# Patient Record
Sex: FEMALE | Race: WHITE | Employment: FULL TIME | URBAN - METROPOLITAN AREA
[De-identification: names, ages, dates, MRNs, and addresses within clinical notes are randomized per-mention and may not be internally consistent; named-entity substitution may affect disease eponyms.]

---

## 2017-06-06 ENCOUNTER — TELEPHONE (OUTPATIENT)
Dept: GYNECOLOGY | Age: 62
End: 2017-06-06

## 2017-06-06 DIAGNOSIS — C54.9 MALIGNANT NEOPLASM OF CORPUS UTERI, EXCEPT ISTHMUS (HCC): Primary | ICD-10-CM

## 2017-06-27 LAB
ALBUMIN SERPL-MCNC: 4.3 G/DL (ref 3.6–4.8)
ALBUMIN/GLOB SERPL: 1.9 {RATIO} (ref 1.2–2.2)
ALP SERPL-CCNC: 75 IU/L (ref 39–117)
ALT SERPL-CCNC: 15 IU/L (ref 0–32)
AST SERPL-CCNC: 14 IU/L (ref 0–40)
BASOPHILS # BLD AUTO: 0 X10E3/UL (ref 0–0.2)
BASOPHILS NFR BLD AUTO: 0 %
BILIRUB SERPL-MCNC: 0.7 MG/DL (ref 0–1.2)
BUN SERPL-MCNC: 10 MG/DL (ref 8–27)
BUN/CREAT SERPL: 21 (ref 12–28)
CALCIUM SERPL-MCNC: 9 MG/DL (ref 8.7–10.3)
CANCER AG125 SERPL-ACNC: 7.4 U/ML (ref 0–38.1)
CHLORIDE SERPL-SCNC: 102 MMOL/L (ref 96–106)
CO2 SERPL-SCNC: 24 MMOL/L (ref 18–29)
CREAT SERPL-MCNC: 0.48 MG/DL (ref 0.57–1)
EOSINOPHIL # BLD AUTO: 0.1 X10E3/UL (ref 0–0.4)
EOSINOPHIL NFR BLD AUTO: 3 %
ERYTHROCYTE [DISTWIDTH] IN BLOOD BY AUTOMATED COUNT: 14 % (ref 12.3–15.4)
GLOBULIN SER CALC-MCNC: 2.3 G/DL (ref 1.5–4.5)
GLUCOSE SERPL-MCNC: 156 MG/DL (ref 65–99)
HCT VFR BLD AUTO: 42.3 % (ref 34–46.6)
HGB BLD-MCNC: 14.4 G/DL (ref 11.1–15.9)
IMM GRANULOCYTES # BLD: 0 X10E3/UL (ref 0–0.1)
IMM GRANULOCYTES NFR BLD: 0 %
LYMPHOCYTES # BLD AUTO: 1.7 X10E3/UL (ref 0.7–3.1)
LYMPHOCYTES NFR BLD AUTO: 35 %
MCH RBC QN AUTO: 30.1 PG (ref 26.6–33)
MCHC RBC AUTO-ENTMCNC: 34 G/DL (ref 31.5–35.7)
MCV RBC AUTO: 89 FL (ref 79–97)
MONOCYTES # BLD AUTO: 0.4 X10E3/UL (ref 0.1–0.9)
MONOCYTES NFR BLD AUTO: 8 %
NEUTROPHILS # BLD AUTO: 2.6 X10E3/UL (ref 1.4–7)
NEUTROPHILS NFR BLD AUTO: 54 %
PLATELET # BLD AUTO: 231 X10E3/UL (ref 150–379)
POTASSIUM SERPL-SCNC: 3.8 MMOL/L (ref 3.5–5.2)
PROT SERPL-MCNC: 6.6 G/DL (ref 6–8.5)
RBC # BLD AUTO: 4.78 X10E6/UL (ref 3.77–5.28)
SODIUM SERPL-SCNC: 140 MMOL/L (ref 134–144)
WBC # BLD AUTO: 4.9 X10E3/UL (ref 3.4–10.8)

## 2017-06-29 ENCOUNTER — OFFICE VISIT (OUTPATIENT)
Dept: GYNECOLOGY | Age: 62
End: 2017-06-29

## 2017-06-29 VITALS
DIASTOLIC BLOOD PRESSURE: 78 MMHG | SYSTOLIC BLOOD PRESSURE: 129 MMHG | WEIGHT: 178.4 LBS | HEART RATE: 100 BPM | BODY MASS INDEX: 29.72 KG/M2 | HEIGHT: 65 IN

## 2017-06-29 DIAGNOSIS — R31.9 HEMATURIA: ICD-10-CM

## 2017-06-29 DIAGNOSIS — C54.9 MALIGNANT NEOPLASM OF CORPUS UTERI, EXCEPT ISTHMUS (HCC): Primary | ICD-10-CM

## 2017-06-29 RX ORDER — RANITIDINE 150 MG/1
TABLET, FILM COATED ORAL
COMMUNITY
Start: 2011-09-29 | End: 2022-10-04

## 2017-06-29 NOTE — PROGRESS NOTES
[address] [place] [address] Mathias Moritz [address], [address] [facility]  [phone]  MD Dashawn Wynn MD  Office Visit    Patient ID:  Name: Marci Cornejo  MRN: [id number]  : 1955/62 y.o. Visit date: 2017    INTERVAL HISTORY:  Marci Cornejo is a  female with stage IA, grade 3 uterine carcinoma. She underwent laparoscopic hysterectomy with staging in 2013. She was recommended six cycles of adjuvant Taxol and Carboplatin, which she completed. We elected not to treat with pelvic radiation therapy due to her difficulty with chemotherapy. She presents today for routine surveillance. Her most recent CA-125 is normal and her most recent CT showed no evidence of disease. She reports some bleeding this morning. She does have hemorrhoids, but she doesn't think it was from there. She is a bit concerned about it. PMH:  Past Medical History:   Diagnosis Date    Abnormal Pap smear     with f/u normal    Anemia     Arthritis     Asthma     Cancer ([facility] [address]     ENDOMETRIAL    Environmental allergies     GERD (gastroesophageal reflux disease)     Goiter     Other unknown and unspecified cause of morbidity or mortality     POSSIBLE ESOPHAGEAL SPASM, ?  OBSTRUCTION DUE TO GOITER    PMB (postmenopausal bleeding)     S/P chemotherapy, time since greater than 12 weeks     LAST CHEMO 10/2014 PER PATIENT    Thyroid disease     goiter     PSH:  Past Surgical History:   Procedure Laterality Date    BREAST SURGERY PROCEDURE UNLISTED  16    right breast bx    HX APPENDECTOMY      HX BUNIONECTOMY      HX GYN  3/13/13    TLH/BSO    HX HEENT  13    TOOTH REMOVED    HX ORTHOPAEDIC      BUNIONECTOMY    HX VASCULAR ACCESS      port     SOC:  Social History     Social History    Marital status:      Spouse name: N/A    Number of children: N/A    Years of education: N/A     Occupational History    Not on file. Social History Main Topics    Smoking status: Never Smoker    Smokeless tobacco: Never Used    Alcohol use 0.0 oz/week      Comment: RARE OCC    Drug use: No    Sexual activity: Not on file     Other Topics Concern    Not on file     Social History Narrative     Family History  Family History   Problem Relation Age of Onset    Cancer Maternal Aunt      breast    Heart Disease Mother     Diabetes Father     Cancer Sister      CERVICAL    Kidney Disease Brother     Diabetes Brother     Heart Attack Other     Arrhythmia Brother     Diabetes Brother     Anesth Problems Neg Hx      Medications:  Current Outpatient Prescriptions on File Prior to Visit   Medication Sig Dispense Refill    valACYclovir (VALTREX) 1 gram tablet Take 1 Tab by mouth three (3) times daily. 21 Tab 3    EPINEPHrine (EPIPEN) 0.3 mg/0.3 mL injection 0.3 mg by IntraMUSCular route once as needed.  Cetirizine (ZYRTEC) 10 mg cap Take 10 mg by mouth nightly.  SAL ACID/UREA/PETROLATUM,WHITE (KERASAL FOOT EX) by Apply Externally route daily as needed.  ACCU-CHEK HELDER PLUS TEST STRP strip   0    NITROSTAT 0.4 mg SL tablet       CALCIUM CARBONATE (MARCELLO-SELTZER ANTACID PO) Take  by mouth daily as needed.  ibuprofen (MOTRIN) 200 mg tablet Take 200 mg by mouth every six (6) hours as needed.  cyclobenzaprine (FLEXERIL) 5 mg tablet take 1 tablet by mouth three times a day if needed  0    HYDROcodone-acetaminophen (NORCO) 5-325 mg per tablet       oxyCODONE-acetaminophen (PERCOCET) 5-325 mg per tablet Take 1 Tab by mouth every four (4) hours as needed for Pain. Max Daily Amount: 6 Tabs. 20 Tab 0    ketotifen (ZADITOR) 0.025 % (0.035 %) ophthalmic solution Administer 1 Drop to both eyes every morning. No current facility-administered medications on file prior to visit. Allergies:   Allergies   Allergen Reactions    Latex Other (comments)     Burns skin    Acetone Shortness of Breath    Amoxicillin Anaphylaxis    Pcn [Penicillins] Anaphylaxis    Azithromycin Hives    Egg Nausea Only    Other Medication Rash     POPPY seeds       ROS:  Negative     OBJECTIVE:    PHYSICAL EXAM  VITAL SIGNS: Visit Vitals    /78 (BP 1 Location: Right arm, BP Patient Position: Sitting)    Pulse 100    Ht 5' 5\" (1.651 m)    Wt 80.9 kg (178 lb 6.4 oz)    BMI 29.69 kg/m2      GENERAL KAIA: in no apparent distress and well developed and well nourished   HEENT: within normal limits   RESPIRATORY: lungs clear to auscultation   CARDIOVASC: Regular rate and rhythm   GASTROINT: soft, non-tender, without masses or organomegaly; vesicular rash on the left lower back   MUSCULOSKEL: no joint tenderness, deformity or swelling   EXTREMITIES: extremities normal, atraumatic, no cyanosis or edema   PELVIC: Normal external genitalia. Normal vagina. Uterus, cervix, and adnexa surgically absent. No masses or nodularity on examination. She does have some vaginal prolapse. RECTAL: Exam deferred. PRIYANKA SURVEY: Cervical, supraclavicular, and axillary nodes normal.   NEURO: Grossly normal       CT of abdomen/pelvis (12/20/16)  The visualized portions of the lung bases are clear.     There are no focal abnormalities within the liver, spleen, pancreas, adrenal  glands or kidneys. No gallstones are noted.     The aorta tapers without aneurysm. There is no retroperitoneal adenopathy or  mass. There is no ascites or free intraperitoneal air.     There is no small or large bowel distention. The appendix is surgically absent.      The uterus and ovaries are surgically absent. There is no pelvic mass or  adenopathy.     Mild degenerative changes are noted in the lumbar spine.       IMPRESSION: Status post hysterectomy, bilateral oophorectomy, and appendectomy. No acute abnormality.       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 She completed six cycles of adjuvant Taxol and Carboplatin chemotherapy and her post-treatment CT scan showed no evidence of disease. She declined adjuvant pelvic radiation. She has no evidence of disease on today's exam.  I reassured her that I did not see anything vaginally that would suggest disease. I think the bleeding was most likely due to her hemorrhoids. I am going to send her for a UA to rule out a urinary source.        Andreea Carlton MD  6/29/2017/2:05 PM

## 2017-06-29 NOTE — PROGRESS NOTES
Six month check up, pt has had recent GI issues, abdominal bloating and  noticed blood when she wiped this morning. She thinks it was vaginal. Last colonoscopy in 1/2015.

## 2017-06-30 LAB
APPEARANCE UR: CLEAR
BILIRUB UR QL STRIP: NEGATIVE
COLOR UR: YELLOW
GLUCOSE UR QL: ABNORMAL
HGB UR QL STRIP: NEGATIVE
KETONES UR QL STRIP: NEGATIVE
LEUKOCYTE ESTERASE UR QL STRIP: NEGATIVE
MICRO URNS: ABNORMAL
NITRITE UR QL STRIP: NEGATIVE
PH UR STRIP: 6 [PH] (ref 5–7.5)
PROT UR QL STRIP: NEGATIVE
SP GR UR: 1.01 (ref 1–1.03)
UROBILINOGEN UR STRIP-MCNC: 0.2 MG/DL (ref 0.2–1)

## 2017-07-06 LAB
CYTOLOGIST CVX/VAG CYTO: NORMAL
CYTOLOGY CVX/VAG DOC THIN PREP: NORMAL
LABCORP, 190119: NORMAL
Lab: NORMAL
OTHER STN SPEC: NORMAL
PATH REPORT.FINAL DX SPEC: NORMAL
STAT OF ADQ CVX/VAG CYTO-IMP: NORMAL

## 2017-07-13 ENCOUNTER — TELEPHONE (OUTPATIENT)
Dept: GYNECOLOGY | Age: 62
End: 2017-07-13

## 2017-07-19 NOTE — TELEPHONE ENCOUNTER
Pt calling back to get pap and urinalysis results. Pt informed pap was normal and urine showed a trace of glucose. She was encouraged to see her PCP as she feels her thyroid may also be low.

## 2017-11-18 ENCOUNTER — HOSPITAL ENCOUNTER (OUTPATIENT)
Dept: ULTRASOUND IMAGING | Age: 62
Discharge: HOME | End: 2017-11-18
Attending: OTOLARYNGOLOGY
Payer: COMMERCIAL

## 2017-11-18 DIAGNOSIS — E04.1 THYROID NODULE: ICD-10-CM

## 2017-11-18 DIAGNOSIS — Z78.9 NONSMOKER: ICD-10-CM

## 2017-11-18 PROCEDURE — 76536 US EXAM OF HEAD AND NECK: CPT

## 2017-12-05 ENCOUNTER — TELEPHONE (OUTPATIENT)
Dept: GYNECOLOGY | Age: 62
End: 2017-12-05

## 2017-12-05 DIAGNOSIS — C54.9 MALIGNANT NEOPLASM OF CORPUS UTERI, EXCEPT ISTHMUS (HCC): Primary | ICD-10-CM

## 2017-12-12 LAB
ALBUMIN SERPL-MCNC: 4.3 G/DL (ref 3.6–4.8)
ALBUMIN/GLOB SERPL: 1.7 {RATIO} (ref 1.2–2.2)
ALP SERPL-CCNC: 76 IU/L (ref 39–117)
ALT SERPL-CCNC: 16 IU/L (ref 0–32)
AST SERPL-CCNC: 14 IU/L (ref 0–40)
BASOPHILS # BLD AUTO: 0 X10E3/UL (ref 0–0.2)
BASOPHILS NFR BLD AUTO: 0 %
BILIRUB SERPL-MCNC: 0.7 MG/DL (ref 0–1.2)
BUN SERPL-MCNC: 10 MG/DL (ref 8–27)
BUN/CREAT SERPL: 21 (ref 12–28)
CALCIUM SERPL-MCNC: 8.8 MG/DL (ref 8.7–10.3)
CANCER AG125 SERPL-ACNC: 6.8 U/ML (ref 0–38.1)
CHLORIDE SERPL-SCNC: 102 MMOL/L (ref 96–106)
CO2 SERPL-SCNC: 25 MMOL/L (ref 18–29)
CREAT SERPL-MCNC: 0.48 MG/DL (ref 0.57–1)
EOSINOPHIL # BLD AUTO: 0.1 X10E3/UL (ref 0–0.4)
EOSINOPHIL NFR BLD AUTO: 2 %
ERYTHROCYTE [DISTWIDTH] IN BLOOD BY AUTOMATED COUNT: 13.9 % (ref 12.3–15.4)
GFR SERPLBLD CREATININE-BSD FMLA CKD-EPI: 106 ML/MIN/1.73
GFR SERPLBLD CREATININE-BSD FMLA CKD-EPI: 122 ML/MIN/1.73
GLOBULIN SER CALC-MCNC: 2.5 G/DL (ref 1.5–4.5)
GLUCOSE SERPL-MCNC: 147 MG/DL (ref 65–99)
HCT VFR BLD AUTO: 43.4 % (ref 34–46.6)
HGB BLD-MCNC: 14.4 G/DL (ref 11.1–15.9)
IMM GRANULOCYTES # BLD: 0 X10E3/UL (ref 0–0.1)
IMM GRANULOCYTES NFR BLD: 0 %
LYMPHOCYTES # BLD AUTO: 1.7 X10E3/UL (ref 0.7–3.1)
LYMPHOCYTES NFR BLD AUTO: 25 %
MCH RBC QN AUTO: 30.1 PG (ref 26.6–33)
MCHC RBC AUTO-ENTMCNC: 33.2 G/DL (ref 31.5–35.7)
MCV RBC AUTO: 91 FL (ref 79–97)
MONOCYTES # BLD AUTO: 0.6 X10E3/UL (ref 0.1–0.9)
MONOCYTES NFR BLD AUTO: 8 %
NEUTROPHILS # BLD AUTO: 4.2 X10E3/UL (ref 1.4–7)
NEUTROPHILS NFR BLD AUTO: 65 %
PLATELET # BLD AUTO: 239 X10E3/UL (ref 150–379)
POTASSIUM SERPL-SCNC: 4.2 MMOL/L (ref 3.5–5.2)
PROT SERPL-MCNC: 6.8 G/DL (ref 6–8.5)
RBC # BLD AUTO: 4.78 X10E6/UL (ref 3.77–5.28)
SODIUM SERPL-SCNC: 141 MMOL/L (ref 134–144)
WBC # BLD AUTO: 6.6 X10E3/UL (ref 3.4–10.8)

## 2017-12-14 ENCOUNTER — OFFICE VISIT (OUTPATIENT)
Dept: GYNECOLOGY | Age: 62
End: 2017-12-14

## 2017-12-14 VITALS
SYSTOLIC BLOOD PRESSURE: 130 MMHG | HEART RATE: 80 BPM | WEIGHT: 175.8 LBS | BODY MASS INDEX: 29.29 KG/M2 | HEIGHT: 65 IN | DIASTOLIC BLOOD PRESSURE: 80 MMHG

## 2017-12-14 DIAGNOSIS — C54.9 MALIGNANT NEOPLASM OF CORPUS UTERI, EXCEPT ISTHMUS (HCC): Primary | ICD-10-CM

## 2017-12-14 RX ORDER — EPINASTINE HYDROCHLORIDE 0.5 MG/ML
SOLUTION/ DROPS OPHTHALMIC
COMMUNITY
End: 2022-10-04

## 2017-12-14 NOTE — PROGRESS NOTES
[address] [place] [address] Mathias Mor[address], [address] [facility]  [phone]  MD Morris Mondragon MD  Office Visit    Patient ID:  Name: Lui Reynoso  MRN: [id number]  : 62 y.o. Visit date: 2017    INTERVAL HISTORY:  Lui Reynoso is a  female with stage IA, grade 3 uterine carcinoma. She underwent laparoscopic hysterectomy with staging in 2013. She was recommended six cycles of adjuvant Taxol and Carboplatin, which she completed. We elected not to treat with pelvic radiation therapy due to her difficulty with chemotherapy. She presents today for routine surveillance. Her most recent CA-125 is normal and her most recent CT showed no evidence of disease. She is doing well and is without complaints. She denies any bleeding or discharge. She denies any bowel or bladder complaints. PMH:  Past Medical History:   Diagnosis Date    Abnormal Pap smear     with f/u normal    Anemia     Arthritis     Asthma     Cancer ([facility] [address]     ENDOMETRIAL    Environmental allergies     GERD (gastroesophageal reflux disease)     Goiter     Other unknown and unspecified cause of morbidity or mortality     POSSIBLE ESOPHAGEAL SPASM, ? OBSTRUCTION DUE TO GOITER    PMB (postmenopausal bleeding)     S/P chemotherapy, time since greater than 12 weeks     LAST CHEMO 10/2014 PER PATIENT    Thyroid disease     goiter     PSH:  Past Surgical History:   Procedure Laterality Date    BREAST SURGERY PROCEDURE UNLISTED  16    right breast bx    HX APPENDECTOMY      HX BUNIONECTOMY      HX GYN  3/13/13    TLH/BSO    HX HEENT  13    TOOTH REMOVED    HX ORTHOPAEDIC      BUNIONECTOMY    HX VASCULAR ACCESS      port     SOC:  Social History     Social History    Marital status:      Spouse name: N/A    Number of children: N/A    Years of education: N/A     Occupational History    Not on file. Social History Main Topics    Smoking status: Never Smoker    Smokeless tobacco: Never Used    Alcohol use 0.0 oz/week      Comment: RARE OCC    Drug use: No    Sexual activity: Not on file     Other Topics Concern    Not on file     Social History Narrative     Family History  Family History   Problem Relation Age of Onset    Cancer Maternal Aunt      breast    Heart Disease Mother     Diabetes Father     Cancer Sister      CERVICAL    Kidney Disease Brother     Diabetes Brother     Heart Attack Other     Arrhythmia Brother     Diabetes Brother     Anesth Problems Neg Hx      Medications:  Current Outpatient Prescriptions on File Prior to Visit   Medication Sig Dispense Refill    raNITIdine (ZANTAC) 150 mg tablet ZANTAC TABS      valACYclovir (VALTREX) 1 gram tablet Take 1 Tab by mouth three (3) times daily. 21 Tab 3    EPINEPHrine (EPIPEN) 0.3 mg/0.3 mL injection 0.3 mg by IntraMUSCular route once as needed.  Cetirizine (ZYRTEC) 10 mg cap Take 10 mg by mouth nightly.  SAL ACID/UREA/PETROLATUM,WHITE (KERASAL FOOT EX) by Apply Externally route daily as needed.  ACCU-CHEK HELDER PLUS TEST STRP strip   0    NITROSTAT 0.4 mg SL tablet       CALCIUM CARBONATE (MARCELLO-SELTZER ANTACID PO) Take  by mouth daily as needed.  ibuprofen (MOTRIN) 200 mg tablet Take 200 mg by mouth every six (6) hours as needed.  cyclobenzaprine (FLEXERIL) 5 mg tablet take 1 tablet by mouth three times a day if needed  0    HYDROcodone-acetaminophen (NORCO) 5-325 mg per tablet       oxyCODONE-acetaminophen (PERCOCET) 5-325 mg per tablet Take 1 Tab by mouth every four (4) hours as needed for Pain. Max Daily Amount: 6 Tabs. 20 Tab 0    ketotifen (ZADITOR) 0.025 % (0.035 %) ophthalmic solution Administer 1 Drop to both eyes every morning. No current facility-administered medications on file prior to visit. Allergies:   Allergies   Allergen Reactions    Latex Other (comments)     Burns skin  Acetone Shortness of Breath    Amoxicillin Anaphylaxis    Pcn [Penicillins] Anaphylaxis    Azithromycin Hives    Egg Nausea Only    Other Medication Rash     POPPY seeds       ROS:  Negative     OBJECTIVE:    PHYSICAL EXAM  VITAL SIGNS: Visit Vitals    /80 (BP 1 Location: Left arm, BP Patient Position: Sitting)    Pulse 80    Ht 5' 5\" (1.651 m)    Wt 175 lb 12.8 oz (79.7 kg)    BMI 29.25 kg/m2      GENERAL KAIA: in no apparent distress and well developed and well nourished   HEENT: within normal limits   RESPIRATORY: lungs clear to auscultation   CARDIOVASC: Regular rate and rhythm   GASTROINT: soft, non-tender, without masses or organomegaly; vesicular rash on the left lower back   MUSCULOSKEL: no joint tenderness, deformity or swelling   EXTREMITIES: extremities normal, atraumatic, no cyanosis or edema   PELVIC: Normal external genitalia. Normal vagina. Uterus, cervix, and adnexa surgically absent. No masses or nodularity on examination. She does have some vaginal prolapse. RECTAL: Exam deferred. PRIYANKA SURVEY: Cervical, supraclavicular, and axillary nodes normal.   NEURO: Grossly normal       CT of abdomen/pelvis (12/20/16)  The visualized portions of the lung bases are clear.     There are no focal abnormalities within the liver, spleen, pancreas, adrenal  glands or kidneys. No gallstones are noted.     The aorta tapers without aneurysm. There is no retroperitoneal adenopathy or  mass. There is no ascites or free intraperitoneal air.     There is no small or large bowel distention. The appendix is surgically absent.      The uterus and ovaries are surgically absent. There is no pelvic mass or  adenopathy.     Mild degenerative changes are noted in the lumbar spine.       IMPRESSION: Status post hysterectomy, bilateral oophorectomy, and appendectomy.   No acute abnormality.       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 She completed six cycles of adjuvant Taxol and Carboplatin chemotherapy and her post-treatment CT scan showed no evidence of disease. She declined adjuvant pelvic radiation.   She has no evidence of disease on today's exam.       Shalini Gil MD  12/14/2017/2:05 PM

## 2017-12-19 ENCOUNTER — HOSPITAL ENCOUNTER (OUTPATIENT)
Dept: ULTRASOUND IMAGING | Age: 62
Discharge: HOME | End: 2017-12-19
Attending: OTOLARYNGOLOGY
Payer: COMMERCIAL

## 2017-12-19 DIAGNOSIS — E04.1 THYROID NODULE: ICD-10-CM

## 2017-12-19 PROCEDURE — 10022 US GUIDE FINE NDL ASP W IMAGE: CPT

## 2017-12-19 PROCEDURE — 74011000250 HC RX REV CODE- 250: Performed by: RADIOLOGY

## 2017-12-19 PROCEDURE — 88172 CYTP DX EVAL FNA 1ST EA SITE: CPT | Performed by: OTOLARYNGOLOGY

## 2017-12-19 PROCEDURE — 88173 CYTOPATH EVAL FNA REPORT: CPT | Performed by: OTOLARYNGOLOGY

## 2017-12-19 RX ORDER — LIDOCAINE HYDROCHLORIDE 10 MG/ML
10 INJECTION, SOLUTION EPIDURAL; INFILTRATION; INTRACAUDAL; PERINEURAL
Status: COMPLETED | OUTPATIENT
Start: 2017-12-19 | End: 2017-12-19

## 2017-12-19 RX ADMIN — LIDOCAINE HYDROCHLORIDE 5 ML: 10 INJECTION, SOLUTION EPIDURAL; INFILTRATION; INTRACAUDAL; PERINEURAL at 12:02

## 2017-12-19 NOTE — DISCHARGE INSTRUCTIONS
[address] [facility]  Department of Radiology  [phone] Ultrasound Department  [phone] Emergency Department    BIOPSY DISCHARGE INSTRUCTIONS for Danii Rodríguez on 12/19/17    General Instructions:  - A biopsy is the removal of a small piece of tissue for microscopic examination or testing.  - Healthy tissue can be obtained for the purpose of tissue-type matching for transplants. - Unhealthy tissues are more commonly biopsied to diagnose disease. General Biopsy:  - A mass can grow in any area of the body, and we are taking a specimen as ordered by your doctor. - The risks are the same. They include bleeding, pain, and infection. Home Care Instructions:  - You may resume your regular diet and medication regimen. - You may use over the counter acetaminophen (Tylenol) or ibuprofen (Advil) for the soreness. - You may apply an ice pack to the affected area for 20-30 minutes at time for the first 24 hours. -- After that, you may apply a heat pack. - You may remove the bandaid(s) tonight. - You may take a shower tonight. - Please keep the site clean and dry for 24-48 hours. - Do not soak in any kind of water (bath tub, hot tub, pool, ocean, etc) for 24-48 hours. - Do not participate in any kind of activity making you vigorously sweat for 24-48 hours. - Do not use any moisturizer/makeup/perfume on the site for 24-48 hours. Call If:  - You should call your doctor if you have any questions or concerns about the biopsy site. - Call if you should have increased pain, fever, redness, drainage, or bleeding more than a small spot on the bandage. Follow-Up Instructions:  - Please see your doctor as he has requested.

## 2017-12-22 LAB
CYTOLOGIST CVX/VAG CYTO: NORMAL
CYTOLOGY CVX/VAG DOC THIN PREP: NORMAL
DX ICD CODE: NORMAL
LABCORP, 190119: NORMAL
Lab: NORMAL
Lab: NORMAL
OTHER STN SPEC: NORMAL
PATH REPORT.FINAL DX SPEC: NORMAL
STAT OF ADQ CVX/VAG CYTO-IMP: NORMAL

## 2018-06-14 ENCOUNTER — TELEPHONE (OUTPATIENT)
Dept: GYNECOLOGY | Age: 63
End: 2018-06-14

## 2018-06-15 DIAGNOSIS — C54.9 MALIGNANT NEOPLASM OF CORPUS UTERI, EXCEPT ISTHMUS (HCC): Primary | ICD-10-CM

## 2018-06-19 LAB
ALBUMIN SERPL-MCNC: 4.3 G/DL (ref 3.6–4.8)
ALBUMIN/GLOB SERPL: 1.7 {RATIO} (ref 1.2–2.2)
ALP SERPL-CCNC: 69 IU/L (ref 39–117)
ALT SERPL-CCNC: 17 IU/L (ref 0–32)
AST SERPL-CCNC: 16 IU/L (ref 0–40)
BASOPHILS # BLD AUTO: 0 X10E3/UL (ref 0–0.2)
BASOPHILS NFR BLD AUTO: 1 %
BILIRUB SERPL-MCNC: 0.4 MG/DL (ref 0–1.2)
BUN SERPL-MCNC: 12 MG/DL (ref 8–27)
BUN/CREAT SERPL: 23 (ref 12–28)
CALCIUM SERPL-MCNC: 9 MG/DL (ref 8.7–10.3)
CANCER AG125 SERPL-ACNC: 6.9 U/ML (ref 0–38.1)
CHLORIDE SERPL-SCNC: 104 MMOL/L (ref 96–106)
CO2 SERPL-SCNC: 23 MMOL/L (ref 20–29)
CREAT SERPL-MCNC: 0.52 MG/DL (ref 0.57–1)
EOSINOPHIL # BLD AUTO: 0.1 X10E3/UL (ref 0–0.4)
EOSINOPHIL NFR BLD AUTO: 2 %
ERYTHROCYTE [DISTWIDTH] IN BLOOD BY AUTOMATED COUNT: 13.6 % (ref 12.3–15.4)
GFR SERPLBLD CREATININE-BSD FMLA CKD-EPI: 103 ML/MIN/1.73
GFR SERPLBLD CREATININE-BSD FMLA CKD-EPI: 118 ML/MIN/1.73
GLOBULIN SER CALC-MCNC: 2.5 G/DL (ref 1.5–4.5)
GLUCOSE SERPL-MCNC: 146 MG/DL (ref 65–99)
HCT VFR BLD AUTO: 43.7 % (ref 34–46.6)
HGB BLD-MCNC: 14.6 G/DL (ref 11.1–15.9)
IMM GRANULOCYTES # BLD: 0 X10E3/UL (ref 0–0.1)
IMM GRANULOCYTES NFR BLD: 0 %
LYMPHOCYTES # BLD AUTO: 1.8 X10E3/UL (ref 0.7–3.1)
LYMPHOCYTES NFR BLD AUTO: 33 %
MCH RBC QN AUTO: 30.6 PG (ref 26.6–33)
MCHC RBC AUTO-ENTMCNC: 33.4 G/DL (ref 31.5–35.7)
MCV RBC AUTO: 92 FL (ref 79–97)
MONOCYTES # BLD AUTO: 0.5 X10E3/UL (ref 0.1–0.9)
MONOCYTES NFR BLD AUTO: 10 %
NEUTROPHILS # BLD AUTO: 2.9 X10E3/UL (ref 1.4–7)
NEUTROPHILS NFR BLD AUTO: 54 %
PLATELET # BLD AUTO: 222 X10E3/UL (ref 150–379)
POTASSIUM SERPL-SCNC: 4.1 MMOL/L (ref 3.5–5.2)
PROT SERPL-MCNC: 6.8 G/DL (ref 6–8.5)
RBC # BLD AUTO: 4.77 X10E6/UL (ref 3.77–5.28)
SODIUM SERPL-SCNC: 142 MMOL/L (ref 134–144)
WBC # BLD AUTO: 5.4 X10E3/UL (ref 3.4–10.8)

## 2018-06-26 ENCOUNTER — OFFICE VISIT (OUTPATIENT)
Dept: GYNECOLOGY | Age: 63
End: 2018-06-26

## 2018-06-26 VITALS
BODY MASS INDEX: 29.06 KG/M2 | DIASTOLIC BLOOD PRESSURE: 82 MMHG | HEIGHT: 65 IN | HEART RATE: 80 BPM | SYSTOLIC BLOOD PRESSURE: 142 MMHG | WEIGHT: 174.4 LBS

## 2018-06-26 DIAGNOSIS — C54.9 MALIGNANT NEOPLASM OF CORPUS UTERI, EXCEPT ISTHMUS (HCC): Primary | ICD-10-CM

## 2018-06-26 NOTE — PROGRESS NOTES
Six month check up. Patient states no abnormal spotting or bleeding. 1. Have you been to the ER, urgent care clinic since your last visit? Hospitalized since your last visit? No    2. Have you seen or consulted any other health care providers outside of the [address] [place] [address] since your last visit? Include any pap smears or colon screening.  No

## 2018-06-26 NOTE — PROGRESS NOTES
[address] [place] [address] Mathias Mor[address], [address] [facility]  [phone]  MD Romelia Licea MD  Office Visit    Patient ID:  Name: Vince Epstein  MRN: [id number]  : 1955/63 y.o. Visit date: 2018    INTERVAL HISTORY:  Vince Epstein is a  female with stage IA, grade 3 uterine carcinoma. She underwent laparoscopic hysterectomy with staging in 2013. She was recommended six cycles of adjuvant Taxol and Carboplatin, which she completed. We elected not to treat with pelvic radiation therapy due to her difficulty with chemotherapy. She presents today for routine surveillance. Her most recent CA-125 is normal and her most recent CT showed no evidence of disease. She is doing well and is without complaints. She denies any bleeding or discharge. She denies any bowel or bladder complaints. PMH:  Past Medical History:   Diagnosis Date    Abnormal Pap smear     with f/u normal    Anemia     Arthritis     Asthma     Cancer ([facility] [address]     ENDOMETRIAL    Environmental allergies     GERD (gastroesophageal reflux disease)     Goiter     Other unknown and unspecified cause of morbidity or mortality     POSSIBLE ESOPHAGEAL SPASM, ? OBSTRUCTION DUE TO GOITER    PMB (postmenopausal bleeding)     S/P chemotherapy, time since greater than 12 weeks     LAST CHEMO 10/2014 PER PATIENT    Thyroid disease     goiter     PSH:  Past Surgical History:   Procedure Laterality Date    BREAST SURGERY PROCEDURE UNLISTED  16    right breast bx    HX APPENDECTOMY      HX BUNIONECTOMY      HX GYN  3/13/13    TLH/BSO    HX HEENT  13    TOOTH REMOVED    HX ORTHOPAEDIC      BUNIONECTOMY    HX VASCULAR ACCESS      port     SOC:  Social History     Social History    Marital status:      Spouse name: N/A    Number of children: N/A    Years of education: N/A     Occupational History    Not on file. Social History Main Topics    Smoking status: Never Smoker    Smokeless tobacco: Never Used    Alcohol use 0.0 oz/week      Comment: RARE OCC    Drug use: No    Sexual activity: Not on file     Other Topics Concern    Not on file     Social History Narrative     Family History  Family History   Problem Relation Age of Onset    Cancer Maternal Aunt      breast    Heart Disease Mother     Diabetes Father     Cancer Sister      CERVICAL    Kidney Disease Brother     Diabetes Brother     Heart Attack Other     Arrhythmia Brother     Diabetes Brother     Anesth Problems Neg Hx      Medications:  Current Outpatient Prescriptions on File Prior to Visit   Medication Sig Dispense Refill    raNITIdine (ZANTAC) 150 mg tablet ZANTAC TABS      valACYclovir (VALTREX) 1 gram tablet Take 1 Tab by mouth three (3) times daily. 21 Tab 3    EPINEPHrine (EPIPEN) 0.3 mg/0.3 mL injection 0.3 mg by IntraMUSCular route once as needed.  ketotifen (ZADITOR) 0.025 % (0.035 %) ophthalmic solution Administer 1 Drop to both eyes every morning.  Cetirizine (ZYRTEC) 10 mg cap Take 10 mg by mouth nightly.  SAL ACID/UREA/PETROLATUM,WHITE (KERASAL FOOT EX) by Apply Externally route daily as needed.  ACCU-CHEK HELDER PLUS TEST STRP strip   0    NITROSTAT 0.4 mg SL tablet       CALCIUM CARBONATE (MARCELLO-SELTZER ANTACID PO) Take  by mouth daily as needed.  ibuprofen (MOTRIN) 200 mg tablet Take 200 mg by mouth every six (6) hours as needed.  epinastine 0.05 % drop Apply  to eye.  cyclobenzaprine (FLEXERIL) 5 mg tablet take 1 tablet by mouth three times a day if needed  0     No current facility-administered medications on file prior to visit. Allergies:   Allergies   Allergen Reactions    Latex Other (comments)     Burns skin    Acetone Shortness of Breath    Amoxicillin Anaphylaxis    Pcn [Penicillins] Anaphylaxis    Azithromycin Hives    Egg Nausea Only    Other Medication Rash POPPY seeds       ROS:  Negative     OBJECTIVE:    PHYSICAL EXAM  VITAL SIGNS: Visit Vitals    /82 (BP 1 Location: Right arm, BP Patient Position: Sitting)    Pulse 80    Ht 5' 5\" (1.651 m)    Wt 174 lb 6.4 oz (79.1 kg)    BMI 29.02 kg/m2      GENERAL KAIA: in no apparent distress and well developed and well nourished   HEENT: within normal limits   RESPIRATORY: lungs clear to auscultation   CARDIOVASC: Regular rate and rhythm   GASTROINT: soft, non-tender, without masses or organomegaly; vesicular rash on the left lower back   MUSCULOSKEL: no joint tenderness, deformity or swelling   EXTREMITIES: extremities normal, atraumatic, no cyanosis or edema   PELVIC: Normal external genitalia. Normal vagina. Uterus, cervix, and adnexa surgically absent. No masses or nodularity on examination. She does have some vaginal prolapse. RECTAL: Exam deferred. PRIYANKA SURVEY: Cervical, supraclavicular, and axillary nodes normal.   NEURO: Grossly normal       CT of abdomen/pelvis (12/20/16)  The visualized portions of the lung bases are clear.     There are no focal abnormalities within the liver, spleen, pancreas, adrenal  glands or kidneys. No gallstones are noted.     The aorta tapers without aneurysm. There is no retroperitoneal adenopathy or  mass. There is no ascites or free intraperitoneal air.     There is no small or large bowel distention. The appendix is surgically absent.      The uterus and ovaries are surgically absent. There is no pelvic mass or  adenopathy.     Mild degenerative changes are noted in the lumbar spine.       IMPRESSION: Status post hysterectomy, bilateral oophorectomy, and appendectomy. No acute abnormality.       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   She completed six cycles of adjuvant Taxol and Carboplatin chemotherapy and her post-treatment CT scan showed no evidence of disease. She declined adjuvant pelvic radiation.   She has no evidence of disease on today's exam.       Maryanne Patterson., MD  6/26/2018/2:05 PM

## 2018-06-29 LAB
CYTOLOGIST CVX/VAG CYTO: NORMAL
CYTOLOGY CVX/VAG DOC THIN PREP: NORMAL
DX ICD CODE: NORMAL
LABCORP, 190119: NORMAL
Lab: NORMAL
OTHER STN SPEC: NORMAL
PATH REPORT.FINAL DX SPEC: NORMAL
STAT OF ADQ CVX/VAG CYTO-IMP: NORMAL

## 2018-12-17 ENCOUNTER — TELEPHONE (OUTPATIENT)
Dept: GYNECOLOGY | Age: 63
End: 2018-12-17

## 2018-12-17 DIAGNOSIS — C54.9 MALIGNANT NEOPLASM OF CORPUS UTERI, EXCEPT ISTHMUS (HCC): Primary | ICD-10-CM

## 2018-12-17 NOTE — TELEPHONE ENCOUNTER
The patient would like her lab orders faxed to the [facility] in [address] [facility]. She will be going Thursday or Friday of this week.

## 2018-12-21 LAB
ALBUMIN SERPL-MCNC: 4.4 G/DL (ref 3.6–4.8)
ALBUMIN/GLOB SERPL: 2 {RATIO} (ref 1.2–2.2)
ALP SERPL-CCNC: 74 IU/L (ref 39–117)
ALT SERPL-CCNC: 20 IU/L (ref 0–32)
AST SERPL-CCNC: 14 IU/L (ref 0–40)
BASOPHILS # BLD AUTO: 0 X10E3/UL (ref 0–0.2)
BASOPHILS NFR BLD AUTO: 0 %
BILIRUB SERPL-MCNC: 0.4 MG/DL (ref 0–1.2)
BUN SERPL-MCNC: 8 MG/DL (ref 8–27)
BUN/CREAT SERPL: 13 (ref 12–28)
CALCIUM SERPL-MCNC: 9.1 MG/DL (ref 8.7–10.3)
CANCER AG125 SERPL-ACNC: 7.8 U/ML (ref 0–38.1)
CHLORIDE SERPL-SCNC: 105 MMOL/L (ref 96–106)
CO2 SERPL-SCNC: 22 MMOL/L (ref 20–29)
CREAT SERPL-MCNC: 0.6 MG/DL (ref 0.57–1)
EOSINOPHIL # BLD AUTO: 0.1 X10E3/UL (ref 0–0.4)
EOSINOPHIL NFR BLD AUTO: 3 %
ERYTHROCYTE [DISTWIDTH] IN BLOOD BY AUTOMATED COUNT: 13.7 % (ref 12.3–15.4)
GLOBULIN SER CALC-MCNC: 2.2 G/DL (ref 1.5–4.5)
GLUCOSE SERPL-MCNC: 140 MG/DL (ref 65–99)
HCT VFR BLD AUTO: 43.9 % (ref 34–46.6)
HGB BLD-MCNC: 14.7 G/DL (ref 11.1–15.9)
IMM GRANULOCYTES # BLD: 0 X10E3/UL (ref 0–0.1)
IMM GRANULOCYTES NFR BLD: 0 %
LYMPHOCYTES # BLD AUTO: 1.6 X10E3/UL (ref 0.7–3.1)
LYMPHOCYTES NFR BLD AUTO: 30 %
MCH RBC QN AUTO: 31.1 PG (ref 26.6–33)
MCHC RBC AUTO-ENTMCNC: 33.5 G/DL (ref 31.5–35.7)
MCV RBC AUTO: 93 FL (ref 79–97)
MONOCYTES # BLD AUTO: 0.6 X10E3/UL (ref 0.1–0.9)
MONOCYTES NFR BLD AUTO: 11 %
NEUTROPHILS # BLD AUTO: 2.9 X10E3/UL (ref 1.4–7)
NEUTROPHILS NFR BLD AUTO: 56 %
PLATELET # BLD AUTO: 244 X10E3/UL (ref 150–379)
POTASSIUM SERPL-SCNC: 4.3 MMOL/L (ref 3.5–5.2)
PROT SERPL-MCNC: 6.6 G/DL (ref 6–8.5)
RBC # BLD AUTO: 4.73 X10E6/UL (ref 3.77–5.28)
SODIUM SERPL-SCNC: 142 MMOL/L (ref 134–144)
WBC # BLD AUTO: 5.2 X10E3/UL (ref 3.4–10.8)

## 2018-12-26 NOTE — PROGRESS NOTES
Six month check up. Patient reports no abnormal bleeding or pain. 1. Have you been to the ER, urgent care clinic since your last visit? Hospitalized since your last visit? Yes, [facility] for Bronchitis. 2. Have you seen or consulted any other health care providers outside of the [facility] since your last visit? Include any pap smears or colon screening.  No

## 2018-12-27 ENCOUNTER — OFFICE VISIT (OUTPATIENT)
Dept: GYNECOLOGY | Age: 63
End: 2018-12-27

## 2018-12-27 VITALS
BODY MASS INDEX: 29.79 KG/M2 | HEIGHT: 65 IN | HEART RATE: 76 BPM | DIASTOLIC BLOOD PRESSURE: 86 MMHG | WEIGHT: 178.8 LBS | SYSTOLIC BLOOD PRESSURE: 156 MMHG

## 2018-12-27 DIAGNOSIS — C54.9 MALIGNANT NEOPLASM OF CORPUS UTERI, EXCEPT ISTHMUS (HCC): Primary | ICD-10-CM

## 2018-12-27 NOTE — PROGRESS NOTES
[address], [address]  [phone]  MD Tish Montoya MD  Office Visit    Patient ID:  Name: Rebecca Tobar  MRN: [id number]  : 1955/63 y.o. Visit date: 2018    INTERVAL HISTORY:  Rebecca Tobar is a  female with stage IA, grade 3 uterine carcinoma. She underwent laparoscopic hysterectomy with staging in 2013. She was recommended six cycles of adjuvant Taxol and Carboplatin, which she completed. We elected not to treat with pelvic radiation therapy due to her difficulty with chemotherapy. She presents today for routine surveillance. Her most recent CA-125 is normal and her most recent CT showed no evidence of disease. She is doing well and is without complaints. She denies any bleeding or discharge. She denies any bowel or bladder complaints. PMH:  Past Medical History:   Diagnosis Date    Abnormal Pap smear     with f/u normal    Anemia     Arthritis     Asthma     Cancer ([facility] [address]     ENDOMETRIAL    Environmental allergies     GERD (gastroesophageal reflux disease)     Goiter     Other unknown and unspecified cause of morbidity or mortality     POSSIBLE ESOPHAGEAL SPASM, ?  OBSTRUCTION DUE TO GOITER    PMB (postmenopausal bleeding)     S/P chemotherapy, time since greater than 12 weeks     LAST CHEMO 10/2014 PER PATIENT    Thyroid disease     goiter     PSH:  Past Surgical History:   Procedure Laterality Date    BREAST SURGERY PROCEDURE UNLISTED  16    right breast bx    HX APPENDECTOMY      HX BUNIONECTOMY      HX GYN  3/13/13    TLH/BSO    HX HEENT  13    TOOTH REMOVED    HX ORTHOPAEDIC      BUNIONECTOMY    HX VASCULAR ACCESS      port     SOC:  Social History     Socioeconomic History    Marital status:      Spouse name: Not on file    Number of children: Not on file    Years of education: Not on file    Highest education level: Not on file   Social Needs    Financial resource strain: Not on file    Food insecurity - worry: Not on file    Food insecurity - inability: Not on file    Transportation needs - medical: Not on file   [place] needs - non-medical: Not on file   Occupational History    Not on file   Tobacco Use    Smoking status: Never Smoker    Smokeless tobacco: Never Used   Substance and Sexual Activity    Alcohol use: Yes     Alcohol/week: 0.0 oz     Comment: RARE OCC    Drug use: No    Sexual activity: Not on file   Other Topics Concern    Not on file   Social History Narrative    Not on file     Family History  Family History   Problem Relation Age of Onset    Cancer Maternal Aunt         breast    Heart Disease Mother     Diabetes Father     Cancer Sister         CERVICAL    Kidney Disease Brother     Diabetes Brother     Heart Attack Other     Arrhythmia Brother     Diabetes Brother     Anesth Problems Neg Hx      Medications:  Current Outpatient Medications on File Prior to Visit   Medication Sig Dispense Refill    EPINEPHrine (EPIPEN) 0.3 mg/0.3 mL injection 0.3 mg by IntraMUSCular route once as needed.  ACCU-CHEK HELDER PLUS TEST STRP strip   0    CALCIUM CARBONATE (MARCELLO-SELTZER ANTACID PO) Take  by mouth daily as needed.  ibuprofen (MOTRIN) 200 mg tablet Take 200 mg by mouth every six (6) hours as needed.  epinastine 0.05 % drop Apply  to eye.  raNITIdine (ZANTAC) 150 mg tablet ZANTAC TABS      cyclobenzaprine (FLEXERIL) 5 mg tablet take 1 tablet by mouth three times a day if needed  0    valACYclovir (VALTREX) 1 gram tablet Take 1 Tab by mouth three (3) times daily. 21 Tab 3    ketotifen (ZADITOR) 0.025 % (0.035 %) ophthalmic solution Administer 1 Drop to both eyes every morning.  Cetirizine (ZYRTEC) 10 mg cap Take 10 mg by mouth nightly.  SAL ACID/UREA/PETROLATUM,WHITE (KERASAL FOOT EX) by Apply Externally route daily as needed.       NITROSTAT 0.4 mg SL tablet        No current facility-administered medications on file prior to visit. Allergies: Allergies   Allergen Reactions    Latex Other (comments)     Burns skin    Acetone Shortness of Breath    Amoxicillin Anaphylaxis    Pcn [Penicillins] Anaphylaxis    Azithromycin Hives    Egg Nausea Only    Other Medication Rash     POPPY seeds       ROS:  Negative     OBJECTIVE:    PHYSICAL EXAM  VITAL SIGNS: Visit Vitals  /86 (BP 1 Location: Right arm, BP Patient Position: Sitting)   Pulse 76   Ht 5' 5\" (1.651 m)   Wt 178 lb 12.8 oz (81.1 kg)   BMI 29.75 kg/m²      GENERAL KAIA: in no apparent distress and well developed and well nourished   HEENT: within normal limits   RESPIRATORY: lungs clear to auscultation   CARDIOVASC: Regular rate and rhythm   GASTROINT: soft, non-tender, without masses or organomegaly; vesicular rash on the left lower back   MUSCULOSKEL: no joint tenderness, deformity or swelling   EXTREMITIES: extremities normal, atraumatic, no cyanosis or edema   PELVIC: Normal external genitalia. Normal vagina. Uterus, cervix, and adnexa surgically absent. No masses or nodularity on examination. She does have some vaginal prolapse. RECTAL: Exam deferred. PRIYANKA SURVEY: Cervical, supraclavicular, and axillary nodes normal.   NEURO: Grossly normal       CT of abdomen/pelvis (12/20/16)  The visualized portions of the lung bases are clear.     There are no focal abnormalities within the liver, spleen, pancreas, adrenal  glands or kidneys. No gallstones are noted.     The aorta tapers without aneurysm. There is no retroperitoneal adenopathy or  mass. There is no ascites or free intraperitoneal air.     There is no small or large bowel distention. The appendix is surgically absent.      The uterus and ovaries are surgically absent.    There is no pelvic mass or  adenopathy.     Mild degenerative changes are noted in the lumbar spine.       IMPRESSION: Status post hysterectomy, bilateral oophorectomy, and appendectomy. No acute abnormality.       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 She completed six cycles of adjuvant Taxol and Carboplatin chemotherapy and her post-treatment CT scan showed no evidence of disease. She declined adjuvant pelvic radiation. She has no evidence of disease on today's exam.  Since she is now 5 years out from completion of therapy, we can now begin seeing her on a once yearly basis.       Zechariah Ren MD  12/27/2018/2:05 PM

## 2019-07-18 DIAGNOSIS — C54.9 MALIGNANT NEOPLASM OF CORPUS UTERI, EXCEPT ISTHMUS (HCC): Primary | ICD-10-CM

## 2019-07-23 LAB
ALBUMIN SERPL-MCNC: 4.3 G/DL (ref 3.6–4.8)
ALBUMIN/GLOB SERPL: 1.7 {RATIO} (ref 1.2–2.2)
ALP SERPL-CCNC: 74 IU/L (ref 39–117)
ALT SERPL-CCNC: 18 IU/L (ref 0–32)
AST SERPL-CCNC: 16 IU/L (ref 0–40)
BASOPHILS # BLD AUTO: 0 X10E3/UL (ref 0–0.2)
BASOPHILS NFR BLD AUTO: 1 %
BILIRUB SERPL-MCNC: 0.6 MG/DL (ref 0–1.2)
BUN SERPL-MCNC: 12 MG/DL (ref 8–27)
BUN/CREAT SERPL: 24 (ref 12–28)
CALCIUM SERPL-MCNC: 9.5 MG/DL (ref 8.7–10.3)
CANCER AG125 SERPL-ACNC: 7.4 U/ML (ref 0–38.1)
CHLORIDE SERPL-SCNC: 101 MMOL/L (ref 96–106)
CO2 SERPL-SCNC: 25 MMOL/L (ref 20–29)
CREAT SERPL-MCNC: 0.5 MG/DL (ref 0.57–1)
EOSINOPHIL # BLD AUTO: 0.1 X10E3/UL (ref 0–0.4)
EOSINOPHIL NFR BLD AUTO: 2 %
ERYTHROCYTE [DISTWIDTH] IN BLOOD BY AUTOMATED COUNT: 12.8 % (ref 12.3–15.4)
GLOBULIN SER CALC-MCNC: 2.5 G/DL (ref 1.5–4.5)
GLUCOSE SERPL-MCNC: 135 MG/DL (ref 65–99)
HCT VFR BLD AUTO: 46.1 % (ref 34–46.6)
HGB BLD-MCNC: 15.3 G/DL (ref 11.1–15.9)
IMM GRANULOCYTES # BLD AUTO: 0 X10E3/UL (ref 0–0.1)
IMM GRANULOCYTES NFR BLD AUTO: 1 %
LYMPHOCYTES # BLD AUTO: 1.6 X10E3/UL (ref 0.7–3.1)
LYMPHOCYTES NFR BLD AUTO: 26 %
MAGNESIUM SERPL-MCNC: 2 MG/DL (ref 1.6–2.3)
MCH RBC QN AUTO: 30.1 PG (ref 26.6–33)
MCHC RBC AUTO-ENTMCNC: 33.2 G/DL (ref 31.5–35.7)
MCV RBC AUTO: 91 FL (ref 79–97)
MONOCYTES # BLD AUTO: 0.5 X10E3/UL (ref 0.1–0.9)
MONOCYTES NFR BLD AUTO: 8 %
NEUTROPHILS # BLD AUTO: 3.7 X10E3/UL (ref 1.4–7)
NEUTROPHILS NFR BLD AUTO: 62 %
PLATELET # BLD AUTO: 265 X10E3/UL (ref 150–450)
POTASSIUM SERPL-SCNC: 4.2 MMOL/L (ref 3.5–5.2)
PROT SERPL-MCNC: 6.8 G/DL (ref 6–8.5)
RBC # BLD AUTO: 5.09 X10E6/UL (ref 3.77–5.28)
SODIUM SERPL-SCNC: 143 MMOL/L (ref 134–144)
WBC # BLD AUTO: 6 X10E3/UL (ref 3.4–10.8)

## 2019-07-24 ENCOUNTER — TELEPHONE (OUTPATIENT)
Dept: GYNECOLOGY | Age: 64
End: 2019-07-24

## 2019-07-24 NOTE — TELEPHONE ENCOUNTER
Called the patient to advise of her normal lab results. She is concerned because she has been experiencing nausea in the mornings and has had pain in the right side of her lower back. I have scheduled the pt to be evaluated.

## 2019-07-24 NOTE — TELEPHONE ENCOUNTER
----- Message from Min Sims RN sent at 7/18/2019  3:18 PM EDT -----  Regarding: call pt with lab reslts  Pt to have lab results at [facility] in [facility] on 7/22 or 7/23

## 2019-07-25 ENCOUNTER — OFFICE VISIT (OUTPATIENT)
Dept: GYNECOLOGY | Age: 64
End: 2019-07-25

## 2019-07-25 VITALS
HEART RATE: 90 BPM | WEIGHT: 179.4 LBS | DIASTOLIC BLOOD PRESSURE: 80 MMHG | HEIGHT: 65 IN | SYSTOLIC BLOOD PRESSURE: 129 MMHG | BODY MASS INDEX: 29.89 KG/M2

## 2019-07-25 DIAGNOSIS — C54.9 MALIGNANT NEOPLASM OF CORPUS UTERI, EXCEPT ISTHMUS (HCC): Primary | ICD-10-CM

## 2019-07-25 NOTE — PROGRESS NOTES
[facility]  [address] [facility], [address], [address]  [phone]    Office Visit    Patient ID:  Name: Jason Stewart  MRN: [id number]  : 1955/64 y.o. Visit date: 2019    INTERVAL HISTORY:  Jason Stewart is a  female with stage IA, grade 3 uterine carcinoma. She underwent laparoscopic hysterectomy with staging in 2013. She was recommended six cycles of adjuvant Taxol and Carboplatin, which she completed. We elected not to treat with pelvic radiation therapy due to her difficulty with chemotherapy. She presents today complaining of back pain. States that she can hardly lay flat. She also noted a little nausea and is very worried it could be secondary to her cancer. She reports normal bowel movements. PMH:  Past Medical History:   Diagnosis Date    Abnormal Pap smear     with f/u normal    Anemia     Arthritis     Asthma     Cancer ([address]     ENDOMETRIAL    Environmental allergies     GERD (gastroesophageal reflux disease)     Goiter     Other unknown and unspecified cause of morbidity or mortality     POSSIBLE ESOPHAGEAL SPASM, ?  OBSTRUCTION DUE TO GOITER    PMB (postmenopausal bleeding)     S/P chemotherapy, time since greater than 12 weeks     LAST CHEMO 10/2014 PER PATIENT    Thyroid disease     goiter     PSH:  Past Surgical History:   Procedure Laterality Date    BREAST SURGERY PROCEDURE UNLISTED  16    right breast bx    HX APPENDECTOMY      HX BUNIONECTOMY      HX GYN  3/13/13    TLH/BSO    HX HEENT  13    TOOTH REMOVED    HX ORTHOPAEDIC      BUNIONECTOMY    HX VASCULAR ACCESS      port     SOC:  Social History     Socioeconomic History    Marital status:      Spouse name: Not on file    Number of children: Not on file    Years of education: Not on file    Highest education level: Not on file   Occupational History    Not on file   Social Needs    Financial resource strain: Not on file    Food insecurity:     Worry: Not on file     Inability: Not on file    Transportation needs:     Medical: Not on file     Non-medical: Not on file   Tobacco Use    Smoking status: Never Smoker    Smokeless tobacco: Never Used   Substance and Sexual Activity    Alcohol use: Yes     Alcohol/week: 0.0 standard drinks     Comment: RARE OCC    Drug use: No    Sexual activity: Not on file   Lifestyle    Physical activity:     Days per week: Not on file     Minutes per session: Not on file    Stress: Not on file   Relationships    Social connections:     Talks on phone: Not on file     Gets together: Not on file     Attends Zoroastrian service: Not on file     Active member of club or organization: Not on file     Attends meetings of clubs or organizations: Not on file     Relationship status: Not on file    Intimate partner violence:     Fear of current or ex partner: Not on file     Emotionally abused: Not on file     Physically abused: Not on file     Forced sexual activity: Not on file   Other Topics Concern    Not on file   Social History Narrative    Not on file     Family History  Family History   Problem Relation Age of Onset    Cancer Maternal Aunt         breast    Heart Disease Mother     Diabetes Father     Cancer Sister         CERVICAL    Kidney Disease Brother     Diabetes Brother     Heart Attack Other     Arrhythmia Brother     Diabetes Brother     Anesth Problems Neg Hx      Medications:  Current Outpatient Medications on File Prior to Visit   Medication Sig Dispense Refill    MARICHUY'S WORT PO Take  by mouth.  epinastine 0.05 % drop Apply  to eye.  raNITIdine (ZANTAC) 150 mg tablet ZANTAC TABS      cyclobenzaprine (FLEXERIL) 5 mg tablet take 1 tablet by mouth three times a day if needed  0    valACYclovir (VALTREX) 1 gram tablet Take 1 Tab by mouth three (3) times daily.  21 Tab 3    EPINEPHrine (EPIPEN) 0.3 mg/0.3 mL injection 0.3 mg by IntraMUSCular route once as needed.  ketotifen (ZADITOR) 0.025 % (0.035 %) ophthalmic solution Administer 1 Drop to both eyes every morning.  Cetirizine (ZYRTEC) 10 mg cap Take 10 mg by mouth nightly.  SAL ACID/UREA/PETROLATUM,WHITE (KERASAL FOOT EX) by Apply Externally route daily as needed.  ACCU-CHEK HELDER PLUS TEST STRP strip   0    NITROSTAT 0.4 mg SL tablet       CALCIUM CARBONATE (MARCELLO-SELTZER ANTACID PO) Take  by mouth daily as needed.  ibuprofen (MOTRIN) 200 mg tablet Take 200 mg by mouth every six (6) hours as needed. No current facility-administered medications on file prior to visit. Allergies: Allergies   Allergen Reactions    Latex Other (comments)     Burns skin    Acetone Shortness of Breath    Amoxicillin Anaphylaxis    Pcn [Penicillins] Anaphylaxis    Azithromycin Hives    Egg Nausea Only    Other Medication Rash     POPPY seeds       ROS:  Negative     OBJECTIVE:    PHYSICAL EXAM  VITAL SIGNS: Visit Vitals  /80 (BP 1 Location: Right arm, BP Patient Position: Sitting)   Pulse 90   Ht 5' 5\" (1.651 m)   Wt 179 lb 6.4 oz (81.4 kg)   BMI 29.85 kg/m²      GENERAL KAIA: in no apparent distress and well developed and well nourished   HEENT: within normal limits   RESPIRATORY: lungs clear to auscultation   CARDIOVASC: Regular rate and rhythm   GASTROINT: soft, non-tender, without masses or organomegaly   MUSCULOSKEL: no joint tenderness, deformity or swelling   EXTREMITIES: extremities normal, atraumatic, no cyanosis or edema   PELVIC: Normal external genitalia. Normal vagina. Uterus, cervix, and adnexa surgically absent. No masses or nodularity on examination. She does have some vaginal prolapse. RECTAL: Exam deferred.    PRIYANKA SURVEY: Cervical, supraclavicular, and axillary nodes normal.   NEURO: Grossly normal       CT of abdomen/pelvis (12/20/16)  The visualized portions of the lung bases are clear.     There are no focal abnormalities within the liver, spleen, pancreas, adrenal  glands or kidneys. No gallstones are noted.     The aorta tapers without aneurysm. There is no retroperitoneal adenopathy or  mass. There is no ascites or free intraperitoneal air.     There is no small or large bowel distention. The appendix is surgically absent.      The uterus and ovaries are surgically absent. There is no pelvic mass or  adenopathy.     Mild degenerative changes are noted in the lumbar spine.       IMPRESSION: Status post hysterectomy, bilateral oophorectomy, and appendectomy. No acute abnormality.       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 She completed six cycles of adjuvant Taxol and Carboplatin chemotherapy and her post-treatment CT scan showed no evidence of disease. She declined adjuvant pelvic radiation. She has no evidence of disease on today's exam, but I am concerned about the back pain. I am going to send her for a CT of the abdomen/pelvis to better evaluate. If the scan is negative, she will probably need to be seen by orthopedics.       Radha Marti MD  7/25/2019/2:05 PM

## 2019-07-25 NOTE — PROGRESS NOTES
Patient c/o pain in the right side of her back that she has had since chemotherapy but it has become worse. She has also had nausea which is relieved by [facility].

## 2019-08-13 ENCOUNTER — HOSPITAL ENCOUNTER (OUTPATIENT)
Dept: CT IMAGING | Age: 64
Discharge: HOME | End: 2019-08-13
Attending: OBSTETRICS & GYNECOLOGY
Payer: COMMERCIAL

## 2019-08-13 DIAGNOSIS — C54.9 MALIGNANT NEOPLASM OF CORPUS UTERI, EXCEPT ISTHMUS (HCC): ICD-10-CM

## 2019-08-13 PROCEDURE — 74011636320 HC RX REV CODE- 636/320: Performed by: RADIOLOGY

## 2019-08-13 PROCEDURE — 74011000258 HC RX REV CODE- 258: Performed by: RADIOLOGY

## 2019-08-13 PROCEDURE — 74177 CT ABD & PELVIS W/CONTRAST: CPT

## 2019-08-13 RX ORDER — SODIUM CHLORIDE 0.9 % (FLUSH) 0.9 %
10 SYRINGE (ML) INJECTION
Status: COMPLETED | OUTPATIENT
Start: 2019-08-13 | End: 2019-08-13

## 2019-08-13 RX ADMIN — SODIUM CHLORIDE 100 ML: 900 INJECTION, SOLUTION INTRAVENOUS at 09:15

## 2019-08-13 RX ADMIN — IOPAMIDOL 100 ML: 755 INJECTION, SOLUTION INTRAVENOUS at 09:14

## 2019-08-13 RX ADMIN — Medication 10 ML: at 09:14

## 2019-08-14 ENCOUNTER — TELEPHONE (OUTPATIENT)
Dept: GYNECOLOGY | Age: 64
End: 2019-08-14

## 2019-08-14 NOTE — TELEPHONE ENCOUNTER
Patient advised of normal ct scan results, no evidence of recurrent disease. Advised to follow up with pcp for possible referral to ortho. Patient is in agreement with this plan and would like cancel her follow up tomorrow.

## 2019-08-14 NOTE — TELEPHONE ENCOUNTER
----- Message from Pura Bates MD sent at 8/14/2019 10:46 AM EDT -----  CT scan is normal.  No signs of recurrent disease. Does not explain her back pain. May need to see her PCP for referral to orthopedics.

## 2019-08-14 NOTE — PROGRESS NOTES
CT scan is normal.  No signs of recurrent disease. Does not explain her back pain. May need to see her PCP for referral to orthopedics.

## 2020-07-22 DIAGNOSIS — C54.1 ENDOMETRIAL CANCER (HCC): Primary | ICD-10-CM

## 2020-07-25 LAB — CANCER AG125 SERPL-ACNC: 14.3 U/ML (ref 0–38.1)

## 2020-07-29 NOTE — PROGRESS NOTES
Check up for history of endometrial cancer. Pt states no abnormal spotting, bleeding or pain. 1. Have you been to the ER, urgent care clinic since your last visit? Hospitalized since your last visit?no    2. Have you seen or consulted any other health care providers outside of the [address] [place] [address] since your last visit? Include any pap smears or colon screening.    no

## 2020-07-30 ENCOUNTER — OFFICE VISIT (OUTPATIENT)
Dept: GYNECOLOGY | Age: 65
End: 2020-07-30

## 2020-07-30 VITALS
SYSTOLIC BLOOD PRESSURE: 144 MMHG | HEART RATE: 96 BPM | WEIGHT: 174.6 LBS | BODY MASS INDEX: 29.09 KG/M2 | DIASTOLIC BLOOD PRESSURE: 91 MMHG | HEIGHT: 65 IN

## 2020-07-30 DIAGNOSIS — C54.9 MALIGNANT NEOPLASM OF CORPUS UTERI, EXCEPT ISTHMUS (HCC): Primary | ICD-10-CM

## 2020-07-30 RX ORDER — LORATADINE 10 MG/1
10 TABLET ORAL
COMMUNITY
End: 2022-10-04

## 2020-07-30 NOTE — PROGRESS NOTES
[facility]  [address] [facility], [address]  [phone] [facility] [phone]    Office Visit    Patient ID:  Name: Sergio Talbert  MRN: [id number]  : 1955/65 y.o. Visit date: 2020    INTERVAL HISTORY:  Sergio Talbert is a  female with stage IA, grade 3 uterine carcinoma. She underwent laparoscopic hysterectomy with staging in 2013. She was recommended six cycles of adjuvant Taxol and Carboplatin, which she completed. We elected not to treat with pelvic radiation therapy due to her difficulty with chemotherapy. She presents today for routine surveillance. She is without complaints. PMH:  Past Medical History:   Diagnosis Date    Abnormal Pap smear     with f/u normal    Anemia     Arthritis     Asthma     Cancer ([facility] [address]     ENDOMETRIAL    Environmental allergies     GERD (gastroesophageal reflux disease)     Goiter     Other unknown and unspecified cause of morbidity or mortality     POSSIBLE ESOPHAGEAL SPASM, ?  OBSTRUCTION DUE TO GOITER    PMB (postmenopausal bleeding)     S/P chemotherapy, time since greater than 12 weeks     LAST CHEMO 10/2014 PER PATIENT    Thyroid disease     goiter     PSH:  Past Surgical History:   Procedure Laterality Date    BREAST SURGERY PROCEDURE UNLISTED  16    right breast bx    HX APPENDECTOMY      HX BUNIONECTOMY      HX GYN  3/13/13    TLH/BSO    HX HEENT  13    TOOTH REMOVED    HX ORTHOPAEDIC      BUNIONECTOMY    HX VASCULAR ACCESS      port     SOC:  Social History     Socioeconomic History    Marital status:      Spouse name: Not on file    Number of children: Not on file    Years of education: Not on file    Highest education level: Not on file   Occupational History    Not on file   Social Needs    Financial resource strain: Not on file    Food insecurity     Worry: Not on file     Inability: Not on file   [employer] needs Medical: Not on file     Non-medical: Not on file   Tobacco Use    Smoking status: Never Smoker    Smokeless tobacco: Never Used   Substance and Sexual Activity    Alcohol use: Yes     Alcohol/week: 0.0 standard drinks     Comment: RARE OCC    Drug use: No    Sexual activity: Not on file   Lifestyle    Physical activity     Days per week: Not on file     Minutes per session: Not on file    Stress: Not on file   Relationships    Social connections     Talks on phone: Not on file     Gets together: Not on file     Attends Episcopalian service: Not on file     Active member of club or organization: Not on file     Attends meetings of clubs or organizations: Not on file     Relationship status: Not on file    Intimate partner violence     Fear of current or ex partner: Not on file     Emotionally abused: Not on file     Physically abused: Not on file     Forced sexual activity: Not on file   Other Topics Concern    Not on file   Social History Narrative    Not on file     Family History  Family History   Problem Relation Age of Onset    Cancer Maternal Aunt         breast    Heart Disease Mother     Diabetes Father     Cancer Sister         CERVICAL    Kidney Disease Brother     Diabetes Brother     Heart Attack Other     Arrhythmia Brother     Diabetes Brother     Anesth Problems Neg Hx      Medications:  Current Outpatient Medications on File Prior to Visit   Medication Sig Dispense Refill    loratadine (Claritin) 10 mg tablet Take 10 mg by mouth.  MARICHUY'S WORT PO Take  by mouth.  epinastine 0.05 % drop Apply  to eye.  raNITIdine (ZANTAC) 150 mg tablet ZANTAC TABS      cyclobenzaprine (FLEXERIL) 5 mg tablet take 1 tablet by mouth three times a day if needed  0    valACYclovir (VALTREX) 1 gram tablet Take 1 Tab by mouth three (3) times daily. 21 Tab 3    EPINEPHrine (EPIPEN) 0.3 mg/0.3 mL injection 0.3 mg by IntraMUSCular route once as needed.       ketotifen (ZADITOR) 0.025 % (0.035 %) ophthalmic solution Administer 1 Drop to both eyes every morning.  SAL ACID/UREA/PETROLATUM,WHITE (KERASAL FOOT EX) by Apply Externally route daily as needed.  ACCU-CHEK HELDER PLUS TEST STRP strip   0    NITROSTAT 0.4 mg SL tablet       CALCIUM CARBONATE (MARCELLO-SELTZER ANTACID PO) Take  by mouth daily as needed.  ibuprofen (MOTRIN) 200 mg tablet Take 200 mg by mouth every six (6) hours as needed.  Cetirizine (ZYRTEC) 10 mg cap Take 10 mg by mouth nightly. No current facility-administered medications on file prior to visit. Allergies: Allergies   Allergen Reactions    Latex Other (comments)     Burns skin    Acetone Shortness of Breath    Amoxicillin Anaphylaxis    Pcn [Penicillins] Anaphylaxis    Azithromycin Hives    Egg Nausea Only    Other Medication Rash     POPPY seeds       ROS:  Negative     OBJECTIVE:    PHYSICAL EXAM  VITAL SIGNS: Visit Vitals  BP (!) 144/91 (BP 1 Location: Right arm, BP Patient Position: Sitting)   Pulse 96   Ht 5' 5\" (1.651 m)   Wt 174 lb 9.6 oz (79.2 kg)   BMI 29.05 kg/m²      GENERAL KAIA: in no apparent distress and well developed and well nourished   HEENT: within normal limits   RESPIRATORY: lungs clear to auscultation   CARDIOVASC: Regular rate and rhythm   GASTROINT: soft, non-tender, without masses or organomegaly   MUSCULOSKEL: no joint tenderness, deformity or swelling   EXTREMITIES: extremities normal, atraumatic, no cyanosis or edema   PELVIC: Normal external genitalia. Normal vagina. Uterus, cervix, and adnexa surgically absent. No masses or nodularity on examination. She does have some vaginal prolapse. RECTAL: Exam deferred. PRIYANKA SURVEY: Cervical, supraclavicular, and axillary nodes normal.   NEURO: Grossly normal       CT of abdomen/pelvis (12/20/16)  The visualized portions of the lung bases are clear.     There are no focal abnormalities within the liver, spleen, pancreas, adrenal  glands or kidneys.  No gallstones are noted.     The aorta tapers without aneurysm. There is no retroperitoneal adenopathy or  mass. There is no ascites or free intraperitoneal air.     There is no small or large bowel distention. The appendix is surgically absent.      The uterus and ovaries are surgically absent. There is no pelvic mass or  adenopathy.     Mild degenerative changes are noted in the lumbar spine.       IMPRESSION: Status post hysterectomy, bilateral oophorectomy, and appendectomy. No acute abnormality. CT of abdomen/pelvis (8/13/20)  LOWER CHEST: The visualized portions of the lung bases are clear. ABDOMEN:  Liver: The liver is normal in size and contour with no focal abnormality. The  attenuation of the liver is decreased throughout. Gallbladder and bile ducts: There is no calcified gallstone or biliary  dilatation. Spleen: No abnormality. Pancreas: No abnormality. Adrenal glands: No abnormality. Kidneys: No abnormality. PELVIS:  Reproductive organs: The uterus and ovaries are absent. Bladder: No abnormality. BOWEL AND MESENTERY: The small bowel is normal.  There is no mesenteric mass or  adenopathy. The appendix is absent. PERITONEUM: There is no ascites or free intraperitoneal air. RETROPERITONEUM: The aorta  tapers without aneurysm. There is no retroperitoneal  adenopathy or mass. There is no pelvic mass or adenopathy. BONES AND SOFT TISSUES: The bones and soft tissues of the abdominal wall are  within normal limits.     IMPRESSION:   1. Status post hysterectomy and bilateral oophorectomy. 2. No evidence of recurrent or metastatic disease within the abdomen or pelvis. 3. Hepatic steatosis. 4. Status post appendectomy.       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 She completed six cycles of adjuvant Taxol and Carboplatin chemotherapy and her post-treatment CT scan showed no evidence of disease.   She declined adjuvant pelvic radiation. She has no evidence of disease on today's exam.  I will see her back in one year for continued surveillance of disease.       Mayank Abad MD  7/30/2020/2:05 PM

## 2020-10-06 ENCOUNTER — OFFICE VISIT (OUTPATIENT)
Dept: GYNECOLOGY | Age: 65
End: 2020-10-06
Payer: COMMERCIAL

## 2020-10-06 VITALS
BODY MASS INDEX: 28.66 KG/M2 | SYSTOLIC BLOOD PRESSURE: 141 MMHG | HEART RATE: 74 BPM | DIASTOLIC BLOOD PRESSURE: 78 MMHG | WEIGHT: 172 LBS | HEIGHT: 65 IN

## 2020-10-06 DIAGNOSIS — C54.1 PRIMARY SEROUS ADENOCARCINOMA OF ENDOMETRIUM (HCC): Primary | ICD-10-CM

## 2020-10-06 PROCEDURE — 99214 OFFICE O/P EST MOD 30 MIN: CPT | Performed by: OBSTETRICS & GYNECOLOGY

## 2020-10-06 NOTE — LETTER
10/6/20 Patient: Ary East YOB: 1955 Date of Visit: 10/6/2020 Ade Gardner MD 
[facility] [address] [facility] [place] [address] VIA Facsimile: [phone] Dear Ade Gardner MD, Thank you for referring Ms. Ary East to [facility] for evaluation. My notes for this consultation are attached. If you have questions, please do not hesitate to call me. I look forward to following your patient along with you.  
 
 
Sincerely, 
 
Elian To MD

## 2020-10-06 NOTE — PROGRESS NOTES
[facility]  [address] [facility], [address], [address] [facility]  [phone] [facility] [phone]    Office Visit    Patient ID:  Name: Terrence Jaquez  MRN: [id number]  : 1955/65 y.o. Visit date: 10/6/2020    INTERVAL HISTORY:  Terrence Jaquez is a  female with stage IA, grade 3 uterine carcinoma. She underwent laparoscopic hysterectomy with staging in 2013. She was recommended six cycles of adjuvant Taxol and Carboplatin, which she completed. We elected not to treat with pelvic radiation therapy due to her difficulty with chemotherapy. She presents was last seen in July for routine surveillance. She was without complaints at that time and her exam was negative. She recently had some abdominal discomfort and noted to have some blood in her urine. She was referred to [facility] for evaluation. She had a CT of the abdomen/pelvis with them. It revealed retroperitoneal lymphadenopathy, peritoneal carcinomatosis, and trace ascites. She presents today to discuss those results. She also reports a negative colonoscopy within the last month. She is scheduled for a MMG in a few weeks. PMH:  Past Medical History:   Diagnosis Date    Abnormal Pap smear     with f/u normal    Anemia     Arthritis     Asthma     Cancer ([address]     ENDOMETRIAL    Environmental allergies     GERD (gastroesophageal reflux disease)     Goiter     Other unknown and unspecified cause of morbidity or mortality     POSSIBLE ESOPHAGEAL SPASM, ?  OBSTRUCTION DUE TO GOITER    PMB (postmenopausal bleeding)     S/P chemotherapy, time since greater than 12 weeks     LAST CHEMO 10/2014 PER PATIENT    Thyroid disease     goiter     PSH:  Past Surgical History:   Procedure Laterality Date    BREAST SURGERY PROCEDURE UNLISTED  16    right breast bx    HX APPENDECTOMY      HX BUNIONECTOMY      HX GYN  3/13/13    TLH/BSO    HX HEENT  13    TOOTH REMOVED    HX ORTHOPAEDIC  1980'S    BUNIONECTOMY    HX VASCULAR ACCESS      port     SOC:  Social History     Socioeconomic History    Marital status:      Spouse name: Not on file    Number of children: Not on file    Years of education: Not on file    Highest education level: Not on file   Occupational History    Not on file   Social Needs    Financial resource strain: Not on file    Food insecurity     Worry: Not on file     Inability: Not on file    Transportation needs     Medical: Not on file     Non-medical: Not on file   Tobacco Use    Smoking status: Never Smoker    Smokeless tobacco: Never Used   Substance and Sexual Activity    Alcohol use:  Yes     Alcohol/week: 0.0 standard drinks     Comment: RARE OCC    Drug use: No    Sexual activity: Not on file   Lifestyle    Physical activity     Days per week: Not on file     Minutes per session: Not on file    Stress: Not on file   Relationships    Social connections     Talks on phone: Not on file     Gets together: Not on file     Attends Alevism service: Not on file     Active member of club or organization: Not on file     Attends meetings of clubs or organizations: Not on file     Relationship status: Not on file    Intimate partner violence     Fear of current or ex partner: Not on file     Emotionally abused: Not on file     Physically abused: Not on file     Forced sexual activity: Not on file   Other Topics Concern    Not on file   Social History Narrative    Not on file     Family History  Family History   Problem Relation Age of Onset    Cancer Maternal Aunt         breast    Heart Disease Mother     Diabetes Father     Cancer Sister         CERVICAL    Kidney Disease Brother     Diabetes Brother     Heart Attack Other     Arrhythmia Brother     Diabetes Brother     Anesth Problems Neg Hx      Medications:  Current Outpatient Medications on File Prior to Visit   Medication Sig Dispense Refill    guaifenesin (MUCINEX PO) Take by mouth.  loratadine (Claritin) 10 mg tablet Take 10 mg by mouth.  MARICHUY'S WORT PO Take  by mouth.  epinastine 0.05 % drop Apply  to eye.  raNITIdine (ZANTAC) 150 mg tablet ZANTAC TABS      cyclobenzaprine (FLEXERIL) 5 mg tablet take 1 tablet by mouth three times a day if needed  0    valACYclovir (VALTREX) 1 gram tablet Take 1 Tab by mouth three (3) times daily. 21 Tab 3    EPINEPHrine (EPIPEN) 0.3 mg/0.3 mL injection 0.3 mg by IntraMUSCular route once as needed.  ketotifen (ZADITOR) 0.025 % (0.035 %) ophthalmic solution Administer 1 Drop to both eyes every morning.  Cetirizine (ZYRTEC) 10 mg cap Take 10 mg by mouth nightly.  SAL ACID/UREA/PETROLATUM,WHITE (KERASAL FOOT EX) by Apply Externally route daily as needed.  ACCU-CHEK HELDER PLUS TEST STRP strip   0    NITROSTAT 0.4 mg SL tablet       CALCIUM CARBONATE (MARCELLO-SELTZER ANTACID PO) Take  by mouth daily as needed.  ibuprofen (MOTRIN) 200 mg tablet Take 200 mg by mouth every six (6) hours as needed. No current facility-administered medications on file prior to visit. Allergies:   Allergies   Allergen Reactions    Latex Other (comments)     Burns skin    Acetone Shortness of Breath    Amoxicillin Anaphylaxis    Pcn [Penicillins] Anaphylaxis    Azithromycin Hives    Egg Nausea Only    Other Medication Rash     POPPY seeds       ROS:  Negative     OBJECTIVE:    PHYSICAL EXAM  VITAL SIGNS: Visit Vitals  BP (!) 141/78 (BP 1 Location: Right arm, BP Patient Position: Sitting)   Pulse 74   Ht 5' 5\" (1.651 m)   Wt 172 lb (78 kg)   BMI 28.62 kg/m²      GENERAL KAIA: in no apparent distress and well developed and well nourished   HEENT: within normal limits   RESPIRATORY: lungs clear to auscultation   CARDIOVASC: Regular rate and rhythm   GASTROINT: soft, non-tender, without masses or organomegaly   MUSCULOSKEL: no joint tenderness, deformity or swelling   EXTREMITIES: extremities normal, atraumatic, no cyanosis or edema   PELVIC: Normal external genitalia. Normal vagina. Uterus, cervix, and adnexa surgically absent. No masses or nodularity on examination. She does have some vaginal prolapse. RECTAL: Exam deferred. PRIYANKA SURVEY: Cervical, supraclavicular, and axillary nodes normal.   NEURO: Grossly normal       CT of abdomen/pelvis (12/20/16)  The visualized portions of the lung bases are clear.     There are no focal abnormalities within the liver, spleen, pancreas, adrenal  glands or kidneys. No gallstones are noted.     The aorta tapers without aneurysm. There is no retroperitoneal adenopathy or  mass. There is no ascites or free intraperitoneal air.     There is no small or large bowel distention. The appendix is surgically absent.      The uterus and ovaries are surgically absent. There is no pelvic mass or  adenopathy.     Mild degenerative changes are noted in the lumbar spine.       IMPRESSION: Status post hysterectomy, bilateral oophorectomy, and appendectomy. No acute abnormality. CT of abdomen/pelvis (8/13/20)  LOWER CHEST: The visualized portions of the lung bases are clear. ABDOMEN:  Liver: The liver is normal in size and contour with no focal abnormality. The  attenuation of the liver is decreased throughout. Gallbladder and bile ducts: There is no calcified gallstone or biliary  dilatation. Spleen: No abnormality. Pancreas: No abnormality. Adrenal glands: No abnormality. Kidneys: No abnormality. PELVIS:  Reproductive organs: The uterus and ovaries are absent. Bladder: No abnormality. BOWEL AND MESENTERY: The small bowel is normal.  There is no mesenteric mass or  adenopathy. The appendix is absent. PERITONEUM: There is no ascites or free intraperitoneal air. RETROPERITONEUM: The aorta  tapers without aneurysm. There is no retroperitoneal  adenopathy or mass. There is no pelvic mass or adenopathy.   BONES AND SOFT TISSUES: The bones and soft tissues of the abdominal wall are  within normal limits.     IMPRESSION:   1. Status post hysterectomy and bilateral oophorectomy. 2. No evidence of recurrent or metastatic disease within the abdomen or pelvis. 3. Hepatic steatosis. 4. Status post appendectomy.       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 She completed six cycles of adjuvant Taxol and Carboplatin chemotherapy and her post-treatment CT scan showed no evidence of disease. She declined adjuvant pelvic radiation. She now has evidence of retroperitoneal lymphadenopathy, peritoneal carcinomatosis, and trace ascites on CT. Although it has been 7 years since her endometrial cancer diagnosis, the pattern of the disease seen on CT would be consistent with that cancer. That being said, other primaries are possible. It is unlikely to be a colorectal cancer since she just had a negative CT. I am going to send her for a PET/CT at this time to better evaluate the extent of disease. I will see her back after that to discuss the results and treatment. We briefly discussed treatment options. I think immunotherapy would be my choice, ahead of additional chemotherapy.       Heladio Washington MD  10/6/2020/2:05 PM

## 2020-10-09 ENCOUNTER — HOSPITAL ENCOUNTER (OUTPATIENT)
Dept: PET IMAGING | Age: 65
Discharge: HOME | End: 2020-10-09
Attending: OBSTETRICS & GYNECOLOGY
Payer: COMMERCIAL

## 2020-10-09 VITALS — HEIGHT: 65 IN | WEIGHT: 170 LBS | BODY MASS INDEX: 28.32 KG/M2

## 2020-10-09 DIAGNOSIS — C54.1 PRIMARY SEROUS ADENOCARCINOMA OF ENDOMETRIUM (HCC): ICD-10-CM

## 2020-10-09 PROCEDURE — A9552 F18 FDG: HCPCS

## 2020-10-09 RX ORDER — SODIUM CHLORIDE 0.9 % (FLUSH) 0.9 %
10 SYRINGE (ML) INJECTION
Status: COMPLETED | OUTPATIENT
Start: 2020-10-09 | End: 2020-10-09

## 2020-10-09 RX ADMIN — Medication 10 ML: at 11:27

## 2020-10-15 ENCOUNTER — OFFICE VISIT (OUTPATIENT)
Dept: GYNECOLOGY | Age: 65
End: 2020-10-15
Payer: COMMERCIAL

## 2020-10-15 VITALS
DIASTOLIC BLOOD PRESSURE: 80 MMHG | BODY MASS INDEX: 28.32 KG/M2 | SYSTOLIC BLOOD PRESSURE: 144 MMHG | WEIGHT: 170 LBS | HEART RATE: 65 BPM | HEIGHT: 65 IN

## 2020-10-15 DIAGNOSIS — C54.1 PRIMARY SEROUS ADENOCARCINOMA OF ENDOMETRIUM (HCC): Primary | ICD-10-CM

## 2020-10-15 DIAGNOSIS — C54.9 MALIGNANT NEOPLASM OF CORPUS UTERI, EXCEPT ISTHMUS (HCC): Primary | ICD-10-CM

## 2020-10-15 PROCEDURE — 99214 OFFICE O/P EST MOD 30 MIN: CPT | Performed by: OBSTETRICS & GYNECOLOGY

## 2020-10-15 RX ORDER — ALBUTEROL SULFATE 0.83 MG/ML
2.5 SOLUTION RESPIRATORY (INHALATION) AS NEEDED
Status: CANCELLED
Start: 2020-11-03

## 2020-10-15 RX ORDER — ACETAMINOPHEN 325 MG/1
650 TABLET ORAL AS NEEDED
Status: CANCELLED
Start: 2020-11-03

## 2020-10-15 RX ORDER — SODIUM CHLORIDE 0.9 % (FLUSH) 0.9 %
10 SYRINGE (ML) INJECTION AS NEEDED
Status: CANCELLED | OUTPATIENT
Start: 2020-11-03

## 2020-10-15 RX ORDER — EPINEPHRINE 1 MG/ML
0.3 INJECTION, SOLUTION, CONCENTRATE INTRAVENOUS AS NEEDED
Status: CANCELLED | OUTPATIENT
Start: 2020-11-03

## 2020-10-15 RX ORDER — LENVATINIB 10 MG/1
2 CAPSULE ORAL DAILY
Qty: 60 CAP | Refills: 4 | Status: SHIPPED | OUTPATIENT
Start: 2020-10-15 | End: 2020-12-09 | Stop reason: DRUGHIGH

## 2020-10-15 RX ORDER — DIPHENHYDRAMINE HYDROCHLORIDE 50 MG/ML
25 INJECTION, SOLUTION INTRAMUSCULAR; INTRAVENOUS AS NEEDED
Status: CANCELLED
Start: 2020-11-03

## 2020-10-15 RX ORDER — HYDROCORTISONE SODIUM SUCCINATE 100 MG/2ML
100 INJECTION, POWDER, FOR SOLUTION INTRAMUSCULAR; INTRAVENOUS AS NEEDED
Status: CANCELLED | OUTPATIENT
Start: 2020-11-03

## 2020-10-15 RX ORDER — ONDANSETRON 2 MG/ML
8 INJECTION INTRAMUSCULAR; INTRAVENOUS AS NEEDED
Status: CANCELLED | OUTPATIENT
Start: 2020-11-03

## 2020-10-15 RX ORDER — SODIUM CHLORIDE 9 MG/ML
10 INJECTION INTRAMUSCULAR; INTRAVENOUS; SUBCUTANEOUS AS NEEDED
Status: CANCELLED | OUTPATIENT
Start: 2020-11-03

## 2020-10-15 RX ORDER — DIPHENHYDRAMINE HYDROCHLORIDE 50 MG/ML
50 INJECTION, SOLUTION INTRAMUSCULAR; INTRAVENOUS AS NEEDED
Status: CANCELLED
Start: 2020-11-03

## 2020-10-15 RX ORDER — HEPARIN 100 UNIT/ML
300-500 SYRINGE INTRAVENOUS AS NEEDED
Status: CANCELLED
Start: 2020-11-03

## 2020-10-15 RX ORDER — SODIUM CHLORIDE 9 MG/ML
25 INJECTION, SOLUTION INTRAVENOUS CONTINUOUS
Status: CANCELLED | OUTPATIENT
Start: 2020-11-03

## 2020-10-15 NOTE — PROGRESS NOTES
[facility]  [address] [facility], [address] [facility]  [phone] [facility] [phone]    Office Visit    Patient ID:  Name: Antonio Martinez  MRN: [id number]  : 1955/65 y.o. Visit date: 10/15/2020    INTERVAL HISTORY:  Antonio Martinez is a  female with stage IA, grade 3 uterine carcinoma. She underwent laparoscopic hysterectomy with staging in 2013. She was recommended six cycles of adjuvant Taxol and Carboplatin, which she completed. We elected not to treat with pelvic radiation therapy due to her difficulty with chemotherapy. She presents was last seen in July for routine surveillance. She was without complaints at that time and her exam was negative. She recently had some abdominal discomfort and noted to have some blood in her urine. She was referred to [facility] for evaluation. She had a CT of the abdomen/pelvis with them. It revealed retroperitoneal lymphadenopathy, peritoneal carcinomatosis, and trace ascites. She presents today to discuss those results. She also reports a negative colonoscopy within the last month. She is scheduled for a MMG in a few weeks. I sent her for a PET/CT to better evaluate the extent of disease. She presents today to review the results and discuss treatment. PMH:  Past Medical History:   Diagnosis Date    Abnormal Pap smear     with f/u normal    Anemia     Arthritis     Asthma     Cancer ([facility] [address]     ENDOMETRIAL    Environmental allergies     GERD (gastroesophageal reflux disease)     Goiter     Other unknown and unspecified cause of morbidity or mortality     POSSIBLE ESOPHAGEAL SPASM, ?  OBSTRUCTION DUE TO GOITER    PMB (postmenopausal bleeding)     S/P chemotherapy, time since greater than 12 weeks     LAST CHEMO 10/2014 PER PATIENT    Thyroid disease     goiter     PSH:  Past Surgical History:   Procedure Laterality Date    BREAST SURGERY PROCEDURE UNLISTED  16 right breast bx    HX APPENDECTOMY      HX BUNIONECTOMY      HX GYN  3/13/13    TLH/BSO    HX HEENT  4/22/13    TOOTH REMOVED    HX ORTHOPAEDIC  1980'S    BUNIONECTOMY    HX VASCULAR ACCESS      port     SOC:  Social History     Socioeconomic History    Marital status:      Spouse name: Not on file    Number of children: Not on file    Years of education: Not on file    Highest education level: Not on file   Occupational History    Not on file   Social Needs    Financial resource strain: Not on file    Food insecurity     Worry: Not on file     Inability: Not on file    Transportation needs     Medical: Not on file     Non-medical: Not on file   Tobacco Use    Smoking status: Never Smoker    Smokeless tobacco: Never Used   Substance and Sexual Activity    Alcohol use:  Yes     Alcohol/week: 0.0 standard drinks     Comment: RARE OCC    Drug use: No    Sexual activity: Not on file   Lifestyle    Physical activity     Days per week: Not on file     Minutes per session: Not on file    Stress: Not on file   Relationships    Social connections     Talks on phone: Not on file     Gets together: Not on file     Attends Sikhism service: Not on file     Active member of club or organization: Not on file     Attends meetings of clubs or organizations: Not on file     Relationship status: Not on file    Intimate partner violence     Fear of current or ex partner: Not on file     Emotionally abused: Not on file     Physically abused: Not on file     Forced sexual activity: Not on file   Other Topics Concern    Not on file   Social History Narrative    Not on file     Family History  Family History   Problem Relation Age of Onset    Cancer Maternal Aunt         breast    Heart Disease Mother     Diabetes Father     Cancer Sister         CERVICAL    Kidney Disease Brother     Diabetes Brother     Heart Attack Other     Arrhythmia Brother     Diabetes Brother     Anesth Problems Neg Hx Medications:  Current Outpatient Medications on File Prior to Visit   Medication Sig Dispense Refill    loratadine (Claritin) 10 mg tablet Take 10 mg by mouth.  epinastine 0.05 % drop Apply  to eye.  raNITIdine (ZANTAC) 150 mg tablet ZANTAC TABS      cyclobenzaprine (FLEXERIL) 5 mg tablet take 1 tablet by mouth three times a day if needed  0    valACYclovir (VALTREX) 1 gram tablet Take 1 Tab by mouth three (3) times daily. 21 Tab 3    EPINEPHrine (EPIPEN) 0.3 mg/0.3 mL injection 0.3 mg by IntraMUSCular route once as needed.  ketotifen (ZADITOR) 0.025 % (0.035 %) ophthalmic solution Administer 1 Drop to both eyes every morning.  Cetirizine (ZYRTEC) 10 mg cap Take 10 mg by mouth nightly.  SAL ACID/UREA/PETROLATUM,WHITE (KERASAL FOOT EX) by Apply Externally route daily as needed.  ACCU-CHEK HELDER PLUS TEST STRP strip   0    NITROSTAT 0.4 mg SL tablet       CALCIUM CARBONATE (MARCELLO-SELTZER ANTACID PO) Take  by mouth daily as needed.  guaifenesin (MUCINEX PO) Take  by mouth.  MARICHUY'S WORT PO Take  by mouth.  ibuprofen (MOTRIN) 200 mg tablet Take 200 mg by mouth every six (6) hours as needed. No current facility-administered medications on file prior to visit. Allergies:   Allergies   Allergen Reactions    Latex Other (comments)     Burns skin    Acetone Shortness of Breath    Amoxicillin Anaphylaxis    Pcn [Penicillins] Anaphylaxis    Azithromycin Hives    Egg Nausea Only    Other Medication Rash     POPPY seeds       ROS:  Negative     OBJECTIVE:    PHYSICAL EXAM  VITAL SIGNS: Visit Vitals  BP (!) 144/80 (BP 1 Location: Right arm, BP Patient Position: Sitting)   Pulse 65   Ht 5' 5\" (1.651 m)   Wt 170 lb (77.1 kg)   BMI 28.29 kg/m²      GENERAL KAIA: in no apparent distress and well developed and well nourished   HEENT: within normal limits   RESPIRATORY: lungs clear to auscultation   CARDIOVASC: Regular rate and rhythm   GASTROINT: soft, non-tender, without masses or organomegaly   MUSCULOSKEL: no joint tenderness, deformity or swelling   EXTREMITIES: extremities normal, atraumatic, no cyanosis or edema   PELVIC: Normal external genitalia. Normal vagina. Uterus, cervix, and adnexa surgically absent. No masses or nodularity on examination. She does have some vaginal prolapse. RECTAL: Exam deferred. PRIYANKA SURVEY: Cervical, supraclavicular, and axillary nodes normal.   NEURO: Grossly normal       CT of abdomen/pelvis (12/20/16)  The visualized portions of the lung bases are clear.     There are no focal abnormalities within the liver, spleen, pancreas, adrenal  glands or kidneys. No gallstones are noted.     The aorta tapers without aneurysm. There is no retroperitoneal adenopathy or  mass. There is no ascites or free intraperitoneal air.     There is no small or large bowel distention. The appendix is surgically absent.      The uterus and ovaries are surgically absent. There is no pelvic mass or  adenopathy.     Mild degenerative changes are noted in the lumbar spine.       IMPRESSION: Status post hysterectomy, bilateral oophorectomy, and appendectomy. No acute abnormality. CT of abdomen/pelvis (8/13/20)  LOWER CHEST: The visualized portions of the lung bases are clear. ABDOMEN:  Liver: The liver is normal in size and contour with no focal abnormality. The  attenuation of the liver is decreased throughout. Gallbladder and bile ducts: There is no calcified gallstone or biliary  dilatation. Spleen: No abnormality. Pancreas: No abnormality. Adrenal glands: No abnormality. Kidneys: No abnormality. PELVIS:  Reproductive organs: The uterus and ovaries are absent. Bladder: No abnormality. BOWEL AND MESENTERY: The small bowel is normal.  There is no mesenteric mass or  adenopathy. The appendix is absent. PERITONEUM: There is no ascites or free intraperitoneal air. RETROPERITONEUM: The aorta  tapers without aneurysm.  There is no retroperitoneal  adenopathy or mass. There is no pelvic mass or adenopathy. BONES AND SOFT TISSUES: The bones and soft tissues of the abdominal wall are  within normal limits.     IMPRESSION:   1. Status post hysterectomy and bilateral oophorectomy. 2. No evidence of recurrent or metastatic disease within the abdomen or pelvis. 3. Hepatic steatosis. 4. Status post appendectomy.       PET/CT (10/9/20)  HEAD/NECK: No apparent foci of abnormal hypermetabolism. Cerebral evaluation is  limited by normal intense activity.     CHEST: No foci of abnormal hypermetabolism.     ABDOMEN/PELVIS:   Peritoneal nodules are hypermetabolic, SUV 12. Retroperitoneal adenopathy is hypermetabolic, SUV 6. Right deep pelvic adenopathy is hypermetabolic, SUV 8. Left pelvic cul-de-sac mass is hypermetabolic, SUV 11.     SKELETON: No foci of abnormal hypermetabolism in the axial and visualized  appendicular skeleton.     IMPRESSION:   1. Peritoneal carcinomatosis. 2. 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 3. Left pelvic cul-de-sac tumor. 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 She completed six cycles of adjuvant Taxol and Carboplatin chemotherapy and her post-treatment CT scan showed no evidence of disease. She declined adjuvant pelvic radiation. She now has evidence of retroperitoneal lymphadenopathy, peritoneal carcinomatosis, and trace ascites on CT. Although it has been 7 years since her endometrial cancer diagnosis, the pattern of the disease seen on CT would be consistent with that cancer. That being said, other primaries are possible. It is unlikely to be a colorectal cancer since she just had a negative CT. I sent her for a PET/CT at this time to better evaluate the extent of disease. Her disease is not amenable to surgical resection. Systemic therapy is her only viable option.   I think immunotherapy would be my choice, ahead of additional chemotherapy. This would consist of IV Keytruda and PO Lenvima. If that is not successful, I would consider retreatment with Taxol/Carboplatin or single agent Doxil. We will place an IV port to facilitate therapy. She was counseled on the expected side effects and potential toxicities and she wishes to proceed.       Mary Rahman MD  10/15/2020/2:05 PM

## 2020-10-15 NOTE — TELEPHONE ENCOUNTER
Requested Prescriptions     Signed Prescriptions Disp Refills    lenvatinib (Lenvima) 20 mg/day (10 mg x 2) cap 60 Cap 4     Sig: Take 2 Caps by mouth daily. Authorizing Provider: Rhett Morgan     Ordering User: Cathy Oliveira     Rx printed and faxed to [facility] per vo Dr. Beatris Murphy.

## 2020-10-20 ENCOUNTER — TRANSCRIBE ORDER (OUTPATIENT)
Dept: REGISTRATION | Age: 65
End: 2020-10-20

## 2020-10-20 DIAGNOSIS — Z01.812 PRE-PROCEDURE LAB EXAM: Primary | ICD-10-CM

## 2020-10-21 ENCOUNTER — TELEPHONE (OUTPATIENT)
Dept: GYNECOLOGY | Age: 65
End: 2020-10-21

## 2020-10-21 DIAGNOSIS — C54.9 MALIGNANT NEOPLASM OF CORPUS UTERI, EXCEPT ISTHMUS (HCC): ICD-10-CM

## 2020-10-21 DIAGNOSIS — Z01.818 PREOPERATIVE TESTING: Primary | ICD-10-CM

## 2020-10-22 DIAGNOSIS — C54.9 MALIGNANT NEOPLASM OF CORPUS UTERI, EXCEPT ISTHMUS (HCC): Primary | ICD-10-CM

## 2020-10-22 RX ORDER — ONDANSETRON 4 MG/1
4 TABLET, ORALLY DISINTEGRATING ORAL
Qty: 30 TAB | Refills: 3 | Status: SHIPPED | OUTPATIENT
Start: 2020-10-22 | End: 2022-10-04

## 2020-10-22 RX ORDER — LIDOCAINE AND PRILOCAINE 25; 25 MG/G; MG/G
CREAM TOPICAL
Qty: 30 G | Refills: 3 | Status: SHIPPED | OUTPATIENT
Start: 2020-10-22 | End: 2022-10-04

## 2020-10-22 NOTE — TELEPHONE ENCOUNTER
Requested Prescriptions     Signed Prescriptions Disp Refills    ondansetron (ZOFRAN ODT) 4 mg disintegrating tablet 30 Tab 3     Sig: Take 1 Tab by mouth every eight (8) hours as needed for Nausea. Indications: nausea and vomiting caused by cancer drugs     Authorizing Provider: Vannessa Galarza     Ordering User: Christa Batres    lidocaine-prilocaine (EMLA) topical cream 30 g 3     Sig: Apply small amount over port area and cover with band aid one hour before chemo     Authorizing Provider: Vannessa Galarza     Ordering User: Christa Batres     Rx e scribed to pts pharmacy per vo Dr. Iesha Nix to be used during tx of Keira Vaughn, and Keytruda.

## 2020-10-23 NOTE — TELEPHONE ENCOUNTER
Pt returning my call. Confirmed Lenvima to be delivered and she will start this medication on 11/3/20 when she will receive her first tx of Keytruda. Many questions answered, schedule given for MD/OPIC appts.

## 2020-10-24 ENCOUNTER — HOSPITAL ENCOUNTER (OUTPATIENT)
Dept: PREADMISSION TESTING | Age: 65
Discharge: HOME | End: 2020-10-24
Payer: COMMERCIAL

## 2020-10-24 DIAGNOSIS — Z01.812 PRE-PROCEDURE LAB EXAM: ICD-10-CM

## 2020-10-24 LAB
ALBUMIN SERPL-MCNC: 4.2 G/DL (ref 3.8–4.8)
ALBUMIN/GLOB SERPL: 1.8 {RATIO} (ref 1.2–2.2)
ALP SERPL-CCNC: 83 IU/L (ref 39–117)
ALT SERPL-CCNC: 16 IU/L (ref 0–32)
AST SERPL-CCNC: 17 IU/L (ref 0–40)
BASOPHILS # BLD AUTO: 0 X10E3/UL (ref 0–0.2)
BASOPHILS NFR BLD AUTO: 1 %
BILIRUB SERPL-MCNC: 0.5 MG/DL (ref 0–1.2)
BUN SERPL-MCNC: 8 MG/DL (ref 8–27)
BUN/CREAT SERPL: 14 (ref 12–28)
CALCIUM SERPL-MCNC: 9.6 MG/DL (ref 8.7–10.3)
CANCER AG125 SERPL-ACNC: 98.9 U/ML (ref 0–38.1)
CHLORIDE SERPL-SCNC: 101 MMOL/L (ref 96–106)
CO2 SERPL-SCNC: 27 MMOL/L (ref 20–29)
CREAT SERPL-MCNC: 0.58 MG/DL (ref 0.57–1)
EOSINOPHIL # BLD AUTO: 0.1 X10E3/UL (ref 0–0.4)
EOSINOPHIL NFR BLD AUTO: 2 %
ERYTHROCYTE [DISTWIDTH] IN BLOOD BY AUTOMATED COUNT: 12.7 % (ref 11.7–15.4)
GLOBULIN SER CALC-MCNC: 2.3 G/DL (ref 1.5–4.5)
GLUCOSE SERPL-MCNC: 117 MG/DL (ref 65–99)
HCT VFR BLD AUTO: 43.1 % (ref 34–46.6)
HGB BLD-MCNC: 14.7 G/DL (ref 11.1–15.9)
IMM GRANULOCYTES # BLD AUTO: 0 X10E3/UL (ref 0–0.1)
IMM GRANULOCYTES NFR BLD AUTO: 0 %
LYMPHOCYTES # BLD AUTO: 1.2 X10E3/UL (ref 0.7–3.1)
LYMPHOCYTES NFR BLD AUTO: 23 %
MCH RBC QN AUTO: 30.5 PG (ref 26.6–33)
MCHC RBC AUTO-ENTMCNC: 34.1 G/DL (ref 31.5–35.7)
MCV RBC AUTO: 89 FL (ref 79–97)
MONOCYTES # BLD AUTO: 0.6 X10E3/UL (ref 0.1–0.9)
MONOCYTES NFR BLD AUTO: 11 %
NEUTROPHILS # BLD AUTO: 3.3 X10E3/UL (ref 1.4–7)
NEUTROPHILS NFR BLD AUTO: 63 %
PLATELET # BLD AUTO: 279 X10E3/UL (ref 150–450)
POTASSIUM SERPL-SCNC: 4.3 MMOL/L (ref 3.5–5.2)
PROT SERPL-MCNC: 6.5 G/DL (ref 6–8.5)
RBC # BLD AUTO: 4.82 X10E6/UL (ref 3.77–5.28)
SODIUM SERPL-SCNC: 140 MMOL/L (ref 134–144)
WBC # BLD AUTO: 5.2 X10E3/UL (ref 3.4–10.8)

## 2020-10-24 PROCEDURE — 87635 SARS-COV-2 COVID-19 AMP PRB: CPT

## 2020-10-25 LAB — SARS-COV-2, COV2NT: NOT DETECTED

## 2020-10-27 ENCOUNTER — ANESTHESIA EVENT (OUTPATIENT)
Dept: SURGERY | Age: 65
End: 2020-10-27
Payer: COMMERCIAL

## 2020-10-27 NOTE — H&P
[facility] GYNECOLOGIC ONCOLOGY  [address] [facility], [address] [facility]  [phone] [facility] [[email or web address]] [phone]        Patient ID:  Name: Fuentes Green  MRN: [id number]  : 1955/65 y.o. Visit date: 10/27/2020    INTERVAL HISTORY:  Fuentes Green is a  female with stage IA, grade 3 uterine carcinoma. She underwent laparoscopic hysterectomy with staging in 2013. She was recommended six cycles of adjuvant Taxol and Carboplatin, which she completed. We elected not to treat with pelvic radiation therapy due to her difficulty with chemotherapy. She presented in 2020 for routine surveillance. She was without complaints at that time and her exam was negative. She subsequently had some abdominal discomfort and noted to have some blood in her urine. She was referred to [facility] for evaluation. She had a CT of the abdomen/pelvis with them. It revealed retroperitoneal lymphadenopathy, peritoneal carcinomatosis, and trace ascites. She presents today to discuss those results. She also reported a negative colonoscopy within the last month and is scheduled for a MMG in a few weeks. I sent her for a PET/CT to better evaluate the extent of disease. She presented to review the results and discuss treatment. PMH:  Past Medical History:   Diagnosis Date    Abnormal Pap smear     with f/u normal    Anemia     Arthritis     Asthma     Cancer ([address]     ENDOMETRIAL    Environmental allergies     GERD (gastroesophageal reflux disease)     Goiter     Other unknown and unspecified cause of morbidity or mortality     POSSIBLE ESOPHAGEAL SPASM, ?  OBSTRUCTION DUE TO GOITER    PMB (postmenopausal bleeding)     S/P chemotherapy, time since greater than 12 weeks     LAST CHEMO 10/2014 PER PATIENT    Thyroid disease     goiter     PSH:  Past Surgical History:   Procedure Laterality Date    BREAST SURGERY PROCEDURE UNLISTED  16    right breast bx    HX APPENDECTOMY      HX BUNIONECTOMY      HX GYN  3/13/13    TLH/BSO    HX HEENT  4/22/13    TOOTH REMOVED    HX ORTHOPAEDIC  1980'S    BUNIONECTOMY    HX VASCULAR ACCESS      port     SOC:  Social History     Socioeconomic History    Marital status:      Spouse name: Not on file    Number of children: Not on file    Years of education: Not on file    Highest education level: Not on file   Occupational History    Not on file   Social Needs    Financial resource strain: Not on file    Food insecurity     Worry: Not on file     Inability: Not on file    Transportation needs     Medical: Not on file     Non-medical: Not on file   Tobacco Use    Smoking status: Never Smoker    Smokeless tobacco: Never Used   Substance and Sexual Activity    Alcohol use:  Yes     Alcohol/week: 0.0 standard drinks     Comment: RARE OCC    Drug use: No    Sexual activity: Not on file   Lifestyle    Physical activity     Days per week: Not on file     Minutes per session: Not on file    Stress: Not on file   Relationships    Social connections     Talks on phone: Not on file     Gets together: Not on file     Attends Holiness service: Not on file     Active member of club or organization: Not on file     Attends meetings of clubs or organizations: Not on file     Relationship status: Not on file    Intimate partner violence     Fear of current or ex partner: Not on file     Emotionally abused: Not on file     Physically abused: Not on file     Forced sexual activity: Not on file   Other Topics Concern    Not on file   Social History Narrative    Not on file     Family History  Family History   Problem Relation Age of Onset    Cancer Maternal Aunt         breast    Heart Disease Mother     Diabetes Father     Cancer Sister         CERVICAL    Kidney Disease Brother     Diabetes Brother     Heart Attack Other     Arrhythmia Brother     Diabetes Brother     Anesth Problems Neg Hx      Medications:  No current facility-administered medications on file prior to encounter. Current Outpatient Medications on File Prior to Encounter   Medication Sig Dispense Refill    guaifenesin (MUCINEX PO) Take  by mouth.  loratadine (Claritin) 10 mg tablet Take 10 mg by mouth.  MARICHUY'S WORT PO Take  by mouth.  epinastine 0.05 % drop Apply  to eye.  raNITIdine (ZANTAC) 150 mg tablet ZANTAC TABS      cyclobenzaprine (FLEXERIL) 5 mg tablet take 1 tablet by mouth three times a day if needed  0    valACYclovir (VALTREX) 1 gram tablet Take 1 Tab by mouth three (3) times daily. 21 Tab 3    EPINEPHrine (EPIPEN) 0.3 mg/0.3 mL injection 0.3 mg by IntraMUSCular route once as needed.  ketotifen (ZADITOR) 0.025 % (0.035 %) ophthalmic solution Administer 1 Drop to both eyes every morning.  Cetirizine (ZYRTEC) 10 mg cap Take 10 mg by mouth nightly.  SAL ACID/UREA/PETROLATUM,WHITE (KERASAL FOOT EX) by Apply Externally route daily as needed.  ACCU-CHEK HELDER PLUS TEST STRP strip   0    NITROSTAT 0.4 mg SL tablet       CALCIUM CARBONATE (MARCELLO-SELTZER ANTACID PO) Take  by mouth daily as needed.  ibuprofen (MOTRIN) 200 mg tablet Take 200 mg by mouth every six (6) hours as needed. Allergies: Allergies   Allergen Reactions    Latex Other (comments)     Burns skin    Acetone Shortness of Breath    Amoxicillin Anaphylaxis    Pcn [Penicillins] Anaphylaxis    Azithromycin Hives    Egg Nausea Only    Other Medication Rash     POPPY seeds       ROS:  Negative     OBJECTIVE:    PHYSICAL EXAM  VITAL SIGNS: There were no vitals taken for this visit.    GENERAL KAIA: in no apparent distress and well developed and well nourished   HEENT: within normal limits   RESPIRATORY: lungs clear to auscultation   CARDIOVASC: Regular rate and rhythm   GASTROINT: soft, non-tender, without masses or organomegaly   MUSCULOSKEL: no joint tenderness, deformity or swelling   EXTREMITIES: extremities normal, atraumatic, no cyanosis or edema   PELVIC: Normal external genitalia. Normal vagina. Uterus, cervix, and adnexa surgically absent. No masses or nodularity on examination. She does have some vaginal prolapse. RECTAL: Exam deferred. PRIYANKA SURVEY: Cervical, supraclavicular, and axillary nodes normal.   NEURO: Grossly normal       CT of abdomen/pelvis (12/20/16)  The visualized portions of the lung bases are clear.     There are no focal abnormalities within the liver, spleen, pancreas, adrenal  glands or kidneys. No gallstones are noted.     The aorta tapers without aneurysm. There is no retroperitoneal adenopathy or  mass. There is no ascites or free intraperitoneal air.     There is no small or large bowel distention. The appendix is surgically absent.      The uterus and ovaries are surgically absent. There is no pelvic mass or  adenopathy.     Mild degenerative changes are noted in the lumbar spine.       IMPRESSION: Status post hysterectomy, bilateral oophorectomy, and appendectomy. No acute abnormality. CT of abdomen/pelvis (8/13/20)  LOWER CHEST: The visualized portions of the lung bases are clear. ABDOMEN:  Liver: The liver is normal in size and contour with no focal abnormality. The  attenuation of the liver is decreased throughout. Gallbladder and bile ducts: There is no calcified gallstone or biliary  dilatation. Spleen: No abnormality. Pancreas: No abnormality. Adrenal glands: No abnormality. Kidneys: No abnormality. PELVIS:  Reproductive organs: The uterus and ovaries are absent. Bladder: No abnormality. BOWEL AND MESENTERY: The small bowel is normal.  There is no mesenteric mass or  adenopathy. The appendix is absent. PERITONEUM: There is no ascites or free intraperitoneal air. RETROPERITONEUM: The aorta  tapers without aneurysm. There is no retroperitoneal  adenopathy or mass. There is no pelvic mass or adenopathy.   BONES AND SOFT TISSUES: The bones and soft tissues of the abdominal wall are  within normal limits.     IMPRESSION:   1. Status post hysterectomy and bilateral oophorectomy. 2. No evidence of recurrent or metastatic disease within the abdomen or pelvis. 3. Hepatic steatosis. 4. Status post appendectomy.       PET/CT (10/9/20)  HEAD/NECK: No apparent foci of abnormal hypermetabolism. Cerebral evaluation is  limited by normal intense activity.     CHEST: No foci of abnormal hypermetabolism.     ABDOMEN/PELVIS:   Peritoneal nodules are hypermetabolic, SUV 12. Retroperitoneal adenopathy is hypermetabolic, SUV 6. Right deep pelvic adenopathy is hypermetabolic, SUV 8. Left pelvic cul-de-sac mass is hypermetabolic, SUV 11.     SKELETON: No foci of abnormal hypermetabolism in the axial and visualized  appendicular skeleton.     IMPRESSION:   1. Peritoneal carcinomatosis. 2. 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 3. Left pelvic cul-de-sac tumor. 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 She completed six cycles of adjuvant Taxol and Carboplatin chemotherapy and her post-treatment CT scan showed no evidence of disease. She declined adjuvant pelvic radiation. She now has evidence of retroperitoneal lymphadenopathy, peritoneal carcinomatosis, and trace ascites on CT. Although it has been 7 years since her endometrial cancer diagnosis, the pattern of the disease seen on CT would be consistent with that cancer. That being said, other primaries are possible. It is unlikely to be a colorectal cancer since she just had a negative CT. I sent her for a PET/CT at this time to better evaluate the extent of disease. Her disease is not amenable to surgical resection. Systemic therapy is her only viable option. I think immunotherapy would be my choice, ahead of additional chemotherapy. This would consist of IV Keytruda and PO Lenvima.   If that is not successful, I would consider retreatment with Taxol/Carboplatin or single agent Doxil. We will place an IV port to facilitate therapy. She was counseled on the expected side effects and potential toxicities and she wishes to proceed. Patricia Mccoy MD  10/27/2020/2:05 PM    Date of Surgery Update:  Stacie Short was seen and examined. History and physical has been reviewed. The patient has been examined. There have been no significant clinical changes since the completion of the originally dated History and Physical.  Patient identified by surgeon; surgical site was confirmed by patient and surgeon.     Signed By: Patricia Mccoy MD     October 28, 2020 7:19 AM

## 2020-10-28 ENCOUNTER — APPOINTMENT (OUTPATIENT)
Dept: GENERAL RADIOLOGY | Age: 65
End: 2020-10-28
Attending: OBSTETRICS & GYNECOLOGY
Payer: COMMERCIAL

## 2020-10-28 ENCOUNTER — HOSPITAL ENCOUNTER (OUTPATIENT)
Age: 65
Setting detail: OUTPATIENT SURGERY
Discharge: HOME | End: 2020-10-28
Attending: OBSTETRICS & GYNECOLOGY | Admitting: OBSTETRICS & GYNECOLOGY
Payer: COMMERCIAL

## 2020-10-28 ENCOUNTER — ANESTHESIA (OUTPATIENT)
Dept: SURGERY | Age: 65
End: 2020-10-28
Payer: COMMERCIAL

## 2020-10-28 VITALS
DIASTOLIC BLOOD PRESSURE: 53 MMHG | OXYGEN SATURATION: 97 % | TEMPERATURE: 98 F | RESPIRATION RATE: 18 BRPM | HEART RATE: 66 BPM | SYSTOLIC BLOOD PRESSURE: 129 MMHG

## 2020-10-28 DIAGNOSIS — G89.18 POSTOPERATIVE PAIN: Primary | ICD-10-CM

## 2020-10-28 PROCEDURE — 74011000250 HC RX REV CODE- 250: Performed by: NURSE ANESTHETIST, CERTIFIED REGISTERED

## 2020-10-28 PROCEDURE — 77030031139 HC SUT VCRL2 J&J -A: Performed by: OBSTETRICS & GYNECOLOGY

## 2020-10-28 PROCEDURE — 74011000250 HC RX REV CODE- 250: Performed by: OBSTETRICS & GYNECOLOGY

## 2020-10-28 PROCEDURE — 74011250636 HC RX REV CODE- 250/636: Performed by: NURSE ANESTHETIST, CERTIFIED REGISTERED

## 2020-10-28 PROCEDURE — 74011250636 HC RX REV CODE- 250/636: Performed by: ANESTHESIOLOGY

## 2020-10-28 PROCEDURE — C1788 PORT, INDWELLING, IMP: HCPCS | Performed by: OBSTETRICS & GYNECOLOGY

## 2020-10-28 PROCEDURE — 76060000033 HC ANESTHESIA 1 TO 1.5 HR: Performed by: OBSTETRICS & GYNECOLOGY

## 2020-10-28 PROCEDURE — 2709999900 HC NON-CHARGEABLE SUPPLY: Performed by: OBSTETRICS & GYNECOLOGY

## 2020-10-28 PROCEDURE — 74011250636 HC RX REV CODE- 250/636: Performed by: OBSTETRICS & GYNECOLOGY

## 2020-10-28 PROCEDURE — 76210000000 HC OR PH I REC 2 TO 2.5 HR: Performed by: OBSTETRICS & GYNECOLOGY

## 2020-10-28 PROCEDURE — 77030020256 HC SOL INJ NACL 0.9%  500ML: Performed by: OBSTETRICS & GYNECOLOGY

## 2020-10-28 PROCEDURE — 74011250637 HC RX REV CODE- 250/637: Performed by: ANESTHESIOLOGY

## 2020-10-28 PROCEDURE — 77030002933 HC SUT MCRYL J&J -A: Performed by: OBSTETRICS & GYNECOLOGY

## 2020-10-28 PROCEDURE — 77030027138 HC INCENT SPIROMETER -A

## 2020-10-28 PROCEDURE — 71045 X-RAY EXAM CHEST 1 VIEW: CPT

## 2020-10-28 PROCEDURE — 76010000138 HC OR TIME 0.5 TO 1 HR: Performed by: OBSTETRICS & GYNECOLOGY

## 2020-10-28 PROCEDURE — 77030002986 HC SUT PROL J&J -A: Performed by: OBSTETRICS & GYNECOLOGY

## 2020-10-28 DEVICE — POWERPORT IMPLANTABLE PORT WITH ATTACHABLE 8F CHRONOFLEX OPEN-ENDED SINGLE-LUMEN VENOUS CATHETER INTERMEDIATE KIT (WITH SUTURE PLUGS)
Type: IMPLANTABLE DEVICE | Site: CHEST | Status: FUNCTIONAL
Brand: POWERPORT, CHRONOFLEX

## 2020-10-28 RX ORDER — SODIUM CHLORIDE 9 MG/ML
25 INJECTION, SOLUTION INTRAVENOUS CONTINUOUS
Status: DISCONTINUED | OUTPATIENT
Start: 2020-10-28 | End: 2020-10-28 | Stop reason: HOSPADM

## 2020-10-28 RX ORDER — KETAMINE HYDROCHLORIDE 10 MG/ML
INJECTION, SOLUTION INTRAMUSCULAR; INTRAVENOUS AS NEEDED
Status: DISCONTINUED | OUTPATIENT
Start: 2020-10-28 | End: 2020-10-28 | Stop reason: HOSPADM

## 2020-10-28 RX ORDER — HEPARIN 100 UNIT/ML
SYRINGE INTRAVENOUS AS NEEDED
Status: DISCONTINUED | OUTPATIENT
Start: 2020-10-28 | End: 2020-10-28 | Stop reason: HOSPADM

## 2020-10-28 RX ORDER — ONDANSETRON 2 MG/ML
4 INJECTION INTRAMUSCULAR; INTRAVENOUS AS NEEDED
Status: DISCONTINUED | OUTPATIENT
Start: 2020-10-28 | End: 2020-10-28 | Stop reason: HOSPADM

## 2020-10-28 RX ORDER — FENTANYL CITRATE 50 UG/ML
50 INJECTION, SOLUTION INTRAMUSCULAR; INTRAVENOUS AS NEEDED
Status: DISCONTINUED | OUTPATIENT
Start: 2020-10-28 | End: 2020-10-28 | Stop reason: HOSPADM

## 2020-10-28 RX ORDER — MIDAZOLAM HYDROCHLORIDE 1 MG/ML
0.5 INJECTION, SOLUTION INTRAMUSCULAR; INTRAVENOUS
Status: DISCONTINUED | OUTPATIENT
Start: 2020-10-28 | End: 2020-10-28 | Stop reason: HOSPADM

## 2020-10-28 RX ORDER — ACETAMINOPHEN 325 MG/1
650 TABLET ORAL ONCE
Status: COMPLETED | OUTPATIENT
Start: 2020-10-28 | End: 2020-10-28

## 2020-10-28 RX ORDER — DEXAMETHASONE SODIUM PHOSPHATE 4 MG/ML
INJECTION, SOLUTION INTRA-ARTICULAR; INTRALESIONAL; INTRAMUSCULAR; INTRAVENOUS; SOFT TISSUE AS NEEDED
Status: DISCONTINUED | OUTPATIENT
Start: 2020-10-28 | End: 2020-10-28 | Stop reason: HOSPADM

## 2020-10-28 RX ORDER — SODIUM CHLORIDE, SODIUM LACTATE, POTASSIUM CHLORIDE, CALCIUM CHLORIDE 600; 310; 30; 20 MG/100ML; MG/100ML; MG/100ML; MG/100ML
125 INJECTION, SOLUTION INTRAVENOUS CONTINUOUS
Status: DISCONTINUED | OUTPATIENT
Start: 2020-10-28 | End: 2020-10-28 | Stop reason: HOSPADM

## 2020-10-28 RX ORDER — OXYCODONE AND ACETAMINOPHEN 5; 325 MG/1; MG/1
1 TABLET ORAL AS NEEDED
Status: DISCONTINUED | OUTPATIENT
Start: 2020-10-28 | End: 2020-10-28 | Stop reason: HOSPADM

## 2020-10-28 RX ORDER — HYDROMORPHONE HYDROCHLORIDE 1 MG/ML
0.2 INJECTION, SOLUTION INTRAMUSCULAR; INTRAVENOUS; SUBCUTANEOUS
Status: DISCONTINUED | OUTPATIENT
Start: 2020-10-28 | End: 2020-10-28 | Stop reason: HOSPADM

## 2020-10-28 RX ORDER — LIDOCAINE HYDROCHLORIDE 10 MG/ML
0.1 INJECTION, SOLUTION EPIDURAL; INFILTRATION; INTRACAUDAL; PERINEURAL AS NEEDED
Status: DISCONTINUED | OUTPATIENT
Start: 2020-10-28 | End: 2020-10-28 | Stop reason: HOSPADM

## 2020-10-28 RX ORDER — ONDANSETRON 2 MG/ML
INJECTION INTRAMUSCULAR; INTRAVENOUS AS NEEDED
Status: DISCONTINUED | OUTPATIENT
Start: 2020-10-28 | End: 2020-10-28 | Stop reason: HOSPADM

## 2020-10-28 RX ORDER — SODIUM CHLORIDE 0.9 % (FLUSH) 0.9 %
5-40 SYRINGE (ML) INJECTION EVERY 8 HOURS
Status: DISCONTINUED | OUTPATIENT
Start: 2020-10-28 | End: 2020-10-28 | Stop reason: HOSPADM

## 2020-10-28 RX ORDER — FENTANYL CITRATE 50 UG/ML
25 INJECTION, SOLUTION INTRAMUSCULAR; INTRAVENOUS
Status: DISCONTINUED | OUTPATIENT
Start: 2020-10-28 | End: 2020-10-28 | Stop reason: HOSPADM

## 2020-10-28 RX ORDER — PROPOFOL 10 MG/ML
INJECTION, EMULSION INTRAVENOUS
Status: DISCONTINUED | OUTPATIENT
Start: 2020-10-28 | End: 2020-10-28 | Stop reason: HOSPADM

## 2020-10-28 RX ORDER — SODIUM CHLORIDE, SODIUM LACTATE, POTASSIUM CHLORIDE, CALCIUM CHLORIDE 600; 310; 30; 20 MG/100ML; MG/100ML; MG/100ML; MG/100ML
INJECTION, SOLUTION INTRAVENOUS
Status: DISCONTINUED | OUTPATIENT
Start: 2020-10-28 | End: 2020-10-28 | Stop reason: HOSPADM

## 2020-10-28 RX ORDER — DEXMEDETOMIDINE HYDROCHLORIDE 100 UG/ML
INJECTION, SOLUTION INTRAVENOUS AS NEEDED
Status: DISCONTINUED | OUTPATIENT
Start: 2020-10-28 | End: 2020-10-28 | Stop reason: HOSPADM

## 2020-10-28 RX ORDER — MORPHINE SULFATE 10 MG/ML
2 INJECTION, SOLUTION INTRAMUSCULAR; INTRAVENOUS
Status: DISCONTINUED | OUTPATIENT
Start: 2020-10-28 | End: 2020-10-28 | Stop reason: HOSPADM

## 2020-10-28 RX ORDER — DIPHENHYDRAMINE HYDROCHLORIDE 50 MG/ML
12.5 INJECTION, SOLUTION INTRAMUSCULAR; INTRAVENOUS AS NEEDED
Status: DISCONTINUED | OUTPATIENT
Start: 2020-10-28 | End: 2020-10-28 | Stop reason: HOSPADM

## 2020-10-28 RX ORDER — TRAMADOL HYDROCHLORIDE 50 MG/1
50 TABLET ORAL
Qty: 20 TAB | Refills: 0 | Status: SHIPPED | OUTPATIENT
Start: 2020-10-28 | End: 2020-11-02

## 2020-10-28 RX ORDER — MIDAZOLAM HYDROCHLORIDE 1 MG/ML
INJECTION, SOLUTION INTRAMUSCULAR; INTRAVENOUS AS NEEDED
Status: DISCONTINUED | OUTPATIENT
Start: 2020-10-28 | End: 2020-10-28 | Stop reason: HOSPADM

## 2020-10-28 RX ORDER — SODIUM CHLORIDE 0.9 % (FLUSH) 0.9 %
5-40 SYRINGE (ML) INJECTION AS NEEDED
Status: DISCONTINUED | OUTPATIENT
Start: 2020-10-28 | End: 2020-10-28 | Stop reason: HOSPADM

## 2020-10-28 RX ORDER — MIDAZOLAM HYDROCHLORIDE 1 MG/ML
1 INJECTION, SOLUTION INTRAMUSCULAR; INTRAVENOUS AS NEEDED
Status: DISCONTINUED | OUTPATIENT
Start: 2020-10-28 | End: 2020-10-28 | Stop reason: HOSPADM

## 2020-10-28 RX ADMIN — KETAMINE HYDROCHLORIDE 15 MG: 10 INJECTION, SOLUTION INTRAMUSCULAR; INTRAVENOUS at 14:51

## 2020-10-28 RX ADMIN — ACETAMINOPHEN 650 MG: 325 TABLET ORAL at 13:17

## 2020-10-28 RX ADMIN — SODIUM CHLORIDE, POTASSIUM CHLORIDE, SODIUM LACTATE AND CALCIUM CHLORIDE: 600; 310; 30; 20 INJECTION, SOLUTION INTRAVENOUS at 14:23

## 2020-10-28 RX ADMIN — DEXAMETHASONE SODIUM PHOSPHATE 4 MG: 4 INJECTION, SOLUTION INTRAMUSCULAR; INTRAVENOUS at 14:33

## 2020-10-28 RX ADMIN — SODIUM CHLORIDE, SODIUM LACTATE, POTASSIUM CHLORIDE, AND CALCIUM CHLORIDE 125 ML/HR: 600; 310; 30; 20 INJECTION, SOLUTION INTRAVENOUS at 13:18

## 2020-10-28 RX ADMIN — KETAMINE HYDROCHLORIDE 10 MG: 10 INJECTION, SOLUTION INTRAMUSCULAR; INTRAVENOUS at 14:47

## 2020-10-28 RX ADMIN — DEXMEDETOMIDINE HYDROCHLORIDE 12 MCG: 100 INJECTION, SOLUTION, CONCENTRATE INTRAVENOUS at 14:23

## 2020-10-28 RX ADMIN — MIDAZOLAM 2 MG: 1 INJECTION INTRAMUSCULAR; INTRAVENOUS at 14:23

## 2020-10-28 RX ADMIN — ONDANSETRON HYDROCHLORIDE 4 MG: 2 INJECTION, SOLUTION INTRAMUSCULAR; INTRAVENOUS at 14:33

## 2020-10-28 RX ADMIN — PROPOFOL 100 MCG/KG/MIN: 10 INJECTION, EMULSION INTRAVENOUS at 14:24

## 2020-10-28 NOTE — PERIOP NOTES
Patient verbalized understanding of all instructions. No s/s of nausea, SOB, pain or bleed. All belongings returned.

## 2020-10-28 NOTE — ANESTHESIA POSTPROCEDURE EVALUATION
Post-Anesthesia Evaluation and Assessment    Patient: Criss Núñez MRN: [id number]  SSN: [id number]    YOB: 1955  Age: 60 y.o. Sex: female      I have evaluated the patient and they are stable and ready for discharge from the PACU. Cardiovascular Function/Vital Signs  Visit Vitals  /62   Pulse 63   Temp 37.1 °C (98.7 °F)   Resp 16   SpO2 99%       Patient is status post MAC anesthesia for Procedure(s):  INSERTION PORT A CATH. Nausea/Vomiting: None    Postoperative hydration reviewed and adequate. Pain:  Pain Scale 1: Numeric (0 - 10) (10/28/20 1246)  Pain Intensity 1: 0 (10/28/20 1246)   Managed    Neurological Status:   Neuro (WDL): Within Defined Limits (10/28/20 1253)   At baseline    Mental Status, Level of Consciousness: Alert and  oriented to person, place, and time    Pulmonary Status:   O2 Device: Nasal cannula (10/28/20 1524)   Adequate oxygenation and airway patent    Complications related to anesthesia: None    Post-anesthesia assessment completed. No concerns    Signed By: Carl Garcia MD     October 28, 2020              Procedure(s):  INSERTION PORT A CATH. MAC    <BSHSIANPOST>    INITIAL Post-op Vital signs:   Vitals Value Taken Time   /62 10/28/2020  3:45 PM   Temp 37.1 °C (98.7 °F) 10/28/2020  3:24 PM   Pulse 69 10/28/2020  3:58 PM   Resp 15 10/28/2020  3:58 PM   SpO2 96 % 10/28/2020  3:58 PM   Vitals shown include unvalidated device data.

## 2020-10-28 NOTE — OP NOTES
Gynecologic Oncology Operative Report    Yecenia Green  10/28/2020    Pre-operative diagnosis:   1) Recurrent endometrial CA      2) Requires venous access for chemotherapy     Post-operative diagnosis: 1) Recurrent endometrial CA      2) Requires venous access for chemotherapy    Procedure:  IV port placement    Surgeon:  Negra Badillo MD    Assistant:  N/A    Anesthesia:  MAC, plus local    EBL:  5 cc    Implants:    Implant Name Type Inv. Item Serial No.  Lot No. LRB No. Used Action   PORT INFUS 8FR POLYUR CATH FULL SZ TI AIRGUARD VLV ADALBERTO FILL - SNA  PORT INFUS 8FR POLYUR CATH FULL SZ TI AIRGUARD VLV ADALBERTO FILL NA BARD PERIPHERAL VASCULAR_WD [id number] Left 1 Implanted     Complications:  None    Operative indications:  59 yo WF with recurrent endometrial cancer. Scheduled to begin systemic therapy. Presents for IV port placement. Operative findings:  Normal anatomy. Procedure in detail:  After the risks, benefits, indications, and alternatives of the procedure were discussed with the patient and informed consent was obtained, the patient was taken to the operating room. She was then correctly identified, administered IV sedation per anesthesia, and then prepped and draped in the supine position in the usual fashion. Her arms were also tucked at each side. The patient was then placed in slight Trendelenburg tilt and the anatomy of the anterior thoracic wall was noted. 1% lidocaine without epinephrine was then injected just underneath the left mid clavicle to provide local anesthesia. The left subclavian vein was then located and entered with an 18-gauge needle. A guidewire was then inserted through the needle and proper placement was confirmed by fluoroscopy. A small incision was made with an #11-blade scalpel at the insertion site and the catheter introducer and dilator were inserted over the guidewire. The guidewire and dilator were then removed.   The 8 Russian catheter was then inserted through the introducer to a depth of 20 cm at skin level. Proper placement was confirmed by fluoroscopy with the catheter tip positioned in the superior vena cava just above the right atrium. The peel-away catheter introducer was then removed. Good flow and return were noted through the catheter. Attention was then directed to creation of the port pocket. The location of the pocket was selected and 1% lidocaine was injected. The site was approximately 4 cm caudad to the catheter insertion site. An  4 cm skin incision was made transversely with a #15-blade scalpel. The incision was then carried down to the pectoralis fascia with electro-cautery. A port pocket was then bluntly created, large enough to accommodate the standard-size Bard Powerport. The port was then secured to the pectoralis fascia with 2-0 Proline suture at three locations. The catheter was then tunneled from the insertion site to the port site using the tunneling device. The catheter was cut to fit and then attached to the port. The catheter was secured to the port using the locking device. The port was then accessed with a Perez needle and good glow and returned were noted. The port was then flushed with concentrated Heparin solution. The port pocket was then irrigated and made hemostatic with electro-cautery. The incision was then closed in two layers. The subcutaneous fat was reapproximated with a running 3-0 Vicryl. The skin was then reapproximated with a 4-0 Monocryl in a running subcuticular fashion. Steri-strips were then applied, followed by a sterile pressure dressing. The patient was then awakened from anesthesia and taken to the recovery room in stable condition. All instrument, sponge, and needle counts were correct. A chest X-ray was ordered for the recovery room, but the results are pending at the time of this dictation.       Rosibel Cisneros MD  10/28/2020  3:11 PM

## 2020-10-28 NOTE — DISCHARGE INSTRUCTIONS
Patient Education        Implanted [facility] HEALTH PROVIDERS [facility] for Chemotherapy: Care Instructions  Your Care Instructions  An implanted port is a device placed, in most cases, under the skin of your chest below your collarbone. It is made of plastic, stainless steel, or titanium. The port is about the size of a quarter, but thicker. A thin, flexible tube called a catheter runs from the port under your skin into a large vein. A membrane (septum) similar to a pencil eraser is in the center of the port. A nurse uses a needle to put chemotherapy or other medicine and fluids through the septum into a blood vessel. The port may be used to draw blood for tests only if another vein, such as in the hand or arm, can't be used. An implanted port can be used for months. A special needle (called a Perez needle) may stay in the port for a short time. The port needs regular care to make sure it does not get blocked. Tell your doctor if you take aspirin or some other blood thinner. These medicines can increase the chance of bleeding inside your body. Follow-up care is a key part of your treatment and safety. Be sure to make and go to all appointments, and call your doctor if you are having problems. It's also a good idea to know your test results and keep a list of the medicines you take. How can you care for yourself at home? · You will probably need to take 1 day off from work and will be able return to normal activities shortly after. This depends on the type of work you do, why you have the port, and how you feel. · You probably will be able to bathe and swim. But you may need to avoid some activities if a Perez needle is left in the port. Talk to your doctor about any limits on your activity. · Some clothes may rub the skin over the port. Do not wear a bra or suspenders that irritate your skin near the port. · You will get a medical alert card with information about your port. Carry this with you.  It will tell health care workers you have a port in case you need emergency care. · Your port will need regular flushing to keep it open. A nurse or other health professional will do this for you. When should you call for help? Call your doctor now or seek immediate medical care if:    · You have signs of infection, such as:  ? Increased pain, swelling, warmth, or redness near the port. ? Red streaks leading from the port. ? Pus draining from the port. ? A fever.     · You have pain or swelling in your neck or arm.     · You have trouble breathing. Watch closely for changes in your health, and be sure to contact your doctor if:    · You have any problems with your port. Where can you learn more? Go to http://www.gray.com/  Enter P577 in the search box to learn more about \"Implanted [employer] HEALTH PROVIDERS [facility] [place] for Chemotherapy: Care Instructions. \"  Current as of: June 26, 2019               Content Version: 12.6  © 9064-4008 [employer]. Care instructions adapted under license by [employer] (which disclaims liability or warranty for this information). If you have questions about a medical condition or this instruction, always ask your healthcare professional. [place] [address] any warranty or liability for your use of this information. Patient Education        Learning About Using an Incentive Spirometer  What is an incentive spirometer? An incentive spirometer is a handheld device that exercises your lungs and measures how much air you can breathe in. It tells you and your doctor how well your lungs are working. The spirometer can help you practice taking deep breaths. Deep breaths can help open your airways and prevent fluid or mucus from building up in your lungs, and make it easier for you to breathe.   Using the device can help prevent serious lung infections like pneumonia, improve your breathing after you've had pneumonia or surgery, and keep your airways open and lungs active if you can't get out of bed. How do you use an incentive spirometer? When you use an incentive spirometer, you breathe in air through a tube that is connected to a large air column containing a piston or ball. As you breathe in, the piston or ball inside the column moves up. The height of the piston or ball shows how much air you breathed in. You may feel lightheaded when you breathe in deeply for this exercise. If you feel dizzy or feel like you're going to pass out, stop the exercise and rest.  Each time you do this exercise, keep track of your progress by writing down how high the piston or ball moves up the column. 1. Move the slider on the outside of the large column to the level that you want to reach or that your doctor recommended. 2. Sit or stand up straight, and hold the spirometer in front of you. Be sure to keep it level. 3. To start, breathe out normally. Then close your lips tightly around the mouthpiece. Make sure that you don't block the mouthpiece with your tongue. 4. Take a slow, deep breath. Breathe in as deeply as you can. As you breathe in, the piston or ball inside the large column will move up. Try to move the piston or ball as high up as you can or to the level your doctor recommended. When you can't breathe in anymore, hold your breath for 2 to 5 seconds. 5. Relax, take the mouthpiece out of your mouth, and breathe out normally. 6. Repeat steps 1 through 5 as many times as your doctor tells you to.  7. After you've taken the recommended number of breaths, try to cough a few times. This will help loosen any mucus that has built up in your lungs. It will make it easier for you to breathe. If you just had surgery on your belly or chest, hold a pillow over your cut (incision) when you cough. Follow-up care is a key part of your treatment and safety. Be sure to make and go to all appointments, and call your doctor if you are having problems.  It's also a good idea to know your test results and keep a list of the medicines you take. Where can you learn more? Go to http://www.gray.com/  Enter B979 in the search box to learn more about \"Learning About Using an [employer]. \"  Current as of: February 24, 2020               Content Version: 12.6  © 3487-4064 [employer]. Care instructions adapted under license by [employer] (which disclaims liability or warranty for this information). If you have questions about a medical condition or this instruction, always ask your healthcare professional. [address] any warranty or liability for your use of this information. ______________________________________________________________________    Anesthesia Discharge Instructions    After general anesthesia or intervenous sedation, for 24 hours or while taking prescription Narcotics:  · Limit your activities  · Do not drive or operate hazardous machinery  · If you have not urinated within 8 hours after discharge, please contact your surgeon on call. · Do not make important personal or business decisions  · Do not drink alcoholic beverages    Report the following to your surgeon:  · Excessive pain, swelling, redness or odor of or around the surgical area  · Temperature over 100.5 degrees  · Nausea and vomiting lasting longer than 4 hours or if unable to take medication  · Any signs of decreased circulation or nerve impairment to extremity:  Change in color, persistent numbness, tingling, coldness or increased pain.   · Any questions

## 2020-10-28 NOTE — BRIEF OP NOTE
Brief Postoperative Note    Patient: Maliha Reese  YOB: 1955  MRN: [id number]    Date of Procedure: 10/28/2020     Pre-Op Diagnosis: ENDOMETRIAL CANCER    Post-Op Diagnosis: Same as preoperative diagnosis. Procedure(s):  INSERTION PORT A CATH    Surgeon(s):  Adonay Mojica MD    Surgical Assistant: None    Anesthesia: MAC     Estimated Blood Loss (mL): Minimal    Complications: None    Specimens: * No specimens in log *     Implants:   Implant Name Type Inv. Item Serial No.  Lot No. LRB No. Used Action   PORT INFUS 8FR POLYUR CATH FULL SZ TI AIRGUARD VLV ADALBERTO FILL - SNA  PORT INFUS 8FR POLYUR CATH FULL SZ TI AIRGUARD VLV ADALBERTO FILL NA BARD PERIPHERAL VASCULAR_WD [id number] Left 1 Implanted       Drains: * No LDAs found *    Findings: Normal anatomy.     Electronically Signed by Pearl Kinsey MD on 10/28/2020 at 3:10 PM

## 2020-10-28 NOTE — PERIOP NOTES
CXR read by Dr. Ford Boast. No Pneumothorax. Incentive Spirometer provided as per CXR result for mild left lower lobe atelectasis.

## 2020-10-28 NOTE — ANESTHESIA PREPROCEDURE EVALUATION
Relevant Problems   No relevant active problems       Anesthetic History   No history of anesthetic complications            Review of Systems / Medical History  Patient summary reviewed, nursing notes reviewed and pertinent labs reviewed    Pulmonary            Asthma : well controlled       Neuro/Psych   Within defined limits           Cardiovascular  Within defined limits                Exercise tolerance: >4 METS     GI/Hepatic/Renal     GERD: well controlled           Endo/Other      Hypothyroidism: well controlled  Arthritis     Other Findings   Comments: Endometrial ca           Physical Exam    Airway  Mallampati: II  TM Distance: > 6 cm  Neck ROM: normal range of motion   Mouth opening: Normal     Cardiovascular  Regular rate and rhythm,  S1 and S2 normal,  no murmur, click, rub, or gallop             Dental  No notable dental hx       Pulmonary  Breath sounds clear to auscultation               Abdominal  GI exam deferred       Other Findings            Anesthetic Plan    ASA: 3  Anesthesia type: MAC          Induction: Intravenous  Anesthetic plan and risks discussed with: Patient

## 2020-10-28 NOTE — ROUTINE PROCESS
Patient: Jessy Lee MRN: [id number]  SSN: [id number] YOB: 1955  Age: 60 y.o. Sex: female Patient is status post Procedure(s): 
INSERTION PORT A CATH. Surgeon(s) and Role: Cme Rinaldi MD - Primary Local/Dose/Irrigation:  See [facility] Venous Access Device 04/24/13 (Active) Venous Access Device 10/28/20 Upper chest (subclavicular area), left (Active) Peripheral IV 10/28/20 Right Hand (Active) Dressing/Packing:  Wound Chest Left-Dressing Type: Adhesive wound dressing (Mastisol); Adhesive wound closure strips (Steri-Strips);2 x 2;Transparent film (10/28/20 7132) Splint/Cast:  ] Other:  NA

## 2020-11-03 ENCOUNTER — HOSPITAL ENCOUNTER (OUTPATIENT)
Dept: INFUSION THERAPY | Age: 65
Discharge: HOME | End: 2020-11-03
Payer: COMMERCIAL

## 2020-11-03 ENCOUNTER — DOCUMENTATION ONLY (OUTPATIENT)
Dept: ONCOLOGY | Age: 65
End: 2020-11-03

## 2020-11-03 VITALS
RESPIRATION RATE: 20 BRPM | DIASTOLIC BLOOD PRESSURE: 75 MMHG | TEMPERATURE: 97.8 F | SYSTOLIC BLOOD PRESSURE: 137 MMHG | HEART RATE: 75 BPM

## 2020-11-03 DIAGNOSIS — C54.9 MALIGNANT NEOPLASM OF CORPUS UTERI, EXCEPT ISTHMUS (HCC): Primary | ICD-10-CM

## 2020-11-03 DIAGNOSIS — Z51.11 ENCOUNTER FOR ANTINEOPLASTIC CHEMOTHERAPY: ICD-10-CM

## 2020-11-03 DIAGNOSIS — E04.9 THYROID GOITER: ICD-10-CM

## 2020-11-03 DIAGNOSIS — J45.909 MILD ASTHMA WITHOUT COMPLICATION, UNSPECIFIED WHETHER PERSISTENT: ICD-10-CM

## 2020-11-03 LAB
ALBUMIN SERPL-MCNC: 3.3 G/DL (ref 3.5–5)
ALBUMIN/GLOB SERPL: 1.1 {RATIO} (ref 1.1–2.2)
ALP SERPL-CCNC: 70 U/L (ref 45–117)
ALT SERPL-CCNC: 22 U/L (ref 12–78)
ANION GAP SERPL CALC-SCNC: 8 MMOL/L (ref 5–15)
APPEARANCE UR: CLEAR
AST SERPL-CCNC: 18 U/L (ref 15–37)
BACTERIA URNS QL MICRO: NEGATIVE /HPF
BASOPHILS # BLD: 0 K/UL (ref 0–0.1)
BASOPHILS NFR BLD: 1 % (ref 0–1)
BILIRUB SERPL-MCNC: 0.4 MG/DL (ref 0.2–1)
BILIRUB UR QL: NEGATIVE
BUN SERPL-MCNC: 10 MG/DL (ref 6–20)
BUN/CREAT SERPL: 18 (ref 12–20)
CALCIUM SERPL-MCNC: 8.7 MG/DL (ref 8.5–10.1)
CHLORIDE SERPL-SCNC: 108 MMOL/L (ref 97–108)
CO2 SERPL-SCNC: 27 MMOL/L (ref 21–32)
COLOR UR: NORMAL
COMMENT, HOLDF: NORMAL
CREAT SERPL-MCNC: 0.56 MG/DL (ref 0.55–1.02)
DIFFERENTIAL METHOD BLD: ABNORMAL
EOSINOPHIL # BLD: 0.1 K/UL (ref 0–0.4)
EOSINOPHIL NFR BLD: 1 % (ref 0–7)
EPITH CASTS URNS QL MICRO: NORMAL /LPF
ERYTHROCYTE [DISTWIDTH] IN BLOOD BY AUTOMATED COUNT: 12.8 % (ref 11.5–14.5)
GLOBULIN SER CALC-MCNC: 2.9 G/DL (ref 2–4)
GLUCOSE SERPL-MCNC: 144 MG/DL (ref 65–100)
GLUCOSE UR STRIP.AUTO-MCNC: NEGATIVE MG/DL
HCT VFR BLD AUTO: 39.9 % (ref 35–47)
HGB BLD-MCNC: 13.4 G/DL (ref 11.5–16)
HGB UR QL STRIP: NEGATIVE
HYALINE CASTS URNS QL MICRO: NORMAL /LPF (ref 0–5)
IMM GRANULOCYTES # BLD AUTO: 0 K/UL (ref 0–0.04)
IMM GRANULOCYTES NFR BLD AUTO: 1 % (ref 0–0.5)
KETONES UR QL STRIP.AUTO: NEGATIVE MG/DL
LEUKOCYTE ESTERASE UR QL STRIP.AUTO: NEGATIVE
LYMPHOCYTES # BLD: 1.3 K/UL (ref 0.8–3.5)
LYMPHOCYTES NFR BLD: 23 % (ref 12–49)
MCH RBC QN AUTO: 30.4 PG (ref 26–34)
MCHC RBC AUTO-ENTMCNC: 33.6 G/DL (ref 30–36.5)
MCV RBC AUTO: 90.5 FL (ref 80–99)
MONOCYTES # BLD: 0.5 K/UL (ref 0–1)
MONOCYTES NFR BLD: 9 % (ref 5–13)
NEUTS SEG # BLD: 3.9 K/UL (ref 1.8–8)
NEUTS SEG NFR BLD: 65 % (ref 32–75)
NITRITE UR QL STRIP.AUTO: NEGATIVE
NRBC # BLD: 0 K/UL (ref 0–0.01)
NRBC BLD-RTO: 0 PER 100 WBC
PH UR STRIP: 7 [PH] (ref 5–8)
PLATELET # BLD AUTO: 224 K/UL (ref 150–400)
PMV BLD AUTO: 9.1 FL (ref 8.9–12.9)
POTASSIUM SERPL-SCNC: 4.4 MMOL/L (ref 3.5–5.1)
PROT SERPL-MCNC: 6.2 G/DL (ref 6.4–8.2)
PROT UR STRIP-MCNC: NEGATIVE MG/DL
RBC # BLD AUTO: 4.41 M/UL (ref 3.8–5.2)
RBC #/AREA URNS HPF: NORMAL /HPF (ref 0–5)
SAMPLES BEING HELD,HOLD: NORMAL
SODIUM SERPL-SCNC: 143 MMOL/L (ref 136–145)
SP GR UR REFRACTOMETRY: 1.01 (ref 1–1.03)
TSH SERPL DL<=0.05 MIU/L-ACNC: 0.67 UIU/ML (ref 0.36–3.74)
UR CULT HOLD, URHOLD: NORMAL
UROBILINOGEN UR QL STRIP.AUTO: 0.2 EU/DL (ref 0.2–1)
WBC # BLD AUTO: 5.9 K/UL (ref 3.6–11)
WBC URNS QL MICRO: NORMAL /HPF (ref 0–4)

## 2020-11-03 PROCEDURE — 36415 COLL VENOUS BLD VENIPUNCTURE: CPT

## 2020-11-03 PROCEDURE — 99214 OFFICE O/P EST MOD 30 MIN: CPT | Performed by: PHYSICIAN ASSISTANT

## 2020-11-03 PROCEDURE — 74011250636 HC RX REV CODE- 250/636: Performed by: OBSTETRICS & GYNECOLOGY

## 2020-11-03 PROCEDURE — 77030012965 HC NDL HUBR BBMI -A

## 2020-11-03 PROCEDURE — 74011000258 HC RX REV CODE- 258: Performed by: OBSTETRICS & GYNECOLOGY

## 2020-11-03 PROCEDURE — 80053 COMPREHEN METABOLIC PANEL: CPT

## 2020-11-03 PROCEDURE — 96413 CHEMO IV INFUSION 1 HR: CPT

## 2020-11-03 PROCEDURE — 84443 ASSAY THYROID STIM HORMONE: CPT

## 2020-11-03 PROCEDURE — 81001 URINALYSIS AUTO W/SCOPE: CPT

## 2020-11-03 PROCEDURE — 85025 COMPLETE CBC W/AUTO DIFF WBC: CPT

## 2020-11-03 RX ORDER — SODIUM CHLORIDE 0.9 % (FLUSH) 0.9 %
10 SYRINGE (ML) INJECTION AS NEEDED
Status: DISCONTINUED | OUTPATIENT
Start: 2020-11-03 | End: 2020-11-04 | Stop reason: HOSPADM

## 2020-11-03 RX ORDER — HEPARIN 100 UNIT/ML
300-500 SYRINGE INTRAVENOUS AS NEEDED
Status: DISCONTINUED | OUTPATIENT
Start: 2020-11-03 | End: 2020-11-04 | Stop reason: HOSPADM

## 2020-11-03 RX ORDER — SODIUM CHLORIDE 9 MG/ML
10 INJECTION INTRAMUSCULAR; INTRAVENOUS; SUBCUTANEOUS AS NEEDED
Status: DISCONTINUED | OUTPATIENT
Start: 2020-11-03 | End: 2020-11-04 | Stop reason: HOSPADM

## 2020-11-03 RX ORDER — SODIUM CHLORIDE 9 MG/ML
25 INJECTION, SOLUTION INTRAVENOUS CONTINUOUS
Status: DISCONTINUED | OUTPATIENT
Start: 2020-11-03 | End: 2020-11-04 | Stop reason: HOSPADM

## 2020-11-03 RX ADMIN — HEPARIN 500 UNITS: 100 SYRINGE at 14:40

## 2020-11-03 RX ADMIN — SODIUM CHLORIDE 200 MG: 9 INJECTION, SOLUTION INTRAVENOUS at 14:02

## 2020-11-03 RX ADMIN — Medication 10 ML: at 14:40

## 2020-11-03 RX ADMIN — SODIUM CHLORIDE 25 ML/HR: 900 INJECTION, SOLUTION INTRAVENOUS at 14:02

## 2020-11-03 NOTE — PROGRESS NOTES
[address], [address], [address] [facility]  P [phone]  F [phone]      Patient ID:  Name:  Hollie Watkins  MRN:  [id number]  :  1955/65 y.o. Date:  2020      Kevin Chu MD: Sofia Sevilla MD  PCP: Ran Clement MD     Primary Diagnosis: uterine cancer  Date of Diagnosis: 3/2013      Current Agent: Keytruda/Lenvima  Cycle: 1      HPI:  60 y.o. 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 She is now found with recurrence noted on imaging studies pelvic adenopathy and peritoneal carcinomatosis. She is recommended combination Keytruda/Lenvima. OncTx History:  3/2013 [facility] Hyst/BSO  FINAL PATHOLOGIC DIAGNOSIS  1.  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  Histologic grade: High grade  Myometrial invasion: Depth of invasion: 0.3 cm  Myometrial thickness: 1.9 cm  Involvement of cervix: Not involved  Extent of involvement of other organs:  Right ovary: Not involved  Left ovary: Not involved  Right fallopian tube: Not involved  Left fallopian tube: Not involved  Right parametrial tissue: Not involved  Left parametrial tissue: Not involved  Lymphovascular invasion: Not identified  Additional pathologic findings:  Focal adenomyosis  Benign simple cyst of left ovary  Paratubal cysts  Pathologic staging (pTNM):  Primary tumor (pT): pT1a  Regional lymph nodes (pN): pN0  Pelvic lymph nodes:  Number examined: 17  Number involved: 0  Distant metastasis (M): Not applicable  2. Omentum, omentectomy:Benign adipose tissue, negative for carcinoma  3. Lymph nodes, right pelvic, excision:Seven lymph nodes, negative for metastatic carcinoma (0/7)  4. Lymph nodes, left pelvic, excision:Ten lymph nodes, negative for metastatic carcinoma (0/10)     5/2013 - 9/2013 Taxol/carboplatin   10/9/20 PET/CT:    1. Peritoneal carcinomatosis. 2. 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 3. Left pelvic cul-de-sac tumor   11/3/20 Keytruda/lenvima           SUBJECTIVE:  Carrie Hartley presents for chemotherapy. She is doing well, no c/o. Continues to work full time, unable to work from home. No residual effects s/p her therapy in 2013. She lives alone, no close family for support buy many friends and very well supported by her work family.       ROS  Constitutional: no weight loss, fever, night sweats  Respiratory: no cough, shortness of breath, or wheezing  Cardiovascular: no chest pain or dyspnea on exertion  Heme: No abnormal bleeding  Gastrointestinal: no abdominal pain, change in bowel habits, or black or bloody stools  Genito-Urinary: no dysuria, trouble voiding, or hematuria  Musculoskeletal: negative for - gait disturbance or joint swelling  Neurological: negative for - behavioral changes, dizziness, headaches, memory loss or numbness/tingling  Derm: negative  Psych: negative for anxiety and depression       OBJECTIVE:  Physical Exam  Visit Vitals  /75   Pulse 75   Temp 97.8 °F (36.6 °C)   Resp 20   Breastfeeding No        General:  alert, cooperative, no distress       HEENT: without pallor, sclera without jaundice, oral mucosa without lesions,      Cardiac:  Regular rate and rhythm        Lungs:  clear to auscultation bilaterally          Port:  clean, dry, no drainage  Abdomen:  soft, protuberant, nondistended, nontender       Lymph:  no lymphadenopathy   Extremity: no edema, redness or tenderness in the calves or thighs    Recent Labs     11/03/20  1344   WBC 5.9   ANEU 3.9   HGB 13.4   HCT 39.9   MCV 90.5   MCH 30.4        Recent Labs     11/03/20  1142      K 4.4      *   BUN 10   CREA 0.56   CA 8.7   ALB 3.3*   TBILI 0.4   ALT 22     UA no protein  TSH 0.67    Tumor markers  Cancer Ag (CA) 125   Date Value Ref Range Status   10/23/2020 98.9 (H) 0.0 - 38.1 U/mL Final     Comment:     [employer] Electrochemiluminescence Immunoassay (ECLIA)  Values obtained with different assay methods or kits cannot be  used interchangeably. Results cannot be interpreted as absolute  evidence of the presence or absence of malignant disease. 07/24/2020 14.3 0.0 - 38.1 U/mL Final     Comment:     [employer] Electrochemiluminescence Immunoassay (ECLIA)  Values obtained with different assay methods or kits cannot be  used interchangeably. Results cannot be interpreted as absolute  evidence of the presence or absence of malignant disease. 07/22/2019 7.4 0.0 - 38.1 U/mL Final     Comment:     [employer] Electrochemiluminescence Immunoassay (ECLIA)  Values obtained with different assay methods or kits cannot be  used interchangeably. Results cannot be interpreted as absolute  evidence of the presence or absence of malignant disease.      12/20/2018 7.8 0.0 - 38.1 U/mL Final     Comment:     Roche ECLIA methodology   06/18/2018 6.9 0.0 - 38.1 U/mL Final     Comment:     Roche ECLIA methodology   12/11/2017 6.8 0.0 - 38.1 U/mL Final     Comment:     Roche ECLIA methodology   06/26/2017 7.4 0.0 - 38.1 U/mL Final     Comment:     Roche ECLIA methodology       Patient Active Problem List   Diagnosis Code    Malignant neoplasm of corpus uteri, except isthmus ([facility] [address] C54.9     Past Medical History:   Diagnosis Date    Abnormal Pap smear 1995    with f/u normal    Anemia     Arthritis     Asthma     Cancer ([facility] [address]     ENDOMETRIAL    Environmental allergies     GERD (gastroesophageal reflux disease)     Goiter     Other unknown and unspecified cause of morbidity or mortality     POSSIBLE ESOPHAGEAL SPASM, ? OBSTRUCTION DUE TO GOITER    PMB (postmenopausal bleeding)     S/P chemotherapy, time since greater than 12 weeks     LAST CHEMO 10/2014 PER PATIENT    Thyroid disease     goiter     Prior to Admission medications    Medication Sig Start Date End Date Taking? Authorizing Provider   ondansetron (ZOFRAN ODT) 4 mg disintegrating tablet Take 1 Tab by mouth every eight (8) hours as needed for Nausea. Indications: nausea and vomiting caused by cancer drugs 10/22/20   Mckay So MD   lidocaine-prilocaine (EMLA) topical cream Apply small amount over port area and cover with band aid one hour before chemo 10/22/20   Mckay So MD   lenvatinib (Lenvima) 20 mg/day (10 mg x 2) cap Take 2 Caps by mouth daily. 10/15/20   Mckay So MD   guaifenesin (MUCINEX PO) Take  by mouth. Provider, Historical   loratadine (Claritin) 10 mg tablet Take 10 mg by mouth. Provider, Historical   MARICHUY'S WORT PO Take  by mouth. Provider, Historical   epinastine 0.05 % drop Apply  to eye. Provider, Historical   raNITIdine (ZANTAC) 150 mg tablet ZANTAC TABS 9/29/11   Provider, Historical   cyclobenzaprine (FLEXERIL) 5 mg tablet take 1 tablet by mouth three times a day if needed 12/5/16   Provider, Historical   valACYclovir (VALTREX) 1 gram tablet Take 1 Tab by mouth three (3) times daily. 12/15/16   Mckay So MD   EPINEPHrine (EPIPEN) 0.3 mg/0.3 mL injection 0.3 mg by IntraMUSCular route once as needed. Provider, Historical   ketotifen (ZADITOR) 0.025 % (0.035 %) ophthalmic solution Administer 1 Drop to both eyes every morning. Provider, Historical   Cetirizine (ZYRTEC) 10 mg cap Take 10 mg by mouth nightly. Provider, Historical   SAL ACID/UREA/PETROLATUM,WHITE (KERASAL FOOT EX) by Apply Externally route daily as needed.     Provider, Historical   ACCU-CHEK HELDER PLUS TEST STRP strip 7/3/15   Provider, Historical   NITROSTAT 0.4 mg SL tablet  9/22/14   Provider, Historical   CALCIUM CARBONATE (MARCELLO-SELTZER ANTACID PO) Take  by mouth daily as needed. Provider, Historical   ibuprofen (MOTRIN) 200 mg tablet Take 200 mg by mouth every six (6) hours as needed. Provider, Historical     Allergies   Allergen Reactions    Latex Other (comments)     Burns skin    Acetone Shortness of Breath    Amoxicillin Anaphylaxis    Ciprofloxacin Hives    Pcn [Penicillins] Anaphylaxis    Buspar [Buspirone] Unknown (comments)     Has N&V and makes her feel \"weird\"    Azithromycin Hives    Egg Nausea Only    Other Medication Rash     POPPY seeds     Family History   Problem Relation Age of Onset    Cancer Maternal Aunt         breast    Heart Disease Mother     Diabetes Father     Cancer Sister         CERVICAL    Kidney Disease Brother     Diabetes Brother     Heart Attack Other     Arrhythmia Brother     Diabetes Brother     Anesth Problems Neg Hx          CT Results (most recent):  Results from Hospital Encounter encounter on 08/13/19   CT ABD PELV W CONT    Narrative EXAM: CT ABDOMEN PELVIS WITH CONTRAST  INDICATION: History of uterine cancer, back pain, possible recurrence. Malignant neoplasm of corpus uteri, unspecified. COMPARISON: 12/20/2016. CONTRAST: 100 mL of Isovue-370. TECHNIQUE:   Multislice helical CT was performed from the diaphragm to the symphysis pubis  during uneventful rapid bolus intravenous contrast administration. Oral contrast  was also administered. Contiguous 5 mm axial images were reconstructed and lung  and soft tissue windows were generated. Coronal and sagittal reformations were  generated. CT dose reduction was achieved through use of a standardized protocol  tailored for this examination and automatic exposure control for dose  modulation. Adaptive statistical iterative reconstruction (ASIR) was utilized  for this examination.     FINDINGS:  LOWER CHEST: The visualized portions of the lung bases are clear. ABDOMEN:  Liver: The liver is normal in size and contour with no focal abnormality. The  attenuation of the liver is decreased throughout. Gallbladder and bile ducts: There is no calcified gallstone or biliary  dilatation. Spleen: No abnormality. Pancreas: No abnormality. Adrenal glands: No abnormality. Kidneys: No abnormality. PELVIS:  Reproductive organs: The uterus and ovaries are absent. Bladder: No abnormality. BOWEL AND MESENTERY: The small bowel is normal.  There is no mesenteric mass or  adenopathy. The appendix is absent. PERITONEUM: There is no ascites or free intraperitoneal air. RETROPERITONEUM: The aorta  tapers without aneurysm. There is no retroperitoneal  adenopathy or mass. There is no pelvic mass or adenopathy. BONES AND SOFT TISSUES: The bones and soft tissues of the abdominal wall are  within normal limits. Impression IMPRESSION:   1. Status post hysterectomy and bilateral oophorectomy. 2. No evidence of recurrent or metastatic disease within the abdomen or pelvis. 3. Hepatic steatosis. 4. Status post appendectomy. .                IMPRESSION/PLAN:  60 y.o. 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 ECO      Chemotherapy keytruda immunotherapy today. Cycle one. Carine Motto starts tonight. ASE profile discussed in depth. Labs reviewed  Hx of thyroid goiter. Hx benign bx. TSH normal  Chronic med issues: Hx asthma - inhaler PRN   Hx esophageal spasm - uses nitroglycerin  Psychosocial: In good spirits and feels well supported by her friends and work family. Asks many appropriate questions. Advised to call with questions/concerns. 25 minutes spent with the patient and >50% allotted to direct counseling, discussion and answering questions related to the above.       Luann Frias PA-C  Gyn Onc

## 2020-11-03 NOTE — PROGRESS NOTES
Pharmacy Note- Immunotherapy Education    Luisito Walls is a  60 y. o.female  diagnosed with endometrial cancer here today for Cycle 1, Day 1 chemotherapy counseling. Ms. Ketan Mccain is being treated with Pembrolizumab and Lenvatinib. Reviewed proper storage and handling for Lenvatinib. Ms. Ketan Mccain was provided information regarding the risks of immune-mediated adverse reactions secondary to pembrolizumab that may require interruption/delay of treatment and possible use of corticosteroids. These reactions include, but are not limited to: colitis (diarrhea or severe abdominal pain); hepatitis (jaundice, severe nausea, or vomiting, easy bruising, and/or bleeding); hypophysitis (persistent or unusual headache, extreme weakness, dizziness/fainting, and/or vision changes); dermatitis (skin rash, mouth sores, skin blisters, and/or skin peeling); ocular toxicity (uveitis, iritis, and/or episcleritis); neuropathy (motor or sensory); hyper/hypothyroidism. Side effects of lenvantinib reviewed included s/s appetite changes, t fatigue, hair  skin and nail changes, diarrhea/constipation, high blood pressure, liver changes, voice changes, thyroid changes, impaired wound healing, muscle aches/pains and kidney changes. Patient given ways to manage these side effects and when to contact office. Ms. Ketan Mccain verbalized understanding of the information presented and all of the patient's questions were answered.     Osmin Block, PharmD, BCPS, BCOP    CLINICAL PHARMACY CONSULT: MED RECONCILIATION/REVIEW ADDENDUM    For Pharmacy Admin Tracking Only    PHSO: PHSO Patient?: No  Total # of Interventions Recommended: Count: 1    Total Interventions Accepted: 1  Time Spent (min): 30

## 2020-11-03 NOTE — PROGRESS NOTES
OPIC Chemo Progress Note      1 Ms. Shakira Villavicencio Arrived to [facility] for  Keytruda (C1) ambulatory in stable condition. Assessment was completed, no acute issues at this time, no new complaints voiced. Port accessed with positive blood return (positional). Labs drawn and sent for processing. Chemotherapy Flowsheet 11/3/2020   Cycle C1   Date 11/3/2020   Drug / Regimen Keytruda   Notes given       Ms. David Garza vitals were reviewed. Patient Vitals for the past 12 hrs:   Temp Pulse Resp BP   11/03/20 1440  75  137/75   11/03/20 1055 97.8 °F (36.6 °C) 85 20 135/79       Lab results were obtained and reviewed. Recent Results (from the past 12 hour(s))   METABOLIC PANEL, COMPREHENSIVE    Collection Time: 11/03/20 11:42 AM   Result Value Ref Range    Sodium 143 136 - 145 mmol/L    Potassium 4.4 3.5 - 5.1 mmol/L    Chloride 108 97 - 108 mmol/L    CO2 27 21 - 32 mmol/L    Anion gap 8 5 - 15 mmol/L    Glucose 144 (H) 65 - 100 mg/dL    BUN 10 6 - 20 MG/DL    Creatinine 0.56 0.55 - 1.02 MG/DL    BUN/Creatinine ratio 18 12 - 20      GFR est AA >60 >60 ml/min/1.73m2    GFR est non-AA >60 >60 ml/min/1.73m2    Calcium 8.7 8.5 - 10.1 MG/DL    Bilirubin, total 0.4 0.2 - 1.0 MG/DL    ALT (SGPT) 22 12 - 78 U/L    AST (SGOT) 18 15 - 37 U/L    Alk. phosphatase 70 45 - 117 U/L    Protein, total 6.2 (L) 6.4 - 8.2 g/dL    Albumin 3.3 (L) 3.5 - 5.0 g/dL    Globulin 2.9 2.0 - 4.0 g/dL    A-G Ratio 1.1 1.1 - 2.2     TSH 3RD GENERATION    Collection Time: 11/03/20 11:42 AM   Result Value Ref Range    TSH 0.67 0.36 - 3.74 uIU/mL   URINE CULTURE HOLD SAMPLE    Collection Time: 11/03/20 11:42 AM    Specimen: Serum   Result Value Ref Range    Urine culture hold        Urine on hold in Microbiology dept for 2 days. If unpreserved urine is submitted, it cannot be used for addtional testing after 24 hours, recollection will be required.    URINALYSIS W/MICROSCOPIC    Collection Time: 11/03/20 11:42 AM   Result Value Ref Range    Color YELLOW/STRAW      Appearance CLEAR CLEAR      Specific gravity 1.006 1.003 - 1.030      pH (UA) 7.0 5.0 - 8.0      Protein Negative NEG mg/dL    Glucose Negative NEG mg/dL    Ketone Negative NEG mg/dL    Bilirubin Negative NEG      Blood Negative NEG      Urobilinogen 0.2 0.2 - 1.0 EU/dL    Nitrites Negative NEG      Leukocyte Esterase Negative NEG      WBC 0-4 0 - 4 /hpf    RBC 0-5 0 - 5 /hpf    Epithelial cells FEW FEW /lpf    Bacteria Negative NEG /hpf    Hyaline cast 0-2 0 - 5 /lpf   SAMPLES BEING HELD    Collection Time: 11/03/20 11:42 AM   Result Value Ref Range    SAMPLES BEING HELD 1SST     COMMENT        Add-on orders for these samples will be processed based on acceptable specimen integrity and analyte stability, which may vary by analyte. CBC WITH AUTOMATED DIFF    Collection Time: 11/03/20  1:44 PM   Result Value Ref Range    WBC 5.9 3.6 - 11.0 K/uL    RBC 4.41 3.80 - 5.20 M/uL    HGB 13.4 11.5 - 16.0 g/dL    HCT 39.9 35.0 - 47.0 %    MCV 90.5 80.0 - 99.0 FL    MCH 30.4 26.0 - 34.0 PG    MCHC 33.6 30.0 - 36.5 g/dL    RDW 12.8 11.5 - 14.5 %    PLATELET 282 993 - 517 K/uL    MPV 9.1 8.9 - 12.9 FL    NRBC 0.0 0  WBC    ABSOLUTE NRBC 0.00 0.00 - 0.01 K/uL    NEUTROPHILS 65 32 - 75 %    LYMPHOCYTES 23 12 - 49 %    MONOCYTES 9 5 - 13 %    EOSINOPHILS 1 0 - 7 %    BASOPHILS 1 0 - 1 %    IMMATURE GRANULOCYTES 1 (H) 0.0 - 0.5 %    ABS. NEUTROPHILS 3.9 1.8 - 8.0 K/UL    ABS. LYMPHOCYTES 1.3 0.8 - 3.5 K/UL    ABS. MONOCYTES 0.5 0.0 - 1.0 K/UL    ABS. EOSINOPHILS 0.1 0.0 - 0.4 K/UL    ABS. BASOPHILS 0.0 0.0 - 0.1 K/UL    ABS. IMM. GRANS. 0.0 0.00 - 0.04 K/UL    DF AUTOMATED         Pre-medications  were administered as ordered and chemotherapy was initiated.   Medications Administered     0.9% sodium chloride infusion     Admin Date  11/03/2020 Action  New Bag Dose  25 mL/hr Rate  25 mL/hr Route  IntraVENous Administered By  Roxana Lea RN          heparin (porcine) pf 300-500 Units     Admin Date  11/03/2020 Action  Given Dose  500 Units Route  InterCATHeter Administered By  Crow Olguin RN          pembrolizumab [facility]) 200 mg in 0.9% sodium chloride 100 mL, overfill volume 10 mL IVPB     Admin Date  11/03/2020 Action  New Bag Dose  200 mg Rate  236 mL/hr Route  IntraVENous Administered By  Crow Olguin RN          sodium chloride (NS) flush 10 mL     Admin Date  11/03/2020 Action  Given Dose  10 mL Route  IntraVENous Administered By  Crow Olguin RN              [id number] Patient tolerated treatment well. Port maintained positive blood return throughout treatment. Port flushed, heparinized and de accessed per protocol. Patient was discharged from [facility] in no distress.  Patient is aware of next appointment on 11/24/2020 at 11:00am.     Future Appointments   Date Time Provider David Jara   11/17/2020  3:30 PM Barbra Sicard, MD [facility]   11/24/2020 11:00 AM A1 [facility]   12/8/2020  3:00 PM Barbra Sicard, MD CGO BS [facility]   12/15/2020 11:00 AM A1 [facility]  [place] [facility]    1/5/2021 11:00 AM A1 [facility] Laurel Umm Lepe RN  November 3, 2020

## 2020-11-17 ENCOUNTER — VIRTUAL VISIT (OUTPATIENT)
Dept: GYNECOLOGY | Age: 65
End: 2020-11-17
Payer: COMMERCIAL

## 2020-11-17 DIAGNOSIS — C54.1 PRIMARY SEROUS ADENOCARCINOMA OF ENDOMETRIUM (HCC): Primary | ICD-10-CM

## 2020-11-17 PROCEDURE — 99442 PR PHYS/QHP TELEPHONE EVALUATION 11-20 MIN: CPT | Performed by: OBSTETRICS & GYNECOLOGY

## 2020-11-17 NOTE — PROGRESS NOTES
[facility]COL[facility]  [address] [place] [address], [address]  [phone] [facility] [phone]    Office Visit    Patient ID:  Name: Luis Fernando Bhatt  MRN: [id number]  : 1955/65 y.o. Visit date: 2020    INTERVAL HISTORY:  Luis Fernando Bhatt is a  female with stage IA, grade 3 uterine carcinoma. She underwent laparoscopic hysterectomy with staging in 2013. She was recommended six cycles of adjuvant Taxol and Carboplatin, which she completed. We elected not to treat with pelvic radiation therapy due to her difficulty with chemotherapy. She presents was last seen in July for routine surveillance. She was without complaints at that time and her exam was negative. She recently had some abdominal discomfort and noted to have some blood in her urine. She was referred to [facility] for evaluation. She had a CT of the abdomen/pelvis with them. It revealed retroperitoneal lymphadenopathy, peritoneal carcinomatosis, and trace ascites. She also reported a negative colonoscopy within the last month. She is scheduled for a MMG in a few weeks. I sent her for a PET/CT to better evaluate the extent of disease. She presented to review the results and discuss treatment. Her disease is not amenable to surgical resection. Systemic therapy is her only viable option. I thought immunotherapy would be the best choice, ahead of additional chemotherapy. This would consist of IV Keytruda and PO Lenvima. If that is not successful, we will consider retreatment with Taxol/Carboplatin or single agent Doxil. She has completed one cycle so far. She has some vague complaints but overall tolerating well. Her biggest problem is a headache. Her BP is running a bit high.           PMH:  Past Medical History:   Diagnosis Date    Abnormal Pap smear     with f/u normal    Anemia     Arthritis     Asthma     Cancer ([facility] [address]     ENDOMETRIAL    Environmental allergies     GERD (gastroesophageal reflux disease)     Goiter     Other unknown and unspecified cause of morbidity or mortality     POSSIBLE ESOPHAGEAL SPASM, ? OBSTRUCTION DUE TO GOITER    PMB (postmenopausal bleeding)     S/P chemotherapy, time since greater than 12 weeks     LAST CHEMO 10/2014 PER PATIENT    Thyroid disease     goiter     PSH:  Past Surgical History:   Procedure Laterality Date    BREAST SURGERY PROCEDURE UNLISTED  1/12/16    right breast bx    HX APPENDECTOMY      HX BUNIONECTOMY      HX GYN  3/13/13    TLH/BSO    HX HEENT  4/22/13    TOOTH REMOVED    HX ORTHOPAEDIC  1980'S    BUNIONECTOMY    HX VASCULAR ACCESS      port     SOC:  Social History     Socioeconomic History    Marital status:      Spouse name: Not on file    Number of children: Not on file    Years of education: Not on file    Highest education level: Not on file   Occupational History    Not on file   Social Needs    Financial resource strain: Not on file    Food insecurity     Worry: Not on file     Inability: Not on file    Transportation needs     Medical: Not on file     Non-medical: Not on file   Tobacco Use    Smoking status: Never Smoker    Smokeless tobacco: Never Used   Substance and Sexual Activity    Alcohol use:  Yes     Alcohol/week: 0.0 standard drinks     Comment: RARE OCC    Drug use: No    Sexual activity: Not on file   Lifestyle    Physical activity     Days per week: Not on file     Minutes per session: Not on file    Stress: Not on file   Relationships    Social connections     Talks on phone: Not on file     Gets together: Not on file     Attends Shinto service: Not on file     Active member of club or organization: Not on file     Attends meetings of clubs or organizations: Not on file     Relationship status: Not on file    Intimate partner violence     Fear of current or ex partner: Not on file     Emotionally abused: Not on file     Physically abused: Not on file Forced sexual activity: Not on file   Other Topics Concern    Not on file   Social History Narrative    Not on file     Family History  Family History   Problem Relation Age of Onset    Cancer Maternal Aunt         breast    Heart Disease Mother     Diabetes Father     Cancer Sister         CERVICAL    Kidney Disease Brother     Diabetes Brother     Heart Attack Other     Arrhythmia Brother     Diabetes Brother     Anesth Problems Neg Hx      Medications:  Current Outpatient Medications on File Prior to Visit   Medication Sig Dispense Refill    ondansetron (ZOFRAN ODT) 4 mg disintegrating tablet Take 1 Tab by mouth every eight (8) hours as needed for Nausea. Indications: nausea and vomiting caused by cancer drugs 30 Tab 3    lidocaine-prilocaine (EMLA) topical cream Apply small amount over port area and cover with band aid one hour before chemo 30 g 3    lenvatinib (Lenvima) 20 mg/day (10 mg x 2) cap Take 2 Caps by mouth daily. 60 Cap 4    guaifenesin (MUCINEX PO) Take  by mouth.  loratadine (Claritin) 10 mg tablet Take 10 mg by mouth.  MARICHUY'S WORT PO Take  by mouth.  epinastine 0.05 % drop Apply  to eye.  raNITIdine (ZANTAC) 150 mg tablet ZANTAC TABS      cyclobenzaprine (FLEXERIL) 5 mg tablet take 1 tablet by mouth three times a day if needed  0    valACYclovir (VALTREX) 1 gram tablet Take 1 Tab by mouth three (3) times daily. 21 Tab 3    EPINEPHrine (EPIPEN) 0.3 mg/0.3 mL injection 0.3 mg by IntraMUSCular route once as needed.  ketotifen (ZADITOR) 0.025 % (0.035 %) ophthalmic solution Administer 1 Drop to both eyes every morning.  Cetirizine (ZYRTEC) 10 mg cap Take 10 mg by mouth nightly.  SAL ACID/UREA/PETROLATUM,WHITE (KERASAL FOOT EX) by Apply Externally route daily as needed.  ACCU-CHEK HELDER PLUS TEST STRP strip   0    NITROSTAT 0.4 mg SL tablet       CALCIUM CARBONATE (MARCELLO-SELTZER ANTACID PO) Take  by mouth daily as needed.       ibuprofen (MOTRIN) 200 mg tablet Take 200 mg by mouth every six (6) hours as needed. No current facility-administered medications on file prior to visit. Allergies: Allergies   Allergen Reactions    Latex Other (comments)     Burns skin    Acetone Shortness of Breath    Amoxicillin Anaphylaxis    Ciprofloxacin Hives    Pcn [Penicillins] Anaphylaxis    Buspar [Buspirone] Unknown (comments)     Has N&V and makes her feel \"weird\"    Azithromycin Hives    Egg Nausea Only    Other Medication Rash     POPPY seeds       ROS:  Negative     OBJECTIVE:    PHYSICAL EXAM  VITAL SIGNS: There were no vitals taken for this visit. GENERAL KAIA:    HEENT:    RESPIRATORY:    CARDIOVASC:    GASTROINT:    MUSCULOSKEL:    EXTREMITIES:    PELVIC:    RECTAL:    PRIYANKA SURVEY:    NEURO:        CT of abdomen/pelvis (12/20/16)  The visualized portions of the lung bases are clear.     There are no focal abnormalities within the liver, spleen, pancreas, adrenal  glands or kidneys. No gallstones are noted.     The aorta tapers without aneurysm. There is no retroperitoneal adenopathy or  mass. There is no ascites or free intraperitoneal air.     There is no small or large bowel distention. The appendix is surgically absent.      The uterus and ovaries are surgically absent. There is no pelvic mass or  adenopathy.     Mild degenerative changes are noted in the lumbar spine.       IMPRESSION: Status post hysterectomy, bilateral oophorectomy, and appendectomy. No acute abnormality. CT of abdomen/pelvis (8/13/20)  LOWER CHEST: The visualized portions of the lung bases are clear. ABDOMEN:  Liver: The liver is normal in size and contour with no focal abnormality. The  attenuation of the liver is decreased throughout. Gallbladder and bile ducts: There is no calcified gallstone or biliary  dilatation. Spleen: No abnormality. Pancreas: No abnormality. Adrenal glands: No abnormality. Kidneys: No abnormality.   PELVIS:  Reproductive organs: The uterus and ovaries are absent. Bladder: No abnormality. BOWEL AND MESENTERY: The small bowel is normal.  There is no mesenteric mass or  adenopathy. The appendix is absent. PERITONEUM: There is no ascites or free intraperitoneal air. RETROPERITONEUM: The aorta  tapers without aneurysm. There is no retroperitoneal  adenopathy or mass. There is no pelvic mass or adenopathy. BONES AND SOFT TISSUES: The bones and soft tissues of the abdominal wall are  within normal limits.     IMPRESSION:   1. Status post hysterectomy and bilateral oophorectomy. 2. No evidence of recurrent or metastatic disease within the abdomen or pelvis. 3. Hepatic steatosis. 4. Status post appendectomy.       PET/CT (10/9/20)  HEAD/NECK: No apparent foci of abnormal hypermetabolism. Cerebral evaluation is  limited by normal intense activity.     CHEST: No foci of abnormal hypermetabolism.     ABDOMEN/PELVIS:   Peritoneal nodules are hypermetabolic, SUV 12. Retroperitoneal adenopathy is hypermetabolic, SUV 6. Right deep pelvic adenopathy is hypermetabolic, SUV 8. Left pelvic cul-de-sac mass is hypermetabolic, SUV 11.     SKELETON: No foci of abnormal hypermetabolism in the axial and visualized  appendicular skeleton.     IMPRESSION:   1. Peritoneal carcinomatosis. 2. 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 3. Left pelvic cul-de-sac tumor. 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 She completed six cycles of adjuvant Taxol and Carboplatin chemotherapy. She now has recurrent disease and is being treated with the regimen ofIV Keytruda and PO Lenvima. She is having a significant headache at this time. I suspect it is due to the [facility]. We will lower the dose with the second cycle to see if that helps.     Antonio Martinez is a 60 y.o. female, evaluated via audio-only technology on 11/17/2020 for Chemotherapy (Virtual visit. [facility] [place] on 11/24)  . Assessment & Plan:   Diagnoses and all orders for this visit:    1. Primary serous adenocarcinoma of endometrium (HCC)        12  Subjective:       Prior to Admission medications    Medication Sig Start Date End Date Taking? Authorizing Provider   ondansetron (ZOFRAN ODT) 4 mg disintegrating tablet Take 1 Tab by mouth every eight (8) hours as needed for Nausea. Indications: nausea and vomiting caused by cancer drugs 10/22/20  Yes Samy Schroeder MD   lidocaine-prilocaine (EMLA) topical cream Apply small amount over port area and cover with band aid one hour before chemo 10/22/20  Yes Samy Schroeder MD   lenvatinib (Lenvima) 20 mg/day (10 mg x 2) cap Take 2 Caps by mouth daily. 10/15/20  Yes Samy Schroeder MD   guaifenesin (MUCINEX PO) Take  by mouth. Yes Provider, Historical   loratadine (Claritin) 10 mg tablet Take 10 mg by mouth. Yes Provider, Historical   MARICHUY'S WORT PO Take  by mouth. Yes Provider, Historical   epinastine 0.05 % drop Apply  to eye. Yes Provider, Historical   raNITIdine (ZANTAC) 150 mg tablet ZANTAC TABS 9/29/11  Yes Provider, Historical   cyclobenzaprine (FLEXERIL) 5 mg tablet take 1 tablet by mouth three times a day if needed 12/5/16  Yes Provider, Historical   valACYclovir (VALTREX) 1 gram tablet Take 1 Tab by mouth three (3) times daily. 12/15/16  Yes Samy Schroeder MD   EPINEPHrine (EPIPEN) 0.3 mg/0.3 mL injection 0.3 mg by IntraMUSCular route once as needed. Yes Provider, Historical   ketotifen (ZADITOR) 0.025 % (0.035 %) ophthalmic solution Administer 1 Drop to both eyes every morning. Yes Provider, Historical   Cetirizine (ZYRTEC) 10 mg cap Take 10 mg by mouth nightly. Yes Provider, Historical   SAL ACID/UREA/PETROLATUM,WHITE (KERASAL FOOT EX) by Apply Externally route daily as needed.    Yes Provider, Historical   ACCU-CHEK HELDER PLUS TEST STRP strip  7/3/15  Yes Provider, Historical   NITROSTAT 0.4 mg SL tablet 9/22/14  Yes Provider, Historical   CALCIUM CARBONATE (MARCELLO-SELTZER ANTACID PO) Take  by mouth daily as needed. Yes Provider, Historical   ibuprofen (MOTRIN) 200 mg tablet Take 200 mg by mouth every six (6) hours as needed. Yes Provider, Historical     Patient Active Problem List   Diagnosis Code    Malignant neoplasm of corpus uteri, except isthmus ([facility] [address] C54.9     Patient Active Problem List    Diagnosis Date Noted    Malignant neoplasm of corpus uteri, except isthmus ([facility] [address] 02/28/2013     Current Outpatient Medications   Medication Sig Dispense Refill    ondansetron (ZOFRAN ODT) 4 mg disintegrating tablet Take 1 Tab by mouth every eight (8) hours as needed for Nausea. Indications: nausea and vomiting caused by cancer drugs 30 Tab 3    lidocaine-prilocaine (EMLA) topical cream Apply small amount over port area and cover with band aid one hour before chemo 30 g 3    lenvatinib (Lenvima) 20 mg/day (10 mg x 2) cap Take 2 Caps by mouth daily. 60 Cap 4    guaifenesin (MUCINEX PO) Take  by mouth.  loratadine (Claritin) 10 mg tablet Take 10 mg by mouth.  MARICHUY'S WORT PO Take  by mouth.  epinastine 0.05 % drop Apply  to eye.  raNITIdine (ZANTAC) 150 mg tablet ZANTAC TABS      cyclobenzaprine (FLEXERIL) 5 mg tablet take 1 tablet by mouth three times a day if needed  0    valACYclovir (VALTREX) 1 gram tablet Take 1 Tab by mouth three (3) times daily. 21 Tab 3    EPINEPHrine (EPIPEN) 0.3 mg/0.3 mL injection 0.3 mg by IntraMUSCular route once as needed.  ketotifen (ZADITOR) 0.025 % (0.035 %) ophthalmic solution Administer 1 Drop to both eyes every morning.  Cetirizine (ZYRTEC) 10 mg cap Take 10 mg by mouth nightly.  SAL ACID/UREA/PETROLATUM,WHITE (KERASAL FOOT EX) by Apply Externally route daily as needed.  ACCU-CHEK HELDER PLUS TEST STRP strip   0    NITROSTAT 0.4 mg SL tablet       CALCIUM CARBONATE (MARCELLO-SELTZER ANTACID PO) Take  by mouth daily as needed.       ibuprofen (MOTRIN) 200 mg tablet Take 200 mg by mouth every six (6) hours as needed. Allergies   Allergen Reactions    Latex Other (comments)     Burns skin    Acetone Shortness of Breath    Amoxicillin Anaphylaxis    Ciprofloxacin Hives    Pcn [Penicillins] Anaphylaxis    Buspar [Buspirone] Unknown (comments)     Has N&V and makes her feel \"weird\"    Azithromycin Hives    Egg Nausea Only    Other Medication Rash     POPPY seeds     Past Medical History:   Diagnosis Date    Abnormal Pap smear 1995    with f/u normal    Anemia     Arthritis     Asthma     Cancer ([facility] [address]     ENDOMETRIAL    Environmental allergies     GERD (gastroesophageal reflux disease)     Goiter     Other unknown and unspecified cause of morbidity or mortality     POSSIBLE ESOPHAGEAL SPASM, ? OBSTRUCTION DUE TO GOITER    PMB (postmenopausal bleeding)     S/P chemotherapy, time since greater than 12 weeks     LAST CHEMO 10/2014 PER PATIENT    Thyroid disease     goiter     Past Surgical History:   Procedure Laterality Date    BREAST SURGERY PROCEDURE UNLISTED  1/12/16    right breast bx    HX APPENDECTOMY      HX BUNIONECTOMY      HX GYN  3/13/13    TLH/BSO    HX HEENT  4/22/13    TOOTH REMOVED    HX ORTHOPAEDIC  1'S    BUNIONECTOMY    HX VASCULAR ACCESS      port     Family History   Problem Relation Age of Onset    Cancer Maternal Aunt         breast    Heart Disease Mother     Diabetes Father     Cancer Sister         CERVICAL    Kidney Disease Brother     Diabetes Brother     Heart Attack Other     Arrhythmia Brother     Diabetes Brother     Anesth Problems Neg Hx      Social History     Tobacco Use    Smoking status: Never Smoker    Smokeless tobacco: Never Used   Substance Use Topics    Alcohol use: Yes     Alcohol/week: 0.0 standard drinks     Comment: RARE OCC       ROS    No flowsheet data found.      Tariq Arthur, who was evaluated through a patient-initiated, synchronous (real-time) audio only encounter, and/or her healthcare decision maker, is aware that it is a billable service, with coverage as determined by her insurance carrier. She provided verbal consent to proceed: Yes. She has not had a related appointment within my department in the past 7 days or scheduled within the next 24 hours.       Total Time: minutes: 11-20 minutes    MD Minna Trujillo MD  11/17/2020/2:05 PM

## 2020-11-18 ENCOUNTER — TELEPHONE (OUTPATIENT)
Dept: GYNECOLOGY | Age: 65
End: 2020-11-18

## 2020-11-18 RX ORDER — DIPHENHYDRAMINE HYDROCHLORIDE 50 MG/ML
25 INJECTION, SOLUTION INTRAMUSCULAR; INTRAVENOUS AS NEEDED
Status: CANCELLED
Start: 2020-11-24

## 2020-11-18 RX ORDER — DIPHENHYDRAMINE HYDROCHLORIDE 50 MG/ML
50 INJECTION, SOLUTION INTRAMUSCULAR; INTRAVENOUS AS NEEDED
Status: CANCELLED
Start: 2020-11-24

## 2020-11-18 RX ORDER — HYDROCORTISONE SODIUM SUCCINATE 100 MG/2ML
100 INJECTION, POWDER, FOR SOLUTION INTRAMUSCULAR; INTRAVENOUS AS NEEDED
Status: CANCELLED | OUTPATIENT
Start: 2020-11-24

## 2020-11-18 RX ORDER — ONDANSETRON 2 MG/ML
8 INJECTION INTRAMUSCULAR; INTRAVENOUS AS NEEDED
Status: CANCELLED | OUTPATIENT
Start: 2020-11-24

## 2020-11-18 RX ORDER — SODIUM CHLORIDE 0.9 % (FLUSH) 0.9 %
10 SYRINGE (ML) INJECTION AS NEEDED
Status: CANCELLED | OUTPATIENT
Start: 2020-11-24

## 2020-11-18 RX ORDER — ACETAMINOPHEN 325 MG/1
650 TABLET ORAL AS NEEDED
Status: CANCELLED
Start: 2020-11-24

## 2020-11-18 RX ORDER — HEPARIN 100 UNIT/ML
300-500 SYRINGE INTRAVENOUS AS NEEDED
Status: CANCELLED
Start: 2020-11-24

## 2020-11-18 RX ORDER — SODIUM CHLORIDE 9 MG/ML
25 INJECTION, SOLUTION INTRAVENOUS CONTINUOUS
Status: CANCELLED | OUTPATIENT
Start: 2020-11-24

## 2020-11-18 RX ORDER — SODIUM CHLORIDE 9 MG/ML
10 INJECTION INTRAMUSCULAR; INTRAVENOUS; SUBCUTANEOUS AS NEEDED
Status: CANCELLED | OUTPATIENT
Start: 2020-11-24

## 2020-11-18 RX ORDER — ALBUTEROL SULFATE 0.83 MG/ML
2.5 SOLUTION RESPIRATORY (INHALATION) AS NEEDED
Status: CANCELLED
Start: 2020-11-24

## 2020-11-18 RX ORDER — EPINEPHRINE 1 MG/ML
0.3 INJECTION, SOLUTION, CONCENTRATE INTRAVENOUS AS NEEDED
Status: CANCELLED | OUTPATIENT
Start: 2020-11-24

## 2020-11-18 NOTE — TELEPHONE ENCOUNTER
Call placed to pt.to review medication changes with Lenvima dose, per Dr. Martina Harris decreased to 10mg daily d/t headaches and b/p.

## 2020-11-23 ENCOUNTER — TELEPHONE (OUTPATIENT)
Dept: GYNECOLOGY | Age: 65
End: 2020-11-23

## 2020-11-23 NOTE — TELEPHONE ENCOUNTER
Pt , she states she found out this morning that her daughter's coworker has tested positive for COVID. She states her daughter was at her home yesterday and she used her phone. Pt states she is not exhibiting any symptoms. Per Vitaly Rose, pt was advised to come in for chemo treatment tomorrow 11/24/2020 as long as she does not have any symptoms.   If she develops any symptoms call [facility] immediately, pt verbalized understanding

## 2020-11-24 ENCOUNTER — HOSPITAL ENCOUNTER (OUTPATIENT)
Dept: INFUSION THERAPY | Age: 65
Discharge: HOME | End: 2020-11-24
Payer: COMMERCIAL

## 2020-11-24 VITALS
HEART RATE: 63 BPM | SYSTOLIC BLOOD PRESSURE: 176 MMHG | DIASTOLIC BLOOD PRESSURE: 82 MMHG | TEMPERATURE: 97.7 F | RESPIRATION RATE: 18 BRPM

## 2020-11-24 DIAGNOSIS — Z51.11 ENCOUNTER FOR ANTINEOPLASTIC CHEMOTHERAPY: ICD-10-CM

## 2020-11-24 DIAGNOSIS — C54.9 MALIGNANT NEOPLASM OF CORPUS UTERI, EXCEPT ISTHMUS (HCC): Primary | ICD-10-CM

## 2020-11-24 LAB
ALBUMIN SERPL-MCNC: 3.2 G/DL (ref 3.5–5)
ALBUMIN/GLOB SERPL: 1.1 {RATIO} (ref 1.1–2.2)
ALP SERPL-CCNC: 80 U/L (ref 45–117)
ALT SERPL-CCNC: 33 U/L (ref 12–78)
ANION GAP SERPL CALC-SCNC: 6 MMOL/L (ref 5–15)
AST SERPL-CCNC: 24 U/L (ref 15–37)
BASOPHILS # BLD: 0 K/UL (ref 0–0.1)
BASOPHILS NFR BLD: 1 % (ref 0–1)
BILIRUB SERPL-MCNC: 0.7 MG/DL (ref 0.2–1)
BUN SERPL-MCNC: 11 MG/DL (ref 6–20)
BUN/CREAT SERPL: 20 (ref 12–20)
CALCIUM SERPL-MCNC: 8.3 MG/DL (ref 8.5–10.1)
CANCER AG125 SERPL-ACNC: 19 U/ML (ref 1.5–35)
CHLORIDE SERPL-SCNC: 106 MMOL/L (ref 97–108)
CO2 SERPL-SCNC: 28 MMOL/L (ref 21–32)
CREAT SERPL-MCNC: 0.54 MG/DL (ref 0.55–1.02)
DIFFERENTIAL METHOD BLD: ABNORMAL
EOSINOPHIL # BLD: 0.1 K/UL (ref 0–0.4)
EOSINOPHIL NFR BLD: 2 % (ref 0–7)
ERYTHROCYTE [DISTWIDTH] IN BLOOD BY AUTOMATED COUNT: 13.5 % (ref 11.5–14.5)
GLOBULIN SER CALC-MCNC: 3 G/DL (ref 2–4)
GLUCOSE SERPL-MCNC: 117 MG/DL (ref 65–100)
HCT VFR BLD AUTO: 41.2 % (ref 35–47)
HGB BLD-MCNC: 13.7 G/DL (ref 11.5–16)
IMM GRANULOCYTES # BLD AUTO: 0 K/UL (ref 0–0.04)
IMM GRANULOCYTES NFR BLD AUTO: 1 % (ref 0–0.5)
LYMPHOCYTES # BLD: 1.1 K/UL (ref 0.8–3.5)
LYMPHOCYTES NFR BLD: 21 % (ref 12–49)
MCH RBC QN AUTO: 29.7 PG (ref 26–34)
MCHC RBC AUTO-ENTMCNC: 33.3 G/DL (ref 30–36.5)
MCV RBC AUTO: 89.4 FL (ref 80–99)
MONOCYTES # BLD: 0.5 K/UL (ref 0–1)
MONOCYTES NFR BLD: 8 % (ref 5–13)
NEUTS SEG # BLD: 3.6 K/UL (ref 1.8–8)
NEUTS SEG NFR BLD: 68 % (ref 32–75)
NRBC # BLD: 0 K/UL (ref 0–0.01)
NRBC BLD-RTO: 0 PER 100 WBC
PLATELET # BLD AUTO: 127 K/UL (ref 150–400)
PMV BLD AUTO: 8.9 FL (ref 8.9–12.9)
POTASSIUM SERPL-SCNC: 4 MMOL/L (ref 3.5–5.1)
PROT SERPL-MCNC: 6.2 G/DL (ref 6.4–8.2)
RBC # BLD AUTO: 4.61 M/UL (ref 3.8–5.2)
SODIUM SERPL-SCNC: 140 MMOL/L (ref 136–145)
WBC # BLD AUTO: 5.3 K/UL (ref 3.6–11)

## 2020-11-24 PROCEDURE — 96361 HYDRATE IV INFUSION ADD-ON: CPT

## 2020-11-24 PROCEDURE — 36415 COLL VENOUS BLD VENIPUNCTURE: CPT

## 2020-11-24 PROCEDURE — 74011000258 HC RX REV CODE- 258: Performed by: PHYSICIAN ASSISTANT

## 2020-11-24 PROCEDURE — 99213 OFFICE O/P EST LOW 20 MIN: CPT | Performed by: PHYSICIAN ASSISTANT

## 2020-11-24 PROCEDURE — 80053 COMPREHEN METABOLIC PANEL: CPT

## 2020-11-24 PROCEDURE — 96413 CHEMO IV INFUSION 1 HR: CPT

## 2020-11-24 PROCEDURE — 85025 COMPLETE CBC W/AUTO DIFF WBC: CPT

## 2020-11-24 PROCEDURE — 77030012965 HC NDL HUBR BBMI -A

## 2020-11-24 PROCEDURE — 74011250636 HC RX REV CODE- 250/636: Performed by: PHYSICIAN ASSISTANT

## 2020-11-24 PROCEDURE — 86304 IMMUNOASSAY TUMOR CA 125: CPT

## 2020-11-24 RX ORDER — SODIUM CHLORIDE 9 MG/ML
25 INJECTION, SOLUTION INTRAVENOUS CONTINUOUS
Status: DISCONTINUED | OUTPATIENT
Start: 2020-11-24 | End: 2020-11-25 | Stop reason: HOSPADM

## 2020-11-24 RX ORDER — HEPARIN 100 UNIT/ML
300-500 SYRINGE INTRAVENOUS AS NEEDED
Status: DISCONTINUED | OUTPATIENT
Start: 2020-11-24 | End: 2020-11-25 | Stop reason: HOSPADM

## 2020-11-24 RX ORDER — SODIUM CHLORIDE 9 MG/ML
10 INJECTION INTRAMUSCULAR; INTRAVENOUS; SUBCUTANEOUS AS NEEDED
Status: DISCONTINUED | OUTPATIENT
Start: 2020-11-24 | End: 2020-11-25 | Stop reason: HOSPADM

## 2020-11-24 RX ORDER — SODIUM CHLORIDE 0.9 % (FLUSH) 0.9 %
10 SYRINGE (ML) INJECTION AS NEEDED
Status: DISCONTINUED | OUTPATIENT
Start: 2020-11-24 | End: 2020-11-25 | Stop reason: HOSPADM

## 2020-11-24 RX ADMIN — SODIUM CHLORIDE 200 MG: 9 INJECTION, SOLUTION INTRAVENOUS at 13:10

## 2020-11-24 RX ADMIN — SODIUM CHLORIDE 1000 ML: 900 INJECTION, SOLUTION INTRAVENOUS at 11:51

## 2020-11-24 RX ADMIN — Medication 10 ML: at 11:06

## 2020-11-24 RX ADMIN — HEPARIN 500 UNITS: 100 SYRINGE at 13:45

## 2020-11-24 RX ADMIN — Medication 10 ML: at 11:05

## 2020-11-24 RX ADMIN — Medication 10 ML: at 11:07

## 2020-11-24 RX ADMIN — Medication 10 ML: at 13:45

## 2020-11-24 NOTE — PROGRESS NOTES
[address] [place] [address] Mathias Moritz 515, 6090 [facility] [phone]  F [phone]      Patient ID:  Name:  Monroe Villalobos  MRN:  [id number]  :  1955/65 y.o. Date:  2020      Sean Villavicencio MD: Christopher Barry MD  PCP: Twila Parsons MD     Primary Diagnosis: uterine cancer  Date of Diagnosis: 3/2013      Current Agent: Keytruda/Lenvima  Cycle: 2      HPI:  60 y.o. 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 She is now found with recurrence noted on imaging studies pelvic adenopathy and peritoneal carcinomatosis. She is recommended combination Keytruda/Lenvima. SUBJECTIVE:  Ms. Shakira Villavicencio comes in today for cycle 2 of Keytruda chemotherapy. She began Lenvima on the same day as she had cycle 1 of Keytruda. She complains of generalized nausea. She takes zofran once a day, usually in the evening. She also developed with hypertension and severe headaches and her dose of Vesta [employer] was decreased about 1 week ago. She states that she developed erythema on her palms and sores and sensitivity along her lips. She does not feel that she has been able to drink enough over the past week. Partly she thinks this is due to her nausea but also because she is busy at work. She also complains of a skin rash on the backs of her arms which she noticed yesterday. She also mentions that her daughter had a COVID exposure and visited her prior to knowing about this. Ms. Shakira Villavicencio is monitoring her symptoms and plans on getting tested in the next week.      ROS  Constitutional: no weight loss, fever, night sweats  Respiratory: no cough, shortness of breath, or wheezing  Cardiovascular: no chest pain or dyspnea on exertion  Heme: No abnormal bleeding  Gastrointestinal: no abdominal pain, change in bowel habits, or black or bloody stools  Genito-Urinary: no dysuria, trouble voiding, or hematuria  Musculoskeletal: negative for - gait disturbance or joint swelling  Neurological: negative for - behavioral changes, dizziness, headaches, memory loss or numbness/tingling  Derm: negative  Psych: negative for anxiety and depression       OBJECTIVE:  Physical Exam  Visit Vitals  BP (!) 177/74   Pulse 69   Temp 97.7 °F (36.5 °C)   Resp 18        General:  alert, cooperative, no distress       HEENT: without pallor, sclera without jaundice, oral mucosa without lesions,      Cardiac:  Regular rate and rhythm        Lungs:  clear to auscultation bilaterally          Port:  clean, dry, no drainage  Abdomen:  soft, nondistended, nontender       Lymph:  no lymphadenopathy   Extremity: no edema, redness or tenderness in the calves or thighs. No rashes noted on the upper back or the backs of both arms. Mild erythema of the palms. She has developed splinter hemorrhages on her hands. Recent Labs     11/24/20  1114   WBC 5.3   ANEU 3.6   HGB 13.7   HCT 41.2   MCV 89.4   MCH 29.7   *     Recent Labs     11/24/20  1114      K 4.0      *   BUN 11   CREA 0.54*   CA 8.3*   ALB 3.2*   TBILI 0.7   ALT 33     UA no protein  TSH 0.67    Tumor markers  Cancer Ag (CA) 125   Date Value Ref Range Status   10/23/2020 98.9 (H) 0.0 - 38.1 U/mL Final     Comment:     [employer] Electrochemiluminescence Immunoassay (ECLIA)  Values obtained with different assay methods or kits cannot be  used interchangeably. Results cannot be interpreted as absolute  evidence of the presence or absence of malignant disease. 07/24/2020 14.3 0.0 - 38.1 U/mL Final     Comment:     [employer] Electrochemiluminescence Immunoassay (ECLIA)  Values obtained with different assay methods or kits cannot be  used interchangeably. Results cannot be interpreted as absolute  evidence of the presence or absence of malignant disease.      07/22/2019 7.4 0.0 - 38.1 U/mL Final     Comment:     [employer] Electrochemiluminescence Immunoassay (ECLIA)  Values obtained with different assay methods or kits cannot be  used interchangeably. Results cannot be interpreted as absolute  evidence of the presence or absence of malignant disease. 12/20/2018 7.8 0.0 - 38.1 U/mL Final     Comment:     Roche ECLIA methodology   06/18/2018 6.9 0.0 - 38.1 U/mL Final     Comment:     Roche ECLIA methodology   12/11/2017 6.8 0.0 - 38.1 U/mL Final     Comment:     Roche ECLIA methodology   06/26/2017 7.4 0.0 - 38.1 U/mL Final     Comment:     Roche ECLIA methodology       Patient Active Problem List   Diagnosis Code    Malignant neoplasm of corpus uteri, except isthmus [facility] C54.9     Past Medical History:   Diagnosis Date    Abnormal Pap smear 1995    with f/u normal    Anemia     Arthritis     Asthma     Cancer ([facility]     ENDOMETRIAL    Environmental allergies     GERD (gastroesophageal reflux disease)     Goiter     Other unknown and unspecified cause of morbidity or mortality     POSSIBLE ESOPHAGEAL SPASM, ? OBSTRUCTION DUE TO GOITER    PMB (postmenopausal bleeding)     S/P chemotherapy, time since greater than 12 weeks     LAST CHEMO 10/2014 PER PATIENT    Thyroid disease     goiter     Prior to Admission medications    Medication Sig Start Date End Date Taking? Authorizing Provider   ondansetron (ZOFRAN ODT) 4 mg disintegrating tablet Take 1 Tab by mouth every eight (8) hours as needed for Nausea. Indications: nausea and vomiting caused by cancer drugs 10/22/20   Edmond Yang MD   lidocaine-prilocaine (EMLA) topical cream Apply small amount over port area and cover with band aid one hour before chemo 10/22/20   Edmond Yang MD   lenvatinib (Lenvima) 20 mg/day (10 mg x 2) cap Take 2 Caps by mouth daily. 10/15/20   Edmond Yang MD   guaifenesin (MUCINEX PO) Take  by mouth. Provider, Historical   loratadine (Claritin) 10 mg tablet Take 10 mg by mouth. Provider, Historical   MARICHUY'S WORT PO Take  by mouth. Provider, Historical   epinastine 0.05 % drop Apply  to eye. Provider, Historical   raNITIdine (ZANTAC) 150 mg tablet ZANTAC TABS 9/29/11   Provider, Historical   cyclobenzaprine (FLEXERIL) 5 mg tablet take 1 tablet by mouth three times a day if needed 12/5/16   Provider, Historical   valACYclovir (VALTREX) 1 gram tablet Take 1 Tab by mouth three (3) times daily. 12/15/16   Viviane Pollard MD   EPINEPHrine (EPIPEN) 0.3 mg/0.3 mL injection 0.3 mg by IntraMUSCular route once as needed. Provider, Historical   ketotifen (ZADITOR) 0.025 % (0.035 %) ophthalmic solution Administer 1 Drop to both eyes every morning. Provider, Historical   Cetirizine (ZYRTEC) 10 mg cap Take 10 mg by mouth nightly. Provider, Historical   SAL ACID/UREA/PETROLATUM,WHITE (KERASAL FOOT EX) by Apply Externally route daily as needed. Provider, Historical   ACCU-CHEK HELDER PLUS TEST STRP strip  7/3/15   Provider, Historical   NITROSTAT 0.4 mg SL tablet  9/22/14   Provider, Historical   CALCIUM CARBONATE (MARCELLO-SELTZER ANTACID PO) Take  by mouth daily as needed. Provider, Historical   ibuprofen (MOTRIN) 200 mg tablet Take 200 mg by mouth every six (6) hours as needed.     Provider, Historical     Allergies   Allergen Reactions    Latex Other (comments)     Burns skin    Acetone Shortness of Breath    Amoxicillin Anaphylaxis    Ciprofloxacin Hives    Pcn [Penicillins] Anaphylaxis    Buspar [Buspirone] Unknown (comments)     Has N&V and makes her feel \"weird\"    Azithromycin Hives    Egg Nausea Only    Other Medication Rash     POPPY seeds     Family History   Problem Relation Age of Onset    Cancer Maternal Aunt         breast    Heart Disease Mother     Diabetes Father     Cancer Sister         CERVICAL    Kidney Disease Brother     Diabetes Brother     Heart Attack Other     Arrhythmia Brother     Diabetes Brother     Anesth Problems Neg Hx          CT Results (most recent):  [facility]   CT ABD PELV W CONT    Narrative EXAM: CT ABDOMEN PELVIS WITH CONTRAST  INDICATION: History of uterine cancer, back pain, possible recurrence. Malignant neoplasm of corpus uteri, unspecified. COMPARISON: 12/20/2016. CONTRAST: 100 mL of Isovue-370. TECHNIQUE:   Multislice helical CT was performed from the diaphragm to the symphysis pubis  during uneventful rapid bolus intravenous contrast administration. Oral contrast  was also administered. Contiguous 5 mm axial images were reconstructed and lung  and soft tissue windows were generated. Coronal and sagittal reformations were  generated. CT dose reduction was achieved through use of a standardized protocol  tailored for this examination and automatic exposure control for dose  modulation. Adaptive statistical iterative reconstruction (ASIR) was utilized  for this examination. FINDINGS:  LOWER CHEST: The visualized portions of the lung bases are clear. ABDOMEN:  Liver: The liver is normal in size and contour with no focal abnormality. The  attenuation of the liver is decreased throughout. Gallbladder and bile ducts: There is no calcified gallstone or biliary  dilatation. Spleen: No abnormality. Pancreas: No abnormality. Adrenal glands: No abnormality. Kidneys: No abnormality. PELVIS:  Reproductive organs: The uterus and ovaries are absent. Bladder: No abnormality. BOWEL AND MESENTERY: The small bowel is normal.  There is no mesenteric mass or  adenopathy. The appendix is absent. PERITONEUM: There is no ascites or free intraperitoneal air. RETROPERITONEUM: The aorta  tapers without aneurysm. There is no retroperitoneal  adenopathy or mass. There is no pelvic mass or adenopathy. BONES AND SOFT TISSUES: The bones and soft tissues of the abdominal wall are  within normal limits. Impression IMPRESSION:   1. Status post hysterectomy and bilateral oophorectomy.   2. No evidence of recurrent or metastatic disease within the abdomen or pelvis. 3. Hepatic steatosis. 4. Status post appendectomy. .                IMPRESSION/PLAN:  60 y.o. 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 ECO    Chemotherapy: proceed with cycle 2 [place] immunotherapy today. Continue Lenvima. She has started keeping a journal to keep track of her post-chemotherapy symptoms. CINV:  She is only taking Zofran once a day. I reminded her that she can take this every 8 hours if she continues to experience nausea. She has not vomited. Over the past week, she has also developed a lack of appetite. We discussed the importance of protein supplementation if she does not feel like eating. She states that she can tolerate Ensure Clear. Labs reviewed. No abnormalities. 1 L bolus of normal saline given as she feels dehydrated. Encourage PO intake of fluids. Discussed strategies. Hx of thyroid goiter. Hx benign bx. TSH normal  Chronic med issues: Hx asthma - inhaler PRN    Hx esophageal spasm - uses nitroglycerin    Hypertension? Uncertain if she has been diagnosed before. Monitor blood pressure. We may need to start her on an anti-hypertensive is her blood pressure remains elevated. Psychosocial: In good spirits and feels well supported by her friends and work family. She lives alone, no close family for support buy many friends and very well supported by her work family. Advised to call with questions/concerns.         Neris Rader PA-C  Gyn Onc

## 2020-11-24 NOTE — PROGRESS NOTES
[facility]    1045  Pt arrived at [facility] ambulatory and in no distress for C2 of Keytruda. Assessment completed, pt c/o headache since 1st infusion (MD aware), occasional nausea, and increased fatigue. MELONY Robles in to see pt. Left chest port accessed with 1 in edgar no difficulty. Positive blood return noted and labs drawn. Recent Results (from the past 12 hour(s))   CBC WITH AUTOMATED DIFF    Collection Time: 11/24/20 11:14 AM   Result Value Ref Range    WBC 5.3 3.6 - 11.0 K/uL    RBC 4.61 3.80 - 5.20 M/uL    HGB 13.7 11.5 - 16.0 g/dL    HCT 41.2 35.0 - 47.0 %    MCV 89.4 80.0 - 99.0 FL    MCH 29.7 26.0 - 34.0 PG    MCHC 33.3 30.0 - 36.5 g/dL    RDW 13.5 11.5 - 14.5 %    PLATELET 995 (L) 886 - 400 K/uL    MPV 8.9 8.9 - 12.9 FL    NRBC 0.0 0  WBC    ABSOLUTE NRBC 0.00 0.00 - 0.01 K/uL    NEUTROPHILS 68 32 - 75 %    LYMPHOCYTES 21 12 - 49 %    MONOCYTES 8 5 - 13 %    EOSINOPHILS 2 0 - 7 %    BASOPHILS 1 0 - 1 %    IMMATURE GRANULOCYTES 1 (H) 0.0 - 0.5 %    ABS. NEUTROPHILS 3.6 1.8 - 8.0 K/UL    ABS. LYMPHOCYTES 1.1 0.8 - 3.5 K/UL    ABS. MONOCYTES 0.5 0.0 - 1.0 K/UL    ABS. EOSINOPHILS 0.1 0.0 - 0.4 K/UL    ABS. BASOPHILS 0.0 0.0 - 0.1 K/UL    ABS. IMM. GRANS. 0.0 0.00 - 0.04 K/UL    DF AUTOMATED     METABOLIC PANEL, COMPREHENSIVE    Collection Time: 11/24/20 11:14 AM   Result Value Ref Range    Sodium 140 136 - 145 mmol/L    Potassium 4.0 3.5 - 5.1 mmol/L    Chloride 106 97 - 108 mmol/L    CO2 28 21 - 32 mmol/L    Anion gap 6 5 - 15 mmol/L    Glucose 117 (H) 65 - 100 mg/dL    BUN 11 6 - 20 MG/DL    Creatinine 0.54 (L) 0.55 - 1.02 MG/DL    BUN/Creatinine ratio 20 12 - 20      GFR est AA >60 >60 ml/min/1.73m2    GFR est non-AA >60 >60 ml/min/1.73m2    Calcium 8.3 (L) 8.5 - 10.1 MG/DL    Bilirubin, total 0.7 0.2 - 1.0 MG/DL    ALT (SGPT) 33 12 - 78 U/L    AST (SGOT) 24 15 - 37 U/L    Alk.  phosphatase 80 45 - 117 U/L    Protein, total 6.2 (L) 6.4 - 8.2 g/dL    Albumin 3.2 (L) 3.5 - 5.0 g/dL Globulin 3.0 2.0 - 4.0 g/dL    A-G Ratio 1.1 1.1 - 2.2     CANCER ANTIGEN 125    Collection Time: 11/24/20 11:14 AM   Result Value Ref Range    CA-125 19 1.5 - 35.0 U/mL     Orders entered by MELONY Delvalle for IV NS bolus today as pt is feeling dehydrated. Medications received:  NS bolus IV  Keytruda     Patient Vitals for the past 12 hrs:   Temp Pulse Resp BP   11/24/20 1349  63  (!) 176/82   11/24/20 1118 97.7 °F (36.5 °C)      11/24/20 1047 96.9 °F (36.1 °C) 69 18 (!) 177/74     Tolerated treatment well, no adverse reaction noted. Port de-accessed and flushed per protocol. Positive blood return noted. 1350  D/C'd from [facility] ambulatory and in no distress. Next appointment is 12/15/20.

## 2020-12-08 ENCOUNTER — VIRTUAL VISIT (OUTPATIENT)
Dept: GYNECOLOGY | Age: 65
End: 2020-12-08
Payer: COMMERCIAL

## 2020-12-08 DIAGNOSIS — C54.1 PRIMARY SEROUS ADENOCARCINOMA OF ENDOMETRIUM (HCC): Primary | ICD-10-CM

## 2020-12-08 PROCEDURE — 99442 PR PHYS/QHP TELEPHONE EVALUATION 11-20 MIN: CPT | Performed by: OBSTETRICS & GYNECOLOGY

## 2020-12-08 RX ORDER — EPINEPHRINE 1 MG/ML
0.3 INJECTION, SOLUTION, CONCENTRATE INTRAVENOUS AS NEEDED
Status: CANCELLED | OUTPATIENT
Start: 2020-12-15

## 2020-12-08 RX ORDER — SODIUM CHLORIDE 9 MG/ML
10 INJECTION INTRAMUSCULAR; INTRAVENOUS; SUBCUTANEOUS AS NEEDED
Status: CANCELLED | OUTPATIENT
Start: 2020-12-15

## 2020-12-08 RX ORDER — LISINOPRIL 5 MG/1
5 TABLET ORAL DAILY
Qty: 30 TAB | Refills: 5 | Status: SHIPPED | OUTPATIENT
Start: 2020-12-08 | End: 2021-01-05 | Stop reason: SDUPTHER

## 2020-12-08 RX ORDER — HEPARIN 100 UNIT/ML
300-500 SYRINGE INTRAVENOUS AS NEEDED
Status: CANCELLED
Start: 2020-12-15

## 2020-12-08 RX ORDER — DIPHENHYDRAMINE HYDROCHLORIDE 50 MG/ML
25 INJECTION, SOLUTION INTRAMUSCULAR; INTRAVENOUS AS NEEDED
Status: CANCELLED
Start: 2020-12-15

## 2020-12-08 RX ORDER — SODIUM CHLORIDE 9 MG/ML
25 INJECTION, SOLUTION INTRAVENOUS CONTINUOUS
Status: CANCELLED | OUTPATIENT
Start: 2020-12-15

## 2020-12-08 RX ORDER — ALBUTEROL SULFATE 0.83 MG/ML
2.5 SOLUTION RESPIRATORY (INHALATION) AS NEEDED
Status: CANCELLED
Start: 2020-12-15

## 2020-12-08 RX ORDER — ONDANSETRON 2 MG/ML
8 INJECTION INTRAMUSCULAR; INTRAVENOUS AS NEEDED
Status: CANCELLED | OUTPATIENT
Start: 2020-12-15

## 2020-12-08 RX ORDER — SODIUM CHLORIDE 0.9 % (FLUSH) 0.9 %
10 SYRINGE (ML) INJECTION AS NEEDED
Status: CANCELLED | OUTPATIENT
Start: 2020-12-15

## 2020-12-08 RX ORDER — DIPHENHYDRAMINE HYDROCHLORIDE 50 MG/ML
50 INJECTION, SOLUTION INTRAMUSCULAR; INTRAVENOUS AS NEEDED
Status: CANCELLED
Start: 2020-12-15

## 2020-12-08 RX ORDER — ACETAMINOPHEN 325 MG/1
650 TABLET ORAL AS NEEDED
Status: CANCELLED
Start: 2020-12-15

## 2020-12-08 RX ORDER — HYDROCORTISONE SODIUM SUCCINATE 100 MG/2ML
100 INJECTION, POWDER, FOR SOLUTION INTRAMUSCULAR; INTRAVENOUS AS NEEDED
Status: CANCELLED | OUTPATIENT
Start: 2020-12-15

## 2020-12-08 NOTE — PROGRESS NOTES
[facility]IC [facility]COLOG[facility]  [address] [facility], [address], [address] [facility]  [phone] [facility] [phone]    Office Visit    Patient ID:  Name: Sandeep Perales  MRN: [id number]  : 1955/65 y.o. Visit date: 2020    INTERVAL HISTORY:  Sandeep Perales is a  female with stage IA, grade 3 uterine carcinoma. She underwent laparoscopic hysterectomy with staging in 2013. She was recommended six cycles of adjuvant Taxol and Carboplatin, which she completed. We elected not to treat with pelvic radiation therapy due to her difficulty with chemotherapy. She presents was last seen in July for routine surveillance. She was without complaints at that time and her exam was negative. She recently had some abdominal discomfort and noted to have some blood in her urine. She was referred to [facility] for evaluation. She had a CT of the abdomen/pelvis with them. It revealed retroperitoneal lymphadenopathy, peritoneal carcinomatosis, and trace ascites. She also reported a negative colonoscopy within the last month. She is scheduled for a MMG in a few weeks. I sent her for a PET/CT to better evaluate the extent of disease. She presented to review the results and discuss treatment. Her disease is not amenable to surgical resection. Systemic therapy is her only viable option. I thought immunotherapy would be the best choice, ahead of additional chemotherapy. This would consist of IV Keytruda and PO Lenvima. If that is not successful, we will consider retreatment with Taxol/Carboplatin or single agent Doxil. She has completed 2 cycles so far. She has continued to complain of headaches and elevations in her BP, despite lowering the dose of the Lenvima.           PMH:  Past Medical History:   Diagnosis Date    Abnormal Pap smear     with f/u normal    Anemia     Arthritis     Asthma     Cancer ([facility] [address]     ENDOMETRIAL    Environmental allergies     GERD (gastroesophageal reflux disease)     Goiter     Other unknown and unspecified cause of morbidity or mortality     POSSIBLE ESOPHAGEAL SPASM, ? OBSTRUCTION DUE TO GOITER    PMB (postmenopausal bleeding)     S/P chemotherapy, time since greater than 12 weeks     LAST CHEMO 10/2014 PER PATIENT    Thyroid disease     goiter     PSH:  Past Surgical History:   Procedure Laterality Date    BREAST SURGERY PROCEDURE UNLISTED  1/12/16    right breast bx    HX APPENDECTOMY      HX BUNIONECTOMY      HX GYN  3/13/13    TLH/BSO    HX HEENT  4/22/13    TOOTH REMOVED    HX ORTHOPAEDIC  1980'S    BUNIONECTOMY    HX VASCULAR ACCESS      port     SOC:  Social History     Socioeconomic History    Marital status:      Spouse name: Not on file    Number of children: Not on file    Years of education: Not on file    Highest education level: Not on file   Occupational History    Not on file   Social Needs    Financial resource strain: Not on file    Food insecurity     Worry: Not on file     Inability: Not on file    Transportation needs     Medical: Not on file     Non-medical: Not on file   Tobacco Use    Smoking status: Never Smoker    Smokeless tobacco: Never Used   Substance and Sexual Activity    Alcohol use:  Yes     Alcohol/week: 0.0 standard drinks     Comment: RARE OCC    Drug use: No    Sexual activity: Not on file   Lifestyle    Physical activity     Days per week: Not on file     Minutes per session: Not on file    Stress: Not on file   Relationships    Social connections     Talks on phone: Not on file     Gets together: Not on file     Attends Mormonism service: Not on file     Active member of club or organization: Not on file     Attends meetings of clubs or organizations: Not on file     Relationship status: Not on file    Intimate partner violence     Fear of current or ex partner: Not on file     Emotionally abused: Not on file     Physically abused: Not on file     Forced sexual activity: Not on file   Other Topics Concern    Not on file   Social History Narrative    Not on file     Family History  Family History   Problem Relation Age of Onset    Cancer Maternal Aunt         breast    Heart Disease Mother     Diabetes Father     Cancer Sister         CERVICAL    Kidney Disease Brother     Diabetes Brother     Heart Attack Other     Arrhythmia Brother     Diabetes Brother     Anesth Problems Neg Hx      Medications:  Current Outpatient Medications on File Prior to Visit   Medication Sig Dispense Refill    ondansetron (ZOFRAN ODT) 4 mg disintegrating tablet Take 1 Tab by mouth every eight (8) hours as needed for Nausea. Indications: nausea and vomiting caused by cancer drugs 30 Tab 3    lidocaine-prilocaine (EMLA) topical cream Apply small amount over port area and cover with band aid one hour before chemo 30 g 3    lenvatinib (Lenvima) 20 mg/day (10 mg x 2) cap Take 2 Caps by mouth daily. 60 Cap 4    guaifenesin (MUCINEX PO) Take  by mouth.  loratadine (Claritin) 10 mg tablet Take 10 mg by mouth.  MARICHUY'S WORT PO Take  by mouth.  epinastine 0.05 % drop Apply  to eye.  raNITIdine (ZANTAC) 150 mg tablet ZANTAC TABS      cyclobenzaprine (FLEXERIL) 5 mg tablet take 1 tablet by mouth three times a day if needed  0    valACYclovir (VALTREX) 1 gram tablet Take 1 Tab by mouth three (3) times daily. 21 Tab 3    EPINEPHrine (EPIPEN) 0.3 mg/0.3 mL injection 0.3 mg by IntraMUSCular route once as needed.  ketotifen (ZADITOR) 0.025 % (0.035 %) ophthalmic solution Administer 1 Drop to both eyes every morning.  Cetirizine (ZYRTEC) 10 mg cap Take 10 mg by mouth nightly.  SAL ACID/UREA/PETROLATUM,WHITE (KERASAL FOOT EX) by Apply Externally route daily as needed.  ACCU-CHEK HELDER PLUS TEST STRP strip   0    NITROSTAT 0.4 mg SL tablet       CALCIUM CARBONATE (MARCELLO-SELTZER ANTACID PO) Take  by mouth daily as needed.       ibuprofen (MOTRIN) 200 mg tablet Take 200 mg by mouth every six (6) hours as needed. No current facility-administered medications on file prior to visit. Allergies: Allergies   Allergen Reactions    Latex Other (comments)     Burns skin    Acetone Shortness of Breath    Amoxicillin Anaphylaxis    Ciprofloxacin Hives    Pcn [Penicillins] Anaphylaxis    Buspar [Buspirone] Unknown (comments)     Has N&V and makes her feel \"weird\"    Azithromycin Hives    Egg Nausea Only    Other Medication Rash     POPPY seeds       ROS:  Negative     OBJECTIVE:    PHYSICAL EXAM  VITAL SIGNS: There were no vitals taken for this visit. GENERAL KAIA:    HEENT:    RESPIRATORY:    CARDIOVASC:    GASTROINT:    MUSCULOSKEL:    EXTREMITIES:    PELVIC:    RECTAL:    PRIYANKA SURVEY:    NEURO:        CT of abdomen/pelvis (12/20/16)  The visualized portions of the lung bases are clear.     There are no focal abnormalities within the liver, spleen, pancreas, adrenal  glands or kidneys. No gallstones are noted.     The aorta tapers without aneurysm. There is no retroperitoneal adenopathy or  mass. There is no ascites or free intraperitoneal air.     There is no small or large bowel distention. The appendix is surgically absent.      The uterus and ovaries are surgically absent. There is no pelvic mass or  adenopathy.     Mild degenerative changes are noted in the lumbar spine.       IMPRESSION: Status post hysterectomy, bilateral oophorectomy, and appendectomy. No acute abnormality. CT of abdomen/pelvis (8/13/20)  LOWER CHEST: The visualized portions of the lung bases are clear. ABDOMEN:  Liver: The liver is normal in size and contour with no focal abnormality. The  attenuation of the liver is decreased throughout. Gallbladder and bile ducts: There is no calcified gallstone or biliary  dilatation. Spleen: No abnormality. Pancreas: No abnormality. Adrenal glands: No abnormality. Kidneys: No abnormality. PELVIS:  Reproductive organs:  The uterus and ovaries are absent. Bladder: No abnormality. BOWEL AND MESENTERY: The small bowel is normal.  There is no mesenteric mass or  adenopathy. The appendix is absent. PERITONEUM: There is no ascites or free intraperitoneal air. RETROPERITONEUM: The aorta  tapers without aneurysm. There is no retroperitoneal  adenopathy or mass. There is no pelvic mass or adenopathy. BONES AND SOFT TISSUES: The bones and soft tissues of the abdominal wall are  within normal limits.     IMPRESSION:   1. Status post hysterectomy and bilateral oophorectomy. 2. No evidence of recurrent or metastatic disease within the abdomen or pelvis. 3. Hepatic steatosis. 4. Status post appendectomy.       PET/CT (10/9/20)  HEAD/NECK: No apparent foci of abnormal hypermetabolism. Cerebral evaluation is  limited by normal intense activity.     CHEST: No foci of abnormal hypermetabolism.     ABDOMEN/PELVIS:   Peritoneal nodules are hypermetabolic, SUV 12. Retroperitoneal adenopathy is hypermetabolic, SUV 6. Right deep pelvic adenopathy is hypermetabolic, SUV 8. Left pelvic cul-de-sac mass is hypermetabolic, SUV 11.     SKELETON: No foci of abnormal hypermetabolism in the axial and visualized  appendicular skeleton.     IMPRESSION:   1. Peritoneal carcinomatosis. 2. 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 3. Left pelvic cul-de-sac tumor. 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 She completed six cycles of adjuvant Taxol and Carboplatin chemotherapy. She now has recurrent disease and is being treated with the regimen of mIV Keytruda and PO Lenvima. She has completed two cycles so far. We will plan on repeat imaging after the third cycle. I am going to start her on lisinopril to help with the blood pressure. If not improved, we may need to drop the dose of the Lenvima again.       Ramona Escobedo is a 60 y.o. female, evaluated via audio-only technology on 12/8/2020 for No chief complaint on file. Assessment & Plan:   Diagnoses and all orders for this visit:    1. Primary serous adenocarcinoma of endometrium (HCC)        12  Subjective:       Prior to Admission medications    Medication Sig Start Date End Date Taking? Authorizing Provider   ondansetron (ZOFRAN ODT) 4 mg disintegrating tablet Take 1 Tab by mouth every eight (8) hours as needed for Nausea. Indications: nausea and vomiting caused by cancer drugs 10/22/20   Stuart Paredes MD   lidocaine-prilocaine (EMLA) topical cream Apply small amount over port area and cover with band aid one hour before chemo 10/22/20   Stuart Paredes MD   lenvatinib (Lenvima) 20 mg/day (10 mg x 2) cap Take 2 Caps by mouth daily. 10/15/20   Stuart Paredes MD   guaifenesin (MUCINEX PO) Take  by mouth. Provider, Historical   loratadine (Claritin) 10 mg tablet Take 10 mg by mouth. Provider, Historical   MARICHUY'S WORT PO Take  by mouth. Provider, Historical   epinastine 0.05 % drop Apply  to eye. Provider, Historical   raNITIdine (ZANTAC) 150 mg tablet ZANTAC TABS 9/29/11   Provider, Historical   cyclobenzaprine (FLEXERIL) 5 mg tablet take 1 tablet by mouth three times a day if needed 12/5/16   Provider, Historical   valACYclovir (VALTREX) 1 gram tablet Take 1 Tab by mouth three (3) times daily. 12/15/16   Stuart Paredes MD   EPINEPHrine (EPIPEN) 0.3 mg/0.3 mL injection 0.3 mg by IntraMUSCular route once as needed. Provider, Historical   ketotifen (ZADITOR) 0.025 % (0.035 %) ophthalmic solution Administer 1 Drop to both eyes every morning. Provider, Historical   Cetirizine (ZYRTEC) 10 mg cap Take 10 mg by mouth nightly. Provider, Historical   SAL ACID/UREA/PETROLATUM,WHITE (KERASAL FOOT EX) by Apply Externally route daily as needed.     Provider, Historical   ACCU-CHEK HELDER PLUS TEST STRP strip  7/3/15   Provider, Historical   NITROSTAT 0.4 mg SL tablet  9/22/14 Provider, Historical   CALCIUM CARBONATE (MARCELLO-SELTZER ANTACID PO) Take  by mouth daily as needed. Provider, Historical   ibuprofen (MOTRIN) 200 mg tablet Take 200 mg by mouth every six (6) hours as needed. Provider, Historical     Patient Active Problem List   Diagnosis Code    Malignant neoplasm of corpus uteri, except isthmus ([facility] [address] C54.9     Patient Active Problem List    Diagnosis Date Noted    Malignant neoplasm of corpus uteri, except isthmus ([facility] [address] 02/28/2013     Current Outpatient Medications   Medication Sig Dispense Refill    ondansetron (ZOFRAN ODT) 4 mg disintegrating tablet Take 1 Tab by mouth every eight (8) hours as needed for Nausea. Indications: nausea and vomiting caused by cancer drugs 30 Tab 3    lidocaine-prilocaine (EMLA) topical cream Apply small amount over port area and cover with band aid one hour before chemo 30 g 3    lenvatinib (Lenvima) 20 mg/day (10 mg x 2) cap Take 2 Caps by mouth daily. 60 Cap 4    guaifenesin (MUCINEX PO) Take  by mouth.  loratadine (Claritin) 10 mg tablet Take 10 mg by mouth.  MARICHUY'S WORT PO Take  by mouth.  epinastine 0.05 % drop Apply  to eye.  raNITIdine (ZANTAC) 150 mg tablet ZANTAC TABS      cyclobenzaprine (FLEXERIL) 5 mg tablet take 1 tablet by mouth three times a day if needed  0    valACYclovir (VALTREX) 1 gram tablet Take 1 Tab by mouth three (3) times daily. 21 Tab 3    EPINEPHrine (EPIPEN) 0.3 mg/0.3 mL injection 0.3 mg by IntraMUSCular route once as needed.  ketotifen (ZADITOR) 0.025 % (0.035 %) ophthalmic solution Administer 1 Drop to both eyes every morning.  Cetirizine (ZYRTEC) 10 mg cap Take 10 mg by mouth nightly.  SAL ACID/UREA/PETROLATUM,WHITE (KERASAL FOOT EX) by Apply Externally route daily as needed.  ACCU-CHEK HELDER PLUS TEST STRP strip   0    NITROSTAT 0.4 mg SL tablet       CALCIUM CARBONATE (MARCELLO-SELTZER ANTACID PO) Take  by mouth daily as needed.       ibuprofen (MOTRIN) 200 mg tablet Take 200 mg by mouth every six (6) hours as needed. Allergies   Allergen Reactions    Latex Other (comments)     Burns skin    Acetone Shortness of Breath    Amoxicillin Anaphylaxis    Ciprofloxacin Hives    Pcn [Penicillins] Anaphylaxis    Buspar [Buspirone] Unknown (comments)     Has N&V and makes her feel \"weird\"    Azithromycin Hives    Egg Nausea Only    Other Medication Rash     POPPY seeds     Past Medical History:   Diagnosis Date    Abnormal Pap smear 1995    with f/u normal    Anemia     Arthritis     Asthma     Cancer ([facility] [address]     ENDOMETRIAL    Environmental allergies     GERD (gastroesophageal reflux disease)     Goiter     Other unknown and unspecified cause of morbidity or mortality     POSSIBLE ESOPHAGEAL SPASM, ? OBSTRUCTION DUE TO GOITER    PMB (postmenopausal bleeding)     S/P chemotherapy, time since greater than 12 weeks     LAST CHEMO 10/2014 PER PATIENT    Thyroid disease     goiter     Past Surgical History:   Procedure Laterality Date    BREAST SURGERY PROCEDURE UNLISTED  1/12/16    right breast bx    HX APPENDECTOMY      HX BUNIONECTOMY      HX GYN  3/13/13    TLH/BSO    HX HEENT  4/22/13    TOOTH REMOVED    HX ORTHOPAEDIC  1'S    BUNIONECTOMY    HX VASCULAR ACCESS      port     Family History   Problem Relation Age of Onset    Cancer Maternal Aunt         breast    Heart Disease Mother     Diabetes Father     Cancer Sister         CERVICAL    Kidney Disease Brother     Diabetes Brother     Heart Attack Other     Arrhythmia Brother     Diabetes Brother     Anesth Problems Neg Hx      Social History     Tobacco Use    Smoking status: Never Smoker    Smokeless tobacco: Never Used   Substance Use Topics    Alcohol use: Yes     Alcohol/week: 0.0 standard drinks     Comment: RARE OCC       ROS    No flowsheet data found.      Jessy Lee, who was evaluated through a patient-initiated, synchronous (real-time) audio only encounter, and/or her healthcare decision maker, is aware that it is a billable service, with coverage as determined by her insurance carrier. She provided verbal consent to proceed: Yes. She has not had a related appointment within my department in the past 7 days or scheduled within the next 24 hours.       Total Time: minutes: 11-20 minutes    MD Steph Lux MD  12/8/2020/2:05 PM

## 2020-12-09 DIAGNOSIS — C54.9 MALIGNANT NEOPLASM OF CORPUS UTERI, EXCEPT ISTHMUS (HCC): Primary | ICD-10-CM

## 2020-12-09 RX ORDER — LENVATINIB 14 MG/DAY
KIT ORAL
Qty: 30 CAP | Refills: 3 | Status: SHIPPED | OUTPATIENT
Start: 2020-12-09 | End: 2020-12-15 | Stop reason: DRUGHIGH

## 2020-12-09 NOTE — TELEPHONE ENCOUNTER
Requested Prescriptions     Signed Prescriptions Disp Refills    lenvatinib (Lenvima) 14 mg/day(10 mg x 1-4 mg x 1) cap 30 Cap 3     Sig: Take 1 (one) 10 mg cap and 1 (one) 4 mg cap daily     Authorizing Provider: Ha Mackay     Ordering User: Phoebe Samuel     New Rx with dose change faxed to [facility] per vo Dr. Alfa Nguyen.

## 2020-12-11 ENCOUNTER — TELEPHONE (OUTPATIENT)
Dept: GYNECOLOGY | Age: 65
End: 2020-12-11

## 2020-12-11 NOTE — TELEPHONE ENCOUNTER
Pt , she states they have called her to ship her Chanel Taylor but she is concerned because it is not the right dosage, she states Dr. Aide Ly decreased her lenvema due to her headaches. She states the dose they want to send her is higher than she is taking. Please call her at [phone]. She also states she is still having headaches, she started her Lisinopril on 12/9/2020.

## 2020-12-12 ENCOUNTER — HOSPITAL ENCOUNTER (EMERGENCY)
Age: 65
Discharge: HOME | End: 2020-12-13
Attending: EMERGENCY MEDICINE | Admitting: EMERGENCY MEDICINE
Payer: COMMERCIAL

## 2020-12-12 ENCOUNTER — APPOINTMENT (OUTPATIENT)
Dept: CT IMAGING | Age: 65
End: 2020-12-12
Attending: EMERGENCY MEDICINE
Payer: COMMERCIAL

## 2020-12-12 DIAGNOSIS — R11.2 NON-INTRACTABLE VOMITING WITH NAUSEA, UNSPECIFIED VOMITING TYPE: ICD-10-CM

## 2020-12-12 DIAGNOSIS — R51.9 ACUTE NONINTRACTABLE HEADACHE, UNSPECIFIED HEADACHE TYPE: Primary | ICD-10-CM

## 2020-12-12 PROCEDURE — 96374 THER/PROPH/DIAG INJ IV PUSH: CPT

## 2020-12-12 PROCEDURE — 96375 TX/PRO/DX INJ NEW DRUG ADDON: CPT

## 2020-12-12 PROCEDURE — 70450 CT HEAD/BRAIN W/O DYE: CPT

## 2020-12-12 PROCEDURE — 96361 HYDRATE IV INFUSION ADD-ON: CPT

## 2020-12-12 PROCEDURE — 99284 EMERGENCY DEPT VISIT MOD MDM: CPT

## 2020-12-12 RX ORDER — DIPHENHYDRAMINE HYDROCHLORIDE 50 MG/ML
12.5 INJECTION, SOLUTION INTRAMUSCULAR; INTRAVENOUS
Status: COMPLETED | OUTPATIENT
Start: 2020-12-12 | End: 2020-12-13

## 2020-12-13 VITALS
SYSTOLIC BLOOD PRESSURE: 163 MMHG | TEMPERATURE: 98.7 F | HEART RATE: 67 BPM | BODY MASS INDEX: 26.66 KG/M2 | OXYGEN SATURATION: 98 % | RESPIRATION RATE: 18 BRPM | HEIGHT: 65 IN | WEIGHT: 160 LBS | DIASTOLIC BLOOD PRESSURE: 88 MMHG

## 2020-12-13 LAB
ALBUMIN SERPL-MCNC: 3.6 G/DL (ref 3.5–5)
ALBUMIN/GLOB SERPL: 1 {RATIO} (ref 1.1–2.2)
ALP SERPL-CCNC: 71 U/L (ref 45–117)
ALT SERPL-CCNC: 22 U/L (ref 12–78)
ANION GAP SERPL CALC-SCNC: 5 MMOL/L (ref 5–15)
AST SERPL-CCNC: 14 U/L (ref 15–37)
BASOPHILS # BLD: 0 K/UL (ref 0–0.1)
BASOPHILS NFR BLD: 0 % (ref 0–1)
BILIRUB SERPL-MCNC: 0.7 MG/DL (ref 0.2–1)
BUN SERPL-MCNC: 12 MG/DL (ref 6–20)
BUN/CREAT SERPL: 27 (ref 12–20)
CALCIUM SERPL-MCNC: 8.9 MG/DL (ref 8.5–10.1)
CHLORIDE SERPL-SCNC: 107 MMOL/L (ref 97–108)
CO2 SERPL-SCNC: 28 MMOL/L (ref 21–32)
COMMENT, HOLDF: NORMAL
CREAT SERPL-MCNC: 0.45 MG/DL (ref 0.55–1.02)
DIFFERENTIAL METHOD BLD: ABNORMAL
EOSINOPHIL # BLD: 0 K/UL (ref 0–0.4)
EOSINOPHIL NFR BLD: 0 % (ref 0–7)
ERYTHROCYTE [DISTWIDTH] IN BLOOD BY AUTOMATED COUNT: 13.1 % (ref 11.5–14.5)
GLOBULIN SER CALC-MCNC: 3.7 G/DL (ref 2–4)
GLUCOSE SERPL-MCNC: 115 MG/DL (ref 65–100)
HCT VFR BLD AUTO: 41.6 % (ref 35–47)
HGB BLD-MCNC: 14.1 G/DL (ref 11.5–16)
IMM GRANULOCYTES # BLD AUTO: 0 K/UL (ref 0–0.04)
IMM GRANULOCYTES NFR BLD AUTO: 0 % (ref 0–0.5)
LYMPHOCYTES # BLD: 1 K/UL (ref 0.8–3.5)
LYMPHOCYTES NFR BLD: 13 % (ref 12–49)
MAGNESIUM SERPL-MCNC: 2 MG/DL (ref 1.6–2.4)
MCH RBC QN AUTO: 29.7 PG (ref 26–34)
MCHC RBC AUTO-ENTMCNC: 33.9 G/DL (ref 30–36.5)
MCV RBC AUTO: 87.6 FL (ref 80–99)
MONOCYTES # BLD: 0.5 K/UL (ref 0–1)
MONOCYTES NFR BLD: 6 % (ref 5–13)
NEUTS SEG # BLD: 6.2 K/UL (ref 1.8–8)
NEUTS SEG NFR BLD: 81 % (ref 32–75)
NRBC # BLD: 0 K/UL (ref 0–0.01)
NRBC BLD-RTO: 0 PER 100 WBC
PLATELET # BLD AUTO: 176 K/UL (ref 150–400)
PMV BLD AUTO: 8.7 FL (ref 8.9–12.9)
POTASSIUM SERPL-SCNC: 3.7 MMOL/L (ref 3.5–5.1)
PROT SERPL-MCNC: 7.3 G/DL (ref 6.4–8.2)
RBC # BLD AUTO: 4.75 M/UL (ref 3.8–5.2)
SAMPLES BEING HELD,HOLD: NORMAL
SODIUM SERPL-SCNC: 140 MMOL/L (ref 136–145)
WBC # BLD AUTO: 7.7 K/UL (ref 3.6–11)

## 2020-12-13 PROCEDURE — 80053 COMPREHEN METABOLIC PANEL: CPT

## 2020-12-13 PROCEDURE — 36415 COLL VENOUS BLD VENIPUNCTURE: CPT

## 2020-12-13 PROCEDURE — 74011250636 HC RX REV CODE- 250/636: Performed by: EMERGENCY MEDICINE

## 2020-12-13 PROCEDURE — 74011000250 HC RX REV CODE- 250: Performed by: EMERGENCY MEDICINE

## 2020-12-13 PROCEDURE — 85025 COMPLETE CBC W/AUTO DIFF WBC: CPT

## 2020-12-13 PROCEDURE — 83735 ASSAY OF MAGNESIUM: CPT

## 2020-12-13 RX ADMIN — SODIUM CHLORIDE 1000 ML: 9 INJECTION, SOLUTION INTRAVENOUS at 01:02

## 2020-12-13 RX ADMIN — DIPHENHYDRAMINE HYDROCHLORIDE 12.5 MG: 50 INJECTION, SOLUTION INTRAMUSCULAR; INTRAVENOUS at 01:01

## 2020-12-13 RX ADMIN — PROCHLORPERAZINE EDISYLATE 10 MG: 5 INJECTION INTRAMUSCULAR; INTRAVENOUS at 01:01

## 2020-12-13 NOTE — ED TRIAGE NOTES
Pt ambulatory to ED accompanied by family with c/o headache and nausea daily for 2 months. \"I've had it since I started Runell Caper for endometrial cancer. My doctor said to come to the ED because I've had N/V and can't keep anything down\".

## 2020-12-13 NOTE — ED NOTES
Pt given written and verbal discharge instructions, 0 Rx; pt verbalized understanding of such. VSS at time of discharge. Belongings in pt possession at time of discharge. No complaints, needs, or questions at this time. Pt to call PCP ASAP for follow-up. Waiting for fluids to finish and then she can go home.

## 2020-12-13 NOTE — ED PROVIDER NOTES
Shama Denise is a 59 yo F with endometiral cancer who is being treated with David Freedman who has chronic headaches and nasuea for 2 months. She states today her headache became much more intense as well as her nausea and vomiting and she has been unable to keep anything down. She denies fever or chills. She called her Oncologist who advised her to come to the ED for eval.            Past Medical History:   Diagnosis Date    Abnormal Pap smear 1995    with f/u normal    Anemia     Arthritis     Asthma     Cancer ([facility] [address]     ENDOMETRIAL    Environmental allergies     GERD (gastroesophageal reflux disease)     Goiter     Other unknown and unspecified cause of morbidity or mortality     POSSIBLE ESOPHAGEAL SPASM, ?  OBSTRUCTION DUE TO GOITER    PMB (postmenopausal bleeding)     S/P chemotherapy, time since greater than 12 weeks     LAST CHEMO 10/2014 PER PATIENT    Thyroid disease     goiter       Past Surgical History:   Procedure Laterality Date    BREAST SURGERY PROCEDURE UNLISTED  1/12/16    right breast bx    HX APPENDECTOMY      HX BUNIONECTOMY      HX GYN  3/13/13    TLH/BSO    HX HEENT  4/22/13    TOOTH REMOVED    HX ORTHOPAEDIC  1980'S    BUNIONECTOMY    HX VASCULAR ACCESS      port         Family History:   Problem Relation Age of Onset    Cancer Maternal Aunt         breast    Heart Disease Mother     Diabetes Father     Cancer Sister         CERVICAL    Kidney Disease Brother     Diabetes Brother     Heart Attack Other     Arrhythmia Brother     Diabetes Brother     Anesth Problems Neg Hx        Social History     Socioeconomic History    Marital status:      Spouse name: Not on file    Number of children: Not on file    Years of education: Not on file    Highest education level: Not on file   Occupational History    Not on file   Social Needs    Financial resource strain: Not on file    Food insecurity     Worry: Not on file     Inability: Not on file   [facility] Transportation needs     Medical: Not on file     Non-medical: Not on file   Tobacco Use    Smoking status: Never Smoker    Smokeless tobacco: Never Used   Substance and Sexual Activity    Alcohol use: Yes     Alcohol/week: 0.0 standard drinks     Comment: RARE OCC    Drug use: No    Sexual activity: Not on file   Lifestyle    Physical activity     Days per week: Not on file     Minutes per session: Not on file    Stress: Not on file   Relationships    Social connections     Talks on phone: Not on file     Gets together: Not on file     Attends [employer] service: Not on file     Active member of club or organization: Not on file     Attends meetings of clubs or organizations: Not on file     Relationship status: Not on file    Intimate partner violence     Fear of current or ex partner: Not on file     Emotionally abused: Not on file     Physically abused: Not on file     Forced sexual activity: Not on file   Other Topics Concern    Not on file   Social History Narrative    Not on file         ALLERGIES: Latex; Acetone; Amoxicillin; Ciprofloxacin; Pcn [penicillins]; Buspar [buspirone]; Azithromycin; Egg; and Other medication    Review of Systems   Constitutional: Negative for fever. HENT: Negative for sore throat. Eyes: Positive for photophobia. Negative for visual disturbance. Respiratory: Negative for cough. Cardiovascular: Negative for chest pain. Gastrointestinal: Positive for nausea and vomiting. Negative for abdominal pain. Genitourinary: Negative for dysuria. Musculoskeletal: Negative for back pain. Skin: Negative for rash. Neurological: Positive for headaches. Vitals:    12/12/20 2031   BP: (!) 170/102   Pulse: 86   Resp: 15   Temp: 98.7 °F (37.1 °C)   SpO2: 98%   Weight: 72.6 kg (160 lb)   Height: 5' 5\" (1.651 m)            Physical Exam  Vitals signs and nursing note reviewed. Constitutional:       General: She is not in acute distress.      Appearance: She is well-developed. HENT:      Head: Normocephalic and atraumatic. Eyes:      Conjunctiva/sclera: Conjunctivae normal.      Pupils: Pupils are equal, round, and reactive to light. Neck:      Musculoskeletal: Normal range of motion and neck supple. Trachea: Phonation normal.   Cardiovascular:      Rate and Rhythm: Normal rate. Pulmonary:      Effort: Pulmonary effort is normal. No respiratory distress. Abdominal:      General: There is no distension. Tenderness: There is no abdominal tenderness. Musculoskeletal: Normal range of motion. General: No tenderness. Skin:     General: Skin is warm and dry. Neurological:      Mental Status: She is alert. She is not disoriented. Motor: No abnormal muscle tone. 2:13 AM  Patient reassessed and states that headache is improved with compazine and benadryl. IVNS infusing. CT head normal as well as CBC and CMP. Will discharge home after IVNS bolus completed.      MDM       Procedures

## 2020-12-15 ENCOUNTER — HOSPITAL ENCOUNTER (OUTPATIENT)
Dept: INFUSION THERAPY | Age: 65
Discharge: HOME | End: 2020-12-15
Payer: COMMERCIAL

## 2020-12-15 VITALS
SYSTOLIC BLOOD PRESSURE: 159 MMHG | RESPIRATION RATE: 18 BRPM | DIASTOLIC BLOOD PRESSURE: 74 MMHG | HEART RATE: 91 BPM | TEMPERATURE: 96.8 F

## 2020-12-15 DIAGNOSIS — Z51.11 ENCOUNTER FOR ANTINEOPLASTIC CHEMOTHERAPY: ICD-10-CM

## 2020-12-15 DIAGNOSIS — C54.9 MALIGNANT NEOPLASM OF CORPUS UTERI, EXCEPT ISTHMUS (HCC): Primary | ICD-10-CM

## 2020-12-15 DIAGNOSIS — I10 HYPERTENSION, UNSPECIFIED TYPE: ICD-10-CM

## 2020-12-15 LAB
ALBUMIN SERPL-MCNC: 3.5 G/DL (ref 3.5–5)
ALBUMIN/GLOB SERPL: 1.2 {RATIO} (ref 1.1–2.2)
ALP SERPL-CCNC: 68 U/L (ref 45–117)
ALT SERPL-CCNC: 18 U/L (ref 12–78)
ANION GAP SERPL CALC-SCNC: 6 MMOL/L (ref 5–15)
APPEARANCE UR: CLEAR
AST SERPL-CCNC: 17 U/L (ref 15–37)
BASOPHILS # BLD: 0 K/UL (ref 0–0.1)
BASOPHILS NFR BLD: 0 % (ref 0–1)
BILIRUB SERPL-MCNC: 0.5 MG/DL (ref 0.2–1)
BILIRUB UR QL: NEGATIVE
BUN SERPL-MCNC: 15 MG/DL (ref 6–20)
BUN/CREAT SERPL: 33 (ref 12–20)
CALCIUM SERPL-MCNC: 8.2 MG/DL (ref 8.5–10.1)
CHLORIDE SERPL-SCNC: 108 MMOL/L (ref 97–108)
CO2 SERPL-SCNC: 26 MMOL/L (ref 21–32)
COLOR UR: NORMAL
CREAT SERPL-MCNC: 0.46 MG/DL (ref 0.55–1.02)
DIFFERENTIAL METHOD BLD: ABNORMAL
EOSINOPHIL # BLD: 0.1 K/UL (ref 0–0.4)
EOSINOPHIL NFR BLD: 1 % (ref 0–7)
ERYTHROCYTE [DISTWIDTH] IN BLOOD BY AUTOMATED COUNT: 13.7 % (ref 11.5–14.5)
GLOBULIN SER CALC-MCNC: 3 G/DL (ref 2–4)
GLUCOSE SERPL-MCNC: 114 MG/DL (ref 65–100)
GLUCOSE UR STRIP.AUTO-MCNC: NEGATIVE MG/DL
HCT VFR BLD AUTO: 40.5 % (ref 35–47)
HGB BLD-MCNC: 13.4 G/DL (ref 11.5–16)
HGB UR QL STRIP: NEGATIVE
IMM GRANULOCYTES # BLD AUTO: 0 K/UL (ref 0–0.04)
IMM GRANULOCYTES NFR BLD AUTO: 1 % (ref 0–0.5)
KETONES UR QL STRIP.AUTO: NEGATIVE MG/DL
LEUKOCYTE ESTERASE UR QL STRIP.AUTO: NEGATIVE
LYMPHOCYTES # BLD: 1.1 K/UL (ref 0.8–3.5)
LYMPHOCYTES NFR BLD: 17 % (ref 12–49)
MCH RBC QN AUTO: 29.8 PG (ref 26–34)
MCHC RBC AUTO-ENTMCNC: 33.1 G/DL (ref 30–36.5)
MCV RBC AUTO: 90 FL (ref 80–99)
MONOCYTES # BLD: 0.6 K/UL (ref 0–1)
MONOCYTES NFR BLD: 9 % (ref 5–13)
NEUTS SEG # BLD: 4.5 K/UL (ref 1.8–8)
NEUTS SEG NFR BLD: 72 % (ref 32–75)
NITRITE UR QL STRIP.AUTO: NEGATIVE
NRBC # BLD: 0 K/UL (ref 0–0.01)
NRBC BLD-RTO: 0 PER 100 WBC
PH UR STRIP: 6 [PH] (ref 5–8)
PLATELET # BLD AUTO: 228 K/UL (ref 150–400)
PMV BLD AUTO: 8.9 FL (ref 8.9–12.9)
POTASSIUM SERPL-SCNC: 3.9 MMOL/L (ref 3.5–5.1)
PROT SERPL-MCNC: 6.5 G/DL (ref 6.4–8.2)
PROT UR STRIP-MCNC: NEGATIVE MG/DL
RBC # BLD AUTO: 4.5 M/UL (ref 3.8–5.2)
SODIUM SERPL-SCNC: 140 MMOL/L (ref 136–145)
SP GR UR REFRACTOMETRY: 1.01 (ref 1–1.03)
T3FREE SERPL-MCNC: 3.4 PG/ML (ref 2.2–4)
T4 FREE SERPL-MCNC: 1.2 NG/DL (ref 0.8–1.5)
TSH SERPL DL<=0.05 MIU/L-ACNC: 0.07 UIU/ML (ref 0.36–3.74)
UROBILINOGEN UR QL STRIP.AUTO: 1 EU/DL (ref 0.2–1)
WBC # BLD AUTO: 6.2 K/UL (ref 3.6–11)

## 2020-12-15 PROCEDURE — 77030012965 HC NDL HUBR BBMI -A

## 2020-12-15 PROCEDURE — 99214 OFFICE O/P EST MOD 30 MIN: CPT | Performed by: PHYSICIAN ASSISTANT

## 2020-12-15 PROCEDURE — 74011250636 HC RX REV CODE- 250/636: Performed by: PHYSICIAN ASSISTANT

## 2020-12-15 PROCEDURE — 84439 ASSAY OF FREE THYROXINE: CPT

## 2020-12-15 PROCEDURE — 74011000258 HC RX REV CODE- 258: Performed by: PHYSICIAN ASSISTANT

## 2020-12-15 PROCEDURE — 85025 COMPLETE CBC W/AUTO DIFF WBC: CPT

## 2020-12-15 PROCEDURE — 84481 FREE ASSAY (FT-3): CPT

## 2020-12-15 PROCEDURE — 84443 ASSAY THYROID STIM HORMONE: CPT

## 2020-12-15 PROCEDURE — 96413 CHEMO IV INFUSION 1 HR: CPT

## 2020-12-15 PROCEDURE — 36415 COLL VENOUS BLD VENIPUNCTURE: CPT

## 2020-12-15 PROCEDURE — 80053 COMPREHEN METABOLIC PANEL: CPT

## 2020-12-15 PROCEDURE — 81003 URINALYSIS AUTO W/O SCOPE: CPT

## 2020-12-15 RX ORDER — SODIUM CHLORIDE 9 MG/ML
10 INJECTION INTRAMUSCULAR; INTRAVENOUS; SUBCUTANEOUS AS NEEDED
Status: ACTIVE | OUTPATIENT
Start: 2020-12-15 | End: 2020-12-15

## 2020-12-15 RX ORDER — SODIUM CHLORIDE 9 MG/ML
25 INJECTION, SOLUTION INTRAVENOUS CONTINUOUS
Status: DISPENSED | OUTPATIENT
Start: 2020-12-15 | End: 2020-12-15

## 2020-12-15 RX ORDER — HEPARIN 100 UNIT/ML
300-500 SYRINGE INTRAVENOUS AS NEEDED
Status: ACTIVE | OUTPATIENT
Start: 2020-12-15 | End: 2020-12-15

## 2020-12-15 RX ORDER — LENVATINIB 4 MG/1
2 CAPSULE ORAL DAILY
Qty: 60 CAP | Refills: 2 | Status: SHIPPED | OUTPATIENT
Start: 2020-12-15 | End: 2021-08-10

## 2020-12-15 RX ORDER — SODIUM CHLORIDE 0.9 % (FLUSH) 0.9 %
10 SYRINGE (ML) INJECTION AS NEEDED
Status: DISPENSED | OUTPATIENT
Start: 2020-12-15 | End: 2020-12-15

## 2020-12-15 RX ADMIN — SODIUM CHLORIDE 200 MG: 9 INJECTION, SOLUTION INTRAVENOUS at 13:38

## 2020-12-15 RX ADMIN — SODIUM CHLORIDE 25 ML/HR: 900 INJECTION, SOLUTION INTRAVENOUS at 13:34

## 2020-12-15 RX ADMIN — HEPARIN 500 UNITS: 100 SYRINGE at 14:13

## 2020-12-15 RX ADMIN — Medication 10 ML: at 14:13

## 2020-12-15 RX ADMIN — SODIUM CHLORIDE 10 ML: 9 INJECTION INTRAMUSCULAR; INTRAVENOUS; SUBCUTANEOUS at 11:00

## 2020-12-15 NOTE — PROGRESS NOTES
[address] [facility] [address], [address] [facility], operated by [facility] [phone]  F [phone]      Patient ID:  Name:  Ary East  MRN:  [id number]  :  1955/65 y.o. Date:  12/15/2020      Elmer Pappas MD: Elian To MD  PCP: Parvin Levy MD     Primary Diagnosis: uterine cancer  Date of Diagnosis: 3/2013      Current Agent: Keytruda/Lenvima  Cycle: 3    HPI:  60 y.o. 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 She is now found with recurrence noted on imaging studies pelvic adenopathy and peritoneal carcinomatosis. She is recommended combination Keytruda/Lenvima. SUBJECTIVE:  Ms. Baljeet Hanks comes in today for cycle 3 of Keytruda chemotherapy. She has continued to have mild, intermittent nausea since starting on the Lenvima. She also continues to have headaches and hypertension. She was started on Lisinopil about a week ago. She states that these symptoms became severe this past Friday. She was experiencing sever nausea and vomiting. She was unable to eat or drink any fluids. She also developed a severe, constant headache and was unable to concentrate. She was instructed to go to the ED. Her blood pressure in the ED was 170/102. She was given compazine, benadryl and a bolus of fluid. Symptoms resolved and she was discharged home. Dr. Alfa Nguyen then instructed her to hold the Evin Fermo over the weekend. Today, she is feeling well. She states that she is feeling \"like myself again\". She has not taken the Evin Fermo in about 3 days. She states that her headache and intermittent nausea have also resolved. She monitors her blood pressure at home and her blood pressure has improved since stopping the [place] Fermo as well. She also complains of constipation and has not had a bowel movement for about 4 days. She has been using Miralax with no results so far.   She states that she is beginning to feel uncomfortable due to the constipation. She has had to disimpact herself in the past.     ROS  Constitutional: no weight loss, fever, night sweats  Respiratory: no cough, shortness of breath, or wheezing  Cardiovascular: no chest pain or dyspnea on exertion  Heme: No abnormal bleeding  Gastrointestinal: no abdominal pain, change in bowel habits, or black or bloody stools  Genito-Urinary: no dysuria, trouble voiding, or hematuria  Musculoskeletal: negative for - gait disturbance or joint swelling  Neurological: negative for - behavioral changes, dizziness, headaches, memory loss or numbness/tingling  Derm: negative  Psych: negative for anxiety and depression       OBJECTIVE:  Physical Exam  Visit Vitals  /87   Pulse 100   Temp 96.8 °F (36 °C)   Resp 18        General:  alert, cooperative, no distress       HEENT: without pallor, sclera without jaundice, oral mucosa without lesions,      Cardiac:  Regular rate and rhythm        Lungs:  clear to auscultation bilaterally          Port:  clean, dry, no drainage  Abdomen:  soft, nondistended, nontender       Lymph:  no lymphadenopathy   Extremity: no edema, redness or tenderness in the calves or thighs. splinter hemorrhages noted on her hands. Recent Labs     12/15/20  1105 12/13/20  0106   WBC 6.2 7.7   ANEU 4.5 6.2   HGB 13.4 14.1   HCT 40.5 41.6   MCV 90.0 87.6   MCH 29.8 29.7    176     Recent Labs     12/15/20  1105 12/13/20  0106    140   K 3.9 3.7    107   * 115*   BUN 15 12   CREA 0.46* 0.45*   CA 8.2* 8.9   MG  --  2.0   ALB 3.5 3.6   TBILI 0.5 0.7   ALT 18 22     UA no protein  TSH 0.67    Tumor markers  Cancer Ag (CA) 125   Date Value Ref Range Status   10/23/2020 98.9 (H) 0.0 - 38.1 U/mL Final     Comment:     [employer] Electrochemiluminescence Immunoassay (ECLIA)  Values obtained with different assay methods or kits cannot be  used interchangeably.   Results cannot be interpreted as absolute  evidence of the presence or absence of malignant disease. 07/24/2020 14.3 0.0 - 38.1 U/mL Final     Comment:     [employer] Electrochemiluminescence Immunoassay (ECLIA)  Values obtained with different assay methods or kits cannot be  used interchangeably. Results cannot be interpreted as absolute  evidence of the presence or absence of malignant disease. 07/22/2019 7.4 0.0 - 38.1 U/mL Final     Comment:     [employer] Electrochemiluminescence Immunoassay (ECLIA)  Values obtained with different assay methods or kits cannot be  used interchangeably. Results cannot be interpreted as absolute  evidence of the presence or absence of malignant disease. 12/20/2018 7.8 0.0 - 38.1 U/mL Final     Comment:     Roche ECLIA methodology   06/18/2018 6.9 0.0 - 38.1 U/mL Final     Comment:     Roche ECLIA methodology   12/11/2017 6.8 0.0 - 38.1 U/mL Final     Comment:     Roche ECLIA methodology   06/26/2017 7.4 0.0 - 38.1 U/mL Final     Comment:     Roche ECLIA methodology       Patient Active Problem List   Diagnosis Code    Malignant neoplasm of corpus uteri, except isthmus ([facility] [address] C54.9     Past Medical History:   Diagnosis Date    Abnormal Pap smear 1995    with f/u normal    Anemia     Arthritis     Asthma     Cancer ([facility] [address]     ENDOMETRIAL    Environmental allergies     GERD (gastroesophageal reflux disease)     Goiter     Other unknown and unspecified cause of morbidity or mortality     POSSIBLE ESOPHAGEAL SPASM, ? OBSTRUCTION DUE TO GOITER    PMB (postmenopausal bleeding)     S/P chemotherapy, time since greater than 12 weeks     LAST CHEMO 10/2014 PER PATIENT    Thyroid disease     goiter     Prior to Admission medications    Medication Sig Start Date End Date Taking?  Authorizing Provider   lenvatinib (Lenvima) 14 mg/day(10 mg x 1-4 mg x 1) cap Take 1 (one) 10 mg cap and 1 (one) 4 mg cap daily 12/9/20   Judge Wally MD   lisinopriL (PRINIVIL, ZESTRIL) 5 mg tablet Take 1 Tab by mouth daily. 12/8/20   Mayur Montoya MD   ondansetron (ZOFRAN ODT) 4 mg disintegrating tablet Take 1 Tab by mouth every eight (8) hours as needed for Nausea. Indications: nausea and vomiting caused by cancer drugs 10/22/20   Mayur Montoya MD   lidocaine-prilocaine (EMLA) topical cream Apply small amount over port area and cover with band aid one hour before chemo 10/22/20   Mayur Montoya MD   guaifenesin (MUCINEX PO) Take  by mouth. Provider, Historical   loratadine (Claritin) 10 mg tablet Take 10 mg by mouth. Provider, Historical   MARICHUY'S WORT PO Take  by mouth. Provider, Historical   epinastine 0.05 % drop Apply  to eye. Provider, Historical   raNITIdine (ZANTAC) 150 mg tablet ZANTAC TABS 9/29/11   Provider, Historical   cyclobenzaprine (FLEXERIL) 5 mg tablet take 1 tablet by mouth three times a day if needed 12/5/16   Provider, Historical   valACYclovir (VALTREX) 1 gram tablet Take 1 Tab by mouth three (3) times daily. 12/15/16   Mayur Montoya MD   EPINEPHrine (EPIPEN) 0.3 mg/0.3 mL injection 0.3 mg by IntraMUSCular route once as needed. Provider, Historical   ketotifen (ZADITOR) 0.025 % (0.035 %) ophthalmic solution Administer 1 Drop to both eyes every morning. Provider, Historical   Cetirizine (ZYRTEC) 10 mg cap Take 10 mg by mouth nightly. Provider, Historical   SAL ACID/UREA/PETROLATUM,WHITE (KERASAL FOOT EX) by Apply Externally route daily as needed. Provider, Historical   ACCU-CHEK HELDER PLUS TEST STRP strip  7/3/15   Provider, Historical   NITROSTAT 0.4 mg SL tablet  9/22/14   Provider, Historical   CALCIUM CARBONATE (MARCELLO-SELTZER ANTACID PO) Take  by mouth daily as needed. Provider, Historical   ibuprofen (MOTRIN) 200 mg tablet Take 200 mg by mouth every six (6) hours as needed.     Provider, Historical     Allergies   Allergen Reactions    Latex Other (comments)     Burns skin    Acetone Shortness of Breath    Amoxicillin Anaphylaxis    Ciprofloxacin Hives    Pcn [Penicillins] Anaphylaxis    Buspar [Buspirone] Unknown (comments)     Has N&V and makes her feel \"weird\"    Azithromycin Hives    Egg Nausea Only    Other Medication Rash     POPPY seeds     Family History   Problem Relation Age of Onset    Cancer Maternal Aunt         breast    Heart Disease Mother     Diabetes Father     Cancer Sister         CERVICAL    Kidney Disease Brother     Diabetes Brother     Heart Attack Other     Arrhythmia Brother     Diabetes Brother     Anesth Problems Neg Hx          CT Results (most recent):  Results from Hospital Encounter encounter on 20   CT HEAD WO CONT    Narrative EXAM: CT HEAD WO CONT    INDICATION: Headache and nausea. Endometrial carcinoma with peritoneal  carcinomatosis. COMPARISON: None    TECHNIQUE: Noncontrast head CT. Coronal and sagittal reformats. CT dose  reduction was achieved through the use of a standardized protocol tailored for  this examination and automatic exposure control for dose modulation. FINDINGS: The ventricles and sulci are age-appropriate without hydrocephalus. There is no mass effect or midline shift. There is no intracranial hemorrhage or  extra-axial fluid collection. There is no abnormal area of decreased density to  suggest infarct. The calvarium is intact. The visualized paranasal sinuses and mastoid air cells  are clear. Impression IMPRESSION:     No acute intracranial abnormality on this noncontrast head CT. IMPRESSION/PLAN:  60 y.o. 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 ECO    Chemotherapy: proceed with cycle 3 keytruda immunotherapy today. We discussed that she may have to stop the Lainey Haiku if she is unable to tolerate it. She states that she felt so badly over the weekend that she did consider stopping the therapy.   She is feeling much better today and states that she does not develop side effects from the [place]. She has been taking Lenvima 10mg. Since she is unable to tolerate that dose, we can decrease her dosage again. She will start 8mg daily. If she is unable to tolerate 8mg, we can further reduce the dose to 4mg or possible none at all. CINV:  She states that she has only had nausea intermittently at home and that it is usually not severe enough to take anti-emetics. She does have Zofran at home if needed. She has been experiencing anorexia. Have encouraged her to at least drink protein nutritional supplements since she has been tolerating those. Have encouraged her to drink as well. Hx of thyroid goiter. Hx benign bx. TSH today 0.07. Have ordered T3/T4. She may need a referral to Endocrinology. Chronic med issues: Hx asthma - inhaler PRN    Hx esophageal spasm - uses nitroglycerin    Hypertension? Uncertain if she has been diagnosed before. Monitor blood pressure. She has a BP cuff at home. Suspect her hypertension is at least in part due to the [facility]. She has been taking Lisinopril 5mg daily. Her blood pressures for the past several days, since she stopped the [facility], have been well controlled. Continue current dose. If her pressure begins to increase again when she restarts the [facility], we may need to consider a dose adjustment. Constipation:  She reports no bowel movements x 4 days. She has been using Miralax. Have encouraged her to maintain her oral fluid intake. We discussed starting a stool softener daily. She may need Magnesium Citrate or possibly an enema over the counter to have a BM since she is beginning to become uncomfortable. Consider Pericolace is other stool softener ineffective. Psychosocial: In good spirits and feels well supported by her friends and work family. She lives alone, no close family for support buy many friends and very well supported by her work family. Has been frustrated recently with her chemotherapy side effects.   She would like to be aggressive as she can be with treating her disease, however, she has been feeling poorly over the past several weeks. She feels that her quality of life is not where she would like it to be. May stop Lenvima if she is unable to tolerate. Questions answered. Advised to call with questions/concerns.         Aj Lanza PA-C  Gyn Onc

## 2020-12-16 DIAGNOSIS — E05.90 HYPERTHYROIDISM, SUBCLINICAL: ICD-10-CM

## 2020-12-16 DIAGNOSIS — C54.9 MALIGNANT NEOPLASM OF CORPUS UTERI, EXCEPT ISTHMUS (HCC): Primary | ICD-10-CM

## 2020-12-16 NOTE — PROGRESS NOTES
[facility] Chemo Progress Note      1045 Ms. Moraima Ortega Arrived to [facility] for  [address] ambulatory in stable condition. Assessment was completed, no acute issues at this time, no new complaints voiced. Pt recently seen in ED for headache; she also has some questions/concerns about new BP med. Delbert Patrick to see pt in [facility]. Port accessed with positive blood return. Labs drawn and sent for processing. Patient denies SOB, fever, cough, and/or general not feeling well. Patient has not had any recent exposure to someone who has tested positive for COVID-19. Patient denies contact with anyone who has a pending COVID test.       Chemotherapy Flowsheet 12/15/2020   Cycle C3   Date 12/15/2020   Drug / Regimen Keytruda   Notes given       Ms. Dolores Alves vitals were reviewed. Patient Vitals for the past 12 hrs:   Temp Pulse Resp BP   12/15/20 1426  91  (!) 159/74   12/15/20 1048 96.8 °F (36 °C) 100 18 129/87       Lab results were obtained and reviewed. Recent Results (from the past 12 hour(s))   CBC WITH AUTOMATED DIFF    Collection Time: 12/15/20 11:05 AM   Result Value Ref Range    WBC 6.2 3.6 - 11.0 K/uL    RBC 4.50 3.80 - 5.20 M/uL    HGB 13.4 11.5 - 16.0 g/dL    HCT 40.5 35.0 - 47.0 %    MCV 90.0 80.0 - 99.0 FL    MCH 29.8 26.0 - 34.0 PG    MCHC 33.1 30.0 - 36.5 g/dL    RDW 13.7 11.5 - 14.5 %    PLATELET 843 232 - 037 K/uL    MPV 8.9 8.9 - 12.9 FL    NRBC 0.0 0  WBC    ABSOLUTE NRBC 0.00 0.00 - 0.01 K/uL    NEUTROPHILS 72 32 - 75 %    LYMPHOCYTES 17 12 - 49 %    MONOCYTES 9 5 - 13 %    EOSINOPHILS 1 0 - 7 %    BASOPHILS 0 0 - 1 %    IMMATURE GRANULOCYTES 1 (H) 0.0 - 0.5 %    ABS. NEUTROPHILS 4.5 1.8 - 8.0 K/UL    ABS. LYMPHOCYTES 1.1 0.8 - 3.5 K/UL    ABS. MONOCYTES 0.6 0.0 - 1.0 K/UL    ABS. EOSINOPHILS 0.1 0.0 - 0.4 K/UL    ABS. BASOPHILS 0.0 0.0 - 0.1 K/UL    ABS. IMM.  GRANS. 0.0 0.00 - 0.04 K/UL    DF AUTOMATED     METABOLIC PANEL, COMPREHENSIVE    Collection Time: 12/15/20 11:05 AM   Result Value Ref Range    Sodium 140 136 - 145 mmol/L    Potassium 3.9 3.5 - 5.1 mmol/L    Chloride 108 97 - 108 mmol/L    CO2 26 21 - 32 mmol/L    Anion gap 6 5 - 15 mmol/L    Glucose 114 (H) 65 - 100 mg/dL    BUN 15 6 - 20 MG/DL    Creatinine 0.46 (L) 0.55 - 1.02 MG/DL    BUN/Creatinine ratio 33 (H) 12 - 20      GFR est AA >60 >60 ml/min/1.73m2    GFR est non-AA >60 >60 ml/min/1.73m2    Calcium 8.2 (L) 8.5 - 10.1 MG/DL    Bilirubin, total 0.5 0.2 - 1.0 MG/DL    ALT (SGPT) 18 12 - 78 U/L    AST (SGOT) 17 15 - 37 U/L    Alk. phosphatase 68 45 - 117 U/L    Protein, total 6.5 6.4 - 8.2 g/dL    Albumin 3.5 3.5 - 5.0 g/dL    Globulin 3.0 2.0 - 4.0 g/dL    A-G Ratio 1.2 1.1 - 2.2     TSH 3RD GENERATION    Collection Time: 12/15/20 11:05 AM   Result Value Ref Range    TSH 0.07 (L) 0.36 - 3.74 uIU/mL   URINALYSIS W/ RFLX MICROSCOPIC    Collection Time: 12/15/20 11:05 AM   Result Value Ref Range    Color YELLOW/STRAW      Appearance CLEAR CLEAR      Specific gravity 1.009 1.003 - 1.030      pH (UA) 6.0 5.0 - 8.0      Protein Negative NEG mg/dL    Glucose Negative NEG mg/dL    Ketone Negative NEG mg/dL    Bilirubin Negative NEG      Blood Negative NEG      Urobilinogen 1.0 0.2 - 1.0 EU/dL    Nitrites Negative NEG      Leukocyte Esterase Negative NEG     T4, FREE    Collection Time: 12/15/20  1:22 PM   Result Value Ref Range    T4, Free 1.2 0.8 - 1.5 NG/DL   T3, FREE    Collection Time: 12/15/20  1:22 PM   Result Value Ref Range    Free Triiodothyronine (T3) 3.4 2.2 - 4.0 pg/mL       Pre-medications  were administered as ordered and chemotherapy was initiated.   Medications Administered     0.9% sodium chloride infusion     Admin Date  12/15/2020 Action  New Bag Dose  25 mL/hr Rate  25 mL/hr Route  IntraVENous Administered By  Lázaro Figueroa RN          0.9% sodium chloride injection 10 mL     Admin Date  12/15/2020 Action  Given Dose  10 mL Route  IntraVENous Administered By  Lázaro Figueroa RN          heparin (porcine) pf 300-500 Units Admin Date  12/15/2020 Action  Given Dose  500 Units Route  InterCATHeter Administered By  Lázaro Figueroa RN          pembrolizumab [facility]) 200 mg in 0.9% sodium chloride 100 mL, overfill volume 10 mL IVPB     Admin Date  12/15/2020 Action  New Bag Dose  200 mg Rate  236 mL/hr Route  IntraVENous Administered By  Lázaro Figueroa RN          sodium chloride (NS) flush 10 mL     Admin Date  12/15/2020 Action  Given Dose  10 mL Route  IntraVENous Administered By  Lázaro Figueroa RN                     [id number] Patient tolerated treatment well. Port maintained positive blood return throughout treatment. Port flushed, heparinized and de accessed per protocol. Patient was discharged from [facility] in no distress.  Patient is aware of next appointment on     Future Appointments   Date Time Provider David Jara   12/30/2020 11:00 AM [facility] CT ER 3 [facility]   12/31/2020 10:00 AM MD GREER Espinoza BS AMB   1/5/2021 11:00 AM A1 [facility]  [address]   1/19/2021  3:45 PM MD GREER Espinoza BS AMB   1/26/2021 11:00 AM A1 [facility] MED [address]   2/9/2021  3:30 PM MD GREER Espinoza  AMB   2/16/2021 11:00 AM A1 [facility]  [address] [facility]   3/2/2021  3:30 PM MD GREER Espinoza  AMB   3/9/2021 11:00 AM A1 [facility] MED [facility]   3/30/2021 10:30 AM MD GREER Espinoza BS AMB   3/30/2021 11:00 AM A1 [facility]IA  [address] [facility]   4/20/2021 11:00 AM A1 [facility] MED [address]   5/11/2021 11:00 AM A1 [facility] [address]   6/1/2021 11:00 AM A1 [facility] MED [address] RN  December 15, 2020

## 2020-12-30 ENCOUNTER — HOSPITAL ENCOUNTER (OUTPATIENT)
Dept: CT IMAGING | Age: 65
Discharge: HOME | End: 2020-12-30
Attending: OBSTETRICS & GYNECOLOGY
Payer: COMMERCIAL

## 2020-12-30 DIAGNOSIS — C54.1 PRIMARY SEROUS ADENOCARCINOMA OF ENDOMETRIUM (HCC): ICD-10-CM

## 2020-12-30 PROCEDURE — 74177 CT ABD & PELVIS W/CONTRAST: CPT

## 2020-12-30 PROCEDURE — 74011000636 HC RX REV CODE- 636: Performed by: OBSTETRICS & GYNECOLOGY

## 2020-12-30 PROCEDURE — 71260 CT THORAX DX C+: CPT

## 2020-12-30 RX ORDER — ONDANSETRON 2 MG/ML
8 INJECTION INTRAMUSCULAR; INTRAVENOUS AS NEEDED
Status: CANCELLED | OUTPATIENT
Start: 2021-01-05

## 2020-12-30 RX ORDER — EPINEPHRINE 1 MG/ML
0.3 INJECTION, SOLUTION, CONCENTRATE INTRAVENOUS AS NEEDED
Status: CANCELLED | OUTPATIENT
Start: 2021-01-05

## 2020-12-30 RX ORDER — HEPARIN 100 UNIT/ML
300-500 SYRINGE INTRAVENOUS AS NEEDED
Status: CANCELLED
Start: 2021-01-05

## 2020-12-30 RX ORDER — DIPHENHYDRAMINE HYDROCHLORIDE 50 MG/ML
50 INJECTION, SOLUTION INTRAMUSCULAR; INTRAVENOUS AS NEEDED
Status: CANCELLED
Start: 2021-01-05

## 2020-12-30 RX ORDER — ALBUTEROL SULFATE 0.83 MG/ML
2.5 SOLUTION RESPIRATORY (INHALATION) AS NEEDED
Status: CANCELLED
Start: 2021-01-05

## 2020-12-30 RX ORDER — DIPHENHYDRAMINE HYDROCHLORIDE 50 MG/ML
25 INJECTION, SOLUTION INTRAMUSCULAR; INTRAVENOUS AS NEEDED
Status: CANCELLED
Start: 2021-01-05

## 2020-12-30 RX ORDER — ACETAMINOPHEN 325 MG/1
650 TABLET ORAL AS NEEDED
Status: CANCELLED
Start: 2021-01-05

## 2020-12-30 RX ORDER — SODIUM CHLORIDE 9 MG/ML
25 INJECTION, SOLUTION INTRAVENOUS CONTINUOUS
Status: CANCELLED | OUTPATIENT
Start: 2021-01-05

## 2020-12-30 RX ORDER — SODIUM CHLORIDE 0.9 % (FLUSH) 0.9 %
10 SYRINGE (ML) INJECTION AS NEEDED
Status: CANCELLED | OUTPATIENT
Start: 2021-01-05

## 2020-12-30 RX ORDER — SODIUM CHLORIDE 9 MG/ML
10 INJECTION INTRAMUSCULAR; INTRAVENOUS; SUBCUTANEOUS AS NEEDED
Status: CANCELLED | OUTPATIENT
Start: 2021-01-05

## 2020-12-30 RX ORDER — HYDROCORTISONE SODIUM SUCCINATE 100 MG/2ML
100 INJECTION, POWDER, FOR SOLUTION INTRAMUSCULAR; INTRAVENOUS AS NEEDED
Status: CANCELLED | OUTPATIENT
Start: 2021-01-05

## 2020-12-30 RX ADMIN — IOPAMIDOL 100 ML: 755 INJECTION, SOLUTION INTRAVENOUS at 11:12

## 2020-12-31 ENCOUNTER — VIRTUAL VISIT (OUTPATIENT)
Dept: GYNECOLOGY | Age: 65
End: 2020-12-31
Payer: COMMERCIAL

## 2020-12-31 DIAGNOSIS — C54.9 MALIGNANT NEOPLASM OF CORPUS UTERI, EXCEPT ISTHMUS (HCC): ICD-10-CM

## 2020-12-31 DIAGNOSIS — C54.1 PRIMARY SEROUS ADENOCARCINOMA OF ENDOMETRIUM (HCC): Primary | ICD-10-CM

## 2020-12-31 PROCEDURE — 99442 PR PHYS/QHP TELEPHONE EVALUATION 11-20 MIN: CPT | Performed by: OBSTETRICS & GYNECOLOGY

## 2020-12-31 NOTE — PROGRESS NOTES
[facility]COL[facility]  [address] [facility], [address], [address] [facility]  [phone] [facility] [phone]    Office Visit    Patient ID:  Name: Hollie Watkins  MRN: [id number]  : 1955/65 y.o. Visit date: 2020    INTERVAL HISTORY:  Hollie Watkins is a  female with stage IA, grade 3 uterine carcinoma. She underwent laparoscopic hysterectomy with staging in 2013. She was recommended six cycles of adjuvant Taxol and Carboplatin, which she completed. We elected not to treat with pelvic radiation therapy due to her difficulty with chemotherapy. She presents was last seen in July for routine surveillance. She was without complaints at that time and her exam was negative. She recently had some abdominal discomfort and noted to have some blood in her urine. She was referred to [facility] for evaluation. She had a CT of the abdomen/pelvis with them. It revealed retroperitoneal lymphadenopathy, peritoneal carcinomatosis, and trace ascites. She also reported a negative colonoscopy within the last month. She is scheduled for a MMG in a few weeks. I sent her for a PET/CT to better evaluate the extent of disease. She presented to review the results and discuss treatment. Her disease is not amenable to surgical resection. Systemic therapy is her only viable option. I thought immunotherapy would be the best choice, ahead of additional chemotherapy. This would consist of IV Keytruda and PO Lenvima. If that is not successful, we will consider retreatment with Taxol/Carboplatin or single agent Doxil. She has completed 3 cycles so far. She has continued to complain of intermittent headaches and elevations in her BP, despite lowering the dose of the Lenvima. She presents today to review her interval CT results.         PMH:  Past Medical History:   Diagnosis Date    Abnormal Pap smear     with f/u normal    Anemia     Arthritis     Asthma     Cancer ([facility] [address]     ENDOMETRIAL    Environmental allergies     GERD (gastroesophageal reflux disease)     Goiter     Other unknown and unspecified cause of morbidity or mortality     POSSIBLE ESOPHAGEAL SPASM, ? OBSTRUCTION DUE TO GOITER    PMB (postmenopausal bleeding)     S/P chemotherapy, time since greater than 12 weeks     LAST CHEMO 10/2014 PER PATIENT    Thyroid disease     goiter     PSH:  Past Surgical History:   Procedure Laterality Date    BREAST SURGERY PROCEDURE UNLISTED  1/12/16    right breast bx    HX APPENDECTOMY      HX BUNIONECTOMY      HX GYN  3/13/13    TLH/BSO    HX HEENT  4/22/13    TOOTH REMOVED    HX ORTHOPAEDIC  1980'S    BUNIONECTOMY    HX VASCULAR ACCESS      port     SOC:  Social History     Socioeconomic History    Marital status:      Spouse name: Not on file    Number of children: Not on file    Years of education: Not on file    Highest education level: Not on file   Occupational History    Not on file   Social Needs    Financial resource strain: Not on file    Food insecurity     Worry: Not on file     Inability: Not on file    Transportation needs     Medical: Not on file     Non-medical: Not on file   Tobacco Use    Smoking status: Never Smoker    Smokeless tobacco: Never Used   Substance and Sexual Activity    Alcohol use:  Yes     Alcohol/week: 0.0 standard drinks     Comment: RARE OCC    Drug use: No    Sexual activity: Not on file   Lifestyle    Physical activity     Days per week: Not on file     Minutes per session: Not on file    Stress: Not on file   Relationships    Social connections     Talks on phone: Not on file     Gets together: Not on file     Attends Gnosticism service: Not on file     Active member of club or organization: Not on file     Attends meetings of clubs or organizations: Not on file     Relationship status: Not on file    Intimate partner violence     Fear of current or ex partner: Not on file     Emotionally abused: Not on file     Physically abused: Not on file     Forced sexual activity: Not on file   Other Topics Concern    Not on file   Social History Narrative    Not on file     Family History  Family History   Problem Relation Age of Onset    Cancer Maternal Aunt         breast    Heart Disease Mother     Diabetes Father     Cancer Sister         CERVICAL    Kidney Disease Brother     Diabetes Brother     Heart Attack Other     Arrhythmia Brother     Diabetes Brother     Anesth Problems Neg Hx      Medications:  Current Outpatient Medications on File Prior to Visit   Medication Sig Dispense Refill    lenvatinib (Lenvima) 8 mg/day (4 mg x 2) cap Take 2 Caps by mouth daily. 60 Cap 2    lisinopriL (PRINIVIL, ZESTRIL) 5 mg tablet Take 1 Tab by mouth daily. 30 Tab 5    ondansetron (ZOFRAN ODT) 4 mg disintegrating tablet Take 1 Tab by mouth every eight (8) hours as needed for Nausea. Indications: nausea and vomiting caused by cancer drugs 30 Tab 3    lidocaine-prilocaine (EMLA) topical cream Apply small amount over port area and cover with band aid one hour before chemo 30 g 3    guaifenesin (MUCINEX PO) Take  by mouth.  loratadine (Claritin) 10 mg tablet Take 10 mg by mouth.  MARICHUY'S WORT PO Take  by mouth.  epinastine 0.05 % drop Apply  to eye.  cyclobenzaprine (FLEXERIL) 5 mg tablet take 1 tablet by mouth three times a day if needed  0    valACYclovir (VALTREX) 1 gram tablet Take 1 Tab by mouth three (3) times daily. 21 Tab 3    EPINEPHrine (EPIPEN) 0.3 mg/0.3 mL injection 0.3 mg by IntraMUSCular route once as needed.  ketotifen (ZADITOR) 0.025 % (0.035 %) ophthalmic solution Administer 1 Drop to both eyes every morning.  Cetirizine (ZYRTEC) 10 mg cap Take 10 mg by mouth nightly.  SAL ACID/UREA/PETROLATUM,WHITE (KERASAL FOOT EX) by Apply Externally route daily as needed.       ACCU-CHEK HELDER PLUS TEST STRP strip   0    NITROSTAT 0.4 mg SL tablet       CALCIUM CARBONATE (MARCELLO-SELTZER ANTACID PO) Take  by mouth daily as needed.  ibuprofen (MOTRIN) 200 mg tablet Take 200 mg by mouth every six (6) hours as needed.  raNITIdine (ZANTAC) 150 mg tablet ZANTAC TABS       Current Facility-Administered Medications on File Prior to Visit   Medication Dose Route Frequency Provider Last Rate Last Admin    [COMPLETED] iopamidoL (ISOVUE-370) 76 % injection 100 mL  100 mL IntraVENous RAD Fitz Millan MD   100 mL at 12/30/20 1112     Allergies: Allergies   Allergen Reactions    Latex Other (comments)     Burns skin    Acetone Shortness of Breath    Amoxicillin Anaphylaxis    Ciprofloxacin Hives    Pcn [Penicillins] Anaphylaxis    Buspar [Buspirone] Unknown (comments)     Has N&V and makes her feel \"weird\"    Azithromycin Hives    Egg Nausea Only    Other Medication Rash     POPPY seeds       ROS:  Negative     OBJECTIVE:    PHYSICAL EXAM  VITAL SIGNS: There were no vitals taken for this visit. GENERAL KAIA:    HEENT:    RESPIRATORY:    CARDIOVASC:    GASTROINT:    MUSCULOSKEL:    EXTREMITIES:    PELVIC:    RECTAL:    PRIYANKA SURVEY:    NEURO:        CT of abdomen/pelvis (12/20/16)  The visualized portions of the lung bases are clear.     There are no focal abnormalities within the liver, spleen, pancreas, adrenal  glands or kidneys. No gallstones are noted.     The aorta tapers without aneurysm. There is no retroperitoneal adenopathy or  mass. There is no ascites or free intraperitoneal air.     There is no small or large bowel distention. The appendix is surgically absent.      The uterus and ovaries are surgically absent. There is no pelvic mass or  adenopathy.     Mild degenerative changes are noted in the lumbar spine.       IMPRESSION: Status post hysterectomy, bilateral oophorectomy, and appendectomy. No acute abnormality. CT of abdomen/pelvis (8/13/20)  LOWER CHEST: The visualized portions of the lung bases are clear. ABDOMEN:  Liver:  The liver is normal in size and contour with no focal abnormality. The  attenuation of the liver is decreased throughout. Gallbladder and bile ducts: There is no calcified gallstone or biliary  dilatation. Spleen: No abnormality. Pancreas: No abnormality. Adrenal glands: No abnormality. Kidneys: No abnormality. PELVIS:  Reproductive organs: The uterus and ovaries are absent. Bladder: No abnormality. BOWEL AND MESENTERY: The small bowel is normal.  There is no mesenteric mass or  adenopathy. The appendix is absent. PERITONEUM: There is no ascites or free intraperitoneal air. RETROPERITONEUM: The aorta  tapers without aneurysm. There is no retroperitoneal  adenopathy or mass. There is no pelvic mass or adenopathy. BONES AND SOFT TISSUES: The bones and soft tissues of the abdominal wall are  within normal limits.     IMPRESSION:   1. Status post hysterectomy and bilateral oophorectomy. 2. No evidence of recurrent or metastatic disease within the abdomen or pelvis. 3. Hepatic steatosis. 4. Status post appendectomy.       PET/CT (10/9/20)  HEAD/NECK: No apparent foci of abnormal hypermetabolism. Cerebral evaluation is  limited by normal intense activity.     CHEST: No foci of abnormal hypermetabolism.     ABDOMEN/PELVIS:   Peritoneal nodules are hypermetabolic, SUV 12. Retroperitoneal adenopathy is hypermetabolic, SUV 6. Right deep pelvic adenopathy is hypermetabolic, SUV 8. Left pelvic cul-de-sac mass is hypermetabolic, SUV 11.     SKELETON: No foci of abnormal hypermetabolism in the axial and visualized  appendicular skeleton.     IMPRESSION:   1. Peritoneal carcinomatosis. 2. 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 3. Left pelvic cul-de-sac tumor. CT of chest/abdomen/pelvis (12/30/20)  CT chest:    There is stable multinodular thyroid enlargement. Left chest Port-A-Cath  terminates in the SVC. The aorta and main pulmonary artery are normal in  caliber.  The heart size is normal.  There is no pericardial or pleural effusion.        There are no enlarged axillary, mediastinal, or hilar lymph nodes.      There is no lung mass or airspace opacity. There is no pneumothorax. The  central airways are clear.     CT abdomen and pelvis: The liver, spleen, pancreas, and adrenal glands are normal. The gall bladder is  present  without intra- or extra-hepatic biliary dilatation.       The kidneys are symmetric without hydronephrosis.      There are no dilated bowel loops. The appendix is surgically absent.       Enlarged retroperitoneal lymph nodes are again demonstrated with a  representative left para-aortic lymph node measuring 1.3 x 1.8 cm (series 3,  image 76), previously 1.5 x 1.7 cm on 10/9/2020. A left common iliac lymph node  measures 1.1 x 1.5 cm (series 3, image 91), previously 0.8 x 1.4 cm.       Anterior peritoneal/mesenteric soft tissue nodularity is overall mildly  increased since 10/9/2020 with a representative measurement of 2.3 x 6.8 cm  (series 3, image 77), previously 2.6 x 5.5 cm.     Peritoneal soft tissue nodularity in the deep left pelvis measures 1.4 x 2.2 cm  (series 3, image 116), previously 2.9 x 3.1 cm.     There is no free fluid or free air. The aorta tapers without aneurysm.     The urinary bladder is normal. The uterus and ovaries are surgically absent.     There is no aggressive bony lesion.     IMPRESSION:   1. Mixed response in the abdomen and pelvis compared to 10/9/2020 with mildly  increased anterior peritoneal carcinomatosis. Stable to slightly increased  retroperitoneal lymphadenopathy. Decreased peritoneal soft tissue nodularity in  the deep left pelvis.     2. No evidence for metastatic disease in the chest.      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 She completed six cycles of adjuvant Taxol and Carboplatin chemotherapy.   She now has recurrent disease and is being treated with the regimen of IV Keytruda and PO Lenvima. She has completed 3 cycles so far. Her newest CT scan demonstrates a mixed response, though her CA-125 has significantly improved. I recommend that we continue with the current regimen and repeat imaging after another 2-3 cycles. Luisito Walls is a 60 y.o. female, evaluated via audio-only technology on 12/31/2020 for No chief complaint on file. Assessment & Plan:   Diagnoses and all orders for this visit:    1. Primary serous adenocarcinoma of endometrium ([facility] [address]    2. Malignant neoplasm of corpus uteri, except isthmus (HCC)        12  Subjective:       Prior to Admission medications    Medication Sig Start Date End Date Taking? Authorizing Provider   lenvatinib (Lenvima) 8 mg/day (4 mg x 2) cap Take 2 Caps by mouth daily. 12/15/20  Yes Florian Knowles MD   lisinopriL (PRINIVIL, ZESTRIL) 5 mg tablet Take 1 Tab by mouth daily. 12/8/20  Yes Florian Knowles MD   ondansetron (ZOFRAN ODT) 4 mg disintegrating tablet Take 1 Tab by mouth every eight (8) hours as needed for Nausea. Indications: nausea and vomiting caused by cancer drugs 10/22/20  Yes Florian Knowles MD   lidocaine-prilocaine (EMLA) topical cream Apply small amount over port area and cover with band aid one hour before chemo 10/22/20  Yes Florian Knowles MD   guaifenesin (MUCINEX PO) Take  by mouth. Yes Provider, Historical   loratadine (Claritin) 10 mg tablet Take 10 mg by mouth. Yes Provider, Historical   MARICHUY'S WORT PO Take  by mouth. Yes Provider, Historical   epinastine 0.05 % drop Apply  to eye. Yes Provider, Historical   cyclobenzaprine (FLEXERIL) 5 mg tablet take 1 tablet by mouth three times a day if needed 12/5/16  Yes Provider, Historical   valACYclovir (VALTREX) 1 gram tablet Take 1 Tab by mouth three (3) times daily. 12/15/16  Yes Florian Knowles MD   EPINEPHrine (EPIPEN) 0.3 mg/0.3 mL injection 0.3 mg by IntraMUSCular route once as needed.    Yes Provider, Historical   ketotifen (ZADITOR) 0.025 % (0.035 %) ophthalmic solution Administer 1 Drop to both eyes every morning. Yes Provider, Historical   Cetirizine (ZYRTEC) 10 mg cap Take 10 mg by mouth nightly. Yes Provider, Historical   SAL ACID/UREA/PETROLATUM,WHITE (KERASAL FOOT EX) by Apply Externally route daily as needed. Yes Provider, Historical   ACCU-CHEK HELDER PLUS TEST STRP strip  7/3/15  Yes Provider, Historical   NITROSTAT 0.4 mg SL tablet  9/22/14  Yes Provider, Historical   CALCIUM CARBONATE (MARCELLO-SELTZER ANTACID PO) Take  by mouth daily as needed. Yes Provider, Historical   ibuprofen (MOTRIN) 200 mg tablet Take 200 mg by mouth every six (6) hours as needed. Yes Provider, Historical   raNITIdine (ZANTAC) 150 mg tablet ZANTAC TABS 9/29/11   Provider, Historical     Patient Active Problem List   Diagnosis Code    Malignant neoplasm of corpus uteri, except isthmus ([facility] [address] C54.9     Patient Active Problem List    Diagnosis Date Noted    Malignant neoplasm of corpus uteri, except isthmus ([facility] [address] 02/28/2013     Current Outpatient Medications   Medication Sig Dispense Refill    lenvatinib (Lenvima) 8 mg/day (4 mg x 2) cap Take 2 Caps by mouth daily. 60 Cap 2    lisinopriL (PRINIVIL, ZESTRIL) 5 mg tablet Take 1 Tab by mouth daily. 30 Tab 5    ondansetron (ZOFRAN ODT) 4 mg disintegrating tablet Take 1 Tab by mouth every eight (8) hours as needed for Nausea. Indications: nausea and vomiting caused by cancer drugs 30 Tab 3    lidocaine-prilocaine (EMLA) topical cream Apply small amount over port area and cover with band aid one hour before chemo 30 g 3    guaifenesin (MUCINEX PO) Take  by mouth.  loratadine (Claritin) 10 mg tablet Take 10 mg by mouth.  MARICHUY'S WORT PO Take  by mouth.  epinastine 0.05 % drop Apply  to eye.       cyclobenzaprine (FLEXERIL) 5 mg tablet take 1 tablet by mouth three times a day if needed  0    valACYclovir (VALTREX) 1 gram tablet Take 1 Tab by mouth three (3) times daily. 21 Tab 3    EPINEPHrine (EPIPEN) 0.3 mg/0.3 mL injection 0.3 mg by IntraMUSCular route once as needed.  ketotifen (ZADITOR) 0.025 % (0.035 %) ophthalmic solution Administer 1 Drop to both eyes every morning.  Cetirizine (ZYRTEC) 10 mg cap Take 10 mg by mouth nightly.  SAL ACID/UREA/PETROLATUM,WHITE (KERASAL FOOT EX) by Apply Externally route daily as needed.  ACCU-CHEK HELDER PLUS TEST STRP strip   0    NITROSTAT 0.4 mg SL tablet       CALCIUM CARBONATE (MARCELLO-SELTZER ANTACID PO) Take  by mouth daily as needed.  ibuprofen (MOTRIN) 200 mg tablet Take 200 mg by mouth every six (6) hours as needed.  raNITIdine (ZANTAC) 150 mg tablet ZANTAC TABS       Allergies   Allergen Reactions    Latex Other (comments)     Burns skin    Acetone Shortness of Breath    Amoxicillin Anaphylaxis    Ciprofloxacin Hives    Pcn [Penicillins] Anaphylaxis    Buspar [Buspirone] Unknown (comments)     Has N&V and makes her feel \"weird\"    Azithromycin Hives    Egg Nausea Only    Other Medication Rash     POPPY seeds     Past Medical History:   Diagnosis Date    Abnormal Pap smear 1995    with f/u normal    Anemia     Arthritis     Asthma     Cancer ([facility] [address]     ENDOMETRIAL    Environmental allergies     GERD (gastroesophageal reflux disease)     Goiter     Other unknown and unspecified cause of morbidity or mortality     POSSIBLE ESOPHAGEAL SPASM, ?  OBSTRUCTION DUE TO GOITER    PMB (postmenopausal bleeding)     S/P chemotherapy, time since greater than 12 weeks     LAST CHEMO 10/2014 PER PATIENT    Thyroid disease     goiter     Past Surgical History:   Procedure Laterality Date    BREAST SURGERY PROCEDURE UNLISTED  1/12/16    right breast bx    HX APPENDECTOMY      HX BUNIONECTOMY      HX GYN  3/13/13    TLH/BSO    HX HEENT  4/22/13    TOOTH REMOVED    HX ORTHOPAEDIC  1980'S    BUNIONECTOMY    HX VASCULAR ACCESS      port     Family History   Problem Relation Age of Onset  Cancer Maternal Aunt         breast    Heart Disease Mother     Diabetes Father     Cancer Sister         CERVICAL    Kidney Disease Brother     Diabetes Brother     Heart Attack Other     Arrhythmia Brother     Diabetes Brother     Anesth Problems Neg Hx      Social History     Tobacco Use    Smoking status: Never Smoker    Smokeless tobacco: Never Used   Substance Use Topics    Alcohol use: Yes     Alcohol/week: 0.0 standard drinks     Comment: RARE OCC       ROS    No flowsheet data found. Ary East, who was evaluated through a patient-initiated, synchronous (real-time) audio only encounter, and/or her healthcare decision maker, is aware that it is a billable service, with coverage as determined by her insurance carrier. She provided verbal consent to proceed: Yes. She has not had a related appointment within my department in the past 7 days or scheduled within the next 24 hours.       Total Time: minutes: 11-20 minutes    MD Elian Rizzo MD  12/31/2020/2:05 PM

## 2021-01-05 ENCOUNTER — HOSPITAL ENCOUNTER (OUTPATIENT)
Dept: INFUSION THERAPY | Age: 66
Discharge: HOME | End: 2021-01-05
Payer: MEDICARE

## 2021-01-05 VITALS
HEIGHT: 65 IN | SYSTOLIC BLOOD PRESSURE: 172 MMHG | RESPIRATION RATE: 18 BRPM | BODY MASS INDEX: 27.82 KG/M2 | WEIGHT: 167 LBS | TEMPERATURE: 97.3 F | DIASTOLIC BLOOD PRESSURE: 91 MMHG | HEART RATE: 73 BPM

## 2021-01-05 DIAGNOSIS — C54.9 MALIGNANT NEOPLASM OF CORPUS UTERI, EXCEPT ISTHMUS (HCC): Primary | ICD-10-CM

## 2021-01-05 LAB
ALBUMIN SERPL-MCNC: 3 G/DL (ref 3.5–5)
ALBUMIN/GLOB SERPL: 0.9 {RATIO} (ref 1.1–2.2)
ALP SERPL-CCNC: 51 U/L (ref 45–117)
ALT SERPL-CCNC: 31 U/L (ref 12–78)
ANION GAP SERPL CALC-SCNC: 5 MMOL/L (ref 5–15)
AST SERPL-CCNC: 41 U/L (ref 15–37)
BASOPHILS # BLD: 0 K/UL (ref 0–0.1)
BASOPHILS NFR BLD: 1 % (ref 0–1)
BILIRUB SERPL-MCNC: 0.4 MG/DL (ref 0.2–1)
BUN SERPL-MCNC: 9 MG/DL (ref 6–20)
BUN/CREAT SERPL: 21 (ref 12–20)
CALCIUM SERPL-MCNC: 7.9 MG/DL (ref 8.5–10.1)
CANCER AG125 SERPL-ACNC: 25 U/ML (ref 1.5–35)
CHLORIDE SERPL-SCNC: 113 MMOL/L (ref 97–108)
CO2 SERPL-SCNC: 22 MMOL/L (ref 21–32)
CREAT SERPL-MCNC: 0.42 MG/DL (ref 0.55–1.02)
DIFFERENTIAL METHOD BLD: ABNORMAL
EOSINOPHIL # BLD: 0.1 K/UL (ref 0–0.4)
EOSINOPHIL NFR BLD: 2 % (ref 0–7)
ERYTHROCYTE [DISTWIDTH] IN BLOOD BY AUTOMATED COUNT: 13.6 % (ref 11.5–14.5)
GLOBULIN SER CALC-MCNC: 3.2 G/DL (ref 2–4)
GLUCOSE SERPL-MCNC: 111 MG/DL (ref 65–100)
HCT VFR BLD AUTO: 37.3 % (ref 35–47)
HGB BLD-MCNC: 12.5 G/DL (ref 11.5–16)
IMM GRANULOCYTES # BLD AUTO: 0 K/UL (ref 0–0.04)
IMM GRANULOCYTES NFR BLD AUTO: 1 % (ref 0–0.5)
LYMPHOCYTES # BLD: 1 K/UL (ref 0.8–3.5)
LYMPHOCYTES NFR BLD: 26 % (ref 12–49)
MCH RBC QN AUTO: 29.9 PG (ref 26–34)
MCHC RBC AUTO-ENTMCNC: 33.5 G/DL (ref 30–36.5)
MCV RBC AUTO: 89.2 FL (ref 80–99)
MONOCYTES # BLD: 0.3 K/UL (ref 0–1)
MONOCYTES NFR BLD: 8 % (ref 5–13)
NEUTS SEG # BLD: 2.4 K/UL (ref 1.8–8)
NEUTS SEG NFR BLD: 62 % (ref 32–75)
NRBC # BLD: 0 K/UL (ref 0–0.01)
NRBC BLD-RTO: 0 PER 100 WBC
PLATELET # BLD AUTO: 129 K/UL (ref 150–400)
PMV BLD AUTO: 10.1 FL (ref 8.9–12.9)
POTASSIUM SERPL-SCNC: 4 MMOL/L (ref 3.5–5.1)
PROT SERPL-MCNC: 6.2 G/DL (ref 6.4–8.2)
RBC # BLD AUTO: 4.18 M/UL (ref 3.8–5.2)
SODIUM SERPL-SCNC: 140 MMOL/L (ref 136–145)
T4 FREE SERPL-MCNC: 0.9 NG/DL (ref 0.8–1.5)
TSH SERPL DL<=0.05 MIU/L-ACNC: 0.83 UIU/ML (ref 0.36–3.74)
WBC # BLD AUTO: 3.8 K/UL (ref 3.6–11)

## 2021-01-05 PROCEDURE — 84443 ASSAY THYROID STIM HORMONE: CPT

## 2021-01-05 PROCEDURE — 85025 COMPLETE CBC W/AUTO DIFF WBC: CPT

## 2021-01-05 PROCEDURE — 99215 OFFICE O/P EST HI 40 MIN: CPT | Performed by: PHYSICIAN ASSISTANT

## 2021-01-05 PROCEDURE — 86304 IMMUNOASSAY TUMOR CA 125: CPT

## 2021-01-05 PROCEDURE — 36415 COLL VENOUS BLD VENIPUNCTURE: CPT

## 2021-01-05 PROCEDURE — 96413 CHEMO IV INFUSION 1 HR: CPT

## 2021-01-05 PROCEDURE — 74011250636 HC RX REV CODE- 250/636: Performed by: PHYSICIAN ASSISTANT

## 2021-01-05 PROCEDURE — 84439 ASSAY OF FREE THYROXINE: CPT

## 2021-01-05 PROCEDURE — 80053 COMPREHEN METABOLIC PANEL: CPT

## 2021-01-05 PROCEDURE — 74011000258 HC RX REV CODE- 258: Performed by: PHYSICIAN ASSISTANT

## 2021-01-05 RX ORDER — SODIUM CHLORIDE 9 MG/ML
10 INJECTION INTRAMUSCULAR; INTRAVENOUS; SUBCUTANEOUS AS NEEDED
Status: DISCONTINUED | OUTPATIENT
Start: 2021-01-05 | End: 2021-01-06 | Stop reason: HOSPADM

## 2021-01-05 RX ORDER — SODIUM CHLORIDE 0.9 % (FLUSH) 0.9 %
10 SYRINGE (ML) INJECTION AS NEEDED
Status: DISCONTINUED | OUTPATIENT
Start: 2021-01-05 | End: 2021-01-06 | Stop reason: HOSPADM

## 2021-01-05 RX ORDER — HEPARIN 100 UNIT/ML
300-500 SYRINGE INTRAVENOUS AS NEEDED
Status: DISCONTINUED | OUTPATIENT
Start: 2021-01-05 | End: 2021-01-06 | Stop reason: HOSPADM

## 2021-01-05 RX ORDER — SODIUM CHLORIDE 9 MG/ML
25 INJECTION, SOLUTION INTRAVENOUS CONTINUOUS
Status: DISCONTINUED | OUTPATIENT
Start: 2021-01-05 | End: 2021-01-06 | Stop reason: HOSPADM

## 2021-01-05 RX ORDER — LISINOPRIL 5 MG/1
10 TABLET ORAL DAILY
Qty: 30 TAB | Refills: 5 | Status: SHIPPED | OUTPATIENT
Start: 2021-01-05 | End: 2022-10-04

## 2021-01-05 RX ADMIN — SODIUM CHLORIDE 25 ML/HR: 900 INJECTION, SOLUTION INTRAVENOUS at 13:11

## 2021-01-05 RX ADMIN — HEPARIN 500 UNITS: 100 SYRINGE at 14:35

## 2021-01-05 RX ADMIN — SODIUM CHLORIDE 10 ML: 9 INJECTION INTRAMUSCULAR; INTRAVENOUS; SUBCUTANEOUS at 14:35

## 2021-01-05 RX ADMIN — SODIUM CHLORIDE 200 MG: 9 INJECTION, SOLUTION INTRAVENOUS at 13:46

## 2021-01-05 NOTE — PROGRESS NOTES
[address] [facility] [address], [address] [facility]  P [phone]  F [phone]      Patient ID:  Name:  Suhas Marion  MRN:  [id number]  :  1955/65 y.o. Date:  2021      Silvino Cox MD: Charlette Cranker, MD  PCP: Bhumika Abbasi MD     Primary Diagnosis: uterine cancer  Date of Diagnosis: 3/2013      Current Agent: Keytruda/Lenvima  Cycle: 4      HPI:  60 y.o. 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 She is now found with recurrence noted on imaging studies pelvic adenopathy and peritoneal carcinomatosis. She is recommended combination Keytruda/Lenvima. OncTx History:  3/2013 [facility] [place]  FINAL PATHOLOGIC DIAGNOSIS  1.  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  Histologic grade: High grade  Myometrial invasion: Depth of invasion: 0.3 cm  Myometrial thickness: 1.9 cm  Involvement of cervix: Not involved  Extent of involvement of other organs:  Right ovary: Not involved  Left ovary: Not involved  Right fallopian tube: Not involved  Left fallopian tube: Not involved  Right parametrial tissue: Not involved  Left parametrial tissue: Not involved  Lymphovascular invasion: Not identified  Additional pathologic findings:  Focal adenomyosis  Benign simple cyst of left ovary  Paratubal cysts  Pathologic staging (pTNM):  Primary tumor (pT): pT1a  Regional lymph nodes (pN): pN0  Pelvic lymph nodes:  Number examined: 17  Number involved: 0  Distant metastasis (M): Not applicable  2. Omentum, omentectomy:Benign adipose tissue, negative for carcinoma  3. Lymph nodes, right pelvic, excision:Seven lymph nodes, negative for metastatic carcinoma (0/7)  4. Lymph nodes, left pelvic, excision:Ten lymph nodes, negative for metastatic carcinoma (0/10)     5/2013 - 9/2013 Taxol/carboplatin   10/9/20 PET/CT:    1. Peritoneal carcinomatosis. 2. 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 3. Left pelvic cul-de-sac tumor   11/3/20 Keytruda/lenvima   12/30/20 CT CAP impression: Mixed response in the abdomen and pelvis compared to 10/9/2020 with mildly increased anterior peritoneal carcinomatosis. Stable to slightly increased retroperitoneal lymphadenopathy. Decreased peritoneal soft tissue nodularity in the deep left pelvis. No evidence for metastatic disease in the chest.               SUBJECTIVE:  Faustina Kaye presents for chemotherapy. She is doing well with reduced dosing of Lenvima, though notes BP continues to climb since resuming. Still with some headaches, frontal.  No visual changes, ataxia. No pain, rare nausea controlled with activity modification. BMs more regular. Continues to work full time, unable to work from home. She remains very active. No residual effects s/p her therapy in 2013. She lives alone, no close family for support buy many friends and very well supported by her work family. ROS  Constitutional: no weight loss, fever, night sweats  Respiratory: no cough, shortness of breath, or wheezing  Cardiovascular: no chest pain or dyspnea on exertion  Heme: No abnormal bleeding, no epistaxis.   Gastrointestinal: no abdominal pain, no dysphagia, change in bowel habits, or black or bloody stools  Genito-Urinary: no dysuria, trouble voiding, or hematuria  Musculoskeletal: negative for - gait disturbance or joint swelling  Neurological: negative for - behavioral changes, dizziness, headaches, memory loss or numbness/tingling  Derm: negative  Psych: negative for anxiety and depression       OBJECTIVE:  Physical Exam  Visit Vitals  BP (!) 172/91 (BP 1 Location: Right arm, BP Patient Position: Sitting)   Pulse 73   Temp 97.3 °F (36.3 °C)   Resp 18   Ht 5' 5\" (1.651 m)   Wt 167 lb (75.8 kg)   BMI 27.79 kg/m²        General:  alert, cooperative, no distress       HEENT: without pallor, sclera without jaundice, oral mucosa without lesions,      Cardiac:  Regular rate and rhythm        Lungs:  clear to auscultation bilaterally          Port:  clean, dry, no drainage  Abdomen:  soft, protuberant, nondistended, nontender       Lymph:  no lymphadenopathy   Extremity: no edema, redness or tenderness in the calves or thighs    Recent Labs     01/05/21  1053   WBC 3.8   ANEU 2.4   HGB 12.5   HCT 37.3   MCV 89.2   MCH 29.9   *     Recent Labs     01/05/21  1053      K 4.0   *   *   BUN 9   CREA 0.42*   CA 7.9*   ALB 3.0*   TBILI 0.4   ALT 31     UA no protein  Lab Results   Component Value Date/Time    TSH 0.83 01/05/2021 10:53 AM       Tumor markers  Lab Results   Component Value Date/Time    CA-125 25 01/05/2021 10:53 AM    Cancer Ag (CA) 125 98.9 (H) 10/23/2020 10:02 AM         Patient Active Problem List   Diagnosis Code    Malignant neoplasm of corpus uteri, except isthmus (HCC) C54.9     Past Medical History:   Diagnosis Date    Abnormal Pap smear 1995    with f/u normal    Anemia     Arthritis     Asthma     Cancer (HCC)     ENDOMETRIAL    Environmental allergies     GERD (gastroesophageal reflux disease)     Goiter     Other unknown and unspecified cause of morbidity or mortality     POSSIBLE ESOPHAGEAL SPASM, ?  OBSTRUCTION DUE TO GOITER    PMB (postmenopausal bleeding)     S/P chemotherapy, time since greater than 12 weeks     LAST CHEMO 10/2014 PER PATIENT    Thyroid disease     goiter     Prior to Admission medications    Medication Sig Start Date End Date Taking? Authorizing Provider   lisinopriL (PRINIVIL, ZESTRIL) 5 mg tablet Take 2 Tabs by mouth daily. 1/5/21  Yes Hieu Cline PA-C   lenvatinib (Lenvima) 8 mg/day (4 mg x 2) cap Take 2 Caps by mouth daily. 12/15/20   Archie Carmona MD   lisinopriL (PRINIVIL, ZESTRIL) 5 mg tablet Take 1 Tab by mouth daily. 12/8/20 1/5/21  Archie Carmona MD   ondansetron (ZOFRAN ODT) 4 mg disintegrating tablet Take 1 Tab by mouth every eight (8) hours as needed for Nausea. Indications: nausea and vomiting caused by cancer drugs 10/22/20   Archie Carmona MD   lidocaine-prilocaine (EMLA) topical cream Apply small amount over port area and cover with band aid one hour before chemo 10/22/20   Archie Carmona MD   guaifenesin (MUCINEX PO) Take  by mouth. Provider, Historical   loratadine (Claritin) 10 mg tablet Take 10 mg by mouth. Provider, Historical   MARICHUY'S WORT PO Take  by mouth. Provider, Historical   epinastine 0.05 % drop Apply  to eye. Provider, Historical   raNITIdine (ZANTAC) 150 mg tablet ZANTAC TABS 9/29/11   Provider, Historical   cyclobenzaprine (FLEXERIL) 5 mg tablet take 1 tablet by mouth three times a day if needed 12/5/16   Provider, Historical   valACYclovir (VALTREX) 1 gram tablet Take 1 Tab by mouth three (3) times daily. 12/15/16   Archie Carmona MD   EPINEPHrine (EPIPEN) 0.3 mg/0.3 mL injection 0.3 mg by IntraMUSCular route once as needed. Provider, Historical   ketotifen (ZADITOR) 0.025 % (0.035 %) ophthalmic solution Administer 1 Drop to both eyes every morning. Provider, Historical   Cetirizine (ZYRTEC) 10 mg cap Take 10 mg by mouth nightly. Provider, Historical   SAL ACID/UREA/PETROLATUM,WHITE (KERASAL FOOT EX) by Apply Externally route daily as needed.     Provider, Historical   ACCU-CHEK HELDER PLUS TEST STRP strip  7/3/15   Provider, Historical   NITROSTAT 0.4 mg SL tablet  9/22/14   Provider, Historical   CALCIUM CARBONATE (MARCELLO-SELCOLEENER ANTACID PO) Take  by mouth daily as needed. Provider, Historical   ibuprofen (MOTRIN) 200 mg tablet Take 200 mg by mouth every six (6) hours as needed. Provider, Historical     Allergies   Allergen Reactions    Latex Other (comments)     Burns skin    Acetone Shortness of Breath    Amoxicillin Anaphylaxis    Ciprofloxacin Hives    Pcn [Penicillins] Anaphylaxis    Buspar [Buspirone] Unknown (comments)     Has N&V and makes her feel \"weird\"    Azithromycin Hives    Egg Nausea Only    Other Medication Rash     POPPY seeds     Family History   Problem Relation Age of Onset    Cancer Maternal Aunt         breast    Heart Disease Mother     Diabetes Father     Cancer Sister         CERVICAL    Kidney Disease Brother     Diabetes Brother     Heart Attack Other     Arrhythmia Brother     Diabetes Brother     Anesth Problems Neg Hx          CT Results (most recent):  Results from Hospital Encounter encounter on 12/30/20   CT CHEST W CONT    Narrative EXAM:  CT CHEST W CONT, CT ABD PELV W CONT    INDICATION: Primary serous adenocarcinoma of the endometrium. COMPARISON: PET/CT 10/9/2020. CT abdomen pelvis 8/13/2019. TECHNIQUE: Helical CT of the chest, abdomen  and pelvis  with oral and  intravenous contrast.  Coronal and sagittal reformats are performed. CT dose  reduction was achieved through use of a standardized protocol tailored for this  examination and automatic exposure control for dose modulation. Adaptive  statistical iterative reconstruction (ASIR) was utilized. FINDINGS:   CT chest:    There is stable multinodular thyroid enlargement. Left chest Port-A-Cath  terminates in the SVC. The aorta and main pulmonary artery are normal in  caliber. The heart size is normal.  There is no pericardial or pleural effusion. There are no enlarged axillary, mediastinal, or hilar lymph nodes. There is no lung mass or airspace opacity. There is no pneumothorax. The  central airways are clear.     CT abdomen and pelvis: The liver, spleen, pancreas, and adrenal glands are normal. The gall bladder is  present  without intra- or extra-hepatic biliary dilatation. The kidneys are symmetric without hydronephrosis. There are no dilated bowel loops. The appendix is surgically absent. Enlarged retroperitoneal lymph nodes are again demonstrated with a  representative left para-aortic lymph node measuring 1.3 x 1.8 cm (series 3,  image 76), previously 1.5 x 1.7 cm on 10/9/2020. A left common iliac lymph node  measures 1.1 x 1.5 cm (series 3, image 91), previously 0.8 x 1.4 cm. Anterior peritoneal/mesenteric soft tissue nodularity is overall mildly  increased since 10/9/2020 with a representative measurement of 2.3 x 6.8 cm  (series 3, image 77), previously 2.6 x 5.5 cm. Peritoneal soft tissue nodularity in the deep left pelvis measures 1.4 x 2.2 cm  (series 3, image 116), previously 2.9 x 3.1 cm. There is no free fluid or free air. The aorta tapers without aneurysm. The urinary bladder is normal. The uterus and ovaries are surgically absent. There is no aggressive bony lesion. Impression IMPRESSION:   1. Mixed response in the abdomen and pelvis compared to 10/9/2020 with mildly  increased anterior peritoneal carcinomatosis. Stable to slightly increased  retroperitoneal lymphadenopathy. Decreased peritoneal soft tissue nodularity in  the deep left pelvis. 2. No evidence for metastatic disease in the chest.              IMPRESSION/PLAN:  60 y.o. 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 ECO    Labs/chart reviewed  -Chemotherapy Keytruda immunotherapy today, cycle 4. Lenvima titration last visit with Dr. Skyler Dave  -HTN: increase lisinopril, continue home checks  -Hx of thyroid nodular goiter, benign follicular bx. Last visit noted asx, transient subclinical hyperthyroid, now normalized, proteins pending.  Follow next infusions.   -Thrombocytopenia, monitor  -Chronic med issues:    Hx asthma - inhaler PRN   Hx esophageal spasm - uses nitroglycerin  Psychosocial: In good spirits and feels well supported by her friends and work family. Asks many appropriate questions. Advised to call with questions/concerns. Applying soon to medicare. 01/05/21 I have spent 40 minutes reviewing previous notes, test results and face to face with the patient discussing the diagnosis and importance of compliance with the treatment plan.         James Marsh PA-C  Gyn Onc

## 2021-01-05 NOTE — PROGRESS NOTES
[facility] Chemo Progress Note    Date: January 5, 2021      1045 Ms. Daniella Lyles Arrived to [facility] for  [address] ambulatory in stable condition. Assessment was completed. Patient presenting with headache. BP elevated. Took home BP medications this AM. Port accessed with positive blood return. Labs drawn and sent for processing. Patient denies SOB, fever, cough, general not feeling well. Patient denies recent exposure to someone who has tested positive for COVID-19. Patient denies having contact with anyone who has a pending COVID test.      1230 Labs met criteria for treatment. 1330 TSH added on to morning lab order per PA. PA spoke to patient at chairside regarding hypertension. Adjustments to be made to home medications. BP to be taken at home BID. Chemotherapy Flowsheet 1/5/2021   Cycle C4   Date 1/5/2021   Drug / Regimen Keytruda   Notes Given       Ms. Mo Phelan vitals were reviewed. Patient Vitals for the past 12 hrs:   Temp Pulse Resp BP   01/05/21 1445 -- 73 18 (!) 172/91   01/05/21 1225 -- 67 18 (!) 167/95   01/05/21 1048 97.3 °F (36.3 °C) 66 18 (!) 173/96       Lab results were obtained and reviewed. Recent Results (from the past 12 hour(s))   CBC WITH AUTOMATED DIFF    Collection Time: 01/05/21 10:53 AM   Result Value Ref Range    WBC 3.8 3.6 - 11.0 K/uL    RBC 4.18 3.80 - 5.20 M/uL    HGB 12.5 11.5 - 16.0 g/dL    HCT 37.3 35.0 - 47.0 %    MCV 89.2 80.0 - 99.0 FL    MCH 29.9 26.0 - 34.0 PG    MCHC 33.5 30.0 - 36.5 g/dL    RDW 13.6 11.5 - 14.5 %    PLATELET 376 (L) 628 - 400 K/uL    MPV 10.1 8.9 - 12.9 FL    NRBC 0.0 0  WBC    ABSOLUTE NRBC 0.00 0.00 - 0.01 K/uL    NEUTROPHILS 62 32 - 75 %    LYMPHOCYTES 26 12 - 49 %    MONOCYTES 8 5 - 13 %    EOSINOPHILS 2 0 - 7 %    BASOPHILS 1 0 - 1 %    IMMATURE GRANULOCYTES 1 (H) 0.0 - 0.5 %    ABS. NEUTROPHILS 2.4 1.8 - 8.0 K/UL    ABS. LYMPHOCYTES 1.0 0.8 - 3.5 K/UL    ABS. MONOCYTES 0.3 0.0 - 1.0 K/UL    ABS.  EOSINOPHILS 0.1 0.0 - 0.4 K/UL ABS. BASOPHILS 0.0 0.0 - 0.1 K/UL    ABS. IMM. GRANS. 0.0 0.00 - 0.04 K/UL    DF AUTOMATED     METABOLIC PANEL, COMPREHENSIVE    Collection Time: 01/05/21 10:53 AM   Result Value Ref Range    Sodium 140 136 - 145 mmol/L    Potassium 4.0 3.5 - 5.1 mmol/L    Chloride 113 (H) 97 - 108 mmol/L    CO2 22 21 - 32 mmol/L    Anion gap 5 5 - 15 mmol/L    Glucose 111 (H) 65 - 100 mg/dL    BUN 9 6 - 20 MG/DL    Creatinine 0.42 (L) 0.55 - 1.02 MG/DL    BUN/Creatinine ratio 21 (H) 12 - 20      GFR est AA >60 >60 ml/min/1.73m2    GFR est non-AA >60 >60 ml/min/1.73m2    Calcium 7.9 (L) 8.5 - 10.1 MG/DL    Bilirubin, total 0.4 0.2 - 1.0 MG/DL    ALT (SGPT) 31 12 - 78 U/L    AST (SGOT) 41 (H) 15 - 37 U/L    Alk. phosphatase 51 45 - 117 U/L    Protein, total 6.2 (L) 6.4 - 8.2 g/dL    Albumin 3.0 (L) 3.5 - 5.0 g/dL    Globulin 3.2 2.0 - 4.0 g/dL    A-G Ratio 0.9 (L) 1.1 - 2.2     CANCER ANTIGEN 125    Collection Time: 01/05/21 10:53 AM   Result Value Ref Range    CA-125 25 1.5 - 35.0 U/mL   TSH 3RD GENERATION    Collection Time: 01/05/21 10:53 AM   Result Value Ref Range    TSH 0.83 0.36 - 3.74 uIU/mL   T4, FREE    Collection Time: 01/05/21 10:53 AM   Result Value Ref Range    T4, Free 0.9 0.8 - 1.5 NG/DL       Pre-medications  were administered as ordered and chemotherapy was initiated.   Medications Administered     0.9% sodium chloride infusion     Admin Date  01/05/2021 Action  New Bag Dose  25 mL/hr Rate  25 mL/hr Route  IntraVENous Administered By  Jacob Cervantes          0.9% sodium chloride injection 10 mL     Admin Date  01/05/2021 Action  Given Dose  10 mL Route  IntraVENous Administered By  Jacob Cervantes          heparin (porcine) pf 300-500 Units     Admin Date  01/05/2021 Action  Given Dose  500 Units Route  InterCATHeter Administered By  Jacob Cervantes          pembrolizumab [facility]) 200 mg in 0.9% sodium chloride 100 mL, overfill volume 10 mL IVPB     Admin Date  01/05/2021 Action  New Bag Dose  200 mg Rate  236 mL/hr Route  IntraVENous Administered By  [address], [address] Patient tolerated treatment well. Port maintained positive blood return throughout treatment. Port flushed, heparinized and de accessed per protocol. Patient was discharged in stable condition. Patient is aware of next scheduled [facility] appointment on 1/26/21.     Future Appointments   Date Time Provider David Estefani   1/11/2021  2:30 PM MD PEARL Jones [facility] BS AMB   1/19/2021  3:45 PM MD GREER Price [facility]   1/26/2021 11:00 AM A1 GRECIA MED [address]   2/9/2021  3:30 PM MD GREER Price [facility]   2/16/2021 11:00 AM A1 GRECIA  [address] [facility] H   3/2/2021  3:30 PM MD GREER Price [facility]   3/9/2021 11:00 AM A1 GRECIA MED [facility]   3/30/2021 10:30 AM MD GREER Price [facility]   3/30/2021 11:00 AM A1 GRECIA  [address] [facility] H   4/20/2021 11:00 AM A1 GRECIA MED [address]   5/11/2021 11:00 AM A1 GRECIA MED TX [facility]   6/1/2021 11:00 AM A1 GRECIA MED [address]         Emilie Kumar RN  January 5, 2021

## 2021-01-06 ENCOUNTER — TELEPHONE (OUTPATIENT)
Dept: GYNECOLOGY | Age: 66
End: 2021-01-06

## 2021-01-06 RX ORDER — LISINOPRIL 10 MG/1
10 TABLET ORAL DAILY
Qty: 30 TAB | Refills: 2 | Status: SHIPPED | OUTPATIENT
Start: 2021-01-06 | End: 2022-10-04

## 2021-01-06 NOTE — TELEPHONE ENCOUNTER
The patient called stating the insurance has denied the new prescription. I called the pharmacy and he said it must be written for 10 mg and not 2 5 mg tablets. Verified with Roseline Ramirez that I can send in a new prescription for Lisinopril 10 mg. New prescription sent pvorb from Roseline Ramirez. Requested Prescriptions     Signed Prescriptions Disp Refills    lisinopriL (PRINIVIL, ZESTRIL) 10 mg tablet 30 Tab 2     Sig: Take 1 Tab by mouth daily. Authorizing Provider: Louie Becker     Ordering User: LAKESHIA, [address] [address] the patient, advised that the insurance requires a new prescription for 10 mg tablet. Advised per Suzette Briseno that her thyroid labs were normal and she does not need the follow up with endocrinology at this time.

## 2021-01-19 ENCOUNTER — VIRTUAL VISIT (OUTPATIENT)
Dept: GYNECOLOGY | Age: 66
End: 2021-01-19
Payer: MEDICARE

## 2021-01-19 DIAGNOSIS — C54.1 PRIMARY SEROUS ADENOCARCINOMA OF ENDOMETRIUM (HCC): ICD-10-CM

## 2021-01-19 DIAGNOSIS — C54.9 MALIGNANT NEOPLASM OF CORPUS UTERI, EXCEPT ISTHMUS (HCC): Primary | ICD-10-CM

## 2021-01-19 PROCEDURE — 99442 PR PHYS/QHP TELEPHONE EVALUATION 11-20 MIN: CPT | Performed by: OBSTETRICS & GYNECOLOGY

## 2021-01-19 NOTE — PROGRESS NOTES
Chemotherapy, labs and C5 keytruda on 1/26. Patient c/o headaches. She decreased Lenvima to 1/2 tablet daily until Sunday and is now taking the 8 mg again. Patient reports increased blood pressures.   176/98  157/83  140/85

## 2021-01-19 NOTE — PROGRESS NOTES
[facility]  [address] [place] [address], [address], [address] [facility]  [phone] [facility] [phone]    Office Visit    Patient ID:  Name: Franko Alanis  MRN: [id number]  : 1955/65 y.o. Visit date: 2021    INTERVAL HISTORY:  Franko Alanis is a  female who is an established patient with stage IA, grade 3 uterine carcinoma. She underwent laparoscopic hysterectomy with staging in 2013. She was recommended six cycles of adjuvant Taxol and Carboplatin, which she completed. We elected not to treat with pelvic radiation therapy due to her difficulty with chemotherapy. She presents was last seen in July for routine surveillance. She was without complaints at that time and her exam was negative. She recently had some abdominal discomfort and noted to have some blood in her urine. She was referred to [facility] for evaluation. She had a CT of the abdomen/pelvis with them. It revealed retroperitoneal lymphadenopathy, peritoneal carcinomatosis, and trace ascites. She also reported a negative colonoscopy within the last month. She is scheduled for a MMG in a few weeks. I sent her for a PET/CT to better evaluate the extent of disease. She presented to review the results and discuss treatment. Her disease is not amenable to surgical resection. Systemic therapy is her only viable option. I thought immunotherapy would be the best choice, ahead of additional chemotherapy. This would consist of IV Keytruda and PO Lenvima. If that is not successful, we will consider retreatment with Taxol/Carboplatin or single agent Doxil. She has completed 4 cycles so far. She has continued to complain of intermittent headaches and elevations in her BP, despite lowering the dose of the Lenvima. Her interval CT on 20 demonstrated a mixed response.           PMH:  Past Medical History:   Diagnosis Date    Abnormal Pap smear     with f/u normal    Anemia  Arthritis     Asthma     Cancer ([facility] [address]     ENDOMETRIAL    Environmental allergies     GERD (gastroesophageal reflux disease)     Goiter     Other unknown and unspecified cause of morbidity or mortality     POSSIBLE ESOPHAGEAL SPASM, ? OBSTRUCTION DUE TO GOITER    PMB (postmenopausal bleeding)     S/P chemotherapy, time since greater than 12 weeks     LAST CHEMO 10/2014 PER PATIENT    Thyroid disease     goiter     PSH:  Past Surgical History:   Procedure Laterality Date    HX APPENDECTOMY      HX BUNIONECTOMY      HX GYN  3/13/13    TLH/BSO    HX HEENT  4/22/13    TOOTH REMOVED    HX ORTHOPAEDIC  1980'S    BUNIONECTOMY    HX VASCULAR ACCESS      port    [place] BREAST SURGERY PROCEDURE UNLISTED  1/12/16    right breast bx     SOC:  Social History     Socioeconomic History    Marital status:      Spouse name: Not on file    Number of children: Not on file    Years of education: Not on file    Highest education level: Not on file   Occupational History    Not on file   Social Needs    Financial resource strain: Not on file    Food insecurity     Worry: Not on file     Inability: Not on file    Transportation needs     Medical: Not on file     Non-medical: Not on file   Tobacco Use    Smoking status: Never Smoker    Smokeless tobacco: Never Used   Substance and Sexual Activity    Alcohol use:  Yes     Alcohol/week: 0.0 standard drinks     Comment: RARE OCC    Drug use: No    Sexual activity: Not on file   Lifestyle    Physical activity     Days per week: Not on file     Minutes per session: Not on file    Stress: Not on file   Relationships    Social connections     Talks on phone: Not on file     Gets together: Not on file     Attends Zoroastrian service: Not on file     Active member of club or organization: Not on file     Attends meetings of clubs or organizations: Not on file     Relationship status: Not on file    Intimate partner violence     Fear of current or ex partner: Not on file     Emotionally abused: Not on file     Physically abused: Not on file     Forced sexual activity: Not on file   Other Topics Concern    Not on file   Social History Narrative    Not on file     Family History  Family History   Problem Relation Age of Onset    Cancer Maternal Aunt         breast    Heart Disease Mother     Diabetes Father     Cancer Sister         CERVICAL    Kidney Disease Brother     Diabetes Brother     Heart Attack Other     Arrhythmia Brother     Diabetes Brother     Anesth Problems Neg Hx      Medications:  Current Outpatient Medications on File Prior to Visit   Medication Sig Dispense Refill    lisinopriL (PRINIVIL, ZESTRIL) 10 mg tablet Take 1 Tab by mouth daily. 30 Tab 2    lenvatinib (Lenvima) 8 mg/day (4 mg x 2) cap Take 2 Caps by mouth daily. 60 Cap 2    lidocaine-prilocaine (EMLA) topical cream Apply small amount over port area and cover with band aid one hour before chemo 30 g 3    epinastine 0.05 % drop Apply  to eye.  EPINEPHrine (EPIPEN) 0.3 mg/0.3 mL injection 0.3 mg by IntraMUSCular route once as needed.  NITROSTAT 0.4 mg SL tablet       ibuprofen (MOTRIN) 200 mg tablet Take 200 mg by mouth every six (6) hours as needed.  lisinopriL (PRINIVIL, ZESTRIL) 5 mg tablet Take 2 Tabs by mouth daily. 30 Tab 5    ondansetron (ZOFRAN ODT) 4 mg disintegrating tablet Take 1 Tab by mouth every eight (8) hours as needed for Nausea. Indications: nausea and vomiting caused by cancer drugs 30 Tab 3    guaifenesin (MUCINEX PO) Take  by mouth.  loratadine (Claritin) 10 mg tablet Take 10 mg by mouth.  MARICHUY'S WORT PO Take  by mouth.  raNITIdine (ZANTAC) 150 mg tablet ZANTAC TABS      cyclobenzaprine (FLEXERIL) 5 mg tablet take 1 tablet by mouth three times a day if needed  0    valACYclovir (VALTREX) 1 gram tablet Take 1 Tab by mouth three (3) times daily.  21 Tab 3    ketotifen (ZADITOR) 0.025 % (0.035 %) ophthalmic solution Administer 1 Drop to both eyes every morning.  Cetirizine (ZYRTEC) 10 mg cap Take 10 mg by mouth nightly.  SAL ACID/UREA/PETROLATUM,WHITE (KERASAL FOOT EX) by Apply Externally route daily as needed.  ACCU-CHEK HELDER PLUS TEST STRP strip   0    CALCIUM CARBONATE (MARCELLO-SELTZER ANTACID PO) Take  by mouth daily as needed. No current facility-administered medications on file prior to visit. Allergies: Allergies   Allergen Reactions    Latex Other (comments)     Burns skin    Acetone Shortness of Breath    Amoxicillin Anaphylaxis    Ciprofloxacin Hives    Pcn [Penicillins] Anaphylaxis    Buspar [Buspirone] Unknown (comments)     Has N&V and makes her feel \"weird\"    Azithromycin Hives    Egg Nausea Only    Other Medication Rash     POPPY seeds       ROS:  Negative     OBJECTIVE:    PHYSICAL EXAM  VITAL SIGNS: There were no vitals taken for this visit. GENERAL KAIA:    HEENT:    RESPIRATORY:    CARDIOVASC:    GASTROINT:    MUSCULOSKEL:    EXTREMITIES:    PELVIC:    RECTAL:    PRIYANKA SURVEY:    NEURO:        CT of abdomen/pelvis (12/20/16)  The visualized portions of the lung bases are clear.     There are no focal abnormalities within the liver, spleen, pancreas, adrenal  glands or kidneys. No gallstones are noted.     The aorta tapers without aneurysm. There is no retroperitoneal adenopathy or  mass. There is no ascites or free intraperitoneal air.     There is no small or large bowel distention. The appendix is surgically absent.      The uterus and ovaries are surgically absent. There is no pelvic mass or  adenopathy.     Mild degenerative changes are noted in the lumbar spine.       IMPRESSION: Status post hysterectomy, bilateral oophorectomy, and appendectomy. No acute abnormality. CT of abdomen/pelvis (8/13/20)  LOWER CHEST: The visualized portions of the lung bases are clear. ABDOMEN:  Liver: The liver is normal in size and contour with no focal abnormality.  The  attenuation of the liver is decreased throughout. Gallbladder and bile ducts: There is no calcified gallstone or biliary  dilatation. Spleen: No abnormality. Pancreas: No abnormality. Adrenal glands: No abnormality. Kidneys: No abnormality. PELVIS:  Reproductive organs: The uterus and ovaries are absent. Bladder: No abnormality. BOWEL AND MESENTERY: The small bowel is normal.  There is no mesenteric mass or  adenopathy. The appendix is absent. PERITONEUM: There is no ascites or free intraperitoneal air. RETROPERITONEUM: The aorta  tapers without aneurysm. There is no retroperitoneal  adenopathy or mass. There is no pelvic mass or adenopathy. BONES AND SOFT TISSUES: The bones and soft tissues of the abdominal wall are  within normal limits.     IMPRESSION:   1. Status post hysterectomy and bilateral oophorectomy. 2. No evidence of recurrent or metastatic disease within the abdomen or pelvis. 3. Hepatic steatosis. 4. Status post appendectomy.       PET/CT (10/9/20)  HEAD/NECK: No apparent foci of abnormal hypermetabolism. Cerebral evaluation is  limited by normal intense activity.     CHEST: No foci of abnormal hypermetabolism.     ABDOMEN/PELVIS:   Peritoneal nodules are hypermetabolic, SUV 12. Retroperitoneal adenopathy is hypermetabolic, SUV 6. Right deep pelvic adenopathy is hypermetabolic, SUV 8. Left pelvic cul-de-sac mass is hypermetabolic, SUV 11.     SKELETON: No foci of abnormal hypermetabolism in the axial and visualized  appendicular skeleton.     IMPRESSION:   1. Peritoneal carcinomatosis. 2. 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 3. Left pelvic cul-de-sac tumor. CT of chest/abdomen/pelvis (12/30/20)  CT chest:    There is stable multinodular thyroid enlargement. Left chest Port-A-Cath  terminates in the SVC. The aorta and main pulmonary artery are normal in  caliber.  The heart size is normal.  There is no pericardial or pleural effusion.        There are no enlarged axillary, mediastinal, or hilar lymph nodes.      There is no lung mass or airspace opacity. There is no pneumothorax. The  central airways are clear.     CT abdomen and pelvis: The liver, spleen, pancreas, and adrenal glands are normal. The gall bladder is  present  without intra- or extra-hepatic biliary dilatation.       The kidneys are symmetric without hydronephrosis.      There are no dilated bowel loops. The appendix is surgically absent.       Enlarged retroperitoneal lymph nodes are again demonstrated with a  representative left para-aortic lymph node measuring 1.3 x 1.8 cm (series 3,  image 76), previously 1.5 x 1.7 cm on 10/9/2020. A left common iliac lymph node  measures 1.1 x 1.5 cm (series 3, image 91), previously 0.8 x 1.4 cm.       Anterior peritoneal/mesenteric soft tissue nodularity is overall mildly  increased since 10/9/2020 with a representative measurement of 2.3 x 6.8 cm  (series 3, image 77), previously 2.6 x 5.5 cm.     Peritoneal soft tissue nodularity in the deep left pelvis measures 1.4 x 2.2 cm  (series 3, image 116), previously 2.9 x 3.1 cm.     There is no free fluid or free air. The aorta tapers without aneurysm.     The urinary bladder is normal. The uterus and ovaries are surgically absent.     There is no aggressive bony lesion.     IMPRESSION:   1. Mixed response in the abdomen and pelvis compared to 10/9/2020 with mildly  increased anterior peritoneal carcinomatosis. Stable to slightly increased  retroperitoneal lymphadenopathy. Decreased peritoneal soft tissue nodularity in  the deep left pelvis.     2. No evidence for metastatic disease in the chest.      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 She completed six cycles of adjuvant Taxol and Carboplatin chemotherapy. She now has recurrent disease and is being treated with the regimen of IV Keytruda and PO Lenvima.   She has completed 4 cycles so far. Her most recent CT scan demonstrated a mixed response, though her CA-125 has significantly improved. I recommend that we continue with the current regimen and repeat imaging after another 2 cycles. We will hold the [address] [place] [address] [address] for a week to see if her BPs and HAs improve. Jose Eckert is a 60 y.o. female, evaluated via audio-only technology on 1/19/2021 for Chemotherapy      Assessment & Plan:   Diagnoses and all orders for this visit:    1. Malignant neoplasm of corpus uteri, except isthmus ([address]    2. Primary serous adenocarcinoma of endometrium (HCC)        Subjective:       Prior to Admission medications    Medication Sig Start Date End Date Taking? Authorizing Provider   lisinopriL (PRINIVIL, ZESTRIL) 10 mg tablet Take 1 Tab by mouth daily. 1/6/21  Yes Kayli Jackson MD   lenvatinib (Lenvima) 8 mg/day (4 mg x 2) cap Take 2 Caps by mouth daily. 12/15/20  Yes Kayli Jackson MD   lidocaine-prilocaine (EMLA) topical cream Apply small amount over port area and cover with band aid one hour before chemo 10/22/20  Yes Kayli Jackson MD   epinastine 0.05 % drop Apply  to eye. Yes Provider, Historical   EPINEPHrine (EPIPEN) 0.3 mg/0.3 mL injection 0.3 mg by IntraMUSCular route once as needed. Yes Provider, Historical   NITROSTAT 0.4 mg SL tablet  9/22/14  Yes Provider, Historical   ibuprofen (MOTRIN) 200 mg tablet Take 200 mg by mouth every six (6) hours as needed. Yes Provider, Historical   lisinopriL (PRINIVIL, ZESTRIL) 5 mg tablet Take 2 Tabs by mouth daily. 1/5/21   Hieu Cline PA-C   ondansetron (ZOFRAN ODT) 4 mg disintegrating tablet Take 1 Tab by mouth every eight (8) hours as needed for Nausea. Indications: nausea and vomiting caused by cancer drugs 10/22/20   Kayli Jackson MD   guaifenesin (MUCINEX PO) Take  by mouth. Provider, Historical   loratadine (Claritin) 10 mg tablet Take 10 mg by mouth. Provider, Historical   MARICHUY'S WORT PO Take  by mouth. Provider, Historical   raNITIdine (ZANTAC) 150 mg tablet ZANTAC TABS 9/29/11   Provider, Historical   cyclobenzaprine (FLEXERIL) 5 mg tablet take 1 tablet by mouth three times a day if needed 12/5/16   Provider, Historical   valACYclovir (VALTREX) 1 gram tablet Take 1 Tab by mouth three (3) times daily. 12/15/16   Jose Brasher MD   ketotifen (ZADITOR) 0.025 % (0.035 %) ophthalmic solution Administer 1 Drop to both eyes every morning. Provider, Historical   Cetirizine (ZYRTEC) 10 mg cap Take 10 mg by mouth nightly. Provider, Historical   SAL ACID/UREA/PETROLATUM,WHITE (KERASAL FOOT EX) by Apply Externally route daily as needed. Provider, Historical   ACCU-CHEK HELDER PLUS TEST STRP strip  7/3/15   Provider, Historical   CALCIUM CARBONATE (MARCELLO-SELTZER ANTACID PO) Take  by mouth daily as needed. Provider, Historical     Patient Active Problem List   Diagnosis Code    Malignant neoplasm of corpus uteri, except isthmus ([facility] [address] C54.9     Patient Active Problem List    Diagnosis Date Noted    Malignant neoplasm of corpus uteri, except isthmus ([facility] [address] 02/28/2013     Current Outpatient Medications   Medication Sig Dispense Refill    lisinopriL (PRINIVIL, ZESTRIL) 10 mg tablet Take 1 Tab by mouth daily. 30 Tab 2    lenvatinib (Lenvima) 8 mg/day (4 mg x 2) cap Take 2 Caps by mouth daily. 60 Cap 2    lidocaine-prilocaine (EMLA) topical cream Apply small amount over port area and cover with band aid one hour before chemo 30 g 3    epinastine 0.05 % drop Apply  to eye.  EPINEPHrine (EPIPEN) 0.3 mg/0.3 mL injection 0.3 mg by IntraMUSCular route once as needed.  NITROSTAT 0.4 mg SL tablet       ibuprofen (MOTRIN) 200 mg tablet Take 200 mg by mouth every six (6) hours as needed.  lisinopriL (PRINIVIL, ZESTRIL) 5 mg tablet Take 2 Tabs by mouth daily. 30 Tab 5    ondansetron (ZOFRAN ODT) 4 mg disintegrating tablet Take 1 Tab by mouth every eight (8) hours as needed for Nausea.  Indications: nausea and vomiting caused by cancer drugs 30 Tab 3    guaifenesin (MUCINEX PO) Take  by mouth.  loratadine (Claritin) 10 mg tablet Take 10 mg by mouth.  MARICHUY'S WORT PO Take  by mouth.  raNITIdine (ZANTAC) 150 mg tablet ZANTAC TABS      cyclobenzaprine (FLEXERIL) 5 mg tablet take 1 tablet by mouth three times a day if needed  0    valACYclovir (VALTREX) 1 gram tablet Take 1 Tab by mouth three (3) times daily. 21 Tab 3    ketotifen (ZADITOR) 0.025 % (0.035 %) ophthalmic solution Administer 1 Drop to both eyes every morning.  Cetirizine (ZYRTEC) 10 mg cap Take 10 mg by mouth nightly.  SAL ACID/UREA/PETROLATUM,WHITE (KERASAL FOOT EX) by Apply Externally route daily as needed.  ACCU-CHEK HELDER PLUS TEST STRP strip   0    CALCIUM CARBONATE (MARCELLO-SELTZER ANTACID PO) Take  by mouth daily as needed. Allergies   Allergen Reactions    Latex Other (comments)     Burns skin    Acetone Shortness of Breath    Amoxicillin Anaphylaxis    Ciprofloxacin Hives    Pcn [Penicillins] Anaphylaxis    Buspar [Buspirone] Unknown (comments)     Has N&V and makes her feel \"weird\"    Azithromycin Hives    Egg Nausea Only    Other Medication Rash     POPPY seeds     Past Medical History:   Diagnosis Date    Abnormal Pap smear 1995    with f/u normal    Anemia     Arthritis     Asthma     Cancer ([facility] [address]     ENDOMETRIAL    Environmental allergies     GERD (gastroesophageal reflux disease)     Goiter     Other unknown and unspecified cause of morbidity or mortality     POSSIBLE ESOPHAGEAL SPASM, ?  OBSTRUCTION DUE TO GOITER    PMB (postmenopausal bleeding)     S/P chemotherapy, time since greater than 12 weeks     LAST CHEMO 10/2014 PER PATIENT    Thyroid disease     goiter     Past Surgical History:   Procedure Laterality Date    HX APPENDECTOMY      HX BUNIONECTOMY      HX GYN  3/13/13    TLH/BSO    HX HEENT  4/22/13    TOOTH REMOVED    HX ORTHOPAEDIC  1980'S    BUNIONECTOMY    HX VASCULAR ACCESS      port    [place] BREAST SURGERY PROCEDURE UNLISTED  1/12/16    right breast bx     Family History   Problem Relation Age of Onset    Cancer Maternal Aunt         breast    Heart Disease Mother     Diabetes Father     Cancer Sister         CERVICAL    Kidney Disease Brother     Diabetes Brother     Heart Attack Other     Arrhythmia Brother     Diabetes Brother     Anesth Problems Neg Hx      Social History     Tobacco Use    Smoking status: Never Smoker    Smokeless tobacco: Never Used   Substance Use Topics    Alcohol use: Yes     Alcohol/week: 0.0 standard drinks     Comment: RARE OCC       ROS    Patient-Reported Vitals 1/19/2021   Patient-Reported Systolic  400   Patient-Reported Diastolic 96        Padmini Mtz, who was evaluated through a patient-initiated, synchronous (real-time) audio only encounter, and/or her healthcare decision maker, is aware that it is a billable service, with coverage as determined by her insurance carrier. She provided verbal consent to proceed: Yes. She has not had a related appointment within my department in the past 7 days or scheduled within the next 24 hours.       Total Time: minutes: 11-20 minutes      Ibrahima Hoskins MD  1/19/2021

## 2021-01-20 RX ORDER — DIPHENHYDRAMINE HYDROCHLORIDE 50 MG/ML
50 INJECTION, SOLUTION INTRAMUSCULAR; INTRAVENOUS AS NEEDED
Status: CANCELLED
Start: 2021-01-26

## 2021-01-20 RX ORDER — SODIUM CHLORIDE 9 MG/ML
10 INJECTION INTRAMUSCULAR; INTRAVENOUS; SUBCUTANEOUS AS NEEDED
Status: CANCELLED | OUTPATIENT
Start: 2021-01-26

## 2021-01-20 RX ORDER — SODIUM CHLORIDE 0.9 % (FLUSH) 0.9 %
10 SYRINGE (ML) INJECTION AS NEEDED
Status: CANCELLED | OUTPATIENT
Start: 2021-01-26

## 2021-01-20 RX ORDER — ALBUTEROL SULFATE 0.83 MG/ML
2.5 SOLUTION RESPIRATORY (INHALATION) AS NEEDED
Status: CANCELLED
Start: 2021-01-26

## 2021-01-20 RX ORDER — DIPHENHYDRAMINE HYDROCHLORIDE 50 MG/ML
25 INJECTION, SOLUTION INTRAMUSCULAR; INTRAVENOUS AS NEEDED
Status: CANCELLED
Start: 2021-01-26

## 2021-01-20 RX ORDER — ACETAMINOPHEN 325 MG/1
650 TABLET ORAL AS NEEDED
Status: CANCELLED
Start: 2021-01-26

## 2021-01-20 RX ORDER — EPINEPHRINE 1 MG/ML
0.3 INJECTION, SOLUTION, CONCENTRATE INTRAVENOUS AS NEEDED
Status: CANCELLED | OUTPATIENT
Start: 2021-01-26

## 2021-01-20 RX ORDER — HYDROCORTISONE SODIUM SUCCINATE 100 MG/2ML
100 INJECTION, POWDER, FOR SOLUTION INTRAMUSCULAR; INTRAVENOUS AS NEEDED
Status: CANCELLED | OUTPATIENT
Start: 2021-01-26

## 2021-01-20 RX ORDER — SODIUM CHLORIDE 9 MG/ML
25 INJECTION, SOLUTION INTRAVENOUS CONTINUOUS
Status: CANCELLED | OUTPATIENT
Start: 2021-01-26

## 2021-01-20 RX ORDER — HEPARIN 100 UNIT/ML
300-500 SYRINGE INTRAVENOUS AS NEEDED
Status: CANCELLED
Start: 2021-01-26

## 2021-01-20 RX ORDER — ONDANSETRON 2 MG/ML
8 INJECTION INTRAMUSCULAR; INTRAVENOUS AS NEEDED
Status: CANCELLED | OUTPATIENT
Start: 2021-01-26

## 2021-01-25 ENCOUNTER — TELEPHONE (OUTPATIENT)
Dept: GYNECOLOGY | Age: 66
End: 2021-01-25

## 2021-01-25 NOTE — TELEPHONE ENCOUNTER
Pt calling with c/o fevers for the last 3 nights, 1/22/21 99.1, 1/23/21 100.1, and 1/23/21 99.1 and is calling to see if she should keep her appt with [facility] tomorrow.

## 2021-01-25 NOTE — TELEPHONE ENCOUNTER
Per Dr. Jose Cabrera pt informed to keep her appt at [facility] to have labs checked and if normal may receive Keytruda.

## 2021-01-26 ENCOUNTER — HOSPITAL ENCOUNTER (OUTPATIENT)
Dept: INFUSION THERAPY | Age: 66
Discharge: HOME | End: 2021-01-26
Payer: MEDICARE

## 2021-01-26 ENCOUNTER — HOSPITAL ENCOUNTER (OUTPATIENT)
Dept: LAB | Age: 66
Discharge: HOME | End: 2021-01-26

## 2021-01-26 VITALS
DIASTOLIC BLOOD PRESSURE: 70 MMHG | RESPIRATION RATE: 18 BRPM | TEMPERATURE: 95.3 F | SYSTOLIC BLOOD PRESSURE: 128 MMHG | HEART RATE: 77 BPM

## 2021-01-26 DIAGNOSIS — C54.9 MALIGNANT NEOPLASM OF CORPUS UTERI, EXCEPT ISTHMUS (HCC): Primary | ICD-10-CM

## 2021-01-26 LAB
ALBUMIN SERPL-MCNC: 3.5 G/DL (ref 3.5–5)
ALBUMIN/GLOB SERPL: 1.1 {RATIO} (ref 1.1–2.2)
ALP SERPL-CCNC: 68 U/L (ref 45–117)
ALT SERPL-CCNC: 19 U/L (ref 12–78)
ANION GAP SERPL CALC-SCNC: 7 MMOL/L (ref 5–15)
APPEARANCE UR: CLEAR
AST SERPL-CCNC: 14 U/L (ref 15–37)
BACTERIA URNS QL MICRO: NEGATIVE /HPF
BASOPHILS # BLD: 0 K/UL (ref 0–0.1)
BASOPHILS NFR BLD: 1 % (ref 0–1)
BILIRUB SERPL-MCNC: 0.3 MG/DL (ref 0.2–1)
BILIRUB UR QL: NEGATIVE
BUN SERPL-MCNC: 12 MG/DL (ref 6–20)
BUN/CREAT SERPL: 21 (ref 12–20)
CALCIUM SERPL-MCNC: 9.1 MG/DL (ref 8.5–10.1)
CHLORIDE SERPL-SCNC: 105 MMOL/L (ref 97–108)
CO2 SERPL-SCNC: 26 MMOL/L (ref 21–32)
COLOR UR: NORMAL
CREAT SERPL-MCNC: 0.56 MG/DL (ref 0.55–1.02)
DIFFERENTIAL METHOD BLD: NORMAL
EOSINOPHIL # BLD: 0.1 K/UL (ref 0–0.4)
EOSINOPHIL NFR BLD: 2 % (ref 0–7)
EPITH CASTS URNS QL MICRO: NORMAL /LPF
ERYTHROCYTE [DISTWIDTH] IN BLOOD BY AUTOMATED COUNT: 13.7 % (ref 11.5–14.5)
GLOBULIN SER CALC-MCNC: 3.3 G/DL (ref 2–4)
GLUCOSE SERPL-MCNC: 94 MG/DL (ref 65–100)
GLUCOSE UR STRIP.AUTO-MCNC: NEGATIVE MG/DL
HCT VFR BLD AUTO: 40.9 % (ref 35–47)
HGB BLD-MCNC: 13.4 G/DL (ref 11.5–16)
HGB UR QL STRIP: NEGATIVE
HYALINE CASTS URNS QL MICRO: NORMAL /LPF (ref 0–5)
IMM GRANULOCYTES # BLD AUTO: 0 K/UL (ref 0–0.04)
IMM GRANULOCYTES NFR BLD AUTO: 0 % (ref 0–0.5)
KETONES UR QL STRIP.AUTO: NEGATIVE MG/DL
LEUKOCYTE ESTERASE UR QL STRIP.AUTO: NEGATIVE
LYMPHOCYTES # BLD: 1.1 K/UL (ref 0.8–3.5)
LYMPHOCYTES NFR BLD: 19 % (ref 12–49)
MCH RBC QN AUTO: 29.8 PG (ref 26–34)
MCHC RBC AUTO-ENTMCNC: 32.8 G/DL (ref 30–36.5)
MCV RBC AUTO: 91.1 FL (ref 80–99)
MONOCYTES # BLD: 0.5 K/UL (ref 0–1)
MONOCYTES NFR BLD: 9 % (ref 5–13)
NEUTS SEG # BLD: 3.9 K/UL (ref 1.8–8)
NEUTS SEG NFR BLD: 69 % (ref 32–75)
NITRITE UR QL STRIP.AUTO: NEGATIVE
NRBC # BLD: 0 K/UL (ref 0–0.01)
NRBC BLD-RTO: 0 PER 100 WBC
PH UR STRIP: 6 [PH] (ref 5–8)
PLATELET # BLD AUTO: 240 K/UL (ref 150–400)
PMV BLD AUTO: 8.9 FL (ref 8.9–12.9)
POTASSIUM SERPL-SCNC: 4.1 MMOL/L (ref 3.5–5.1)
PROT SERPL-MCNC: 6.8 G/DL (ref 6.4–8.2)
PROT UR STRIP-MCNC: NEGATIVE MG/DL
RBC # BLD AUTO: 4.49 M/UL (ref 3.8–5.2)
RBC #/AREA URNS HPF: NORMAL /HPF (ref 0–5)
SODIUM SERPL-SCNC: 138 MMOL/L (ref 136–145)
SP GR UR REFRACTOMETRY: 1.01 (ref 1–1.03)
T4 FREE SERPL-MCNC: 1.1 NG/DL (ref 0.8–1.5)
TSH SERPL DL<=0.05 MIU/L-ACNC: 1.92 UIU/ML (ref 0.36–3.74)
UA: UC IF INDICATED,UAUC: NORMAL
UROBILINOGEN UR QL STRIP.AUTO: 1 EU/DL (ref 0.2–1)
WBC # BLD AUTO: 5.7 K/UL (ref 3.6–11)
WBC URNS QL MICRO: NORMAL /HPF (ref 0–4)

## 2021-01-26 PROCEDURE — 74011250636 HC RX REV CODE- 250/636: Performed by: PHYSICIAN ASSISTANT

## 2021-01-26 PROCEDURE — 36415 COLL VENOUS BLD VENIPUNCTURE: CPT

## 2021-01-26 PROCEDURE — 96413 CHEMO IV INFUSION 1 HR: CPT

## 2021-01-26 PROCEDURE — 80053 COMPREHEN METABOLIC PANEL: CPT

## 2021-01-26 PROCEDURE — 99215 OFFICE O/P EST HI 40 MIN: CPT | Performed by: PHYSICIAN ASSISTANT

## 2021-01-26 PROCEDURE — 85025 COMPLETE CBC W/AUTO DIFF WBC: CPT

## 2021-01-26 PROCEDURE — 84443 ASSAY THYROID STIM HORMONE: CPT

## 2021-01-26 PROCEDURE — 81001 URINALYSIS AUTO W/SCOPE: CPT

## 2021-01-26 PROCEDURE — 74011000258 HC RX REV CODE- 258: Performed by: PHYSICIAN ASSISTANT

## 2021-01-26 PROCEDURE — 84439 ASSAY OF FREE THYROXINE: CPT

## 2021-01-26 PROCEDURE — 77030012965 HC NDL HUBR BBMI -A

## 2021-01-26 RX ORDER — SODIUM CHLORIDE 0.9 % (FLUSH) 0.9 %
10 SYRINGE (ML) INJECTION AS NEEDED
Status: DISCONTINUED | OUTPATIENT
Start: 2021-01-26 | End: 2021-01-27 | Stop reason: HOSPADM

## 2021-01-26 RX ORDER — HEPARIN 100 UNIT/ML
300-500 SYRINGE INTRAVENOUS AS NEEDED
Status: DISCONTINUED | OUTPATIENT
Start: 2021-01-26 | End: 2021-01-27 | Stop reason: HOSPADM

## 2021-01-26 RX ORDER — SODIUM CHLORIDE 9 MG/ML
10 INJECTION INTRAMUSCULAR; INTRAVENOUS; SUBCUTANEOUS AS NEEDED
Status: DISCONTINUED | OUTPATIENT
Start: 2021-01-26 | End: 2021-01-27 | Stop reason: HOSPADM

## 2021-01-26 RX ORDER — SODIUM CHLORIDE 9 MG/ML
25 INJECTION, SOLUTION INTRAVENOUS CONTINUOUS
Status: DISCONTINUED | OUTPATIENT
Start: 2021-01-26 | End: 2021-01-27 | Stop reason: HOSPADM

## 2021-01-26 RX ADMIN — HEPARIN 500 UNITS: 100 SYRINGE at 14:15

## 2021-01-26 RX ADMIN — Medication 10 ML: at 11:22

## 2021-01-26 RX ADMIN — SODIUM CHLORIDE 200 MG: 9 INJECTION, SOLUTION INTRAVENOUS at 13:40

## 2021-01-26 RX ADMIN — SODIUM CHLORIDE 25 ML/HR: 900 INJECTION, SOLUTION INTRAVENOUS at 13:40

## 2021-01-26 RX ADMIN — Medication 10 ML: at 11:20

## 2021-01-26 RX ADMIN — Medication 10 ML: at 11:21

## 2021-01-26 RX ADMIN — Medication 10 ML: at 14:15

## 2021-01-26 NOTE — PROGRESS NOTES
[address], [address], [address]  P [phone]  F [phone]      Patient ID:  Name:  Maeve Cespedes  MRN:  [id number]  :  1955/66 y.o. Date:  2021      Maribel Gann MD: Saddie Alpers, MD  PCP: Julien Bryant MD     Primary Diagnosis: uterine cancer  Date of Diagnosis: 3/2013      Current Agent: Keytruda/Lenvima  Cycle: 5      HPI:  73 y.o. 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 She is now found with recurrence noted on imaging studies pelvic adenopathy and peritoneal carcinomatosis. She is recommended combination Keytruda/Lenvima. OncTx History:  3/2013 [facility] Hyst/BSO  FINAL PATHOLOGIC DIAGNOSIS  1.  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  Histologic grade: High grade  Myometrial invasion: Depth of invasion: 0.3 cm  Myometrial thickness: 1.9 cm  Involvement of cervix: Not involved  Extent of involvement of other organs:  Right ovary: Not involved  Left ovary: Not involved  Right fallopian tube: Not involved  Left fallopian tube: Not involved  Right parametrial tissue: Not involved  Left parametrial tissue: Not involved  Lymphovascular invasion: Not identified  Additional pathologic findings:  Focal adenomyosis  Benign simple cyst of left ovary  Paratubal cysts  Pathologic staging (pTNM):  Primary tumor (pT): pT1a  Regional lymph nodes (pN): pN0  Pelvic lymph nodes:  Number examined: 17  Number involved: 0  Distant metastasis (M): Not applicable  2. Omentum, omentectomy:Benign adipose tissue, negative for carcinoma  3. Lymph nodes, right pelvic, excision:Seven lymph nodes, negative for metastatic carcinoma (0/7)  4. Lymph nodes, left pelvic, excision:Ten lymph nodes, negative for metastatic carcinoma (0/10)     5/2013 - 9/2013 Taxol/carboplatin   10/9/20 PET/CT:    1. Peritoneal carcinomatosis. 2. 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 3. Left pelvic cul-de-sac tumor   11/3/20 Keytruda/lenvima     Reduced lenvima 8mg d/t HTN   12/30/20 CT CAP impression: Mixed response in the abdomen and pelvis compared to 10/9/2020 with mildly increased anterior peritoneal carcinomatosis. Stable to slightly increased retroperitoneal lymphadenopathy. Decreased peritoneal soft tissue nodularity in the deep left pelvis. No evidence for metastatic disease in the chest.               SUBJECTIVE:  Leisa Slater presents for chemotherapy. Recent HA causing disorientation. Holding lenvima x 1 week. Had some \"fever\" with temps in 99s for the last few nights. Denies cough, SOB. Feeling very clear and \"bright\" today. Noted resolution of HTN last few days. Continues on lisinopril. No visual changes. Sparse episodes of imbalance in the past, none recent. No pain, N/V, derm/oral lesions. No GI/ c/o. Continues to work full time, unable to work from home. She remains very active at work. No residual effects s/p her therapy in 2013. She lives alone, no close family for support buy many friends and very well supported by her work family. 2 children, daughter and son, as well as her brother that are available for help if she were to need it, though feels they may not grasp what she is dealing with. Looking into/questions long term with SS. Work for her is a positive stressor, has few hobbies and uncertain of purpose w/o work.       ROS  Constitutional: no weight loss, fever, night sweats  Respiratory: no cough, shortness of breath, or wheezing  Cardiovascular: no chest pain or dyspnea on exertion  Heme: No abnormal bleeding, no epistaxis.   Gastrointestinal: no abdominal pain, no dysphagia, change in bowel habits, or black or bloody stools  Genito-Urinary: no dysuria, trouble voiding, or hematuria  Musculoskeletal: negative for - gait disturbance or joint swelling  Neurological: negative for - behavioral changes, dizziness, headaches, memory loss or numbness/tingling  Derm: negative  Psych: negative for anxiety and depression       OBJECTIVE:  Physical Exam  Visit Vitals  /70   Pulse 77   Temp (!) 95.3 °F (35.2 °C)   Resp 18   SpO2 95% RA       General:  alert, cooperative, no distress       HEENT: without pallor, sclera without jaundice, oral mucosa without lesions,      Cardiac:  Regular rate and rhythm        Lungs:  clear to auscultation bilaterally          Port:  clean, dry, no drainage  Abdomen:  soft, protuberant, nondistended, nontender       Lymph:  no lymphadenopathy   Extremity: no edema, redness or tenderness in the calves or thighs    Recent Labs     01/26/21  1128   WBC 5.7   ANEU 3.9   HGB 13.4   HCT 40.9   MCV 91.1   MCH 29.8        Recent Labs     01/26/21  1128      K 4.1      GLU 94   BUN 12   CREA 0.56   CA 9.1   ALB 3.5   TBILI 0.3   ALT 19     UA no protein    Lab Results   Component Value Date/Time    TSH 1.92 01/26/2021 11:28 AM    TSH 0.83 01/05/2021 10:53 AM    TSH 0.07 (L) 12/15/2020 11:05 AM    TSH 0.67 11/03/2020 11:42 AM         Tumor markers  Lab Results   Component Value Date/Time    CA-125 25 01/05/2021 10:53 AM    Cancer Ag (CA) 125 98.9 (H) 10/23/2020 10:02 AM         Patient Active Problem List   Diagnosis Code    Malignant neoplasm of corpus uteri, except isthmus (HCC) C54.9     Past Medical History:   Diagnosis Date    Abnormal Pap smear 1995    with f/u normal    Anemia     Arthritis     Asthma     Cancer ([facility] [address]     ENDOMETRIAL    Environmental allergies     GERD (gastroesophageal reflux disease)     Goiter     Other unknown and unspecified cause of morbidity or mortality     POSSIBLE ESOPHAGEAL SPASM, ? OBSTRUCTION DUE TO GOITER    PMB (postmenopausal bleeding)     S/P chemotherapy, time since greater than 12 weeks     LAST CHEMO 10/2014 PER PATIENT    Thyroid disease     goiter     Prior to Admission medications    Medication Sig Start Date End Date Taking? Authorizing Provider   lisinopriL (PRINIVIL, ZESTRIL) 10 mg tablet Take 1 Tab by mouth daily. 1/6/21   Joselyn Hicks MD   lisinopriL (PRINIVIL, ZESTRIL) 5 mg tablet Take 2 Tabs by mouth daily. 1/5/21   [facility], VIR[facility]   lenvatinib (Lenvima) 8 mg/day (4 mg x 2) cap Take 2 Caps by mouth daily. 12/15/20   Joselyn Hicks MD   ondansetron (ZOFRAN ODT) 4 mg disintegrating tablet Take 1 Tab by mouth every eight (8) hours as needed for Nausea. Indications: nausea and vomiting caused by cancer drugs 10/22/20   Joselyn Hicks MD   lidocaine-prilocaine (EMLA) topical cream Apply small amount over port area and cover with band aid one hour before chemo 10/22/20   Joselyn Hicks MD   guaifenesin (MUCINEX PO) Take  by mouth. Provider, Historical   loratadine (Claritin) 10 mg tablet Take 10 mg by mouth. Provider, Historical   MARICHUY'S WORT PO Take  by mouth. Provider, Historical   epinastine 0.05 % drop Apply  to eye. Provider, Historical   raNITIdine (ZANTAC) 150 mg tablet ZANTAC TABS 9/29/11   Provider, Historical   cyclobenzaprine (FLEXERIL) 5 mg tablet take 1 tablet by mouth three times a day if needed 12/5/16   Provider, Historical   valACYclovir (VALTREX) 1 gram tablet Take 1 Tab by mouth three (3) times daily. 12/15/16   Joselyn Hicks MD   EPINEPHrine (EPIPEN) 0.3 mg/0.3 mL injection 0.3 mg by IntraMUSCular route once as needed.     Provider, Historical   ketotifen (ZADITOR) 0.025 % (0.035 %) ophthalmic solution Administer 1 Drop to both eyes every morning. Provider, Historical   Cetirizine (ZYRTEC) 10 mg cap Take 10 mg by mouth nightly. Provider, Historical   SAL ACID/UREA/PETROLATUM,WHITE (KERASAL FOOT EX) by Apply Externally route daily as needed. Provider, Historical   ACCU-CHEK HELDER PLUS TEST STRP strip  7/3/15   Provider, Historical   NITROSTAT 0.4 mg SL tablet  14   Provider, Historical   CALCIUM CARBONATE (MARCELLO-SELTZER ANTACID PO) Take  by mouth daily as needed. Provider, Historical   ibuprofen (MOTRIN) 200 mg tablet Take 200 mg by mouth every six (6) hours as needed. Provider, Historical     Allergies   Allergen Reactions    Latex Other (comments)     Burns skin    Acetone Shortness of Breath    Amoxicillin Anaphylaxis    Ciprofloxacin Hives    Pcn [Penicillins] Anaphylaxis    Buspar [Buspirone] Unknown (comments)     Has N&V and makes her feel \"weird\"    Azithromycin Hives    Egg Nausea Only    Other Medication Rash     POPPY seeds     Family History   Problem Relation Age of Onset    Cancer Maternal Aunt         breast    Heart Disease Mother     Diabetes Father     Cancer Sister         CERVICAL    Kidney Disease Brother     Diabetes Brother     Heart Attack Other     Arrhythmia Brother     Diabetes Brother     Anesth Problems Neg Hx    Maternal grandmother \"female\" cancer  in 45s      CT Results (most recent):  Results from Hospital Encounter encounter on 20   CT CHEST W CONT    Narrative EXAM:  CT CHEST W CONT, CT ABD PELV W CONT    INDICATION: Primary serous adenocarcinoma of the endometrium. COMPARISON: PET/CT 10/9/2020. CT abdomen pelvis 2019. TECHNIQUE: Helical CT of the chest, abdomen  and pelvis  with oral and  intravenous contrast.  Coronal and sagittal reformats are performed. CT dose  reduction was achieved through use of a standardized protocol tailored for this  examination and automatic exposure control for dose modulation.  Adaptive  statistical iterative reconstruction (ASIR) was utilized. FINDINGS:   CT chest:    There is stable multinodular thyroid enlargement. Left chest Port-A-Cath  terminates in the SVC. The aorta and main pulmonary artery are normal in  caliber. The heart size is normal.  There is no pericardial or pleural effusion. There are no enlarged axillary, mediastinal, or hilar lymph nodes. There is no lung mass or airspace opacity. There is no pneumothorax. The  central airways are clear. CT abdomen and pelvis: The liver, spleen, pancreas, and adrenal glands are normal. The gall bladder is  present  without intra- or extra-hepatic biliary dilatation. The kidneys are symmetric without hydronephrosis. There are no dilated bowel loops. The appendix is surgically absent. Enlarged retroperitoneal lymph nodes are again demonstrated with a  representative left para-aortic lymph node measuring 1.3 x 1.8 cm (series 3,  image 76), previously 1.5 x 1.7 cm on 10/9/2020. A left common iliac lymph node  measures 1.1 x 1.5 cm (series 3, image 91), previously 0.8 x 1.4 cm. Anterior peritoneal/mesenteric soft tissue nodularity is overall mildly  increased since 10/9/2020 with a representative measurement of 2.3 x 6.8 cm  (series 3, image 77), previously 2.6 x 5.5 cm. Peritoneal soft tissue nodularity in the deep left pelvis measures 1.4 x 2.2 cm  (series 3, image 116), previously 2.9 x 3.1 cm. There is no free fluid or free air. The aorta tapers without aneurysm. The urinary bladder is normal. The uterus and ovaries are surgically absent. There is no aggressive bony lesion. Impression IMPRESSION:   1. Mixed response in the abdomen and pelvis compared to 10/9/2020 with mildly  increased anterior peritoneal carcinomatosis. Stable to slightly increased  retroperitoneal lymphadenopathy. Decreased peritoneal soft tissue nodularity in  the deep left pelvis.     2. No evidence for metastatic disease in the chest. IMPRESSION/PLAN:  73 y.o. 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 ECO    Labs/chart reviewed  -Chemotherapy Keytruda immunotherapy today, cycle 5. Lenvima on hold. -Pathology request  -HTN: improved. Continue Lisinopril, continue home checks  -Hx of thyroid nodular goiter, benign follicular bx. Was trending subclinical hyperthyroid, asx, transient. Resolved. -Chronic med issues:    Hx asthma - inhaler PRN   Hx esophageal spasm - uses nitroglycerin  Psychosocial: In good spirits and feels well supported by her friends/work family. Asks many appropriate questions. Long discussion today regarding her employment and impact of her cancer diagnosis on any decision to draw from social security. Agreed to continue information gathering; she is doing very well at the moment. Feels work is essential for her well-being at this point. She agrees to d/w her family more her diagnosis and need to make them aware of future needs expected. Topic of AMDs discussed. Advised to call with questions/concerns. Applying to medicare. 21 I have spent 40 minutes reviewing previous notes, test/path results and requests and face to face with the patient discussing the diagnosis and treatment plan as outlined above.      Electronically signed,      Steffen Trujillo PA-C  Gyn Onc

## 2021-01-26 NOTE — PROGRESS NOTES
[facility] VISIT NOTE    9634  Pt arrived at [facility] ambulatory and in no distress for C5 of Keytruda. Assessment completed, pt complains of low grade fevers that stopped the night before last. States she has had pain in her left hip that radiates down her leg. States MD office aware of fevers and told her it was ok to come in today. States she was afebrile last night and is feeling much better today. Per MELONY Unger, ok to treat today with Keytruda. Left chest port accessed with 1 in edgar no difficulty. Positive blood return noted and labs drawn. Recent Results (from the past 12 hour(s))   CBC WITH AUTOMATED DIFF    Collection Time: 01/26/21 11:28 AM   Result Value Ref Range    WBC 5.7 3.6 - 11.0 K/uL    RBC 4.49 3.80 - 5.20 M/uL    HGB 13.4 11.5 - 16.0 g/dL    HCT 40.9 35.0 - 47.0 %    MCV 91.1 80.0 - 99.0 FL    MCH 29.8 26.0 - 34.0 PG    MCHC 32.8 30.0 - 36.5 g/dL    RDW 13.7 11.5 - 14.5 %    PLATELET 585 311 - 209 K/uL    MPV 8.9 8.9 - 12.9 FL    NRBC 0.0 0  WBC    ABSOLUTE NRBC 0.00 0.00 - 0.01 K/uL    NEUTROPHILS 69 32 - 75 %    LYMPHOCYTES 19 12 - 49 %    MONOCYTES 9 5 - 13 %    EOSINOPHILS 2 0 - 7 %    BASOPHILS 1 0 - 1 %    IMMATURE GRANULOCYTES 0 0.0 - 0.5 %    ABS. NEUTROPHILS 3.9 1.8 - 8.0 K/UL    ABS. LYMPHOCYTES 1.1 0.8 - 3.5 K/UL    ABS. MONOCYTES 0.5 0.0 - 1.0 K/UL    ABS. EOSINOPHILS 0.1 0.0 - 0.4 K/UL    ABS. BASOPHILS 0.0 0.0 - 0.1 K/UL    ABS. IMM.  GRANS. 0.0 0.00 - 0.04 K/UL    DF AUTOMATED     METABOLIC PANEL, COMPREHENSIVE    Collection Time: 01/26/21 11:28 AM   Result Value Ref Range    Sodium 138 136 - 145 mmol/L    Potassium 4.1 3.5 - 5.1 mmol/L    Chloride 105 97 - 108 mmol/L    CO2 26 21 - 32 mmol/L    Anion gap 7 5 - 15 mmol/L    Glucose 94 65 - 100 mg/dL    BUN 12 6 - 20 MG/DL    Creatinine 0.56 0.55 - 1.02 MG/DL    BUN/Creatinine ratio 21 (H) 12 - 20      GFR est AA >60 >60 ml/min/1.73m2    GFR est non-AA >60 >60 ml/min/1.73m2    Calcium 9.1 8.5 - 10.1 MG/DL    Bilirubin, total 0.3 0.2 - 1.0 MG/DL    ALT (SGPT) 19 12 - 78 U/L    AST (SGOT) 14 (L) 15 - 37 U/L    Alk. phosphatase 68 45 - 117 U/L    Protein, total 6.8 6.4 - 8.2 g/dL    Albumin 3.5 3.5 - 5.0 g/dL    Globulin 3.3 2.0 - 4.0 g/dL    A-G Ratio 1.1 1.1 - 2.2     TSH 3RD GENERATION    Collection Time: 01/26/21 11:28 AM   Result Value Ref Range    TSH 1.92 0.36 - 3.74 uIU/mL   T4, FREE    Collection Time: 01/26/21 11:28 AM   Result Value Ref Range    T4, Free 1.1 0.8 - 1.5 NG/DL   URINALYSIS W/ REFLEX CULTURE    Collection Time: 01/26/21 11:28 AM    Specimen: Urine   Result Value Ref Range    Color YELLOW/STRAW      Appearance CLEAR CLEAR      Specific gravity 1.015 1.003 - 1.030      pH (UA) 6.0 5.0 - 8.0      Protein Negative NEG mg/dL    Glucose Negative NEG mg/dL    Ketone Negative NEG mg/dL    Bilirubin Negative NEG      Blood Negative NEG      Urobilinogen 1.0 0.2 - 1.0 EU/dL    Nitrites Negative NEG      Leukocyte Esterase Negative NEG      UA:UC IF INDICATED PENDING     WBC 0-4 0 - 4 /hpf    RBC 0-5 0 - 5 /hpf    Epithelial cells FEW FEW /lpf    Bacteria Negative NEG /hpf    Hyaline cast 0-2 0 - 5 /lpf     Medications received:  NS at [facility]     Patient Vitals for the past 12 hrs:   Temp Pulse Resp BP   01/26/21 1116 (!) 95.3 °F (35.2 °C) 83 18 125/74     Tolerated treatment well, no adverse reaction noted. Port de-accessed and flushed per protocol. Positive blood return noted. 1415  D/C'd from [facility] ambulatory and in no distress. Next appointment is 2/16/21.

## 2021-01-27 ENCOUNTER — TELEPHONE (OUTPATIENT)
Dept: GYNECOLOGY | Age: 66
End: 2021-01-27

## 2021-01-27 NOTE — TELEPHONE ENCOUNTER
Pt calling d/t time to refill Nuala Boby and has been off of it for a week d/t B/P and headaches, and she is also scheduled for a Covid vaccine tomorrow. Pt informed per Dr. Joel Steel to restart the Lenvima 8mg daily and do not get the covid vaccine as she is currently on immunotherapy.

## 2021-02-09 ENCOUNTER — VIRTUAL VISIT (OUTPATIENT)
Dept: GYNECOLOGY | Age: 66
End: 2021-02-09
Payer: MEDICARE

## 2021-02-09 DIAGNOSIS — C54.1 PRIMARY SEROUS ADENOCARCINOMA OF ENDOMETRIUM (HCC): Primary | ICD-10-CM

## 2021-02-09 PROCEDURE — 99442 PR PHYS/QHP TELEPHONE EVALUATION 11-20 MIN: CPT | Performed by: OBSTETRICS & GYNECOLOGY

## 2021-02-09 RX ORDER — EPINEPHRINE 1 MG/ML
0.3 INJECTION, SOLUTION, CONCENTRATE INTRAVENOUS AS NEEDED
Status: CANCELLED | OUTPATIENT
Start: 2021-02-16

## 2021-02-09 RX ORDER — HYDROCORTISONE SODIUM SUCCINATE 100 MG/2ML
100 INJECTION, POWDER, FOR SOLUTION INTRAMUSCULAR; INTRAVENOUS AS NEEDED
Status: CANCELLED | OUTPATIENT
Start: 2021-02-16

## 2021-02-09 RX ORDER — DIPHENHYDRAMINE HYDROCHLORIDE 50 MG/ML
50 INJECTION, SOLUTION INTRAMUSCULAR; INTRAVENOUS AS NEEDED
Status: CANCELLED
Start: 2021-02-16

## 2021-02-09 RX ORDER — ONDANSETRON 2 MG/ML
8 INJECTION INTRAMUSCULAR; INTRAVENOUS AS NEEDED
Status: CANCELLED | OUTPATIENT
Start: 2021-02-16

## 2021-02-09 RX ORDER — ALBUTEROL SULFATE 0.83 MG/ML
2.5 SOLUTION RESPIRATORY (INHALATION) AS NEEDED
Status: CANCELLED
Start: 2021-02-16

## 2021-02-09 RX ORDER — SODIUM CHLORIDE 9 MG/ML
10 INJECTION INTRAMUSCULAR; INTRAVENOUS; SUBCUTANEOUS AS NEEDED
Status: CANCELLED | OUTPATIENT
Start: 2021-02-16

## 2021-02-09 RX ORDER — ACETAMINOPHEN 325 MG/1
650 TABLET ORAL AS NEEDED
Status: CANCELLED
Start: 2021-02-16

## 2021-02-09 RX ORDER — DIPHENHYDRAMINE HYDROCHLORIDE 50 MG/ML
25 INJECTION, SOLUTION INTRAMUSCULAR; INTRAVENOUS AS NEEDED
Status: CANCELLED
Start: 2021-02-16

## 2021-02-09 RX ORDER — SODIUM CHLORIDE 0.9 % (FLUSH) 0.9 %
10 SYRINGE (ML) INJECTION AS NEEDED
Status: CANCELLED | OUTPATIENT
Start: 2021-02-16

## 2021-02-09 RX ORDER — HEPARIN 100 UNIT/ML
300-500 SYRINGE INTRAVENOUS AS NEEDED
Status: CANCELLED
Start: 2021-02-16

## 2021-02-09 RX ORDER — SODIUM CHLORIDE 9 MG/ML
25 INJECTION, SOLUTION INTRAVENOUS CONTINUOUS
Status: CANCELLED | OUTPATIENT
Start: 2021-02-16

## 2021-02-09 NOTE — PROGRESS NOTES
[facility] GYNECOLOGIC ONCOLOGY  [address] [facility], [address], [address] [facility]  [phone] [facility] [phone]    Office Visit    Patient ID:  Name: Franky Casillas  MRN: [id number]  : 1955/66 y.o. Visit date: 2021    INTERVAL HISTORY:  Franky Casillas is a  female who is an established patient with stage IA, grade 3 uterine carcinoma. She underwent laparoscopic hysterectomy with staging in 2013. She was recommended six cycles of adjuvant Taxol and Carboplatin, which she completed. We elected not to treat with pelvic radiation therapy due to her difficulty with chemotherapy. She presents was last seen in July for routine surveillance. She was without complaints at that time and her exam was negative. She recently had some abdominal discomfort and noted to have some blood in her urine. She was referred to [facility] for evaluation. She had a CT of the abdomen/pelvis with them. It revealed retroperitoneal lymphadenopathy, peritoneal carcinomatosis, and trace ascites. She also reported a negative colonoscopy within the last month. She is scheduled for a MMG in a few weeks. I sent her for a PET/CT to better evaluate the extent of disease. She presented to review the results and discuss treatment. Her disease is not amenable to surgical resection. Systemic therapy is her only viable option. I thought immunotherapy would be the best choice, ahead of additional chemotherapy. This would consist of IV Keytruda and PO Lenvima. If that is not successful, we will consider retreatment with Taxol/Carboplatin or single agent Doxil. She has completed 5 cycles so far. She had continued to complain of intermittent headaches and elevations in her BP, despite lowering the dose of the Lenvima. We held the [facility] for a week before starting back at 8 mg. So far she has tolerated that well and the headaches have not returned.   Her interval CT on 20 demonstrated a mixed response. PMH:  Past Medical History:   Diagnosis Date    Abnormal Pap smear 1995    with f/u normal    Anemia     Arthritis     Asthma     Cancer ([facility] [address]     ENDOMETRIAL    Environmental allergies     GERD (gastroesophageal reflux disease)     Goiter     Other unknown and unspecified cause of morbidity or mortality     POSSIBLE ESOPHAGEAL SPASM, ? OBSTRUCTION DUE TO GOITER    PMB (postmenopausal bleeding)     S/P chemotherapy, time since greater than 12 weeks     LAST CHEMO 10/2014 PER PATIENT    Thyroid disease     goiter     PSH:  Past Surgical History:   Procedure Laterality Date    HX APPENDECTOMY      HX BUNIONECTOMY      HX GYN  3/13/13    TLH/BSO    HX HEENT  4/22/13    TOOTH REMOVED    HX ORTHOPAEDIC  1980'S    BUNIONECTOMY    HX VASCULAR ACCESS      port    PA BREAST SURGERY PROCEDURE UNLISTED  1/12/16    right breast bx     SOC:  Social History     Socioeconomic History    Marital status:      Spouse name: Not on file    Number of children: Not on file    Years of education: Not on file    Highest education level: Not on file   Occupational History    Not on file   Social Needs    Financial resource strain: Not on file    Food insecurity     Worry: Not on file     Inability: Not on file    Transportation needs     Medical: Not on file     Non-medical: Not on file   Tobacco Use    Smoking status: Never Smoker    Smokeless tobacco: Never Used   Substance and Sexual Activity    Alcohol use:  Yes     Alcohol/week: 0.0 standard drinks     Comment: RARE OCC    Drug use: No    Sexual activity: Not on file   Lifestyle    Physical activity     Days per week: Not on file     Minutes per session: Not on file    Stress: Not on file   Relationships    Social connections     Talks on phone: Not on file     Gets together: Not on file     Attends Yazidi service: Not on file     Active member of club or organization: Not on file     Attends meetings of clubs or organizations: Not on file     Relationship status: Not on file    Intimate partner violence     Fear of current or ex partner: Not on file     Emotionally abused: Not on file     Physically abused: Not on file     Forced sexual activity: Not on file   Other Topics Concern    Not on file   Social History Narrative    Not on file     Family History  Family History   Problem Relation Age of Onset    Cancer Maternal Aunt         breast    Heart Disease Mother     Diabetes Father     Cancer Sister         CERVICAL    Kidney Disease Brother     Diabetes Brother     Heart Attack Other     Arrhythmia Brother     Diabetes Brother     Anesth Problems Neg Hx      Medications:  Current Outpatient Medications on File Prior to Visit   Medication Sig Dispense Refill    lisinopriL (PRINIVIL, ZESTRIL) 10 mg tablet Take 1 Tab by mouth daily. 30 Tab 2    lisinopriL (PRINIVIL, ZESTRIL) 5 mg tablet Take 2 Tabs by mouth daily. 30 Tab 5    lenvatinib (Lenvima) 8 mg/day (4 mg x 2) cap Take 2 Caps by mouth daily. 60 Cap 2    ondansetron (ZOFRAN ODT) 4 mg disintegrating tablet Take 1 Tab by mouth every eight (8) hours as needed for Nausea. Indications: nausea and vomiting caused by cancer drugs 30 Tab 3    lidocaine-prilocaine (EMLA) topical cream Apply small amount over port area and cover with band aid one hour before chemo 30 g 3    guaifenesin (MUCINEX PO) Take  by mouth.  loratadine (Claritin) 10 mg tablet Take 10 mg by mouth.  MARICHUY'S WORT PO Take  by mouth.  epinastine 0.05 % drop Apply  to eye.  raNITIdine (ZANTAC) 150 mg tablet ZANTAC TABS      cyclobenzaprine (FLEXERIL) 5 mg tablet take 1 tablet by mouth three times a day if needed  0    valACYclovir (VALTREX) 1 gram tablet Take 1 Tab by mouth three (3) times daily. 21 Tab 3    EPINEPHrine (EPIPEN) 0.3 mg/0.3 mL injection 0.3 mg by IntraMUSCular route once as needed.       ketotifen (ZADITOR) 0.025 % (0.035 %) ophthalmic solution Administer 1 Drop to both eyes every morning.  Cetirizine (ZYRTEC) 10 mg cap Take 10 mg by mouth nightly.  SAL ACID/UREA/PETROLATUM,WHITE (KERASAL FOOT EX) by Apply Externally route daily as needed.  ACCU-CHEK HELDER PLUS TEST STRP strip   0    NITROSTAT 0.4 mg SL tablet       CALCIUM CARBONATE (MARCELLO-SELTZER ANTACID PO) Take  by mouth daily as needed.  ibuprofen (MOTRIN) 200 mg tablet Take 200 mg by mouth every six (6) hours as needed. No current facility-administered medications on file prior to visit. Allergies: Allergies   Allergen Reactions    Latex Other (comments)     Burns skin    Acetone Shortness of Breath    Amoxicillin Anaphylaxis    Ciprofloxacin Hives    Pcn [Penicillins] Anaphylaxis    Buspar [Buspirone] Unknown (comments)     Has N&V and makes her feel \"weird\"    Azithromycin Hives    Egg Nausea Only    Other Medication Rash     POPPY seeds       ROS:  Negative     OBJECTIVE:    PHYSICAL EXAM  VITAL SIGNS: There were no vitals taken for this visit. GENERAL KAIA:    HEENT:    RESPIRATORY:    CARDIOVASC:    GASTROINT:    MUSCULOSKEL:    EXTREMITIES:    PELVIC:    RECTAL:    PRIYANKA SURVEY:    NEURO:        CT of abdomen/pelvis (12/20/16)  The visualized portions of the lung bases are clear.     There are no focal abnormalities within the liver, spleen, pancreas, adrenal  glands or kidneys. No gallstones are noted.     The aorta tapers without aneurysm. There is no retroperitoneal adenopathy or  mass. There is no ascites or free intraperitoneal air.     There is no small or large bowel distention. The appendix is surgically absent.      The uterus and ovaries are surgically absent. There is no pelvic mass or  adenopathy.     Mild degenerative changes are noted in the lumbar spine.       IMPRESSION: Status post hysterectomy, bilateral oophorectomy, and appendectomy. No acute abnormality.       CT of abdomen/pelvis (8/13/20)  LOWER CHEST: The visualized portions of the lung bases are clear. ABDOMEN:  Liver: The liver is normal in size and contour with no focal abnormality. The  attenuation of the liver is decreased throughout. Gallbladder and bile ducts: There is no calcified gallstone or biliary  dilatation. Spleen: No abnormality. Pancreas: No abnormality. Adrenal glands: No abnormality. Kidneys: No abnormality. PELVIS:  Reproductive organs: The uterus and ovaries are absent. Bladder: No abnormality. BOWEL AND MESENTERY: The small bowel is normal.  There is no mesenteric mass or  adenopathy. The appendix is absent. PERITONEUM: There is no ascites or free intraperitoneal air. RETROPERITONEUM: The aorta  tapers without aneurysm. There is no retroperitoneal  adenopathy or mass. There is no pelvic mass or adenopathy. BONES AND SOFT TISSUES: The bones and soft tissues of the abdominal wall are  within normal limits.     IMPRESSION:   1. Status post hysterectomy and bilateral oophorectomy. 2. No evidence of recurrent or metastatic disease within the abdomen or pelvis. 3. Hepatic steatosis. 4. Status post appendectomy.       PET/CT (10/9/20)  HEAD/NECK: No apparent foci of abnormal hypermetabolism. Cerebral evaluation is  limited by normal intense activity.     CHEST: No foci of abnormal hypermetabolism.     ABDOMEN/PELVIS:   Peritoneal nodules are hypermetabolic, SUV 12. Retroperitoneal adenopathy is hypermetabolic, SUV 6. Right deep pelvic adenopathy is hypermetabolic, SUV 8. Left pelvic cul-de-sac mass is hypermetabolic, SUV 11.     SKELETON: No foci of abnormal hypermetabolism in the axial and visualized  appendicular skeleton.     IMPRESSION:   1. Peritoneal carcinomatosis. 2. 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 3. Left pelvic cul-de-sac tumor. CT of chest/abdomen/pelvis (12/30/20)  CT chest:    There is stable multinodular thyroid enlargement. Left chest Port-A-Cath  terminates in the SVC.  The aorta and main pulmonary artery are normal in  caliber. The heart size is normal.  There is no pericardial or pleural effusion.        There are no enlarged axillary, mediastinal, or hilar lymph nodes.      There is no lung mass or airspace opacity. There is no pneumothorax. The  central airways are clear.     CT abdomen and pelvis: The liver, spleen, pancreas, and adrenal glands are normal. The gall bladder is  present  without intra- or extra-hepatic biliary dilatation.       The kidneys are symmetric without hydronephrosis.      There are no dilated bowel loops. The appendix is surgically absent.       Enlarged retroperitoneal lymph nodes are again demonstrated with a  representative left para-aortic lymph node measuring 1.3 x 1.8 cm (series 3,  image 76), previously 1.5 x 1.7 cm on 10/9/2020. A left common iliac lymph node  measures 1.1 x 1.5 cm (series 3, image 91), previously 0.8 x 1.4 cm.       Anterior peritoneal/mesenteric soft tissue nodularity is overall mildly  increased since 10/9/2020 with a representative measurement of 2.3 x 6.8 cm  (series 3, image 77), previously 2.6 x 5.5 cm.     Peritoneal soft tissue nodularity in the deep left pelvis measures 1.4 x 2.2 cm  (series 3, image 116), previously 2.9 x 3.1 cm.     There is no free fluid or free air. The aorta tapers without aneurysm.     The urinary bladder is normal. The uterus and ovaries are surgically absent.     There is no aggressive bony lesion.     IMPRESSION:   1. Mixed response in the abdomen and pelvis compared to 10/9/2020 with mildly  increased anterior peritoneal carcinomatosis. Stable to slightly increased  retroperitoneal lymphadenopathy. Decreased peritoneal soft tissue nodularity in  the deep left pelvis.     2. No evidence for metastatic disease in the chest.      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   She completed six cycles of adjuvant Taxol and Carboplatin chemotherapy. She now has recurrent disease and is being treated with the regimen of IV Keytruda and PO Lenvima. She has completed 5 cycles so far. Her most recent CT scan demonstrated a mixed response, though her CA-125 had significantly improved. I recommend that we continue with the current regimen and repeat imaging after another cycle. Altaf Varner is a 73 y.o. female, evaluated via audio-only technology on 2/9/2021 for No chief complaint on file. Assessment & Plan:   Diagnoses and all orders for this visit:    1. Primary serous adenocarcinoma of endometrium (HCC)  -     CT CHEST W CONT; Future  -     CT ABD PELV W CONT; Future        Subjective:       Prior to Admission medications    Medication Sig Start Date End Date Taking? Authorizing Provider   lisinopriL (PRINIVIL, ZESTRIL) 10 mg tablet Take 1 Tab by mouth daily. 1/6/21   Cortney Hurtado MD   lisinopriL (PRINIVIL, ZESTRIL) 5 mg tablet Take 2 Tabs by mouth daily. 1/5/21   Giancarlo Cline PA-C   lenvatinib (Lenvima) 8 mg/day (4 mg x 2) cap Take 2 Caps by mouth daily. 12/15/20   Cortney Hurtado MD   ondansetron (ZOFRAN ODT) 4 mg disintegrating tablet Take 1 Tab by mouth every eight (8) hours as needed for Nausea. Indications: nausea and vomiting caused by cancer drugs 10/22/20   Cortney Hurtado MD   lidocaine-prilocaine (EMLA) topical cream Apply small amount over port area and cover with band aid one hour before chemo 10/22/20   Cortney Hurtado MD   guaifenesin (MUCINEX PO) Take  by mouth. Provider, Historical   loratadine (Claritin) 10 mg tablet Take 10 mg by mouth. Provider, Historical   MARICHUY'S WORT PO Take  by mouth. Provider, Historical   epinastine 0.05 % drop Apply  to eye.     Provider, Historical   raNITIdine (ZANTAC) 150 mg tablet ZANTAC TABS 9/29/11   Provider, Historical   cyclobenzaprine (FLEXERIL) 5 mg tablet take 1 tablet by mouth three times a day if needed 12/5/16   Provider, Historical   valACYclovir (VALTREX) 1 gram tablet Take 1 Tab by mouth three (3) times daily. 12/15/16   Cortney Hurtado MD   EPINEPHrine (EPIPEN) 0.3 mg/0.3 mL injection 0.3 mg by IntraMUSCular route once as needed. Provider, Historical   ketotifen (ZADITOR) 0.025 % (0.035 %) ophthalmic solution Administer 1 Drop to both eyes every morning. Provider, Historical   Cetirizine (ZYRTEC) 10 mg cap Take 10 mg by mouth nightly. Provider, Historical   SAL ACID/UREA/PETROLATUM,WHITE (KERASAL FOOT EX) by Apply Externally route daily as needed. Provider, Historical   ACCU-CHEK HELDER PLUS TEST STRP strip  7/3/15   Provider, Historical   NITROSTAT 0.4 mg SL tablet  9/22/14   Provider, Historical   CALCIUM CARBONATE (MARCELLO-SELTZER ANTACID PO) Take  by mouth daily as needed. Provider, Historical   ibuprofen (MOTRIN) 200 mg tablet Take 200 mg by mouth every six (6) hours as needed. Provider, Historical     Patient Active Problem List   Diagnosis Code    Malignant neoplasm of corpus uteri, except isthmus ([facility] [address] C54.9     Patient Active Problem List    Diagnosis Date Noted    Malignant neoplasm of corpus uteri, except isthmus ([facility] [address] 02/28/2013     Current Outpatient Medications   Medication Sig Dispense Refill    lisinopriL (PRINIVIL, ZESTRIL) 10 mg tablet Take 1 Tab by mouth daily. 30 Tab 2    lisinopriL (PRINIVIL, ZESTRIL) 5 mg tablet Take 2 Tabs by mouth daily. 30 Tab 5    lenvatinib (Lenvima) 8 mg/day (4 mg x 2) cap Take 2 Caps by mouth daily. 60 Cap 2    ondansetron (ZOFRAN ODT) 4 mg disintegrating tablet Take 1 Tab by mouth every eight (8) hours as needed for Nausea. Indications: nausea and vomiting caused by cancer drugs 30 Tab 3    lidocaine-prilocaine (EMLA) topical cream Apply small amount over port area and cover with band aid one hour before chemo 30 g 3    guaifenesin (MUCINEX PO) Take  by mouth.  loratadine (Claritin) 10 mg tablet Take 10 mg by mouth.  MARICHUY'S WORT PO Take  by mouth.       epinastine 0.05 % drop Apply  to eye.  raNITIdine (ZANTAC) 150 mg tablet ZANTAC TABS      cyclobenzaprine (FLEXERIL) 5 mg tablet take 1 tablet by mouth three times a day if needed  0    valACYclovir (VALTREX) 1 gram tablet Take 1 Tab by mouth three (3) times daily. 21 Tab 3    EPINEPHrine (EPIPEN) 0.3 mg/0.3 mL injection 0.3 mg by IntraMUSCular route once as needed.  ketotifen (ZADITOR) 0.025 % (0.035 %) ophthalmic solution Administer 1 Drop to both eyes every morning.  Cetirizine (ZYRTEC) 10 mg cap Take 10 mg by mouth nightly.  SAL ACID/UREA/PETROLATUM,WHITE (KERASAL FOOT EX) by Apply Externally route daily as needed.  ACCU-CHEK HELDER PLUS TEST STRP strip   0    NITROSTAT 0.4 mg SL tablet       CALCIUM CARBONATE (MARCELLO-SELTZER ANTACID PO) Take  by mouth daily as needed.  ibuprofen (MOTRIN) 200 mg tablet Take 200 mg by mouth every six (6) hours as needed. Allergies   Allergen Reactions    Latex Other (comments)     Burns skin    Acetone Shortness of Breath    Amoxicillin Anaphylaxis    Ciprofloxacin Hives    Pcn [Penicillins] Anaphylaxis    Buspar [Buspirone] Unknown (comments)     Has N&V and makes her feel \"weird\"    Azithromycin Hives    Egg Nausea Only    Other Medication Rash     POPPY seeds     Past Medical History:   Diagnosis Date    Abnormal Pap smear 1995    with f/u normal    Anemia     Arthritis     Asthma     Cancer ([facility] [address]     ENDOMETRIAL    Environmental allergies     GERD (gastroesophageal reflux disease)     Goiter     Other unknown and unspecified cause of morbidity or mortality     POSSIBLE ESOPHAGEAL SPASM, ?  OBSTRUCTION DUE TO GOITER    PMB (postmenopausal bleeding)     S/P chemotherapy, time since greater than 12 weeks     LAST CHEMO 10/2014 PER PATIENT    Thyroid disease     goiter     Past Surgical History:   Procedure Laterality Date    HX APPENDECTOMY      HX BUNIONECTOMY      HX GYN  3/13/13    TLH/BSO    HX HEENT  4/22/13    TOOTH REMOVED    HX ORTHOPAEDIC  1980'S    BUNIONECTOMY    HX VASCULAR ACCESS      port    SD BREAST SURGERY PROCEDURE UNLISTED  1/12/16    right breast bx     Family History   Problem Relation Age of Onset    Cancer Maternal Aunt         breast    Heart Disease Mother     Diabetes Father     Cancer Sister         CERVICAL    Kidney Disease Brother     Diabetes Brother     Heart Attack Other     Arrhythmia Brother     Diabetes Brother     Anesth Problems Neg Hx      Social History     Tobacco Use    Smoking status: Never Smoker    Smokeless tobacco: Never Used   Substance Use Topics    Alcohol use: Yes     Alcohol/week: 0.0 standard drinks     Comment: RARE OCC       ROS    Patient-Reported Vitals 1/19/2021   Patient-Reported Systolic  917   Patient-Reported Diastolic 96        Cletus Bumpers, who was evaluated through a patient-initiated, synchronous (real-time) audio only encounter, and/or her healthcare decision maker, is aware that it is a billable service, with coverage as determined by her insurance carrier. She provided verbal consent to proceed: Yes. She has not had a related appointment within my department in the past 7 days or scheduled within the next 24 hours.       Total Time: minutes: 11-20 minutes      Shirlene Ham MD  2/9/2021

## 2021-02-16 ENCOUNTER — HOSPITAL ENCOUNTER (OUTPATIENT)
Dept: INFUSION THERAPY | Age: 66
Discharge: HOME | End: 2021-02-16
Payer: MEDICARE

## 2021-02-16 VITALS
TEMPERATURE: 96.3 F | RESPIRATION RATE: 18 BRPM | DIASTOLIC BLOOD PRESSURE: 83 MMHG | HEART RATE: 69 BPM | SYSTOLIC BLOOD PRESSURE: 158 MMHG

## 2021-02-16 DIAGNOSIS — R53.83 FATIGUE, UNSPECIFIED TYPE: Primary | ICD-10-CM

## 2021-02-16 DIAGNOSIS — G47.9 SLEEP DISTURBANCES: ICD-10-CM

## 2021-02-16 DIAGNOSIS — C54.9 MALIGNANT NEOPLASM OF CORPUS UTERI, EXCEPT ISTHMUS (HCC): Primary | ICD-10-CM

## 2021-02-16 LAB
ALBUMIN SERPL-MCNC: 3.4 G/DL (ref 3.5–5)
ALBUMIN/GLOB SERPL: 0.9 {RATIO} (ref 1.1–2.2)
ALP SERPL-CCNC: 72 U/L (ref 45–117)
ALT SERPL-CCNC: 25 U/L (ref 12–78)
ANION GAP SERPL CALC-SCNC: 5 MMOL/L (ref 5–15)
APPEARANCE UR: CLEAR
AST SERPL-CCNC: 21 U/L (ref 15–37)
BACTERIA URNS QL MICRO: NEGATIVE /HPF
BASOPHILS # BLD: 0 K/UL (ref 0–0.1)
BASOPHILS NFR BLD: 1 % (ref 0–1)
BILIRUB SERPL-MCNC: 0.3 MG/DL (ref 0.2–1)
BILIRUB UR QL: NEGATIVE
BUN SERPL-MCNC: 10 MG/DL (ref 6–20)
BUN/CREAT SERPL: 17 (ref 12–20)
CALCIUM SERPL-MCNC: 8.8 MG/DL (ref 8.5–10.1)
CANCER AG125 SERPL-ACNC: 56 U/ML (ref 1.5–35)
CHLORIDE SERPL-SCNC: 107 MMOL/L (ref 97–108)
CO2 SERPL-SCNC: 28 MMOL/L (ref 21–32)
COLOR UR: NORMAL
CREAT SERPL-MCNC: 0.6 MG/DL (ref 0.55–1.02)
DIFFERENTIAL METHOD BLD: ABNORMAL
EOSINOPHIL # BLD: 0.1 K/UL (ref 0–0.4)
EOSINOPHIL NFR BLD: 3 % (ref 0–7)
EPITH CASTS URNS QL MICRO: NORMAL /LPF
ERYTHROCYTE [DISTWIDTH] IN BLOOD BY AUTOMATED COUNT: 13.8 % (ref 11.5–14.5)
GLOBULIN SER CALC-MCNC: 3.8 G/DL (ref 2–4)
GLUCOSE SERPL-MCNC: 83 MG/DL (ref 65–100)
GLUCOSE UR STRIP.AUTO-MCNC: NEGATIVE MG/DL
HCT VFR BLD AUTO: 41.3 % (ref 35–47)
HGB BLD-MCNC: 13.9 G/DL (ref 11.5–16)
HGB UR QL STRIP: NEGATIVE
HYALINE CASTS URNS QL MICRO: NORMAL /LPF (ref 0–5)
IMM GRANULOCYTES # BLD AUTO: 0 K/UL (ref 0–0.04)
IMM GRANULOCYTES NFR BLD AUTO: 1 % (ref 0–0.5)
KETONES UR QL STRIP.AUTO: NEGATIVE MG/DL
LEUKOCYTE ESTERASE UR QL STRIP.AUTO: NEGATIVE
LYMPHOCYTES # BLD: 1 K/UL (ref 0.8–3.5)
LYMPHOCYTES NFR BLD: 24 % (ref 12–49)
MCH RBC QN AUTO: 29.8 PG (ref 26–34)
MCHC RBC AUTO-ENTMCNC: 33.7 G/DL (ref 30–36.5)
MCV RBC AUTO: 88.4 FL (ref 80–99)
MONOCYTES # BLD: 0.4 K/UL (ref 0–1)
MONOCYTES NFR BLD: 10 % (ref 5–13)
NEUTS SEG # BLD: 2.7 K/UL (ref 1.8–8)
NEUTS SEG NFR BLD: 61 % (ref 32–75)
NITRITE UR QL STRIP.AUTO: NEGATIVE
NRBC # BLD: 0 K/UL (ref 0–0.01)
NRBC BLD-RTO: 0 PER 100 WBC
PH UR STRIP: 6.5 [PH] (ref 5–8)
PLATELET # BLD AUTO: 211 K/UL (ref 150–400)
PMV BLD AUTO: 9 FL (ref 8.9–12.9)
POTASSIUM SERPL-SCNC: 3.9 MMOL/L (ref 3.5–5.1)
PROT SERPL-MCNC: 7.2 G/DL (ref 6.4–8.2)
PROT UR STRIP-MCNC: NEGATIVE MG/DL
RBC # BLD AUTO: 4.67 M/UL (ref 3.8–5.2)
RBC #/AREA URNS HPF: NORMAL /HPF (ref 0–5)
SODIUM SERPL-SCNC: 140 MMOL/L (ref 136–145)
SP GR UR REFRACTOMETRY: 1.01 (ref 1–1.03)
T4 SERPL-MCNC: 8 UG/DL (ref 4.8–13.9)
TSH SERPL DL<=0.05 MIU/L-ACNC: 2.96 UIU/ML (ref 0.36–3.74)
UA: UC IF INDICATED,UAUC: NORMAL
UROBILINOGEN UR QL STRIP.AUTO: 0.2 EU/DL (ref 0.2–1)
WBC # BLD AUTO: 4.2 K/UL (ref 3.6–11)
WBC URNS QL MICRO: NORMAL /HPF (ref 0–4)

## 2021-02-16 PROCEDURE — 86304 IMMUNOASSAY TUMOR CA 125: CPT

## 2021-02-16 PROCEDURE — 74011000258 HC RX REV CODE- 258: Performed by: OBSTETRICS & GYNECOLOGY

## 2021-02-16 PROCEDURE — 74011250636 HC RX REV CODE- 250/636: Performed by: OBSTETRICS & GYNECOLOGY

## 2021-02-16 PROCEDURE — 99214 OFFICE O/P EST MOD 30 MIN: CPT | Performed by: PHYSICIAN ASSISTANT

## 2021-02-16 PROCEDURE — 84436 ASSAY OF TOTAL THYROXINE: CPT

## 2021-02-16 PROCEDURE — 36415 COLL VENOUS BLD VENIPUNCTURE: CPT

## 2021-02-16 PROCEDURE — 80053 COMPREHEN METABOLIC PANEL: CPT

## 2021-02-16 PROCEDURE — 81001 URINALYSIS AUTO W/SCOPE: CPT

## 2021-02-16 PROCEDURE — 84443 ASSAY THYROID STIM HORMONE: CPT

## 2021-02-16 PROCEDURE — 85025 COMPLETE CBC W/AUTO DIFF WBC: CPT

## 2021-02-16 PROCEDURE — 96413 CHEMO IV INFUSION 1 HR: CPT

## 2021-02-16 PROCEDURE — 77030012965 HC NDL HUBR BBMI -A

## 2021-02-16 RX ORDER — SODIUM CHLORIDE 9 MG/ML
10 INJECTION INTRAMUSCULAR; INTRAVENOUS; SUBCUTANEOUS AS NEEDED
Status: ACTIVE | OUTPATIENT
Start: 2021-02-16 | End: 2021-02-16

## 2021-02-16 RX ORDER — SODIUM CHLORIDE 0.9 % (FLUSH) 0.9 %
10 SYRINGE (ML) INJECTION AS NEEDED
Status: DISPENSED | OUTPATIENT
Start: 2021-02-16 | End: 2021-02-16

## 2021-02-16 RX ORDER — HEPARIN 100 UNIT/ML
300-500 SYRINGE INTRAVENOUS AS NEEDED
Status: ACTIVE | OUTPATIENT
Start: 2021-02-16 | End: 2021-02-16

## 2021-02-16 RX ORDER — SODIUM CHLORIDE 9 MG/ML
25 INJECTION, SOLUTION INTRAVENOUS CONTINUOUS
Status: DISCONTINUED | OUTPATIENT
Start: 2021-02-16 | End: 2021-02-17 | Stop reason: HOSPADM

## 2021-02-16 RX ADMIN — SODIUM CHLORIDE 10 ML: 9 INJECTION INTRAMUSCULAR; INTRAVENOUS; SUBCUTANEOUS at 12:30

## 2021-02-16 RX ADMIN — HEPARIN 500 UNITS: 100 SYRINGE at 13:52

## 2021-02-16 RX ADMIN — SODIUM CHLORIDE 25 ML/HR: 900 INJECTION, SOLUTION INTRAVENOUS at 13:14

## 2021-02-16 RX ADMIN — Medication 10 ML: at 12:30

## 2021-02-16 RX ADMIN — Medication 10 ML: at 13:52

## 2021-02-16 RX ADMIN — SODIUM CHLORIDE 200 MG: 9 INJECTION, SOLUTION INTRAVENOUS at 13:16

## 2021-02-16 NOTE — PROGRESS NOTES
[address], [address], [address]  P [phone]  F [phone]      Patient ID:  Name:  Alan Potter  MRN:  [id number]  :  1955/66 y.o. Date:  2021      Tori Quiñonez MD: Eliecer Jo MD  PCP: Jose A Shrestha MD     Primary Diagnosis: uterine cancer  Date of Diagnosis: 3/2013      Current Agent: Keytruda/Lenvima  Cycle: 6      HPI:  73 y.o. 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 She is now found with recurrence noted on imaging studies pelvic adenopathy and peritoneal carcinomatosis. She is recommended combination Keytruda/Lenvima. OncTx History:  3/2013 [facility] [place] Hyst/BSO  FINAL PATHOLOGIC DIAGNOSIS  1.  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  Histologic grade: High grade  Myometrial invasion: Depth of invasion: 0.3 cm  Myometrial thickness: 1.9 cm  Involvement of cervix: Not involved  Extent of involvement of other organs:  Right ovary: Not involved  Left ovary: Not involved  Right fallopian tube: Not involved  Left fallopian tube: Not involved  Right parametrial tissue: Not involved  Left parametrial tissue: Not involved  Lymphovascular invasion: Not identified  Additional pathologic findings:  Focal adenomyosis  Benign simple cyst of left ovary  Paratubal cysts  Pathologic staging (pTNM):  Primary tumor (pT): pT1a  Regional lymph nodes (pN): pN0  Pelvic lymph nodes:  Number examined: 17  Number involved: 0  Distant metastasis (M): Not applicable  2. Omentum, omentectomy:Benign adipose tissue, negative for carcinoma  3. Lymph nodes, right pelvic, excision:Seven lymph nodes, negative for metastatic carcinoma (0/7)  4. Lymph nodes, left pelvic, excision:Ten lymph nodes, negative for metastatic carcinoma (0/10)    MMR proficient   5/2013 - 9/2013 Taxol/carboplatin   10/9/20 PET/CT:    1. Peritoneal carcinomatosis. 2. 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 3. Left pelvic cul-de-sac tumor   11/3/20 Keytruda/lenvima     Reduced lenvima 8mg d/t HTN   12/30/20 CT CAP impression: Mixed response in the abdomen and pelvis compared to 10/9/2020 with mildly increased anterior peritoneal carcinomatosis. Stable to slightly increased retroperitoneal lymphadenopathy. Decreased peritoneal soft tissue nodularity in the deep left pelvis. No evidence for metastatic disease in the chest.               SUBJECTIVE:  nAdres Mckinnon presents for immunotherapy. HA resolved following DC lenvima. She has restarted and no noted HA yet. BP remains elevated in the AM.  Difficulty with sleep maintenance and AM fatigue. No nocturia. Falls asleep in arm chair, moves to bed, keeps TV on for noise d/t highway noise. Naps rarely. Asthma controlled. Lives alone. Otherwise doing well. Denies pain, N/V, derm/oral lesions. No GI/ c/o. Continues to work full time, unable to work from home. She remains very active at work. No residual effects s/p her therapy in 2013. She lives alone. Does not feel overly close to her children. Her daughter lives next door and brother nearby. Her son lives near [place]. She feels most supported by her brother Jeannine Cox, close friend Mylene and her work family. She still has difficulty feeling comfortable asking family for help. Looking into/questions assisted with SS.   Work for her is a positive stressor, has few hobbies and uncertain of purpose w/o work. ROS  Constitutional: no weight loss, fever, night sweats  Respiratory: no cough, shortness of breath, or wheezing  Cardiovascular: no chest pain or dyspnea on exertion  Heme: No abnormal bleeding, no epistaxis.   Gastrointestinal: no abdominal pain, no dysphagia, change in bowel habits, or black or bloody stools  Genito-Urinary: no dysuria, trouble voiding, or hematuria  Musculoskeletal: negative for - gait disturbance or joint swelling  Neurological: negative for - behavioral changes, dizziness, headaches, memory loss or numbness/tingling  Derm: negative  Psych: negative for anxiety and depression       OBJECTIVE:  Physical Exam  Visit Vitals  BP (!) 156/89 (BP 1 Location: Left upper arm, BP Patient Position: At rest)   Pulse 73   Resp 18          General:  alert, cooperative, no distress       HEENT: without pallor, sclera without jaundice, oral mucosa without lesions,      Cardiac:  Regular rate and rhythm        Lungs:  clear to auscultation bilaterally          Port:  clean, dry, no drainage  Abdomen:  soft, protuberant, nondistended, nontender       Lymph:  no lymphadenopathy   Extremity: no edema, redness or tenderness in the calves or thighs    Recent Labs     02/16/21  1046   WBC 4.2   ANEU 2.7   HGB 13.9   HCT 41.3   MCV 88.4   MCH 29.8        Recent Labs     02/16/21  1046      K 3.9      GLU 83   BUN 10   CREA 0.60   CA 8.8   ALB 3.4*   TBILI 0.3   ALT 25     UA no protein    Lab Results   Component Value Date/Time    TSH 2.96 02/16/2021 10:46 AM    TSH 1.92 01/26/2021 11:28 AM    TSH 0.83 01/05/2021 10:53 AM    TSH 0.07 (L) 12/15/2020 11:05 AM    TSH 0.67 11/03/2020 11:42 AM     Lab Results   Component Value Date/Time    TSH 2.96 02/16/2021 10:46 AM    T4, Free 1.1 01/26/2021 11:28 AM    T4, Total 8.0 02/16/2021 10:46 AM         Tumor markers  Lab Results   Component Value Date/Time    CA-125 25 01/05/2021 10:53 AM    Cancer Ag (CA) 125 98.9 (H) 10/23/2020 10:02 AM         Patient Active Problem List   Diagnosis Code    Malignant neoplasm of corpus uteri, except isthmus (HCC) C54.9     Past Medical History:   Diagnosis Date    Abnormal Pap smear 1995    with f/u normal    Anemia     Arthritis     Asthma     Cancer ([facility] [address]     ENDOMETRIAL    Environmental allergies     GERD (gastroesophageal reflux disease)     Goiter     Other unknown and unspecified cause of morbidity or mortality     POSSIBLE ESOPHAGEAL SPASM, ? OBSTRUCTION DUE TO GOITER    PMB (postmenopausal bleeding)     S/P chemotherapy, time since greater than 12 weeks     LAST CHEMO 10/2014 PER PATIENT    Thyroid disease     goiter     Prior to Admission medications    Medication Sig Start Date End Date Taking? Authorizing Provider   lisinopriL (PRINIVIL, ZESTRIL) 10 mg tablet Take 1 Tab by mouth daily. 1/6/21   Antonio Sánchez MD   lisinopriL (PRINIVIL, ZESTRIL) 5 mg tablet Take 2 Tabs by mouth daily. 1/5/21   Jose Luis Cline PA-C   lenvatinib (Lenvima) 8 mg/day (4 mg x 2) cap Take 2 Caps by mouth daily. 12/15/20   Antonio Sánchez MD   ondansetron (ZOFRAN ODT) 4 mg disintegrating tablet Take 1 Tab by mouth every eight (8) hours as needed for Nausea. Indications: nausea and vomiting caused by cancer drugs 10/22/20   Antonio Sánchez MD   lidocaine-prilocaine (EMLA) topical cream Apply small amount over port area and cover with band aid one hour before chemo 10/22/20   Antonio Sánchez MD   guaifenesin (MUCINEX PO) Take  by mouth. Provider, Historical   loratadine (Claritin) 10 mg tablet Take 10 mg by mouth. Provider, Historical   MARICHUY'S WORT PO Take  by mouth. Provider, Historical   epinastine 0.05 % drop Apply  to eye.     Provider, Historical   raNITIdine (ZANTAC) 150 mg tablet ZANTAC TABS 9/29/11   Provider, Historical   cyclobenzaprine (FLEXERIL) 5 mg tablet take 1 tablet by mouth three times a day if needed 12/5/16   Provider, Historical   valACYclovir (VALTREX) 1 gram tablet Take 1 Tab by mouth three (3) times daily. 12/15/16   Will Rincon MD   EPINEPHrine (EPIPEN) 0.3 mg/0.3 mL injection 0.3 mg by IntraMUSCular route once as needed. Provider, Historical   ketotifen (ZADITOR) 0.025 % (0.035 %) ophthalmic solution Administer 1 Drop to both eyes every morning. Provider, Historical   Cetirizine (ZYRTEC) 10 mg cap Take 10 mg by mouth nightly. Provider, Historical   SAL ACID/UREA/PETROLATUM,WHITE (KERASAL FOOT EX) by Apply Externally route daily as needed. Provider, Historical   ACCU-CHEK HELDER PLUS TEST STRP strip  7/3/15   Provider, Historical   NITROSTAT 0.4 mg SL tablet  14   Provider, Historical   CALCIUM CARBONATE (MARCELLO-SELTZER ANTACID PO) Take  by mouth daily as needed. Provider, Historical   ibuprofen (MOTRIN) 200 mg tablet Take 200 mg by mouth every six (6) hours as needed. Provider, Historical     Allergies   Allergen Reactions    Latex Other (comments)     Burns skin    Acetone Shortness of Breath    Amoxicillin Anaphylaxis    Ciprofloxacin Hives    Pcn [Penicillins] Anaphylaxis    Buspar [Buspirone] Unknown (comments)     Has N&V and makes her feel \"weird\"    Azithromycin Hives    Egg Nausea Only    Other Medication Rash     POPPY seeds     Family History   Problem Relation Age of Onset    Cancer Maternal Aunt         breast    Heart Disease Mother     Diabetes Father     Cancer Sister         CERVICAL    Kidney Disease Brother     Diabetes Brother     Heart Attack Other     Arrhythmia Brother     Diabetes Brother     Anesth Problems Neg Hx    Maternal grandmother \"female\" cancer  in 45s      CT Results (most recent):  Results from Hospital Encounter encounter on 20   CT CHEST W CONT    Narrative EXAM:  CT CHEST W CONT, CT ABD PELV W CONT    INDICATION: Primary serous adenocarcinoma of the endometrium. COMPARISON: PET/CT 10/9/2020. CT abdomen pelvis 2019.     TECHNIQUE: Helical CT of the chest, abdomen  and pelvis  with oral and  intravenous contrast.  Coronal and sagittal reformats are performed. CT dose  reduction was achieved through use of a standardized protocol tailored for this  examination and automatic exposure control for dose modulation. Adaptive  statistical iterative reconstruction (ASIR) was utilized.    FINDINGS:   CT chest:    There is stable multinodular thyroid enlargement. Left chest Port-A-Cath  terminates in the SVC. The aorta and main pulmonary artery are normal in  caliber. The heart size is normal.  There is no pericardial or pleural effusion.       There are no enlarged axillary, mediastinal, or hilar lymph nodes.     There is no lung mass or airspace opacity.  There is no pneumothorax.  The  central airways are clear.    CT abdomen and pelvis:    The liver, spleen, pancreas, and adrenal glands are normal. The gall bladder is  present  without intra- or extra-hepatic biliary dilatation.      The kidneys are symmetric without hydronephrosis.     There are no dilated bowel loops.  The appendix is surgically absent.      Enlarged retroperitoneal lymph nodes are again demonstrated with a  representative left para-aortic lymph node measuring 1.3 x 1.8 cm (series 3,  image 76), previously 1.5 x 1.7 cm on 10/9/2020. A left common iliac lymph node  measures 1.1 x 1.5 cm (series 3, image 91), previously 0.8 x 1.4 cm.      Anterior peritoneal/mesenteric soft tissue nodularity is overall mildly  increased since 10/9/2020 with a representative measurement of 2.3 x 6.8 cm  (series 3, image 77), previously 2.6 x 5.5 cm.    Peritoneal soft tissue nodularity in the deep left pelvis measures 1.4 x 2.2 cm  (series 3, image 116), previously 2.9 x 3.1 cm.    There is no free fluid or free air. The aorta tapers without aneurysm.    The urinary bladder is normal. The uterus and ovaries are surgically absent.    There is no aggressive bony lesion.      Impression IMPRESSION:   1. Mixed response in the abdomen and pelvis compared to  10/9/2020 with mildly  increased anterior peritoneal carcinomatosis. Stable to slightly increased  retroperitoneal lymphadenopathy. Decreased peritoneal soft tissue nodularity in  the deep left pelvis. 2. No evidence for metastatic disease in the chest.              IMPRESSION/PLAN:  73 y.o. 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 ECO    Labs/chart reviewed  -Keytruda immunotherapy today, cycle 6. Joylene Oz restarted. EOD CT next month. -Path MMR proficient. -HTN: stable AM HTN. Continue Lisinopril, continue home checks, may need adjustment.  -Hx of thyroid nodular goiter, benign follicular bx. Was trending subclinical hyperthyroid, asx, transient. Resolved. -Chronic med issues:    Hx asthma - no issues. inhaler PRN   Hx esophageal spasm - uses nitroglycerin  -Sleep disturbances: notable poor sleep hygiene. Consult sleep study, eval for MARGARITA  Psychosocial: In good spirits and feels well supported by her friends/work family. Asks many appropriate questions. She is doing very well at the moment. Feels work is essential for her well-being at this point. Encouraged her again to open dialogue with her family re: her diagnosis and possible future needs expected. Advised to call with questions/concerns. Applying to medicare. 21 I have spent 30minutes reviewing previous notes, test/path results and face to face with the patient discussing the diagnosis and treatment plan as outlined above.    Electronically signed,      Jackeline Villalta PA-C  Gyn Onc

## 2021-02-16 NOTE — PROGRESS NOTES
[facility] - Chemotherapy Progress Note    1050 Pt admit to [facility] for [address]  ambulatory in stable condition. Assessment completed. No new concerns voiced. Port accessed with positive blood return. Labs drawn and sent for processing. IV attached [facility] at [facility]    Patient denies SOB, fever, cough, and/or general not feeling well. Patient denies recent exposure to someone who has tested positive for COVID-19. Patient denies contact with anyone who has a pending COVID test.     Chemotherapy Flowsheet 2/16/2021   Cycle C6   Date 2/16/2021   Drug / Regimen Keytruda   Notes given       Visit Vitals  BP (!) 158/83   Pulse 69   Temp (!) 96.3 °F (35.7 °C)   Resp 18       Medications:  Medications Administered     0.9% sodium chloride infusion     Admin Date  02/16/2021 Action  New Bag Dose  25 mL/hr Rate  25 mL/hr Route  IntraVENous Administered By  Renna Opitz, RN          0.9% sodium chloride injection 10 mL     Admin Date  02/16/2021 Action  Given Dose  10 mL Route  IntraVENous Administered By  Renna Opitz, RN          heparin (porcine) pf 300-500 Units     Admin Date  02/16/2021 Action  Given Dose  500 Units Route  InterCATHeter Administered By  Renna Opitz, RN          pembrolizumab [facility]) 200 mg in 0.9% sodium chloride 100 mL, overfill volume 10 mL IVPB     Admin Date  02/16/2021 Action  New Bag Dose  200 mg Rate  236 mL/hr Route  IntraVENous Administered By  Renna Opitz, RN          sodium chloride (NS) flush 10 mL     Admin Date  02/16/2021 Action  Given Dose  10 mL Route  IntraVENous Administered By  Renna Opitz, RN           Admin Date  02/16/2021 Action  Given Dose  10 mL Route  IntraVENous Administered By  Renna Opitz, RN                1350 Pt tolerated treatment well. Port maintained positive blood return throughout treatment, flushed with positive blood return at conclusion. D/c home ambulatory in no distress. Pt aware of next appointment scheduled for 3/9/21.     Recent Results (from the past 24 hour(s))   CBC WITH AUTOMATED DIFF    Collection Time: 02/16/21 10:46 AM   Result Value Ref Range    WBC 4.2 3.6 - 11.0 K/uL    RBC 4.67 3.80 - 5.20 M/uL    HGB 13.9 11.5 - 16.0 g/dL    HCT 41.3 35.0 - 47.0 %    MCV 88.4 80.0 - 99.0 FL    MCH 29.8 26.0 - 34.0 PG    MCHC 33.7 30.0 - 36.5 g/dL    RDW 13.8 11.5 - 14.5 %    PLATELET 702 909 - 702 K/uL    MPV 9.0 8.9 - 12.9 FL    NRBC 0.0 0  WBC    ABSOLUTE NRBC 0.00 0.00 - 0.01 K/uL    NEUTROPHILS 61 32 - 75 %    LYMPHOCYTES 24 12 - 49 %    MONOCYTES 10 5 - 13 %    EOSINOPHILS 3 0 - 7 %    BASOPHILS 1 0 - 1 %    IMMATURE GRANULOCYTES 1 (H) 0.0 - 0.5 %    ABS. NEUTROPHILS 2.7 1.8 - 8.0 K/UL    ABS. LYMPHOCYTES 1.0 0.8 - 3.5 K/UL    ABS. MONOCYTES 0.4 0.0 - 1.0 K/UL    ABS. EOSINOPHILS 0.1 0.0 - 0.4 K/UL    ABS. BASOPHILS 0.0 0.0 - 0.1 K/UL    ABS. IMM. GRANS. 0.0 0.00 - 0.04 K/UL    DF AUTOMATED     METABOLIC PANEL, COMPREHENSIVE    Collection Time: 02/16/21 10:46 AM   Result Value Ref Range    Sodium 140 136 - 145 mmol/L    Potassium 3.9 3.5 - 5.1 mmol/L    Chloride 107 97 - 108 mmol/L    CO2 28 21 - 32 mmol/L    Anion gap 5 5 - 15 mmol/L    Glucose 83 65 - 100 mg/dL    BUN 10 6 - 20 MG/DL    Creatinine 0.60 0.55 - 1.02 MG/DL    BUN/Creatinine ratio 17 12 - 20      GFR est AA >60 >60 ml/min/1.73m2    GFR est non-AA >60 >60 ml/min/1.73m2    Calcium 8.8 8.5 - 10.1 MG/DL    Bilirubin, total 0.3 0.2 - 1.0 MG/DL    ALT (SGPT) 25 12 - 78 U/L    AST (SGOT) 21 15 - 37 U/L    Alk.  phosphatase 72 45 - 117 U/L    Protein, total 7.2 6.4 - 8.2 g/dL    Albumin 3.4 (L) 3.5 - 5.0 g/dL    Globulin 3.8 2.0 - 4.0 g/dL    A-G Ratio 0.9 (L) 1.1 - 2.2     CANCER ANTIGEN 125    Collection Time: 02/16/21 10:46 AM   Result Value Ref Range    CA-125 56 (H) 1.5 - 35.0 U/mL   TSH 3RD GENERATION    Collection Time: 02/16/21 10:46 AM   Result Value Ref Range    TSH 2.96 0.36 - 3.74 uIU/mL   T4 (THYROXINE)    Collection Time: 02/16/21 10:46 AM   Result Value Ref Range T4, Total 8.0 4.8 - 13.9 ug/dL   URINALYSIS W/ REFLEX CULTURE    Collection Time: 02/16/21 10:46 AM    Specimen: Urine   Result Value Ref Range    Color YELLOW/STRAW      Appearance CLEAR CLEAR      Specific gravity 1.006 1.003 - 1.030      pH (UA) 6.5 5.0 - 8.0      Protein Negative NEG mg/dL    Glucose Negative NEG mg/dL    Ketone Negative NEG mg/dL    Bilirubin Negative NEG      Blood Negative NEG      Urobilinogen 0.2 0.2 - 1.0 EU/dL    Nitrites Negative NEG      Leukocyte Esterase Negative NEG      UA:UC IF INDICATED CULTURE NOT INDICATED BY UA RESULT CNI      WBC 0-4 0 - 4 /hpf    RBC 0-5 0 - 5 /hpf    Epithelial cells FEW FEW /lpf    Bacteria Negative NEG /hpf    Hyaline cast 0-2 0 - 5 /lpf

## 2021-02-24 RX ORDER — HEPARIN 100 UNIT/ML
300-500 SYRINGE INTRAVENOUS AS NEEDED
Status: CANCELLED
Start: 2021-03-09

## 2021-02-24 RX ORDER — ALBUTEROL SULFATE 0.83 MG/ML
2.5 SOLUTION RESPIRATORY (INHALATION) AS NEEDED
Status: CANCELLED
Start: 2021-03-09

## 2021-02-24 RX ORDER — ACETAMINOPHEN 325 MG/1
650 TABLET ORAL AS NEEDED
Status: CANCELLED
Start: 2021-03-09

## 2021-02-24 RX ORDER — SODIUM CHLORIDE 9 MG/ML
10 INJECTION INTRAMUSCULAR; INTRAVENOUS; SUBCUTANEOUS AS NEEDED
Status: CANCELLED | OUTPATIENT
Start: 2021-03-09

## 2021-02-24 RX ORDER — DIPHENHYDRAMINE HYDROCHLORIDE 50 MG/ML
50 INJECTION, SOLUTION INTRAMUSCULAR; INTRAVENOUS AS NEEDED
Status: CANCELLED
Start: 2021-03-09

## 2021-02-24 RX ORDER — EPINEPHRINE 1 MG/ML
0.3 INJECTION, SOLUTION, CONCENTRATE INTRAVENOUS AS NEEDED
Status: CANCELLED | OUTPATIENT
Start: 2021-03-09

## 2021-02-24 RX ORDER — HYDROCORTISONE SODIUM SUCCINATE 100 MG/2ML
100 INJECTION, POWDER, FOR SOLUTION INTRAMUSCULAR; INTRAVENOUS AS NEEDED
Status: CANCELLED | OUTPATIENT
Start: 2021-03-09

## 2021-02-24 RX ORDER — ONDANSETRON 2 MG/ML
8 INJECTION INTRAMUSCULAR; INTRAVENOUS AS NEEDED
Status: CANCELLED | OUTPATIENT
Start: 2021-03-09

## 2021-02-24 RX ORDER — SODIUM CHLORIDE 9 MG/ML
25 INJECTION, SOLUTION INTRAVENOUS CONTINUOUS
Status: CANCELLED | OUTPATIENT
Start: 2021-03-09

## 2021-02-24 RX ORDER — SODIUM CHLORIDE 0.9 % (FLUSH) 0.9 %
10 SYRINGE (ML) INJECTION AS NEEDED
Status: CANCELLED | OUTPATIENT
Start: 2021-03-09

## 2021-02-24 RX ORDER — DIPHENHYDRAMINE HYDROCHLORIDE 50 MG/ML
25 INJECTION, SOLUTION INTRAMUSCULAR; INTRAVENOUS AS NEEDED
Status: CANCELLED
Start: 2021-03-09

## 2021-02-25 ENCOUNTER — APPOINTMENT (OUTPATIENT)
Dept: CT IMAGING | Age: 66
End: 2021-02-25
Attending: OBSTETRICS & GYNECOLOGY
Payer: MEDICARE

## 2021-02-25 ENCOUNTER — HOSPITAL ENCOUNTER (OUTPATIENT)
Dept: CT IMAGING | Age: 66
Discharge: HOME | End: 2021-02-25
Attending: OBSTETRICS & GYNECOLOGY
Payer: MEDICARE

## 2021-02-25 DIAGNOSIS — C54.1 PRIMARY SEROUS ADENOCARCINOMA OF ENDOMETRIUM (HCC): ICD-10-CM

## 2021-02-25 PROCEDURE — 74177 CT ABD & PELVIS W/CONTRAST: CPT

## 2021-02-25 PROCEDURE — 74011000636 HC RX REV CODE- 636: Performed by: OBSTETRICS & GYNECOLOGY

## 2021-02-25 PROCEDURE — 71260 CT THORAX DX C+: CPT

## 2021-02-25 RX ADMIN — IOPAMIDOL 100 ML: 755 INJECTION, SOLUTION INTRAVENOUS at 14:11

## 2021-03-02 ENCOUNTER — VIRTUAL VISIT (OUTPATIENT)
Dept: GYNECOLOGY | Age: 66
End: 2021-03-02
Payer: MEDICARE

## 2021-03-02 DIAGNOSIS — C54.1 PRIMARY SEROUS ADENOCARCINOMA OF ENDOMETRIUM (HCC): Primary | ICD-10-CM

## 2021-03-02 DIAGNOSIS — C54.9 MALIGNANT NEOPLASM OF CORPUS UTERI, EXCEPT ISTHMUS (HCC): ICD-10-CM

## 2021-03-02 PROCEDURE — 99442 PR PHYS/QHP TELEPHONE EVALUATION 11-20 MIN: CPT | Performed by: OBSTETRICS & GYNECOLOGY

## 2021-03-02 NOTE — PROGRESS NOTES
[facility] GYNECOLOGIC ONCOLOGY  [address] [facility], [address] [facility]  [phone]    Office Visit    Patient ID:  Name: Alan Potter  MRN: [id number]  : 1955/66 y.o. Visit date: 3/2/2021    INTERVAL HISTORY:  Alan Potter is a  female who is an established patient with stage IA, grade 3 uterine carcinoma. She underwent laparoscopic hysterectomy with staging in 2013. She was recommended six cycles of adjuvant Taxol and Carboplatin, which she completed. We elected not to treat with pelvic radiation therapy due to her difficulty with chemotherapy. She presents was last seen in July for routine surveillance. She was without complaints at that time and her exam was negative. She recently had some abdominal discomfort and noted to have some blood in her urine. She was referred to [facility] for evaluation. She had a CT of the abdomen/pelvis with them. It revealed retroperitoneal lymphadenopathy, peritoneal carcinomatosis, and trace ascites. She also reported a negative colonoscopy within the last month. She is scheduled for a MMG in a few weeks. I sent her for a PET/CT to better evaluate the extent of disease. She presented to review the results and discuss treatment. Her disease is not amenable to surgical resection. Systemic therapy is her only viable option. I thought immunotherapy would be the best choice, ahead of additional chemotherapy. This would consist of IV Keytruda and PO Lenvima. If that is not successful, we will consider retreatment with Taxol/Carboplatin or single agent Doxil. She has completed 6 cycles. Unfortunately, her newest CT now demonstrates progression of disease.         PMH:  Past Medical History:   Diagnosis Date    Abnormal Pap smear     with f/u normal    Anemia     Arthritis     Asthma     Cancer ([facility] [address]     ENDOMETRIAL    Environmental allergies     GERD (gastroesophageal reflux disease)     Goiter     Other unknown and unspecified cause of morbidity or mortality     POSSIBLE ESOPHAGEAL SPASM, ? OBSTRUCTION DUE TO GOITER    PMB (postmenopausal bleeding)     S/P chemotherapy, time since greater than 12 weeks     LAST CHEMO 10/2014 PER PATIENT    Thyroid disease     goiter     PSH:  Past Surgical History:   Procedure Laterality Date    HX APPENDECTOMY      HX BUNIONECTOMY      HX GYN  3/13/13    TLH/BSO    HX HEENT  4/22/13    TOOTH REMOVED    HX ORTHOPAEDIC  1980'S    BUNIONECTOMY    HX VASCULAR ACCESS      port    TX BREAST SURGERY PROCEDURE UNLISTED  1/12/16    right breast bx     SOC:  Social History     Socioeconomic History    Marital status:      Spouse name: Not on file    Number of children: Not on file    Years of education: Not on file    Highest education level: Not on file   Occupational History    Not on file   Social Needs    Financial resource strain: Not on file    Food insecurity     Worry: Not on file     Inability: Not on file    Transportation needs     Medical: Not on file     Non-medical: Not on file   Tobacco Use    Smoking status: Never Smoker    Smokeless tobacco: Never Used   Substance and Sexual Activity    Alcohol use:  Yes     Alcohol/week: 0.0 standard drinks     Comment: RARE OCC    Drug use: No    Sexual activity: Not on file   Lifestyle    Physical activity     Days per week: Not on file     Minutes per session: Not on file    Stress: Not on file   Relationships    Social connections     Talks on phone: Not on file     Gets together: Not on file     Attends Hindu service: Not on file     Active member of club or organization: Not on file     Attends meetings of clubs or organizations: Not on file     Relationship status: Not on file    Intimate partner violence     Fear of current or ex partner: Not on file     Emotionally abused: Not on file     Physically abused: Not on file     Forced sexual activity: Not on file Other Topics Concern    Not on file   Social History Narrative    Not on file     Family History  Family History   Problem Relation Age of Onset    Cancer Maternal Aunt         breast    Heart Disease Mother     Diabetes Father     Cancer Sister         CERVICAL    Kidney Disease Brother     Diabetes Brother     Heart Attack Other     Arrhythmia Brother     Diabetes Brother     Anesth Problems Neg Hx      Medications:  Current Outpatient Medications on File Prior to Visit   Medication Sig Dispense Refill    lisinopriL (PRINIVIL, ZESTRIL) 10 mg tablet Take 1 Tab by mouth daily. 30 Tab 2    lisinopriL (PRINIVIL, ZESTRIL) 5 mg tablet Take 2 Tabs by mouth daily. 30 Tab 5    lenvatinib (Lenvima) 8 mg/day (4 mg x 2) cap Take 2 Caps by mouth daily. 60 Cap 2    ondansetron (ZOFRAN ODT) 4 mg disintegrating tablet Take 1 Tab by mouth every eight (8) hours as needed for Nausea. Indications: nausea and vomiting caused by cancer drugs 30 Tab 3    lidocaine-prilocaine (EMLA) topical cream Apply small amount over port area and cover with band aid one hour before chemo 30 g 3    guaifenesin (MUCINEX PO) Take  by mouth.  loratadine (Claritin) 10 mg tablet Take 10 mg by mouth.  MAIRCHUY'S WORT PO Take  by mouth.  epinastine 0.05 % drop Apply  to eye.  raNITIdine (ZANTAC) 150 mg tablet ZANTAC TABS      cyclobenzaprine (FLEXERIL) 5 mg tablet take 1 tablet by mouth three times a day if needed  0    valACYclovir (VALTREX) 1 gram tablet Take 1 Tab by mouth three (3) times daily. 21 Tab 3    EPINEPHrine (EPIPEN) 0.3 mg/0.3 mL injection 0.3 mg by IntraMUSCular route once as needed.  ketotifen (ZADITOR) 0.025 % (0.035 %) ophthalmic solution Administer 1 Drop to both eyes every morning.  Cetirizine (ZYRTEC) 10 mg cap Take 10 mg by mouth nightly.  SAL ACID/UREA/PETROLATUM,WHITE (KERASAL FOOT EX) by Apply Externally route daily as needed.       ACCU-CHEK HELDER PLUS TEST STRP strip   0  NITROSTAT 0.4 mg SL tablet       CALCIUM CARBONATE (MARCELLO-SELTZER ANTACID PO) Take  by mouth daily as needed.  ibuprofen (MOTRIN) 200 mg tablet Take 200 mg by mouth every six (6) hours as needed. No current facility-administered medications on file prior to visit. Allergies: Allergies   Allergen Reactions    Latex Other (comments)     Burns skin    Acetone Shortness of Breath    Amoxicillin Anaphylaxis    Ciprofloxacin Hives    Pcn [Penicillins] Anaphylaxis    Buspar [Buspirone] Unknown (comments)     Has N&V and makes her feel \"weird\"    Azithromycin Hives    Egg Nausea Only    Other Medication Rash     POPPY seeds       ROS:  Negative     OBJECTIVE:    PHYSICAL EXAM  VITAL SIGNS: There were no vitals taken for this visit. GENERAL KAIA:    HEENT:    RESPIRATORY:    CARDIOVASC:    GASTROINT:    MUSCULOSKEL:    EXTREMITIES:    PELVIC:    RECTAL:    PRIYANKA SURVEY:    NEURO:        CT of abdomen/pelvis (8/13/20)  LOWER CHEST: The visualized portions of the lung bases are clear. ABDOMEN:  Liver: The liver is normal in size and contour with no focal abnormality. The  attenuation of the liver is decreased throughout. Gallbladder and bile ducts: There is no calcified gallstone or biliary  dilatation. Spleen: No abnormality. Pancreas: No abnormality. Adrenal glands: No abnormality. Kidneys: No abnormality. PELVIS:  Reproductive organs: The uterus and ovaries are absent. Bladder: No abnormality. BOWEL AND MESENTERY: The small bowel is normal.  There is no mesenteric mass or  adenopathy. The appendix is absent. PERITONEUM: There is no ascites or free intraperitoneal air. RETROPERITONEUM: The aorta  tapers without aneurysm. There is no retroperitoneal  adenopathy or mass. There is no pelvic mass or adenopathy. BONES AND SOFT TISSUES: The bones and soft tissues of the abdominal wall are  within normal limits.     IMPRESSION:   1.  Status post hysterectomy and bilateral oophorectomy. 2. No evidence of recurrent or metastatic disease within the abdomen or pelvis. 3. Hepatic steatosis. 4. Status post appendectomy.       PET/CT (10/9/20)  HEAD/NECK: No apparent foci of abnormal hypermetabolism. Cerebral evaluation is  limited by normal intense activity.     CHEST: No foci of abnormal hypermetabolism.     ABDOMEN/PELVIS:   Peritoneal nodules are hypermetabolic, SUV 12. Retroperitoneal adenopathy is hypermetabolic, SUV 6. Right deep pelvic adenopathy is hypermetabolic, SUV 8. Left pelvic cul-de-sac mass is hypermetabolic, SUV 11.     SKELETON: No foci of abnormal hypermetabolism in the axial and visualized  appendicular skeleton.     IMPRESSION:   1. Peritoneal carcinomatosis. 2. 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 3. Left pelvic cul-de-sac tumor. CT of chest/abdomen/pelvis (12/30/20)  CT chest:    There is stable multinodular thyroid enlargement. Left chest Port-A-Cath  terminates in the SVC. The aorta and main pulmonary artery are normal in  caliber. The heart size is normal.  There is no pericardial or pleural effusion.        There are no enlarged axillary, mediastinal, or hilar lymph nodes.      There is no lung mass or airspace opacity. There is no pneumothorax. The  central airways are clear.     CT abdomen and pelvis: The liver, spleen, pancreas, and adrenal glands are normal. The gall bladder is  present  without intra- or extra-hepatic biliary dilatation.       The kidneys are symmetric without hydronephrosis.      There are no dilated bowel loops. The appendix is surgically absent.       Enlarged retroperitoneal lymph nodes are again demonstrated with a  representative left para-aortic lymph node measuring 1.3 x 1.8 cm (series 3,  image 76), previously 1.5 x 1.7 cm on 10/9/2020.  A left common iliac lymph node  measures 1.1 x 1.5 cm (series 3, image 91), previously 0.8 x 1.4 cm.       Anterior peritoneal/mesenteric soft tissue nodularity is overall mildly  increased since 10/9/2020 with a representative measurement of 2.3 x 6.8 cm  (series 3, image 77), previously 2.6 x 5.5 cm.     Peritoneal soft tissue nodularity in the deep left pelvis measures 1.4 x 2.2 cm  (series 3, image 116), previously 2.9 x 3.1 cm.     There is no free fluid or free air. The aorta tapers without aneurysm.     The urinary bladder is normal. The uterus and ovaries are surgically absent.     There is no aggressive bony lesion.     IMPRESSION:   1. Mixed response in the abdomen and pelvis compared to 10/9/2020 with mildly  increased anterior peritoneal carcinomatosis. Stable to slightly increased  retroperitoneal lymphadenopathy. Decreased peritoneal soft tissue nodularity in  the deep left pelvis.     2. No evidence for metastatic disease in the chest.      CT of chest/abdomen/pelvis (2/25/21)  CHEST WALL:No axillary or supraclavicular adenopathy. THYROID: Large hypodensity in the thyroid gland unchanged. MEDIASTINUM: 12 mm cardiophrenic lymph node unchanged. RAMEZ: No mass or lymphadenopathy. THORACIC AORTA: No dissection or aneurysm. MAIN PULMONARY ARTERY: Normal in caliber. TRACHEA/BRONCHI: Patent. ESOPHAGUS: No wall thickening or dilatation. HEART: Coronary atherosclerotic disease  PLEURA: No effusion or pneumothorax. LUNGS: No nodule, mass, or airspace disease. LIVER: No mass or biliary dilatation. GALLBLADDER: Unremarkable. BILIARY TREE: Unremarkable  SPLEEN: Unremarkable  PANCREAS: No mass or ductal dilatation. ADRENALS: Unremarkable. KIDNEYS: No mass, calculus, or hydronephrosis. STOMACH: Unremarkable. SMALL BOWEL: No dilatation or wall thickening. COLON: No dilatation or wall thickening. APPENDIX: Not visualized  PERITONEUM: Peritoneal carcinomatosis is again noted. 6.8 x 3.1 cm peritoneal  mass anteriorly is slightly increased in size.  There is adjacent probably  confluent lesion measuring 3.4 x 2.6 cm which is increased in size from the  prior study. Deep left peritoneal nodularity is not significantly changed. Free  fluid in the pelvis increase in interval  RETROPERITONEUM: Left retroperitoneal lymph node measuring 1.5 x 1.4 cm slightly  decreased in size from 0.8 x 1.3 cm. Right retroperitoneal lymph node measuring  0.8 x 0.9 cm is increase in size of common iliac lymph node now measures 1.4 x  1.2 cm not significantly changed in size. REPRODUCTIVE ORGANS: Hysterectomy  URINARY BLADDER: No mass or calculus. BONES: No destructive bone lesion. ADDITIONAL COMMENTS: N/A     IMPRESSION  1. Overall increase in omental caking along the anterior peritoneum (the prior  study.     2.  Retroperitoneal adenopathy demonstrating variable response to therapy. Some  of these have increased in interval.     3.  Pelvic soft tissue in the left deep pelvis is not significantly changed  although not well visualized.     4.  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 She completed six cycles of adjuvant Taxol and Carboplatin chemotherapy. She now has recurrent disease and is being treated with the regimen of IV Keytruda and PO Lenvima. She has completed 6 cycles. Her last CT scan after 3 cycles demonstrated a mixed response, though her CA-125 had significantly improved. I recommend that we continue with the current regimen. Her newest CT demonstrates progressive disease. We will stop the current regimen at this time. We discussed options of treatment. Since it has been almost 8 years since her Taxol/Carboplatin, I recommend that we retreat her with that regimen, though I suggested a lower dose of each. She was counseled on the expected side effects and potential toxicities of the proposed regimen. Her questions were answered to her satisfaction and she wishes to proceed with the planned treatment.       Alayna Jasso Lisa Lucio is a 73 y.o. female, evaluated via audio-only technology on 3/2/2021 for Chemotherapy (Review ct scan results. Labs and C7 keytruda on 3/9/21)      Assessment & Plan:   Diagnoses and all orders for this visit:    1. Primary serous adenocarcinoma of endometrium ([facility] [address]    2. Malignant neoplasm of corpus uteri, except isthmus (HCC)        Subjective:       Prior to Admission medications    Medication Sig Start Date End Date Taking? Authorizing Provider   lisinopriL (PRINIVIL, ZESTRIL) 10 mg tablet Take 1 Tab by mouth daily. 1/6/21  Yes Elio Wilkerson MD   lisinopriL (PRINIVIL, ZESTRIL) 5 mg tablet Take 2 Tabs by mouth daily. 1/5/21  Yes Hieu Cline PA-C   lenvatinib (Lenvima) 8 mg/day (4 mg x 2) cap Take 2 Caps by mouth daily. 12/15/20  Yes Elio Wilkerson MD   ondansetron (ZOFRAN ODT) 4 mg disintegrating tablet Take 1 Tab by mouth every eight (8) hours as needed for Nausea. Indications: nausea and vomiting caused by cancer drugs 10/22/20  Yes Elio Wilkerson MD   lidocaine-prilocaine (EMLA) topical cream Apply small amount over port area and cover with band aid one hour before chemo 10/22/20  Yes Elio Wilkerson MD   guaifenesin (MUCINEX PO) Take  by mouth. Yes Provider, Historical   loratadine (Claritin) 10 mg tablet Take 10 mg by mouth. Yes Provider, Historical   MARICHUY'S WORT PO Take  by mouth. Yes Provider, Historical   epinastine 0.05 % drop Apply  to eye. Yes Provider, Historical   raNITIdine (ZANTAC) 150 mg tablet ZANTAC TABS 9/29/11  Yes Provider, Historical   cyclobenzaprine (FLEXERIL) 5 mg tablet take 1 tablet by mouth three times a day if needed 12/5/16  Yes Provider, Historical   valACYclovir (VALTREX) 1 gram tablet Take 1 Tab by mouth three (3) times daily. 12/15/16  Yes Elio Wilkerson MD   EPINEPHrine (EPIPEN) 0.3 mg/0.3 mL injection 0.3 mg by IntraMUSCular route once as needed.    Yes Provider, Historical   ketotifen (ZADITOR) 0.025 % (0.035 %) ophthalmic solution Administer 1 Drop to both eyes every morning. Yes Provider, Historical   Cetirizine (ZYRTEC) 10 mg cap Take 10 mg by mouth nightly. Yes Provider, Historical   SAL ACID/UREA/PETROLATUM,WHITE (KERASAL FOOT EX) by Apply Externally route daily as needed. Yes Provider, Historical   ACCU-CHEK HELDER PLUS TEST STRP strip  7/3/15  Yes Provider, Historical   NITROSTAT 0.4 mg SL tablet  9/22/14  Yes Provider, Historical   CALCIUM CARBONATE (MARCELLO-SELTZER ANTACID PO) Take  by mouth daily as needed. Yes Provider, Historical   ibuprofen (MOTRIN) 200 mg tablet Take 200 mg by mouth every six (6) hours as needed. Yes Provider, Historical     Patient Active Problem List   Diagnosis Code    Malignant neoplasm of corpus uteri, except isthmus ([facility] [address] C54.9     Patient Active Problem List    Diagnosis Date Noted    Malignant neoplasm of corpus uteri, except isthmus ([facility] [address] 02/28/2013     Current Outpatient Medications   Medication Sig Dispense Refill    lisinopriL (PRINIVIL, ZESTRIL) 10 mg tablet Take 1 Tab by mouth daily. 30 Tab 2    lisinopriL (PRINIVIL, ZESTRIL) 5 mg tablet Take 2 Tabs by mouth daily. 30 Tab 5    lenvatinib (Lenvima) 8 mg/day (4 mg x 2) cap Take 2 Caps by mouth daily. 60 Cap 2    ondansetron (ZOFRAN ODT) 4 mg disintegrating tablet Take 1 Tab by mouth every eight (8) hours as needed for Nausea. Indications: nausea and vomiting caused by cancer drugs 30 Tab 3    lidocaine-prilocaine (EMLA) topical cream Apply small amount over port area and cover with band aid one hour before chemo 30 g 3    guaifenesin (MUCINEX PO) Take  by mouth.  loratadine (Claritin) 10 mg tablet Take 10 mg by mouth.  MARICHUY'S WORT PO Take  by mouth.  epinastine 0.05 % drop Apply  to eye.  raNITIdine (ZANTAC) 150 mg tablet ZANTAC TABS      cyclobenzaprine (FLEXERIL) 5 mg tablet take 1 tablet by mouth three times a day if needed  0    valACYclovir (VALTREX) 1 gram tablet Take 1 Tab by mouth three (3) times daily.  21 Tab 3    EPINEPHrine (EPIPEN) 0.3 mg/0.3 mL injection 0.3 mg by IntraMUSCular route once as needed.  ketotifen (ZADITOR) 0.025 % (0.035 %) ophthalmic solution Administer 1 Drop to both eyes every morning.  Cetirizine (ZYRTEC) 10 mg cap Take 10 mg by mouth nightly.  SAL ACID/UREA/PETROLATUM,WHITE (KERASAL FOOT EX) by Apply Externally route daily as needed.  ACCU-CHEK HELDER PLUS TEST STRP strip   0    NITROSTAT 0.4 mg SL tablet       CALCIUM CARBONATE (MARCELLO-SELTZER ANTACID PO) Take  by mouth daily as needed.  ibuprofen (MOTRIN) 200 mg tablet Take 200 mg by mouth every six (6) hours as needed. Allergies   Allergen Reactions    Latex Other (comments)     Burns skin    Acetone Shortness of Breath    Amoxicillin Anaphylaxis    Ciprofloxacin Hives    Pcn [Penicillins] Anaphylaxis    Buspar [Buspirone] Unknown (comments)     Has N&V and makes her feel \"weird\"    Azithromycin Hives    Egg Nausea Only    Other Medication Rash     POPPY seeds     Past Medical History:   Diagnosis Date    Abnormal Pap smear 1995    with f/u normal    Anemia     Arthritis     Asthma     Cancer ([facility] [address]     ENDOMETRIAL    Environmental allergies     GERD (gastroesophageal reflux disease)     Goiter     Other unknown and unspecified cause of morbidity or mortality     POSSIBLE ESOPHAGEAL SPASM, ?  OBSTRUCTION DUE TO GOITER    PMB (postmenopausal bleeding)     S/P chemotherapy, time since greater than 12 weeks     LAST CHEMO 10/2014 PER PATIENT    Thyroid disease     goiter     Past Surgical History:   Procedure Laterality Date    HX APPENDECTOMY      HX BUNIONECTOMY      HX GYN  3/13/13    TLH/BSO    HX HEENT  4/22/13    TOOTH REMOVED    HX ORTHOPAEDIC  1980'S    BUNIONECTOMY    HX VASCULAR ACCESS      port    CT BREAST SURGERY PROCEDURE UNLISTED  1/12/16    right breast bx     Family History   Problem Relation Age of Onset    Cancer Maternal Aunt         breast    Heart Disease Mother     Diabetes Father  Cancer Sister         CERVICAL    Kidney Disease Brother     Diabetes Brother     Heart Attack Other     Arrhythmia Brother     Diabetes Brother     Anesth Problems Neg Hx      Social History     Tobacco Use    Smoking status: Never Smoker    Smokeless tobacco: Never Used   Substance Use Topics    Alcohol use: Yes     Alcohol/week: 0.0 standard drinks     Comment: RARE OCC       ROS    Patient-Reported Vitals 1/19/2021   Patient-Reported Systolic  695   Patient-Reported Diastolic 96        Pauline Villavicencio, who was evaluated through a patient-initiated, synchronous (real-time) audio only encounter, and/or her healthcare decision maker, is aware that it is a billable service, with coverage as determined by her insurance carrier. She provided verbal consent to proceed: Yes. She has not had a related appointment within my department in the past 7 days or scheduled within the next 24 hours.       Total Time: minutes: 11-20 minutes      Virgie Rincon MD  3/2/2021

## 2021-03-05 ENCOUNTER — TELEPHONE (OUTPATIENT)
Dept: GYNECOLOGY | Age: 66
End: 2021-03-05

## 2021-03-05 RX ORDER — DIPHENHYDRAMINE HYDROCHLORIDE 50 MG/ML
25 INJECTION, SOLUTION INTRAMUSCULAR; INTRAVENOUS AS NEEDED
Status: CANCELLED
Start: 2021-03-09

## 2021-03-05 RX ORDER — HYDROCORTISONE SODIUM SUCCINATE 100 MG/2ML
100 INJECTION, POWDER, FOR SOLUTION INTRAMUSCULAR; INTRAVENOUS AS NEEDED
Status: CANCELLED | OUTPATIENT
Start: 2021-03-09

## 2021-03-05 RX ORDER — LORAZEPAM 2 MG/ML
0.5 INJECTION INTRAMUSCULAR
Status: CANCELLED
Start: 2021-03-09

## 2021-03-05 RX ORDER — PALONOSETRON 0.05 MG/ML
0.25 INJECTION, SOLUTION INTRAVENOUS ONCE
Status: CANCELLED | OUTPATIENT
Start: 2021-03-09 | End: 2021-03-09

## 2021-03-05 RX ORDER — SODIUM CHLORIDE 9 MG/ML
10 INJECTION INTRAMUSCULAR; INTRAVENOUS; SUBCUTANEOUS AS NEEDED
Status: CANCELLED | OUTPATIENT
Start: 2021-03-09

## 2021-03-05 RX ORDER — EPINEPHRINE 1 MG/ML
0.3 INJECTION, SOLUTION, CONCENTRATE INTRAVENOUS AS NEEDED
Status: CANCELLED | OUTPATIENT
Start: 2021-03-09

## 2021-03-05 RX ORDER — ONDANSETRON 2 MG/ML
8 INJECTION INTRAMUSCULAR; INTRAVENOUS AS NEEDED
Status: CANCELLED | OUTPATIENT
Start: 2021-03-09

## 2021-03-05 RX ORDER — SODIUM CHLORIDE 0.9 % (FLUSH) 0.9 %
10 SYRINGE (ML) INJECTION AS NEEDED
Status: CANCELLED | OUTPATIENT
Start: 2021-03-09

## 2021-03-05 RX ORDER — SODIUM CHLORIDE 9 MG/ML
25 INJECTION, SOLUTION INTRAVENOUS CONTINUOUS
Status: CANCELLED | OUTPATIENT
Start: 2021-03-09

## 2021-03-05 RX ORDER — HEPARIN 100 UNIT/ML
300-500 SYRINGE INTRAVENOUS AS NEEDED
Status: CANCELLED
Start: 2021-03-09

## 2021-03-05 RX ORDER — DIPHENHYDRAMINE HYDROCHLORIDE 50 MG/ML
50 INJECTION, SOLUTION INTRAMUSCULAR; INTRAVENOUS AS NEEDED
Status: CANCELLED
Start: 2021-03-09

## 2021-03-05 RX ORDER — ALBUTEROL SULFATE 0.83 MG/ML
2.5 SOLUTION RESPIRATORY (INHALATION) AS NEEDED
Status: CANCELLED
Start: 2021-03-09

## 2021-03-05 RX ORDER — DIPHENHYDRAMINE HYDROCHLORIDE 50 MG/ML
50 INJECTION, SOLUTION INTRAMUSCULAR; INTRAVENOUS ONCE
Status: CANCELLED | OUTPATIENT
Start: 2021-03-09 | End: 2021-03-09

## 2021-03-05 RX ORDER — ACETAMINOPHEN 325 MG/1
650 TABLET ORAL AS NEEDED
Status: CANCELLED
Start: 2021-03-09

## 2021-03-08 DIAGNOSIS — C54.9 MALIGNANT NEOPLASM OF CORPUS UTERI, EXCEPT ISTHMUS (HCC): Primary | ICD-10-CM

## 2021-03-08 RX ORDER — DEXAMETHASONE 4 MG/1
TABLET ORAL
Qty: 36 TAB | Refills: 1 | Status: SHIPPED | OUTPATIENT
Start: 2021-03-08 | End: 2022-03-18 | Stop reason: SDUPTHER

## 2021-03-08 NOTE — TELEPHONE ENCOUNTER
Requested Prescriptions     Signed Prescriptions Disp Refills    dexAMETHasone (Decadron) 4 mg tablet 36 Tab 1     Sig: Take 2 tablets by mouth with breakfast the day before chemo also take 2 tablets with breakfast for 2 days after chemotherapy     Authorizing Provider: Karlene Ordonez     Ordering User: Stephanie George     Rx sent to pharmacy per rajiv Sifuentes to be used during chemotherapy.

## 2021-03-08 NOTE — TELEPHONE ENCOUNTER
Call placed to pt to give her appt for CGO with [place] on 3/9/21 at 8:30am, and C1 Taxol/Carboplatin at [address] [facility] at 9:00am.  Rx sent to pharmacy for Decadron per vo Dr. Felix King.

## 2021-03-09 ENCOUNTER — HOSPITAL ENCOUNTER (OUTPATIENT)
Dept: INFUSION THERAPY | Age: 66
Discharge: HOME | End: 2021-03-09
Payer: MEDICARE

## 2021-03-09 ENCOUNTER — CLINICAL SUPPORT (OUTPATIENT)
Dept: GYNECOLOGY | Age: 66
End: 2021-03-09
Payer: MEDICARE

## 2021-03-09 ENCOUNTER — HOSPITAL ENCOUNTER (OUTPATIENT)
Dept: INFUSION THERAPY | Age: 66
End: 2021-03-09
Payer: MEDICARE

## 2021-03-09 VITALS
OXYGEN SATURATION: 98 % | HEIGHT: 65 IN | RESPIRATION RATE: 18 BRPM | DIASTOLIC BLOOD PRESSURE: 90 MMHG | BODY MASS INDEX: 27.82 KG/M2 | SYSTOLIC BLOOD PRESSURE: 155 MMHG | HEART RATE: 84 BPM | TEMPERATURE: 97.8 F | WEIGHT: 167 LBS

## 2021-03-09 DIAGNOSIS — C54.9 MALIGNANT NEOPLASM OF CORPUS UTERI, EXCEPT ISTHMUS (HCC): Primary | ICD-10-CM

## 2021-03-09 LAB
ALBUMIN SERPL-MCNC: 3.8 G/DL (ref 3.5–5)
ALBUMIN/GLOB SERPL: 0.9 {RATIO} (ref 1.1–2.2)
ALP SERPL-CCNC: 71 U/L (ref 45–117)
ALT SERPL-CCNC: 23 U/L (ref 12–78)
ANION GAP SERPL CALC-SCNC: 7 MMOL/L (ref 5–15)
AST SERPL-CCNC: 17 U/L (ref 15–37)
BASOPHILS # BLD: 0 K/UL (ref 0–0.1)
BASOPHILS NFR BLD: 0 % (ref 0–1)
BILIRUB SERPL-MCNC: 0.5 MG/DL (ref 0.2–1)
BUN SERPL-MCNC: 13 MG/DL (ref 6–20)
BUN/CREAT SERPL: 19 (ref 12–20)
CALCIUM SERPL-MCNC: 9.6 MG/DL (ref 8.5–10.1)
CANCER AG125 SERPL-ACNC: 155 U/ML (ref 1.5–35)
CHLORIDE SERPL-SCNC: 106 MMOL/L (ref 97–108)
CO2 SERPL-SCNC: 23 MMOL/L (ref 21–32)
CREAT SERPL-MCNC: 0.68 MG/DL (ref 0.55–1.02)
DIFFERENTIAL METHOD BLD: ABNORMAL
EOSINOPHIL # BLD: 0 K/UL (ref 0–0.4)
EOSINOPHIL NFR BLD: 0 % (ref 0–7)
ERYTHROCYTE [DISTWIDTH] IN BLOOD BY AUTOMATED COUNT: 14.6 % (ref 11.5–14.5)
GLOBULIN SER CALC-MCNC: 4.1 G/DL (ref 2–4)
GLUCOSE SERPL-MCNC: 181 MG/DL (ref 65–100)
HCT VFR BLD AUTO: 42 % (ref 35–47)
HGB BLD-MCNC: 13.9 G/DL (ref 11.5–16)
IMM GRANULOCYTES # BLD AUTO: 0 K/UL (ref 0–0.04)
IMM GRANULOCYTES NFR BLD AUTO: 0 % (ref 0–0.5)
LYMPHOCYTES # BLD: 0.5 K/UL (ref 0.8–3.5)
LYMPHOCYTES NFR BLD: 10 % (ref 12–49)
MAGNESIUM SERPL-MCNC: 2.1 MG/DL (ref 1.6–2.4)
MCH RBC QN AUTO: 30 PG (ref 26–34)
MCHC RBC AUTO-ENTMCNC: 33.1 G/DL (ref 30–36.5)
MCV RBC AUTO: 90.5 FL (ref 80–99)
MONOCYTES # BLD: 0.1 K/UL (ref 0–1)
MONOCYTES NFR BLD: 3 % (ref 5–13)
NEUTS SEG # BLD: 4.3 K/UL (ref 1.8–8)
NEUTS SEG NFR BLD: 87 % (ref 32–75)
NRBC # BLD: 0 K/UL (ref 0–0.01)
NRBC BLD-RTO: 0 PER 100 WBC
PLATELET # BLD AUTO: 246 K/UL (ref 150–400)
PMV BLD AUTO: 8.9 FL (ref 8.9–12.9)
POTASSIUM SERPL-SCNC: 4.1 MMOL/L (ref 3.5–5.1)
PROT SERPL-MCNC: 7.9 G/DL (ref 6.4–8.2)
RBC # BLD AUTO: 4.64 M/UL (ref 3.8–5.2)
RBC MORPH BLD: ABNORMAL
SODIUM SERPL-SCNC: 136 MMOL/L (ref 136–145)
WBC # BLD AUTO: 4.9 K/UL (ref 3.6–11)

## 2021-03-09 PROCEDURE — 74011250636 HC RX REV CODE- 250/636: Performed by: OBSTETRICS & GYNECOLOGY

## 2021-03-09 PROCEDURE — 83735 ASSAY OF MAGNESIUM: CPT

## 2021-03-09 PROCEDURE — 74011000250 HC RX REV CODE- 250: Performed by: OBSTETRICS & GYNECOLOGY

## 2021-03-09 PROCEDURE — 99214 OFFICE O/P EST MOD 30 MIN: CPT | Performed by: PHYSICIAN ASSISTANT

## 2021-03-09 PROCEDURE — 80053 COMPREHEN METABOLIC PANEL: CPT

## 2021-03-09 PROCEDURE — 86304 IMMUNOASSAY TUMOR CA 125: CPT

## 2021-03-09 PROCEDURE — 36415 COLL VENOUS BLD VENIPUNCTURE: CPT

## 2021-03-09 PROCEDURE — 96367 TX/PROPH/DG ADDL SEQ IV INF: CPT

## 2021-03-09 PROCEDURE — 77030012965 HC NDL HUBR BBMI -A

## 2021-03-09 PROCEDURE — 74011000258 HC RX REV CODE- 258: Performed by: OBSTETRICS & GYNECOLOGY

## 2021-03-09 PROCEDURE — 85025 COMPLETE CBC W/AUTO DIFF WBC: CPT

## 2021-03-09 PROCEDURE — 96375 TX/PRO/DX INJ NEW DRUG ADDON: CPT

## 2021-03-09 PROCEDURE — 96413 CHEMO IV INFUSION 1 HR: CPT

## 2021-03-09 PROCEDURE — G0463 HOSPITAL OUTPT CLINIC VISIT: HCPCS | Performed by: OBSTETRICS & GYNECOLOGY

## 2021-03-09 PROCEDURE — 96415 CHEMO IV INFUSION ADDL HR: CPT

## 2021-03-09 PROCEDURE — 96417 CHEMO IV INFUS EACH ADDL SEQ: CPT

## 2021-03-09 RX ORDER — SODIUM CHLORIDE 0.9 % (FLUSH) 0.9 %
10 SYRINGE (ML) INJECTION AS NEEDED
Status: DISPENSED | OUTPATIENT
Start: 2021-03-09 | End: 2021-03-09

## 2021-03-09 RX ORDER — HEPARIN 100 UNIT/ML
300-500 SYRINGE INTRAVENOUS AS NEEDED
Status: ACTIVE | OUTPATIENT
Start: 2021-03-09 | End: 2021-03-09

## 2021-03-09 RX ORDER — DIPHENHYDRAMINE HYDROCHLORIDE 50 MG/ML
50 INJECTION, SOLUTION INTRAMUSCULAR; INTRAVENOUS ONCE
Status: COMPLETED | OUTPATIENT
Start: 2021-03-09 | End: 2021-03-09

## 2021-03-09 RX ORDER — SODIUM CHLORIDE 9 MG/ML
25 INJECTION, SOLUTION INTRAVENOUS CONTINUOUS
Status: DISPENSED | OUTPATIENT
Start: 2021-03-09 | End: 2021-03-09

## 2021-03-09 RX ORDER — SODIUM CHLORIDE 9 MG/ML
10 INJECTION INTRAMUSCULAR; INTRAVENOUS; SUBCUTANEOUS AS NEEDED
Status: ACTIVE | OUTPATIENT
Start: 2021-03-09 | End: 2021-03-09

## 2021-03-09 RX ORDER — PALONOSETRON 0.05 MG/ML
0.25 INJECTION, SOLUTION INTRAVENOUS ONCE
Status: COMPLETED | OUTPATIENT
Start: 2021-03-09 | End: 2021-03-09

## 2021-03-09 RX ADMIN — FAMOTIDINE 20 MG: 10 INJECTION INTRAVENOUS at 11:58

## 2021-03-09 RX ADMIN — PACLITAXEL 251 MG: 6 INJECTION, SOLUTION INTRAVENOUS at 13:48

## 2021-03-09 RX ADMIN — CARBOPLATIN 533 MG: 10 INJECTION, SOLUTION INTRAVENOUS at 17:10

## 2021-03-09 RX ADMIN — Medication 10 ML: at 09:39

## 2021-03-09 RX ADMIN — SODIUM CHLORIDE 25 ML/HR: 900 INJECTION, SOLUTION INTRAVENOUS at 11:57

## 2021-03-09 RX ADMIN — SODIUM CHLORIDE 150 MG: 900 INJECTION, SOLUTION INTRAVENOUS at 12:42

## 2021-03-09 RX ADMIN — DEXAMETHASONE SODIUM PHOSPHATE 12 MG: 4 INJECTION, SOLUTION INTRA-ARTICULAR; INTRALESIONAL; INTRAMUSCULAR; INTRAVENOUS; SOFT TISSUE at 13:18

## 2021-03-09 RX ADMIN — HEPARIN 500 UNITS: 100 SYRINGE at 17:40

## 2021-03-09 RX ADMIN — Medication 10 ML: at 17:40

## 2021-03-09 RX ADMIN — SODIUM CHLORIDE 10 ML: 9 INJECTION INTRAMUSCULAR; INTRAVENOUS; SUBCUTANEOUS at 09:40

## 2021-03-09 RX ADMIN — DIPHENHYDRAMINE HYDROCHLORIDE 50 MG: 50 INJECTION INTRAMUSCULAR; INTRAVENOUS at 12:00

## 2021-03-09 RX ADMIN — PALONOSETRON 0.25 MG: 0.05 INJECTION, SOLUTION INTRAVENOUS at 11:57

## 2021-03-09 NOTE — PROGRESS NOTES
Per Dr. Susanne Layton patient was given written materials with review for Taxol and Carboplatin. Side effects with management recomendations:     1. Nausea and vomiting ( Zofran ODT, eating small frequent meals, hydration, and to call office if unable to eat or drink with vomiting X4 over 24 hours), Eating Hints before during and after Chemotherapy given,     2. Hair loss ( ACS book with wigs/hair accessories and a 2 page list of names with address and phone numbers of where she may purchase wigs given )    3. Lab abnormalities: Neutropenia (to call office with chills and fever of 100.4), use good handwashing with soap and water frequently, stay away from anyone sick, and stay out of crowds,   4. Diarrhea/Constipation:  Advise she may use Imodium as directed, but if continues with 4 watery stools to call the office, for constipation she can use Miralax  Prescriptions for , Decadron 4 mg #36,  escribed to pts pharmacy, pt has Zofran ODT, and Emla cream at home. All questions answered. Office contact phone number given. Patient given schedule for MD/6 cycles chemotherapy. Consent signed and scanned into CC. Chemotherapy orders set up and sent to Dr. Susanne Layton for signature. Pt has brought bills in with her today to ask for FA, I will reach out to Ludmila Larios, and give pt application for Care Card.

## 2021-03-09 NOTE — PROGRESS NOTES
[facility] - Chemotherapy Progress Note    0915 Pt admit to [address] [place] [address] for Taxol/Carbo ambulatory in stable condition. Assessment completed. No new concerns voiced. Port accessed with positive blood return. Labs drawn and sent for processing. IV attached to NS at [facility]. Patient denies SOB, fever, cough, and/or general not feeling well. Patient denies recent exposure to someone who has tested positive for COVID-19.  Patient denies contact with anyone who has a pending COVID test.     Chemotherapy Flowsheet 3/9/2021   Cycle C1   Date 3/9/2021   Drug / Regimen Taxol/Carbo   Notes given       Visit Vitals  BP (!) 155/90 (BP 1 Location: Left arm, BP Patient Position: At rest)   Pulse 84   Temp 97.8 °F (36.6 °C)   Resp 18   Ht 5' 5\" (1.651 m)   Wt 75.8 kg (167 lb)   SpO2 98%   BMI 27.79 kg/m²       Medications:  Medications Administered     0.9% sodium chloride infusion     Admin Date  03/09/2021 Action  New Bag Dose  25 mL/hr Rate  25 mL/hr Route  IntraVENous Administered By  Yusef Montalvo RN          0.9% sodium chloride injection 10 mL     Admin Date  03/09/2021 Action  Given Dose  10 mL Route  IntraVENous Administered By  Yusef Montalvo RN          CARBOplatin (PARAPLATIN) 533 mg in 0.9% sodium chloride 250 mL, overfill volume 25 mL chemo infusion (GOG)     Admin Date  03/09/2021 Action  New Bag Dose  533 mg Rate  656.6 mL/hr Route  IntraVENous Administered By  Yusef Montalvo RN          dexamethasone (DECADRON) 12 mg in 0.9% sodium chloride 50 mL, overfill volume 5 mL IVPB     Admin Date  03/09/2021 Action  Given Dose  12 mg Rate  232 mL/hr Route  IntraVENous Administered By  Yusef Montalvo RN          diphenhydrAMINE (BENADRYL) injection 50 mg     Admin Date  03/09/2021 Action  Given Dose  50 mg Route  IntraVENous Administered By  Yusef Montalvo RN          famotidine (PF) (PEPCID) 20 mg in 0.9% sodium chloride 10 mL injection     Admin Date  03/09/2021 Action  Given Dose  20 mg Route  IntraVENous Administered By  Manohar Ventura RN          fosaprepitant (EMEND) 150 mg in 0.9% sodium chloride 150 mL IVPB     Admin Date  03/09/2021 Action  Given Dose  150 mg Rate  450 mL/hr Route  IntraVENous Administered By  Manohar Ventura RN          heparin (porcine) pf 300-500 Units     Admin Date  03/09/2021 Action  Given Dose  500 Units Route  InterCATHeter Administered By  Manohar Ventura RN          PACLitaxeL (TAXOL) 251 mg in 0.9% sodium chloride 250 mL, overfill volume 25 mL chemo infusion     Admin Date  03/09/2021 Action  New Bag Dose  251 mg Rate  105.6 mL/hr Route  IntraVENous Administered By  Manohar Ventura RN          palonosetron HCl (ALOXI) injection 0.25 mg     Admin Date  03/09/2021 Action  Given Dose  0.25 mg Route  IntraVENous Administered By  Manohar Ventura RN          sodium chloride (NS) flush 10 mL     Admin Date  03/09/2021 Action  Given Dose  10 mL Route  IntraVENous Administered By  Manohar Ventura RN           Admin Date  03/09/2021 Action  Given Dose  10 mL Route  IntraVENous Administered By  Manohar Ventura RN                1183 Pt tolerated treatment well. Port maintained positive blood return throughout treatment, flushed with positive blood return at conclusion. D/c home ambulatory in no distress.  Pt aware of next appointment scheduled for 3/30/21    Recent Results (from the past 24 hour(s))   CBC WITH AUTOMATED DIFF    Collection Time: 03/09/21  9:32 AM   Result Value Ref Range    WBC 4.9 3.6 - 11.0 K/uL    RBC 4.64 3.80 - 5.20 M/uL    HGB 13.9 11.5 - 16.0 g/dL    HCT 42.0 35.0 - 47.0 %    MCV 90.5 80.0 - 99.0 FL    MCH 30.0 26.0 - 34.0 PG    MCHC 33.1 30.0 - 36.5 g/dL    RDW 14.6 (H) 11.5 - 14.5 %    PLATELET 640 377 - 650 K/uL    MPV 8.9 8.9 - 12.9 FL    NRBC 0.0 0  WBC    ABSOLUTE NRBC 0.00 0.00 - 0.01 K/uL    NEUTROPHILS 87 (H) 32 - 75 %    LYMPHOCYTES 10 (L) 12 - 49 %    MONOCYTES 3 (L) 5 - 13 %    EOSINOPHILS 0 0 - 7 %    BASOPHILS 0 0 - 1 %    IMMATURE GRANULOCYTES 0 0.0 - 0.5 %    ABS. NEUTROPHILS 4.3 1.8 - 8.0 K/UL    ABS. LYMPHOCYTES 0.5 (L) 0.8 - 3.5 K/UL    ABS. MONOCYTES 0.1 0.0 - 1.0 K/UL    ABS. EOSINOPHILS 0.0 0.0 - 0.4 K/UL    ABS. BASOPHILS 0.0 0.0 - 0.1 K/UL    ABS. IMM. GRANS. 0.0 0.00 - 0.04 K/UL    DF SMEAR SCANNED      RBC COMMENTS NORMOCYTIC, NORMOCHROMIC     METABOLIC PANEL, COMPREHENSIVE    Collection Time: 03/09/21  9:32 AM   Result Value Ref Range    Sodium 136 136 - 145 mmol/L    Potassium 4.1 3.5 - 5.1 mmol/L    Chloride 106 97 - 108 mmol/L    CO2 23 21 - 32 mmol/L    Anion gap 7 5 - 15 mmol/L    Glucose 181 (H) 65 - 100 mg/dL    BUN 13 6 - 20 MG/DL    Creatinine 0.68 0.55 - 1.02 MG/DL    BUN/Creatinine ratio 19 12 - 20      GFR est AA >60 >60 ml/min/1.73m2    GFR est non-AA >60 >60 ml/min/1.73m2    Calcium 9.6 8.5 - 10.1 MG/DL    Bilirubin, total 0.5 0.2 - 1.0 MG/DL    ALT (SGPT) 23 12 - 78 U/L    AST (SGOT) 17 15 - 37 U/L    Alk.  phosphatase 71 45 - 117 U/L    Protein, total 7.9 6.4 - 8.2 g/dL    Albumin 3.8 3.5 - 5.0 g/dL    Globulin 4.1 (H) 2.0 - 4.0 g/dL    A-G Ratio 0.9 (L) 1.1 - 2.2     MAGNESIUM    Collection Time: 03/09/21  9:32 AM   Result Value Ref Range    Magnesium 2.1 1.6 - 2.4 mg/dL   CANCER ANTIGEN 125    Collection Time: 03/09/21  9:32 AM   Result Value Ref Range    CA-125 155 (H) 1.5 - 35.0 U/mL

## 2021-03-09 NOTE — PROGRESS NOTES
[address], [address], [address] [facility] [phone]  F [phone]      Patient ID:  Name:  Corrina Reed  MRN:  [id number]  :  1955/66 y.o. Date:  3/9/2021      Tulio Mattson MD: Sherrie Kennedy MD  PCP: Padmini Gann MD     Primary Diagnosis: uterine cancer  Date of Diagnosis: 3/2013      Current Agent: Taxol/carboplatin  Cycle: 1      HPI:  73 y.o. 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 She is now found with recurrence noted on imaging studies pelvic adenopathy and peritoneal carcinomatosis. She is recommended combination Keytruda/Lenvima. [place] History:  [place] [facility] [place]  FINAL PATHOLOGIC DIAGNOSIS  1.  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  Histologic grade: High grade  Myometrial invasion: Depth of invasion: 0.3 cm  Myometrial thickness: 1.9 cm  Involvement of cervix: Not involved  Extent of involvement of other organs:  Right ovary: Not involved  Left ovary: Not involved  Right fallopian tube: Not involved  Left fallopian tube: Not involved  Right parametrial tissue: Not involved  Left parametrial tissue: Not involved  Lymphovascular invasion: Not identified  Additional pathologic findings:  Focal adenomyosis  Benign simple cyst of left ovary  Paratubal cysts  Pathologic staging (pTNM):  Primary tumor (pT): pT1a  Regional lymph nodes (pN): pN0  Pelvic lymph nodes:  Number examined: 17  Number involved: 0  Distant metastasis (M): Not applicable  2. Omentum, omentectomy:Benign adipose tissue, negative for carcinoma  3. Lymph nodes, right pelvic, excision:Seven lymph nodes, negative for metastatic carcinoma (0/7)  4. Lymph nodes, left pelvic, excision:Ten lymph nodes, negative for metastatic carcinoma (0/10)    MMR proficient   5/2013 - 9/2013 Taxol/carboplatin x 6 cycles   10/9/20 PET/CT:    1. Peritoneal carcinomatosis. 2. 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 3. Left pelvic cul-de-sac tumor   11/3/20 - 2/16/21 Keytruda/lenvima x 6 cycles     Reduced lenvima 8mg d/t HTN   12/30/20 CT CAP:   Mixed response in the abdomen and pelvis compared to 10/9/2020 with mildly increased anterior peritoneal carcinomatosis. Stable to slightly increased retroperitoneal lymphadenopathy. Decreased peritoneal soft tissue nodularity in the deep left pelvis. No evidence for metastatic disease in the chest.   2/25/21 CT CAP:   1. Overall increase in omental caking along the anterior peritoneum (the prior study. 2.  Retroperitoneal adenopathy demonstrating variable response to therapy. Some of these have increased in interval  3. Pelvic soft tissue in the left deep pelvis is not significantly changed although not well visualized. 4.  Slight interval increase in pelvic free fluid   3/9/21 [id number]/m2/Carboplatin AUC5 initiated           SUBJECTIVE:  Taiwo Conklin presents for immunotherapy. HA/HTN improved following DC lenvima. Sleep quality better as well. She relates these to the [facility]. She has no c/o today and reports feeling quite well. Continues to work full time, unable to work from home. She remains very active at work. No residual effects s/p her therapy in 2013. She lives alone. Does not feel overly close to her children. Her daughter lives next door and brother nearby. Her son lives near [place].   She feels most supported by her brother Darshan , close friend Mylene and her work family. She still has difficulty feeling comfortable asking family for help. Looking into/questions correction with SS. Work for her is a positive stressor, has few hobbies and uncertain of purpose w/o work. ROS  Constitutional: no weight loss, fever, night sweats  Respiratory: no cough, shortness of breath, or wheezing  Cardiovascular: no chest pain or dyspnea on exertion  Heme: No abnormal bleeding, no epistaxis.   Gastrointestinal: no abdominal pain, no dysphagia, change in bowel habits, or black or bloody stools  Genito-Urinary: no dysuria, trouble voiding, or hematuria  Musculoskeletal: negative for - gait disturbance or joint swelling  Neurological: negative for - behavioral changes, dizziness, headaches, memory loss or numbness/tingling  Derm: negative  Psych: negative for anxiety and depression       OBJECTIVE:  Physical Exam  Visit Vitals  /89 (BP 1 Location: Left upper arm, BP Patient Position: At rest)   Pulse (!) 111   Temp 97.8 °F (36.6 °C)   Resp 18   Ht 5' 5\" (1.651 m)   Wt 167 lb (75.8 kg)   SpO2 98%   BMI 27.79 kg/m²          General:  alert, cooperative, no distress       HEENT: without pallor, sclera without jaundice, oral mucosa without lesions,      Cardiac:  Regular rate and rhythm        Lungs:  clear to auscultation bilaterally          Port:  clean, dry, no drainage  Abdomen:  soft, protuberant, nondistended, nontender       Lymph:  no lymphadenopathy   Extremity: no edema, redness or tenderness in the calves or thighs    Wt Readings from Last 3 Encounters:   01/05/21 167 lb (75.8 kg)   12/12/20 160 lb (72.6 kg)   10/15/20 170 lb (77.1 kg)       Recent Labs     03/09/21  0932   WBC 4.9   ANEU 4.3   HGB 13.9   HCT 42.0   MCV 90.5   MCH 30.0        Recent Labs     03/09/21  0932      K 4.1      *   BUN 13   CREA 0.68   CA 9.6   MG 2.1   ALB 3.8   TBILI 0.5   ALT 23         Lab Results   Component Value Date/Time    TSH 2.96 02/16/2021 10:46 AM    TSH 1.92 01/26/2021 11:28 AM    TSH 0.83 01/05/2021 10:53 AM    TSH 0.07 (L) 12/15/2020 11:05 AM    TSH 0.67 11/03/2020 11:42 AM     Lab Results   Component Value Date/Time    TSH 2.96 02/16/2021 10:46 AM    T4, Free 1.1 01/26/2021 11:28 AM    T4, Total 8.0 02/16/2021 10:46 AM         Tumor markers  CA-125   Date Value Ref Range Status   02/16/2021 56 (H) 1.5 - 35.0 U/mL Final     Comment:     ** Note new reference range and method **  Results may vary depending on . Method used is Team-Match     01/05/2021 25 1.5 - 35.0 U/mL Final     Comment:     ** Note new reference range and method **  Results may vary depending on . Method used is Team-Match     11/24/2020 19 1.5 - 35.0 U/mL Final     Comment:     ** Note new reference range and method **  Results may vary depending on . Method used is Team-Match     07/23/2015 6 0 - 30 U/mL Final     Comment:     Results may vary depending on . Method used is Siemens Advia Centaur  II.     04/21/2015 6 0 - 30 U/mL Final     Comment:     Results may vary depending on . Method used is Siemens Advia Centaur  II.     01/20/2015 <4 0 - 30 U/mL Final     Comment:     Results may vary depending on . Method used is Siemens Advia Centaur  II. Patient Active Problem List   Diagnosis Code    Malignant neoplasm of corpus uteri, except isthmus ([facility] [address] C54.9     Past Medical History:   Diagnosis Date    Abnormal Pap smear 1995    with f/u normal    Anemia     Arthritis     Asthma     Cancer ([facility] [address]     ENDOMETRIAL    Environmental allergies     GERD (gastroesophageal reflux disease)     Goiter     Other unknown and unspecified cause of morbidity or mortality     POSSIBLE ESOPHAGEAL SPASM, ?  OBSTRUCTION DUE TO GOITER    PMB (postmenopausal bleeding)     S/P chemotherapy, time since greater than 12 weeks     LAST CHEMO 10/2014 PER PATIENT    Thyroid disease     goiter     Prior to Admission medications    Medication Sig Start Date End Date Taking? Authorizing Provider   dexAMETHasone (Decadron) 4 mg tablet Take 2 tablets by mouth with breakfast the day before chemo also take 2 tablets with breakfast for 2 days after chemotherapy 3/8/21   Scarlett Escalera MD   lisinopriL (PRINIVIL, ZESTRIL) 10 mg tablet Take 1 Tab by mouth daily. 1/6/21   Scarlett Escalera MD   lisinopriL (PRINIVIL, ZESTRIL) 5 mg tablet Take 2 Tabs by mouth daily. 1/5/21   Lacie Cline PA-C   lenvatinib (Lenvima) 8 mg/day (4 mg x 2) cap Take 2 Caps by mouth daily. 12/15/20   Scarlett Esclaera MD   ondansetron (ZOFRAN ODT) 4 mg disintegrating tablet Take 1 Tab by mouth every eight (8) hours as needed for Nausea. Indications: nausea and vomiting caused by cancer drugs 10/22/20   Scarlett Escalera MD   lidocaine-prilocaine (EMLA) topical cream Apply small amount over port area and cover with band aid one hour before chemo 10/22/20   Scarlett Escalera MD   guaifenesin (MUCINEX PO) Take  by mouth. Provider, Historical   loratadine (Claritin) 10 mg tablet Take 10 mg by mouth. Provider, Historical   MARICHUY'S WORT PO Take  by mouth. Provider, Historical   epinastine 0.05 % drop Apply  to eye. Provider, Historical   raNITIdine (ZANTAC) 150 mg tablet ZANTAC TABS 9/29/11   Provider, Historical   cyclobenzaprine (FLEXERIL) 5 mg tablet take 1 tablet by mouth three times a day if needed 12/5/16   Provider, Historical   valACYclovir (VALTREX) 1 gram tablet Take 1 Tab by mouth three (3) times daily. 12/15/16   Scarlett Escalera MD   EPINEPHrine (EPIPEN) 0.3 mg/0.3 mL injection 0.3 mg by IntraMUSCular route once as needed. Provider, Historical   ketotifen (ZADITOR) 0.025 % (0.035 %) ophthalmic solution Administer 1 Drop to both eyes every morning. Provider, Historical   Cetirizine (ZYRTEC) 10 mg cap Take 10 mg by mouth nightly.     Provider, Historical   SAL ACID/UREA/PETROLATUM,WHITE (KERASAL FOOT EX) by Apply Externally route daily as needed. Provider, Historical   ACCU-CHEK HELDER PLUS TEST STRP strip  7/3/15   Provider, Historical   NITROSTAT 0.4 mg SL tablet  14   Provider, Historical   CALCIUM CARBONATE (MARCELLO-SELTZER ANTACID PO) Take  by mouth daily as needed. Provider, Historical   ibuprofen (MOTRIN) 200 mg tablet Take 200 mg by mouth every six (6) hours as needed. Provider, Historical     Allergies   Allergen Reactions    Latex Other (comments)     Burns skin    Acetone Shortness of Breath    Amoxicillin Anaphylaxis    Ciprofloxacin Hives    Pcn [Penicillins] Anaphylaxis    Buspar [Buspirone] Unknown (comments)     Has N&V and makes her feel \"weird\"    Azithromycin Hives    Egg Nausea Only    Other Medication Rash     POPPY seeds     Family History   Problem Relation Age of Onset    Cancer Maternal Aunt         breast    Heart Disease Mother     Diabetes Father     Cancer Sister         CERVICAL    Kidney Disease Brother     Diabetes Brother     Heart Attack Other     Arrhythmia Brother     Diabetes Brother     Anesth Problems Neg Hx    Maternal grandmother \"female\" cancer  in 45s      CT Results (most recent):  Results from Hospital Encounter encounter on 21   CT ABD PELV W CONT    Narrative EXAM: CT CHEST W CONT, CT ABD PELV W CONT    INDICATION: Endometrial cancer    COMPARISON: 2020    CONTRAST: 100 mL of Isovue-370. TECHNIQUE:   Following the uneventful intravenous administration of contrast, thin axial  images were obtained through the chest, abdomen and pelvis. Coronal and sagittal  reconstructions were generated. Oral contrast was administered. CT dose  reduction was achieved through use of a standardized protocol tailored for this  examination and automatic exposure control for dose modulation. FINDINGS:     CHEST WALL:No axillary or supraclavicular adenopathy.   THYROID: Large hypodensity in the thyroid gland unchanged.  MEDIASTINUM: 12 mm cardiophrenic lymph node unchanged.  RAMEZ: No mass or lymphadenopathy.  THORACIC AORTA: No dissection or aneurysm.  MAIN PULMONARY ARTERY: Normal in caliber.  TRACHEA/BRONCHI: Patent.  ESOPHAGUS: No wall thickening or dilatation.  HEART: Coronary atherosclerotic disease  PLEURA: No effusion or pneumothorax.  LUNGS: No nodule, mass, or airspace disease.  LIVER: No mass or biliary dilatation.  GALLBLADDER: Unremarkable.  BILIARY TREE: Unremarkable  SPLEEN: Unremarkable  PANCREAS: No mass or ductal dilatation.  ADRENALS: Unremarkable.  KIDNEYS: No mass, calculus, or hydronephrosis.  STOMACH: Unremarkable.  SMALL BOWEL: No dilatation or wall thickening.  COLON: No dilatation or wall thickening.  APPENDIX: Not visualized  PERITONEUM: Peritoneal carcinomatosis is again noted. 6.8 x 3.1 cm peritoneal  mass anteriorly is slightly increased in size. There is adjacent probably  confluent lesion measuring 3.4 x 2.6 cm which is increased in size from the  prior study. Deep left peritoneal nodularity is not significantly changed. Free  fluid in the pelvis increase in interval  RETROPERITONEUM: Left retroperitoneal lymph node measuring 1.5 x 1.4 cm slightly  decreased in size from 0.8 x 1.3 cm. Right retroperitoneal lymph node measuring  0.8 x 0.9 cm is increase in size of common iliac lymph node now measures 1.4 x  1.2 cm not significantly changed in size.  REPRODUCTIVE ORGANS: Hysterectomy  URINARY BLADDER: No mass or calculus.  BONES: No destructive bone lesion.  ADDITIONAL COMMENTS: N/A      Impression 1.  Overall increase in omental caking along the anterior peritoneum (the prior  study.    2.  Retroperitoneal adenopathy demonstrating variable response to therapy. Some  of these have increased in interval.    3.  Pelvic soft tissue in the left deep pelvis is not significantly changed  although not well visualized.    4.  Slight interval increase in pelvic free fluid    IMPRESSION/PLAN:  73 y.o. 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 ECO    Labs/chart reviewed  -Taxol/carboplatin today, cycle 1. ASE profile reviewed. -HTN: Continue Lisinopril, continue home checks, may need adjustment.  -Hx of thyroid nodular goiter, benign follicular bx. Was trending subclinical hyperthyroid on antiPD1/TKI therapy transiently and asx. Resolved, now change in therapy.  -Chronic med issues:    Hx asthma - no issues. inhaler PRN   Hx esophageal spasm - uses nitroglycerin  -Sleep disturbances: No f/u on sleep study. She feels she's improved and defers further referral.   Psychosocial: In good spirits and feels well supported by her friends/work family. Asks many appropriate questions. She is doing very well at the moment. Feels work is essential for her well-being at this point. Encouraged her again to open dialogue with her family re: her diagnosis and possible future needs expected. Advised to call with questions/concerns. 21 I have spent 30 minutes reviewing previous notes, test/path results and face to face with the patient discussing the diagnosis and treatment plan as outlined above.    Electronically signed,      Florin West PA-C  Gyn Onc

## 2021-03-15 ENCOUNTER — TELEPHONE (OUTPATIENT)
Dept: GYNECOLOGY | Age: 66
End: 2021-03-15

## 2021-03-15 NOTE — TELEPHONE ENCOUNTER
Rcieved C1 Taxol/Carboplatin on 3/9/21 without problems. Felt very jittery, dizzy and shaky on 3/10, and 3/11/21. C/o burning from Neves's on down to abdomen\", took Pepcid with small of amount of relief. Feet and legs were cold as well as body, did not temperature during that time. Saturday and Sunday \" I felt like my body was empty with problems concentrating with feeling confused\". Feels a little better today. Took fluids and Ensure over we, urinating fine and having small bowel movements. B/P running 112/69 yesterday morning held b/p med on Sunday. B/P 119/80 this AM.  Also having some bleeding from nose, small amount, X1. I reviewed bleeding precautions and when to seek medical attention.   Pt will keep us informed, increase fluid intake, continue Pepcid and Tylenol as well as rest.

## 2021-03-24 RX ORDER — EPINEPHRINE 1 MG/ML
0.3 INJECTION, SOLUTION, CONCENTRATE INTRAVENOUS AS NEEDED
Status: CANCELLED | OUTPATIENT
Start: 2021-03-30

## 2021-03-24 RX ORDER — PALONOSETRON 0.05 MG/ML
0.25 INJECTION, SOLUTION INTRAVENOUS ONCE
Status: CANCELLED | OUTPATIENT
Start: 2021-03-30 | End: 2021-03-30

## 2021-03-24 RX ORDER — SODIUM CHLORIDE 9 MG/ML
10 INJECTION INTRAMUSCULAR; INTRAVENOUS; SUBCUTANEOUS AS NEEDED
Status: CANCELLED | OUTPATIENT
Start: 2021-03-30

## 2021-03-24 RX ORDER — DIPHENHYDRAMINE HYDROCHLORIDE 50 MG/ML
50 INJECTION, SOLUTION INTRAMUSCULAR; INTRAVENOUS ONCE
Status: CANCELLED | OUTPATIENT
Start: 2021-03-30 | End: 2021-03-30

## 2021-03-24 RX ORDER — DIPHENHYDRAMINE HYDROCHLORIDE 50 MG/ML
25 INJECTION, SOLUTION INTRAMUSCULAR; INTRAVENOUS AS NEEDED
Status: CANCELLED
Start: 2021-03-30

## 2021-03-24 RX ORDER — ACETAMINOPHEN 325 MG/1
650 TABLET ORAL AS NEEDED
Status: CANCELLED
Start: 2021-03-30

## 2021-03-24 RX ORDER — ONDANSETRON 2 MG/ML
8 INJECTION INTRAMUSCULAR; INTRAVENOUS AS NEEDED
Status: CANCELLED | OUTPATIENT
Start: 2021-03-30

## 2021-03-24 RX ORDER — HYDROCORTISONE SODIUM SUCCINATE 100 MG/2ML
100 INJECTION, POWDER, FOR SOLUTION INTRAMUSCULAR; INTRAVENOUS AS NEEDED
Status: CANCELLED | OUTPATIENT
Start: 2021-03-30

## 2021-03-24 RX ORDER — ALBUTEROL SULFATE 0.83 MG/ML
2.5 SOLUTION RESPIRATORY (INHALATION) AS NEEDED
Status: CANCELLED
Start: 2021-03-30

## 2021-03-24 RX ORDER — DIPHENHYDRAMINE HYDROCHLORIDE 50 MG/ML
50 INJECTION, SOLUTION INTRAMUSCULAR; INTRAVENOUS AS NEEDED
Status: CANCELLED
Start: 2021-03-30

## 2021-03-24 RX ORDER — SODIUM CHLORIDE 0.9 % (FLUSH) 0.9 %
10 SYRINGE (ML) INJECTION AS NEEDED
Status: CANCELLED | OUTPATIENT
Start: 2021-03-30

## 2021-03-24 RX ORDER — HEPARIN 100 UNIT/ML
300-500 SYRINGE INTRAVENOUS AS NEEDED
Status: CANCELLED
Start: 2021-03-30

## 2021-03-24 RX ORDER — LORAZEPAM 2 MG/ML
0.5 INJECTION INTRAMUSCULAR
Status: CANCELLED
Start: 2021-03-30

## 2021-03-24 RX ORDER — SODIUM CHLORIDE 9 MG/ML
25 INJECTION, SOLUTION INTRAVENOUS CONTINUOUS
Status: CANCELLED | OUTPATIENT
Start: 2021-03-30

## 2021-03-30 ENCOUNTER — APPOINTMENT (OUTPATIENT)
Dept: INFUSION THERAPY | Age: 66
End: 2021-03-30
Payer: MEDICARE

## 2021-03-30 ENCOUNTER — OFFICE VISIT (OUTPATIENT)
Dept: GYNECOLOGY | Age: 66
End: 2021-03-30
Payer: MEDICARE

## 2021-03-30 ENCOUNTER — HOSPITAL ENCOUNTER (OUTPATIENT)
Dept: INFUSION THERAPY | Age: 66
Discharge: HOME | End: 2021-03-30
Payer: MEDICARE

## 2021-03-30 VITALS
WEIGHT: 166.8 LBS | HEART RATE: 75 BPM | DIASTOLIC BLOOD PRESSURE: 69 MMHG | RESPIRATION RATE: 18 BRPM | HEIGHT: 65 IN | BODY MASS INDEX: 27.79 KG/M2 | TEMPERATURE: 97.5 F | SYSTOLIC BLOOD PRESSURE: 109 MMHG

## 2021-03-30 VITALS
DIASTOLIC BLOOD PRESSURE: 80 MMHG | WEIGHT: 167.6 LBS | HEIGHT: 65 IN | HEART RATE: 64 BPM | BODY MASS INDEX: 27.92 KG/M2 | SYSTOLIC BLOOD PRESSURE: 130 MMHG

## 2021-03-30 DIAGNOSIS — C54.9 MALIGNANT NEOPLASM OF CORPUS UTERI, EXCEPT ISTHMUS (HCC): Primary | ICD-10-CM

## 2021-03-30 DIAGNOSIS — C54.1 PRIMARY SEROUS ADENOCARCINOMA OF ENDOMETRIUM (HCC): Primary | ICD-10-CM

## 2021-03-30 LAB
ALBUMIN SERPL-MCNC: 3.7 G/DL (ref 3.5–5)
ALBUMIN/GLOB SERPL: 1 {RATIO} (ref 1.1–2.2)
ALP SERPL-CCNC: 67 U/L (ref 45–117)
ALT SERPL-CCNC: 28 U/L (ref 12–78)
ANION GAP SERPL CALC-SCNC: 8 MMOL/L (ref 5–15)
AST SERPL-CCNC: 15 U/L (ref 15–37)
BASOPHILS # BLD: 0 K/UL (ref 0–0.1)
BASOPHILS NFR BLD: 0 % (ref 0–1)
BILIRUB SERPL-MCNC: 0.3 MG/DL (ref 0.2–1)
BUN SERPL-MCNC: 14 MG/DL (ref 6–20)
BUN/CREAT SERPL: 28 (ref 12–20)
CALCIUM SERPL-MCNC: 8.8 MG/DL (ref 8.5–10.1)
CANCER AG125 SERPL-ACNC: 27 U/ML (ref 1.5–35)
CHLORIDE SERPL-SCNC: 108 MMOL/L (ref 97–108)
CO2 SERPL-SCNC: 25 MMOL/L (ref 21–32)
CREAT SERPL-MCNC: 0.5 MG/DL (ref 0.55–1.02)
DIFFERENTIAL METHOD BLD: ABNORMAL
EOSINOPHIL # BLD: 0 K/UL (ref 0–0.4)
EOSINOPHIL NFR BLD: 1 % (ref 0–7)
ERYTHROCYTE [DISTWIDTH] IN BLOOD BY AUTOMATED COUNT: 15 % (ref 11.5–14.5)
GLOBULIN SER CALC-MCNC: 3.6 G/DL (ref 2–4)
GLUCOSE SERPL-MCNC: 113 MG/DL (ref 65–100)
HCT VFR BLD AUTO: 38.1 % (ref 35–47)
HGB BLD-MCNC: 12.5 G/DL (ref 11.5–16)
IMM GRANULOCYTES # BLD AUTO: 0.1 K/UL (ref 0–0.04)
IMM GRANULOCYTES NFR BLD AUTO: 1 % (ref 0–0.5)
LYMPHOCYTES # BLD: 1.8 K/UL (ref 0.8–3.5)
LYMPHOCYTES NFR BLD: 22 % (ref 12–49)
MAGNESIUM SERPL-MCNC: 2.2 MG/DL (ref 1.6–2.4)
MCH RBC QN AUTO: 30.5 PG (ref 26–34)
MCHC RBC AUTO-ENTMCNC: 32.8 G/DL (ref 30–36.5)
MCV RBC AUTO: 92.9 FL (ref 80–99)
MONOCYTES # BLD: 1 K/UL (ref 0–1)
MONOCYTES NFR BLD: 12 % (ref 5–13)
NEUTS SEG # BLD: 5.3 K/UL (ref 1.8–8)
NEUTS SEG NFR BLD: 64 % (ref 32–75)
NRBC # BLD: 0 K/UL (ref 0–0.01)
NRBC BLD-RTO: 0 PER 100 WBC
PLATELET # BLD AUTO: 154 K/UL (ref 150–400)
PMV BLD AUTO: 8.6 FL (ref 8.9–12.9)
POTASSIUM SERPL-SCNC: 3.6 MMOL/L (ref 3.5–5.1)
PROT SERPL-MCNC: 7.3 G/DL (ref 6.4–8.2)
RBC # BLD AUTO: 4.1 M/UL (ref 3.8–5.2)
SODIUM SERPL-SCNC: 141 MMOL/L (ref 136–145)
WBC # BLD AUTO: 8.2 K/UL (ref 3.6–11)

## 2021-03-30 PROCEDURE — 85025 COMPLETE CBC W/AUTO DIFF WBC: CPT

## 2021-03-30 PROCEDURE — 1090F PRES/ABSN URINE INCON ASSESS: CPT | Performed by: OBSTETRICS & GYNECOLOGY

## 2021-03-30 PROCEDURE — G8536 NO DOC ELDER MAL SCRN: HCPCS | Performed by: OBSTETRICS & GYNECOLOGY

## 2021-03-30 PROCEDURE — 96415 CHEMO IV INFUSION ADDL HR: CPT

## 2021-03-30 PROCEDURE — G8400 PT W/DXA NO RESULTS DOC: HCPCS | Performed by: OBSTETRICS & GYNECOLOGY

## 2021-03-30 PROCEDURE — G0463 HOSPITAL OUTPT CLINIC VISIT: HCPCS | Performed by: OBSTETRICS & GYNECOLOGY

## 2021-03-30 PROCEDURE — 74011250636 HC RX REV CODE- 250/636: Performed by: PHYSICIAN ASSISTANT

## 2021-03-30 PROCEDURE — 1101F PT FALLS ASSESS-DOCD LE1/YR: CPT | Performed by: OBSTETRICS & GYNECOLOGY

## 2021-03-30 PROCEDURE — 96367 TX/PROPH/DG ADDL SEQ IV INF: CPT

## 2021-03-30 PROCEDURE — 96413 CHEMO IV INFUSION 1 HR: CPT

## 2021-03-30 PROCEDURE — 99213 OFFICE O/P EST LOW 20 MIN: CPT | Performed by: OBSTETRICS & GYNECOLOGY

## 2021-03-30 PROCEDURE — 96375 TX/PRO/DX INJ NEW DRUG ADDON: CPT

## 2021-03-30 PROCEDURE — 80053 COMPREHEN METABOLIC PANEL: CPT

## 2021-03-30 PROCEDURE — 96417 CHEMO IV INFUS EACH ADDL SEQ: CPT

## 2021-03-30 PROCEDURE — G8419 CALC BMI OUT NRM PARAM NOF/U: HCPCS | Performed by: OBSTETRICS & GYNECOLOGY

## 2021-03-30 PROCEDURE — 77030012965 HC NDL HUBR BBMI -A

## 2021-03-30 PROCEDURE — 74011000258 HC RX REV CODE- 258: Performed by: PHYSICIAN ASSISTANT

## 2021-03-30 PROCEDURE — 3017F COLORECTAL CA SCREEN DOC REV: CPT | Performed by: OBSTETRICS & GYNECOLOGY

## 2021-03-30 PROCEDURE — 86304 IMMUNOASSAY TUMOR CA 125: CPT

## 2021-03-30 PROCEDURE — G8427 DOCREV CUR MEDS BY ELIG CLIN: HCPCS | Performed by: OBSTETRICS & GYNECOLOGY

## 2021-03-30 PROCEDURE — G8432 DEP SCR NOT DOC, RNG: HCPCS | Performed by: OBSTETRICS & GYNECOLOGY

## 2021-03-30 PROCEDURE — 83735 ASSAY OF MAGNESIUM: CPT

## 2021-03-30 PROCEDURE — 74011000250 HC RX REV CODE- 250: Performed by: PHYSICIAN ASSISTANT

## 2021-03-30 PROCEDURE — 36415 COLL VENOUS BLD VENIPUNCTURE: CPT

## 2021-03-30 RX ORDER — DIPHENHYDRAMINE HYDROCHLORIDE 50 MG/ML
50 INJECTION, SOLUTION INTRAMUSCULAR; INTRAVENOUS ONCE
Status: COMPLETED | OUTPATIENT
Start: 2021-03-30 | End: 2021-03-30

## 2021-03-30 RX ORDER — SODIUM CHLORIDE 0.9 % (FLUSH) 0.9 %
10 SYRINGE (ML) INJECTION AS NEEDED
Status: DISCONTINUED | OUTPATIENT
Start: 2021-03-30 | End: 2021-03-31 | Stop reason: HOSPADM

## 2021-03-30 RX ORDER — HEPARIN 100 UNIT/ML
300-500 SYRINGE INTRAVENOUS AS NEEDED
Status: DISCONTINUED | OUTPATIENT
Start: 2021-03-30 | End: 2021-03-31 | Stop reason: HOSPADM

## 2021-03-30 RX ORDER — SODIUM CHLORIDE 9 MG/ML
10 INJECTION INTRAMUSCULAR; INTRAVENOUS; SUBCUTANEOUS AS NEEDED
Status: DISCONTINUED | OUTPATIENT
Start: 2021-03-30 | End: 2021-03-31 | Stop reason: HOSPADM

## 2021-03-30 RX ORDER — SODIUM CHLORIDE 9 MG/ML
25 INJECTION, SOLUTION INTRAVENOUS CONTINUOUS
Status: DISCONTINUED | OUTPATIENT
Start: 2021-03-30 | End: 2021-03-31 | Stop reason: HOSPADM

## 2021-03-30 RX ORDER — PALONOSETRON 0.05 MG/ML
0.25 INJECTION, SOLUTION INTRAVENOUS ONCE
Status: COMPLETED | OUTPATIENT
Start: 2021-03-30 | End: 2021-03-30

## 2021-03-30 RX ADMIN — Medication 10 ML: at 18:24

## 2021-03-30 RX ADMIN — SODIUM CHLORIDE 150 MG: 900 INJECTION, SOLUTION INTRAVENOUS at 14:20

## 2021-03-30 RX ADMIN — DEXAMETHASONE SODIUM PHOSPHATE 12 MG: 4 INJECTION, SOLUTION INTRA-ARTICULAR; INTRALESIONAL; INTRAMUSCULAR; INTRAVENOUS; SOFT TISSUE at 13:59

## 2021-03-30 RX ADMIN — HEPARIN 500 UNITS: 100 SYRINGE at 18:25

## 2021-03-30 RX ADMIN — PACLITAXEL 251 MG: 6 INJECTION, SOLUTION INTRAVENOUS at 14:48

## 2021-03-30 RX ADMIN — PALONOSETRON 0.25 MG: 0.05 INJECTION, SOLUTION INTRAVENOUS at 13:01

## 2021-03-30 RX ADMIN — DIPHENHYDRAMINE HYDROCHLORIDE 50 MG: 50 INJECTION INTRAMUSCULAR; INTRAVENOUS at 13:02

## 2021-03-30 RX ADMIN — CARBOPLATIN 534 MG: 10 INJECTION, SOLUTION INTRAVENOUS at 17:48

## 2021-03-30 RX ADMIN — SODIUM CHLORIDE 25 ML/HR: 900 INJECTION, SOLUTION INTRAVENOUS at 12:57

## 2021-03-30 RX ADMIN — FAMOTIDINE 20 MG: 10 INJECTION INTRAVENOUS at 12:59

## 2021-03-30 NOTE — PROGRESS NOTES
[facility] GYNECOLOGIC ONCOLOGY  [address] [facility], [address], [address] [facility]  [phone]    Office Visit    Patient ID:  Name: Jose Eckert  MRN: [id number]  : 1955/66 y.o. Visit date: 3/30/2021    INTERVAL HISTORY:  Jose Eckert is a  female who is an established patient with stage IA, grade 3 uterine carcinoma. She underwent laparoscopic hysterectomy with staging in 2013. She was recommended six cycles of adjuvant Taxol and Carboplatin, which she completed. We elected not to treat with pelvic radiation therapy due to her difficulty with chemotherapy. She presents was last seen in July for routine surveillance. She was without complaints at that time and her exam was negative. She recently had some abdominal discomfort and noted to have some blood in her urine. She was referred to [facility] for evaluation. She had a CT of the abdomen/pelvis with them. It revealed retroperitoneal lymphadenopathy, peritoneal carcinomatosis, and trace ascites. She also reported a negative colonoscopy within the last month. She is scheduled for a MMG in a few weeks. I sent her for a PET/CT to better evaluate the extent of disease. She presented to review the results and discuss treatment. Her disease is not amenable to surgical resection. Systemic therapy is her only viable option. I thought immunotherapy would be the best choice, ahead of additional chemotherapy. This would consist of IV Keytruda and PO Lenvima. If that were not successful, we would consider retreatment with Taxol/Carboplatin or single agent Doxil. She completed 6 cycles before demonstrating progression of disease. We decided upon retreatment with Taxol/Carboplatin. She has completed one cycle so far. She felt bad for a few days after her first infusion, but recovered well. She is already starting to lose her hair.       PMH:  Past Medical History:   Diagnosis Date    Abnormal Pap smear 1995    with f/u normal    Anemia     Arthritis     Asthma     Cancer ([facility] [address]     ENDOMETRIAL    Environmental allergies     GERD (gastroesophageal reflux disease)     Goiter     Other unknown and unspecified cause of morbidity or mortality     POSSIBLE ESOPHAGEAL SPASM, ? OBSTRUCTION DUE TO GOITER    PMB (postmenopausal bleeding)     S/P chemotherapy, time since greater than 12 weeks     LAST CHEMO 10/2014 PER PATIENT    Thyroid disease     goiter     PSH:  Past Surgical History:   Procedure Laterality Date    HX APPENDECTOMY      HX BUNIONECTOMY      HX GYN  3/13/13    TLH/BSO    HX HEENT  4/22/13    TOOTH REMOVED    HX ORTHOPAEDIC  1980'S    BUNIONECTOMY    HX VASCULAR ACCESS      port    TX BREAST SURGERY PROCEDURE UNLISTED  1/12/16    right breast bx     SOC:  Social History     Socioeconomic History    Marital status:      Spouse name: Not on file    Number of children: Not on file    Years of education: Not on file    Highest education level: Not on file   Occupational History    Not on file   Social Needs    Financial resource strain: Not on file    Food insecurity     Worry: Not on file     Inability: Not on file    Transportation needs     Medical: Not on file     Non-medical: Not on file   Tobacco Use    Smoking status: Never Smoker    Smokeless tobacco: Never Used   Substance and Sexual Activity    Alcohol use:  Yes     Alcohol/week: 0.0 standard drinks     Comment: RARE OCC    Drug use: No    Sexual activity: Not on file   Lifestyle    Physical activity     Days per week: Not on file     Minutes per session: Not on file    Stress: Not on file   Relationships    Social connections     Talks on phone: Not on file     Gets together: Not on file     Attends Gnosticism service: Not on file     Active member of club or organization: Not on file     Attends meetings of clubs or organizations: Not on file     Relationship status: Not on file    Intimate partner violence     Fear of current or ex partner: Not on file     Emotionally abused: Not on file     Physically abused: Not on file     Forced sexual activity: Not on file   Other Topics Concern    Not on file   Social History Narrative    Not on file     Family History  Family History   Problem Relation Age of Onset    Cancer Maternal Aunt         breast    Heart Disease Mother     Diabetes Father     Cancer Sister         CERVICAL    Kidney Disease Brother     Diabetes Brother     Heart Attack Other     Arrhythmia Brother     Diabetes Brother     Anesth Problems Neg Hx      Medications:  Current Outpatient Medications on File Prior to Visit   Medication Sig Dispense Refill    dexAMETHasone (Decadron) 4 mg tablet Take 2 tablets by mouth with breakfast the day before chemo also take 2 tablets with breakfast for 2 days after chemotherapy 36 Tab 1    lisinopriL (PRINIVIL, ZESTRIL) 10 mg tablet Take 1 Tab by mouth daily. 30 Tab 2    lisinopriL (PRINIVIL, ZESTRIL) 5 mg tablet Take 2 Tabs by mouth daily. 30 Tab 5    ondansetron (ZOFRAN ODT) 4 mg disintegrating tablet Take 1 Tab by mouth every eight (8) hours as needed for Nausea. Indications: nausea and vomiting caused by cancer drugs 30 Tab 3    lidocaine-prilocaine (EMLA) topical cream Apply small amount over port area and cover with band aid one hour before chemo 30 g 3    guaifenesin (MUCINEX PO) Take  by mouth.  MARICHUY'S WORT PO Take  by mouth.  epinastine 0.05 % drop Apply  to eye.  raNITIdine (ZANTAC) 150 mg tablet ZANTAC TABS      cyclobenzaprine (FLEXERIL) 5 mg tablet take 1 tablet by mouth three times a day if needed  0    valACYclovir (VALTREX) 1 gram tablet Take 1 Tab by mouth three (3) times daily. 21 Tab 3    EPINEPHrine (EPIPEN) 0.3 mg/0.3 mL injection 0.3 mg by IntraMUSCular route once as needed.  ketotifen (ZADITOR) 0.025 % (0.035 %) ophthalmic solution Administer 1 Drop to both eyes every morning.       SAL ACID/UREA/PETROLATUM,WHITE (KERASAL FOOT EX) by Apply Externally route daily as needed.  ACCU-CHEK HELDER PLUS TEST STRP strip   0    NITROSTAT 0.4 mg SL tablet       CALCIUM CARBONATE (MARCELLO-SELTZER ANTACID PO) Take  by mouth daily as needed.  ibuprofen (MOTRIN) 200 mg tablet Take 200 mg by mouth every six (6) hours as needed.  lenvatinib (Lenvima) 8 mg/day (4 mg x 2) cap Take 2 Caps by mouth daily. 60 Cap 2    loratadine (Claritin) 10 mg tablet Take 10 mg by mouth.  Cetirizine (ZYRTEC) 10 mg cap Take 10 mg by mouth nightly. No current facility-administered medications on file prior to visit. Allergies:   Allergies   Allergen Reactions    Latex Other (comments)     Burns skin    Acetone Shortness of Breath    Amoxicillin Anaphylaxis    Ciprofloxacin Hives    Pcn [Penicillins] Anaphylaxis    Buspar [Buspirone] Unknown (comments)     Has N&V and makes her feel \"weird\"    Azithromycin Hives    Egg Nausea Only    Other Medication Rash     POPPY seeds       ROS:  Negative     OBJECTIVE:    PHYSICAL EXAM  VITAL SIGNS: Visit Vitals  /80 (BP 1 Location: Right upper arm, BP Patient Position: Sitting)   Pulse 64   Ht 5' 5\" (1.651 m)   Wt 167 lb 9.6 oz (76 kg)   BMI 27.89 kg/m²      GENERAL KAIA: WD, WN female in NAD   HEENT: WNL   RESPIRATORY: CTA   CARDIOVASC: RRR   GASTROINT: Soft, NT, ND   MUSCULOSKEL: WNL   EXTREMITIES: No edema   PELVIC: Deferred   RECTAL: Deferred   PRIYANKA SURVEY: Negative   NEURO: Grossly intact     Lab Results   Component Value Date/Time    WBC 4.9 03/09/2021 09:32 AM    Hemoglobin (POC) 12.9 05/01/2013 09:53 AM    HGB 13.9 03/09/2021 09:32 AM    Hematocrit (POC) 38 05/01/2013 09:53 AM    HCT 42.0 03/09/2021 09:32 AM    PLATELET 436 07/50/2865 09:32 AM    MCV 90.5 03/09/2021 09:32 AM     Lab Results   Component Value Date/Time    Sodium 136 03/09/2021 09:32 AM    Potassium 4.1 03/09/2021 09:32 AM    Chloride 106 03/09/2021 09:32 AM    CO2 23 03/09/2021 09:32 AM    Anion gap 7 03/09/2021 09:32 AM    Glucose 181 (H) 03/09/2021 09:32 AM    BUN 13 03/09/2021 09:32 AM    Creatinine 0.68 03/09/2021 09:32 AM    BUN/Creatinine ratio 19 03/09/2021 09:32 AM    GFR est AA >60 03/09/2021 09:32 AM    GFR est non-AA >60 03/09/2021 09:32 AM    Calcium 9.6 03/09/2021 09:32 AM    Bilirubin, total 0.5 03/09/2021 09:32 AM    Alk. phosphatase 71 03/09/2021 09:32 AM    Protein, total 7.9 03/09/2021 09:32 AM    Albumin 3.8 03/09/2021 09:32 AM    Globulin 4.1 (H) 03/09/2021 09:32 AM    A-G Ratio 0.9 (L) 03/09/2021 09:32 AM    ALT (SGPT) 23 03/09/2021 09:32 AM    AST (SGOT) 17 03/09/2021 09:32 AM     Lab Results   Component Value Date/Time    CA-125 155 (H) 03/09/2021 09:32 AM    Cancer Ag (CA) 125 98.9 (H) 10/23/2020 10:02 AM         CT of abdomen/pelvis (8/13/20)  LOWER CHEST: The visualized portions of the lung bases are clear. ABDOMEN:  Liver: The liver is normal in size and contour with no focal abnormality. The  attenuation of the liver is decreased throughout. Gallbladder and bile ducts: There is no calcified gallstone or biliary  dilatation. Spleen: No abnormality. Pancreas: No abnormality. Adrenal glands: No abnormality. Kidneys: No abnormality. PELVIS:  Reproductive organs: The uterus and ovaries are absent. Bladder: No abnormality. BOWEL AND MESENTERY: The small bowel is normal.  There is no mesenteric mass or  adenopathy. The appendix is absent. PERITONEUM: There is no ascites or free intraperitoneal air. RETROPERITONEUM: The aorta  tapers without aneurysm. There is no retroperitoneal  adenopathy or mass. There is no pelvic mass or adenopathy. BONES AND SOFT TISSUES: The bones and soft tissues of the abdominal wall are  within normal limits.     IMPRESSION:   1. Status post hysterectomy and bilateral oophorectomy. 2. No evidence of recurrent or metastatic disease within the abdomen or pelvis. 3. Hepatic steatosis.   4. Status post appendectomy.       PET/CT (10/9/20)  HEAD/NECK: No apparent foci of abnormal hypermetabolism. Cerebral evaluation is  limited by normal intense activity.     CHEST: No foci of abnormal hypermetabolism.     ABDOMEN/PELVIS:   Peritoneal nodules are hypermetabolic, SUV 12. Retroperitoneal adenopathy is hypermetabolic, SUV 6. Right deep pelvic adenopathy is hypermetabolic, SUV 8. Left pelvic cul-de-sac mass is hypermetabolic, SUV 11.     SKELETON: No foci of abnormal hypermetabolism in the axial and visualized  appendicular skeleton.     IMPRESSION:   1. Peritoneal carcinomatosis. 2. 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 3. Left pelvic cul-de-sac tumor. CT of chest/abdomen/pelvis (12/30/20)  CT chest:    There is stable multinodular thyroid enlargement. Left chest Port-A-Cath  terminates in the SVC. The aorta and main pulmonary artery are normal in  caliber. The heart size is normal.  There is no pericardial or pleural effusion.        There are no enlarged axillary, mediastinal, or hilar lymph nodes.      There is no lung mass or airspace opacity. There is no pneumothorax. The  central airways are clear.     CT abdomen and pelvis: The liver, spleen, pancreas, and adrenal glands are normal. The gall bladder is  present  without intra- or extra-hepatic biliary dilatation.       The kidneys are symmetric without hydronephrosis.      There are no dilated bowel loops. The appendix is surgically absent.       Enlarged retroperitoneal lymph nodes are again demonstrated with a  representative left para-aortic lymph node measuring 1.3 x 1.8 cm (series 3,  image 76), previously 1.5 x 1.7 cm on 10/9/2020.  A left common iliac lymph node  measures 1.1 x 1.5 cm (series 3, image 91), previously 0.8 x 1.4 cm.       Anterior peritoneal/mesenteric soft tissue nodularity is overall mildly  increased since 10/9/2020 with a representative measurement of 2.3 x 6.8 cm  (series 3, image 77), previously 2.6 x 5.5 cm.     Peritoneal soft tissue nodularity in the deep left pelvis measures 1.4 x 2.2 cm  (series 3, image 116), previously 2.9 x 3.1 cm.     There is no free fluid or free air. The aorta tapers without aneurysm.     The urinary bladder is normal. The uterus and ovaries are surgically absent.     There is no aggressive bony lesion.     IMPRESSION:   1. Mixed response in the abdomen and pelvis compared to 10/9/2020 with mildly  increased anterior peritoneal carcinomatosis. Stable to slightly increased  retroperitoneal lymphadenopathy. Decreased peritoneal soft tissue nodularity in  the deep left pelvis.     2. No evidence for metastatic disease in the chest.      CT of chest/abdomen/pelvis (2/25/21)  CHEST WALL:No axillary or supraclavicular adenopathy. THYROID: Large hypodensity in the thyroid gland unchanged. MEDIASTINUM: 12 mm cardiophrenic lymph node unchanged. RAMEZ: No mass or lymphadenopathy. THORACIC AORTA: No dissection or aneurysm. MAIN PULMONARY ARTERY: Normal in caliber. TRACHEA/BRONCHI: Patent. ESOPHAGUS: No wall thickening or dilatation. HEART: Coronary atherosclerotic disease  PLEURA: No effusion or pneumothorax. LUNGS: No nodule, mass, or airspace disease. LIVER: No mass or biliary dilatation. GALLBLADDER: Unremarkable. BILIARY TREE: Unremarkable  SPLEEN: Unremarkable  PANCREAS: No mass or ductal dilatation. ADRENALS: Unremarkable. KIDNEYS: No mass, calculus, or hydronephrosis. STOMACH: Unremarkable. SMALL BOWEL: No dilatation or wall thickening. COLON: No dilatation or wall thickening. APPENDIX: Not visualized  PERITONEUM: Peritoneal carcinomatosis is again noted. 6.8 x 3.1 cm peritoneal  mass anteriorly is slightly increased in size. There is adjacent probably  confluent lesion measuring 3.4 x 2.6 cm which is increased in size from the  prior study. Deep left peritoneal nodularity is not significantly changed.  Free  fluid in the pelvis increase in interval  RETROPERITONEUM: Left retroperitoneal lymph node measuring 1.5 x 1.4 cm slightly  decreased in size from 0.8 x 1.3 cm. Right retroperitoneal lymph node measuring  0.8 x 0.9 cm is increase in size of common iliac lymph node now measures 1.4 x  1.2 cm not significantly changed in size. REPRODUCTIVE ORGANS: Hysterectomy  URINARY BLADDER: No mass or calculus. BONES: No destructive bone lesion. ADDITIONAL COMMENTS: N/A     IMPRESSION  1. Overall increase in omental caking along the anterior peritoneum (the prior  study.     2.  Retroperitoneal adenopathy demonstrating variable response to therapy. Some  of these have increased in interval.     3.  Pelvic soft tissue in the left deep pelvis is not significantly changed  although not well visualized.     4.  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 She completed six cycles of adjuvant Taxol and Carboplatin chemotherapy. She developed recurrent disease and was treated with the regimen of IV Keytruda and PO Lenvima. She  completed 6 cycles before progression. Since it had been almost 8 years since her adjuvanatTaxol/Carboplatin, I recommended that we retreat her with that regimen, though I suggested a lower dose of each. She has completed one cycle so far. She is tolerating it as expected. We will continue with the current regimen and plan to repeat imaging after 3 cycles.          Charlette Cranker, MD  3/30/2021

## 2021-03-30 NOTE — PROGRESS NOTES
[facility] Chemo Progress Note    Date: 2021    Name: Leah Yeboah    MRN: [id number]         : 1955    [address] Ms[address] Geraldine Viera Arrived to [facility] for  C2 d1 Carboplatin and Paclitaxel ambulatory in stable condition. Assessment was completed, no acute issues at this time, no new complaints voiced. Port accessed with positive blood return. Labs drawn and sent for processing. Chemotherapy Flowsheet 3/30/2021   Cycle C2 D1   Date 3/30/2021   Drug / Regimen Taxol   Pre Meds given   Notes given         Patient denies SOB, fever, cough, general not feeling well. Patient denies recent exposure to someone who has tested positive for COVID-19. Patient denies having contact with anyone who has a pending COVID test.      Ms. Caleb Robins vitals were reviewed. Patient Vitals for the past 12 hrs:   Temp Pulse Resp BP   21 1107 97.5 °F (36.4 °C) (P) 72 (P) 18 (P) 139/78         Lab results were obtained and reviewed. Recent Results (from the past 12 hour(s))   CBC WITH AUTOMATED DIFF    Collection Time: 21 11:15 AM   Result Value Ref Range    WBC 8.2 3.6 - 11.0 K/uL    RBC 4.10 3.80 - 5.20 M/uL    HGB 12.5 11.5 - 16.0 g/dL    HCT 38.1 35.0 - 47.0 %    MCV 92.9 80.0 - 99.0 FL    MCH 30.5 26.0 - 34.0 PG    MCHC 32.8 30.0 - 36.5 g/dL    RDW 15.0 (H) 11.5 - 14.5 %    PLATELET 706 543 - 240 K/uL    MPV 8.6 (L) 8.9 - 12.9 FL    NRBC 0.0 0  WBC    ABSOLUTE NRBC 0.00 0.00 - 0.01 K/uL    NEUTROPHILS 64 32 - 75 %    LYMPHOCYTES 22 12 - 49 %    MONOCYTES 12 5 - 13 %    EOSINOPHILS 1 0 - 7 %    BASOPHILS 0 0 - 1 %    IMMATURE GRANULOCYTES 1 (H) 0.0 - 0.5 %    ABS. NEUTROPHILS 5.3 1.8 - 8.0 K/UL    ABS. LYMPHOCYTES 1.8 0.8 - 3.5 K/UL    ABS. MONOCYTES 1.0 0.0 - 1.0 K/UL    ABS. EOSINOPHILS 0.0 0.0 - 0.4 K/UL    ABS. BASOPHILS 0.0 0.0 - 0.1 K/UL    ABS. IMM.  GRANS. 0.1 (H) 0.00 - 0.04 K/UL    DF AUTOMATED     METABOLIC PANEL, COMPREHENSIVE    Collection Time: 21 11:15 AM   Result Value Ref Range Sodium 141 136 - 145 mmol/L    Potassium 3.6 3.5 - 5.1 mmol/L    Chloride 108 97 - 108 mmol/L    CO2 25 21 - 32 mmol/L    Anion gap 8 5 - 15 mmol/L    Glucose 113 (H) 65 - 100 mg/dL    BUN 14 6 - 20 MG/DL    Creatinine 0.50 (L) 0.55 - 1.02 MG/DL    BUN/Creatinine ratio 28 (H) 12 - 20      GFR est AA >60 >60 ml/min/1.73m2    GFR est non-AA >60 >60 ml/min/1.73m2    Calcium 8.8 8.5 - 10.1 MG/DL    Bilirubin, total 0.3 0.2 - 1.0 MG/DL    ALT (SGPT) 28 12 - 78 U/L    AST (SGOT) 15 15 - 37 U/L    Alk. phosphatase 67 45 - 117 U/L    Protein, total 7.3 6.4 - 8.2 g/dL    Albumin 3.7 3.5 - 5.0 g/dL    Globulin 3.6 2.0 - 4.0 g/dL    A-G Ratio 1.0 (L) 1.1 - 2.2     CANCER ANTIGEN 125    Collection Time: 03/30/21 11:15 AM   Result Value Ref Range    CA-125 27 1.5 - 35.0 U/mL   MAGNESIUM    Collection Time: 03/30/21 11:15 AM   Result Value Ref Range    Magnesium 2.2 1.6 - 2.4 mg/dL       Pre-medications  were administered as ordered and chemotherapy was initiated.   Medications Administered     0.9% sodium chloride infusion     Admin Date  03/30/2021 Action  New Bag Dose  25 mL/hr Rate  25 mL/hr Route  IntraVENous Administered By  Dario Larson RN          CARBOplatin (PARAPLATIN) 534 mg in 0.9% sodium chloride 250 mL, overfill volume 25 mL chemo infusion (GOG)     Admin Date  03/30/2021 Action  New Bag Dose  534 mg Rate  656.8 mL/hr Route  IntraVENous Administered By  Dario Larson RN          dexamethasone (DECADRON) 12 mg in 0.9% sodium chloride 50 mL, overfill volume 5 mL IVPB     Admin Date  03/30/2021 Action  Given Dose  12 mg Rate  232 mL/hr Route  IntraVENous Administered By  Dario Larson RN          diphenhydrAMINE (BENADRYL) injection 50 mg     Admin Date  03/30/2021 Action  Given Dose  50 mg Route  IntraVENous Administered By  Dario Larson, LISANDRA          famotidine (PF) (PEPCID) 20 mg in 0.9% sodium chloride 10 mL injection     Admin Date  03/30/2021 Action  Given Dose  20 mg Route  IntraVENous Administered By  Omar Barrera RN          fosaprepitant (EMEND) 150 mg in 0.9% sodium chloride 150 mL IVPB     Admin Date  03/30/2021 Action  Given Dose  150 mg Rate  450 mL/hr Route  IntraVENous Administered By  Omar Barrera RN          heparin (porcine) pf 300-500 Units     Admin Date  03/30/2021 Action  Given Dose  500 Units Route  InterCATHeter Administered By  Omar Barrera RN          PACLitaxeL (TAXOL) 251 mg in 0.9% sodium chloride 250 mL, overfill volume 25 mL chemo infusion     Admin Date  03/30/2021 Action  New Bag Dose  251 mg Rate  105.6 mL/hr Route  IntraVENous Administered By  Omar Barrera RN          palonosetron HCl (ALOXI) injection 0.25 mg     Admin Date  03/30/2021 Action  Given Dose  0.25 mg Route  IntraVENous Administered By  Omar Barrera RN          sodium chloride (NS) flush 10 mL     Admin Date  03/30/2021 Action  Given Dose  10 mL Route  IntraVENous Administered By  Omar Barrera RN                  [id number] Patient tolerated treatment well. Port maintained positive blood return throughout treatment. Port flushed, heparinized and de accessed per protocol.  Patient was discharged from [address] [place] [address]   Date Time Provider Port [place]   4/15/2021  2:30 PM MD GREER Guillermo BS AMB   4/20/2021  9:00 AM F1 GRECIA LONG [address] [facility]   5/6/2021  2:15 PM MD GREER Guillermo BS AMB   5/11/2021  9:00 AM F1 GRECIA LONG [address] [facility]   5/27/2021  2:45 PM MD GREER Guillermo BS AMB   6/1/2021  9:00 AM F1 GRECIA LONG [address] [facility]   6/17/2021  2:30 PM MD GREER Guillermo BS AMB   6/22/2021  9:00 AM F1 GRECIA LONG [place] [address] SY[address] Be RN  March 30, 2021

## 2021-04-02 ENCOUNTER — TELEPHONE (OUTPATIENT)
Dept: GYNECOLOGY | Age: 66
End: 2021-04-02

## 2021-04-02 NOTE — TELEPHONE ENCOUNTER
The patient called stating earlier in the week she had external itching and irritation due to new toilet paper used. That has since improved after she stopped using that. She noticed today that her urine is cloudy. She denies urinary frequency, painful urination, abdominal pain, fever or chills. I advised to increase her water intake and to see her pcp with the above symptoms.  The patient is in agreement with this plan,

## 2021-04-14 DIAGNOSIS — C54.9 MALIGNANT NEOPLASM OF CORPUS UTERI, EXCEPT ISTHMUS (HCC): ICD-10-CM

## 2021-04-14 RX ORDER — HYDROCORTISONE SODIUM SUCCINATE 100 MG/2ML
100 INJECTION, POWDER, FOR SOLUTION INTRAMUSCULAR; INTRAVENOUS AS NEEDED
Status: CANCELLED | OUTPATIENT
Start: 2021-04-20

## 2021-04-14 RX ORDER — SODIUM CHLORIDE 9 MG/ML
10 INJECTION INTRAMUSCULAR; INTRAVENOUS; SUBCUTANEOUS AS NEEDED
Status: CANCELLED | OUTPATIENT
Start: 2021-04-20

## 2021-04-14 RX ORDER — DIPHENHYDRAMINE HYDROCHLORIDE 50 MG/ML
50 INJECTION, SOLUTION INTRAMUSCULAR; INTRAVENOUS ONCE
Status: CANCELLED | OUTPATIENT
Start: 2021-04-20 | End: 2021-04-20

## 2021-04-14 RX ORDER — SODIUM CHLORIDE 9 MG/ML
25 INJECTION, SOLUTION INTRAVENOUS CONTINUOUS
Status: CANCELLED | OUTPATIENT
Start: 2021-04-20

## 2021-04-14 RX ORDER — SODIUM CHLORIDE 0.9 % (FLUSH) 0.9 %
10 SYRINGE (ML) INJECTION AS NEEDED
Status: CANCELLED | OUTPATIENT
Start: 2021-04-20

## 2021-04-14 RX ORDER — ALBUTEROL SULFATE 0.83 MG/ML
2.5 SOLUTION RESPIRATORY (INHALATION) AS NEEDED
Status: CANCELLED
Start: 2021-04-20

## 2021-04-14 RX ORDER — HEPARIN 100 UNIT/ML
300-500 SYRINGE INTRAVENOUS AS NEEDED
Status: CANCELLED
Start: 2021-04-20

## 2021-04-14 RX ORDER — LORAZEPAM 2 MG/ML
0.5 INJECTION INTRAMUSCULAR
Status: CANCELLED
Start: 2021-04-20

## 2021-04-14 RX ORDER — ONDANSETRON 2 MG/ML
8 INJECTION INTRAMUSCULAR; INTRAVENOUS AS NEEDED
Status: CANCELLED | OUTPATIENT
Start: 2021-04-20

## 2021-04-14 RX ORDER — DIPHENHYDRAMINE HYDROCHLORIDE 50 MG/ML
25 INJECTION, SOLUTION INTRAMUSCULAR; INTRAVENOUS AS NEEDED
Status: CANCELLED
Start: 2021-04-20

## 2021-04-14 RX ORDER — PALONOSETRON 0.05 MG/ML
0.25 INJECTION, SOLUTION INTRAVENOUS ONCE
Status: CANCELLED | OUTPATIENT
Start: 2021-04-20 | End: 2021-04-20

## 2021-04-14 RX ORDER — ACETAMINOPHEN 325 MG/1
650 TABLET ORAL AS NEEDED
Status: CANCELLED
Start: 2021-04-20

## 2021-04-14 RX ORDER — DIPHENHYDRAMINE HYDROCHLORIDE 50 MG/ML
50 INJECTION, SOLUTION INTRAMUSCULAR; INTRAVENOUS AS NEEDED
Status: CANCELLED
Start: 2021-04-20

## 2021-04-14 RX ORDER — EPINEPHRINE 1 MG/ML
0.3 INJECTION, SOLUTION, CONCENTRATE INTRAVENOUS AS NEEDED
Status: CANCELLED | OUTPATIENT
Start: 2021-04-20

## 2021-04-15 ENCOUNTER — VIRTUAL VISIT (OUTPATIENT)
Dept: GYNECOLOGY | Age: 66
End: 2021-04-15
Payer: MEDICARE

## 2021-04-15 DIAGNOSIS — C54.1 PRIMARY SEROUS ADENOCARCINOMA OF ENDOMETRIUM (HCC): Primary | ICD-10-CM

## 2021-04-15 PROCEDURE — 99442 PR PHYS/QHP TELEPHONE EVALUATION 11-20 MIN: CPT | Performed by: OBSTETRICS & GYNECOLOGY

## 2021-04-15 NOTE — PROGRESS NOTES
[facility]  [address] [place] [address], [address]  [phone] [phone]    Office Visit    Patient ID:  Name: Ramon Summers  MRN: [id number]  : 1955/66 y.o. Visit date: 4/15/2021    INTERVAL HISTORY:  Ramon Summers is a  female who is an established patient with stage IA, grade 3 uterine carcinoma. She underwent laparoscopic hysterectomy with staging in 2013. She was recommended six cycles of adjuvant Taxol and Carboplatin, which she completed. We elected not to treat with pelvic radiation therapy due to her difficulty with chemotherapy. She presents was last seen in July for routine surveillance. She was without complaints at that time and her exam was negative. She recently had some abdominal discomfort and noted to have some blood in her urine. She was referred to [facility] for evaluation. She had a CT of the abdomen/pelvis with them. It revealed retroperitoneal lymphadenopathy, peritoneal carcinomatosis, and trace ascites. She also reported a negative colonoscopy within the last month. She is scheduled for a MMG in a few weeks. I sent her for a PET/CT to better evaluate the extent of disease. She presented to review the results and discuss treatment. Her disease is not amenable to surgical resection. Systemic therapy is her only viable option. I thought immunotherapy would be the best choice, ahead of additional chemotherapy. This would consist of IV Keytruda and PO Lenvima. If that were not successful, we would consider retreatment with Taxol/Carboplatin or single agent Doxil. She completed 6 cycles before demonstrating progression of disease. We decided upon retreatment with Taxol/Carboplatin. She has completed two cycles so far. She felt bad for a few days after her first infusion, but recovered well.          PMH:  Past Medical History:   Diagnosis Date    Abnormal Pap smear     with f/u normal    Anemia     Arthritis     Asthma     Cancer (HCC)     ENDOMETRIAL    Environmental allergies     GERD (gastroesophageal reflux disease)     Goiter     Other unknown and unspecified cause of morbidity or mortality     POSSIBLE ESOPHAGEAL SPASM, ? OBSTRUCTION DUE TO GOITER    PMB (postmenopausal bleeding)     S/P chemotherapy, time since greater than 12 weeks     LAST CHEMO 10/2014 PER PATIENT    Thyroid disease     goiter     PSH:  Past Surgical History:   Procedure Laterality Date    HX APPENDECTOMY      HX BUNIONECTOMY      HX GYN  3/13/13    TLH/BSO    HX HEENT  4/22/13    TOOTH REMOVED    HX ORTHOPAEDIC  1980'S    BUNIONECTOMY    HX VASCULAR ACCESS      port    [place] BREAST SURGERY PROCEDURE UNLISTED  1/12/16    right breast bx     SOC:  Social History     Socioeconomic History    Marital status:      Spouse name: Not on file    Number of children: Not on file    Years of education: Not on file    Highest education level: Not on file   Occupational History    Not on file   Social Needs    Financial resource strain: Not on file    Food insecurity     Worry: Not on file     Inability: Not on file    Transportation needs     Medical: Not on file     Non-medical: Not on file   Tobacco Use    Smoking status: Never Smoker    Smokeless tobacco: Never Used   Substance and Sexual Activity    Alcohol use:  Yes     Alcohol/week: 0.0 standard drinks     Comment: RARE OCC    Drug use: No    Sexual activity: Not on file   Lifestyle    Physical activity     Days per week: Not on file     Minutes per session: Not on file    Stress: Not on file   Relationships    Social connections     Talks on phone: Not on file     Gets together: Not on file     Attends Tenriism service: Not on file     Active member of club or organization: Not on file     Attends meetings of clubs or organizations: Not on file     Relationship status: Not on file    Intimate partner violence     Fear of current or ex partner: Not on file     Emotionally abused: Not on file     Physically abused: Not on file     Forced sexual activity: Not on file   Other Topics Concern    Not on file   Social History Narrative    Not on file     Family History  Family History   Problem Relation Age of Onset    Cancer Maternal Aunt         breast    Heart Disease Mother     Diabetes Father     Cancer Sister         CERVICAL    Kidney Disease Brother     Diabetes Brother     Heart Attack Other     Arrhythmia Brother     Diabetes Brother     Anesth Problems Neg Hx      Medications:  Current Outpatient Medications on File Prior to Visit   Medication Sig Dispense Refill    dexAMETHasone (Decadron) 4 mg tablet Take 2 tablets by mouth with breakfast the day before chemo also take 2 tablets with breakfast for 2 days after chemotherapy 36 Tab 1    lisinopriL (PRINIVIL, ZESTRIL) 10 mg tablet Take 1 Tab by mouth daily. 30 Tab 2    lisinopriL (PRINIVIL, ZESTRIL) 5 mg tablet Take 2 Tabs by mouth daily. 30 Tab 5    lenvatinib (Lenvima) 8 mg/day (4 mg x 2) cap Take 2 Caps by mouth daily. 60 Cap 2    ondansetron (ZOFRAN ODT) 4 mg disintegrating tablet Take 1 Tab by mouth every eight (8) hours as needed for Nausea. Indications: nausea and vomiting caused by cancer drugs 30 Tab 3    lidocaine-prilocaine (EMLA) topical cream Apply small amount over port area and cover with band aid one hour before chemo 30 g 3    guaifenesin (MUCINEX PO) Take  by mouth.  loratadine (Claritin) 10 mg tablet Take 10 mg by mouth.  MARICHUY'S WORT PO Take  by mouth.  epinastine 0.05 % drop Apply  to eye.  raNITIdine (ZANTAC) 150 mg tablet ZANTAC TABS      cyclobenzaprine (FLEXERIL) 5 mg tablet take 1 tablet by mouth three times a day if needed  0    valACYclovir (VALTREX) 1 gram tablet Take 1 Tab by mouth three (3) times daily. 21 Tab 3    EPINEPHrine (EPIPEN) 0.3 mg/0.3 mL injection 0.3 mg by IntraMUSCular route once as needed.       ketotifen (ZADITOR) 0.025 % (0.035 %) ophthalmic solution Administer 1 Drop to both eyes every morning.  Cetirizine (ZYRTEC) 10 mg cap Take 10 mg by mouth nightly.  SAL ACID/UREA/PETROLATUM,WHITE (KERASAL FOOT EX) by Apply Externally route daily as needed.  ACCU-CHEK HELDER PLUS TEST STRP strip   0    NITROSTAT 0.4 mg SL tablet       CALCIUM CARBONATE (MARCELLO-SELTZER ANTACID PO) Take  by mouth daily as needed.  ibuprofen (MOTRIN) 200 mg tablet Take 200 mg by mouth every six (6) hours as needed. No current facility-administered medications on file prior to visit. Allergies: Allergies   Allergen Reactions    Latex Other (comments)     Burns skin    Acetone Shortness of Breath    Amoxicillin Anaphylaxis    Ciprofloxacin Hives    Pcn [Penicillins] Anaphylaxis    Buspar [Buspirone] Unknown (comments)     Has N&V and makes her feel \"weird\"    Azithromycin Hives    Egg Nausea Only    Other Medication Rash     POPPY seeds       ROS:  Negative     OBJECTIVE:    PHYSICAL EXAM  VITAL SIGNS: There were no vitals taken for this visit.    GENERAL KAIA:    HEENT:    RESPIRATORY:    CARDIOVASC:    GASTROINT:    MUSCULOSKEL:    EXTREMITIES:    PELVIC:    RECTAL:    PRIYANKA SURVEY:    NEURO:      Lab Results   Component Value Date/Time    WBC 8.2 03/30/2021 11:15 AM    Hemoglobin (POC) 12.9 05/01/2013 09:53 AM    HGB 12.5 03/30/2021 11:15 AM    Hematocrit (POC) 38 05/01/2013 09:53 AM    HCT 38.1 03/30/2021 11:15 AM    PLATELET 178 45/83/9691 11:15 AM    MCV 92.9 03/30/2021 11:15 AM     Lab Results   Component Value Date/Time    Sodium 141 03/30/2021 11:15 AM    Potassium 3.6 03/30/2021 11:15 AM    Chloride 108 03/30/2021 11:15 AM    CO2 25 03/30/2021 11:15 AM    Anion gap 8 03/30/2021 11:15 AM    Glucose 113 (H) 03/30/2021 11:15 AM    BUN 14 03/30/2021 11:15 AM    Creatinine 0.50 (L) 03/30/2021 11:15 AM    BUN/Creatinine ratio 28 (H) 03/30/2021 11:15 AM    GFR est AA >60 03/30/2021 11:15 AM    GFR est non-AA >60 03/30/2021 11:15 AM    Calcium 8.8 03/30/2021 11:15 AM    Bilirubin, total 0.3 03/30/2021 11:15 AM    Alk. phosphatase 67 03/30/2021 11:15 AM    Protein, total 7.3 03/30/2021 11:15 AM    Albumin 3.7 03/30/2021 11:15 AM    Globulin 3.6 03/30/2021 11:15 AM    A-G Ratio 1.0 (L) 03/30/2021 11:15 AM    ALT (SGPT) 28 03/30/2021 11:15 AM    AST (SGOT) 15 03/30/2021 11:15 AM     Lab Results   Component Value Date/Time    CA-125 27 03/30/2021 11:15 AM    Cancer Ag (CA) 125 98.9 (H) 10/23/2020 10:02 AM         CT of abdomen/pelvis (8/13/20)  LOWER CHEST: The visualized portions of the lung bases are clear. ABDOMEN:  Liver: The liver is normal in size and contour with no focal abnormality. The  attenuation of the liver is decreased throughout. Gallbladder and bile ducts: There is no calcified gallstone or biliary  dilatation. Spleen: No abnormality. Pancreas: No abnormality. Adrenal glands: No abnormality. Kidneys: No abnormality. PELVIS:  Reproductive organs: The uterus and ovaries are absent. Bladder: No abnormality. BOWEL AND MESENTERY: The small bowel is normal.  There is no mesenteric mass or  adenopathy. The appendix is absent. PERITONEUM: There is no ascites or free intraperitoneal air. RETROPERITONEUM: The aorta  tapers without aneurysm. There is no retroperitoneal  adenopathy or mass. There is no pelvic mass or adenopathy. BONES AND SOFT TISSUES: The bones and soft tissues of the abdominal wall are  within normal limits.     IMPRESSION:   1. Status post hysterectomy and bilateral oophorectomy. 2. No evidence of recurrent or metastatic disease within the abdomen or pelvis. 3. Hepatic steatosis. 4. Status post appendectomy.       PET/CT (10/9/20)  HEAD/NECK: No apparent foci of abnormal hypermetabolism.  Cerebral evaluation is  limited by normal intense activity.     CHEST: No foci of abnormal hypermetabolism.     ABDOMEN/PELVIS:   Peritoneal nodules are hypermetabolic, SUV 12.  Retroperitoneal adenopathy is hypermetabolic, SUV 6. Right deep pelvic adenopathy is hypermetabolic, SUV 8. Left pelvic cul-de-sac mass is hypermetabolic, SUV 11.     SKELETON: No foci of abnormal hypermetabolism in the axial and visualized  appendicular skeleton.     IMPRESSION:   1. Peritoneal carcinomatosis. 2. 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 3. Left pelvic cul-de-sac tumor. CT of chest/abdomen/pelvis (12/30/20)  CT chest:    There is stable multinodular thyroid enlargement. Left chest Port-A-Cath  terminates in the SVC. The aorta and main pulmonary artery are normal in  caliber. The heart size is normal.  There is no pericardial or pleural effusion.        There are no enlarged axillary, mediastinal, or hilar lymph nodes.      There is no lung mass or airspace opacity. There is no pneumothorax. The  central airways are clear.     CT abdomen and pelvis: The liver, spleen, pancreas, and adrenal glands are normal. The gall bladder is  present  without intra- or extra-hepatic biliary dilatation.       The kidneys are symmetric without hydronephrosis.      There are no dilated bowel loops. The appendix is surgically absent.       Enlarged retroperitoneal lymph nodes are again demonstrated with a  representative left para-aortic lymph node measuring 1.3 x 1.8 cm (series 3,  image 76), previously 1.5 x 1.7 cm on 10/9/2020. A left common iliac lymph node  measures 1.1 x 1.5 cm (series 3, image 91), previously 0.8 x 1.4 cm.       Anterior peritoneal/mesenteric soft tissue nodularity is overall mildly  increased since 10/9/2020 with a representative measurement of 2.3 x 6.8 cm  (series 3, image 77), previously 2.6 x 5.5 cm.     Peritoneal soft tissue nodularity in the deep left pelvis measures 1.4 x 2.2 cm  (series 3, image 116), previously 2.9 x 3.1 cm.     There is no free fluid or free air.  The aorta tapers without aneurysm.     The urinary bladder is normal. The uterus and ovaries are surgically absent.     There is no aggressive bony lesion.     IMPRESSION:   1. Mixed response in the abdomen and pelvis compared to 10/9/2020 with mildly  increased anterior peritoneal carcinomatosis. Stable to slightly increased  retroperitoneal lymphadenopathy. Decreased peritoneal soft tissue nodularity in  the deep left pelvis.     2. No evidence for metastatic disease in the chest.      CT of chest/abdomen/pelvis (2/25/21)  CHEST WALL:No axillary or supraclavicular adenopathy. THYROID: Large hypodensity in the thyroid gland unchanged. MEDIASTINUM: 12 mm cardiophrenic lymph node unchanged. RAMEZ: No mass or lymphadenopathy. THORACIC AORTA: No dissection or aneurysm. MAIN PULMONARY ARTERY: Normal in caliber. TRACHEA/BRONCHI: Patent. ESOPHAGUS: No wall thickening or dilatation. HEART: Coronary atherosclerotic disease  PLEURA: No effusion or pneumothorax. LUNGS: No nodule, mass, or airspace disease. LIVER: No mass or biliary dilatation. GALLBLADDER: Unremarkable. BILIARY TREE: Unremarkable  SPLEEN: Unremarkable  PANCREAS: No mass or ductal dilatation. ADRENALS: Unremarkable. KIDNEYS: No mass, calculus, or hydronephrosis. STOMACH: Unremarkable. SMALL BOWEL: No dilatation or wall thickening. COLON: No dilatation or wall thickening. APPENDIX: Not visualized  PERITONEUM: Peritoneal carcinomatosis is again noted. 6.8 x 3.1 cm peritoneal  mass anteriorly is slightly increased in size. There is adjacent probably  confluent lesion measuring 3.4 x 2.6 cm which is increased in size from the  prior study. Deep left peritoneal nodularity is not significantly changed. Free  fluid in the pelvis increase in interval  RETROPERITONEUM: Left retroperitoneal lymph node measuring 1.5 x 1.4 cm slightly  decreased in size from 0.8 x 1.3 cm.  Right retroperitoneal lymph node measuring  0.8 x 0.9 cm is increase in size of common iliac lymph node now measures 1.4 x  1.2 cm not significantly changed in size.  REPRODUCTIVE ORGANS: Hysterectomy  URINARY BLADDER: No mass or calculus. BONES: No destructive bone lesion. ADDITIONAL COMMENTS: N/A     IMPRESSION  1. Overall increase in omental caking along the anterior peritoneum (the prior  study.     2.  Retroperitoneal adenopathy demonstrating variable response to therapy. Some  of these have increased in interval.     3.  Pelvic soft tissue in the left deep pelvis is not significantly changed  although not well visualized.     4.  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 She completed six cycles of adjuvant Taxol and Carboplatin chemotherapy. She developed recurrent disease and was treated with the regimen of IV Keytruda and PO Lenvima. She  completed 6 cycles before progression. Since it had been almost 8 years since her adjuvant Taxol/Carboplatin, I recommended that we retreat her with that regimen, though I suggested a lower dose of each. She has completed two cycles so far. She is tolerating it as expected. We will continue with the current regimen and plan to repeat imaging after this coming cycle. Loreto Masters is a 73 y.o. female, evaluated via audio-only technology on 4/15/2021 for No chief complaint on file. Assessment & Plan:   Diagnoses and all orders for this visit:    1. Primary serous adenocarcinoma of endometrium (HCC)          Subjective:       Prior to Admission medications    Medication Sig Start Date End Date Taking? Authorizing Provider   dexAMETHasone (Decadron) 4 mg tablet Take 2 tablets by mouth with breakfast the day before chemo also take 2 tablets with breakfast for 2 days after chemotherapy 3/8/21   Murlene MD Leena   lisinopriL (PRINIVIL, ZESTRIL) 10 mg tablet Take 1 Tab by mouth daily.  1/6/21   Zhanee MD Leena   lisinopriL (PRINIVIL, ZESTRIL) 5 mg tablet Take 2 Tabs by mouth daily. 1/5/21   Nasreen Cline PA-C   lenvatinib (Lenvima) 8 mg/day (4 mg x 2) cap Take 2 Caps by mouth daily. 12/15/20   Dayan Coronado MD   ondansetron (ZOFRAN ODT) 4 mg disintegrating tablet Take 1 Tab by mouth every eight (8) hours as needed for Nausea. Indications: nausea and vomiting caused by cancer drugs 10/22/20   Dayan Coronado MD   lidocaine-prilocaine (EMLA) topical cream Apply small amount over port area and cover with band aid one hour before chemo 10/22/20   Dayan Coronado MD   guaifenesin (MUCINEX PO) Take  by mouth. Provider, Historical   loratadine (Claritin) 10 mg tablet Take 10 mg by mouth. Provider, Historical   MARICHUY'S WORT PO Take  by mouth. Provider, Historical   epinastine 0.05 % drop Apply  to eye. Provider, Historical   raNITIdine (ZANTAC) 150 mg tablet ZANTAC TABS 9/29/11   Provider, Historical   cyclobenzaprine (FLEXERIL) 5 mg tablet take 1 tablet by mouth three times a day if needed 12/5/16   Provider, Historical   valACYclovir (VALTREX) 1 gram tablet Take 1 Tab by mouth three (3) times daily. 12/15/16   Dayan Coronado MD   EPINEPHrine (EPIPEN) 0.3 mg/0.3 mL injection 0.3 mg by IntraMUSCular route once as needed. Provider, Historical   ketotifen (ZADITOR) 0.025 % (0.035 %) ophthalmic solution Administer 1 Drop to both eyes every morning. Provider, Historical   Cetirizine (ZYRTEC) 10 mg cap Take 10 mg by mouth nightly. Provider, Historical   SAL ACID/UREA/PETROLATUM,WHITE (KERASAL FOOT EX) by Apply Externally route daily as needed. Provider, Historical   ACCU-CHEK HELDER PLUS TEST STRP strip  7/3/15   Provider, Historical   NITROSTAT 0.4 mg SL tablet  9/22/14   Provider, Historical   CALCIUM CARBONATE (MARCELLO-SELTZER ANTACID PO) Take  by mouth daily as needed. Provider, Historical   ibuprofen (MOTRIN) 200 mg tablet Take 200 mg by mouth every six (6) hours as needed.     Provider, Historical     Patient Active Problem List   Diagnosis Code    Malignant neoplasm of corpus uteri, except isthmus (HCC) C54.9     Patient Active Problem List    Diagnosis Date Noted    Malignant neoplasm of corpus uteri, except isthmus ([address] 02/28/2013     Current Outpatient Medications   Medication Sig Dispense Refill    dexAMETHasone (Decadron) 4 mg tablet Take 2 tablets by mouth with breakfast the day before chemo also take 2 tablets with breakfast for 2 days after chemotherapy 36 Tab 1    lisinopriL (PRINIVIL, ZESTRIL) 10 mg tablet Take 1 Tab by mouth daily. 30 Tab 2    lisinopriL (PRINIVIL, ZESTRIL) 5 mg tablet Take 2 Tabs by mouth daily. 30 Tab 5    lenvatinib (Lenvima) 8 mg/day (4 mg x 2) cap Take 2 Caps by mouth daily. 60 Cap 2    ondansetron (ZOFRAN ODT) 4 mg disintegrating tablet Take 1 Tab by mouth every eight (8) hours as needed for Nausea. Indications: nausea and vomiting caused by cancer drugs 30 Tab 3    lidocaine-prilocaine (EMLA) topical cream Apply small amount over port area and cover with band aid one hour before chemo 30 g 3    guaifenesin (MUCINEX PO) Take  by mouth.  loratadine (Claritin) 10 mg tablet Take 10 mg by mouth.  MARICHUY'S WORT PO Take  by mouth.  epinastine 0.05 % drop Apply  to eye.  raNITIdine (ZANTAC) 150 mg tablet ZANTAC TABS      cyclobenzaprine (FLEXERIL) 5 mg tablet take 1 tablet by mouth three times a day if needed  0    valACYclovir (VALTREX) 1 gram tablet Take 1 Tab by mouth three (3) times daily. 21 Tab 3    EPINEPHrine (EPIPEN) 0.3 mg/0.3 mL injection 0.3 mg by IntraMUSCular route once as needed.  ketotifen (ZADITOR) 0.025 % (0.035 %) ophthalmic solution Administer 1 Drop to both eyes every morning.  Cetirizine (ZYRTEC) 10 mg cap Take 10 mg by mouth nightly.  SAL ACID/UREA/PETROLATUM,WHITE (KERASAL FOOT EX) by Apply Externally route daily as needed.       ACCU-CHEK HELDER PLUS TEST STRP strip   0    NITROSTAT 0.4 mg SL tablet       CALCIUM CARBONATE (MARCELLO-SELTZER ANTACID PO) Take  by mouth daily as needed.  ibuprofen (MOTRIN) 200 mg tablet Take 200 mg by mouth every six (6) hours as needed. Allergies   Allergen Reactions    Latex Other (comments)     Burns skin    Acetone Shortness of Breath    Amoxicillin Anaphylaxis    Ciprofloxacin Hives    Pcn [Penicillins] Anaphylaxis    Buspar [Buspirone] Unknown (comments)     Has N&V and makes her feel \"weird\"    Azithromycin Hives    Egg Nausea Only    Other Medication Rash     POPPY seeds     Past Medical History:   Diagnosis Date    Abnormal Pap smear 1995    with f/u normal    Anemia     Arthritis     Asthma     Cancer ([facility] [address]     ENDOMETRIAL    Environmental allergies     GERD (gastroesophageal reflux disease)     Goiter     Other unknown and unspecified cause of morbidity or mortality     POSSIBLE ESOPHAGEAL SPASM, ? OBSTRUCTION DUE TO GOITER    PMB (postmenopausal bleeding)     S/P chemotherapy, time since greater than 12 weeks     LAST CHEMO 10/2014 PER PATIENT    Thyroid disease     goiter     Past Surgical History:   Procedure Laterality Date    HX APPENDECTOMY      HX BUNIONECTOMY      HX GYN  3/13/13    TLH/BSO    HX HEENT  4/22/13    TOOTH REMOVED    HX ORTHOPAEDIC  1980'S    BUNIONECTOMY    HX VASCULAR ACCESS      port    [place] BREAST SURGERY PROCEDURE UNLISTED  1/12/16    right breast bx     Family History   Problem Relation Age of Onset    Cancer Maternal Aunt         breast    Heart Disease Mother     Diabetes Father     Cancer Sister         CERVICAL    Kidney Disease Brother     Diabetes Brother     Heart Attack Other     Arrhythmia Brother     Diabetes Brother     Anesth Problems Neg Hx      Social History     Tobacco Use    Smoking status: Never Smoker    Smokeless tobacco: Never Used   Substance Use Topics    Alcohol use:  Yes     Alcohol/week: 0.0 standard drinks     Comment: RARE OCC       ROS    Patient-Reported Vitals 1/19/2021   Patient-Reported Systolic  765   Patient-Reported Diastolic 1200 [facility], who was evaluated through a patient-initiated, synchronous (real-time) audio only encounter, and/or her healthcare decision maker, is aware that it is a billable service, with coverage as determined by her insurance carrier. She provided verbal consent to proceed: Yes. She has not had a related appointment within my department in the past 7 days or scheduled within the next 24 hours. Total Time: minutes: 11-20 minutes      An electronic signature was used to sign this note.     Yonny Drummond MD  04/15/21

## 2021-04-20 ENCOUNTER — HOSPITAL ENCOUNTER (OUTPATIENT)
Dept: INFUSION THERAPY | Age: 66
Discharge: HOME | End: 2021-04-20
Payer: MEDICARE

## 2021-04-20 ENCOUNTER — APPOINTMENT (OUTPATIENT)
Dept: INFUSION THERAPY | Age: 66
End: 2021-04-20
Payer: MEDICARE

## 2021-04-20 VITALS
DIASTOLIC BLOOD PRESSURE: 68 MMHG | HEIGHT: 65 IN | WEIGHT: 171 LBS | RESPIRATION RATE: 18 BRPM | BODY MASS INDEX: 28.49 KG/M2 | HEART RATE: 68 BPM | TEMPERATURE: 96.9 F | SYSTOLIC BLOOD PRESSURE: 120 MMHG

## 2021-04-20 DIAGNOSIS — C54.9 MALIGNANT NEOPLASM OF CORPUS UTERI, EXCEPT ISTHMUS (HCC): Primary | ICD-10-CM

## 2021-04-20 LAB
ALBUMIN SERPL-MCNC: 3.5 G/DL (ref 3.5–5)
ALBUMIN/GLOB SERPL: 1 {RATIO} (ref 1.1–2.2)
ALP SERPL-CCNC: 67 U/L (ref 45–117)
ALT SERPL-CCNC: 22 U/L (ref 12–78)
ANION GAP SERPL CALC-SCNC: 7 MMOL/L (ref 5–15)
AST SERPL-CCNC: 13 U/L (ref 15–37)
BASOPHILS # BLD: 0 K/UL (ref 0–0.1)
BASOPHILS NFR BLD: 0 % (ref 0–1)
BILIRUB SERPL-MCNC: 0.3 MG/DL (ref 0.2–1)
BUN SERPL-MCNC: 9 MG/DL (ref 6–20)
BUN/CREAT SERPL: 19 (ref 12–20)
CALCIUM SERPL-MCNC: 8.9 MG/DL (ref 8.5–10.1)
CANCER AG125 SERPL-ACNC: 17 U/ML (ref 1.5–35)
CHLORIDE SERPL-SCNC: 110 MMOL/L (ref 97–108)
CO2 SERPL-SCNC: 25 MMOL/L (ref 21–32)
CREAT SERPL-MCNC: 0.47 MG/DL (ref 0.55–1.02)
DIFFERENTIAL METHOD BLD: ABNORMAL
EOSINOPHIL # BLD: 0 K/UL (ref 0–0.4)
EOSINOPHIL NFR BLD: 0 % (ref 0–7)
ERYTHROCYTE [DISTWIDTH] IN BLOOD BY AUTOMATED COUNT: 16 % (ref 11.5–14.5)
GLOBULIN SER CALC-MCNC: 3.4 G/DL (ref 2–4)
GLUCOSE SERPL-MCNC: 145 MG/DL (ref 65–100)
HCT VFR BLD AUTO: 35.9 % (ref 35–47)
HGB BLD-MCNC: 11.8 G/DL (ref 11.5–16)
IMM GRANULOCYTES # BLD AUTO: 0.1 K/UL (ref 0–0.04)
IMM GRANULOCYTES NFR BLD AUTO: 2 % (ref 0–0.5)
LYMPHOCYTES # BLD: 1.4 K/UL (ref 0.8–3.5)
LYMPHOCYTES NFR BLD: 24 % (ref 12–49)
MAGNESIUM SERPL-MCNC: 2.2 MG/DL (ref 1.6–2.4)
MCH RBC QN AUTO: 30.9 PG (ref 26–34)
MCHC RBC AUTO-ENTMCNC: 32.9 G/DL (ref 30–36.5)
MCV RBC AUTO: 94 FL (ref 80–99)
MONOCYTES # BLD: 0.8 K/UL (ref 0–1)
MONOCYTES NFR BLD: 13 % (ref 5–13)
NEUTS SEG # BLD: 3.6 K/UL (ref 1.8–8)
NEUTS SEG NFR BLD: 61 % (ref 32–75)
NRBC # BLD: 0 K/UL (ref 0–0.01)
NRBC BLD-RTO: 0 PER 100 WBC
PLATELET # BLD AUTO: 136 K/UL (ref 150–400)
PMV BLD AUTO: 8.6 FL (ref 8.9–12.9)
POTASSIUM SERPL-SCNC: 3.7 MMOL/L (ref 3.5–5.1)
PROT SERPL-MCNC: 6.9 G/DL (ref 6.4–8.2)
RBC # BLD AUTO: 3.82 M/UL (ref 3.8–5.2)
SODIUM SERPL-SCNC: 142 MMOL/L (ref 136–145)
WBC # BLD AUTO: 6 K/UL (ref 3.6–11)

## 2021-04-20 PROCEDURE — 86304 IMMUNOASSAY TUMOR CA 125: CPT

## 2021-04-20 PROCEDURE — 85025 COMPLETE CBC W/AUTO DIFF WBC: CPT

## 2021-04-20 PROCEDURE — 74011000258 HC RX REV CODE- 258: Performed by: OBSTETRICS & GYNECOLOGY

## 2021-04-20 PROCEDURE — 96415 CHEMO IV INFUSION ADDL HR: CPT

## 2021-04-20 PROCEDURE — 74011250636 HC RX REV CODE- 250/636: Performed by: OBSTETRICS & GYNECOLOGY

## 2021-04-20 PROCEDURE — 77030012965 HC NDL HUBR BBMI -A

## 2021-04-20 PROCEDURE — 36415 COLL VENOUS BLD VENIPUNCTURE: CPT

## 2021-04-20 PROCEDURE — 96375 TX/PRO/DX INJ NEW DRUG ADDON: CPT

## 2021-04-20 PROCEDURE — 96413 CHEMO IV INFUSION 1 HR: CPT

## 2021-04-20 PROCEDURE — 99213 OFFICE O/P EST LOW 20 MIN: CPT | Performed by: PHYSICIAN ASSISTANT

## 2021-04-20 PROCEDURE — 96367 TX/PROPH/DG ADDL SEQ IV INF: CPT

## 2021-04-20 PROCEDURE — 96417 CHEMO IV INFUS EACH ADDL SEQ: CPT

## 2021-04-20 PROCEDURE — 80053 COMPREHEN METABOLIC PANEL: CPT

## 2021-04-20 PROCEDURE — 83735 ASSAY OF MAGNESIUM: CPT

## 2021-04-20 RX ORDER — SODIUM CHLORIDE 9 MG/ML
25 INJECTION, SOLUTION INTRAVENOUS CONTINUOUS
Status: DISPENSED | OUTPATIENT
Start: 2021-04-20 | End: 2021-04-20

## 2021-04-20 RX ORDER — PALONOSETRON 0.05 MG/ML
0.25 INJECTION, SOLUTION INTRAVENOUS ONCE
Status: COMPLETED | OUTPATIENT
Start: 2021-04-20 | End: 2021-04-20

## 2021-04-20 RX ORDER — SODIUM CHLORIDE 0.9 % (FLUSH) 0.9 %
10 SYRINGE (ML) INJECTION AS NEEDED
Status: DISPENSED | OUTPATIENT
Start: 2021-04-20 | End: 2021-04-20

## 2021-04-20 RX ORDER — DIPHENHYDRAMINE HYDROCHLORIDE 50 MG/ML
50 INJECTION, SOLUTION INTRAMUSCULAR; INTRAVENOUS ONCE
Status: COMPLETED | OUTPATIENT
Start: 2021-04-20 | End: 2021-04-20

## 2021-04-20 RX ORDER — HEPARIN 100 UNIT/ML
300-500 SYRINGE INTRAVENOUS AS NEEDED
Status: ACTIVE | OUTPATIENT
Start: 2021-04-20 | End: 2021-04-20

## 2021-04-20 RX ADMIN — SODIUM CHLORIDE 150 MG: 900 INJECTION, SOLUTION INTRAVENOUS at 11:20

## 2021-04-20 RX ADMIN — CARBOPLATIN 538 MG: 10 INJECTION, SOLUTION INTRAVENOUS at 15:00

## 2021-04-20 RX ADMIN — DIPHENHYDRAMINE HYDROCHLORIDE 50 MG: 50 INJECTION INTRAMUSCULAR; INTRAVENOUS at 10:37

## 2021-04-20 RX ADMIN — FAMOTIDINE 20 MG: 10 INJECTION INTRAVENOUS at 10:36

## 2021-04-20 RX ADMIN — DEXAMETHASONE SODIUM PHOSPHATE 12 MG: 4 INJECTION, SOLUTION INTRA-ARTICULAR; INTRALESIONAL; INTRAMUSCULAR; INTRAVENOUS; SOFT TISSUE at 10:55

## 2021-04-20 RX ADMIN — PALONOSETRON 0.25 MG: 0.05 INJECTION, SOLUTION INTRAVENOUS at 10:34

## 2021-04-20 RX ADMIN — Medication 10 ML: at 15:45

## 2021-04-20 RX ADMIN — Medication 10 ML: at 09:30

## 2021-04-20 RX ADMIN — SODIUM CHLORIDE 25 ML/HR: 900 INJECTION, SOLUTION INTRAVENOUS at 10:26

## 2021-04-20 RX ADMIN — HEPARIN 500 UNITS: 100 SYRINGE at 15:45

## 2021-04-20 RX ADMIN — PACLITAXEL 251 MG: 6 INJECTION, SOLUTION INTRAVENOUS at 11:50

## 2021-04-20 NOTE — PROGRESS NOTES
[facility] Chemo Progress Note    [id number] Ms. Nicklas Olszewski Arrived to [facility] for  Taxol/Carbo (C3) ambulatory in stable condition. Assessment was completed, no acute issues at this time, no new complaints voiced. Port accessed with positive blood return. Labs drawn and sent for processing. Port flushed and capped. Patient denies SOB, fever, cough, general not feeling well. Patient denies recent exposure to someone who has tested positive for COVID-19. Patient denies having contact with anyone who has a pending COVID test.      [address] for treatment. Medication ordered from pharmacy. Port connected to [facility]. Chemotherapy Flowsheet 4/20/2021   Cycle C3   Date 4/20/2021   Drug / Regimen Taxol/Carbo   Pre Meds given   Notes given     Ms. Ramon Green vitals were reviewed. Patient Vitals for the past 12 hrs:   Temp Pulse Resp BP   04/20/21 1537  68  120/68   04/20/21 0853 96.9 °F (36.1 °C) 78 18 (!) 144/89       Lab results were obtained and reviewed. Recent Results (from the past 12 hour(s))   CBC WITH AUTOMATED DIFF    Collection Time: 04/20/21  9:03 AM   Result Value Ref Range    WBC 6.0 3.6 - 11.0 K/uL    RBC 3.82 3.80 - 5.20 M/uL    HGB 11.8 11.5 - 16.0 g/dL    HCT 35.9 35.0 - 47.0 %    MCV 94.0 80.0 - 99.0 FL    MCH 30.9 26.0 - 34.0 PG    MCHC 32.9 30.0 - 36.5 g/dL    RDW 16.0 (H) 11.5 - 14.5 %    PLATELET 718 (L) 095 - 400 K/uL    MPV 8.6 (L) 8.9 - 12.9 FL    NRBC 0.0 0  WBC    ABSOLUTE NRBC 0.00 0.00 - 0.01 K/uL    NEUTROPHILS 61 32 - 75 %    LYMPHOCYTES 24 12 - 49 %    MONOCYTES 13 5 - 13 %    EOSINOPHILS 0 0 - 7 %    BASOPHILS 0 0 - 1 %    IMMATURE GRANULOCYTES 2 (H) 0.0 - 0.5 %    ABS. NEUTROPHILS 3.6 1.8 - 8.0 K/UL    ABS. LYMPHOCYTES 1.4 0.8 - 3.5 K/UL    ABS. MONOCYTES 0.8 0.0 - 1.0 K/UL    ABS. EOSINOPHILS 0.0 0.0 - 0.4 K/UL    ABS. BASOPHILS 0.0 0.0 - 0.1 K/UL    ABS. IMM.  GRANS. 0.1 (H) 0.00 - 0.04 K/UL    DF AUTOMATED     METABOLIC PANEL, COMPREHENSIVE    Collection Time: 04/20/21  9:03 AM Result Value Ref Range    Sodium 142 136 - 145 mmol/L    Potassium 3.7 3.5 - 5.1 mmol/L    Chloride 110 (H) 97 - 108 mmol/L    CO2 25 21 - 32 mmol/L    Anion gap 7 5 - 15 mmol/L    Glucose 145 (H) 65 - 100 mg/dL    BUN 9 6 - 20 MG/DL    Creatinine 0.47 (L) 0.55 - 1.02 MG/DL    BUN/Creatinine ratio 19 12 - 20      GFR est AA >60 >60 ml/min/1.73m2    GFR est non-AA >60 >60 ml/min/1.73m2    Calcium 8.9 8.5 - 10.1 MG/DL    Bilirubin, total 0.3 0.2 - 1.0 MG/DL    ALT (SGPT) 22 12 - 78 U/L    AST (SGOT) 13 (L) 15 - 37 U/L    Alk. phosphatase 67 45 - 117 U/L    Protein, total 6.9 6.4 - 8.2 g/dL    Albumin 3.5 3.5 - 5.0 g/dL    Globulin 3.4 2.0 - 4.0 g/dL    A-G Ratio 1.0 (L) 1.1 - 2.2     MAGNESIUM    Collection Time: 04/20/21  9:03 AM   Result Value Ref Range    Magnesium 2.2 1.6 - 2.4 mg/dL   CANCER ANTIGEN 125    Collection Time: 04/20/21  9:03 AM   Result Value Ref Range    CA-125 17 1.5 - 35.0 U/mL     Pre-medications  were administered as ordered and chemotherapy was initiated.   Medications Administered     0.9% sodium chloride infusion     Admin Date  04/20/2021 Action  New Bag Dose  25 mL/hr Rate  25 mL/hr Route  IntraVENous Administered By  Rabia Solo RN          CARBOplatin (PARAPLATIN) 538 mg in 0.9% sodium chloride 250 mL, overfill volume 25 mL chemo infusion (GOG)     Admin Date  04/20/2021 Action  New Bag Dose  538 mg Rate  657.6 mL/hr Route  IntraVENous Administered By  Rabia Solo RN          dexamethasone (DECADRON) 12 mg in 0.9% sodium chloride 50 mL, overfill volume 5 mL IVPB     Admin Date  04/20/2021 Action  Given Dose  12 mg Rate  232 mL/hr Route  IntraVENous Administered By  Rabia Solo RN          diphenhydrAMINE (BENADRYL) injection 50 mg     Admin Date  04/20/2021 Action  Given Dose  50 mg Route  IntraVENous Administered By  Rabia Lot, RN          famotidine (PF) (PEPCID) 20 mg in 0.9% sodium chloride 10 mL injection     Admin Date  04/20/2021 Action  Given Dose  20 mg Route  IntraVENous Administered By  Yane Orourke RN          fosaprepitant (EMEND) 150 mg in 0.9% sodium chloride 150 mL IVPB     Admin Date  04/20/2021 Action  Given Dose  150 mg Rate  450 mL/hr Route  IntraVENous Administered By  Yane Orourke RN          heparin (porcine) pf 300-500 Units     Admin Date  04/20/2021 Action  Given Dose  500 Units Route  InterCATHeter Administered By  Yane Orourke RN          PACLitaxeL (TAXOL) 251 mg in 0.9% sodium chloride 250 mL, overfill volume 25 mL chemo infusion     Admin Date  04/20/2021 Action  New Bag Dose  251 mg Rate  105.6 mL/hr Route  IntraVENous Administered By  Yane Orourke RN          palonosetron HCl (ALOXI) injection 0.25 mg     Admin Date  04/20/2021 Action  Given Dose  0.25 mg Route  IntraVENous Administered By  Yane Orourke RN          sodium chloride (NS) flush 10 mL     Admin Date  04/20/2021 Action  Given Dose  10 mL Route  IntraVENous Administered By  Yane Orourke RN           Admin Date  04/20/2021 Action  Given Dose  10 mL Route  IntraVENous Administered By  Yane Orourke RN              [id number] Patient tolerated treatment well. Port maintained positive blood return throughout treatment. Port flushed, heparinized and de accessed per protocol. Patient was discharged from [facility] in stable condition. Patient is aware of next appointment.      Future Appointments   Date Time Provider David Jara   5/5/2021  1:00 PM [facility] PSYCHIATRIC [place] [facility] ER [address] [facility]   5/6/2021  2:15 PM MD GREER [facility]   5/11/2021  9:00 AM F1 GRECIA LONG [address] [facility]   5/27/2021  2:45 PM MD GREER [facility]   6/1/2021  9:00 AM F1 GRECIA LONG [address] [facility]   6/17/2021  2:30 PM MD GREER [facility]son [facility]   6/22/2021  9:00 AM F1 GRECIA LONG Ez Goodman [address]ISAN[address]A [place]  April 20, 2021

## 2021-04-20 NOTE — PROGRESS NOTES
[address] [facility] [address], [address]  P [phone]  F [phone]      Patient ID:  Name:  Rosemary Ibarra  MRN:  [id number]  :  1955/66 y.o. Date:  2021      Jimbo Conde MD: Camryn Richter MD  PCP: Iris Berrios MD     Primary Diagnosis: uterine cancer  Date of Diagnosis: 3/2013      Current Agent: Taxol/carboplatin  Cycle: 3      HPI:  73 y.o. 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 She is now found with recurrence noted on imaging studies pelvic adenopathy and peritoneal carcinomatosis. She is recommended combination Keytruda/Lenvima. OncTx History:  3/2013 C Hyst/BSO  FINAL PATHOLOGIC DIAGNOSIS  1.  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  Histologic grade: High grade  Myometrial invasion: Depth of invasion: 0.3 cm  Myometrial thickness: 1.9 cm  Involvement of cervix: Not involved  Extent of involvement of other organs:  Right ovary: Not involved  Left ovary: Not involved  Right fallopian tube: Not involved  Left fallopian tube: Not involved  Right parametrial tissue: Not involved  Left parametrial tissue: Not involved  Lymphovascular invasion: Not identified  Additional pathologic findings:  Focal adenomyosis  Benign simple cyst of left ovary  Paratubal cysts  Pathologic staging (pTNM):  Primary tumor (pT): pT1a  Regional lymph nodes (pN): pN0  Pelvic lymph nodes:  Number examined: 17  Number involved: 0  Distant metastasis (M): Not applicable  2. Omentum, omentectomy:Benign adipose tissue, negative for carcinoma  3. Lymph nodes, right pelvic, excision:Seven lymph nodes, negative for metastatic carcinoma (0/7)  4. Lymph nodes, left pelvic, excision:Ten lymph nodes, negative for metastatic carcinoma (0/10)    MMR proficient   5/2013 - 9/2013 Taxol/carboplatin x 6 cycles   10/9/20 PET/CT:    1. Peritoneal carcinomatosis. 2. 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 3. Left pelvic cul-de-sac tumor   11/3/20 - 2/16/21 Keytruda/lenvima x 6 cycles     Reduced lenvima 8mg d/t HTN   12/30/20 CT CAP:   Mixed response in the abdomen and pelvis compared to 10/9/2020 with mildly increased anterior peritoneal carcinomatosis. Stable to slightly increased retroperitoneal lymphadenopathy. Decreased peritoneal soft tissue nodularity in the deep left pelvis. No evidence for metastatic disease in the chest.   2/25/21 CT CAP:   1. Overall increase in omental caking along the anterior peritoneum (the prior study. 2.  Retroperitoneal adenopathy demonstrating variable response to therapy. Some of these have increased in interval  3. Pelvic soft tissue in the left deep pelvis is not significantly changed although not well visualized. 4.  Slight interval increase in pelvic free fluid   3/9/21 [id number]/m2/Carboplatin AUC5 initiated           SUBJECTIVE:  Angelica Pod presents for immunotherapy. HA/HTN resolved, running low BPs. She has no c/o today and reports feeling quite well. She has a good appetite, has occasional acid reflux using Pepcid and delayed Gi function, using dulcolax. Continues to work full time, unable to work from home. She remains very active at work. No residual effects s/p her therapy in 2013. She lives alone. Does not feel overly close to her children. Her daughter lives next door and brother nearby.   Her son lives near [place]. She feels most supported by her brother Julia Lamar, close friend Mylene and her work family. She still has difficulty feeling comfortable asking family for help. Looking into/questions MCFP with SS. Work for her is a positive stressor, has few hobbies and uncertain of purpose w/o work. ROS  Constitutional: no weight loss, fever, night sweats  Respiratory: no cough, shortness of breath, or wheezing  Cardiovascular: no chest pain or dyspnea on exertion  Heme: No abnormal bleeding, no epistaxis. Gastrointestinal: no abdominal pain, no dysphagia, change in bowel habits, or black or bloody stools  Genito-Urinary: no dysuria, trouble voiding, or hematuria  Musculoskeletal: negative for - gait disturbance or joint swelling  Neurological: negative for - behavioral changes, dizziness, headaches, memory loss or numbness/tingling  Derm: negative  Psych: negative for anxiety and depression       OBJECTIVE:  Physical Exam  Visit Vitals  BP (!) 144/89 (BP 1 Location: Right upper arm, BP Patient Position: Sitting)   Pulse 78   Temp 96.9 °F (36.1 °C)   Resp 18   Ht 5' 5\" (1.651 m)   Wt 171 lb (77.6 kg)   BMI 28.46 kg/m²          General:  alert, cooperative, no distress       HEENT: without pallor, sclera without jaundice, oral mucosa without lesions,      Cardiac:  Regular rate and rhythm        Lungs:  clear to auscultation bilaterally          Port:  clean, dry, no drainage  Abdomen:  soft, protuberant, nondistended, nontender, no gross fluid wave.        Lymph:  no lymphadenopathy   Extremity: no edema, redness or tenderness in the calves or thighs    Wt Readings from Last 3 Encounters:   04/20/21 171 lb (77.6 kg)   03/30/21 166 lb 12.8 oz (75.7 kg)   03/30/21 167 lb 9.6 oz (76 kg)       Recent Labs     04/20/21  0903   WBC 6.0   ANEU 3.6   HGB 11.8   HCT 35.9   MCV 94.0   MCH 30.9   *     Recent Labs     04/20/21  0903      K 3.7   *   *   BUN 9   CREA 0.47*   CA 8.9   MG 2.2   ALB 3. 5   TBILI 0.3   ALT 22         Tumor markers  CA-125   Date Value Ref Range Status   04/20/2021 17 1.5 - 35.0 U/mL Final     Comment:     ** Note new reference range and method **  Results may vary depending on . Method used is Drync     03/30/2021 27 1.5 - 35.0 U/mL Final     Comment:     ** Note new reference range and method **  Results may vary depending on . Method used is Drync     03/09/2021 155 (H) 1.5 - 35.0 U/mL Final     Comment:     ** Note new reference range and method **  Results may vary depending on . Method used is Drync     02/16/2021 56 (H) 1.5 - 35.0 U/mL Final     Comment:     ** Note new reference range and method **  Results may vary depending on . Method used is Drync     01/05/2021 25 1.5 - 35.0 U/mL Final     Comment:     ** Note new reference range and method **  Results may vary depending on . Method used is Drync     11/24/2020 19 1.5 - 35.0 U/mL Final     Comment:     ** Note new reference range and method **  Results may vary depending on . Method used is Drync           Patient Active Problem List   Diagnosis Code    Malignant neoplasm of corpus uteri, except isthmus ([facility] [address] C54.9     Past Medical History:   Diagnosis Date    Abnormal Pap smear 1995    with f/u normal    Anemia     Arthritis     Asthma     Cancer ([facility] [address]     ENDOMETRIAL    Environmental allergies     GERD (gastroesophageal reflux disease)     Goiter     Other unknown and unspecified cause of morbidity or mortality     POSSIBLE ESOPHAGEAL SPASM, ? OBSTRUCTION DUE TO GOITER    PMB (postmenopausal bleeding)     S/P chemotherapy, time since greater than 12 weeks     LAST CHEMO 10/2014 PER PATIENT    Thyroid disease     goiter     Prior to Admission medications    Medication Sig Start Date End Date Taking?  Authorizing Provider dexAMETHasone (Decadron) 4 mg tablet Take 2 tablets by mouth with breakfast the day before chemo also take 2 tablets with breakfast for 2 days after chemotherapy 3/8/21   Rory Linder MD   lisinopriL (PRINIVIL, ZESTRIL) 10 mg tablet Take 1 Tab by mouth daily. 1/6/21   Rory Linder MD   lisinopriL (PRINIVIL, ZESTRIL) 5 mg tablet Take 2 Tabs by mouth daily. 1/5/21   Bossman Cline PA-C   lenvatinib (Lenvima) 8 mg/day (4 mg x 2) cap Take 2 Caps by mouth daily. 12/15/20   Rory Linder MD   ondansetron (ZOFRAN ODT) 4 mg disintegrating tablet Take 1 Tab by mouth every eight (8) hours as needed for Nausea. Indications: nausea and vomiting caused by cancer drugs 10/22/20   Rory Linder MD   lidocaine-prilocaine (EMLA) topical cream Apply small amount over port area and cover with band aid one hour before chemo 10/22/20   Rory Linder MD   guaifenesin (MUCINEX PO) Take  by mouth. Provider, Historical   loratadine (Claritin) 10 mg tablet Take 10 mg by mouth. Provider, Historical   MARICHUY'S WORT PO Take  by mouth. Provider, Historical   epinastine 0.05 % drop Apply  to eye. Provider, Historical   raNITIdine (ZANTAC) 150 mg tablet ZANTAC TABS 9/29/11   Provider, Historical   cyclobenzaprine (FLEXERIL) 5 mg tablet take 1 tablet by mouth three times a day if needed 12/5/16   Provider, Historical   valACYclovir (VALTREX) 1 gram tablet Take 1 Tab by mouth three (3) times daily. 12/15/16   Rory Linder MD   EPINEPHrine (EPIPEN) 0.3 mg/0.3 mL injection 0.3 mg by IntraMUSCular route once as needed. Provider, Historical   ketotifen (ZADITOR) 0.025 % (0.035 %) ophthalmic solution Administer 1 Drop to both eyes every morning. Provider, Historical   Cetirizine (ZYRTEC) 10 mg cap Take 10 mg by mouth nightly. Provider, Historical   SAL ACID/UREA/PETROLATUM,WHITE (KERASAL FOOT EX) by Apply Externally route daily as needed.     Provider, Historical   ACCU-CHEK HELDER PLUS TEST STRP strip  7/3/15   Provider, Historical   NITROSTAT 0.4 mg SL tablet  14   Provider, Historical   CALCIUM CARBONATE (MARCELLO-SELTZER ANTACID PO) Take  by mouth daily as needed. Provider, Historical   ibuprofen (MOTRIN) 200 mg tablet Take 200 mg by mouth every six (6) hours as needed. Provider, Historical     Allergies   Allergen Reactions    Latex Other (comments)     Burns skin    Acetone Shortness of Breath    Amoxicillin Anaphylaxis    Ciprofloxacin Hives    Pcn [Penicillins] Anaphylaxis    Buspar [Buspirone] Unknown (comments)     Has N&V and makes her feel \"weird\"    Azithromycin Hives    Egg Nausea Only    Other Medication Rash     POPPY seeds     Family History   Problem Relation Age of Onset    Cancer Maternal Aunt         breast    Heart Disease Mother     Diabetes Father     Cancer Sister         CERVICAL    Kidney Disease Brother     Diabetes Brother     Heart Attack Other     Arrhythmia Brother     Diabetes Brother     Anesth Problems Neg Hx    Maternal grandmother \"female\" cancer  in 45s      CT Results (most recent):  Results from Hospital Encounter encounter on 21   CT ABD PELV W CONT    Narrative EXAM: CT CHEST W CONT, CT ABD PELV W CONT    INDICATION: Endometrial cancer    COMPARISON: 2020    CONTRAST: 100 mL of Isovue-370. TECHNIQUE:   Following the uneventful intravenous administration of contrast, thin axial  images were obtained through the chest, abdomen and pelvis. Coronal and sagittal  reconstructions were generated. Oral contrast was administered. CT dose  reduction was achieved through use of a standardized protocol tailored for this  examination and automatic exposure control for dose modulation. FINDINGS:     CHEST WALL:No axillary or supraclavicular adenopathy. THYROID: Large hypodensity in the thyroid gland unchanged. MEDIASTINUM: 12 mm cardiophrenic lymph node unchanged. RAMEZ: No mass or lymphadenopathy. THORACIC AORTA: No dissection or aneurysm.   MAIN PULMONARY ARTERY: Normal in caliber. TRACHEA/BRONCHI: Patent. ESOPHAGUS: No wall thickening or dilatation. HEART: Coronary atherosclerotic disease  PLEURA: No effusion or pneumothorax. LUNGS: No nodule, mass, or airspace disease. LIVER: No mass or biliary dilatation. GALLBLADDER: Unremarkable. BILIARY TREE: Unremarkable  SPLEEN: Unremarkable  PANCREAS: No mass or ductal dilatation. ADRENALS: Unremarkable. KIDNEYS: No mass, calculus, or hydronephrosis. STOMACH: Unremarkable. SMALL BOWEL: No dilatation or wall thickening. COLON: No dilatation or wall thickening. APPENDIX: Not visualized  PERITONEUM: Peritoneal carcinomatosis is again noted. 6.8 x 3.1 cm peritoneal  mass anteriorly is slightly increased in size. There is adjacent probably  confluent lesion measuring 3.4 x 2.6 cm which is increased in size from the  prior study. Deep left peritoneal nodularity is not significantly changed. Free  fluid in the pelvis increase in interval  RETROPERITONEUM: Left retroperitoneal lymph node measuring 1.5 x 1.4 cm slightly  decreased in size from 0.8 x 1.3 cm. Right retroperitoneal lymph node measuring  0.8 x 0.9 cm is increase in size of common iliac lymph node now measures 1.4 x  1.2 cm not significantly changed in size. REPRODUCTIVE ORGANS: Hysterectomy  URINARY BLADDER: No mass or calculus. BONES: No destructive bone lesion. ADDITIONAL COMMENTS: N/A      Impression 1. Overall increase in omental caking along the anterior peritoneum (the prior  study. 2.  Retroperitoneal adenopathy demonstrating variable response to therapy. Some  of these have increased in interval.    3.  Pelvic soft tissue in the left deep pelvis is not significantly changed  although not well visualized. 4.  Slight interval increase in pelvic free fluid             IMPRESSION/PLAN:  73 y.o. 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   ECOG: 1    Labs/chart reviewed  -Taxol/carboplatin today, cycle 3. Appreciate normalization of her . -HTN: resolved. Stop lisinopril.  -Hx of thyroid nodular goiter, benign follicular bx. Was trending subclinical hyperthyroid on antiPD1/TKI therapy transiently and asx. Resolved, now change in therapy.  -Chronic med issues:    Hx asthma - no issues. inhaler PRN   Hx esophageal spasm - uses nitroglycerin  -Reflux: continue pepcid  -Stool irregularity: continue dulcolax, add Miralax or Milk of Magnesia. Psychosocial: In good spirits and feels well supported by her friends/work family. Asks many appropriate questions. She is doing very well at the moment. Feels work is essential for her well-being at this point. Encouraged her again to open dialogue with her family re: her diagnosis and possible future needs expected. Advised to call with questions/concerns. 04/20/21 I have spent 25 minutes reviewing previous notes, results and face to face with the patient discussing the diagnosis and treatment plan as outlined above.    Electronically signed,        Janet Santos PA-C

## 2021-05-05 ENCOUNTER — HOSPITAL ENCOUNTER (OUTPATIENT)
Dept: CT IMAGING | Age: 66
Discharge: HOME | End: 2021-05-05
Attending: OBSTETRICS & GYNECOLOGY
Payer: MEDICARE

## 2021-05-05 DIAGNOSIS — C54.1 PRIMARY SEROUS ADENOCARCINOMA OF ENDOMETRIUM (HCC): ICD-10-CM

## 2021-05-05 PROCEDURE — 74177 CT ABD & PELVIS W/CONTRAST: CPT

## 2021-05-05 PROCEDURE — 74011000636 HC RX REV CODE- 636: Performed by: RADIOLOGY

## 2021-05-05 RX ADMIN — IOPAMIDOL 100 ML: 755 INJECTION, SOLUTION INTRAVENOUS at 13:34

## 2021-05-06 ENCOUNTER — VIRTUAL VISIT (OUTPATIENT)
Dept: GYNECOLOGY | Age: 66
End: 2021-05-06
Payer: MEDICARE

## 2021-05-06 DIAGNOSIS — C54.1 PRIMARY SEROUS ADENOCARCINOMA OF ENDOMETRIUM (HCC): Primary | ICD-10-CM

## 2021-05-06 PROCEDURE — 99213 OFFICE O/P EST LOW 20 MIN: CPT | Performed by: OBSTETRICS & GYNECOLOGY

## 2021-05-06 PROCEDURE — G8432 DEP SCR NOT DOC, RNG: HCPCS | Performed by: OBSTETRICS & GYNECOLOGY

## 2021-05-06 PROCEDURE — G8427 DOCREV CUR MEDS BY ELIG CLIN: HCPCS | Performed by: OBSTETRICS & GYNECOLOGY

## 2021-05-06 PROCEDURE — G0463 HOSPITAL OUTPT CLINIC VISIT: HCPCS | Performed by: OBSTETRICS & GYNECOLOGY

## 2021-05-06 PROCEDURE — 3017F COLORECTAL CA SCREEN DOC REV: CPT | Performed by: OBSTETRICS & GYNECOLOGY

## 2021-05-06 PROCEDURE — 1090F PRES/ABSN URINE INCON ASSESS: CPT | Performed by: OBSTETRICS & GYNECOLOGY

## 2021-05-06 PROCEDURE — G8400 PT W/DXA NO RESULTS DOC: HCPCS | Performed by: OBSTETRICS & GYNECOLOGY

## 2021-05-06 PROCEDURE — G8419 CALC BMI OUT NRM PARAM NOF/U: HCPCS | Performed by: OBSTETRICS & GYNECOLOGY

## 2021-05-06 PROCEDURE — G8536 NO DOC ELDER MAL SCRN: HCPCS | Performed by: OBSTETRICS & GYNECOLOGY

## 2021-05-06 PROCEDURE — 1101F PT FALLS ASSESS-DOCD LE1/YR: CPT | Performed by: OBSTETRICS & GYNECOLOGY

## 2021-05-06 RX ORDER — ALBUTEROL SULFATE 0.83 MG/ML
2.5 SOLUTION RESPIRATORY (INHALATION) AS NEEDED
Status: CANCELLED
Start: 2021-05-11

## 2021-05-06 RX ORDER — SODIUM CHLORIDE 9 MG/ML
10 INJECTION INTRAMUSCULAR; INTRAVENOUS; SUBCUTANEOUS AS NEEDED
Status: CANCELLED | OUTPATIENT
Start: 2021-05-11

## 2021-05-06 RX ORDER — ONDANSETRON 2 MG/ML
8 INJECTION INTRAMUSCULAR; INTRAVENOUS AS NEEDED
Status: CANCELLED | OUTPATIENT
Start: 2021-05-11

## 2021-05-06 RX ORDER — PALONOSETRON 0.05 MG/ML
0.25 INJECTION, SOLUTION INTRAVENOUS ONCE
Status: CANCELLED | OUTPATIENT
Start: 2021-05-11 | End: 2021-05-11

## 2021-05-06 RX ORDER — EPINEPHRINE 1 MG/ML
0.3 INJECTION, SOLUTION, CONCENTRATE INTRAVENOUS AS NEEDED
Status: CANCELLED | OUTPATIENT
Start: 2021-05-11

## 2021-05-06 RX ORDER — HEPARIN 100 UNIT/ML
300-500 SYRINGE INTRAVENOUS AS NEEDED
Status: CANCELLED
Start: 2021-05-11

## 2021-05-06 RX ORDER — SODIUM CHLORIDE 0.9 % (FLUSH) 0.9 %
10 SYRINGE (ML) INJECTION AS NEEDED
Status: CANCELLED | OUTPATIENT
Start: 2021-05-11

## 2021-05-06 RX ORDER — LORAZEPAM 2 MG/ML
0.5 INJECTION INTRAMUSCULAR
Status: CANCELLED
Start: 2021-05-11

## 2021-05-06 RX ORDER — HYDROCORTISONE SODIUM SUCCINATE 100 MG/2ML
100 INJECTION, POWDER, FOR SOLUTION INTRAMUSCULAR; INTRAVENOUS AS NEEDED
Status: CANCELLED | OUTPATIENT
Start: 2021-05-11

## 2021-05-06 RX ORDER — DIPHENHYDRAMINE HYDROCHLORIDE 50 MG/ML
50 INJECTION, SOLUTION INTRAMUSCULAR; INTRAVENOUS ONCE
Status: CANCELLED | OUTPATIENT
Start: 2021-05-11 | End: 2021-05-11

## 2021-05-06 RX ORDER — ACETAMINOPHEN 325 MG/1
650 TABLET ORAL AS NEEDED
Status: CANCELLED
Start: 2021-05-11

## 2021-05-06 RX ORDER — DIPHENHYDRAMINE HYDROCHLORIDE 50 MG/ML
25 INJECTION, SOLUTION INTRAMUSCULAR; INTRAVENOUS AS NEEDED
Status: CANCELLED
Start: 2021-05-11

## 2021-05-06 RX ORDER — DIPHENHYDRAMINE HYDROCHLORIDE 50 MG/ML
50 INJECTION, SOLUTION INTRAMUSCULAR; INTRAVENOUS AS NEEDED
Status: CANCELLED
Start: 2021-05-11

## 2021-05-06 RX ORDER — SODIUM CHLORIDE 9 MG/ML
25 INJECTION, SOLUTION INTRAVENOUS CONTINUOUS
Status: CANCELLED | OUTPATIENT
Start: 2021-05-11

## 2021-05-06 NOTE — PROGRESS NOTES
[facility]  [address] [place] [address], [address]  [phone] [facility] [phone]    Office Visit    Patient ID:  Name: Kelley Medrano  MRN: [id number]  : 1955/66 y.o. Visit date: 2021    INTERVAL HISTORY:  Kelley Medrano is a  female who is an established patient with stage IA, grade 3 uterine carcinoma. She underwent laparoscopic hysterectomy with staging in 2013. She was recommended six cycles of adjuvant Taxol and Carboplatin, which she completed. We elected not to treat with pelvic radiation therapy due to her difficulty with chemotherapy. She presents was last seen in July for routine surveillance. She was without complaints at that time and her exam was negative. She recently had some abdominal discomfort and noted to have some blood in her urine. She was referred to [facility] for evaluation. She had a CT of the abdomen/pelvis with them. It revealed retroperitoneal lymphadenopathy, peritoneal carcinomatosis, and trace ascites. She also reported a negative colonoscopy within the last month. She is scheduled for a MMG in a few weeks. I sent her for a PET/CT to better evaluate the extent of disease. She presented to review the results and discuss treatment. Her disease is not amenable to surgical resection. Systemic therapy is her only viable option. I thought immunotherapy would be the best choice, ahead of additional chemotherapy. This would consist of IV Keytruda and PO Lenvima. If that were not successful, we would consider retreatment with Taxol/Carboplatin or single agent Doxil. She completed 6 cycles before demonstrating progression of disease. We decided upon retreatment with Taxol/Carboplatin. She has completed 3 cycles so far. She presents today to review her latest CT scan. Her CA-125 has responded.         PMH:  Past Medical History:   Diagnosis Date    Abnormal Pap smear     with f/u normal    Anemia     Arthritis     Asthma     Cancer (HCC)     ENDOMETRIAL    Environmental allergies     GERD (gastroesophageal reflux disease)     Goiter     Other unknown and unspecified cause of morbidity or mortality     POSSIBLE ESOPHAGEAL SPASM, ? OBSTRUCTION DUE TO GOITER    PMB (postmenopausal bleeding)     S/P chemotherapy, time since greater than 12 weeks     LAST CHEMO 10/2014 PER PATIENT    Thyroid disease     goiter     PSH:  Past Surgical History:   Procedure Laterality Date    HX APPENDECTOMY      HX BUNIONECTOMY      HX GYN  3/13/13    TLH/BSO    HX HEENT  4/22/13    TOOTH REMOVED    HX ORTHOPAEDIC  1980'S    BUNIONECTOMY    HX VASCULAR ACCESS      port    [place] BREAST SURGERY PROCEDURE UNLISTED  1/12/16    right breast bx     SOC:  Social History     Socioeconomic History    Marital status:      Spouse name: Not on file    Number of children: Not on file    Years of education: Not on file    Highest education level: Not on file   Occupational History    Not on file   Social Needs    Financial resource strain: Not on file    Food insecurity     Worry: Not on file     Inability: Not on file    Transportation needs     Medical: Not on file     Non-medical: Not on file   Tobacco Use    Smoking status: Never Smoker    Smokeless tobacco: Never Used   Substance and Sexual Activity    Alcohol use:  Yes     Alcohol/week: 0.0 standard drinks     Comment: RARE OCC    Drug use: No    Sexual activity: Not on file   Lifestyle    Physical activity     Days per week: Not on file     Minutes per session: Not on file    Stress: Not on file   Relationships    Social connections     Talks on phone: Not on file     Gets together: Not on file     Attends Jain service: Not on file     Active member of club or organization: Not on file     Attends meetings of clubs or organizations: Not on file     Relationship status: Not on file    Intimate partner violence     Fear of current or ex partner: Not on file     Emotionally abused: Not on file     Physically abused: Not on file     Forced sexual activity: Not on file   Other Topics Concern    Not on file   Social History Narrative    Not on file     Family History  Family History   Problem Relation Age of Onset    Cancer Maternal Aunt         breast    Heart Disease Mother     Diabetes Father     Cancer Sister         CERVICAL    Kidney Disease Brother     Diabetes Brother     Heart Attack Other     Arrhythmia Brother     Diabetes Brother     Anesth Problems Neg Hx      Medications:  Current Outpatient Medications on File Prior to Visit   Medication Sig Dispense Refill    dexAMETHasone (Decadron) 4 mg tablet Take 2 tablets by mouth with breakfast the day before chemo also take 2 tablets with breakfast for 2 days after chemotherapy 36 Tab 1    lisinopriL (PRINIVIL, ZESTRIL) 10 mg tablet Take 1 Tab by mouth daily. 30 Tab 2    lisinopriL (PRINIVIL, ZESTRIL) 5 mg tablet Take 2 Tabs by mouth daily. 30 Tab 5    lenvatinib (Lenvima) 8 mg/day (4 mg x 2) cap Take 2 Caps by mouth daily. 60 Cap 2    ondansetron (ZOFRAN ODT) 4 mg disintegrating tablet Take 1 Tab by mouth every eight (8) hours as needed for Nausea. Indications: nausea and vomiting caused by cancer drugs 30 Tab 3    lidocaine-prilocaine (EMLA) topical cream Apply small amount over port area and cover with band aid one hour before chemo 30 g 3    guaifenesin (MUCINEX PO) Take  by mouth.  loratadine (Claritin) 10 mg tablet Take 10 mg by mouth.  MARICHUY'S WORT PO Take  by mouth.  epinastine 0.05 % drop Apply  to eye.  raNITIdine (ZANTAC) 150 mg tablet ZANTAC TABS      cyclobenzaprine (FLEXERIL) 5 mg tablet take 1 tablet by mouth three times a day if needed  0    valACYclovir (VALTREX) 1 gram tablet Take 1 Tab by mouth three (3) times daily. 21 Tab 3    EPINEPHrine (EPIPEN) 0.3 mg/0.3 mL injection 0.3 mg by IntraMUSCular route once as needed.       ketotifen (ZADITOR) 0.025 % (0.035 %) ophthalmic solution Administer 1 Drop to both eyes every morning.  Cetirizine (ZYRTEC) 10 mg cap Take 10 mg by mouth nightly.  SAL ACID/UREA/PETROLATUM,WHITE (KERASAL FOOT EX) by Apply Externally route daily as needed.  ACCU-CHEK HELDER PLUS TEST STRP strip   0    NITROSTAT 0.4 mg SL tablet       CALCIUM CARBONATE (MARCELLO-SELTZER ANTACID PO) Take  by mouth daily as needed.  ibuprofen (MOTRIN) 200 mg tablet Take 200 mg by mouth every six (6) hours as needed. No current facility-administered medications on file prior to visit. Allergies: Allergies   Allergen Reactions    Latex Other (comments)     Burns skin    Acetone Shortness of Breath    Amoxicillin Anaphylaxis    Ciprofloxacin Hives    Pcn [Penicillins] Anaphylaxis    Buspar [Buspirone] Unknown (comments)     Has N&V and makes her feel \"weird\"    Azithromycin Hives    Egg Nausea Only    Other Medication Rash     POPPY seeds       ROS:  Negative     OBJECTIVE:    PHYSICAL EXAM  VITAL SIGNS: There were no vitals taken for this visit.    GENERAL KAIA:    HEENT:    RESPIRATORY:    CARDIOVASC:    GASTROINT:    MUSCULOSKEL:    EXTREMITIES:    PELVIC:    RECTAL:    PRIYANKA SURVEY:    NEURO:      Lab Results   Component Value Date/Time    WBC 6.0 04/20/2021 09:03 AM    Hemoglobin (POC) 12.9 05/01/2013 09:53 AM    HGB 11.8 04/20/2021 09:03 AM    Hematocrit (POC) 38 05/01/2013 09:53 AM    HCT 35.9 04/20/2021 09:03 AM    PLATELET 190 (L) 17/08/8831 09:03 AM    MCV 94.0 04/20/2021 09:03 AM     Lab Results   Component Value Date/Time    Sodium 142 04/20/2021 09:03 AM    Potassium 3.7 04/20/2021 09:03 AM    Chloride 110 (H) 04/20/2021 09:03 AM    CO2 25 04/20/2021 09:03 AM    Anion gap 7 04/20/2021 09:03 AM    Glucose 145 (H) 04/20/2021 09:03 AM    BUN 9 04/20/2021 09:03 AM    Creatinine 0.47 (L) 04/20/2021 09:03 AM    BUN/Creatinine ratio 19 04/20/2021 09:03 AM    GFR est AA >60 04/20/2021 09:03 AM GFR est non-AA >60 04/20/2021 09:03 AM    Calcium 8.9 04/20/2021 09:03 AM    Bilirubin, total 0.3 04/20/2021 09:03 AM    Alk. phosphatase 67 04/20/2021 09:03 AM    Protein, total 6.9 04/20/2021 09:03 AM    Albumin 3.5 04/20/2021 09:03 AM    Globulin 3.4 04/20/2021 09:03 AM    A-G Ratio 1.0 (L) 04/20/2021 09:03 AM    ALT (SGPT) 22 04/20/2021 09:03 AM    AST (SGOT) 13 (L) 04/20/2021 09:03 AM     Lab Results   Component Value Date/Time    CA-125 17 04/20/2021 09:03 AM    Cancer Ag (CA) 125 98.9 (H) 10/23/2020 10:02 AM         CT of abdomen/pelvis (8/13/20)  LOWER CHEST: The visualized portions of the lung bases are clear. ABDOMEN:  Liver: The liver is normal in size and contour with no focal abnormality. The  attenuation of the liver is decreased throughout. Gallbladder and bile ducts: There is no calcified gallstone or biliary  dilatation. Spleen: No abnormality. Pancreas: No abnormality. Adrenal glands: No abnormality. Kidneys: No abnormality. PELVIS:  Reproductive organs: The uterus and ovaries are absent. Bladder: No abnormality. BOWEL AND MESENTERY: The small bowel is normal.  There is no mesenteric mass or  adenopathy. The appendix is absent. PERITONEUM: There is no ascites or free intraperitoneal air. RETROPERITONEUM: The aorta  tapers without aneurysm. There is no retroperitoneal  adenopathy or mass. There is no pelvic mass or adenopathy. BONES AND SOFT TISSUES: The bones and soft tissues of the abdominal wall are  within normal limits.     IMPRESSION:   1. Status post hysterectomy and bilateral oophorectomy. 2. No evidence of recurrent or metastatic disease within the abdomen or pelvis. 3. Hepatic steatosis. 4. Status post appendectomy.       PET/CT (10/9/20)  HEAD/NECK: No apparent foci of abnormal hypermetabolism.  Cerebral evaluation is  limited by normal intense activity.     CHEST: No foci of abnormal hypermetabolism.     ABDOMEN/PELVIS:   Peritoneal nodules are hypermetabolic, SUV 12.  Retroperitoneal adenopathy is hypermetabolic, SUV 6. Right deep pelvic adenopathy is hypermetabolic, SUV 8. Left pelvic cul-de-sac mass is hypermetabolic, SUV 11.     SKELETON: No foci of abnormal hypermetabolism in the axial and visualized  appendicular skeleton.     IMPRESSION:   1. Peritoneal carcinomatosis. 2. 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 3. Left pelvic cul-de-sac tumor. CT of chest/abdomen/pelvis (12/30/20)  CT chest:    There is stable multinodular thyroid enlargement. Left chest Port-A-Cath  terminates in the SVC. The aorta and main pulmonary artery are normal in  caliber. The heart size is normal.  There is no pericardial or pleural effusion.        There are no enlarged axillary, mediastinal, or hilar lymph nodes.      There is no lung mass or airspace opacity. There is no pneumothorax. The  central airways are clear.     CT abdomen and pelvis: The liver, spleen, pancreas, and adrenal glands are normal. The gall bladder is  present  without intra- or extra-hepatic biliary dilatation.       The kidneys are symmetric without hydronephrosis.      There are no dilated bowel loops. The appendix is surgically absent.       Enlarged retroperitoneal lymph nodes are again demonstrated with a  representative left para-aortic lymph node measuring 1.3 x 1.8 cm (series 3,  image 76), previously 1.5 x 1.7 cm on 10/9/2020. A left common iliac lymph node  measures 1.1 x 1.5 cm (series 3, image 91), previously 0.8 x 1.4 cm.       Anterior peritoneal/mesenteric soft tissue nodularity is overall mildly  increased since 10/9/2020 with a representative measurement of 2.3 x 6.8 cm  (series 3, image 77), previously 2.6 x 5.5 cm.     Peritoneal soft tissue nodularity in the deep left pelvis measures 1.4 x 2.2 cm  (series 3, image 116), previously 2.9 x 3.1 cm.     There is no free fluid or free air.  The aorta tapers without aneurysm.     The urinary bladder is normal. The uterus and ovaries are surgically absent.     There is no aggressive bony lesion.     IMPRESSION:   1. Mixed response in the abdomen and pelvis compared to 10/9/2020 with mildly  increased anterior peritoneal carcinomatosis. Stable to slightly increased  retroperitoneal lymphadenopathy. Decreased peritoneal soft tissue nodularity in  the deep left pelvis.     2. No evidence for metastatic disease in the chest.      CT of chest/abdomen/pelvis (2/25/21)  CHEST WALL:No axillary or supraclavicular adenopathy. THYROID: Large hypodensity in the thyroid gland unchanged. MEDIASTINUM: 12 mm cardiophrenic lymph node unchanged. RAMEZ: No mass or lymphadenopathy. THORACIC AORTA: No dissection or aneurysm. MAIN PULMONARY ARTERY: Normal in caliber. TRACHEA/BRONCHI: Patent. ESOPHAGUS: No wall thickening or dilatation. HEART: Coronary atherosclerotic disease  PLEURA: No effusion or pneumothorax. LUNGS: No nodule, mass, or airspace disease. LIVER: No mass or biliary dilatation. GALLBLADDER: Unremarkable. BILIARY TREE: Unremarkable  SPLEEN: Unremarkable  PANCREAS: No mass or ductal dilatation. ADRENALS: Unremarkable. KIDNEYS: No mass, calculus, or hydronephrosis. STOMACH: Unremarkable. SMALL BOWEL: No dilatation or wall thickening. COLON: No dilatation or wall thickening. APPENDIX: Not visualized  PERITONEUM: Peritoneal carcinomatosis is again noted. 6.8 x 3.1 cm peritoneal  mass anteriorly is slightly increased in size. There is adjacent probably  confluent lesion measuring 3.4 x 2.6 cm which is increased in size from the  prior study. Deep left peritoneal nodularity is not significantly changed. Free  fluid in the pelvis increase in interval  RETROPERITONEUM: Left retroperitoneal lymph node measuring 1.5 x 1.4 cm slightly  decreased in size from 0.8 x 1.3 cm.  Right retroperitoneal lymph node measuring  0.8 x 0.9 cm is increase in size of common iliac lymph node now measures 1.4 x  1.2 cm not significantly changed in size.  REPRODUCTIVE ORGANS: Hysterectomy  URINARY BLADDER: No mass or calculus. BONES: No destructive bone lesion. ADDITIONAL COMMENTS: N/A     IMPRESSION  1. Overall increase in omental caking along the anterior peritoneum (the prior  study.     2.  Retroperitoneal adenopathy demonstrating variable response to therapy. Some  of these have increased in interval.     3.  Pelvic soft tissue in the left deep pelvis is not significantly changed  although not well visualized.     4.  Slight interval increase in pelvic free fluid      CT of abdomen/pelvis (5/5/21)  LOWER THORAX: Right cardiophrenic lymph node measures 2.9 cm and previously  measured 1.2 cm on image number 15. There is minor basilar atelectasis/scarring  LIVER: No mass. BILIARY TREE: There is suggestion of gallstones CBD is not dilated. SPLEEN: within normal limits. PANCREAS: No mass or ductal dilatation. ADRENALS: Unremarkable. KIDNEYS: No mass, calculus, or hydronephrosis. STOMACH: Unremarkable. SMALL BOWEL: No dilatation or wall thickening. COLON: No dilatation or wall thickening. APPENDIX: Status post appendectomy  PERITONEUM: Peritoneal carcinomatosis appears improved. There is a confluent  density anteriorly in the peritoneum on image number 43 measuring 5.9 x 1.4 cm  which previously measured 9.8 x 1.9 cm. RETROPERITONEUM: Left retroperitoneal lymph node measures 0.7 cm image 45 and  previously measured 1.6 cm. Right retroperitoneal lymph node measures 0.7 cm image 46 and previously  measured 0.8 cm.     Left iliac lymph node image 57 measures 0.9 cm and previously measured 1.1 cm. REPRODUCTIVE ORGANS: Status post hysterectomy and oophorectomy. URINARY BLADDER: No mass or calculus. BONES: No destructive bone lesion. ABDOMINAL WALL: No mass or hernia. ADDITIONAL COMMENTS: N/A     IMPRESSION  1. There has been a mild decrease in the peritoneal carcinomatosis.      2.  There has been slight to mild decrease in the retroperitoneal adenopathy.     3. No ascites is identified. No definite pelvic mass is identified. 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 She completed six cycles of adjuvant Taxol and Carboplatin chemotherapy. She developed recurrent disease and was treated with the regimen of IV Keytruda and PO Lenvima. She  completed 6 cycles before progression. Since it had been almost 8 years since her adjuvant Taxol/Carboplatin, I recommended that we retreat her with that regimen, though I suggested a lower dose of each. She has completed 3 cycles so far. She is tolerating it as expected. Her CA-125 is responding. Her CT demonstrates response to therapy. We will continue with the current regimen. Rani Rose is a 73 y.o. female, evaluated via audio-only technology on 5/6/2021 for Chemotherapy      Assessment & Plan:   Diagnoses and all orders for this visit:    1. Primary serous adenocarcinoma of endometrium (HCC)      Subjective:       Prior to Admission medications    Medication Sig Start Date End Date Taking? Authorizing Provider   dexAMETHasone (Decadron) 4 mg tablet Take 2 tablets by mouth with breakfast the day before chemo also take 2 tablets with breakfast for 2 days after chemotherapy 3/8/21  Yes Claude Burden, MD   lisinopriL (PRINIVIL, ZESTRIL) 10 mg tablet Take 1 Tab by mouth daily. 1/6/21  Yes Claude Burden, MD   lisinopriL (PRINIVIL, ZESTRIL) 5 mg tablet Take 2 Tabs by mouth daily. 1/5/21  Yes Hieu Cline PA-C   lenvatinib (Lenvima) 8 mg/day (4 mg x 2) cap Take 2 Caps by mouth daily. 12/15/20  Yes Claude Burden, MD   ondansetron (ZOFRAN ODT) 4 mg disintegrating tablet Take 1 Tab by mouth every eight (8) hours as needed for Nausea.  Indications: nausea and vomiting caused by cancer drugs 10/22/20  Yes Claude Burden, MD   lidocaine-prilocaine (EMLA) topical cream Apply small amount over port area and cover with band aid one hour before chemo 10/22/20  Yes Sona Forde MD   guaifenesin (MUCINEX PO) Take  by mouth. Yes Provider, Historical   loratadine (Claritin) 10 mg tablet Take 10 mg by mouth. Yes Provider, Historical   MARICUHY'S WORT PO Take  by mouth. Yes Provider, Historical   epinastine 0.05 % drop Apply  to eye. Yes Provider, Historical   raNITIdine (ZANTAC) 150 mg tablet ZANTAC TABS 9/29/11  Yes Provider, Historical   cyclobenzaprine (FLEXERIL) 5 mg tablet take 1 tablet by mouth three times a day if needed 12/5/16  Yes Provider, Historical   valACYclovir (VALTREX) 1 gram tablet Take 1 Tab by mouth three (3) times daily. 12/15/16  Yes Sona Forde MD   EPINEPHrine (EPIPEN) 0.3 mg/0.3 mL injection 0.3 mg by IntraMUSCular route once as needed. Yes Provider, Historical   ketotifen (ZADITOR) 0.025 % (0.035 %) ophthalmic solution Administer 1 Drop to both eyes every morning. Yes Provider, Historical   Cetirizine (ZYRTEC) 10 mg cap Take 10 mg by mouth nightly. Yes Provider, Historical   SAL ACID/UREA/PETROLATUM,WHITE (KERASAL FOOT EX) by Apply Externally route daily as needed. Yes Provider, Historical   ACCU-CHEK HELDER PLUS TEST STRP strip  7/3/15  Yes Provider, Historical   NITROSTAT 0.4 mg SL tablet  9/22/14  Yes Provider, Historical   CALCIUM CARBONATE (MARCELLO-SELTZER ANTACID PO) Take  by mouth daily as needed. Yes Provider, Historical   ibuprofen (MOTRIN) 200 mg tablet Take 200 mg by mouth every six (6) hours as needed.    Yes Provider, Historical     Patient Active Problem List   Diagnosis Code    Malignant neoplasm of corpus uteri, except isthmus ([facility] [address] C54.9     Patient Active Problem List    Diagnosis Date Noted    Malignant neoplasm of corpus uteri, except isthmus ([facility] [address] 02/28/2013     Current Outpatient Medications   Medication Sig Dispense Refill    dexAMETHasone (Decadron) 4 mg tablet Take 2 tablets by mouth with breakfast the day before chemo also take 2 tablets with breakfast for 2 days after chemotherapy 36 Tab 1    lisinopriL (PRINIVIL, ZESTRIL) 10 mg tablet Take 1 Tab by mouth daily. 30 Tab 2    lisinopriL (PRINIVIL, ZESTRIL) 5 mg tablet Take 2 Tabs by mouth daily. 30 Tab 5    lenvatinib (Lenvima) 8 mg/day (4 mg x 2) cap Take 2 Caps by mouth daily. 60 Cap 2    ondansetron (ZOFRAN ODT) 4 mg disintegrating tablet Take 1 Tab by mouth every eight (8) hours as needed for Nausea. Indications: nausea and vomiting caused by cancer drugs 30 Tab 3    lidocaine-prilocaine (EMLA) topical cream Apply small amount over port area and cover with band aid one hour before chemo 30 g 3    guaifenesin (MUCINEX PO) Take  by mouth.  loratadine (Claritin) 10 mg tablet Take 10 mg by mouth.  MARICHUY'S WORT PO Take  by mouth.  epinastine 0.05 % drop Apply  to eye.  raNITIdine (ZANTAC) 150 mg tablet ZANTAC TABS      cyclobenzaprine (FLEXERIL) 5 mg tablet take 1 tablet by mouth three times a day if needed  0    valACYclovir (VALTREX) 1 gram tablet Take 1 Tab by mouth three (3) times daily. 21 Tab 3    EPINEPHrine (EPIPEN) 0.3 mg/0.3 mL injection 0.3 mg by IntraMUSCular route once as needed.  ketotifen (ZADITOR) 0.025 % (0.035 %) ophthalmic solution Administer 1 Drop to both eyes every morning.  Cetirizine (ZYRTEC) 10 mg cap Take 10 mg by mouth nightly.  SAL ACID/UREA/PETROLATUM,WHITE (KERASAL FOOT EX) by Apply Externally route daily as needed.  ACCU-CHEK HELDER PLUS TEST STRP strip   0    NITROSTAT 0.4 mg SL tablet       CALCIUM CARBONATE (MARCELLO-SELTZER ANTACID PO) Take  by mouth daily as needed.  ibuprofen (MOTRIN) 200 mg tablet Take 200 mg by mouth every six (6) hours as needed.        Allergies   Allergen Reactions    Latex Other (comments)     Burns skin    Acetone Shortness of Breath    Amoxicillin Anaphylaxis    Ciprofloxacin Hives    Pcn [Penicillins] Anaphylaxis    Buspar [Buspirone] Unknown (comments)     Has N&V and makes her feel \"weird\"    Azithromycin Hives    Egg Nausea Only    Other Medication Rash     POPPY seeds     Past Medical History:   Diagnosis Date    Abnormal Pap smear 1995    with f/u normal    Anemia     Arthritis     Asthma     Cancer ([facility] [address]     ENDOMETRIAL    Environmental allergies     GERD (gastroesophageal reflux disease)     Goiter     Other unknown and unspecified cause of morbidity or mortality     POSSIBLE ESOPHAGEAL SPASM, ? OBSTRUCTION DUE TO GOITER    PMB (postmenopausal bleeding)     S/P chemotherapy, time since greater than 12 weeks     LAST CHEMO 10/2014 PER PATIENT    Thyroid disease     goiter     Past Surgical History:   Procedure Laterality Date    HX APPENDECTOMY      HX BUNIONECTOMY      HX GYN  3/13/13    TLH/BSO    HX HEENT  4/22/13    TOOTH REMOVED    HX ORTHOPAEDIC  1980'S    BUNIONECTOMY    HX VASCULAR ACCESS      port    [place] BREAST SURGERY PROCEDURE UNLISTED  1/12/16    right breast bx     Family History   Problem Relation Age of Onset    Cancer Maternal Aunt         breast    Heart Disease Mother     Diabetes Father     Cancer Sister         CERVICAL    Kidney Disease Brother     Diabetes Brother     Heart Attack Other     Arrhythmia Brother     Diabetes Brother     Anesth Problems Neg Hx      Social History     Tobacco Use    Smoking status: Never Smoker    Smokeless tobacco: Never Used   Substance Use Topics    Alcohol use: Yes     Alcohol/week: 0.0 standard drinks     Comment: RARE OCC       ROS    Patient-Reported Vitals 1/19/2021   Patient-Reported Systolic  590   Patient-Reported Diastolic 96        Lisa Mccollum, who was evaluated through a patient-initiated, synchronous (real-time) audio only encounter, and/or her healthcare decision maker, is aware that it is a billable service, with coverage as determined by her insurance carrier. She provided verbal consent to proceed: Yes.  She has not had a related appointment within my department in the past 7 days or scheduled within the next 24 hours. Total Time: minutes: 11-20 minutes      An electronic signature was used to sign this note.     Alonzo Kumar MD  05/06/21

## 2021-05-11 ENCOUNTER — HOSPITAL ENCOUNTER (OUTPATIENT)
Dept: INFUSION THERAPY | Age: 66
Discharge: HOME | End: 2021-05-11
Payer: MEDICARE

## 2021-05-11 ENCOUNTER — APPOINTMENT (OUTPATIENT)
Dept: INFUSION THERAPY | Age: 66
End: 2021-05-11
Payer: MEDICARE

## 2021-05-11 VITALS
DIASTOLIC BLOOD PRESSURE: 77 MMHG | HEART RATE: 70 BPM | HEIGHT: 65 IN | TEMPERATURE: 96.8 F | SYSTOLIC BLOOD PRESSURE: 152 MMHG | WEIGHT: 176.8 LBS | BODY MASS INDEX: 29.46 KG/M2 | RESPIRATION RATE: 18 BRPM

## 2021-05-11 DIAGNOSIS — C54.9 MALIGNANT NEOPLASM OF CORPUS UTERI, EXCEPT ISTHMUS (HCC): Primary | ICD-10-CM

## 2021-05-11 LAB
ALBUMIN SERPL-MCNC: 3.3 G/DL (ref 3.5–5)
ALBUMIN/GLOB SERPL: 1.2 {RATIO} (ref 1.1–2.2)
ALP SERPL-CCNC: 58 U/L (ref 45–117)
ALT SERPL-CCNC: 26 U/L (ref 12–78)
ANION GAP SERPL CALC-SCNC: 8 MMOL/L (ref 5–15)
AST SERPL-CCNC: 16 U/L (ref 15–37)
BASOPHILS # BLD: 0 K/UL (ref 0–0.1)
BASOPHILS NFR BLD: 0 % (ref 0–1)
BILIRUB SERPL-MCNC: 0.3 MG/DL (ref 0.2–1)
BUN SERPL-MCNC: 11 MG/DL (ref 6–20)
BUN/CREAT SERPL: 27 (ref 12–20)
CALCIUM SERPL-MCNC: 7.9 MG/DL (ref 8.5–10.1)
CANCER AG125 SERPL-ACNC: 12 U/ML (ref 1.5–35)
CHLORIDE SERPL-SCNC: 110 MMOL/L (ref 97–108)
CO2 SERPL-SCNC: 25 MMOL/L (ref 21–32)
CREAT SERPL-MCNC: 0.41 MG/DL (ref 0.55–1.02)
DIFFERENTIAL METHOD BLD: ABNORMAL
EOSINOPHIL # BLD: 0 K/UL (ref 0–0.4)
EOSINOPHIL NFR BLD: 1 % (ref 0–7)
ERYTHROCYTE [DISTWIDTH] IN BLOOD BY AUTOMATED COUNT: 16.5 % (ref 11.5–14.5)
GLOBULIN SER CALC-MCNC: 2.7 G/DL (ref 2–4)
GLUCOSE SERPL-MCNC: 102 MG/DL (ref 65–100)
HCT VFR BLD AUTO: 34.4 % (ref 35–47)
HGB BLD-MCNC: 11.3 G/DL (ref 11.5–16)
IMM GRANULOCYTES # BLD AUTO: 0.1 K/UL (ref 0–0.04)
IMM GRANULOCYTES NFR BLD AUTO: 2 % (ref 0–0.5)
LYMPHOCYTES # BLD: 0.8 K/UL (ref 0.8–3.5)
LYMPHOCYTES NFR BLD: 26 % (ref 12–49)
MAGNESIUM SERPL-MCNC: 2.3 MG/DL (ref 1.6–2.4)
MCH RBC QN AUTO: 31.7 PG (ref 26–34)
MCHC RBC AUTO-ENTMCNC: 32.8 G/DL (ref 30–36.5)
MCV RBC AUTO: 96.4 FL (ref 80–99)
MONOCYTES # BLD: 0.5 K/UL (ref 0–1)
MONOCYTES NFR BLD: 16 % (ref 5–13)
NEUTS SEG # BLD: 1.7 K/UL (ref 1.8–8)
NEUTS SEG NFR BLD: 55 % (ref 32–75)
NRBC # BLD: 0 K/UL (ref 0–0.01)
NRBC BLD-RTO: 0 PER 100 WBC
PLATELET # BLD AUTO: 122 K/UL (ref 150–400)
PMV BLD AUTO: 9 FL (ref 8.9–12.9)
POTASSIUM SERPL-SCNC: 4 MMOL/L (ref 3.5–5.1)
PROT SERPL-MCNC: 6 G/DL (ref 6.4–8.2)
RBC # BLD AUTO: 3.57 M/UL (ref 3.8–5.2)
RBC MORPH BLD: ABNORMAL
RBC MORPH BLD: ABNORMAL
SODIUM SERPL-SCNC: 143 MMOL/L (ref 136–145)
WBC # BLD AUTO: 3.1 K/UL (ref 3.6–11)

## 2021-05-11 PROCEDURE — 96415 CHEMO IV INFUSION ADDL HR: CPT

## 2021-05-11 PROCEDURE — 77030012965 HC NDL HUBR BBMI -A

## 2021-05-11 PROCEDURE — 96417 CHEMO IV INFUS EACH ADDL SEQ: CPT

## 2021-05-11 PROCEDURE — 74011000258 HC RX REV CODE- 258: Performed by: OBSTETRICS & GYNECOLOGY

## 2021-05-11 PROCEDURE — 74011250636 HC RX REV CODE- 250/636: Performed by: OBSTETRICS & GYNECOLOGY

## 2021-05-11 PROCEDURE — 99213 OFFICE O/P EST LOW 20 MIN: CPT | Performed by: PHYSICIAN ASSISTANT

## 2021-05-11 PROCEDURE — 86304 IMMUNOASSAY TUMOR CA 125: CPT

## 2021-05-11 PROCEDURE — 96375 TX/PRO/DX INJ NEW DRUG ADDON: CPT

## 2021-05-11 PROCEDURE — 74011000250 HC RX REV CODE- 250: Performed by: OBSTETRICS & GYNECOLOGY

## 2021-05-11 PROCEDURE — 36415 COLL VENOUS BLD VENIPUNCTURE: CPT

## 2021-05-11 PROCEDURE — 96413 CHEMO IV INFUSION 1 HR: CPT

## 2021-05-11 PROCEDURE — 85025 COMPLETE CBC W/AUTO DIFF WBC: CPT

## 2021-05-11 PROCEDURE — 96367 TX/PROPH/DG ADDL SEQ IV INF: CPT

## 2021-05-11 PROCEDURE — 80053 COMPREHEN METABOLIC PANEL: CPT

## 2021-05-11 PROCEDURE — 83735 ASSAY OF MAGNESIUM: CPT

## 2021-05-11 RX ORDER — HEPARIN 100 UNIT/ML
300-500 SYRINGE INTRAVENOUS AS NEEDED
Status: ACTIVE | OUTPATIENT
Start: 2021-05-11 | End: 2021-05-11

## 2021-05-11 RX ORDER — SODIUM CHLORIDE 0.9 % (FLUSH) 0.9 %
10 SYRINGE (ML) INJECTION AS NEEDED
Status: DISPENSED | OUTPATIENT
Start: 2021-05-11 | End: 2021-05-11

## 2021-05-11 RX ORDER — SODIUM CHLORIDE 9 MG/ML
25 INJECTION, SOLUTION INTRAVENOUS CONTINUOUS
Status: DISPENSED | OUTPATIENT
Start: 2021-05-11 | End: 2021-05-11

## 2021-05-11 RX ORDER — PALONOSETRON 0.05 MG/ML
0.25 INJECTION, SOLUTION INTRAVENOUS ONCE
Status: COMPLETED | OUTPATIENT
Start: 2021-05-11 | End: 2021-05-11

## 2021-05-11 RX ORDER — SODIUM CHLORIDE 9 MG/ML
10 INJECTION INTRAMUSCULAR; INTRAVENOUS; SUBCUTANEOUS AS NEEDED
Status: ACTIVE | OUTPATIENT
Start: 2021-05-11 | End: 2021-05-11

## 2021-05-11 RX ORDER — DIPHENHYDRAMINE HYDROCHLORIDE 50 MG/ML
50 INJECTION, SOLUTION INTRAMUSCULAR; INTRAVENOUS ONCE
Status: COMPLETED | OUTPATIENT
Start: 2021-05-11 | End: 2021-05-11

## 2021-05-11 RX ADMIN — PACLITAXEL 251 MG: 6 INJECTION, SOLUTION INTRAVENOUS at 13:18

## 2021-05-11 RX ADMIN — PALONOSETRON 0.25 MG: 0.05 INJECTION, SOLUTION INTRAVENOUS at 12:48

## 2021-05-11 RX ADMIN — SODIUM CHLORIDE 25 ML/HR: 900 INJECTION, SOLUTION INTRAVENOUS at 12:06

## 2021-05-11 RX ADMIN — SODIUM CHLORIDE 150 MG: 900 INJECTION, SOLUTION INTRAVENOUS at 12:06

## 2021-05-11 RX ADMIN — DIPHENHYDRAMINE HYDROCHLORIDE 50 MG: 50 INJECTION INTRAMUSCULAR; INTRAVENOUS at 12:53

## 2021-05-11 RX ADMIN — Medication 10 ML: at 17:15

## 2021-05-11 RX ADMIN — HEPARIN 500 UNITS: 100 SYRINGE at 17:15

## 2021-05-11 RX ADMIN — DEXAMETHASONE SODIUM PHOSPHATE 12 MG: 4 INJECTION, SOLUTION INTRA-ARTICULAR; INTRALESIONAL; INTRAMUSCULAR; INTRAVENOUS; SOFT TISSUE at 12:25

## 2021-05-11 RX ADMIN — CARBOPLATIN 545 MG: 10 INJECTION, SOLUTION INTRAVENOUS at 16:23

## 2021-05-11 RX ADMIN — SODIUM CHLORIDE 10 ML: 9 INJECTION INTRAMUSCULAR; INTRAVENOUS; SUBCUTANEOUS at 09:00

## 2021-05-11 RX ADMIN — Medication 10 ML: at 09:00

## 2021-05-11 RX ADMIN — FAMOTIDINE 20 MG: 10 INJECTION INTRAVENOUS at 12:50

## 2021-05-11 NOTE — PROGRESS NOTES
OPIC Chemo Progress Note    Date: May 11, 2021    Name: Melina Conde    MRN: [id number]         : 1955    0855 Ms. Kayode Byrnes Arrived to [facility] for  C4 Taxol/Carboplatin ambulatory in stable condition. Assessment was completed, no acute issues at this time, no new complaints voiced. Port accessed with positive blood return. Labs drawn and sent for processing. Normal Saline started at [facility]. Chemotherapy Flowsheet 2021   Cycle C4   Date 2021   Drug / Regimen Taxol/Carbo   Pre Meds given   Notes given         Patient denies SOB, fever, cough, general not feeling well. Patient denies recent exposure to someone who has tested positive for COVID-19. Patient denies having contact with anyone who has a pending COVID test.      Ms. Susy Hawkins vitals were reviewed. Patient Vitals for the past 12 hrs:   Temp Pulse Resp BP   21 1714  70 18 (!) 152/77   21 0857 96.8 °F (36 °C) 78 18 (!) 148/74         Lab results were obtained and reviewed. Recent Results (from the past 12 hour(s))   CBC WITH AUTOMATED DIFF    Collection Time: 21  8:59 AM   Result Value Ref Range    WBC 3.1 (L) 3.6 - 11.0 K/uL    RBC 3.57 (L) 3.80 - 5.20 M/uL    HGB 11.3 (L) 11.5 - 16.0 g/dL    HCT 34.4 (L) 35.0 - 47.0 %    MCV 96.4 80.0 - 99.0 FL    MCH 31.7 26.0 - 34.0 PG    MCHC 32.8 30.0 - 36.5 g/dL    RDW 16.5 (H) 11.5 - 14.5 %    PLATELET 600 (L) 946 - 400 K/uL    MPV 9.0 8.9 - 12.9 FL    NRBC 0.0 0  WBC    ABSOLUTE NRBC 0.00 0.00 - 0.01 K/uL    NEUTROPHILS 55 32 - 75 %    LYMPHOCYTES 26 12 - 49 %    MONOCYTES 16 (H) 5 - 13 %    EOSINOPHILS 1 0 - 7 %    BASOPHILS 0 0 - 1 %    IMMATURE GRANULOCYTES 2 (H) 0.0 - 0.5 %    ABS. NEUTROPHILS 1.7 (L) 1.8 - 8.0 K/UL    ABS. LYMPHOCYTES 0.8 0.8 - 3.5 K/UL    ABS. MONOCYTES 0.5 0.0 - 1.0 K/UL    ABS. EOSINOPHILS 0.0 0.0 - 0.4 K/UL    ABS. BASOPHILS 0.0 0.0 - 0.1 K/UL    ABS. IMM.  GRANS. 0.1 (H) 0.00 - 0.04 K/UL    DF SMEAR SCANNED      RBC COMMENTS ANISOCYTOSIS  1+        RBC COMMENTS MACROCYTOSIS  1+       METABOLIC PANEL, COMPREHENSIVE    Collection Time: 05/11/21  8:59 AM   Result Value Ref Range    Sodium 143 136 - 145 mmol/L    Potassium 4.0 3.5 - 5.1 mmol/L    Chloride 110 (H) 97 - 108 mmol/L    CO2 25 21 - 32 mmol/L    Anion gap 8 5 - 15 mmol/L    Glucose 102 (H) 65 - 100 mg/dL    BUN 11 6 - 20 MG/DL    Creatinine 0.41 (L) 0.55 - 1.02 MG/DL    BUN/Creatinine ratio 27 (H) 12 - 20      GFR est AA >60 >60 ml/min/1.73m2    GFR est non-AA >60 >60 ml/min/1.73m2    Calcium 7.9 (L) 8.5 - 10.1 MG/DL    Bilirubin, total 0.3 0.2 - 1.0 MG/DL    ALT (SGPT) 26 12 - 78 U/L    AST (SGOT) 16 15 - 37 U/L    Alk. phosphatase 58 45 - 117 U/L    Protein, total 6.0 (L) 6.4 - 8.2 g/dL    Albumin 3.3 (L) 3.5 - 5.0 g/dL    Globulin 2.7 2.0 - 4.0 g/dL    A-G Ratio 1.2 1.1 - 2.2     CANCER ANTIGEN 125    Collection Time: 05/11/21  8:59 AM   Result Value Ref Range    CA-125 12 1.5 - 35.0 U/mL   MAGNESIUM    Collection Time: 05/11/21  8:59 AM   Result Value Ref Range    Magnesium 2.3 1.6 - 2.4 mg/dL       Pre-medications  were administered as ordered and chemotherapy was initiated.   Medications Administered     0.9% sodium chloride infusion     Admin Date  05/11/2021 Action  New Bag Dose  25 mL/hr Rate  25 mL/hr Route  IntraVENous Administered By  Lily Gupta RN          0.9% sodium chloride injection 10 mL     Admin Date  05/11/2021 Action  Given Dose  10 mL Route  IntraVENous Administered By  Lily Gupta RN          CARBOplatin (PARAPLATIN) 545 mg in 0.9% sodium chloride 250 mL, overfill volume 25 mL chemo infusion (GOG)     Admin Date  05/11/2021 Action  New Bag Dose  545 mg Rate  659 mL/hr Route  IntraVENous Administered By  Lily Gupta RN          dexamethasone (DECADRON) 12 mg in 0.9% sodium chloride 50 mL IVPB     Admin Date  05/11/2021 Action  Given Dose  12 mg Route  IntraVENous Administered By  Lily Gupta RN          diphenhydrAMINE (BENADRYL) injection 50 mg     Admin Date  05/11/2021 Action  Given Dose  50 mg Route  IntraVENous Administered By  Jaclyn Cespedes RN          famotidine (PF) (PEPCID) 20 mg in 0.9% sodium chloride 10 mL injection     Admin Date  05/11/2021 Action  Given Dose  20 mg Route  IntraVENous Administered By  Jaclyn Cespedes RN          fosaprepitant (EMEND) 150 mg in 0.9% sodium chloride 150 mL IVPB     Admin Date  05/11/2021 Action  Given Dose  150 mg Rate  450 mL/hr Route  IntraVENous Administered By  Jaclyn Cespedes RN          heparin (porcine) pf 300-500 Units     Admin Date  05/11/2021 Action  Given Dose  500 Units Route  InterCATHeter Administered By  Jaclyn Cespedes RN          PACLitaxeL (TAXOL) 251 mg in 0.9% sodium chloride 250 mL, overfill volume 25 mL chemo infusion     Admin Date  05/11/2021 Action  New Bag Dose  251 mg Rate  105.6 mL/hr Route  IntraVENous Administered By  Jaclyn Cespedes RN          palonosetron HCl (ALOXI) injection 0.25 mg     Admin Date  05/11/2021 Action  Given Dose  0.25 mg Route  IntraVENous Administered By  Jaclyn Cespedes RN          sodium chloride (NS) flush 10 mL     Admin Date  05/11/2021 Action  Given Dose  10 mL Route  IntraVENous Administered By  Jaclyn Cespedes RN           Admin Date  05/11/2021 Action  Given Dose  10 mL Route  IntraVENous Administered By  Jaclyn Cespedes RN                  1092 Patient tolerated treatment well. Port maintained positive blood return throughout treatment. Port flushed, heparinized and de accessed per protocol. Patient was discharged from [place] [address].     Future Appointments   Date Time Provider David Jara   5/27/2021  2:45 PM MD GREER Garcia AMB   6/1/2021  9:00 AM F1 GRECIA [address] [facility]   6/17/2021  2:30 PM MD GREER Garcia AMB   6/22/2021  9:00 AM F1 GRECIA Hobbs RN  May 11, 2021

## 2021-05-11 NOTE — PROGRESS NOTES
[address], [address], [address]  P [phone]  F [phone]      Patient ID:  Name:  Lisa Mccollum  MRN:  [id number]  :  1955/66 y.o. Date:  2021      Maxwell Rowland MD: Frances Addison MD  PCP: Harrison Joseph MD     Primary Diagnosis: uterine cancer  Date of Diagnosis: 3/2013      Current Agent: Taxol/carboplatin  Cycle: 4    HPI:  73 y.o. 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 She is now found with recurrence noted on imaging studies pelvic adenopathy and peritoneal carcinomatosis. She most recently progressed on combination Keytruda/Lenvima. She is recommended retreatment with carboplatin/taxol couplet. OncTx History:  3/2013 C Hyst/BSO  FINAL PATHOLOGIC DIAGNOSIS  1.  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  Histologic grade: High grade  Myometrial invasion: Depth of invasion: 0.3 cm  Myometrial thickness: 1.9 cm  Involvement of cervix: Not involved  Extent of involvement of other organs:  Right ovary: Not involved  Left ovary: Not involved  Right fallopian tube: Not involved  Left fallopian tube: Not involved  Right parametrial tissue: Not involved  Left parametrial tissue: Not involved  Lymphovascular invasion: Not identified  Additional pathologic findings:  Focal adenomyosis  Benign simple cyst of left ovary  Paratubal cysts  Pathologic staging (pTNM):  Primary tumor (pT): pT1a  Regional lymph nodes (pN): pN0  Pelvic lymph nodes:  Number examined: 17  Number involved: 0  Distant metastasis (M): Not applicable  2. Omentum, omentectomy:Benign adipose tissue, negative for carcinoma  3. Lymph nodes, right pelvic, excision:Seven lymph nodes, negative for metastatic carcinoma (0/7)  4. Lymph nodes, left pelvic, excision:Ten lymph nodes, negative for metastatic carcinoma (0/10)    MMR proficient   5/2013 - 9/2013 Taxol/carboplatin x 6 cycles   10/9/20 PET/CT:    1. Peritoneal carcinomatosis. 2. 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 3. Left pelvic cul-de-sac tumor   11/3/20 - 2/16/21 Keytruda/lenvima x 6 cycles     Reduced lenvima 8mg d/t HTN   12/30/20 CT CAP:   Mixed response in the abdomen and pelvis compared to 10/9/2020 with mildly increased anterior peritoneal carcinomatosis. Stable to slightly increased retroperitoneal lymphadenopathy. Decreased peritoneal soft tissue nodularity in the deep left pelvis. No evidence for metastatic disease in the chest.   2/25/21 CT CAP:   1. Overall increase in omental caking along the anterior peritoneum (the prior study. 2.  Retroperitoneal adenopathy demonstrating variable response to therapy. Some of these have increased in interval  3. Pelvic soft tissue in the left deep pelvis is not significantly changed although not well visualized. 4.  Slight interval increase in pelvic free fluid   3/9/21 Ueaar522ir/m2/Carboplatin AUC5 initiated   5/5/21 CT AP:  There has been a mild decrease in the peritoneal carcinomatosis, There has been slight to mild decrease in the retroperitoneal adenopathy, No ascites is identified. No definite pelvic mass is identified. SUBJECTIVE:  Maynor Sutton presents for chemotherapy. Overall doing well. Notes continued intermittent bilateral leg swelling, improves overnight. Notes fatigue with exertion present since Keytruda dosing.   No CP/SOB, wheezing, leg pain, palpitations. Able to ambulate, remains independent, was planting flowers in beds over the weekend. She has a good appetite, has occasional acid reflux using Pepcid and delayed Gi function with dulcolax/miralax/MoM. Remains active at work full time, unable to work from home. No residual effects s/p her therapy in 2013. She lives alone. Does not feel overly close to her children. Her daughter lives next door and brother nearby. Her son lives near [place]. She feels most supported by her brother Marcy Sunshine, close friend Mylene and her work family. She still has difficulty feeling comfortable asking family for help. Looking into/questions longterm with SS. Work for her is a positive stressor, has few hobbies and uncertain of purpose w/o work. ROS  Constitutional: no weight loss, fever, night sweats  Respiratory: above  Cardiovascular: above  Heme: No abnormal bleeding, no epistaxis. Gastrointestinal: no abdominal pain, no dysphagia, change in bowel habits, or black or bloody stools  Genito-Urinary: no dysuria, trouble voiding, or hematuria  Musculoskeletal: negative for - gait disturbance or joint swelling  Neurological: negative for - behavioral changes, dizziness, headaches, memory loss or numbness/tingling  Derm: negative  Psych: negative for anxiety and depression       OBJECTIVE:  Physical Exam  Visit Vitals  BP (!) 148/74   Pulse 78   Temp 96.8 °F (36 °C)   Resp 18   Ht 5' 5\" (1.651 m)   Wt 176 lb 12.8 oz (80.2 kg)   Breastfeeding No   BMI 29.42 kg/m²          General:  alert, cooperative, no distress       HEENT: without pallor, sclera without jaundice, oral mucosa without lesions,      Cardiac:  Regular rate and rhythm        Lungs:  clear to auscultation bilaterally          Port:  clean, dry, no drainage  Abdomen:  soft, protuberant, nondistended, nontender, no gross fluid wave.        Lymph:  no lymphadenopathy   Extremity: no edema, redness or tenderness in the calves or thighs    Wt Readings from Last 3 Encounters:   05/11/21 176 lb 12.8 oz (80.2 kg)   04/20/21 171 lb (77.6 kg)   03/30/21 166 lb 12.8 oz (75.7 kg)       Recent Labs     05/11/21  0859   WBC 3.1*   ANEU 1.7*   HGB 11.3*   HCT 34.4*   MCV 96.4   MCH 31.7   *     Recent Labs     05/11/21  0859      K 4.0   *   *   BUN 11   CREA 0.41*   CA 7.9*   MG 2.3   ALB 3.3*   TBILI 0.3   ALT 26         Tumor markers  CA-125   Date Value Ref Range Status   04/20/2021 17 1.5 - 35.0 U/mL Final     Comment:     ** Note new reference range and method **  Results may vary depending on . Method used is [employer]     03/30/2021 27 1.5 - 35.0 U/mL Final     Comment:     ** Note new reference range and method **  Results may vary depending on . Method used is [employer]     03/09/2021 155 (H) 1.5 - 35.0 U/mL Final     Comment:     ** Note new reference range and method **  Results may vary depending on . Method used is [employer]     02/16/2021 56 (H) 1.5 - 35.0 U/mL Final     Comment:     ** Note new reference range and method **  Results may vary depending on . Method used is [employer]     01/05/2021 25 1.5 - 35.0 U/mL Final     Comment:     ** Note new reference range and method **  Results may vary depending on . Method used is [employer]     11/24/2020 19 1.5 - 35.0 U/mL Final     Comment:     ** Note new reference range and method **  Results may vary depending on .   Method used is [employer]           Patient Active Problem List   Diagnosis Code    Malignant neoplasm of corpus uteri, except isthmus ([facility] [address] C54.9     Past Medical History:   Diagnosis Date    Abnormal Pap smear 1995    with f/u normal    Anemia     Arthritis     Asthma     Cancer ([facility] [address]     ENDOMETRIAL    Environmental allergies     GERD (gastroesophageal reflux disease)     Goiter     Other unknown and unspecified cause of morbidity or mortality     POSSIBLE ESOPHAGEAL SPASM, ? OBSTRUCTION DUE TO GOITER    PMB (postmenopausal bleeding)     S/P chemotherapy, time since greater than 12 weeks     LAST CHEMO 10/2014 PER PATIENT    Thyroid disease     goiter     Prior to Admission medications    Medication Sig Start Date End Date Taking? Authorizing Provider   dexAMETHasone (Decadron) 4 mg tablet Take 2 tablets by mouth with breakfast the day before chemo also take 2 tablets with breakfast for 2 days after chemotherapy 3/8/21   Bella Pina MD   lisinopriL (PRINIVIL, ZESTRIL) 10 mg tablet Take 1 Tab by mouth daily. 1/6/21   Bella Pina MD   lisinopriL (PRINIVIL, ZESTRIL) 5 mg tablet Take 2 Tabs by mouth daily. 1/5/21   Rut Cline PA-C   lenvatinib (Lenvima) 8 mg/day (4 mg x 2) cap Take 2 Caps by mouth daily. 12/15/20   Bella Pina MD   ondansetron (ZOFRAN ODT) 4 mg disintegrating tablet Take 1 Tab by mouth every eight (8) hours as needed for Nausea. Indications: nausea and vomiting caused by cancer drugs 10/22/20   Bella Pina MD   lidocaine-prilocaine (EMLA) topical cream Apply small amount over port area and cover with band aid one hour before chemo 10/22/20   Bella Pina MD   guaifenesin (MUCINEX PO) Take  by mouth. Provider, Historical   loratadine (Claritin) 10 mg tablet Take 10 mg by mouth. Provider, Historical   MARICHUY'S WORT PO Take  by mouth. Provider, Historical   epinastine 0.05 % drop Apply  to eye. Provider, Historical   raNITIdine (ZANTAC) 150 mg tablet ZANTAC TABS 9/29/11   Provider, Historical   cyclobenzaprine (FLEXERIL) 5 mg tablet take 1 tablet by mouth three times a day if needed 12/5/16   Provider, Historical   valACYclovir (VALTREX) 1 gram tablet Take 1 Tab by mouth three (3) times daily. 12/15/16   Bella Pina MD   EPINEPHrine (EPIPEN) 0.3 mg/0.3 mL injection 0.3 mg by IntraMUSCular route once as needed.     Provider, Historical   ketotifen (ZADITOR) 0. 025 % (0.035 %) ophthalmic solution Administer 1 Drop to both eyes every morning. Provider, Historical   Cetirizine (ZYRTEC) 10 mg cap Take 10 mg by mouth nightly. Provider, Historical   SAL ACID/UREA/PETROLATUM,WHITE (KERASAL FOOT EX) by Apply Externally route daily as needed. Provider, Historical   ACCU-CHEK HELDER PLUS TEST STRP strip  7/3/15   Provider, Historical   NITROSTAT 0.4 mg SL tablet  14   Provider, Historical   CALCIUM CARBONATE (MARCELLO-SELTZER ANTACID PO) Take  by mouth daily as needed. Provider, Historical   ibuprofen (MOTRIN) 200 mg tablet Take 200 mg by mouth every six (6) hours as needed. Provider, Historical     Allergies   Allergen Reactions    Latex Other (comments)     Burns skin    Acetone Shortness of Breath    Amoxicillin Anaphylaxis    Ciprofloxacin Hives    Pcn [Penicillins] Anaphylaxis    Buspar [Buspirone] Unknown (comments)     Has N&V and makes her feel \"weird\"    Azithromycin Hives    Egg Nausea Only    Other Medication Rash     POPPY seeds     Family History   Problem Relation Age of Onset    Cancer Maternal Aunt         breast    Heart Disease Mother     Diabetes Father     Cancer Sister         CERVICAL    Kidney Disease Brother     Diabetes Brother     Heart Attack Other     Arrhythmia Brother     Diabetes Brother     Anesth Problems Neg Hx    Maternal grandmother \"female\" cancer  in 45s      CT Results (most recent):  Results from Hospital Encounter encounter on 21   CT ABD PELV W CONT    Narrative EXAM: CT ABD PELV W CONT    INDICATION: Adenocarcinoma of the endometrium    COMPARISON: 2020     CONTRAST: 100 mL of Isovue-370. TECHNIQUE:   Following the uneventful intravenous administration of contrast, thin axial  images were obtained through the abdomen and pelvis. Coronal and sagittal  reconstructions were generated. Oral contrast was administered.  CT dose  reduction was achieved through use of a standardized protocol tailored for this  examination and automatic exposure control for dose modulation. FINDINGS:   LOWER THORAX: Right cardiophrenic lymph node measures 2.9 cm and previously  measured 1.2 cm on image number 15. There is minor basilar atelectasis/scarring  LIVER: No mass. BILIARY TREE: There is suggestion of gallstones CBD is not dilated. SPLEEN: within normal limits. PANCREAS: No mass or ductal dilatation. ADRENALS: Unremarkable. KIDNEYS: No mass, calculus, or hydronephrosis. STOMACH: Unremarkable. SMALL BOWEL: No dilatation or wall thickening. COLON: No dilatation or wall thickening. APPENDIX: Status post appendectomy  PERITONEUM: Peritoneal carcinomatosis appears improved. There is a confluent  density anteriorly in the peritoneum on image number 43 measuring 5.9 x 1.4 cm  which previously measured 9.8 x 1.9 cm. RETROPERITONEUM: Left retroperitoneal lymph node measures 0.7 cm image 45 and  previously measured 1.6 cm. Right retroperitoneal lymph node measures 0.7 cm image 46 and previously  measured 0.8 cm. Left iliac lymph node image 57 measures 0.9 cm and previously measured 1.1 cm. REPRODUCTIVE ORGANS: Status post hysterectomy and oophorectomy. URINARY BLADDER: No mass or calculus. BONES: No destructive bone lesion. ABDOMINAL WALL: No mass or hernia. ADDITIONAL COMMENTS: N/A      Impression 1. There has been a mild decrease in the peritoneal carcinomatosis. 2. There has been slight to mild decrease in the retroperitoneal adenopathy. 3. No ascites is identified. No definite pelvic mass is identified. IMPRESSION/PLAN:  73 y.o. 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 ECO    Labs/chart reviewed  -Taxol/carboplatin today, cycle 4. Appreciate normalization of her .  -Low-grade pancytopenia. -HTN: mildly elevated today. Keeps log at home, normotensive there.  Holding lisinopril. -KNIGHT: chronic fatigue d/t chemo, deconditioning. To ED if CP or no improvement with rest  -Hx of thyroid nodular goiter, benign follicular bx.  -Chronic med issues:    Hx asthma - no issues. inhaler PRN   Hx esophageal spasm - uses nitroglycerin  -Reflux: continue Pepcid  -Hypoalbuminemia: diet/protein supplementation. Possible secondary LE edema. Compression hose.  -Stool irregularity: continue stool softeners  Psychosocial: In good spirits and feels well supported by her friends/work family. Asks many appropriate questions. She is doing very well at the moment. Feels work is essential for her well-being at this point. Encouraged her again to open dialogue with her family re: her diagnosis and possible future needs expected. Advised to call with questions/concerns. 05/11/21 I have spent 25 minutes reviewing previous notes, results and face to face with the patient discussing the diagnosis and treatment plan as outlined above.    Electronically signed,        Louisa Maguire PA-C

## 2021-05-26 RX ORDER — EPINEPHRINE 1 MG/ML
0.3 INJECTION, SOLUTION, CONCENTRATE INTRAVENOUS AS NEEDED
Status: CANCELLED | OUTPATIENT
Start: 2021-06-01

## 2021-05-26 RX ORDER — DIPHENHYDRAMINE HYDROCHLORIDE 50 MG/ML
25 INJECTION, SOLUTION INTRAMUSCULAR; INTRAVENOUS AS NEEDED
Status: CANCELLED
Start: 2021-06-01

## 2021-05-26 RX ORDER — ACETAMINOPHEN 325 MG/1
650 TABLET ORAL AS NEEDED
Status: CANCELLED
Start: 2021-06-01

## 2021-05-26 RX ORDER — LORAZEPAM 2 MG/ML
0.5 INJECTION INTRAMUSCULAR
Status: CANCELLED
Start: 2021-06-01

## 2021-05-26 RX ORDER — HEPARIN 100 UNIT/ML
300-500 SYRINGE INTRAVENOUS AS NEEDED
Status: CANCELLED
Start: 2021-06-01

## 2021-05-26 RX ORDER — HYDROCORTISONE SODIUM SUCCINATE 100 MG/2ML
100 INJECTION, POWDER, FOR SOLUTION INTRAMUSCULAR; INTRAVENOUS AS NEEDED
Status: CANCELLED | OUTPATIENT
Start: 2021-06-01

## 2021-05-26 RX ORDER — SODIUM CHLORIDE 0.9 % (FLUSH) 0.9 %
10 SYRINGE (ML) INJECTION AS NEEDED
Status: CANCELLED | OUTPATIENT
Start: 2021-06-01

## 2021-05-26 RX ORDER — ALBUTEROL SULFATE 0.83 MG/ML
2.5 SOLUTION RESPIRATORY (INHALATION) AS NEEDED
Status: CANCELLED
Start: 2021-06-01

## 2021-05-26 RX ORDER — DIPHENHYDRAMINE HYDROCHLORIDE 50 MG/ML
50 INJECTION, SOLUTION INTRAMUSCULAR; INTRAVENOUS ONCE
Status: CANCELLED | OUTPATIENT
Start: 2021-06-01 | End: 2021-06-01

## 2021-05-26 RX ORDER — DIPHENHYDRAMINE HYDROCHLORIDE 50 MG/ML
50 INJECTION, SOLUTION INTRAMUSCULAR; INTRAVENOUS AS NEEDED
Status: CANCELLED
Start: 2021-06-01

## 2021-05-26 RX ORDER — PALONOSETRON 0.05 MG/ML
0.25 INJECTION, SOLUTION INTRAVENOUS ONCE
Status: CANCELLED | OUTPATIENT
Start: 2021-06-01 | End: 2021-06-01

## 2021-05-26 RX ORDER — ONDANSETRON 2 MG/ML
8 INJECTION INTRAMUSCULAR; INTRAVENOUS AS NEEDED
Status: CANCELLED | OUTPATIENT
Start: 2021-06-01

## 2021-05-26 RX ORDER — SODIUM CHLORIDE 9 MG/ML
10 INJECTION INTRAMUSCULAR; INTRAVENOUS; SUBCUTANEOUS AS NEEDED
Status: CANCELLED | OUTPATIENT
Start: 2021-06-01

## 2021-05-26 RX ORDER — SODIUM CHLORIDE 9 MG/ML
25 INJECTION, SOLUTION INTRAVENOUS CONTINUOUS
Status: CANCELLED | OUTPATIENT
Start: 2021-06-01

## 2021-05-27 ENCOUNTER — VIRTUAL VISIT (OUTPATIENT)
Dept: GYNECOLOGY | Age: 66
End: 2021-05-27

## 2021-05-27 DIAGNOSIS — C54.1 PRIMARY SEROUS ADENOCARCINOMA OF ENDOMETRIUM (HCC): Primary | ICD-10-CM

## 2021-05-27 NOTE — PROGRESS NOTES
[facility] GYNECOLOGIC ONCOLOGY  [address] [facility], [facility] [address] Mor[id number] [facility]  [phone] [facility] [phone]    Office Visit    Patient ID:  Name: Laura Duque  MRN: [id number]   : 1955/66 y.o. Visit date: 2021    INTERVAL HISTORY:  Laura Duque is a  female who is an established patient with stage IA, grade 3 uterine carcinoma. She underwent laparoscopic hysterectomy with staging in 2013. She was recommended six cycles of adjuvant Taxol and Carboplatin, which she completed. We elected not to treat with pelvic radiation therapy due to her difficulty with chemotherapy. She presents was last seen in July for routine surveillance. She was without complaints at that time and her exam was negative. She recently had some abdominal discomfort and noted to have some blood in her urine. She was referred to [facility] for evaluation. She had a CT of the abdomen/pelvis with them. It revealed retroperitoneal lymphadenopathy, peritoneal carcinomatosis, and trace ascites. She also reported a negative colonoscopy within the last month. She is scheduled for a MMG in a few weeks. I sent her for a PET/CT to better evaluate the extent of disease. She presented to review the results and discuss treatment. Her disease is not amenable to surgical resection. Systemic therapy is her only viable option. I thought immunotherapy would be the best choice, ahead of additional chemotherapy. This would consist of IV Keytruda and PO Lenvima. If that were not successful, we would consider retreatment with Taxol/Carboplatin or single agent Doxil. She completed 6 cycles before demonstrating progression of disease. We decided upon retreatment with Taxol/Carboplatin. She has completed 4 cycles so far. Her CA-125 has responded and her CT after three cycles demonstrated a response.         PMH:  Past Medical History:   Diagnosis Date    Abnormal Pap smear     with f/u normal    Anemia     Arthritis     Asthma     Cancer ([facility] [address]     ENDOMETRIAL    Environmental allergies     GERD (gastroesophageal reflux disease)     Goiter     Other unknown and unspecified cause of morbidity or mortality     POSSIBLE ESOPHAGEAL SPASM, ? OBSTRUCTION DUE TO GOITER    PMB (postmenopausal bleeding)     S/P chemotherapy, time since greater than 12 weeks     LAST CHEMO 10/2014 PER PATIENT    Thyroid disease     goiter     PSH:  Past Surgical History:   Procedure Laterality Date    HX APPENDECTOMY      HX BUNIONECTOMY      HX GYN  3/13/13    TLH/BSO    HX HEENT  4/22/13    TOOTH REMOVED    HX ORTHOPAEDIC  1980'S    BUNIONECTOMY    HX VASCULAR ACCESS      port    LA BREAST SURGERY PROCEDURE UNLISTED  1/12/16    right breast bx     SOC:  Social History     Socioeconomic History    Marital status:      Spouse name: Not on file    Number of children: Not on file    Years of education: Not on file    Highest education level: Not on file   Occupational History    Not on file   Tobacco Use    Smoking status: Never Smoker    Smokeless tobacco: Never Used   Substance and Sexual Activity    Alcohol use: Yes     Alcohol/week: 0.0 standard drinks     Comment: RARE OCC    Drug use: No    Sexual activity: Not on file   Other Topics Concern    Not on file   Social History Narrative    Not on file     Social Determinants of Health     Financial Resource Strain:     Difficulty of Paying Living Expenses:    Food Insecurity:     Worried About Running Out of Food in the Last Year:     Ran Out of Food in the Last Year:    Transportation Needs:     Lack of Transportation (Medical):      Lack of Transportation (Non-Medical):    Physical Activity:     Days of Exercise per Week:     Minutes of Exercise per Session:    Stress:     Feeling of Stress :    Social Connections:     Frequency of Communication with Friends and Family:     Frequency of Social Gatherings with Friends and Family:     Attends Taoist Services:     Active Member of Clubs or Organizations:     Attends Club or Organization Meetings:     Marital Status:    Intimate Partner Violence:     Fear of Current or Ex-Partner:     Emotionally Abused:     Physically Abused:     Sexually Abused:      Family History  Family History   Problem Relation Age of Onset    Cancer Maternal Aunt         breast    Heart Disease Mother     Diabetes Father     Cancer Sister         CERVICAL    Kidney Disease Brother     Diabetes Brother     Heart Attack Other     Arrhythmia Brother     Diabetes Brother     Anesth Problems Neg Hx      Medications:  Current Outpatient Medications on File Prior to Visit   Medication Sig Dispense Refill    dexAMETHasone (Decadron) 4 mg tablet Take 2 tablets by mouth with breakfast the day before chemo also take 2 tablets with breakfast for 2 days after chemotherapy 36 Tab 1    lisinopriL (PRINIVIL, ZESTRIL) 10 mg tablet Take 1 Tab by mouth daily. 30 Tab 2    lisinopriL (PRINIVIL, ZESTRIL) 5 mg tablet Take 2 Tabs by mouth daily. 30 Tab 5    lenvatinib (Lenvima) 8 mg/day (4 mg x 2) cap Take 2 Caps by mouth daily. 60 Cap 2    ondansetron (ZOFRAN ODT) 4 mg disintegrating tablet Take 1 Tab by mouth every eight (8) hours as needed for Nausea. Indications: nausea and vomiting caused by cancer drugs 30 Tab 3    lidocaine-prilocaine (EMLA) topical cream Apply small amount over port area and cover with band aid one hour before chemo 30 g 3    guaifenesin (MUCINEX PO) Take  by mouth.  loratadine (Claritin) 10 mg tablet Take 10 mg by mouth.  MARICHUY'S WORT PO Take  by mouth.  epinastine 0.05 % drop Apply  to eye.  raNITIdine (ZANTAC) 150 mg tablet ZANTAC TABS      cyclobenzaprine (FLEXERIL) 5 mg tablet take 1 tablet by mouth three times a day if needed  0    valACYclovir (VALTREX) 1 gram tablet Take 1 Tab by mouth three (3) times daily.  21 Tab 3    EPINEPHrine (EPIPEN) 0.3 mg/0.3 mL injection 0.3 mg by IntraMUSCular route once as needed.  ketotifen (ZADITOR) 0.025 % (0.035 %) ophthalmic solution Administer 1 Drop to both eyes every morning.  Cetirizine (ZYRTEC) 10 mg cap Take 10 mg by mouth nightly.  SAL ACID/UREA/PETROLATUM,WHITE (KERASAL FOOT EX) by Apply Externally route daily as needed.  ACCU-CHEK HELDER PLUS TEST STRP strip   0    NITROSTAT 0.4 mg SL tablet       CALCIUM CARBONATE (MARCELLO-SELTZER ANTACID PO) Take  by mouth daily as needed.  ibuprofen (MOTRIN) 200 mg tablet Take 200 mg by mouth every six (6) hours as needed. No current facility-administered medications on file prior to visit. Allergies: Allergies   Allergen Reactions    Latex Other (comments)     Burns skin    Acetone Shortness of Breath    Amoxicillin Anaphylaxis    Ciprofloxacin Hives    Pcn [Penicillins] Anaphylaxis    Buspar [Buspirone] Unknown (comments)     Has N&V and makes her feel \"weird\"    Azithromycin Hives    Egg Nausea Only    Other Medication Rash     POPPY seeds       ROS:  Negative     OBJECTIVE:    PHYSICAL EXAM  VITAL SIGNS: There were no vitals taken for this visit.    GENERAL KAIA:    HEENT:    RESPIRATORY:    CARDIOVASC:    GASTROINT:    MUSCULOSKEL:    EXTREMITIES:    PELVIC:    RECTAL:    PRIYANKA SURVEY:    NEURO:      Lab Results   Component Value Date/Time    WBC 3.1 (L) 05/11/2021 08:59 AM    Hemoglobin (POC) 12.9 05/01/2013 09:53 AM    HGB 11.3 (L) 05/11/2021 08:59 AM    Hematocrit (POC) 38 05/01/2013 09:53 AM    HCT 34.4 (L) 05/11/2021 08:59 AM    PLATELET 892 (L) 90/13/1493 08:59 AM    MCV 96.4 05/11/2021 08:59 AM     Lab Results   Component Value Date/Time    Sodium 143 05/11/2021 08:59 AM    Potassium 4.0 05/11/2021 08:59 AM    Chloride 110 (H) 05/11/2021 08:59 AM    CO2 25 05/11/2021 08:59 AM    Anion gap 8 05/11/2021 08:59 AM    Glucose 102 (H) 05/11/2021 08:59 AM    BUN 11 05/11/2021 08:59 AM    Creatinine 0.41 (L) 05/11/2021 08:59 AM BUN/Creatinine ratio 27 (H) 05/11/2021 08:59 AM    GFR est AA >60 05/11/2021 08:59 AM    GFR est non-AA >60 05/11/2021 08:59 AM    Calcium 7.9 (L) 05/11/2021 08:59 AM    Bilirubin, total 0.3 05/11/2021 08:59 AM    Alk. phosphatase 58 05/11/2021 08:59 AM    Protein, total 6.0 (L) 05/11/2021 08:59 AM    Albumin 3.3 (L) 05/11/2021 08:59 AM    Globulin 2.7 05/11/2021 08:59 AM    A-G Ratio 1.2 05/11/2021 08:59 AM    ALT (SGPT) 26 05/11/2021 08:59 AM    AST (SGOT) 16 05/11/2021 08:59 AM     Lab Results   Component Value Date/Time    CA-125 12 05/11/2021 08:59 AM    Cancer Ag (CA) 125 98.9 (H) 10/23/2020 10:02 AM         CT of abdomen/pelvis (8/13/20)  LOWER CHEST: The visualized portions of the lung bases are clear. ABDOMEN:  Liver: The liver is normal in size and contour with no focal abnormality. The  attenuation of the liver is decreased throughout. Gallbladder and bile ducts: There is no calcified gallstone or biliary  dilatation. Spleen: No abnormality. Pancreas: No abnormality. Adrenal glands: No abnormality. Kidneys: No abnormality. PELVIS:  Reproductive organs: The uterus and ovaries are absent. Bladder: No abnormality. BOWEL AND MESENTERY: The small bowel is normal.  There is no mesenteric mass or  adenopathy. The appendix is absent. PERITONEUM: There is no ascites or free intraperitoneal air. RETROPERITONEUM: The aorta  tapers without aneurysm. There is no retroperitoneal  adenopathy or mass. There is no pelvic mass or adenopathy. BONES AND SOFT TISSUES: The bones and soft tissues of the abdominal wall are  within normal limits.     IMPRESSION:   1. Status post hysterectomy and bilateral oophorectomy. 2. No evidence of recurrent or metastatic disease within the abdomen or pelvis. 3. Hepatic steatosis. 4. Status post appendectomy.       PET/CT (10/9/20)  HEAD/NECK: No apparent foci of abnormal hypermetabolism.  Cerebral evaluation is  limited by normal intense activity.     CHEST: No foci of abnormal hypermetabolism.     ABDOMEN/PELVIS:   Peritoneal nodules are hypermetabolic, SUV 12. Retroperitoneal adenopathy is hypermetabolic, SUV 6. Right deep pelvic adenopathy is hypermetabolic, SUV 8. Left pelvic cul-de-sac mass is hypermetabolic, SUV 11.     SKELETON: No foci of abnormal hypermetabolism in the axial and visualized  appendicular skeleton.     IMPRESSION:   1. Peritoneal carcinomatosis. 2. 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 3. Left pelvic cul-de-sac tumor. CT of chest/abdomen/pelvis (12/30/20)  CT chest:    There is stable multinodular thyroid enlargement. Left chest Port-A-Cath  terminates in the SVC. The aorta and main pulmonary artery are normal in  caliber. The heart size is normal.  There is no pericardial or pleural effusion.        There are no enlarged axillary, mediastinal, or hilar lymph nodes.      There is no lung mass or airspace opacity. There is no pneumothorax. The  central airways are clear.     CT abdomen and pelvis: The liver, spleen, pancreas, and adrenal glands are normal. The gall bladder is  present  without intra- or extra-hepatic biliary dilatation.       The kidneys are symmetric without hydronephrosis.      There are no dilated bowel loops. The appendix is surgically absent.       Enlarged retroperitoneal lymph nodes are again demonstrated with a  representative left para-aortic lymph node measuring 1.3 x 1.8 cm (series 3,  image 76), previously 1.5 x 1.7 cm on 10/9/2020.  A left common iliac lymph node  measures 1.1 x 1.5 cm (series 3, image 91), previously 0.8 x 1.4 cm.       Anterior peritoneal/mesenteric soft tissue nodularity is overall mildly  increased since 10/9/2020 with a representative measurement of 2.3 x 6.8 cm  (series 3, image 77), previously 2.6 x 5.5 cm.     Peritoneal soft tissue nodularity in the deep left pelvis measures 1.4 x 2.2 cm  (series 3, image 116), previously 2.9 x 3.1 cm.     There is no free fluid or free air. The aorta tapers without aneurysm.     The urinary bladder is normal. The uterus and ovaries are surgically absent.     There is no aggressive bony lesion.     IMPRESSION:   1. Mixed response in the abdomen and pelvis compared to 10/9/2020 with mildly  increased anterior peritoneal carcinomatosis. Stable to slightly increased  retroperitoneal lymphadenopathy. Decreased peritoneal soft tissue nodularity in  the deep left pelvis.     2. No evidence for metastatic disease in the chest.      CT of chest/abdomen/pelvis (2/25/21)  CHEST WALL:No axillary or supraclavicular adenopathy. THYROID: Large hypodensity in the thyroid gland unchanged. MEDIASTINUM: 12 mm cardiophrenic lymph node unchanged. RAMEZ: No mass or lymphadenopathy. THORACIC AORTA: No dissection or aneurysm. MAIN PULMONARY ARTERY: Normal in caliber. TRACHEA/BRONCHI: Patent. ESOPHAGUS: No wall thickening or dilatation. HEART: Coronary atherosclerotic disease  PLEURA: No effusion or pneumothorax. LUNGS: No nodule, mass, or airspace disease. LIVER: No mass or biliary dilatation. GALLBLADDER: Unremarkable. BILIARY TREE: Unremarkable  SPLEEN: Unremarkable  PANCREAS: No mass or ductal dilatation. ADRENALS: Unremarkable. KIDNEYS: No mass, calculus, or hydronephrosis. STOMACH: Unremarkable. SMALL BOWEL: No dilatation or wall thickening. COLON: No dilatation or wall thickening. APPENDIX: Not visualized  PERITONEUM: Peritoneal carcinomatosis is again noted. 6.8 x 3.1 cm peritoneal  mass anteriorly is slightly increased in size. There is adjacent probably  confluent lesion measuring 3.4 x 2.6 cm which is increased in size from the  prior study. Deep left peritoneal nodularity is not significantly changed. Free  fluid in the pelvis increase in interval  RETROPERITONEUM: Left retroperitoneal lymph node measuring 1.5 x 1.4 cm slightly  decreased in size from 0.8 x 1.3 cm.  Right retroperitoneal lymph node measuring  0.8 x 0.9 cm is increase in size of common iliac lymph node now measures 1.4 x  1.2 cm not significantly changed in size. REPRODUCTIVE ORGANS: Hysterectomy  URINARY BLADDER: No mass or calculus. BONES: No destructive bone lesion. ADDITIONAL COMMENTS: N/A     IMPRESSION  1. Overall increase in omental caking along the anterior peritoneum (the prior  study.     2.  Retroperitoneal adenopathy demonstrating variable response to therapy. Some  of these have increased in interval.     3.  Pelvic soft tissue in the left deep pelvis is not significantly changed  although not well visualized.     4.  Slight interval increase in pelvic free fluid      CT of abdomen/pelvis (5/5/21)  LOWER THORAX: Right cardiophrenic lymph node measures 2.9 cm and previously  measured 1.2 cm on image number 15. There is minor basilar atelectasis/scarring  LIVER: No mass. BILIARY TREE: There is suggestion of gallstones CBD is not dilated. SPLEEN: within normal limits. PANCREAS: No mass or ductal dilatation. ADRENALS: Unremarkable. KIDNEYS: No mass, calculus, or hydronephrosis. STOMACH: Unremarkable. SMALL BOWEL: No dilatation or wall thickening. COLON: No dilatation or wall thickening. APPENDIX: Status post appendectomy  PERITONEUM: Peritoneal carcinomatosis appears improved. There is a confluent  density anteriorly in the peritoneum on image number 43 measuring 5.9 x 1.4 cm  which previously measured 9.8 x 1.9 cm. RETROPERITONEUM: Left retroperitoneal lymph node measures 0.7 cm image 45 and  previously measured 1.6 cm. Right retroperitoneal lymph node measures 0.7 cm image 46 and previously  measured 0.8 cm.     Left iliac lymph node image 57 measures 0.9 cm and previously measured 1.1 cm. REPRODUCTIVE ORGANS: Status post hysterectomy and oophorectomy. URINARY BLADDER: No mass or calculus. BONES: No destructive bone lesion. ABDOMINAL WALL: No mass or hernia. ADDITIONAL COMMENTS: N/A     IMPRESSION  1.  There has been a mild decrease in the peritoneal carcinomatosis.      2. There has been slight to mild decrease in the retroperitoneal adenopathy.     3. No ascites is identified. No definite pelvic mass is identified. 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 She completed six cycles of adjuvant Taxol and Carboplatin chemotherapy. She developed recurrent disease and was treated with the regimen of IV Keytruda and PO Lenvima. She  completed 6 cycles before progression. Since it had been almost 8 years since her adjuvant Taxol/Carboplatin, I recommended that we retreat her with that regimen, though I suggested a lower dose of each. She has completed 4 cycles so far. She is tolerating it as expected. Her CA-125 is responding. Her CT after 3 cycles demonstrated response to therapy. We will continue with the current regimen. Lisa Mccollum is a 73 y.o. female, evaluated via audio-only technology on 5/27/2021 for Chemotherapy (Virtual visit. Pre chemo, labs and C3 taxol/carbo on 4/20)      Assessment & Plan:   Diagnoses and all orders for this visit:    1. Primary serous adenocarcinoma of endometrium (HCC)      Subjective:       Prior to Admission medications    Medication Sig Start Date End Date Taking? Authorizing Provider   dexAMETHasone (Decadron) 4 mg tablet Take 2 tablets by mouth with breakfast the day before chemo also take 2 tablets with breakfast for 2 days after chemotherapy 3/8/21  Yes Patience Singh MD   lisinopriL (PRINIVIL, ZESTRIL) 10 mg tablet Take 1 Tab by mouth daily. 1/6/21  Yes Patience Singh MD   lisinopriL (PRINIVIL, ZESTRIL) 5 mg tablet Take 2 Tabs by mouth daily. 1/5/21  Yes Hieu Cline PA-C   lenvatinib (Lenvima) 8 mg/day (4 mg x 2) cap Take 2 Caps by mouth daily. 12/15/20  Yes Patience Singh MD   ondansetron (ZOFRAN ODT) 4 mg disintegrating tablet Take 1 Tab by mouth every eight (8) hours as needed for Nausea. Indications: nausea and vomiting caused by cancer drugs 10/22/20  Yes Jessy Landaverde MD   lidocaine-prilocaine (EMLA) topical cream Apply small amount over port area and cover with band aid one hour before chemo 10/22/20  Yes Jessy Landaverde MD   guaifenesin (MUCINEX PO) Take  by mouth. Yes Provider, Historical   loratadine (Claritin) 10 mg tablet Take 10 mg by mouth. Yes Provider, Historical   MARICHUY'S WORT PO Take  by mouth. Yes Provider, Historical   epinastine 0.05 % drop Apply  to eye. Yes Provider, Historical   raNITIdine (ZANTAC) 150 mg tablet ZANTAC TABS 9/29/11  Yes Provider, Historical   cyclobenzaprine (FLEXERIL) 5 mg tablet take 1 tablet by mouth three times a day if needed 12/5/16  Yes Provider, Historical   valACYclovir (VALTREX) 1 gram tablet Take 1 Tab by mouth three (3) times daily. 12/15/16  Yes Jessy Landaverde MD   EPINEPHrine (EPIPEN) 0.3 mg/0.3 mL injection 0.3 mg by IntraMUSCular route once as needed. Yes Provider, Historical   ketotifen (ZADITOR) 0.025 % (0.035 %) ophthalmic solution Administer 1 Drop to both eyes every morning. Yes Provider, Historical   Cetirizine (ZYRTEC) 10 mg cap Take 10 mg by mouth nightly. Yes Provider, Historical   SAL ACID/UREA/PETROLATUM,WHITE (KERASAL FOOT EX) by Apply Externally route daily as needed. Yes Provider, Historical   ACCU-CHEK HELDER PLUS TEST STRP strip  7/3/15  Yes Provider, Historical   NITROSTAT 0.4 mg SL tablet  9/22/14  Yes Provider, Historical   CALCIUM CARBONATE (MARCELLO-SELTZER ANTACID PO) Take  by mouth daily as needed. Yes Provider, Historical   ibuprofen (MOTRIN) 200 mg tablet Take 200 mg by mouth every six (6) hours as needed.    Yes Provider, Historical     Patient Active Problem List   Diagnosis Code    Malignant neoplasm of corpus uteri, except isthmus ([address] C54.9     Patient Active Problem List    Diagnosis Date Noted    Malignant neoplasm of corpus uteri, except isthmus ([address] 02/28/2013     Current Outpatient Medications Medication Sig Dispense Refill    dexAMETHasone (Decadron) 4 mg tablet Take 2 tablets by mouth with breakfast the day before chemo also take 2 tablets with breakfast for 2 days after chemotherapy 36 Tab 1    lisinopriL (PRINIVIL, ZESTRIL) 10 mg tablet Take 1 Tab by mouth daily. 30 Tab 2    lisinopriL (PRINIVIL, ZESTRIL) 5 mg tablet Take 2 Tabs by mouth daily. 30 Tab 5    lenvatinib (Lenvima) 8 mg/day (4 mg x 2) cap Take 2 Caps by mouth daily. 60 Cap 2    ondansetron (ZOFRAN ODT) 4 mg disintegrating tablet Take 1 Tab by mouth every eight (8) hours as needed for Nausea. Indications: nausea and vomiting caused by cancer drugs 30 Tab 3    lidocaine-prilocaine (EMLA) topical cream Apply small amount over port area and cover with band aid one hour before chemo 30 g 3    guaifenesin (MUCINEX PO) Take  by mouth.  loratadine (Claritin) 10 mg tablet Take 10 mg by mouth.  MARICHUY'S WORT PO Take  by mouth.  epinastine 0.05 % drop Apply  to eye.  raNITIdine (ZANTAC) 150 mg tablet ZANTAC TABS      cyclobenzaprine (FLEXERIL) 5 mg tablet take 1 tablet by mouth three times a day if needed  0    valACYclovir (VALTREX) 1 gram tablet Take 1 Tab by mouth three (3) times daily. 21 Tab 3    EPINEPHrine (EPIPEN) 0.3 mg/0.3 mL injection 0.3 mg by IntraMUSCular route once as needed.  ketotifen (ZADITOR) 0.025 % (0.035 %) ophthalmic solution Administer 1 Drop to both eyes every morning.  Cetirizine (ZYRTEC) 10 mg cap Take 10 mg by mouth nightly.  SAL ACID/UREA/PETROLATUM,WHITE (KERASAL FOOT EX) by Apply Externally route daily as needed.  ACCU-CHEK HELDER PLUS TEST STRP strip   0    NITROSTAT 0.4 mg SL tablet       CALCIUM CARBONATE (MARCELLO-SELTZER ANTACID PO) Take  by mouth daily as needed.  ibuprofen (MOTRIN) 200 mg tablet Take 200 mg by mouth every six (6) hours as needed.        Allergies   Allergen Reactions    Latex Other (comments)     Burns skin    Acetone Shortness of Breath  Amoxicillin Anaphylaxis    Ciprofloxacin Hives    Pcn [Penicillins] Anaphylaxis    Buspar [Buspirone] Unknown (comments)     Has N&V and makes her feel \"weird\"    Azithromycin Hives    Egg Nausea Only    Other Medication Rash     POPPY seeds     Past Medical History:   Diagnosis Date    Abnormal Pap smear 1995    with f/u normal    Anemia     Arthritis     Asthma     Cancer ([facility] [address]     ENDOMETRIAL    Environmental allergies     GERD (gastroesophageal reflux disease)     Goiter     Other unknown and unspecified cause of morbidity or mortality     POSSIBLE ESOPHAGEAL SPASM, ? OBSTRUCTION DUE TO GOITER    PMB (postmenopausal bleeding)     S/P chemotherapy, time since greater than 12 weeks     LAST CHEMO 10/2014 PER PATIENT    Thyroid disease     goiter     Past Surgical History:   Procedure Laterality Date    HX APPENDECTOMY      HX BUNIONECTOMY      HX GYN  3/13/13    TLH/BSO    HX HEENT  4/22/13    TOOTH REMOVED    HX ORTHOPAEDIC  1980'S    BUNIONECTOMY    HX VASCULAR ACCESS      port    MA BREAST SURGERY PROCEDURE UNLISTED  1/12/16    right breast bx     Family History   Problem Relation Age of Onset    Cancer Maternal Aunt         breast    Heart Disease Mother     Diabetes Father     Cancer Sister         CERVICAL    Kidney Disease Brother     Diabetes Brother     Heart Attack Other     Arrhythmia Brother     Diabetes Brother     Anesth Problems Neg Hx      Social History     Tobacco Use    Smoking status: Never Smoker    Smokeless tobacco: Never Used   Substance Use Topics    Alcohol use:  Yes     Alcohol/week: 0.0 standard drinks     Comment: RARE OCC       ROS    Patient-Reported Vitals 1/19/2021   Patient-Reported Systolic  528   Patient-Reported Diastolic 96        Loreto Masters, who was evaluated through a patient-initiated, synchronous (real-time) audio only encounter, and/or her healthcare decision maker, is aware that it is a billable service, with coverage as determined by her insurance carrier. She provided verbal consent to proceed: Yes. She has not had a related appointment within my department in the past 7 days or scheduled within the next 24 hours. Total Time: minutes: 11-20 minutes      An electronic signature was used to sign this note.     Alonzo Kumar MD  05/27/21

## 2021-06-01 ENCOUNTER — APPOINTMENT (OUTPATIENT)
Dept: INFUSION THERAPY | Age: 66
End: 2021-06-01
Payer: MEDICARE

## 2021-06-01 ENCOUNTER — HOSPITAL ENCOUNTER (OUTPATIENT)
Dept: INFUSION THERAPY | Age: 66
Discharge: HOME | End: 2021-06-01
Payer: MEDICARE

## 2021-06-01 VITALS
TEMPERATURE: 96.8 F | HEIGHT: 65 IN | BODY MASS INDEX: 29.36 KG/M2 | SYSTOLIC BLOOD PRESSURE: 127 MMHG | HEART RATE: 65 BPM | RESPIRATION RATE: 18 BRPM | DIASTOLIC BLOOD PRESSURE: 71 MMHG | WEIGHT: 176.2 LBS

## 2021-06-01 DIAGNOSIS — C54.9 MALIGNANT NEOPLASM OF CORPUS UTERI, EXCEPT ISTHMUS (HCC): Primary | ICD-10-CM

## 2021-06-01 DIAGNOSIS — Z51.11 ENCOUNTER FOR ANTINEOPLASTIC CHEMOTHERAPY: ICD-10-CM

## 2021-06-01 LAB
ALBUMIN SERPL-MCNC: 3.4 G/DL (ref 3.5–5)
ALBUMIN/GLOB SERPL: 1.2 {RATIO} (ref 1.1–2.2)
ALP SERPL-CCNC: 63 U/L (ref 45–117)
ALT SERPL-CCNC: 25 U/L (ref 12–78)
ANION GAP SERPL CALC-SCNC: 9 MMOL/L (ref 5–15)
AST SERPL-CCNC: 17 U/L (ref 15–37)
BASOPHILS # BLD: 0 K/UL (ref 0–0.1)
BASOPHILS NFR BLD: 0 % (ref 0–1)
BILIRUB SERPL-MCNC: 0.3 MG/DL (ref 0.2–1)
BUN SERPL-MCNC: 19 MG/DL (ref 6–20)
BUN/CREAT SERPL: 40 (ref 12–20)
CALCIUM SERPL-MCNC: 8.2 MG/DL (ref 8.5–10.1)
CANCER AG125 SERPL-ACNC: 11 U/ML (ref 1.5–35)
CHLORIDE SERPL-SCNC: 109 MMOL/L (ref 97–108)
CO2 SERPL-SCNC: 24 MMOL/L (ref 21–32)
CREAT SERPL-MCNC: 0.47 MG/DL (ref 0.55–1.02)
DIFFERENTIAL METHOD BLD: ABNORMAL
EOSINOPHIL # BLD: 0 K/UL (ref 0–0.4)
EOSINOPHIL NFR BLD: 0 % (ref 0–7)
ERYTHROCYTE [DISTWIDTH] IN BLOOD BY AUTOMATED COUNT: 16.6 % (ref 11.5–14.5)
GLOBULIN SER CALC-MCNC: 2.8 G/DL (ref 2–4)
GLUCOSE SERPL-MCNC: 173 MG/DL (ref 65–100)
HCT VFR BLD AUTO: 33.7 % (ref 35–47)
HGB BLD-MCNC: 11 G/DL (ref 11.5–16)
IMM GRANULOCYTES # BLD AUTO: 0.1 K/UL (ref 0–0.04)
IMM GRANULOCYTES NFR BLD AUTO: 1 % (ref 0–0.5)
LYMPHOCYTES # BLD: 1.1 K/UL (ref 0.8–3.5)
LYMPHOCYTES NFR BLD: 22 % (ref 12–49)
MAGNESIUM SERPL-MCNC: 1.9 MG/DL (ref 1.6–2.4)
MCH RBC QN AUTO: 32.2 PG (ref 26–34)
MCHC RBC AUTO-ENTMCNC: 32.6 G/DL (ref 30–36.5)
MCV RBC AUTO: 98.5 FL (ref 80–99)
MONOCYTES # BLD: 0.8 K/UL (ref 0–1)
MONOCYTES NFR BLD: 15 % (ref 5–13)
NEUTS SEG # BLD: 3.3 K/UL (ref 1.8–8)
NEUTS SEG NFR BLD: 62 % (ref 32–75)
NRBC # BLD: 0 K/UL (ref 0–0.01)
NRBC BLD-RTO: 0 PER 100 WBC
PLATELET # BLD AUTO: 117 K/UL (ref 150–400)
PMV BLD AUTO: 9.4 FL (ref 8.9–12.9)
POTASSIUM SERPL-SCNC: 3.6 MMOL/L (ref 3.5–5.1)
PROT SERPL-MCNC: 6.2 G/DL (ref 6.4–8.2)
RBC # BLD AUTO: 3.42 M/UL (ref 3.8–5.2)
SODIUM SERPL-SCNC: 142 MMOL/L (ref 136–145)
WBC # BLD AUTO: 5.3 K/UL (ref 3.6–11)

## 2021-06-01 PROCEDURE — 96375 TX/PRO/DX INJ NEW DRUG ADDON: CPT

## 2021-06-01 PROCEDURE — 96367 TX/PROPH/DG ADDL SEQ IV INF: CPT

## 2021-06-01 PROCEDURE — 85025 COMPLETE CBC W/AUTO DIFF WBC: CPT

## 2021-06-01 PROCEDURE — 74011250636 HC RX REV CODE- 250/636: Performed by: PHYSICIAN ASSISTANT

## 2021-06-01 PROCEDURE — 96415 CHEMO IV INFUSION ADDL HR: CPT

## 2021-06-01 PROCEDURE — 74011000258 HC RX REV CODE- 258: Performed by: PHYSICIAN ASSISTANT

## 2021-06-01 PROCEDURE — 74011000250 HC RX REV CODE- 250: Performed by: PHYSICIAN ASSISTANT

## 2021-06-01 PROCEDURE — 77030012965 HC NDL HUBR BBMI -A

## 2021-06-01 PROCEDURE — 99213 OFFICE O/P EST LOW 20 MIN: CPT | Performed by: PHYSICIAN ASSISTANT

## 2021-06-01 PROCEDURE — 80053 COMPREHEN METABOLIC PANEL: CPT

## 2021-06-01 PROCEDURE — 83735 ASSAY OF MAGNESIUM: CPT

## 2021-06-01 PROCEDURE — 86304 IMMUNOASSAY TUMOR CA 125: CPT

## 2021-06-01 PROCEDURE — 36415 COLL VENOUS BLD VENIPUNCTURE: CPT

## 2021-06-01 PROCEDURE — 96417 CHEMO IV INFUS EACH ADDL SEQ: CPT

## 2021-06-01 PROCEDURE — 96413 CHEMO IV INFUSION 1 HR: CPT

## 2021-06-01 RX ORDER — SODIUM CHLORIDE 9 MG/ML
25 INJECTION, SOLUTION INTRAVENOUS CONTINUOUS
Status: DISPENSED | OUTPATIENT
Start: 2021-06-01 | End: 2021-06-01

## 2021-06-01 RX ORDER — SODIUM CHLORIDE 0.9 % (FLUSH) 0.9 %
10 SYRINGE (ML) INJECTION AS NEEDED
Status: DISPENSED | OUTPATIENT
Start: 2021-06-01 | End: 2021-06-01

## 2021-06-01 RX ORDER — HEPARIN 100 UNIT/ML
300-500 SYRINGE INTRAVENOUS AS NEEDED
Status: ACTIVE | OUTPATIENT
Start: 2021-06-01 | End: 2021-06-01

## 2021-06-01 RX ORDER — SODIUM CHLORIDE 9 MG/ML
10 INJECTION INTRAMUSCULAR; INTRAVENOUS; SUBCUTANEOUS AS NEEDED
Status: ACTIVE | OUTPATIENT
Start: 2021-06-01 | End: 2021-06-01

## 2021-06-01 RX ORDER — DIPHENHYDRAMINE HYDROCHLORIDE 50 MG/ML
50 INJECTION, SOLUTION INTRAMUSCULAR; INTRAVENOUS ONCE
Status: COMPLETED | OUTPATIENT
Start: 2021-06-01 | End: 2021-06-01

## 2021-06-01 RX ORDER — PALONOSETRON 0.05 MG/ML
0.25 INJECTION, SOLUTION INTRAVENOUS ONCE
Status: COMPLETED | OUTPATIENT
Start: 2021-06-01 | End: 2021-06-01

## 2021-06-01 RX ADMIN — CARBOPLATIN 544 MG: 10 INJECTION, SOLUTION INTRAVENOUS at 16:37

## 2021-06-01 RX ADMIN — SODIUM CHLORIDE 25 ML/HR: 900 INJECTION, SOLUTION INTRAVENOUS at 11:34

## 2021-06-01 RX ADMIN — PALONOSETRON 0.25 MG: 0.05 INJECTION, SOLUTION INTRAVENOUS at 11:34

## 2021-06-01 RX ADMIN — FAMOTIDINE 20 MG: 10 INJECTION INTRAVENOUS at 11:51

## 2021-06-01 RX ADMIN — SODIUM CHLORIDE 150 MG: 900 INJECTION, SOLUTION INTRAVENOUS at 11:57

## 2021-06-01 RX ADMIN — DIPHENHYDRAMINE HYDROCHLORIDE 50 MG: 50 INJECTION INTRAMUSCULAR; INTRAVENOUS at 11:55

## 2021-06-01 RX ADMIN — PACLITAXEL 251 MG: 6 INJECTION, SOLUTION INTRAVENOUS at 12:57

## 2021-06-01 RX ADMIN — DEXAMETHASONE SODIUM PHOSPHATE 12 MG: 4 INJECTION, SOLUTION INTRA-ARTICULAR; INTRALESIONAL; INTRAMUSCULAR; INTRAVENOUS; SOFT TISSUE at 12:23

## 2021-06-01 NOTE — PROGRESS NOTES
[facility] [address], [address]  P [phone]  F [phone]      Patient ID:  Name:  Jade Burton  MRN:  [id number]  :  1955/66 y.o. Date:  2021      Mehreen Up MD: Maryanne lGaser MD  PCP: Heraclio Curiel MD     Primary Diagnosis: uterine cancer  Date of Diagnosis: 3/2013      Current Agent: Taxol/carboplatin  Cycle: 5    HPI:  73 y.o. 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 She is now found with recurrence noted on imaging studies pelvic adenopathy and peritoneal carcinomatosis. She most recently progressed on combination Keytruda/Lenvima. She has now been started on Taxol/carboplatin. SUBJECTIVE:  Ms. Sebastián Carey presents today for cycle 5 Carboplatin/Taxol. She is feeling well today. Continues to complain of fatigue with activity. She continues to have intermittent bilateral lower extremity swelling, mainly at her ankles. She remains active. Continues to work full time. No chest pain or shortness of breath. Appetite maintained. Eating well. Complains of occasional reflux and constipation. Using pepcid, dulcolax, mild of magnesia. She lives alone. Does not feel overly close to her children. Her daughter lives next door and brother nearby. Her son lives near [place]. She feels most supported by her brother Fuentes Bright, close friend Mylene and her work family. She still has difficulty feeling comfortable asking family for help. Looking into/questions halfway with SS. Work for her is a positive stressor, has few hobbies and uncertain of purpose w/o work. ROS  Constitutional: no weight loss, fever, night sweats  Respiratory: above  Cardiovascular: above  Heme: No abnormal bleeding, no epistaxis.   Gastrointestinal: no abdominal pain, no dysphagia, change in bowel habits, or black or bloody stools  Genito-Urinary: no dysuria, trouble voiding, or hematuria  Musculoskeletal: negative for - gait disturbance or joint swelling  Neurological: negative for - behavioral changes, dizziness, headaches, memory loss or numbness/tingling  Derm: negative  Psych: negative for anxiety and depression       OBJECTIVE:  Physical Exam  Visit Vitals  Temp 96.8 °F (36 °C)   Ht 5' 5\" (1.651 m)   Wt 176 lb 3.2 oz (79.9 kg)   BMI 29.32 kg/m²          General:  alert, cooperative, no distress       HEENT: without pallor, sclera without jaundice, oral mucosa without lesions,      Cardiac:  Regular rate and rhythm        Lungs:  clear to auscultation bilaterally          Port:  clean, dry, no drainage  Abdomen:  soft, protuberant, nondistended, nontender, no gross fluid wave. Lymph:  no lymphadenopathy   Extremity:  Mild edema noted bilaterally at ankles. No tenderness. Wt Readings from Last 3 Encounters:   06/01/21 176 lb 3.2 oz (79.9 kg)   05/11/21 176 lb 12.8 oz (80.2 kg)   04/20/21 171 lb (77.6 kg)       Recent Labs     06/01/21  0915   WBC 5.3   ANEU 3.3   HGB 11.0*   HCT 33.7*   MCV 98.5   MCH 32.2   *     Recent Labs     06/01/21  0915      K 3.6   *   *   BUN 19   CREA 0.47*   CA 8.2*   MG 1.9   ALB 3.4*   TBILI 0.3   ALT 25         Tumor markers  CA-125   Date Value Ref Range Status   06/01/2021 11 1.5 - 35.0 U/mL Final     Comment:     ** Note new reference range and method **  Results may vary depending on . Method used is [employer]     05/11/2021 12 1.5 - 35.0 U/mL Final     Comment:     ** Note new reference range and method **  Results may vary depending on . Method used is [employer]     04/20/2021 17 1.5 - 35.0 U/mL Final     Comment:     ** Note new reference range and method **  Results may vary depending on .   Method used is [employer]     03/30/2021 27 1.5 - 35.0 U/mL Final     Comment:     ** Note new reference range and method **  Results may vary depending on . Method used is [employer]     03/09/2021 155 (H) 1.5 - 35.0 U/mL Final     Comment:     ** Note new reference range and method **  Results may vary depending on . Method used is [employer]     02/16/2021 56 (H) 1.5 - 35.0 U/mL Final     Comment:     ** Note new reference range and method **  Results may vary depending on . Method used is [employer]           Patient Active Problem List   Diagnosis Code    Malignant neoplasm of corpus uteri, except isthmus ([facility] [address] C54.9     Past Medical History:   Diagnosis Date    Abnormal Pap smear 1995    with f/u normal    Anemia     Arthritis     Asthma     Cancer ([facility] [address]     ENDOMETRIAL    Environmental allergies     GERD (gastroesophageal reflux disease)     Goiter     Other unknown and unspecified cause of morbidity or mortality     POSSIBLE ESOPHAGEAL SPASM, ? OBSTRUCTION DUE TO GOITER    PMB (postmenopausal bleeding)     S/P chemotherapy, time since greater than 12 weeks     LAST CHEMO 10/2014 PER PATIENT    Thyroid disease     goiter     Prior to Admission medications    Medication Sig Start Date End Date Taking? Authorizing Provider   dexAMETHasone (Decadron) 4 mg tablet Take 2 tablets by mouth with breakfast the day before chemo also take 2 tablets with breakfast for 2 days after chemotherapy 3/8/21   Will Gann MD   lisinopriL (PRINIVIL, ZESTRIL) 10 mg tablet Take 1 Tab by mouth daily. 1/6/21   Will Gann MD   lisinopriL (PRINIVIL, ZESTRIL) 5 mg tablet Take 2 Tabs by mouth daily. 1/5/21   Jayda Cline PA-C   lenvatinib (Lenvima) 8 mg/day (4 mg x 2) cap Take 2 Caps by mouth daily. 12/15/20   Will Gann MD   ondansetron (ZOFRAN ODT) 4 mg disintegrating tablet Take 1 Tab by mouth every eight (8) hours as needed for Nausea.  Indications: nausea and vomiting caused by cancer drugs 10/22/20   Will Gann MD lidocaine-prilocaine (EMLA) topical cream Apply small amount over port area and cover with band aid one hour before chemo 10/22/20   Micki Manuel MD   guaifenesin (MUCINEX PO) Take  by mouth. Provider, Historical   loratadine (Claritin) 10 mg tablet Take 10 mg by mouth. Provider, Historical   MARICHUY'S WORT PO Take  by mouth. Provider, Historical   epinastine 0.05 % drop Apply  to eye. Provider, Historical   raNITIdine (ZANTAC) 150 mg tablet ZANTAC TABS 9/29/11   Provider, Historical   cyclobenzaprine (FLEXERIL) 5 mg tablet take 1 tablet by mouth three times a day if needed 12/5/16   Provider, Historical   valACYclovir (VALTREX) 1 gram tablet Take 1 Tab by mouth three (3) times daily. 12/15/16   Micki Manuel MD   EPINEPHrine (EPIPEN) 0.3 mg/0.3 mL injection 0.3 mg by IntraMUSCular route once as needed. Provider, Historical   ketotifen (ZADITOR) 0.025 % (0.035 %) ophthalmic solution Administer 1 Drop to both eyes every morning. Provider, Historical   Cetirizine (ZYRTEC) 10 mg cap Take 10 mg by mouth nightly. Provider, Historical   SAL ACID/UREA/PETROLATUM,WHITE (KERASAL FOOT EX) by Apply Externally route daily as needed. Provider, Historical   ACCU-CHEK HELDER PLUS TEST STRP strip  7/3/15   Provider, Historical   NITROSTAT 0.4 mg SL tablet  9/22/14   Provider, Historical   CALCIUM CARBONATE (MARCELLO-SELTZER ANTACID PO) Take  by mouth daily as needed. Provider, Historical   ibuprofen (MOTRIN) 200 mg tablet Take 200 mg by mouth every six (6) hours as needed.     Provider, Historical     Allergies   Allergen Reactions    Latex Other (comments)     Burns skin    Acetone Shortness of Breath    Amoxicillin Anaphylaxis    Ciprofloxacin Hives    Pcn [Penicillins] Anaphylaxis    Buspar [Buspirone] Unknown (comments)     Has N&V and makes her feel \"weird\"    Azithromycin Hives    Egg Nausea Only    Other Medication Rash     POPPY seeds     Family History   Problem Relation Age of Onset    Cancer Maternal Aunt         breast    Heart Disease Mother     Diabetes Father     Cancer Sister         CERVICAL    Kidney Disease Brother     Diabetes Brother     Heart Attack Other     Arrhythmia Brother     Diabetes Brother     Anesth Problems Neg Hx    Maternal grandmother \"female\" cancer  in 45s      CT Results (most recent):  Results from Hospital Encounter encounter on 21    CT ABD PELV W CONT    Narrative  EXAM: CT ABD PELV W CONT    INDICATION: Adenocarcinoma of the endometrium    COMPARISON: 2020    CONTRAST: 100 mL of Isovue-370. TECHNIQUE:  Following the uneventful intravenous administration of contrast, thin axial  images were obtained through the abdomen and pelvis. Coronal and sagittal  reconstructions were generated. Oral contrast was administered. CT dose  reduction was achieved through use of a standardized protocol tailored for this  examination and automatic exposure control for dose modulation. FINDINGS:  LOWER THORAX: Right cardiophrenic lymph node measures 2.9 cm and previously  measured 1.2 cm on image number 15. There is minor basilar atelectasis/scarring  LIVER: No mass. BILIARY TREE: There is suggestion of gallstones CBD is not dilated. SPLEEN: within normal limits. PANCREAS: No mass or ductal dilatation. ADRENALS: Unremarkable. KIDNEYS: No mass, calculus, or hydronephrosis. STOMACH: Unremarkable. SMALL BOWEL: No dilatation or wall thickening. COLON: No dilatation or wall thickening. APPENDIX: Status post appendectomy  PERITONEUM: Peritoneal carcinomatosis appears improved. There is a confluent  density anteriorly in the peritoneum on image number 43 measuring 5.9 x 1.4 cm  which previously measured 9.8 x 1.9 cm. RETROPERITONEUM: Left retroperitoneal lymph node measures 0.7 cm image 45 and  previously measured 1.6 cm. Right retroperitoneal lymph node measures 0.7 cm image 46 and previously  measured 0.8 cm.     Left iliac lymph node image 57 measures 0.9 cm and previously measured 1.1 cm. REPRODUCTIVE ORGANS: Status post hysterectomy and oophorectomy. URINARY BLADDER: No mass or calculus. BONES: No destructive bone lesion. ABDOMINAL WALL: No mass or hernia. ADDITIONAL COMMENTS: N/A    Impression  1. There has been a mild decrease in the peritoneal carcinomatosis. 2. There has been slight to mild decrease in the retroperitoneal adenopathy. 3. No ascites is identified. No definite pelvic mass is identified. IMPRESSION/PLAN:  73 y.o. 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 ECO    Chemotherapy: proceed with cycle 5 carboplatin/taxol today. Myelosuppression: Labs/chart reviewed. Low grade thrombocytopenia. Platelets and Hgb improved compared to last cycle. Fatigue: states that this has been present since she was on Keytruda. Not activity limiting. Chronic medical problems:   HTN: measures blood pressure regularly. Keeping a log. Blood pressure only mildly elevated. Hx of thyroid nodular goiter, benign follicular bx. Hx asthma - no issues. inhaler PRN   Hx esophageal spasm - uses nitroglycerin  Reflux: continue Pepcid. States that TUMS was no help. Can also try Maalox or Pepto Bismol  Constipation: continue using stool softeners as needed. Milk of magnesia sparingly. Encouraged oral fluids. Psychosocial: In good spirits and feels well supported by her friends/work family. Continues to tolerate work well. Advised to call with questions/concerns. 21 I have spent 25 minutes reviewing previous notes, results and face to face with the patient discussing the diagnosis and treatment plan as outlined above.    Electronically signed,        Cici Morfin PA-C

## 2021-06-01 NOTE — PROGRESS NOTES
[facility] Chemo Progress Note    Date: 2021    Name: Rosemary Ibarra    MRN: [id number]         : 1955    0[address] Ms. Anu Batres Arrived to [facility] for  C5 Taxol/Carboplatin ambulatory in stable condition. Assessment was completed, no acute issues at this time, no new complaints voiced. Port accessed with positive blood return. Labs drawn and sent for processing. Port capped while awaiting results. Chemotherapy Flowsheet 2021   Cycle C5   Date 2021   Drug / Regimen Taxol/Carbo   Pre Meds given   Notes given         Patient denies SOB, fever, cough, general not feeling well. Patient denies recent exposure to someone who has tested positive for COVID-19. Patient denies having contact with anyone who has a pending COVID test.      Ms. Alyssa Guillaume vitals were reviewed. Patient Vitals for the past 12 hrs:   Temp Pulse Resp BP   21 1719  65  127/71   21 0854 96.8 °F (36 °C) 81 18 (!) 146/78         Lab results were obtained and reviewed. Recent Results (from the past 12 hour(s))   CBC WITH AUTOMATED DIFF    Collection Time: 21  9:15 AM   Result Value Ref Range    WBC 5.3 3.6 - 11.0 K/uL    RBC 3.42 (L) 3.80 - 5.20 M/uL    HGB 11.0 (L) 11.5 - 16.0 g/dL    HCT 33.7 (L) 35.0 - 47.0 %    MCV 98.5 80.0 - 99.0 FL    MCH 32.2 26.0 - 34.0 PG    MCHC 32.6 30.0 - 36.5 g/dL    RDW 16.6 (H) 11.5 - 14.5 %    PLATELET 225 (L) 079 - 400 K/uL    MPV 9.4 8.9 - 12.9 FL    NRBC 0.0 0  WBC    ABSOLUTE NRBC 0.00 0.00 - 0.01 K/uL    NEUTROPHILS 62 32 - 75 %    LYMPHOCYTES 22 12 - 49 %    MONOCYTES 15 (H) 5 - 13 %    EOSINOPHILS 0 0 - 7 %    BASOPHILS 0 0 - 1 %    IMMATURE GRANULOCYTES 1 (H) 0.0 - 0.5 %    ABS. NEUTROPHILS 3.3 1.8 - 8.0 K/UL    ABS. LYMPHOCYTES 1.1 0.8 - 3.5 K/UL    ABS. MONOCYTES 0.8 0.0 - 1.0 K/UL    ABS. EOSINOPHILS 0.0 0.0 - 0.4 K/UL    ABS. BASOPHILS 0.0 0.0 - 0.1 K/UL    ABS. IMM.  GRANS. 0.1 (H) 0.00 - 0.04 K/UL    DF AUTOMATED     METABOLIC PANEL, COMPREHENSIVE Collection Time: 06/01/21  9:15 AM   Result Value Ref Range    Sodium 142 136 - 145 mmol/L    Potassium 3.6 3.5 - 5.1 mmol/L    Chloride 109 (H) 97 - 108 mmol/L    CO2 24 21 - 32 mmol/L    Anion gap 9 5 - 15 mmol/L    Glucose 173 (H) 65 - 100 mg/dL    BUN 19 6 - 20 MG/DL    Creatinine 0.47 (L) 0.55 - 1.02 MG/DL    BUN/Creatinine ratio 40 (H) 12 - 20      GFR est AA >60 >60 ml/min/1.73m2    GFR est non-AA >60 >60 ml/min/1.73m2    Calcium 8.2 (L) 8.5 - 10.1 MG/DL    Bilirubin, total 0.3 0.2 - 1.0 MG/DL    ALT (SGPT) 25 12 - 78 U/L    AST (SGOT) 17 15 - 37 U/L    Alk. phosphatase 63 45 - 117 U/L    Protein, total 6.2 (L) 6.4 - 8.2 g/dL    Albumin 3.4 (L) 3.5 - 5.0 g/dL    Globulin 2.8 2.0 - 4.0 g/dL    A-G Ratio 1.2 1.1 - 2.2     CANCER ANTIGEN 125    Collection Time: 06/01/21  9:15 AM   Result Value Ref Range    CA-125 11 1.5 - 35.0 U/mL   MAGNESIUM    Collection Time: 06/01/21  9:15 AM   Result Value Ref Range    Magnesium 1.9 1.6 - 2.4 mg/dL       Pre-medications  were administered as ordered and chemotherapy was initiated.   Medications Administered     0.9% sodium chloride infusion     Admin Date  06/01/2021 Action  New Bag Dose  25 mL/hr Rate  25 mL/hr Route  IntraVENous Administered By  Adelaida Anders RN          CARBOplatin (PARAPLATIN) 544 mg in 0.9% sodium chloride 250 mL, overfill volume 25 mL chemo infusion (GOG)     Admin Date  06/01/2021 Action  New Bag Dose  544 mg Rate  658.8 mL/hr Route  IntraVENous Administered By  Adelaida Anders RN          dexamethasone (DECADRON) 12 mg in 0.9% sodium chloride 50 mL, overfill volume 5 mL IVPB     Admin Date  06/01/2021 Action  New Bag Dose  12 mg Rate  232 mL/hr Route  IntraVENous Administered By  Adelaida Anders RN          diphenhydrAMINE (BENADRYL) injection 50 mg     Admin Date  06/01/2021 Action  Given Dose  50 mg Route  IntraVENous Administered By  Adelaida Anders RN          famotidine (PF) (PEPCID) 20 mg in 0.9% sodium chloride 10 mL injection     Admin Date  06/01/2021 Action  Given Dose  20 mg Route  IntraVENous Administered By  Danette Alegre RN          fosaprepitant (EMEND) 150 mg in 0.9% sodium chloride 150 mL IVPB     Admin Date  06/01/2021 Action  New Bag Dose  150 mg Rate  450 mL/hr Route  IntraVENous Administered By  Danette Alegre RN          PACLitaxeL (TAXOL) 251 mg in 0.9% sodium chloride 250 mL, overfill volume 25 mL chemo infusion     Admin Date  06/01/2021 Action  New Bag Dose  251 mg Rate  105.6 mL/hr Route  IntraVENous Administered By  Danette Alegre RN          palonosetron HCl (ALOXI) injection 0.25 mg     Admin Date  06/01/2021 Action  Given Dose  0.25 mg Route  IntraVENous Administered By  Danette Alegre RN                [id number] Patient tolerated treatment well. Port maintained positive blood return throughout treatment. Port flushed, heparinized and de accessed per protocol. Patient was discharged from [place] [id number].     Future Appointments   Date Time Provider David Jara   6/17/2021  2:30 PM Darryle Hoop, MD [facility] AMB   6/22/2021  9:00 AM DEVIKA [address] RN  June 1, 2021

## 2021-06-17 ENCOUNTER — VIRTUAL VISIT (OUTPATIENT)
Dept: GYNECOLOGY | Age: 66
End: 2021-06-17
Payer: MEDICARE

## 2021-06-17 DIAGNOSIS — C54.1 PRIMARY SEROUS ADENOCARCINOMA OF ENDOMETRIUM (HCC): Primary | ICD-10-CM

## 2021-06-17 DIAGNOSIS — C54.9 MALIGNANT NEOPLASM OF CORPUS UTERI, EXCEPT ISTHMUS (HCC): ICD-10-CM

## 2021-06-17 PROCEDURE — 99442 PR PHYS/QHP TELEPHONE EVALUATION 11-20 MIN: CPT | Performed by: OBSTETRICS & GYNECOLOGY

## 2021-06-17 RX ORDER — ACETAMINOPHEN 325 MG/1
650 TABLET ORAL AS NEEDED
Status: CANCELLED
Start: 2021-06-22

## 2021-06-17 RX ORDER — SODIUM CHLORIDE 0.9 % (FLUSH) 0.9 %
10 SYRINGE (ML) INJECTION AS NEEDED
Status: CANCELLED | OUTPATIENT
Start: 2021-06-22

## 2021-06-17 RX ORDER — ONDANSETRON 2 MG/ML
8 INJECTION INTRAMUSCULAR; INTRAVENOUS AS NEEDED
Status: CANCELLED | OUTPATIENT
Start: 2021-06-22

## 2021-06-17 RX ORDER — LORAZEPAM 2 MG/ML
0.5 INJECTION INTRAMUSCULAR
Status: CANCELLED
Start: 2021-06-22

## 2021-06-17 RX ORDER — HEPARIN 100 UNIT/ML
300-500 SYRINGE INTRAVENOUS AS NEEDED
Status: CANCELLED
Start: 2021-06-22

## 2021-06-17 RX ORDER — DIPHENHYDRAMINE HYDROCHLORIDE 50 MG/ML
25 INJECTION, SOLUTION INTRAMUSCULAR; INTRAVENOUS AS NEEDED
Status: CANCELLED
Start: 2021-06-22

## 2021-06-17 RX ORDER — PALONOSETRON 0.05 MG/ML
0.25 INJECTION, SOLUTION INTRAVENOUS ONCE
Status: CANCELLED | OUTPATIENT
Start: 2021-06-22 | End: 2021-06-22

## 2021-06-17 RX ORDER — DIPHENHYDRAMINE HYDROCHLORIDE 50 MG/ML
50 INJECTION, SOLUTION INTRAMUSCULAR; INTRAVENOUS ONCE
Status: CANCELLED | OUTPATIENT
Start: 2021-06-22 | End: 2021-06-22

## 2021-06-17 RX ORDER — EPINEPHRINE 1 MG/ML
0.3 INJECTION, SOLUTION, CONCENTRATE INTRAVENOUS AS NEEDED
Status: CANCELLED | OUTPATIENT
Start: 2021-06-22

## 2021-06-17 RX ORDER — ALBUTEROL SULFATE 0.83 MG/ML
2.5 SOLUTION RESPIRATORY (INHALATION) AS NEEDED
Status: CANCELLED
Start: 2021-06-22

## 2021-06-17 RX ORDER — DIPHENHYDRAMINE HYDROCHLORIDE 50 MG/ML
50 INJECTION, SOLUTION INTRAMUSCULAR; INTRAVENOUS AS NEEDED
Status: CANCELLED
Start: 2021-06-22

## 2021-06-17 RX ORDER — SODIUM CHLORIDE 9 MG/ML
10 INJECTION INTRAMUSCULAR; INTRAVENOUS; SUBCUTANEOUS AS NEEDED
Status: CANCELLED | OUTPATIENT
Start: 2021-06-22

## 2021-06-17 RX ORDER — SODIUM CHLORIDE 9 MG/ML
25 INJECTION, SOLUTION INTRAVENOUS CONTINUOUS
Status: CANCELLED | OUTPATIENT
Start: 2021-06-22

## 2021-06-17 RX ORDER — HYDROCORTISONE SODIUM SUCCINATE 100 MG/2ML
100 INJECTION, POWDER, FOR SOLUTION INTRAMUSCULAR; INTRAVENOUS AS NEEDED
Status: CANCELLED | OUTPATIENT
Start: 2021-06-22

## 2021-06-17 NOTE — PROGRESS NOTES
[facility]  [address] [place] [address], [id number] [facility]  [phone] [facility] [phone]    Office Visit    Patient ID:  Name: Tarsha Stauffer  MRN: [id number]   : 1955/66 y.o. Visit date: 2021    INTERVAL HISTORY:  Tarsha Stauffer is a  female who is an established patient with stage IA, grade 3 uterine carcinoma. She underwent laparoscopic hysterectomy with staging in 2013. She was recommended six cycles of adjuvant Taxol and Carboplatin, which she completed. We elected not to treat with pelvic radiation therapy due to her difficulty with chemotherapy. She presents was last seen in July for routine surveillance. She was without complaints at that time and her exam was negative. She recently had some abdominal discomfort and noted to have some blood in her urine. She was referred to [facility] for evaluation. She had a CT of the abdomen/pelvis with them. It revealed retroperitoneal lymphadenopathy, peritoneal carcinomatosis, and trace ascites. She also reported a negative colonoscopy within the last month. She is scheduled for a MMG in a few weeks. I sent her for a PET/CT to better evaluate the extent of disease. She presented to review the results and discuss treatment. Her disease is not amenable to surgical resection. Systemic therapy is her only viable option. I thought immunotherapy would be the best choice, ahead of additional chemotherapy. This would consist of IV Keytruda and PO Lenvima. If that were not successful, we would consider retreatment with Taxol/Carboplatin or single agent Doxil. She completed 6 cycles before demonstrating progression of disease. We decided upon retreatment with Taxol/Carboplatin. She has completed 5 cycles so far. Her CA-125 has responded and her CT after three cycles demonstrated a response.         PMH:  Past Medical History:   Diagnosis Date    Abnormal Pap smear     with f/u normal    Anemia     Arthritis     Asthma     Cancer ([facility] [address]     ENDOMETRIAL    Environmental allergies     GERD (gastroesophageal reflux disease)     Goiter     Other unknown and unspecified cause of morbidity or mortality     POSSIBLE ESOPHAGEAL SPASM, ? OBSTRUCTION DUE TO GOITER    PMB (postmenopausal bleeding)     S/P chemotherapy, time since greater than 12 weeks     LAST CHEMO 10/2014 PER PATIENT    Thyroid disease     goiter     PSH:  Past Surgical History:   Procedure Laterality Date    HX APPENDECTOMY      HX BUNIONECTOMY      HX GYN  3/13/13    TLH/BSO    HX HEENT  4/22/13    TOOTH REMOVED    HX ORTHOPAEDIC  1980'S    BUNIONECTOMY    HX VASCULAR ACCESS      port    HI BREAST SURGERY PROCEDURE UNLISTED  1/12/16    right breast bx     SOC:  Social History     Socioeconomic History    Marital status:      Spouse name: Not on file    Number of children: Not on file    Years of education: Not on file    Highest education level: Not on file   Occupational History    Not on file   Tobacco Use    Smoking status: Never Smoker    Smokeless tobacco: Never Used   Substance and Sexual Activity    Alcohol use: Yes     Alcohol/week: 0.0 standard drinks     Comment: RARE OCC    Drug use: No    Sexual activity: Not on file   Other Topics Concern    Not on file   Social History Narrative    Not on file     Social Determinants of Health     Financial Resource Strain:     Difficulty of Paying Living Expenses:    Food Insecurity:     Worried About Running Out of Food in the Last Year:     Ran Out of Food in the Last Year:    Transportation Needs:     Lack of Transportation (Medical):      Lack of Transportation (Non-Medical):    Physical Activity:     Days of Exercise per Week:     Minutes of Exercise per Session:    Stress:     Feeling of Stress :    Social Connections:     Frequency of Communication with Friends and Family:     Frequency of Social Gatherings with Friends and Family:     Attends [employer] Services:     Active Member of Clubs or Organizations:     Attends Club or Organization Meetings:     Marital Status:    Intimate Partner Violence:     Fear of Current or Ex-Partner:     Emotionally Abused:     Physically Abused:     Sexually Abused:      Family History  Family History   Problem Relation Age of Onset    Cancer Maternal Aunt         breast    Heart Disease Mother     Diabetes Father     Cancer Sister         CERVICAL    Kidney Disease Brother     Diabetes Brother     Heart Attack Other     Arrhythmia Brother     Diabetes Brother     Anesth Problems Neg Hx      Medications:  Current Outpatient Medications on File Prior to Visit   Medication Sig Dispense Refill    dexAMETHasone (Decadron) 4 mg tablet Take 2 tablets by mouth with breakfast the day before chemo also take 2 tablets with breakfast for 2 days after chemotherapy 36 Tab 1    lisinopriL (PRINIVIL, ZESTRIL) 10 mg tablet Take 1 Tab by mouth daily. 30 Tab 2    lisinopriL (PRINIVIL, ZESTRIL) 5 mg tablet Take 2 Tabs by mouth daily. 30 Tab 5    lenvatinib (Lenvima) 8 mg/day (4 mg x 2) cap Take 2 Caps by mouth daily. 60 Cap 2    ondansetron (ZOFRAN ODT) 4 mg disintegrating tablet Take 1 Tab by mouth every eight (8) hours as needed for Nausea. Indications: nausea and vomiting caused by cancer drugs 30 Tab 3    lidocaine-prilocaine (EMLA) topical cream Apply small amount over port area and cover with band aid one hour before chemo 30 g 3    guaifenesin (MUCINEX PO) Take  by mouth.  loratadine (Claritin) 10 mg tablet Take 10 mg by mouth.  MARICHUY'S WORT PO Take  by mouth.  epinastine 0.05 % drop Apply  to eye.  raNITIdine (ZANTAC) 150 mg tablet ZANTAC TABS      cyclobenzaprine (FLEXERIL) 5 mg tablet take 1 tablet by mouth three times a day if needed  0    valACYclovir (VALTREX) 1 gram tablet Take 1 Tab by mouth three (3) times daily.  21 Tab 3    EPINEPHrine (EPIPEN) 0.3 mg/0.3 mL injection 0.3 mg by IntraMUSCular route once as needed.  ketotifen (ZADITOR) 0.025 % (0.035 %) ophthalmic solution Administer 1 Drop to both eyes every morning.  Cetirizine (ZYRTEC) 10 mg cap Take 10 mg by mouth nightly.  SAL ACID/UREA/PETROLATUM,WHITE (KERASAL FOOT EX) by Apply Externally route daily as needed.  ACCU-CHEK HELDER PLUS TEST STRP strip   0    NITROSTAT 0.4 mg SL tablet       CALCIUM CARBONATE (MARCELLO-SELTZER ANTACID PO) Take  by mouth daily as needed.  ibuprofen (MOTRIN) 200 mg tablet Take 200 mg by mouth every six (6) hours as needed. No current facility-administered medications on file prior to visit. Allergies: Allergies   Allergen Reactions    Latex Other (comments)     Burns skin    Acetone Shortness of Breath    Amoxicillin Anaphylaxis    Ciprofloxacin Hives    Pcn [Penicillins] Anaphylaxis    Buspar [Buspirone] Unknown (comments)     Has N&V and makes her feel \"weird\"    Azithromycin Hives    Egg Nausea Only    Other Medication Rash     POPPY seeds       ROS:  Negative     OBJECTIVE:    PHYSICAL EXAM  VITAL SIGNS: There were no vitals taken for this visit.    GENERAL KAIA:    HEENT:    RESPIRATORY:    CARDIOVASC:    GASTROINT:    MUSCULOSKEL:    EXTREMITIES:    PELVIC:    RECTAL:    PRIYANKA SURVEY:    NEURO:      Lab Results   Component Value Date/Time    WBC 5.3 06/01/2021 09:15 AM    Hemoglobin (POC) 12.9 05/01/2013 09:53 AM    HGB 11.0 (L) 06/01/2021 09:15 AM    Hematocrit (POC) 38 05/01/2013 09:53 AM    HCT 33.7 (L) 06/01/2021 09:15 AM    PLATELET 839 (L) 57/27/5423 09:15 AM    MCV 98.5 06/01/2021 09:15 AM     Lab Results   Component Value Date/Time    Sodium 142 06/01/2021 09:15 AM    Potassium 3.6 06/01/2021 09:15 AM    Chloride 109 (H) 06/01/2021 09:15 AM    CO2 24 06/01/2021 09:15 AM    Anion gap 9 06/01/2021 09:15 AM    Glucose 173 (H) 06/01/2021 09:15 AM    BUN 19 06/01/2021 09:15 AM    Creatinine 0.47 (L) 06/01/2021 09:15 AM BUN/Creatinine ratio 40 (H) 06/01/2021 09:15 AM    GFR est AA >60 06/01/2021 09:15 AM    GFR est non-AA >60 06/01/2021 09:15 AM    Calcium 8.2 (L) 06/01/2021 09:15 AM    Bilirubin, total 0.3 06/01/2021 09:15 AM    Alk. phosphatase 63 06/01/2021 09:15 AM    Protein, total 6.2 (L) 06/01/2021 09:15 AM    Albumin 3.4 (L) 06/01/2021 09:15 AM    Globulin 2.8 06/01/2021 09:15 AM    A-G Ratio 1.2 06/01/2021 09:15 AM    ALT (SGPT) 25 06/01/2021 09:15 AM    AST (SGOT) 17 06/01/2021 09:15 AM     Lab Results   Component Value Date/Time    CA-125 11 06/01/2021 09:15 AM    Cancer Ag (CA) 125 98.9 (H) 10/23/2020 10:02 AM         CT of abdomen/pelvis (8/13/20)  LOWER CHEST: The visualized portions of the lung bases are clear. ABDOMEN:  Liver: The liver is normal in size and contour with no focal abnormality. The  attenuation of the liver is decreased throughout. Gallbladder and bile ducts: There is no calcified gallstone or biliary  dilatation. Spleen: No abnormality. Pancreas: No abnormality. Adrenal glands: No abnormality. Kidneys: No abnormality. PELVIS:  Reproductive organs: The uterus and ovaries are absent. Bladder: No abnormality. BOWEL AND MESENTERY: The small bowel is normal.  There is no mesenteric mass or  adenopathy. The appendix is absent. PERITONEUM: There is no ascites or free intraperitoneal air. RETROPERITONEUM: The aorta  tapers without aneurysm. There is no retroperitoneal  adenopathy or mass. There is no pelvic mass or adenopathy. BONES AND SOFT TISSUES: The bones and soft tissues of the abdominal wall are  within normal limits.     IMPRESSION:   1. Status post hysterectomy and bilateral oophorectomy. 2. No evidence of recurrent or metastatic disease within the abdomen or pelvis. 3. Hepatic steatosis. 4. Status post appendectomy.       PET/CT (10/9/20)  HEAD/NECK: No apparent foci of abnormal hypermetabolism.  Cerebral evaluation is  limited by normal intense activity.     CHEST: No foci of abnormal hypermetabolism.     ABDOMEN/PELVIS:   Peritoneal nodules are hypermetabolic, SUV 12. Retroperitoneal adenopathy is hypermetabolic, SUV 6. Right deep pelvic adenopathy is hypermetabolic, SUV 8. Left pelvic cul-de-sac mass is hypermetabolic, SUV 11.     SKELETON: No foci of abnormal hypermetabolism in the axial and visualized  appendicular skeleton.     IMPRESSION:   1. Peritoneal carcinomatosis. 2. 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 3. Left pelvic cul-de-sac tumor. CT of chest/abdomen/pelvis (12/30/20)  CT chest:    There is stable multinodular thyroid enlargement. Left chest Port-A-Cath  terminates in the SVC. The aorta and main pulmonary artery are normal in  caliber. The heart size is normal.  There is no pericardial or pleural effusion.        There are no enlarged axillary, mediastinal, or hilar lymph nodes.      There is no lung mass or airspace opacity. There is no pneumothorax. The  central airways are clear.     CT abdomen and pelvis: The liver, spleen, pancreas, and adrenal glands are normal. The gall bladder is  present  without intra- or extra-hepatic biliary dilatation.       The kidneys are symmetric without hydronephrosis.      There are no dilated bowel loops. The appendix is surgically absent.       Enlarged retroperitoneal lymph nodes are again demonstrated with a  representative left para-aortic lymph node measuring 1.3 x 1.8 cm (series 3,  image 76), previously 1.5 x 1.7 cm on 10/9/2020.  A left common iliac lymph node  measures 1.1 x 1.5 cm (series 3, image 91), previously 0.8 x 1.4 cm.       Anterior peritoneal/mesenteric soft tissue nodularity is overall mildly  increased since 10/9/2020 with a representative measurement of 2.3 x 6.8 cm  (series 3, image 77), previously 2.6 x 5.5 cm.     Peritoneal soft tissue nodularity in the deep left pelvis measures 1.4 x 2.2 cm  (series 3, image 116), previously 2.9 x 3.1 cm.     There is no free fluid or free air. The aorta tapers without aneurysm.     The urinary bladder is normal. The uterus and ovaries are surgically absent.     There is no aggressive bony lesion.     IMPRESSION:   1. Mixed response in the abdomen and pelvis compared to 10/9/2020 with mildly  increased anterior peritoneal carcinomatosis. Stable to slightly increased  retroperitoneal lymphadenopathy. Decreased peritoneal soft tissue nodularity in  the deep left pelvis.     2. No evidence for metastatic disease in the chest.      CT of chest/abdomen/pelvis (2/25/21)  CHEST WALL:No axillary or supraclavicular adenopathy. THYROID: Large hypodensity in the thyroid gland unchanged. MEDIASTINUM: 12 mm cardiophrenic lymph node unchanged. RAMEZ: No mass or lymphadenopathy. THORACIC AORTA: No dissection or aneurysm. MAIN PULMONARY ARTERY: Normal in caliber. TRACHEA/BRONCHI: Patent. ESOPHAGUS: No wall thickening or dilatation. HEART: Coronary atherosclerotic disease  PLEURA: No effusion or pneumothorax. LUNGS: No nodule, mass, or airspace disease. LIVER: No mass or biliary dilatation. GALLBLADDER: Unremarkable. BILIARY TREE: Unremarkable  SPLEEN: Unremarkable  PANCREAS: No mass or ductal dilatation. ADRENALS: Unremarkable. KIDNEYS: No mass, calculus, or hydronephrosis. STOMACH: Unremarkable. SMALL BOWEL: No dilatation or wall thickening. COLON: No dilatation or wall thickening. APPENDIX: Not visualized  PERITONEUM: Peritoneal carcinomatosis is again noted. 6.8 x 3.1 cm peritoneal  mass anteriorly is slightly increased in size. There is adjacent probably  confluent lesion measuring 3.4 x 2.6 cm which is increased in size from the  prior study. Deep left peritoneal nodularity is not significantly changed. Free  fluid in the pelvis increase in interval  RETROPERITONEUM: Left retroperitoneal lymph node measuring 1.5 x 1.4 cm slightly  decreased in size from 0.8 x 1.3 cm.  Right retroperitoneal lymph node measuring  0.8 x 0.9 cm is increase in size of common iliac lymph node now measures 1.4 x  1.2 cm not significantly changed in size. REPRODUCTIVE ORGANS: Hysterectomy  URINARY BLADDER: No mass or calculus. BONES: No destructive bone lesion. ADDITIONAL COMMENTS: N/A     IMPRESSION  1. Overall increase in omental caking along the anterior peritoneum (the prior  study.     2.  Retroperitoneal adenopathy demonstrating variable response to therapy. Some  of these have increased in interval.     3.  Pelvic soft tissue in the left deep pelvis is not significantly changed  although not well visualized.     4.  Slight interval increase in pelvic free fluid      CT of abdomen/pelvis (5/5/21)  LOWER THORAX: Right cardiophrenic lymph node measures 2.9 cm and previously  measured 1.2 cm on image number 15. There is minor basilar atelectasis/scarring  LIVER: No mass. BILIARY TREE: There is suggestion of gallstones CBD is not dilated. SPLEEN: within normal limits. PANCREAS: No mass or ductal dilatation. ADRENALS: Unremarkable. KIDNEYS: No mass, calculus, or hydronephrosis. STOMACH: Unremarkable. SMALL BOWEL: No dilatation or wall thickening. COLON: No dilatation or wall thickening. APPENDIX: Status post appendectomy  PERITONEUM: Peritoneal carcinomatosis appears improved. There is a confluent  density anteriorly in the peritoneum on image number 43 measuring 5.9 x 1.4 cm  which previously measured 9.8 x 1.9 cm. RETROPERITONEUM: Left retroperitoneal lymph node measures 0.7 cm image 45 and  previously measured 1.6 cm. Right retroperitoneal lymph node measures 0.7 cm image 46 and previously  measured 0.8 cm.     Left iliac lymph node image 57 measures 0.9 cm and previously measured 1.1 cm. REPRODUCTIVE ORGANS: Status post hysterectomy and oophorectomy. URINARY BLADDER: No mass or calculus. BONES: No destructive bone lesion. ABDOMINAL WALL: No mass or hernia. ADDITIONAL COMMENTS: N/A     IMPRESSION  1.  There has been a mild decrease in the peritoneal carcinomatosis.      2. There has been slight to mild decrease in the retroperitoneal adenopathy.     3. No ascites is identified. No definite pelvic mass is identified. 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 She completed six cycles of adjuvant Taxol and Carboplatin chemotherapy. She developed recurrent disease and was treated with the regimen of IV Keytruda and PO Lenvima. She  completed 6 cycles before progression. Since it had been almost 8 years since her adjuvant Taxol/Carboplatin, I recommended that we retreat her with that regimen, though I suggested a lower dose of each. She has completed 5 cycles so far. She is tolerating it as expected. Her CA-125 is responding. Her CT after 3 cycles demonstrated response to therapy. We will continue with the current regimen and repeat a CT after the next cycle. Rani Rose is a 73 y.o. female, evaluated via audio-only technology on 6/17/2021 for No chief complaint on file. Assessment & Plan:   Diagnoses and all orders for this visit:    1. Primary serous adenocarcinoma of endometrium (HCC)  -     CT ABD PELV W CONT; Future  -     CT CHEST W CONT; Future    2. Malignant neoplasm of corpus uteri, except isthmus (HCC)  -     CT ABD PELV W CONT; Future  -     CT CHEST W CONT; Future      Subjective:       Prior to Admission medications    Medication Sig Start Date End Date Taking? Authorizing Provider   dexAMETHasone (Decadron) 4 mg tablet Take 2 tablets by mouth with breakfast the day before chemo also take 2 tablets with breakfast for 2 days after chemotherapy 3/8/21   Claude Burden, MD   lisinopriL (PRINIVIL, ZESTRIL) 10 mg tablet Take 1 Tab by mouth daily. 1/6/21   Claude Burden, MD   lisinopriL (PRINIVIL, ZESTRIL) 5 mg tablet Take 2 Tabs by mouth daily.  1/5/21   Angel Cline PA-C   lenvatinib (Lenvima) 8 mg/day (4 mg x 2) cap Take 2 Caps by mouth daily. 12/15/20   Bella Pina MD   ondansetron (ZOFRAN ODT) 4 mg disintegrating tablet Take 1 Tab by mouth every eight (8) hours as needed for Nausea. Indications: nausea and vomiting caused by cancer drugs 10/22/20   Bella Pina MD   lidocaine-prilocaine (EMLA) topical cream Apply small amount over port area and cover with band aid one hour before chemo 10/22/20   Bella Pina MD   guaifenesin (MUCINEX PO) Take  by mouth. Provider, Historical   loratadine (Claritin) 10 mg tablet Take 10 mg by mouth. Provider, Historical   MARICHUY'S WORT PO Take  by mouth. Provider, Historical   epinastine 0.05 % drop Apply  to eye. Provider, Historical   raNITIdine (ZANTAC) 150 mg tablet ZANTAC TABS 9/29/11   Provider, Historical   cyclobenzaprine (FLEXERIL) 5 mg tablet take 1 tablet by mouth three times a day if needed 12/5/16   Provider, Historical   valACYclovir (VALTREX) 1 gram tablet Take 1 Tab by mouth three (3) times daily. 12/15/16   Bella Pina MD   EPINEPHrine (EPIPEN) 0.3 mg/0.3 mL injection 0.3 mg by IntraMUSCular route once as needed. Provider, Historical   ketotifen (ZADITOR) 0.025 % (0.035 %) ophthalmic solution Administer 1 Drop to both eyes every morning. Provider, Historical   Cetirizine (ZYRTEC) 10 mg cap Take 10 mg by mouth nightly. Provider, Historical   SAL ACID/UREA/PETROLATUM,WHITE (KERASAL FOOT EX) by Apply Externally route daily as needed. Provider, Historical   ACCU-CHEK HELDER PLUS TEST STRP strip  7/3/15   Provider, Historical   NITROSTAT 0.4 mg SL tablet  9/22/14   Provider, Historical   CALCIUM CARBONATE (MARCELLO-SELTZER ANTACID PO) Take  by mouth daily as needed. Provider, Historical   ibuprofen (MOTRIN) 200 mg tablet Take 200 mg by mouth every six (6) hours as needed.     Provider, Historical     Patient Active Problem List   Diagnosis Code    Malignant neoplasm of corpus uteri, except isthmus ([facility] [address] C54.9     Patient Active Problem List    Diagnosis Date Noted    Malignant neoplasm of corpus uteri, except isthmus ([address] 02/28/2013     Current Outpatient Medications   Medication Sig Dispense Refill    dexAMETHasone (Decadron) 4 mg tablet Take 2 tablets by mouth with breakfast the day before chemo also take 2 tablets with breakfast for 2 days after chemotherapy 36 Tab 1    lisinopriL (PRINIVIL, ZESTRIL) 10 mg tablet Take 1 Tab by mouth daily. 30 Tab 2    lisinopriL (PRINIVIL, ZESTRIL) 5 mg tablet Take 2 Tabs by mouth daily. 30 Tab 5    lenvatinib (Lenvima) 8 mg/day (4 mg x 2) cap Take 2 Caps by mouth daily. 60 Cap 2    ondansetron (ZOFRAN ODT) 4 mg disintegrating tablet Take 1 Tab by mouth every eight (8) hours as needed for Nausea. Indications: nausea and vomiting caused by cancer drugs 30 Tab 3    lidocaine-prilocaine (EMLA) topical cream Apply small amount over port area and cover with band aid one hour before chemo 30 g 3    guaifenesin (MUCINEX PO) Take  by mouth.  loratadine (Claritin) 10 mg tablet Take 10 mg by mouth.  MARICHUY'S WORT PO Take  by mouth.  epinastine 0.05 % drop Apply  to eye.  raNITIdine (ZANTAC) 150 mg tablet ZANTAC TABS      cyclobenzaprine (FLEXERIL) 5 mg tablet take 1 tablet by mouth three times a day if needed  0    valACYclovir (VALTREX) 1 gram tablet Take 1 Tab by mouth three (3) times daily. 21 Tab 3    EPINEPHrine (EPIPEN) 0.3 mg/0.3 mL injection 0.3 mg by IntraMUSCular route once as needed.  ketotifen (ZADITOR) 0.025 % (0.035 %) ophthalmic solution Administer 1 Drop to both eyes every morning.  Cetirizine (ZYRTEC) 10 mg cap Take 10 mg by mouth nightly.  SAL ACID/UREA/PETROLATUM,WHITE (KERASAL FOOT EX) by Apply Externally route daily as needed.  ACCU-CHEK HELDER PLUS TEST STRP strip   0    NITROSTAT 0.4 mg SL tablet       CALCIUM CARBONATE (MARCELLO-SELTZER ANTACID PO) Take  by mouth daily as needed.  ibuprofen (MOTRIN) 200 mg tablet Take 200 mg by mouth every six (6) hours as needed. Allergies   Allergen Reactions    Latex Other (comments)     Burns skin    Acetone Shortness of Breath    Amoxicillin Anaphylaxis    Ciprofloxacin Hives    Pcn [Penicillins] Anaphylaxis    Buspar [Buspirone] Unknown (comments)     Has N&V and makes her feel \"weird\"    Azithromycin Hives    Egg Nausea Only    Other Medication Rash     POPPY seeds     Past Medical History:   Diagnosis Date    Abnormal Pap smear 1995    with f/u normal    Anemia     Arthritis     Asthma     Cancer ([facility] [address]     ENDOMETRIAL    Environmental allergies     GERD (gastroesophageal reflux disease)     Goiter     Other unknown and unspecified cause of morbidity or mortality     POSSIBLE ESOPHAGEAL SPASM, ? OBSTRUCTION DUE TO GOITER    PMB (postmenopausal bleeding)     S/P chemotherapy, time since greater than 12 weeks     LAST CHEMO 10/2014 PER PATIENT    Thyroid disease     goiter     Past Surgical History:   Procedure Laterality Date    HX APPENDECTOMY      HX BUNIONECTOMY      HX GYN  3/13/13    TLH/BSO    HX HEENT  4/22/13    TOOTH REMOVED    HX ORTHOPAEDIC  1980'S    BUNIONECTOMY    HX VASCULAR ACCESS      port    DE BREAST SURGERY PROCEDURE UNLISTED  1/12/16    right breast bx     Family History   Problem Relation Age of Onset    Cancer Maternal Aunt         breast    Heart Disease Mother     Diabetes Father     Cancer Sister         CERVICAL    Kidney Disease Brother     Diabetes Brother     Heart Attack Other     Arrhythmia Brother     Diabetes Brother     Anesth Problems Neg Hx      Social History     Tobacco Use    Smoking status: Never Smoker    Smokeless tobacco: Never Used   Substance Use Topics    Alcohol use:  Yes     Alcohol/week: 0.0 standard drinks     Comment: RARE OCC       ROS    Patient-Reported Vitals 1/19/2021   Patient-Reported Systolic  846   Patient-Reported Diastolic 96        Deana Krabbe, who was evaluated through a patient-initiated, synchronous (real-time) audio only encounter, and/or her healthcare decision maker, is aware that it is a billable service, with coverage as determined by her insurance carrier. She provided verbal consent to proceed: Yes. She has not had a related appointment within my department in the past 7 days or scheduled within the next 24 hours. Total Time: minutes: 11-20 minutes      An electronic signature was used to sign this note.     Trey Boland MD  06/17/21

## 2021-06-22 ENCOUNTER — HOSPITAL ENCOUNTER (OUTPATIENT)
Dept: INFUSION THERAPY | Age: 66
Discharge: HOME | End: 2021-06-22
Payer: MEDICARE

## 2021-06-22 VITALS
RESPIRATION RATE: 18 BRPM | BODY MASS INDEX: 29.49 KG/M2 | HEIGHT: 65 IN | HEART RATE: 74 BPM | WEIGHT: 177 LBS | SYSTOLIC BLOOD PRESSURE: 133 MMHG | DIASTOLIC BLOOD PRESSURE: 72 MMHG | TEMPERATURE: 97 F

## 2021-06-22 DIAGNOSIS — M79.89 LEG SWELLING: ICD-10-CM

## 2021-06-22 DIAGNOSIS — Z79.899 ON ANTINEOPLASTIC CHEMOTHERAPY: ICD-10-CM

## 2021-06-22 DIAGNOSIS — M79.605 PAIN OF LEFT LOWER EXTREMITY: ICD-10-CM

## 2021-06-22 DIAGNOSIS — C54.9 MALIGNANT NEOPLASM OF CORPUS UTERI, EXCEPT ISTHMUS (HCC): Primary | ICD-10-CM

## 2021-06-22 LAB
ALBUMIN SERPL-MCNC: 3.7 G/DL (ref 3.5–5)
ALBUMIN/GLOB SERPL: 1.4 {RATIO} (ref 1.1–2.2)
ALP SERPL-CCNC: 66 U/L (ref 45–117)
ALT SERPL-CCNC: 26 U/L (ref 12–78)
ANION GAP SERPL CALC-SCNC: 6 MMOL/L (ref 5–15)
AST SERPL-CCNC: 17 U/L (ref 15–37)
BASOPHILS # BLD: 0 K/UL (ref 0–0.1)
BASOPHILS NFR BLD: 0 % (ref 0–1)
BILIRUB SERPL-MCNC: 0.4 MG/DL (ref 0.2–1)
BUN SERPL-MCNC: 20 MG/DL (ref 6–20)
BUN/CREAT SERPL: 39 (ref 12–20)
CALCIUM SERPL-MCNC: 9 MG/DL (ref 8.5–10.1)
CANCER AG125 SERPL-ACNC: 11 U/ML (ref 1.5–35)
CHLORIDE SERPL-SCNC: 110 MMOL/L (ref 97–108)
CO2 SERPL-SCNC: 25 MMOL/L (ref 21–32)
CREAT SERPL-MCNC: 0.51 MG/DL (ref 0.55–1.02)
DIFFERENTIAL METHOD BLD: ABNORMAL
EOSINOPHIL # BLD: 0 K/UL (ref 0–0.4)
EOSINOPHIL NFR BLD: 0 % (ref 0–7)
ERYTHROCYTE [DISTWIDTH] IN BLOOD BY AUTOMATED COUNT: 15.6 % (ref 11.5–14.5)
GLOBULIN SER CALC-MCNC: 2.7 G/DL (ref 2–4)
GLUCOSE SERPL-MCNC: 187 MG/DL (ref 65–100)
HCT VFR BLD AUTO: 32.1 % (ref 35–47)
HGB BLD-MCNC: 11 G/DL (ref 11.5–16)
IMM GRANULOCYTES # BLD AUTO: 0.1 K/UL (ref 0–0.04)
IMM GRANULOCYTES NFR BLD AUTO: 1 % (ref 0–0.5)
LYMPHOCYTES # BLD: 1.1 K/UL (ref 0.8–3.5)
LYMPHOCYTES NFR BLD: 20 % (ref 12–49)
MAGNESIUM SERPL-MCNC: 1.9 MG/DL (ref 1.6–2.4)
MCH RBC QN AUTO: 33.3 PG (ref 26–34)
MCHC RBC AUTO-ENTMCNC: 34.3 G/DL (ref 30–36.5)
MCV RBC AUTO: 97.3 FL (ref 80–99)
MONOCYTES # BLD: 0.8 K/UL (ref 0–1)
MONOCYTES NFR BLD: 15 % (ref 5–13)
NEUTS SEG # BLD: 3.4 K/UL (ref 1.8–8)
NEUTS SEG NFR BLD: 64 % (ref 32–75)
NRBC # BLD: 0 K/UL (ref 0–0.01)
NRBC BLD-RTO: 0 PER 100 WBC
PLATELET # BLD AUTO: 98 K/UL (ref 150–400)
PMV BLD AUTO: 9.6 FL (ref 8.9–12.9)
POTASSIUM SERPL-SCNC: 3.6 MMOL/L (ref 3.5–5.1)
PROT SERPL-MCNC: 6.4 G/DL (ref 6.4–8.2)
RBC # BLD AUTO: 3.3 M/UL (ref 3.8–5.2)
RBC MORPH BLD: ABNORMAL
SODIUM SERPL-SCNC: 141 MMOL/L (ref 136–145)
WBC # BLD AUTO: 5.4 K/UL (ref 3.6–11)

## 2021-06-22 PROCEDURE — 74011250636 HC RX REV CODE- 250/636: Performed by: PHYSICIAN ASSISTANT

## 2021-06-22 PROCEDURE — 74011000250 HC RX REV CODE- 250: Performed by: PHYSICIAN ASSISTANT

## 2021-06-22 PROCEDURE — 85025 COMPLETE CBC W/AUTO DIFF WBC: CPT

## 2021-06-22 PROCEDURE — 96415 CHEMO IV INFUSION ADDL HR: CPT

## 2021-06-22 PROCEDURE — 74011000258 HC RX REV CODE- 258: Performed by: PHYSICIAN ASSISTANT

## 2021-06-22 PROCEDURE — 80053 COMPREHEN METABOLIC PANEL: CPT

## 2021-06-22 PROCEDURE — 77030012965 HC NDL HUBR BBMI -A

## 2021-06-22 PROCEDURE — 99214 OFFICE O/P EST MOD 30 MIN: CPT | Performed by: PHYSICIAN ASSISTANT

## 2021-06-22 PROCEDURE — 96417 CHEMO IV INFUS EACH ADDL SEQ: CPT

## 2021-06-22 PROCEDURE — 96367 TX/PROPH/DG ADDL SEQ IV INF: CPT

## 2021-06-22 PROCEDURE — 96413 CHEMO IV INFUSION 1 HR: CPT

## 2021-06-22 PROCEDURE — 36415 COLL VENOUS BLD VENIPUNCTURE: CPT

## 2021-06-22 PROCEDURE — 86304 IMMUNOASSAY TUMOR CA 125: CPT

## 2021-06-22 PROCEDURE — 83735 ASSAY OF MAGNESIUM: CPT

## 2021-06-22 RX ORDER — SODIUM CHLORIDE 0.9 % (FLUSH) 0.9 %
10 SYRINGE (ML) INJECTION AS NEEDED
Status: DISPENSED | OUTPATIENT
Start: 2021-06-22 | End: 2021-06-22

## 2021-06-22 RX ORDER — SODIUM CHLORIDE 9 MG/ML
10 INJECTION INTRAMUSCULAR; INTRAVENOUS; SUBCUTANEOUS AS NEEDED
Status: DISPENSED | OUTPATIENT
Start: 2021-06-22 | End: 2021-06-22

## 2021-06-22 RX ORDER — HEPARIN 100 UNIT/ML
300-500 SYRINGE INTRAVENOUS AS NEEDED
Status: ACTIVE | OUTPATIENT
Start: 2021-06-22 | End: 2021-06-22

## 2021-06-22 RX ORDER — PALONOSETRON 0.05 MG/ML
0.25 INJECTION, SOLUTION INTRAVENOUS ONCE
Status: COMPLETED | OUTPATIENT
Start: 2021-06-22 | End: 2021-06-22

## 2021-06-22 RX ORDER — DIPHENHYDRAMINE HYDROCHLORIDE 50 MG/ML
50 INJECTION, SOLUTION INTRAMUSCULAR; INTRAVENOUS ONCE
Status: COMPLETED | OUTPATIENT
Start: 2021-06-22 | End: 2021-06-22

## 2021-06-22 RX ORDER — SODIUM CHLORIDE 9 MG/ML
25 INJECTION, SOLUTION INTRAVENOUS CONTINUOUS
Status: DISPENSED | OUTPATIENT
Start: 2021-06-22 | End: 2021-06-22

## 2021-06-22 RX ADMIN — SODIUM CHLORIDE 150 MG: 900 INJECTION, SOLUTION INTRAVENOUS at 12:18

## 2021-06-22 RX ADMIN — SODIUM CHLORIDE 25 ML/HR: 900 INJECTION, SOLUTION INTRAVENOUS at 12:18

## 2021-06-22 RX ADMIN — PACLITAXEL 251 MG: 6 INJECTION, SOLUTION INTRAVENOUS at 13:31

## 2021-06-22 RX ADMIN — Medication 10 ML: at 17:00

## 2021-06-22 RX ADMIN — FAMOTIDINE 20 MG: 10 INJECTION INTRAVENOUS at 13:09

## 2021-06-22 RX ADMIN — DIPHENHYDRAMINE HYDROCHLORIDE 50 MG: 50 INJECTION INTRAMUSCULAR; INTRAVENOUS at 13:12

## 2021-06-22 RX ADMIN — HEPARIN 500 UNITS: 100 SYRINGE at 17:00

## 2021-06-22 RX ADMIN — CARBOPLATIN 545 MG: 10 INJECTION, SOLUTION INTRAVENOUS at 16:28

## 2021-06-22 RX ADMIN — PALONOSETRON 0.25 MG: 0.25 INJECTION, SOLUTION INTRAVENOUS at 13:11

## 2021-06-22 RX ADMIN — Medication 10 ML: at 09:00

## 2021-06-22 RX ADMIN — SODIUM CHLORIDE 10 ML: 9 INJECTION INTRAMUSCULAR; INTRAVENOUS; SUBCUTANEOUS at 09:00

## 2021-06-22 RX ADMIN — DEXAMETHASONE SODIUM PHOSPHATE 12 MG: 4 INJECTION, SOLUTION INTRA-ARTICULAR; INTRALESIONAL; INTRAMUSCULAR; INTRAVENOUS; SOFT TISSUE at 12:40

## 2021-06-22 NOTE — PROGRESS NOTES
[address], [address], [address] [facility] [phone]  F [phone]      Patient ID:  Name:  Marina Ferrara  MRN:  [id number]  :  1955/66 y.o. Date:  2021      Connie Erickson MD: Ever Boyd MD  PCP: Haja Gaytan MD     Primary Diagnosis: uterine cancer  Date of Diagnosis: 3/2013      Current Agent: Taxol/carboplatin  Cycle: 6    HPI:  73 y.o. 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 She is now found with recurrence noted on imaging studies pelvic adenopathy and peritoneal carcinomatosis. She most recently progressed on combination Keytruda/Lenvima. She is recommended retreatment with carboplatin/taxol couplet. OncTx History:  3/2013 C Hyst/BSO  FINAL PATHOLOGIC DIAGNOSIS  1.  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  Histologic grade: High grade  Myometrial invasion: Depth of invasion: 0.3 cm  Myometrial thickness: 1.9 cm  Involvement of cervix: Not involved  Extent of involvement of other organs:  Right ovary: Not involved  Left ovary: Not involved  Right fallopian tube: Not involved  Left fallopian tube: Not involved  Right parametrial tissue: Not involved  Left parametrial tissue: Not involved  Lymphovascular invasion: Not identified  Additional pathologic findings:  Focal adenomyosis  Benign simple cyst of left ovary  Paratubal cysts  Pathologic staging (pTNM):  Primary tumor (pT): pT1a  Regional lymph nodes (pN): pN0  Pelvic lymph nodes:  Number examined: 17  Number involved: 0  Distant metastasis (M): Not applicable  2. Omentum, omentectomy:Benign adipose tissue, negative for carcinoma  3. Lymph nodes, right pelvic, excision:Seven lymph nodes, negative for metastatic carcinoma (0/7)  4. Lymph nodes, left pelvic, excision:Ten lymph nodes, negative for metastatic carcinoma (0/10)    MMR proficient   5/2013 - 9/2013 Taxol/carboplatin x 6 cycles   10/9/20 PET/CT:    1. Peritoneal carcinomatosis. 2. 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 3. Left pelvic cul-de-sac tumor   11/3/20 - 2/16/21 Keytruda/lenvima x 6 cycles     Reduced lenvima 8mg d/t HTN   12/30/20 CT CAP:   Mixed response in the abdomen and pelvis compared to 10/9/2020 with mildly increased anterior peritoneal carcinomatosis. Stable to slightly increased retroperitoneal lymphadenopathy. Decreased peritoneal soft tissue nodularity in the deep left pelvis. No evidence for metastatic disease in the chest.   2/25/21 CT CAP:   1. Overall increase in omental caking along the anterior peritoneum (the prior study. 2.  Retroperitoneal adenopathy demonstrating variable response to therapy. Some of these have increased in interval  3. Pelvic soft tissue in the left deep pelvis is not significantly changed although not well visualized. 4.  Slight interval increase in pelvic free fluid   3/9/21 [id number]/Carboplatin AUC5 initiated   5/5/21 CT AP:  There has been a mild decrease in the peritoneal carcinomatosis, There has been slight to mild decrease in the retroperitoneal adenopathy, No ascites is identified. No definite pelvic mass is identified. SUBJECTIVE:  Ben Iglesias presents for chemotherapy. Overall doing well, reports fatigue, still working full time with infrequent time off if she is too fatigued to work.   Bilateral LE swelling ongoing, more recently noted LLE pains that have improved some. She denies claudication. Notes fatigue with exertion present since Keytruda dosing. No CP/SOB, wheezing, palpitations. Able to ambulate, remains independent, minimal \"neuropathy\" with cold feet. She has a good appetite, has occasional acid reflux using Pepcid and delayed GI function with dulcolax/benefiber/MoM. Remains active at work full time, unable to work from home. No residual effects s/p her therapy in 2013. She lives alone. Does not feel overly close to her children. Her daughter lives next door and brother nearby. Her son lives near [place]. She feels most supported by her brother Debbie Nugent, close friend Mylene and her work family. She still has difficulty feeling comfortable asking family for help. Looking into/questions USP with SS. Work for her is a positive stressor, has few hobbies and uncertain of purpose w/o work. ROS  Constitutional: no weight loss, fever, night sweats  Respiratory: above  Cardiovascular: above  Heme: No abnormal bleeding, no epistaxis. Gastrointestinal: no abdominal pain, no dysphagia, change in bowel habits, or black or bloody stools  Genito-Urinary: no dysuria, trouble voiding, or hematuria  Musculoskeletal: negative for - gait disturbance or joint swelling  Neurological: negative for - behavioral changes, dizziness, headaches, memory loss or numbness/tingling  Derm: negative  Psych: negative for anxiety and depression       OBJECTIVE:  Physical Exam  Visit Vitals  /70   Pulse 84   Temp 97 °F (36.1 °C)   Resp 18   Ht 5' 5\" (1.651 m)   Wt 177 lb (80.3 kg)   Breastfeeding No   BMI 29.45 kg/m²          General:  alert, cooperative, no distress       HEENT: without pallor, sclera without jaundice, oral mucosa without lesions,      Cardiac:  Regular rate and rhythm        Lungs:  clear to auscultation bilaterally          Port:  clean, dry, no drainage  Abdomen:  soft, protuberant, nondistended, nontender, no gross fluid wave. Lymph:  no lymphadenopathy   Extremity: bilateral LE edema 1+ edema L>R. No redness or tenderness in the calves or thighs    Wt Readings from Last 3 Encounters:   06/22/21 177 lb (80.3 kg)   06/01/21 176 lb 3.2 oz (79.9 kg)   05/11/21 176 lb 12.8 oz (80.2 kg)       Recent Labs     06/22/21  0910   WBC 5.4   ANEU 3.4   HGB 11.0*   HCT 32.1*   MCV 97.3   MCH 33.3   PLT 98*     Recent Labs     06/22/21  0910      K 3.6   *   *   BUN 20   CREA 0.51*   CA 9.0   MG 1.9   ALB 3.7   TBILI 0.4   ALT 26         Tumor markers  CA-125   Date Value Ref Range Status   06/22/2021 11 1.5 - 35.0 U/mL Final     Comment:     ** Note new reference range and method **  Results may vary depending on . Method used is [employer]     06/01/2021 11 1.5 - 35.0 U/mL Final     Comment:     ** Note new reference range and method **  Results may vary depending on . Method used is [employer]     05/11/2021 12 1.5 - 35.0 U/mL Final     Comment:     ** Note new reference range and method **  Results may vary depending on . Method used is [employer]     04/20/2021 17 1.5 - 35.0 U/mL Final     Comment:     ** Note new reference range and method **  Results may vary depending on . Method used is [employer]     03/30/2021 27 1.5 - 35.0 U/mL Final     Comment:     ** Note new reference range and method **  Results may vary depending on . Method used is [employer]     03/09/2021 155 (H) 1.5 - 35.0 U/mL Final     Comment:     ** Note new reference range and method **  Results may vary depending on .   Method used is [employer]           Patient Active Problem List   Diagnosis Code    Malignant neoplasm of corpus uteri, except isthmus ([facility] [address] C54.9     Past Medical History:   Diagnosis Date    Abnormal Pap smear 1995    with f/u normal    Anemia     Arthritis     Asthma     [facility]     ENDOMETRIAL    Environmental allergies     GERD (gastroesophageal reflux disease)     Goiter     Other unknown and unspecified cause of morbidity or mortality     POSSIBLE ESOPHAGEAL SPASM, ? OBSTRUCTION DUE TO GOITER    PMB (postmenopausal bleeding)     S/P chemotherapy, time since greater than 12 weeks     LAST CHEMO 10/2014 PER PATIENT    Thyroid disease     goiter     Prior to Admission medications    Medication Sig Start Date End Date Taking? Authorizing Provider   dexAMETHasone (Decadron) 4 mg tablet Take 2 tablets by mouth with breakfast the day before chemo also take 2 tablets with breakfast for 2 days after chemotherapy 3/8/21   Shaun Gabriel MD   lisinopriL (PRINIVIL, ZESTRIL) 10 mg tablet Take 1 Tab by mouth daily. 1/6/21   Shaun Gabriel MD   lisinopriL (PRINIVIL, ZESTRIL) 5 mg tablet Take 2 Tabs by mouth daily. 1/5/21   Jorge L Cline PA-C   lenvatinib (Lenvima) 8 mg/day (4 mg x 2) cap Take 2 Caps by mouth daily. 12/15/20   Shaun Gabriel MD   ondansetron (ZOFRAN ODT) 4 mg disintegrating tablet Take 1 Tab by mouth every eight (8) hours as needed for Nausea. Indications: nausea and vomiting caused by cancer drugs 10/22/20   Shaun Gabriel MD   lidocaine-prilocaine (EMLA) topical cream Apply small amount over port area and cover with band aid one hour before chemo 10/22/20   Shaun Gabriel MD   guaifenesin (MUCINEX PO) Take  by mouth. Provider, Historical   loratadine (Claritin) 10 mg tablet Take 10 mg by mouth. Provider, Historical   MARICHUY'S WORT PO Take  by mouth. Provider, Historical   epinastine 0.05 % drop Apply  to eye. Provider, Historical   raNITIdine (ZANTAC) 150 mg tablet ZANTAC TABS 9/29/11   Provider, Historical   cyclobenzaprine (FLEXERIL) 5 mg tablet take 1 tablet by mouth three times a day if needed 12/5/16   Provider, Historical   valACYclovir (VALTREX) 1 gram tablet Take 1 Tab by mouth three (3) times daily.  12/15/16   Shaun Gabriel MD   EPINEPHrine [facility]) 0.3 mg/0.3 mL injection 0.3 mg by IntraMUSCular route once as needed. Provider, Historical   ketotifen (ZADITOR) 0.025 % (0.035 %) ophthalmic solution Administer 1 Drop to both eyes every morning. Provider, Historical   Cetirizine (ZYRTEC) 10 mg cap Take 10 mg by mouth nightly. Provider, Historical   SAL ACID/UREA/PETROLATUM,WHITE (KERASAL FOOT EX) by Apply Externally route daily as needed. Provider, Historical   ACCU-CHEK HELDER PLUS TEST STRP strip  7/3/15   Provider, Historical   NITROSTAT 0.4 mg SL tablet  14   Provider, Historical   CALCIUM CARBONATE (MARCELLO-SELTZER ANTACID PO) Take  by mouth daily as needed. Provider, Historical   ibuprofen (MOTRIN) 200 mg tablet Take 200 mg by mouth every six (6) hours as needed. Provider, Historical     Allergies   Allergen Reactions    Latex Other (comments)     Burns skin    Acetone Shortness of Breath    Amoxicillin Anaphylaxis    Ciprofloxacin Hives    Pcn [Penicillins] Anaphylaxis    Buspar [Buspirone] Unknown (comments)     Has N&V and makes her feel \"weird\"    Azithromycin Hives    Egg Nausea Only    Other Medication Rash     POPPY seeds     Family History   Problem Relation Age of Onset    Cancer Maternal Aunt         breast    Heart Disease Mother     Diabetes Father     Cancer Sister         CERVICAL    Kidney Disease Brother     Diabetes Brother     Heart Attack Other     Arrhythmia Brother     Diabetes Brother     Anesth Problems Neg Hx    Maternal grandmother \"female\" cancer  in 45s      CT Results (most recent):  Results from Hospital Encounter encounter on 21    CT ABD PELV W CONT    Narrative  EXAM: CT ABD PELV W CONT    INDICATION: Adenocarcinoma of the endometrium    COMPARISON: 2020    CONTRAST: 100 mL of Isovue-370. TECHNIQUE:  Following the uneventful intravenous administration of contrast, thin axial  images were obtained through the abdomen and pelvis.  Coronal and sagittal  reconstructions were generated. Oral contrast was administered. CT dose  reduction was achieved through use of a standardized protocol tailored for this  examination and automatic exposure control for dose modulation. FINDINGS:  LOWER THORAX: Right cardiophrenic lymph node measures 2.9 cm and previously  measured 1.2 cm on image number 15. There is minor basilar atelectasis/scarring  LIVER: No mass. BILIARY TREE: There is suggestion of gallstones CBD is not dilated. SPLEEN: within normal limits. PANCREAS: No mass or ductal dilatation. ADRENALS: Unremarkable. KIDNEYS: No mass, calculus, or hydronephrosis. STOMACH: Unremarkable. SMALL BOWEL: No dilatation or wall thickening. COLON: No dilatation or wall thickening. APPENDIX: Status post appendectomy  PERITONEUM: Peritoneal carcinomatosis appears improved. There is a confluent  density anteriorly in the peritoneum on image number 43 measuring 5.9 x 1.4 cm  which previously measured 9.8 x 1.9 cm. RETROPERITONEUM: Left retroperitoneal lymph node measures 0.7 cm image 45 and  previously measured 1.6 cm. Right retroperitoneal lymph node measures 0.7 cm image 46 and previously  measured 0.8 cm. Left iliac lymph node image 57 measures 0.9 cm and previously measured 1.1 cm. REPRODUCTIVE ORGANS: Status post hysterectomy and oophorectomy. URINARY BLADDER: No mass or calculus. BONES: No destructive bone lesion. ABDOMINAL WALL: No mass or hernia. ADDITIONAL COMMENTS: N/A    Impression  1. There has been a mild decrease in the peritoneal carcinomatosis. 2. There has been slight to mild decrease in the retroperitoneal adenopathy. 3. No ascites is identified. No definite pelvic mass is identified. IMPRESSION/PLAN:  73 y.o. 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 ECO    Labs/chart reviewed  -Taxol/carboplatin today, cycle 6. Appreciate normalization of her .  EOD imaging planned.  -Low/moderate-grade cytopenias. Continue therapy (approved via Dr. Kamaljit Marie), precautions advised. -HTN: Controlled. Keeps log at home. Holding lisinopril. -KNIGHT: Chronic/stable fatigue d/t chemo, deconditioning. -LE edema: US r/o DVT LLE scheduled 6/23  -Hx of thyroid nodular goiter, benign follicular bx.  -Chronic med issues:    Hx asthma - no issues. inhaler PRN   Hx esophageal spasm - uses nitroglycerin  -Reflux: continue Pepcid  -Stool irregularity: continue stool softeners  Psychosocial: In good spirits and feels well supported by her friends/work family. Asks many appropriate questions. She is doing very well at the moment. Feels work is essential for her well-being at this point. Encouraged her again to open dialogue with her family re: her diagnosis and possible future needs expected. Advised to call with questions/concerns. 06/22/21 I have spent 35 minutes reviewing previous notes, results and face to face with the patient discussing the diagnosis and treatment plan as outlined above.    Electronically signed,        Luis Fernando Carey PA-C

## 2021-06-22 NOTE — PROGRESS NOTES
[facility] Chemo Progress Note    Date: 2021    Name: Asim Mackay    MRN: [id number]         : 1955    0900 Ms. Titus Choe Arrived to [facility] for  C6 Taxol/Carboplatin   ambulatory in stable condition. Assessment was completed, no acute issues at this time, no new complaints voiced. Port accessed with positive blood return. Labs drawn and sent for processing. Normal Saline started at [place] [facility]. Chemotherapy Flowsheet 2021   Cycle C6   Date 2021   Drug / Regimen Taxol/Carboplatin   Pre Meds given   Notes given         Patient denies SOB, fever, cough, general not feeling well. Patient denies recent exposure to someone who has tested positive for COVID-19. Patient denies having contact with anyone who has a pending COVID test.      Ms. Florencia Berry vitals were reviewed. Patient Vitals for the past 12 hrs:   Temp Pulse Resp BP   21 0900 97 °F (36.1 °C) 84 18 138/70         Lab results were obtained and reviewed. Recent Results (from the past 12 hour(s))   CBC WITH AUTOMATED DIFF    Collection Time: 21  9:10 AM   Result Value Ref Range    WBC 5.4 3.6 - 11.0 K/uL    RBC 3.30 (L) 3.80 - 5.20 M/uL    HGB 11.0 (L) 11.5 - 16.0 g/dL    HCT 32.1 (L) 35.0 - 47.0 %    MCV 97.3 80.0 - 99.0 FL    MCH 33.3 26.0 - 34.0 PG    MCHC 34.3 30.0 - 36.5 g/dL    RDW 15.6 (H) 11.5 - 14.5 %    PLATELET 98 (L) 364 - 400 K/uL    MPV 9.6 8.9 - 12.9 FL    NRBC 0.0 0  WBC    ABSOLUTE NRBC 0.00 0.00 - 0.01 K/uL    NEUTROPHILS 64 32 - 75 %    LYMPHOCYTES 20 12 - 49 %    MONOCYTES 15 (H) 5 - 13 %    EOSINOPHILS 0 0 - 7 %    BASOPHILS 0 0 - 1 %    IMMATURE GRANULOCYTES 1 (H) 0.0 - 0.5 %    ABS. NEUTROPHILS 3.4 1.8 - 8.0 K/UL    ABS. LYMPHOCYTES 1.1 0.8 - 3.5 K/UL    ABS. MONOCYTES 0.8 0.0 - 1.0 K/UL    ABS. EOSINOPHILS 0.0 0.0 - 0.4 K/UL    ABS. BASOPHILS 0.0 0.0 - 0.1 K/UL    ABS. IMM.  GRANS. 0.1 (H) 0.00 - 0.04 K/UL    DF SMEAR SCANNED      RBC COMMENTS ANISOCYTOSIS  1+        RBC COMMENTS OVALOCYTES  PRESENT        RBC COMMENTS POLYCHROMASIA  PRESENT       METABOLIC PANEL, COMPREHENSIVE    Collection Time: 06/22/21  9:10 AM   Result Value Ref Range    Sodium 141 136 - 145 mmol/L    Potassium 3.6 3.5 - 5.1 mmol/L    Chloride 110 (H) 97 - 108 mmol/L    CO2 25 21 - 32 mmol/L    Anion gap 6 5 - 15 mmol/L    Glucose 187 (H) 65 - 100 mg/dL    BUN 20 6 - 20 MG/DL    Creatinine 0.51 (L) 0.55 - 1.02 MG/DL    BUN/Creatinine ratio 39 (H) 12 - 20      GFR est AA >60 >60 ml/min/1.73m2    GFR est non-AA >60 >60 ml/min/1.73m2    Calcium 9.0 8.5 - 10.1 MG/DL    Bilirubin, total 0.4 0.2 - 1.0 MG/DL    ALT (SGPT) 26 12 - 78 U/L    AST (SGOT) 17 15 - 37 U/L    Alk. phosphatase 66 45 - 117 U/L    Protein, total 6.4 6.4 - 8.2 g/dL    Albumin 3.7 3.5 - 5.0 g/dL    Globulin 2.7 2.0 - 4.0 g/dL    A-G Ratio 1.4 1.1 - 2.2     CANCER ANTIGEN 125    Collection Time: 06/22/21  9:10 AM   Result Value Ref Range    CA-125 11 1.5 - 35.0 U/mL   MAGNESIUM    Collection Time: 06/22/21  9:10 AM   Result Value Ref Range    Magnesium 1.9 1.6 - 2.4 mg/dL       Pre-medications  were administered as ordered and chemotherapy was initiated.   Medications Administered     0.9% sodium chloride infusion     Admin Date  06/22/2021 Action  New Bag Dose  25 mL/hr Rate  25 mL/hr Route  IntraVENous Administered By  Olivia Monreal RN          0.9% sodium chloride injection 10 mL     Admin Date  06/22/2021 Action  Given Dose  10 mL Route  IntraVENous Administered By  Olivia Monreal RN          CARBOplatin (PARAPLATIN) 545 mg in 0.9% sodium chloride 250 mL, overfill volume 25 mL chemo infusion (GOG)     Admin Date  06/22/2021 Action  New Bag Dose  545 mg Rate  659 mL/hr Route  IntraVENous Administered By  Olivia Monreal RN          dexamethasone (DECADRON) 12 mg in 0.9% sodium chloride 50 mL, overfill volume 5 mL IVPB     Admin Date  06/22/2021 Action  New Bag Dose  12 mg Rate  232 mL/hr Route  IntraVENous Administered By  Olivia Monreal RN diphenhydrAMINE (BENADRYL) injection 50 mg     Admin Date  06/22/2021 Action  Given Dose  50 mg Route  IntraVENous Administered By  Luke Bryan RN          famotidine (PF) (PEPCID) 20 mg in 0.9% sodium chloride 10 mL injection     Admin Date  06/22/2021 Action  Given Dose  20 mg Route  IntraVENous Administered By  Luke Bryan RN          fosaprepitant (EMEND) 150 mg in 0.9% sodium chloride 150 mL IVPB     Admin Date  06/22/2021 Action  New Bag Dose  150 mg Rate  450 mL/hr Route  IntraVENous Administered By  Luke Bryan RN          heparin (porcine) pf 300-500 Units     Admin Date  06/22/2021 Action  Given Dose  500 Units Route  InterCATHeter Administered By  Lkue Bryan RN          PACLitaxeL (TAXOL) 251 mg in 0.9% sodium chloride 250 mL, overfill volume 25 mL chemo infusion     Admin Date  06/22/2021 Action  New Bag Dose  251 mg Rate  105.6 mL/hr Route  IntraVENous Administered By  Luke Bryan RN          palonosetron HCl (ALOXI) injection 0.25 mg     Admin Date  06/22/2021 Action  Given Dose  0.25 mg Route  IntraVENous Administered By  Luke Bryan RN          sodium chloride (NS) flush 10 mL     Admin Date  06/22/2021 Action  Given Dose  10 mL Route  IntraVENous Administered By  Luke Bryan RN           Admin Date  06/22/2021 Action  Given Dose  10 mL Route  IntraVENous Administered By  Luke Bryan RN                  1236 Patient tolerated treatment well. Port maintained positive blood return throughout treatment. Port flushed, heparinized and de accessed per protocol. Patient was discharged from [place] [id number].     Future Appointments   Date Time Provider David Jara   6/23/2021  1:00 PM [address] REG   7/12/2021 11:30 AM SPT CT 1 SPTRCT SPT   7/12/2021 12:00 PM SPT CT 1 SPTRCT SPT   7/15/2021  3:00 PM Micki Manuel MD [facility] AMB         Sandy Slade RN  June 22, 2021

## 2021-06-23 ENCOUNTER — HOSPITAL ENCOUNTER (OUTPATIENT)
Dept: ULTRASOUND IMAGING | Age: 66
Discharge: HOME | End: 2021-06-23
Attending: PHYSICIAN ASSISTANT
Payer: MEDICARE

## 2021-06-23 DIAGNOSIS — C54.1 PRIMARY SEROUS ADENOCARCINOMA OF ENDOMETRIUM (HCC): Primary | ICD-10-CM

## 2021-06-23 DIAGNOSIS — I82.4Z2 ACUTE DEEP VEIN THROMBOSIS (DVT) OF DISTAL VEIN OF LEFT LOWER EXTREMITY (HCC): ICD-10-CM

## 2021-06-23 PROCEDURE — 93971 EXTREMITY STUDY: CPT

## 2021-06-23 RX ORDER — RIVAROXABAN 15 MG-20MG
KIT ORAL
Qty: 1 DOSE PACK | Refills: 0 | Status: SHIPPED | OUTPATIENT
Start: 2021-06-23 | End: 2021-06-24 | Stop reason: RX

## 2021-06-23 NOTE — PROGRESS NOTES
DVT noted LLE on US, acute/chronic. Informed patient of [facility] Rx sent to pharmacy. Informed to have labs drawn in one week with current mild thrombocytopenia and recent chemo. If bleeding/petechia noted, she is to stop [address] and call for labs or report to ED if bleeding is severe. Weekly labs planned d/t recent thrombocytopenia with chemo.     Dipti Daniels PA-C

## 2021-06-24 ENCOUNTER — TELEPHONE (OUTPATIENT)
Dept: GYNECOLOGY | Age: 66
End: 2021-06-24

## 2021-06-24 DIAGNOSIS — I82.4Z2 DVT, LOWER EXTREMITY, DISTAL, ACUTE, LEFT (HCC): Primary | ICD-10-CM

## 2021-06-24 NOTE — TELEPHONE ENCOUNTER
Pt calling to request [facility] send new Rx for Xarelto as [facility] told her they do not have the starter pack. Pt informed she is to have labs repeated weekly X3 to evaluate platelets. Reviewed with pt things to be aware of with low platelets. Pt wishes to have labs drawn at [facility] in [place].

## 2021-06-28 DIAGNOSIS — C54.1 PRIMARY SEROUS ADENOCARCINOMA OF ENDOMETRIUM (HCC): Primary | ICD-10-CM

## 2021-06-30 LAB
BASOPHILS # BLD AUTO: 0 X10E3/UL (ref 0–0.2)
BASOPHILS NFR BLD AUTO: 0 %
EOSINOPHIL # BLD AUTO: 0 X10E3/UL (ref 0–0.4)
EOSINOPHIL NFR BLD AUTO: 1 %
ERYTHROCYTE [DISTWIDTH] IN BLOOD BY AUTOMATED COUNT: 15 % (ref 11.7–15.4)
HCT VFR BLD AUTO: 35 % (ref 34–46.6)
HGB BLD-MCNC: 11.7 G/DL (ref 11.1–15.9)
IMM GRANULOCYTES # BLD AUTO: 0 X10E3/UL (ref 0–0.1)
IMM GRANULOCYTES NFR BLD AUTO: 1 %
LYMPHOCYTES # BLD AUTO: 0.6 X10E3/UL (ref 0.7–3.1)
LYMPHOCYTES NFR BLD AUTO: 23 %
MCH RBC QN AUTO: 33.2 PG (ref 26.6–33)
MCHC RBC AUTO-ENTMCNC: 33.4 G/DL (ref 31.5–35.7)
MCV RBC AUTO: 99 FL (ref 79–97)
MONOCYTES # BLD AUTO: 0.2 X10E3/UL (ref 0.1–0.9)
MONOCYTES NFR BLD AUTO: 6 %
MORPHOLOGY BLD-IMP: ABNORMAL
NEUTROPHILS # BLD AUTO: 1.8 X10E3/UL (ref 1.4–7)
NEUTROPHILS NFR BLD AUTO: 69 %
PLATELET # BLD AUTO: 90 X10E3/UL (ref 150–450)
RBC # BLD AUTO: 3.52 X10E6/UL (ref 3.77–5.28)
WBC # BLD AUTO: 2.6 X10E3/UL (ref 3.4–10.8)

## 2021-07-02 ENCOUNTER — TELEPHONE (OUTPATIENT)
Dept: GYNECOLOGY | Age: 66
End: 2021-07-02

## 2021-07-02 DIAGNOSIS — C54.9 MALIGNANT NEOPLASM OF CORPUS UTERI, EXCEPT ISTHMUS (HCC): Primary | ICD-10-CM

## 2021-07-02 NOTE — TELEPHONE ENCOUNTER
Pt given lab results from 7/29/21, and to have labs repeated on 7/6/21, and to continue same medications. Pt states she was bitten by a spider on her back earlier in the week. She feels it is looking better.

## 2021-07-07 LAB
BASOPHILS # BLD AUTO: 0 X10E3/UL (ref 0–0.2)
BASOPHILS NFR BLD AUTO: 1 %
EOSINOPHIL # BLD AUTO: 0 X10E3/UL (ref 0–0.4)
EOSINOPHIL NFR BLD AUTO: 1 %
ERYTHROCYTE [DISTWIDTH] IN BLOOD BY AUTOMATED COUNT: 15.7 % (ref 11.7–15.4)
HCT VFR BLD AUTO: 32.4 % (ref 34–46.6)
HGB BLD-MCNC: 10.9 G/DL (ref 11.1–15.9)
IMM GRANULOCYTES # BLD AUTO: 0 X10E3/UL (ref 0–0.1)
IMM GRANULOCYTES NFR BLD AUTO: 1 %
LYMPHOCYTES # BLD AUTO: 0.6 X10E3/UL (ref 0.7–3.1)
LYMPHOCYTES NFR BLD AUTO: 37 %
MCH RBC QN AUTO: 32.6 PG (ref 26.6–33)
MCHC RBC AUTO-ENTMCNC: 33.6 G/DL (ref 31.5–35.7)
MCV RBC AUTO: 97 FL (ref 79–97)
MONOCYTES # BLD AUTO: 0.3 X10E3/UL (ref 0.1–0.9)
MONOCYTES NFR BLD AUTO: 18 %
NEUTROPHILS # BLD AUTO: 0.7 X10E3/UL (ref 1.4–7)
NEUTROPHILS NFR BLD AUTO: 42 %
PLATELET # BLD AUTO: 120 X10E3/UL (ref 150–450)
RBC # BLD AUTO: 3.34 X10E6/UL (ref 3.77–5.28)
WBC # BLD AUTO: 1.6 X10E3/UL (ref 3.4–10.8)

## 2021-07-08 DIAGNOSIS — C54.9 MALIGNANT NEOPLASM OF CORPUS UTERI, EXCEPT ISTHMUS (HCC): Primary | ICD-10-CM

## 2021-07-12 ENCOUNTER — HOSPITAL ENCOUNTER (OUTPATIENT)
Dept: CT IMAGING | Age: 66
Discharge: HOME | End: 2021-07-12
Attending: OBSTETRICS & GYNECOLOGY
Payer: MEDICARE

## 2021-07-12 ENCOUNTER — TELEPHONE (OUTPATIENT)
Dept: GYNECOLOGY | Age: 66
End: 2021-07-12

## 2021-07-12 DIAGNOSIS — C54.9 MALIGNANT NEOPLASM OF CORPUS UTERI, EXCEPT ISTHMUS (HCC): ICD-10-CM

## 2021-07-12 DIAGNOSIS — C54.1 PRIMARY SEROUS ADENOCARCINOMA OF ENDOMETRIUM (HCC): ICD-10-CM

## 2021-07-12 PROCEDURE — 74011000636 HC RX REV CODE- 636: Performed by: OBSTETRICS & GYNECOLOGY

## 2021-07-12 PROCEDURE — 71260 CT THORAX DX C+: CPT

## 2021-07-12 PROCEDURE — 74177 CT ABD & PELVIS W/CONTRAST: CPT

## 2021-07-12 PROCEDURE — 74011000250 HC RX REV CODE- 250: Performed by: OBSTETRICS & GYNECOLOGY

## 2021-07-12 RX ORDER — BARIUM SULFATE 20 MG/ML
900 SUSPENSION ORAL
Status: COMPLETED | OUTPATIENT
Start: 2021-07-12 | End: 2021-07-12

## 2021-07-12 RX ADMIN — BARIUM SULFATE 900 ML: 20 SUSPENSION ORAL at 11:17

## 2021-07-12 RX ADMIN — IOPAMIDOL 100 ML: 755 INJECTION, SOLUTION INTRAVENOUS at 11:18

## 2021-07-12 NOTE — TELEPHONE ENCOUNTER
Message left on answering machine per Darinel TY she does not need more labs drawn as her plts are rising.

## 2021-07-15 ENCOUNTER — OFFICE VISIT (OUTPATIENT)
Dept: GYNECOLOGY | Age: 66
End: 2021-07-15
Payer: MEDICARE

## 2021-07-15 VITALS
BODY MASS INDEX: 29.69 KG/M2 | SYSTOLIC BLOOD PRESSURE: 148 MMHG | HEART RATE: 97 BPM | WEIGHT: 178.2 LBS | DIASTOLIC BLOOD PRESSURE: 86 MMHG | HEIGHT: 65 IN

## 2021-07-15 DIAGNOSIS — C54.1 PRIMARY SEROUS ADENOCARCINOMA OF ENDOMETRIUM (HCC): Primary | ICD-10-CM

## 2021-07-15 PROCEDURE — G8432 DEP SCR NOT DOC, RNG: HCPCS | Performed by: OBSTETRICS & GYNECOLOGY

## 2021-07-15 PROCEDURE — G0463 HOSPITAL OUTPT CLINIC VISIT: HCPCS | Performed by: OBSTETRICS & GYNECOLOGY

## 2021-07-15 PROCEDURE — 99213 OFFICE O/P EST LOW 20 MIN: CPT | Performed by: OBSTETRICS & GYNECOLOGY

## 2021-07-15 PROCEDURE — G8400 PT W/DXA NO RESULTS DOC: HCPCS | Performed by: OBSTETRICS & GYNECOLOGY

## 2021-07-15 PROCEDURE — G8536 NO DOC ELDER MAL SCRN: HCPCS | Performed by: OBSTETRICS & GYNECOLOGY

## 2021-07-15 PROCEDURE — 1090F PRES/ABSN URINE INCON ASSESS: CPT | Performed by: OBSTETRICS & GYNECOLOGY

## 2021-07-15 PROCEDURE — 3017F COLORECTAL CA SCREEN DOC REV: CPT | Performed by: OBSTETRICS & GYNECOLOGY

## 2021-07-15 PROCEDURE — G8419 CALC BMI OUT NRM PARAM NOF/U: HCPCS | Performed by: OBSTETRICS & GYNECOLOGY

## 2021-07-15 PROCEDURE — 1101F PT FALLS ASSESS-DOCD LE1/YR: CPT | Performed by: OBSTETRICS & GYNECOLOGY

## 2021-07-15 PROCEDURE — G8427 DOCREV CUR MEDS BY ELIG CLIN: HCPCS | Performed by: OBSTETRICS & GYNECOLOGY

## 2021-07-15 RX ORDER — LORAZEPAM 2 MG/ML
0.5 INJECTION INTRAMUSCULAR
Status: CANCELLED
Start: 2021-07-20

## 2021-07-15 RX ORDER — HEPARIN 100 UNIT/ML
300-500 SYRINGE INTRAVENOUS AS NEEDED
Status: CANCELLED | OUTPATIENT
Start: 2021-07-20

## 2021-07-15 RX ORDER — DIPHENHYDRAMINE HYDROCHLORIDE 50 MG/ML
50 INJECTION, SOLUTION INTRAMUSCULAR; INTRAVENOUS ONCE
Status: CANCELLED | OUTPATIENT
Start: 2021-07-20 | End: 2021-07-20

## 2021-07-15 RX ORDER — ALBUTEROL SULFATE 0.83 MG/ML
2.5 SOLUTION RESPIRATORY (INHALATION) AS NEEDED
Status: CANCELLED
Start: 2021-07-20

## 2021-07-15 RX ORDER — SODIUM CHLORIDE 0.9 % (FLUSH) 0.9 %
10 SYRINGE (ML) INJECTION AS NEEDED
Status: CANCELLED | OUTPATIENT
Start: 2021-07-20

## 2021-07-15 RX ORDER — HYDROCORTISONE SODIUM SUCCINATE 100 MG/2ML
100 INJECTION, POWDER, FOR SOLUTION INTRAMUSCULAR; INTRAVENOUS AS NEEDED
Status: CANCELLED | OUTPATIENT
Start: 2021-07-20

## 2021-07-15 RX ORDER — ACETAMINOPHEN 325 MG/1
650 TABLET ORAL AS NEEDED
Status: CANCELLED
Start: 2021-07-20

## 2021-07-15 RX ORDER — DIPHENHYDRAMINE HYDROCHLORIDE 50 MG/ML
25 INJECTION, SOLUTION INTRAMUSCULAR; INTRAVENOUS AS NEEDED
Status: CANCELLED
Start: 2021-07-20

## 2021-07-15 RX ORDER — SODIUM CHLORIDE 9 MG/ML
10 INJECTION INTRAMUSCULAR; INTRAVENOUS; SUBCUTANEOUS AS NEEDED
Status: CANCELLED | OUTPATIENT
Start: 2021-07-20

## 2021-07-15 RX ORDER — SODIUM CHLORIDE 9 MG/ML
25 INJECTION, SOLUTION INTRAVENOUS CONTINUOUS
Status: CANCELLED | OUTPATIENT
Start: 2021-07-20

## 2021-07-15 RX ORDER — DIPHENHYDRAMINE HYDROCHLORIDE 50 MG/ML
50 INJECTION, SOLUTION INTRAMUSCULAR; INTRAVENOUS AS NEEDED
Status: CANCELLED
Start: 2021-07-20

## 2021-07-15 RX ORDER — PALONOSETRON 0.05 MG/ML
0.25 INJECTION, SOLUTION INTRAVENOUS ONCE
Status: CANCELLED | OUTPATIENT
Start: 2021-07-20 | End: 2021-07-20

## 2021-07-15 RX ORDER — ONDANSETRON 2 MG/ML
8 INJECTION INTRAMUSCULAR; INTRAVENOUS AS NEEDED
Status: CANCELLED | OUTPATIENT
Start: 2021-07-20

## 2021-07-15 RX ORDER — EPINEPHRINE 1 MG/ML
0.3 INJECTION, SOLUTION, CONCENTRATE INTRAVENOUS AS NEEDED
Status: CANCELLED | OUTPATIENT
Start: 2021-07-20

## 2021-07-15 NOTE — PROGRESS NOTES
[facility] GYNECOLOGIC ONCOLOGY  [address] [facility], [address], [address] [facility]  [phone] [facility] [phone]    Office Visit    Patient ID:  Name: Maikol North  MRN: [id number]   : 1955/66 y.o. Visit date: 7/15/2021    INTERVAL HISTORY:  Maikol North is a  female who is an established patient with stage IA, grade 3 uterine carcinoma. She underwent laparoscopic hysterectomy with staging in 2013. She was recommended six cycles of adjuvant Taxol and Carboplatin, which she completed. We elected not to treat with pelvic radiation therapy due to her difficulty with chemotherapy. She had a CT of the abdomen/pelvis at [facility] that revealed retroperitoneal lymphadenopathy, peritoneal carcinomatosis, and trace ascites. I sent her for a PET/CT to better evaluate the extent of disease. She presented to review the results and discuss treatment. Her disease is not amenable to surgical resection. Systemic therapy was her only viable option. I thought immunotherapy would be the best choice, ahead of additional chemotherapy, specifically IV Keytruda and PO Lenvima. If that were not successful, we would consider retreatment with Taxol/Carboplatin or single agent Doxil. She completed 6 cycles of immunotherapy before demonstrating progression of disease. We decided upon retreatment with Taxol/Carboplatin. She has completed 6 cycles so far. Her CA-125 has responded and her CT after three cycles demonstrated a response. She presents today to review her CT after completion of 6 cycles. PMH:  Past Medical History:   Diagnosis Date    Abnormal Pap smear     with f/u normal    Anemia     Arthritis     Asthma     Cancer ([facility] [address]     ENDOMETRIAL    Environmental allergies     GERD (gastroesophageal reflux disease)     Goiter     Other unknown and unspecified cause of morbidity or mortality     POSSIBLE ESOPHAGEAL SPASM, ?  OBSTRUCTION DUE TO GOITER    PMB (postmenopausal bleeding)     S/P chemotherapy, time since greater than 12 weeks     LAST CHEMO 10/2014 PER PATIENT    Thyroid disease     goiter     PSH:  Past Surgical History:   Procedure Laterality Date    HX APPENDECTOMY      HX BUNIONECTOMY      HX GYN  3/13/13    TLH/BSO    HX HEENT  4/22/13    TOOTH REMOVED    HX ORTHOPAEDIC  1980'S    BUNIONECTOMY    HX VASCULAR ACCESS      port    AK BREAST SURGERY PROCEDURE UNLISTED  1/12/16    right breast bx     SOC:  Social History     Socioeconomic History    Marital status:      Spouse name: Not on file    Number of children: Not on file    Years of education: Not on file    Highest education level: Not on file   Occupational History    Not on file   Tobacco Use    Smoking status: Never Smoker    Smokeless tobacco: Never Used   Substance and Sexual Activity    Alcohol use: Yes     Alcohol/week: 0.0 standard drinks     Comment: RARE OCC    Drug use: No    Sexual activity: Not on file   Other Topics Concern    Not on file   Social History Narrative    Not on file     Social Determinants of Health     Financial Resource Strain:     Difficulty of Paying Living Expenses:    Food Insecurity:     Worried About Running Out of Food in the Last Year:     Ran Out of Food in the Last Year:    Transportation Needs:     Lack of Transportation (Medical):      Lack of Transportation (Non-Medical):    Physical Activity:     Days of Exercise per Week:     Minutes of Exercise per Session:    Stress:     Feeling of Stress :    Social Connections:     Frequency of Communication with Friends and Family:     Frequency of Social Gatherings with Friends and Family:     Attends Church Services:     Active Member of Clubs or Organizations:     Attends Club or Organization Meetings:     Marital Status:    Intimate Partner Violence:     Fear of Current or Ex-Partner:     Emotionally Abused:     Physically Abused:     Sexually Abused: Family History  Family History   Problem Relation Age of Onset    Cancer Maternal Aunt         breast    Heart Disease Mother     Diabetes Father     Cancer Sister         CERVICAL    Kidney Disease Brother     Diabetes Brother     Heart Attack Other     Arrhythmia Brother     Diabetes Brother     Anesth Problems Neg Hx      Medications:  Current Outpatient Medications on File Prior to Visit   Medication Sig Dispense Refill    [START ON 7/21/2021] rivaroxaban (Xarelto) 20 mg tab tablet Take 1 Tablet by mouth daily for 180 days. Begin AFTER completing your starter pack  Indications: blood clot in a deep vein of the extremities 30 Tablet 5    dexAMETHasone (Decadron) 4 mg tablet Take 2 tablets by mouth with breakfast the day before chemo also take 2 tablets with breakfast for 2 days after chemotherapy 36 Tab 1    lisinopriL (PRINIVIL, ZESTRIL) 10 mg tablet Take 1 Tab by mouth daily. 30 Tab 2    lisinopriL (PRINIVIL, ZESTRIL) 5 mg tablet Take 2 Tabs by mouth daily. 30 Tab 5    lenvatinib (Lenvima) 8 mg/day (4 mg x 2) cap Take 2 Caps by mouth daily. 60 Cap 2    ondansetron (ZOFRAN ODT) 4 mg disintegrating tablet Take 1 Tab by mouth every eight (8) hours as needed for Nausea. Indications: nausea and vomiting caused by cancer drugs 30 Tab 3    lidocaine-prilocaine (EMLA) topical cream Apply small amount over port area and cover with band aid one hour before chemo 30 g 3    guaifenesin (MUCINEX PO) Take  by mouth.  loratadine (Claritin) 10 mg tablet Take 10 mg by mouth.  epinastine 0.05 % drop Apply  to eye.  raNITIdine (ZANTAC) 150 mg tablet ZANTAC TABS      cyclobenzaprine (FLEXERIL) 5 mg tablet take 1 tablet by mouth three times a day if needed  0    valACYclovir (VALTREX) 1 gram tablet Take 1 Tab by mouth three (3) times daily. 21 Tab 3    EPINEPHrine (EPIPEN) 0.3 mg/0.3 mL injection 0.3 mg by IntraMUSCular route once as needed.       ketotifen (ZADITOR) 0.025 % (0.035 %) ophthalmic solution Administer 1 Drop to both eyes every morning.  Cetirizine (ZYRTEC) 10 mg cap Take 10 mg by mouth nightly.  SAL ACID/UREA/PETROLATUM,WHITE (KERASAL FOOT EX) by Apply Externally route daily as needed.  ACCU-CHEK HELDER PLUS TEST STRP strip   0    NITROSTAT 0.4 mg SL tablet       CALCIUM CARBONATE (MARCELLO-SELTZER ANTACID PO) Take  by mouth daily as needed. No current facility-administered medications on file prior to visit. Allergies:   Allergies   Allergen Reactions    Latex Other (comments)     Burns skin    Acetone Shortness of Breath    Amoxicillin Anaphylaxis    Ciprofloxacin Hives    Pcn [Penicillins] Anaphylaxis    Buspar [Buspirone] Unknown (comments)     Has N&V and makes her feel \"weird\"    Azithromycin Hives    Egg Nausea Only    Other Medication Rash     POPPY seeds       ROS:  Negative     OBJECTIVE:    PHYSICAL EXAM  VITAL SIGNS: Visit Vitals  BP (!) 148/86 (BP 1 Location: Right upper arm, BP Patient Position: Sitting)   Pulse 97   Ht 5' 5\" (1.651 m)   Wt 178 lb 3.2 oz (80.8 kg)   BMI 29.65 kg/m²      GENERAL KAIA: WD, WN, WF in NAD   HEENT: WNL   RESPIRATORY: CTA   CARDIOVASC: RRR   GASTROINT: Soft, NT, ND   MUSCULOSKEL: WNL   EXTREMITIES: No edema   PELVIC: Deferred   RECTAL: Deferred   PRIYANKA SURVEY: Negative   NEURO: Grossly intact     Lab Results   Component Value Date/Time    WBC 1.6 (LL) 07/06/2021 10:34 AM    Hemoglobin (POC) 12.9 05/01/2013 09:53 AM    HGB 10.9 (L) 07/06/2021 10:34 AM    Hematocrit (POC) 38 05/01/2013 09:53 AM    HCT 32.4 (L) 07/06/2021 10:34 AM    PLATELET 827 (L) 77/75/1560 10:34 AM    MCV 97 07/06/2021 10:34 AM     Lab Results   Component Value Date/Time    Sodium 141 06/22/2021 09:10 AM    Potassium 3.6 06/22/2021 09:10 AM    Chloride 110 (H) 06/22/2021 09:10 AM    CO2 25 06/22/2021 09:10 AM    Anion gap 6 06/22/2021 09:10 AM    Glucose 187 (H) 06/22/2021 09:10 AM    BUN 20 06/22/2021 09:10 AM    Creatinine 0.51 (L) 06/22/2021 09:10 AM    BUN/Creatinine ratio 39 (H) 06/22/2021 09:10 AM    GFR est AA >60 06/22/2021 09:10 AM    GFR est non-AA >60 06/22/2021 09:10 AM    Calcium 9.0 06/22/2021 09:10 AM    Bilirubin, total 0.4 06/22/2021 09:10 AM    Alk. phosphatase 66 06/22/2021 09:10 AM    Protein, total 6.4 06/22/2021 09:10 AM    Albumin 3.7 06/22/2021 09:10 AM    Globulin 2.7 06/22/2021 09:10 AM    A-G Ratio 1.4 06/22/2021 09:10 AM    ALT (SGPT) 26 06/22/2021 09:10 AM    AST (SGOT) 17 06/22/2021 09:10 AM     Lab Results   Component Value Date/Time    CA-125 11 06/22/2021 09:10 AM    Cancer Ag (CA) 125 98.9 (H) 10/23/2020 10:02 AM         CT of abdomen/pelvis (8/13/20)  LOWER CHEST: The visualized portions of the lung bases are clear. ABDOMEN:  Liver: The liver is normal in size and contour with no focal abnormality. The  attenuation of the liver is decreased throughout. Gallbladder and bile ducts: There is no calcified gallstone or biliary  dilatation. Spleen: No abnormality. Pancreas: No abnormality. Adrenal glands: No abnormality. Kidneys: No abnormality. PELVIS:  Reproductive organs: The uterus and ovaries are absent. Bladder: No abnormality. BOWEL AND MESENTERY: The small bowel is normal.  There is no mesenteric mass or  adenopathy. The appendix is absent. PERITONEUM: There is no ascites or free intraperitoneal air. RETROPERITONEUM: The aorta  tapers without aneurysm. There is no retroperitoneal  adenopathy or mass. There is no pelvic mass or adenopathy. BONES AND SOFT TISSUES: The bones and soft tissues of the abdominal wall are  within normal limits.     IMPRESSION:   1. Status post hysterectomy and bilateral oophorectomy. 2. No evidence of recurrent or metastatic disease within the abdomen or pelvis. 3. Hepatic steatosis. 4. Status post appendectomy.       PET/CT (10/9/20)  HEAD/NECK: No apparent foci of abnormal hypermetabolism.  Cerebral evaluation is  limited by normal intense activity.     CHEST: No foci of abnormal hypermetabolism.     ABDOMEN/PELVIS:   Peritoneal nodules are hypermetabolic, SUV 12. Retroperitoneal adenopathy is hypermetabolic, SUV 6. Right deep pelvic adenopathy is hypermetabolic, SUV 8. Left pelvic cul-de-sac mass is hypermetabolic, SUV 11.     SKELETON: No foci of abnormal hypermetabolism in the axial and visualized  appendicular skeleton.     IMPRESSION:   1. Peritoneal carcinomatosis. 2. 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 3. Left pelvic cul-de-sac tumor. CT of chest/abdomen/pelvis (12/30/20)  CT chest:    There is stable multinodular thyroid enlargement. Left chest Port-A-Cath  terminates in the SVC. The aorta and main pulmonary artery are normal in  caliber. The heart size is normal.  There is no pericardial or pleural effusion.        There are no enlarged axillary, mediastinal, or hilar lymph nodes.      There is no lung mass or airspace opacity. There is no pneumothorax. The  central airways are clear.     CT abdomen and pelvis: The liver, spleen, pancreas, and adrenal glands are normal. The gall bladder is  present  without intra- or extra-hepatic biliary dilatation.       The kidneys are symmetric without hydronephrosis.      There are no dilated bowel loops. The appendix is surgically absent.       Enlarged retroperitoneal lymph nodes are again demonstrated with a  representative left para-aortic lymph node measuring 1.3 x 1.8 cm (series 3,  image 76), previously 1.5 x 1.7 cm on 10/9/2020.  A left common iliac lymph node  measures 1.1 x 1.5 cm (series 3, image 91), previously 0.8 x 1.4 cm.       Anterior peritoneal/mesenteric soft tissue nodularity is overall mildly  increased since 10/9/2020 with a representative measurement of 2.3 x 6.8 cm  (series 3, image 77), previously 2.6 x 5.5 cm.     Peritoneal soft tissue nodularity in the deep left pelvis measures 1.4 x 2.2 cm  (series 3, image 116), previously 2.9 x 3.1 cm.     There is no free fluid or free air. The aorta tapers without aneurysm.     The urinary bladder is normal. The uterus and ovaries are surgically absent.     There is no aggressive bony lesion.     IMPRESSION:   1. Mixed response in the abdomen and pelvis compared to 10/9/2020 with mildly  increased anterior peritoneal carcinomatosis. Stable to slightly increased  retroperitoneal lymphadenopathy. Decreased peritoneal soft tissue nodularity in  the deep left pelvis.     2. No evidence for metastatic disease in the chest.      CT of chest/abdomen/pelvis (2/25/21)  CHEST WALL:No axillary or supraclavicular adenopathy. THYROID: Large hypodensity in the thyroid gland unchanged. MEDIASTINUM: 12 mm cardiophrenic lymph node unchanged. RAMEZ: No mass or lymphadenopathy. THORACIC AORTA: No dissection or aneurysm. MAIN PULMONARY ARTERY: Normal in caliber. TRACHEA/BRONCHI: Patent. ESOPHAGUS: No wall thickening or dilatation. HEART: Coronary atherosclerotic disease  PLEURA: No effusion or pneumothorax. LUNGS: No nodule, mass, or airspace disease. LIVER: No mass or biliary dilatation. GALLBLADDER: Unremarkable. BILIARY TREE: Unremarkable  SPLEEN: Unremarkable  PANCREAS: No mass or ductal dilatation. ADRENALS: Unremarkable. KIDNEYS: No mass, calculus, or hydronephrosis. STOMACH: Unremarkable. SMALL BOWEL: No dilatation or wall thickening. COLON: No dilatation or wall thickening. APPENDIX: Not visualized  PERITONEUM: Peritoneal carcinomatosis is again noted. 6.8 x 3.1 cm peritoneal  mass anteriorly is slightly increased in size. There is adjacent probably  confluent lesion measuring 3.4 x 2.6 cm which is increased in size from the  prior study. Deep left peritoneal nodularity is not significantly changed. Free  fluid in the pelvis increase in interval  RETROPERITONEUM: Left retroperitoneal lymph node measuring 1.5 x 1.4 cm slightly  decreased in size from 0.8 x 1.3 cm.  Right retroperitoneal lymph node measuring  0.8 x 0.9 cm is increase in size of common iliac lymph node now measures 1.4 x  1.2 cm not significantly changed in size. REPRODUCTIVE ORGANS: Hysterectomy  URINARY BLADDER: No mass or calculus. BONES: No destructive bone lesion. ADDITIONAL COMMENTS: N/A     IMPRESSION  1. Overall increase in omental caking along the anterior peritoneum (the prior  study.     2.  Retroperitoneal adenopathy demonstrating variable response to therapy. Some  of these have increased in interval.     3.  Pelvic soft tissue in the left deep pelvis is not significantly changed  although not well visualized.     4.  Slight interval increase in pelvic free fluid      CT of abdomen/pelvis (5/5/21)  LOWER THORAX: Right cardiophrenic lymph node measures 2.9 cm and previously  measured 1.2 cm on image number 15. There is minor basilar atelectasis/scarring  LIVER: No mass. BILIARY TREE: There is suggestion of gallstones CBD is not dilated. SPLEEN: within normal limits. PANCREAS: No mass or ductal dilatation. ADRENALS: Unremarkable. KIDNEYS: No mass, calculus, or hydronephrosis. STOMACH: Unremarkable. SMALL BOWEL: No dilatation or wall thickening. COLON: No dilatation or wall thickening. APPENDIX: Status post appendectomy  PERITONEUM: Peritoneal carcinomatosis appears improved. There is a confluent  density anteriorly in the peritoneum on image number 43 measuring 5.9 x 1.4 cm  which previously measured 9.8 x 1.9 cm. RETROPERITONEUM: Left retroperitoneal lymph node measures 0.7 cm image 45 and  previously measured 1.6 cm. Right retroperitoneal lymph node measures 0.7 cm image 46 and previously  measured 0.8 cm.     Left iliac lymph node image 57 measures 0.9 cm and previously measured 1.1 cm. REPRODUCTIVE ORGANS: Status post hysterectomy and oophorectomy. URINARY BLADDER: No mass or calculus. BONES: No destructive bone lesion. ABDOMINAL WALL: No mass or hernia. ADDITIONAL COMMENTS: N/A     IMPRESSION  1.  There has been a mild decrease in the peritoneal carcinomatosis.      2. There has been slight to mild decrease in the retroperitoneal adenopathy.     3. No ascites is identified. No definite pelvic mass is identified. CT of chests/abdomen/pelvis (7/12/21)  THYROID: Heterogeneous thyroid gland with multiple nodules bilaterally including  in the isthmus. MEDIASTINUM: Left subclavian chest port terminates in the SVC. No mediastinal  adenopathy. RAMEZ: No mass or lymphadenopathy. THORACIC AORTA: No dissection or aneurysm. MAIN PULMONARY ARTERY: Nonocclusive thrombus in segmental branches of the right  upper lobe pulmonary artery (3:47, 52), which are nonocclusive and likely  chronic. TRACHEA/BRONCHI: Patent. ESOPHAGUS: No wall thickening or dilatation. HEART: Normal in size. PLEURA: No effusion or pneumothorax. LUNGS: No nodule, mass, or airspace disease. LIVER: No mass or biliary dilatation. GALLBLADDER: Sludge in a nondistended gallbladder. SPLEEN: No mass. PANCREAS: No mass or ductal dilatation. ADRENALS: Unremarkable. KIDNEYS: No mass, calculus, or hydronephrosis. STOMACH: Unremarkable. SMALL BOWEL: No dilatation or wall thickening. COLON: Scattered diverticula. APPENDIX: Surgically absent  PERITONEUM: Decreased volume and size of the omental nodularity along the  anterior peritoneal surface just above the umbilicus. No ascites. No  pneumoperitoneum. RETROPERITONEUM: No lymphadenopathy or aortic aneurysm. REPRODUCTIVE ORGANS: Surgically absent. URINARY BLADDER: No mass or calculus. BONES: No destructive bone lesion. ADDITIONAL COMMENTS: N/A     IMPRESSION  1. Resolved retroperitoneal lymphadenopathy. 2.  Decreased peritoneal carcinomatosis. 3.  No ascites. 4.  Nonocclusive thrombus and segmental right upper lobe pulmonary artery  branches which are nonocclusive and appear chronic. 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   She completed six cycles of adjuvant Taxol and Carboplatin chemotherapy. She developed recurrent disease and was treated with the regimen of IV Keytruda and PO Lenvima. She completed 6 cycles before progression. Since it had been almost 8 years since her adjuvant Taxol/Carboplatin, I recommended that we retreat her with that regimen, though I suggested a lower dose of each. She has now completed 6 cycles of that regimen. She has been tolerating it well. Her CA-125 has responded. Her CT after 3 cycles demonstrated response to therapy. Her CT after 6 cycles demonstrates a continued response, but she still has some residual disease, mostly in the omentum. I recommend continuing with the current regimen for at least 2 more cycles. We will repeat imaging, possibly a PET/CT after that. An electronic signature was used to sign this note.     Sheilla Ormond, MD  07/15/21

## 2021-07-20 ENCOUNTER — HOSPITAL ENCOUNTER (OUTPATIENT)
Dept: INFUSION THERAPY | Age: 66
Discharge: HOME | End: 2021-07-20
Payer: MEDICARE

## 2021-07-20 VITALS
RESPIRATION RATE: 18 BRPM | DIASTOLIC BLOOD PRESSURE: 73 MMHG | BODY MASS INDEX: 29.59 KG/M2 | TEMPERATURE: 97.8 F | HEART RATE: 72 BPM | WEIGHT: 177.6 LBS | SYSTOLIC BLOOD PRESSURE: 138 MMHG | HEIGHT: 65 IN

## 2021-07-20 DIAGNOSIS — C54.9 MALIGNANT NEOPLASM OF CORPUS UTERI, EXCEPT ISTHMUS (HCC): Primary | ICD-10-CM

## 2021-07-20 LAB
ALBUMIN SERPL-MCNC: 3.6 G/DL (ref 3.5–5)
ALBUMIN/GLOB SERPL: 1.1 {RATIO} (ref 1.1–2.2)
ALP SERPL-CCNC: 64 U/L (ref 45–117)
ALT SERPL-CCNC: 24 U/L (ref 12–78)
ANION GAP SERPL CALC-SCNC: 4 MMOL/L (ref 5–15)
AST SERPL-CCNC: 18 U/L (ref 15–37)
BASOPHILS # BLD: 0 K/UL (ref 0–0.1)
BASOPHILS NFR BLD: 0 % (ref 0–1)
BILIRUB SERPL-MCNC: 0.5 MG/DL (ref 0.2–1)
BUN SERPL-MCNC: 20 MG/DL (ref 6–20)
BUN/CREAT SERPL: 36 (ref 12–20)
CALCIUM SERPL-MCNC: 9.2 MG/DL (ref 8.5–10.1)
CANCER AG125 SERPL-ACNC: 15 U/ML (ref 1.5–35)
CHLORIDE SERPL-SCNC: 109 MMOL/L (ref 97–108)
CO2 SERPL-SCNC: 25 MMOL/L (ref 21–32)
CREAT SERPL-MCNC: 0.55 MG/DL (ref 0.55–1.02)
DIFFERENTIAL METHOD BLD: ABNORMAL
EOSINOPHIL # BLD: 0 K/UL (ref 0–0.4)
EOSINOPHIL NFR BLD: 0 % (ref 0–7)
ERYTHROCYTE [DISTWIDTH] IN BLOOD BY AUTOMATED COUNT: 15.3 % (ref 11.5–14.5)
GLOBULIN SER CALC-MCNC: 3.4 G/DL (ref 2–4)
GLUCOSE SERPL-MCNC: 146 MG/DL (ref 65–100)
HCT VFR BLD AUTO: 34.6 % (ref 35–47)
HGB BLD-MCNC: 11.6 G/DL (ref 11.5–16)
IMM GRANULOCYTES # BLD AUTO: 0.1 K/UL (ref 0–0.04)
IMM GRANULOCYTES NFR BLD AUTO: 1 % (ref 0–0.5)
LYMPHOCYTES # BLD: 1.2 K/UL (ref 0.8–3.5)
LYMPHOCYTES NFR BLD: 17 % (ref 12–49)
MAGNESIUM SERPL-MCNC: 2 MG/DL (ref 1.6–2.4)
MCH RBC QN AUTO: 33.5 PG (ref 26–34)
MCHC RBC AUTO-ENTMCNC: 33.5 G/DL (ref 30–36.5)
MCV RBC AUTO: 100 FL (ref 80–99)
MONOCYTES # BLD: 0.9 K/UL (ref 0–1)
MONOCYTES NFR BLD: 13 % (ref 5–13)
NEUTS SEG # BLD: 4.8 K/UL (ref 1.8–8)
NEUTS SEG NFR BLD: 69 % (ref 32–75)
NRBC # BLD: 0 K/UL (ref 0–0.01)
NRBC BLD-RTO: 0 PER 100 WBC
PLATELET # BLD AUTO: 138 K/UL (ref 150–400)
PMV BLD AUTO: 9.8 FL (ref 8.9–12.9)
POTASSIUM SERPL-SCNC: 3.5 MMOL/L (ref 3.5–5.1)
PROT SERPL-MCNC: 7 G/DL (ref 6.4–8.2)
RBC # BLD AUTO: 3.46 M/UL (ref 3.8–5.2)
SODIUM SERPL-SCNC: 138 MMOL/L (ref 136–145)
WBC # BLD AUTO: 7 K/UL (ref 3.6–11)

## 2021-07-20 PROCEDURE — 74011250636 HC RX REV CODE- 250/636: Performed by: PHYSICIAN ASSISTANT

## 2021-07-20 PROCEDURE — 86304 IMMUNOASSAY TUMOR CA 125: CPT

## 2021-07-20 PROCEDURE — 96375 TX/PRO/DX INJ NEW DRUG ADDON: CPT

## 2021-07-20 PROCEDURE — 83735 ASSAY OF MAGNESIUM: CPT

## 2021-07-20 PROCEDURE — 77030012965 HC NDL HUBR BBMI -A

## 2021-07-20 PROCEDURE — 96367 TX/PROPH/DG ADDL SEQ IV INF: CPT

## 2021-07-20 PROCEDURE — 85025 COMPLETE CBC W/AUTO DIFF WBC: CPT

## 2021-07-20 PROCEDURE — 36415 COLL VENOUS BLD VENIPUNCTURE: CPT

## 2021-07-20 PROCEDURE — 80053 COMPREHEN METABOLIC PANEL: CPT

## 2021-07-20 PROCEDURE — 99214 OFFICE O/P EST MOD 30 MIN: CPT | Performed by: PHYSICIAN ASSISTANT

## 2021-07-20 PROCEDURE — 96415 CHEMO IV INFUSION ADDL HR: CPT

## 2021-07-20 PROCEDURE — 74011000258 HC RX REV CODE- 258: Performed by: PHYSICIAN ASSISTANT

## 2021-07-20 PROCEDURE — 96413 CHEMO IV INFUSION 1 HR: CPT

## 2021-07-20 PROCEDURE — 96417 CHEMO IV INFUS EACH ADDL SEQ: CPT

## 2021-07-20 RX ORDER — SODIUM CHLORIDE 0.9 % (FLUSH) 0.9 %
10 SYRINGE (ML) INJECTION AS NEEDED
Status: DISPENSED | OUTPATIENT
Start: 2021-07-20 | End: 2021-07-20

## 2021-07-20 RX ORDER — PALONOSETRON 0.05 MG/ML
0.25 INJECTION, SOLUTION INTRAVENOUS ONCE
Status: COMPLETED | OUTPATIENT
Start: 2021-07-20 | End: 2021-07-20

## 2021-07-20 RX ORDER — HEPARIN 100 UNIT/ML
300-500 SYRINGE INTRAVENOUS AS NEEDED
Status: ACTIVE | OUTPATIENT
Start: 2021-07-20 | End: 2021-07-20

## 2021-07-20 RX ORDER — DIPHENHYDRAMINE HYDROCHLORIDE 50 MG/ML
50 INJECTION, SOLUTION INTRAMUSCULAR; INTRAVENOUS ONCE
Status: COMPLETED | OUTPATIENT
Start: 2021-07-20 | End: 2021-07-20

## 2021-07-20 RX ORDER — SODIUM CHLORIDE 9 MG/ML
25 INJECTION, SOLUTION INTRAVENOUS CONTINUOUS
Status: DISPENSED | OUTPATIENT
Start: 2021-07-20 | End: 2021-07-20

## 2021-07-20 RX ADMIN — Medication 10 ML: at 10:35

## 2021-07-20 RX ADMIN — FAMOTIDINE 20 MG: 10 INJECTION INTRAVENOUS at 13:40

## 2021-07-20 RX ADMIN — DIPHENHYDRAMINE HYDROCHLORIDE 50 MG: 50 INJECTION INTRAMUSCULAR; INTRAVENOUS at 13:38

## 2021-07-20 RX ADMIN — HEPARIN 500 UNITS: 100 SYRINGE at 17:40

## 2021-07-20 RX ADMIN — Medication 10 ML: at 10:36

## 2021-07-20 RX ADMIN — Medication 10 ML: at 17:40

## 2021-07-20 RX ADMIN — CARBOPLATIN 545 MG: 10 INJECTION, SOLUTION INTRAVENOUS at 17:07

## 2021-07-20 RX ADMIN — SODIUM CHLORIDE 25 ML/HR: 900 INJECTION, SOLUTION INTRAVENOUS at 12:13

## 2021-07-20 RX ADMIN — PACLITAXEL 251 MG: 6 INJECTION, SOLUTION INTRAVENOUS at 13:59

## 2021-07-20 RX ADMIN — PALONOSETRON 0.25 MG: 0.25 INJECTION, SOLUTION INTRAVENOUS at 13:29

## 2021-07-20 RX ADMIN — DEXAMETHASONE SODIUM PHOSPHATE 12 MG: 4 INJECTION, SOLUTION INTRA-ARTICULAR; INTRALESIONAL; INTRAMUSCULAR; INTRAVENOUS; SOFT TISSUE at 12:40

## 2021-07-20 RX ADMIN — SODIUM CHLORIDE 150 MG: 900 INJECTION, SOLUTION INTRAVENOUS at 12:55

## 2021-07-20 NOTE — PROGRESS NOTES
[facility] Chemo Progress Note    57 Ms. Deepali Sierra Arrived to [facility] for  Taxol/Carbo (C7) ambulatory in stable condition. Assessment was completed, no acute issues at this time, no new complaints voiced. Port accessed with positive blood return. Labs drawn and sent for processing. Port flushed and capped. Chemotherapy Flowsheet 7/20/2021   Cycle C7   Date 7/20/2021   Drug / Regimen Taxol/Carbo   Pre Meds given   Notes given     Ms. Kaur Generous vitals were reviewed. Patient Vitals for the past 12 hrs:   Temp Pulse Resp BP   07/20/21 1748  72  138/73   07/20/21 1031 97.8 °F (36.6 °C) 85 18 (!) 140/80     Lab results were obtained and reviewed. Recent Results (from the past 12 hour(s))   CBC WITH AUTOMATED DIFF    Collection Time: 07/20/21 10:37 AM   Result Value Ref Range    WBC 7.0 3.6 - 11.0 K/uL    RBC 3.46 (L) 3.80 - 5.20 M/uL    HGB 11.6 11.5 - 16.0 g/dL    HCT 34.6 (L) 35.0 - 47.0 %    .0 (H) 80.0 - 99.0 FL    MCH 33.5 26.0 - 34.0 PG    MCHC 33.5 30.0 - 36.5 g/dL    RDW 15.3 (H) 11.5 - 14.5 %    PLATELET 827 (L) 712 - 400 K/uL    MPV 9.8 8.9 - 12.9 FL    NRBC 0.0 0  WBC    ABSOLUTE NRBC 0.00 0.00 - 0.01 K/uL    NEUTROPHILS 69 32 - 75 %    LYMPHOCYTES 17 12 - 49 %    MONOCYTES 13 5 - 13 %    EOSINOPHILS 0 0 - 7 %    BASOPHILS 0 0 - 1 %    IMMATURE GRANULOCYTES 1 (H) 0.0 - 0.5 %    ABS. NEUTROPHILS 4.8 1.8 - 8.0 K/UL    ABS. LYMPHOCYTES 1.2 0.8 - 3.5 K/UL    ABS. MONOCYTES 0.9 0.0 - 1.0 K/UL    ABS. EOSINOPHILS 0.0 0.0 - 0.4 K/UL    ABS. BASOPHILS 0.0 0.0 - 0.1 K/UL    ABS. IMM.  GRANS. 0.1 (H) 0.00 - 0.04 K/UL    DF AUTOMATED     METABOLIC PANEL, COMPREHENSIVE    Collection Time: 07/20/21 10:37 AM   Result Value Ref Range    Sodium 138 136 - 145 mmol/L    Potassium 3.5 3.5 - 5.1 mmol/L    Chloride 109 (H) 97 - 108 mmol/L    CO2 25 21 - 32 mmol/L    Anion gap 4 (L) 5 - 15 mmol/L    Glucose 146 (H) 65 - 100 mg/dL    BUN 20 6 - 20 MG/DL    Creatinine 0.55 0.55 - 1.02 MG/DL    BUN/Creatinine ratio 36 (H) 12 - 20      GFR est AA >60 >60 ml/min/1.73m2    GFR est non-AA >60 >60 ml/min/1.73m2    Calcium 9.2 8.5 - 10.1 MG/DL    Bilirubin, total 0.5 0.2 - 1.0 MG/DL    ALT (SGPT) 24 12 - 78 U/L    AST (SGOT) 18 15 - 37 U/L    Alk. phosphatase 64 45 - 117 U/L    Protein, total 7.0 6.4 - 8.2 g/dL    Albumin 3.6 3.5 - 5.0 g/dL    Globulin 3.4 2.0 - 4.0 g/dL    A-G Ratio 1.1 1.1 - 2.2     CANCER ANTIGEN 125    Collection Time: 07/20/21 10:37 AM   Result Value Ref Range    CA-125 15 1.5 - 35.0 U/mL   MAGNESIUM    Collection Time: 07/20/21 10:37 AM   Result Value Ref Range    Magnesium 2.0 1.6 - 2.4 mg/dL     Pre-medications  were administered as ordered and chemotherapy was initiated.   Medications Administered     0.9% sodium chloride infusion     Admin Date  07/20/2021 Action  New Bag Dose  25 mL/hr Rate  25 mL/hr Route  IntraVENous Administered By  Jeremias Thomson RN          CARBOplatin (PARAPLATIN) 545 mg in 0.9% sodium chloride 250 mL, overfill volume 25 mL chemo infusion (GOG)     Admin Date  07/20/2021 Action  New Bag Dose  545 mg Rate  659 mL/hr Route  IntraVENous Administered By  Jeremias Thomson RN          dexamethasone (DECADRON) 12 mg in 0.9% sodium chloride 50 mL, overfill volume 5 mL IVPB     Admin Date  07/20/2021 Action  New Bag Dose  12 mg Rate  232 mL/hr Route  IntraVENous Administered By  Jeremias Thomson RN          diphenhydrAMINE (BENADRYL) injection 50 mg     Admin Date  07/20/2021 Action  Given Dose  50 mg Route  IntraVENous Administered By  Jeremias Thomson, RN          famotidine (PF) (PEPCID) 20 mg in 0.9% sodium chloride 10 mL injection     Admin Date  07/20/2021 Action  Given Dose  20 mg Route  IntraVENous Administered By  Jeremias Thomson RN          fosaprepitant (EMEND) 150 mg in 0.9% sodium chloride 150 mL IVPB     Admin Date  07/20/2021 Action  New Bag Dose  150 mg Rate  450 mL/hr Route  IntraVENous Administered By  Jeremias Thomson, RN          heparin (porcine) pf 300-500 Units     Admin Date  07/20/2021 Action  Given Dose  500 Units Route  InterCATHeter Administered By  Sherrie Hartley RN          PACLitaxeL (TAXOL) 251 mg in 0.9% sodium chloride 250 mL, overfill volume 25 mL chemo infusion     Admin Date  07/20/2021 Action  New Bag Dose  251 mg Rate  105.6 mL/hr Route  IntraVENous Administered By  Sherrie Hartley RN          palonosetron HCl (ALOXI) injection 0.25 mg     Admin Date  07/20/2021 Action  Given Dose  0.25 mg Route  IntraVENous Administered By  Sherrie Hartley RN          sodium chloride (NS) flush 10 mL     Admin Date  07/20/2021 Action  Given Dose  10 mL Route  IntraVENous Administered By  Sherrie Hartley RN           Admin Date  07/20/2021 Action  Given Dose  10 mL Route  IntraVENous Administered By  Sherrie Hartley RN           Admin Date  07/20/2021 Action  Given Dose  10 mL Route  IntraVENous Administered By  Sherrie Hartley RN              [id number] Patient tolerated treatment well. Port maintained positive blood return throughout treatment. Port flushed, heparinized and de accessed per protocol. Patient was discharged from [facility] in stable condition. Patient is aware of next appointment.      Future Appointments   Date Time Provider David Jara   8/5/2021  9:00 AM Mayur Montoya MD [facility] BS AMB   8/10/2021 10:00 AM E1 9 Monique Lepe RN  July 20, 2021

## 2021-07-20 NOTE — PROGRESS NOTES
[address], [address], [address]  P [phone]  F [phone]      Patient ID:  Name:  Bao Chiu  MRN:  [id number]  :  1955/66 y.o. Date:  2021      Brit Yancey MD: Renee Prajapati MD  PCP: Essence Randolph MD     Primary Diagnosis: uterine cancer  Date of Diagnosis: 3/2013      Current Agent: Taxol/carboplatin  Cycle: 7    HPI:  73 y.o. 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 She is now found with recurrence noted on imaging studies pelvic adenopathy and peritoneal carcinomatosis. She most recently progressed on combination Keytruda/Lenvima. She is recommended retreatment with carboplatin/taxol couplet. OncTx History:  3/2013 C Hyst/BSO  FINAL PATHOLOGIC DIAGNOSIS  1.  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  Histologic grade: High grade  Myometrial invasion: Depth of invasion: 0.3 cm  Myometrial thickness: 1.9 cm  Involvement of cervix: Not involved  Extent of involvement of other organs:  Right ovary: Not involved  Left ovary: Not involved  Right fallopian tube: Not involved  Left fallopian tube: Not involved  Right parametrial tissue: Not involved  Left parametrial tissue: Not involved  Lymphovascular invasion: Not identified  Additional pathologic findings:  Focal adenomyosis  Benign simple cyst of left ovary  Paratubal cysts  Pathologic staging (pTNM):  Primary tumor (pT): pT1a  Regional lymph nodes (pN): pN0  Pelvic lymph nodes:  Number examined: 17  Number involved: 0  Distant metastasis (M): Not applicable  2. Omentum, omentectomy:Benign adipose tissue, negative for carcinoma  3. Lymph nodes, right pelvic, excision:Seven lymph nodes, negative for metastatic carcinoma (0/7)  4. Lymph nodes, left pelvic, excision:Ten lymph nodes, negative for metastatic carcinoma (0/10)    MMR proficient   5/2013 - 9/2013 Taxol/carboplatin x 6 cycles   10/9/20 PET/CT:    1. Peritoneal carcinomatosis. 2. 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 3. Left pelvic cul-de-sac tumor   11/3/20 - 2/16/21 Keytruda/lenvima x 6 cycles     Reduced lenvima 8mg d/t HTN   12/30/20 CT CAP:   Mixed response in the abdomen and pelvis compared to 10/9/2020 with mildly increased anterior peritoneal carcinomatosis. Stable to slightly increased retroperitoneal lymphadenopathy. Decreased peritoneal soft tissue nodularity in the deep left pelvis. No evidence for metastatic disease in the chest.   2/25/21 CT CAP:   1. Overall increase in omental caking along the anterior peritoneum (the prior study. 2.  Retroperitoneal adenopathy demonstrating variable response to therapy. Some of these have increased in interval  3. Pelvic soft tissue in the left deep pelvis is not significantly changed although not well visualized. 4.  Slight interval increase in pelvic free fluid   3/9/21 Esmim303cs/m2/Carboplatin AUC5 initiated   5/5/21 CT AP:  There has been a mild decrease in the peritoneal carcinomatosis, There has been slight to mild decrease in the retroperitoneal adenopathy, No ascites is identified. No definite pelvic mass is identified. SUBJECTIVE:  Ludwig Mcclain presents for chemotherapy. Overall doing well, reports fatigue, still working full time with infrequent time off if she is too fatigued to work. Bilateral LE swelling improved.  Notes fatigue with exertion present since Keytruda dosing. No CP/SOB, wheezing, palpitations. Able to ambulate, remains independent, minimal \"neuropathy\" with cold feet. She has a good appetite, has occasional acid reflux using Pepcid and delayed GI function with dulcolax/benefiber/MoM. Remains active at work full time, unable to work from home. No residual effects s/p her therapy in 2013. She lives alone. Does not feel overly close to her children. Her daughter lives next door and brother nearby. Her son lives near [facility]. She feels most supported by her brother Jean Barnes, close friend Mylene and her work family. She still has difficulty feeling comfortable asking family for help. Looking into/questions half-way with SS. Work for her is a positive stressor, has few hobbies and uncertain of purpose w/o work. ROS  Constitutional: no weight loss, fever, night sweats  Respiratory: above  Cardiovascular: above  Heme: No abnormal bleeding, no epistaxis. Gastrointestinal: no abdominal pain, no dysphagia, change in bowel habits, or black or bloody stools  Genito-Urinary: no dysuria, trouble voiding, or hematuria  Musculoskeletal: negative for - gait disturbance or joint swelling  Neurological: negative for - behavioral changes, dizziness, headaches, memory loss or numbness/tingling  Derm: negative  Psych: negative for anxiety and depression       OBJECTIVE:  Physical Exam  Visit Vitals  /73   Pulse 72   Temp 97.8 °F (36.6 °C)   Resp 18   Ht 5' 5\" (1.651 m)   Wt 177 lb 9.6 oz (80.6 kg)   BMI 29.55 kg/m²          General:  alert, cooperative, no distress       HEENT: without pallor, sclera without jaundice, oral mucosa without lesions,      Cardiac:  Regular rate and rhythm        Lungs:  clear to auscultation bilaterally          Port:  clean, dry, no drainage  Abdomen:  soft, protuberant, nondistended, nontender, no gross fluid wave. Lymph:  no lymphadenopathy   Extremity: bilateral LE edema 1+ edema L>R.  No redness or tenderness in the calves or thighs    Wt Readings from Last 3 Encounters:   07/20/21 177 lb 9.6 oz (80.6 kg)   07/15/21 178 lb 3.2 oz (80.8 kg)   06/22/21 177 lb (80.3 kg)       Recent Labs     07/20/21  1037   WBC 7.0   ANEU 4.8   HGB 11.6   HCT 34.6*   .0*   MCH 33.5   *     Recent Labs     07/20/21  1037      K 3.5   *   *   BUN 20   CREA 0.55   CA 9.2   MG 2.0   ALB 3.6   TBILI 0.5   ALT 24         Tumor markers  CA-125   Date Value Ref Range Status   07/20/2021 15 1.5 - 35.0 U/mL Final     Comment:     ** Note new reference range and method **  Results may vary depending on . Method used is [employer] Pepper     06/22/2021 11 1.5 - 35.0 U/mL Final     Comment:     ** Note new reference range and method **  Results may vary depending on . Method used is [employer]     06/01/2021 11 1.5 - 35.0 U/mL Final     Comment:     ** Note new reference range and method **  Results may vary depending on . Method used is [employer] Pepper     05/11/2021 12 1.5 - 35.0 U/mL Final     Comment:     ** Note new reference range and method **  Results may vary depending on . Method used is [employer] Pepper     04/20/2021 17 1.5 - 35.0 U/mL Final     Comment:     ** Note new reference range and method **  Results may vary depending on . Method used is Deal Pepper     03/30/2021 27 1.5 - 35.0 U/mL Final     Comment:     ** Note new reference range and method **  Results may vary depending on .   Method used is Deal Pepper           Patient Active Problem List   Diagnosis Code    Malignant neoplasm of corpus uteri, except isthmus ([facility] [address] C54.9     Past Medical History:   Diagnosis Date    Abnormal Pap smear 1995    with f/u normal    Anemia     Arthritis     Asthma     Cancer ([facility] [address]     ENDOMETRIAL    Environmental allergies     GERD (gastroesophageal reflux disease)  Goiter     Other unknown and unspecified cause of morbidity or mortality     POSSIBLE ESOPHAGEAL SPASM, ? OBSTRUCTION DUE TO GOITER    PMB (postmenopausal bleeding)     S/P chemotherapy, time since greater than 12 weeks     LAST CHEMO 10/2014 PER PATIENT    Thyroid disease     goiter     Prior to Admission medications    Medication Sig Start Date End Date Taking? Authorizing Provider   rivaroxaban (Xarelto) 20 mg tab tablet Take 1 Tablet by mouth daily for 180 days. Begin AFTER completing your starter pack  Indications: blood clot in a deep vein of the extremities 7/21/21 1/17/22  Jayden Cline PA-C   dexAMETHasone (Decadron) 4 mg tablet Take 2 tablets by mouth with breakfast the day before chemo also take 2 tablets with breakfast for 2 days after chemotherapy 3/8/21   Abhi Mclean MD   lisinopriL (PRINIVIL, ZESTRIL) 10 mg tablet Take 1 Tab by mouth daily. 1/6/21   Abhi Mclean MD   lisinopriL (PRINIVIL, ZESTRIL) 5 mg tablet Take 2 Tabs by mouth daily. 1/5/21   Jayden Cline PA-C   lenvatinib (Lenvima) 8 mg/day (4 mg x 2) cap Take 2 Caps by mouth daily. 12/15/20   Abhi Mclean MD   ondansetron (ZOFRAN ODT) 4 mg disintegrating tablet Take 1 Tab by mouth every eight (8) hours as needed for Nausea. Indications: nausea and vomiting caused by cancer drugs 10/22/20   Abhi Mclean MD   lidocaine-prilocaine (EMLA) topical cream Apply small amount over port area and cover with band aid one hour before chemo 10/22/20   Abhi Mclean MD   guaifenesin (MUCINEX PO) Take  by mouth. Provider, Historical   loratadine (Claritin) 10 mg tablet Take 10 mg by mouth. Provider, Historical   epinastine 0.05 % drop Apply  to eye.     Provider, Historical   raNITIdine (ZANTAC) 150 mg tablet ZANTAC TABS 9/29/11   Provider, Historical   cyclobenzaprine (FLEXERIL) 5 mg tablet take 1 tablet by mouth three times a day if needed 12/5/16   Provider, Historical   valACYclovir (VALTREX) 1 gram tablet Take 1 Tab by mouth three (3) times daily. 12/15/16   Rogelio Carney MD   EPINEPHrine (EPIPEN) 0.3 mg/0.3 mL injection 0.3 mg by IntraMUSCular route once as needed. Provider, Historical   ketotifen (ZADITOR) 0.025 % (0.035 %) ophthalmic solution Administer 1 Drop to both eyes every morning. Provider, Historical   Cetirizine (ZYRTEC) 10 mg cap Take 10 mg by mouth nightly. Provider, Historical   SAL ACID/UREA/PETROLATUM,WHITE (KERASAL FOOT EX) by Apply Externally route daily as needed. Provider, Historical   ACCU-CHEK HELDER PLUS TEST STRP strip  7/3/15   Provider, Historical   NITROSTAT 0.4 mg SL tablet  14   Provider, Historical   CALCIUM CARBONATE (MARCELLO-SELTZER ANTACID PO) Take  by mouth daily as needed. Provider, Historical     Allergies   Allergen Reactions    Latex Other (comments)     Burns skin    Acetone Shortness of Breath    Amoxicillin Anaphylaxis    Ciprofloxacin Hives    Pcn [Penicillins] Anaphylaxis    Buspar [Buspirone] Unknown (comments)     Has N&V and makes her feel \"weird\"    Azithromycin Hives    Egg Nausea Only    Other Medication Rash     POPPY seeds     Family History   Problem Relation Age of Onset    Cancer Maternal Aunt         breast    Heart Disease Mother     Diabetes Father     Cancer Sister         CERVICAL    Kidney Disease Brother     Diabetes Brother     Heart Attack Other     Arrhythmia Brother     Diabetes Brother     Anesth Problems Neg Hx    Maternal grandmother \"female\" cancer  in 45s      CT Results (most recent):  Results from Hospital Encounter encounter on 21    CT CHEST W CONT    Narrative  EXAM:  CT CHEST W CONT, CT ABD PELV W CONT    INDICATION: Primary serous adenocarcinoma of endometrium. COMPARISON: 2021    CONTRAST:  100 mL of Isovue-370. TECHNIQUE:  Following the uneventful intravenous administration of contrast, thin axial  images were obtained through the chest, abdomen and pelvis.  Coronal and sagittal  reconstructions were generated. Oral contrast was administered. CT dose  reduction was achieved through use of a standardized protocol tailored for this  examination and automatic exposure control for dose modulation. FINDINGS:    THYROID: Heterogeneous thyroid gland with multiple nodules bilaterally including  in the isthmus. MEDIASTINUM: Left subclavian chest port terminates in the SVC. No mediastinal  adenopathy. RMAEZ: No mass or lymphadenopathy. THORACIC AORTA: No dissection or aneurysm. MAIN PULMONARY ARTERY: Nonocclusive thrombus in segmental branches of the right  upper lobe pulmonary artery (3:47, 52), which are nonocclusive and likely  chronic. TRACHEA/BRONCHI: Patent. ESOPHAGUS: No wall thickening or dilatation. HEART: Normal in size. PLEURA: No effusion or pneumothorax. LUNGS: No nodule, mass, or airspace disease. LIVER: No mass or biliary dilatation. GALLBLADDER: Sludge in a nondistended gallbladder. SPLEEN: No mass. PANCREAS: No mass or ductal dilatation. ADRENALS: Unremarkable. KIDNEYS: No mass, calculus, or hydronephrosis. STOMACH: Unremarkable. SMALL BOWEL: No dilatation or wall thickening. COLON: Scattered diverticula. APPENDIX: Surgically absent  PERITONEUM: Decreased volume and size of the omental nodularity along the  anterior peritoneal surface just above the umbilicus. No ascites. No  pneumoperitoneum. RETROPERITONEUM: No lymphadenopathy or aortic aneurysm. REPRODUCTIVE ORGANS: Surgically absent. URINARY BLADDER: No mass or calculus. BONES: No destructive bone lesion. ADDITIONAL COMMENTS: N/A    Impression  1. Resolved retroperitoneal lymphadenopathy. 2.  Decreased peritoneal carcinomatosis. 3.  No ascites. 4.  Nonocclusive thrombus and segmental right upper lobe pulmonary artery  branches which are nonocclusive and appear chronic.           IMPRESSION/PLAN:  73 y.o. 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 ECO    Labs/chart/imaging reviewed  -Taxol/carboplatin today, cycle 7. Appreciate normalization of her . +tumor response.   -Low/moderate-grade cytopenias. -HTN: Controlled. Holding lisinopril. -KNIGHT: Chronic/stable fatigue d/t chemo, deconditioning. -LE edema: LLE DVT 2021. On Xarelto. No bleeding.   -Hx of thyroid nodular goiter, benign follicular bx.  -Chronic med issues:    Hx asthma - no issues. inhaler PRN   Hx esophageal spasm - uses nitroglycerin  -Reflux: continue Pepcid  -Stool irregularity: continue stool softeners  Psychosocial: In good spirits and feels well supported by her friends/work family. Asks many appropriate questions. She is doing very well at the moment. Feels work is essential for her well-being at this point. Encouraged her again to open dialogue with her family re: her diagnosis and possible future needs expected. Advised to call with questions/concerns. 21 I have spent 35 minutes reviewing previous notes, results and face to face with the patient discussing the diagnosis and treatment plan as outlined above.    Electronically signed,        Teri Maguire PA-C

## 2021-07-30 RX ORDER — DIPHENHYDRAMINE HYDROCHLORIDE 50 MG/ML
50 INJECTION, SOLUTION INTRAMUSCULAR; INTRAVENOUS AS NEEDED
Status: CANCELLED
Start: 2021-08-10

## 2021-07-30 RX ORDER — HYDROCORTISONE SODIUM SUCCINATE 100 MG/2ML
100 INJECTION, POWDER, FOR SOLUTION INTRAMUSCULAR; INTRAVENOUS AS NEEDED
Status: CANCELLED | OUTPATIENT
Start: 2021-08-10

## 2021-07-30 RX ORDER — ALBUTEROL SULFATE 0.83 MG/ML
2.5 SOLUTION RESPIRATORY (INHALATION) AS NEEDED
Status: CANCELLED
Start: 2021-08-10

## 2021-07-30 RX ORDER — HEPARIN 100 UNIT/ML
300-500 SYRINGE INTRAVENOUS AS NEEDED
Status: CANCELLED | OUTPATIENT
Start: 2021-08-10

## 2021-07-30 RX ORDER — DIPHENHYDRAMINE HYDROCHLORIDE 50 MG/ML
25 INJECTION, SOLUTION INTRAMUSCULAR; INTRAVENOUS AS NEEDED
Status: CANCELLED
Start: 2021-08-10

## 2021-07-30 RX ORDER — LORAZEPAM 2 MG/ML
0.5 INJECTION INTRAMUSCULAR
Status: CANCELLED
Start: 2021-08-10

## 2021-07-30 RX ORDER — ONDANSETRON 2 MG/ML
8 INJECTION INTRAMUSCULAR; INTRAVENOUS AS NEEDED
Status: CANCELLED | OUTPATIENT
Start: 2021-08-10

## 2021-07-30 RX ORDER — DIPHENHYDRAMINE HYDROCHLORIDE 50 MG/ML
50 INJECTION, SOLUTION INTRAMUSCULAR; INTRAVENOUS ONCE
Status: CANCELLED | OUTPATIENT
Start: 2021-08-10 | End: 2021-08-10

## 2021-07-30 RX ORDER — EPINEPHRINE 1 MG/ML
0.3 INJECTION, SOLUTION, CONCENTRATE INTRAVENOUS AS NEEDED
Status: CANCELLED | OUTPATIENT
Start: 2021-08-10

## 2021-07-30 RX ORDER — SODIUM CHLORIDE 9 MG/ML
25 INJECTION, SOLUTION INTRAVENOUS CONTINUOUS
Status: CANCELLED | OUTPATIENT
Start: 2021-08-10

## 2021-07-30 RX ORDER — SODIUM CHLORIDE 0.9 % (FLUSH) 0.9 %
10 SYRINGE (ML) INJECTION AS NEEDED
Status: CANCELLED | OUTPATIENT
Start: 2021-08-10

## 2021-07-30 RX ORDER — PALONOSETRON 0.05 MG/ML
0.25 INJECTION, SOLUTION INTRAVENOUS ONCE
Status: CANCELLED | OUTPATIENT
Start: 2021-08-10 | End: 2021-08-10

## 2021-07-30 RX ORDER — ACETAMINOPHEN 325 MG/1
650 TABLET ORAL AS NEEDED
Status: CANCELLED
Start: 2021-08-10

## 2021-07-30 RX ORDER — SODIUM CHLORIDE 9 MG/ML
10 INJECTION INTRAMUSCULAR; INTRAVENOUS; SUBCUTANEOUS AS NEEDED
Status: CANCELLED | OUTPATIENT
Start: 2021-08-10

## 2021-08-04 NOTE — PROGRESS NOTES
[facility] GYNECOLOGIC ONCOLOGY  [address] [facility], [address], [address] [facility]  [phone] Critical access hospital [phone]    Office Visit    Patient ID:  Name: Coni Miguel  MRN: [id number]   : 1955/66 y.o. Visit date: 2021    INTERVAL HISTORY:  Coni Miguel is a  female who is an established patient with stage IA, grade 3 uterine carcinoma. She underwent laparoscopic hysterectomy with staging in 2013. She was recommended six cycles of adjuvant Taxol and Carboplatin, which she completed. We elected not to treat with pelvic radiation therapy due to her difficulty with chemotherapy. She had a CT of the abdomen/pelvis at [facility] that revealed retroperitoneal lymphadenopathy, peritoneal carcinomatosis, and trace ascites. I sent her for a PET/CT to better evaluate the extent of disease. She presented to review the results and discuss treatment. Her disease is not amenable to surgical resection. Systemic therapy was her only viable option. I thought immunotherapy would be the best choice, ahead of additional chemotherapy, specifically IV Keytruda and PO Lenvima. If that were not successful, we would consider retreatment with Taxol/Carboplatin or single agent Doxil. She completed 6 cycles of immunotherapy before demonstrating progression of disease. We decided upon retreatment with Taxol/Carboplatin. She has completed 7 cycles so far. Her CA-125 has responded and her CT after three cycles demonstrated a response. Her CT after completion of 6 cycles demonstrated further response. PMH:  Past Medical History:   Diagnosis Date    Abnormal Pap smear     with f/u normal    Anemia     Arthritis     Asthma     Cancer ([facility] [address]     ENDOMETRIAL    Environmental allergies     GERD (gastroesophageal reflux disease)     Goiter     Other unknown and unspecified cause of morbidity or mortality     POSSIBLE ESOPHAGEAL SPASM, ?  OBSTRUCTION DUE TO GOITER    PMB (postmenopausal bleeding)     S/P chemotherapy, time since greater than 12 weeks     LAST CHEMO 10/2014 PER PATIENT    Thyroid disease     goiter     PSH:  Past Surgical History:   Procedure Laterality Date    HX APPENDECTOMY      HX BUNIONECTOMY      HX GYN  3/13/13    TLH/BSO    HX HEENT  4/22/13    TOOTH REMOVED    HX ORTHOPAEDIC  1980'S    BUNIONECTOMY    HX VASCULAR ACCESS      port    IN BREAST SURGERY PROCEDURE UNLISTED  1/12/16    right breast bx     SOC:  Social History     Socioeconomic History    Marital status:      Spouse name: Not on file    Number of children: Not on file    Years of education: Not on file    Highest education level: Not on file   Occupational History    Not on file   Tobacco Use    Smoking status: Never Smoker    Smokeless tobacco: Never Used   Substance and Sexual Activity    Alcohol use: Yes     Alcohol/week: 0.0 standard drinks     Comment: RARE OCC    Drug use: No    Sexual activity: Not on file   Other Topics Concern    Not on file   Social History Narrative    Not on file     Social Determinants of Health     Financial Resource Strain:     Difficulty of Paying Living Expenses:    Food Insecurity:     Worried About Running Out of Food in the Last Year:     Ran Out of Food in the Last Year:    Transportation Needs:     Lack of Transportation (Medical):      Lack of Transportation (Non-Medical):    Physical Activity:     Days of Exercise per Week:     Minutes of Exercise per Session:    Stress:     Feeling of Stress :    Social Connections:     Frequency of Communication with Friends and Family:     Frequency of Social Gatherings with Friends and Family:     Attends Mu-ism Services:     Active Member of Clubs or Organizations:     Attends Club or Organization Meetings:     Marital Status:    Intimate Partner Violence:     Fear of Current or Ex-Partner:     Emotionally Abused:     Physically Abused:     Sexually Abused: Family History  Family History   Problem Relation Age of Onset    Cancer Maternal Aunt         breast    Heart Disease Mother     Diabetes Father     Cancer Sister         CERVICAL    Kidney Disease Brother     Diabetes Brother     Heart Attack Other     Arrhythmia Brother     Diabetes Brother     Anesth Problems Neg Hx      Medications:  Current Outpatient Medications on File Prior to Visit   Medication Sig Dispense Refill    rivaroxaban (Xarelto) 20 mg tab tablet Take 1 Tablet by mouth daily for 180 days. Begin AFTER completing your starter pack  Indications: blood clot in a deep vein of the extremities 30 Tablet 5    dexAMETHasone (Decadron) 4 mg tablet Take 2 tablets by mouth with breakfast the day before chemo also take 2 tablets with breakfast for 2 days after chemotherapy 36 Tab 1    lisinopriL (PRINIVIL, ZESTRIL) 10 mg tablet Take 1 Tab by mouth daily. 30 Tab 2    lisinopriL (PRINIVIL, ZESTRIL) 5 mg tablet Take 2 Tabs by mouth daily. 30 Tab 5    lenvatinib (Lenvima) 8 mg/day (4 mg x 2) cap Take 2 Caps by mouth daily. 60 Cap 2    ondansetron (ZOFRAN ODT) 4 mg disintegrating tablet Take 1 Tab by mouth every eight (8) hours as needed for Nausea. Indications: nausea and vomiting caused by cancer drugs 30 Tab 3    lidocaine-prilocaine (EMLA) topical cream Apply small amount over port area and cover with band aid one hour before chemo 30 g 3    guaifenesin (MUCINEX PO) Take  by mouth.  loratadine (Claritin) 10 mg tablet Take 10 mg by mouth.  epinastine 0.05 % drop Apply  to eye.  raNITIdine (ZANTAC) 150 mg tablet ZANTAC TABS      cyclobenzaprine (FLEXERIL) 5 mg tablet take 1 tablet by mouth three times a day if needed  0    valACYclovir (VALTREX) 1 gram tablet Take 1 Tab by mouth three (3) times daily. 21 Tab 3    EPINEPHrine (EPIPEN) 0.3 mg/0.3 mL injection 0.3 mg by IntraMUSCular route once as needed.       ketotifen (ZADITOR) 0.025 % (0.035 %) ophthalmic solution Administer 1 Drop to both eyes every morning.  Cetirizine (ZYRTEC) 10 mg cap Take 10 mg by mouth nightly.  SAL ACID/UREA/PETROLATUM,WHITE (KERASAL FOOT EX) by Apply Externally route daily as needed.  ACCU-CHEK HELDER PLUS TEST STRP strip   0    NITROSTAT 0.4 mg SL tablet       CALCIUM CARBONATE (MARCELLO-SELTZER ANTACID PO) Take  by mouth daily as needed. No current facility-administered medications on file prior to visit. Allergies: Allergies   Allergen Reactions    Latex Other (comments)     Burns skin    Acetone Shortness of Breath    Amoxicillin Anaphylaxis    Ciprofloxacin Hives    Pcn [Penicillins] Anaphylaxis    Buspar [Buspirone] Unknown (comments)     Has N&V and makes her feel \"weird\"    Azithromycin Hives    Egg Nausea Only    Other Medication Rash     POPPY seeds       ROS:  Negative     OBJECTIVE:    PHYSICAL EXAM  VITAL SIGNS: There were no vitals taken for this visit. GENERAL KAIA:    HEENT:    RESPIRATORY:    CARDIOVASC:    GASTROINT:    MUSCULOSKEL:    EXTREMITIES:    PELVIC:    RECTAL:    PRIYANKA SURVEY:    NEURO:      Lab Results   Component Value Date/Time    WBC 7.0 07/20/2021 10:37 AM    Hemoglobin (POC) 12.9 05/01/2013 09:53 AM    HGB 11.6 07/20/2021 10:37 AM    Hematocrit (POC) 38 05/01/2013 09:53 AM    HCT 34.6 (L) 07/20/2021 10:37 AM    PLATELET 189 (L) 08/86/2190 10:37 AM    .0 (H) 07/20/2021 10:37 AM     Lab Results   Component Value Date/Time    Sodium 138 07/20/2021 10:37 AM    Potassium 3.5 07/20/2021 10:37 AM    Chloride 109 (H) 07/20/2021 10:37 AM    CO2 25 07/20/2021 10:37 AM    Anion gap 4 (L) 07/20/2021 10:37 AM    Glucose 146 (H) 07/20/2021 10:37 AM    BUN 20 07/20/2021 10:37 AM    Creatinine 0.55 07/20/2021 10:37 AM    BUN/Creatinine ratio 36 (H) 07/20/2021 10:37 AM    GFR est AA >60 07/20/2021 10:37 AM    GFR est non-AA >60 07/20/2021 10:37 AM    Calcium 9.2 07/20/2021 10:37 AM    Bilirubin, total 0.5 07/20/2021 10:37 AM    Alk.  phosphatase 64 07/20/2021 10:37 AM    Protein, total 7.0 07/20/2021 10:37 AM    Albumin 3.6 07/20/2021 10:37 AM    Globulin 3.4 07/20/2021 10:37 AM    A-G Ratio 1.1 07/20/2021 10:37 AM    ALT (SGPT) 24 07/20/2021 10:37 AM    AST (SGOT) 18 07/20/2021 10:37 AM     Lab Results   Component Value Date/Time    CA-125 15 07/20/2021 10:37 AM    Cancer Ag (CA) 125 98.9 (H) 10/23/2020 10:02 AM         CT of abdomen/pelvis (8/13/20)  LOWER CHEST: The visualized portions of the lung bases are clear. ABDOMEN:  Liver: The liver is normal in size and contour with no focal abnormality. The  attenuation of the liver is decreased throughout. Gallbladder and bile ducts: There is no calcified gallstone or biliary  dilatation. Spleen: No abnormality. Pancreas: No abnormality. Adrenal glands: No abnormality. Kidneys: No abnormality. PELVIS:  Reproductive organs: The uterus and ovaries are absent. Bladder: No abnormality. BOWEL AND MESENTERY: The small bowel is normal.  There is no mesenteric mass or  adenopathy. The appendix is absent. PERITONEUM: There is no ascites or free intraperitoneal air. RETROPERITONEUM: The aorta  tapers without aneurysm. There is no retroperitoneal  adenopathy or mass. There is no pelvic mass or adenopathy. BONES AND SOFT TISSUES: The bones and soft tissues of the abdominal wall are  within normal limits.     IMPRESSION:   1. Status post hysterectomy and bilateral oophorectomy. 2. No evidence of recurrent or metastatic disease within the abdomen or pelvis. 3. Hepatic steatosis. 4. Status post appendectomy.       PET/CT (10/9/20)  HEAD/NECK: No apparent foci of abnormal hypermetabolism. Cerebral evaluation is  limited by normal intense activity.     CHEST: No foci of abnormal hypermetabolism.     ABDOMEN/PELVIS:   Peritoneal nodules are hypermetabolic, SUV 12. Retroperitoneal adenopathy is hypermetabolic, SUV 6. Right deep pelvic adenopathy is hypermetabolic, SUV 8.   Left pelvic cul-de-sac mass is hypermetabolic, SUV 11.     SKELETON: No foci of abnormal hypermetabolism in the axial and visualized  appendicular skeleton.     IMPRESSION:   1. Peritoneal carcinomatosis. 2. 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 3. Left pelvic cul-de-sac tumor. CT of chest/abdomen/pelvis (12/30/20)  CT chest:    There is stable multinodular thyroid enlargement. Left chest Port-A-Cath  terminates in the SVC. The aorta and main pulmonary artery are normal in  caliber. The heart size is normal.  There is no pericardial or pleural effusion.        There are no enlarged axillary, mediastinal, or hilar lymph nodes.      There is no lung mass or airspace opacity. There is no pneumothorax. The  central airways are clear.     CT abdomen and pelvis: The liver, spleen, pancreas, and adrenal glands are normal. The gall bladder is  present  without intra- or extra-hepatic biliary dilatation.       The kidneys are symmetric without hydronephrosis.      There are no dilated bowel loops. The appendix is surgically absent.       Enlarged retroperitoneal lymph nodes are again demonstrated with a  representative left para-aortic lymph node measuring 1.3 x 1.8 cm (series 3,  image 76), previously 1.5 x 1.7 cm on 10/9/2020. A left common iliac lymph node  measures 1.1 x 1.5 cm (series 3, image 91), previously 0.8 x 1.4 cm.       Anterior peritoneal/mesenteric soft tissue nodularity is overall mildly  increased since 10/9/2020 with a representative measurement of 2.3 x 6.8 cm  (series 3, image 77), previously 2.6 x 5.5 cm.     Peritoneal soft tissue nodularity in the deep left pelvis measures 1.4 x 2.2 cm  (series 3, image 116), previously 2.9 x 3.1 cm.     There is no free fluid or free air. The aorta tapers without aneurysm.     The urinary bladder is normal. The uterus and ovaries are surgically absent.     There is no aggressive bony lesion.     IMPRESSION:   1.  Mixed response in the abdomen and pelvis compared to 10/9/2020 with mildly  increased anterior peritoneal carcinomatosis. Stable to slightly increased  retroperitoneal lymphadenopathy. Decreased peritoneal soft tissue nodularity in  the deep left pelvis.     2. No evidence for metastatic disease in the chest.      CT of chest/abdomen/pelvis (2/25/21)  CHEST WALL:No axillary or supraclavicular adenopathy. THYROID: Large hypodensity in the thyroid gland unchanged. MEDIASTINUM: 12 mm cardiophrenic lymph node unchanged. RAMEZ: No mass or lymphadenopathy. THORACIC AORTA: No dissection or aneurysm. MAIN PULMONARY ARTERY: Normal in caliber. TRACHEA/BRONCHI: Patent. ESOPHAGUS: No wall thickening or dilatation. HEART: Coronary atherosclerotic disease  PLEURA: No effusion or pneumothorax. LUNGS: No nodule, mass, or airspace disease. LIVER: No mass or biliary dilatation. GALLBLADDER: Unremarkable. BILIARY TREE: Unremarkable  SPLEEN: Unremarkable  PANCREAS: No mass or ductal dilatation. ADRENALS: Unremarkable. KIDNEYS: No mass, calculus, or hydronephrosis. STOMACH: Unremarkable. SMALL BOWEL: No dilatation or wall thickening. COLON: No dilatation or wall thickening. APPENDIX: Not visualized  PERITONEUM: Peritoneal carcinomatosis is again noted. 6.8 x 3.1 cm peritoneal  mass anteriorly is slightly increased in size. There is adjacent probably  confluent lesion measuring 3.4 x 2.6 cm which is increased in size from the  prior study. Deep left peritoneal nodularity is not significantly changed. Free  fluid in the pelvis increase in interval  RETROPERITONEUM: Left retroperitoneal lymph node measuring 1.5 x 1.4 cm slightly  decreased in size from 0.8 x 1.3 cm. Right retroperitoneal lymph node measuring  0.8 x 0.9 cm is increase in size of common iliac lymph node now measures 1.4 x  1.2 cm not significantly changed in size. REPRODUCTIVE ORGANS: Hysterectomy  URINARY BLADDER: No mass or calculus. BONES: No destructive bone lesion.   ADDITIONAL COMMENTS: N/A     IMPRESSION  1. Overall increase in omental caking along the anterior peritoneum (the prior  study.     2.  Retroperitoneal adenopathy demonstrating variable response to therapy. Some  of these have increased in interval.     3.  Pelvic soft tissue in the left deep pelvis is not significantly changed  although not well visualized.     4.  Slight interval increase in pelvic free fluid      CT of abdomen/pelvis (5/5/21)  LOWER THORAX: Right cardiophrenic lymph node measures 2.9 cm and previously  measured 1.2 cm on image number 15. There is minor basilar atelectasis/scarring  LIVER: No mass. BILIARY TREE: There is suggestion of gallstones CBD is not dilated. SPLEEN: within normal limits. PANCREAS: No mass or ductal dilatation. ADRENALS: Unremarkable. KIDNEYS: No mass, calculus, or hydronephrosis. STOMACH: Unremarkable. SMALL BOWEL: No dilatation or wall thickening. COLON: No dilatation or wall thickening. APPENDIX: Status post appendectomy  PERITONEUM: Peritoneal carcinomatosis appears improved. There is a confluent  density anteriorly in the peritoneum on image number 43 measuring 5.9 x 1.4 cm  which previously measured 9.8 x 1.9 cm. RETROPERITONEUM: Left retroperitoneal lymph node measures 0.7 cm image 45 and  previously measured 1.6 cm. Right retroperitoneal lymph node measures 0.7 cm image 46 and previously  measured 0.8 cm.     Left iliac lymph node image 57 measures 0.9 cm and previously measured 1.1 cm. REPRODUCTIVE ORGANS: Status post hysterectomy and oophorectomy. URINARY BLADDER: No mass or calculus. BONES: No destructive bone lesion. ABDOMINAL WALL: No mass or hernia. ADDITIONAL COMMENTS: N/A     IMPRESSION  1. There has been a mild decrease in the peritoneal carcinomatosis.      2. There has been slight to mild decrease in the retroperitoneal adenopathy.     3. No ascites is identified. No definite pelvic mass is identified.       CT of chests/abdomen/pelvis (7/12/21)  THYROID: Heterogeneous thyroid gland with multiple nodules bilaterally including  in the isthmus. MEDIASTINUM: Left subclavian chest port terminates in the SVC. No mediastinal  adenopathy. RAMEZ: No mass or lymphadenopathy. THORACIC AORTA: No dissection or aneurysm. MAIN PULMONARY ARTERY: Nonocclusive thrombus in segmental branches of the right  upper lobe pulmonary artery (3:47, 52), which are nonocclusive and likely  chronic. TRACHEA/BRONCHI: Patent. ESOPHAGUS: No wall thickening or dilatation. HEART: Normal in size. PLEURA: No effusion or pneumothorax. LUNGS: No nodule, mass, or airspace disease. LIVER: No mass or biliary dilatation. GALLBLADDER: Sludge in a nondistended gallbladder. SPLEEN: No mass. PANCREAS: No mass or ductal dilatation. ADRENALS: Unremarkable. KIDNEYS: No mass, calculus, or hydronephrosis. STOMACH: Unremarkable. SMALL BOWEL: No dilatation or wall thickening. COLON: Scattered diverticula. APPENDIX: Surgically absent  PERITONEUM: Decreased volume and size of the omental nodularity along the  anterior peritoneal surface just above the umbilicus. No ascites. No  pneumoperitoneum. RETROPERITONEUM: No lymphadenopathy or aortic aneurysm. REPRODUCTIVE ORGANS: Surgically absent. URINARY BLADDER: No mass or calculus. BONES: No destructive bone lesion. ADDITIONAL COMMENTS: N/A     IMPRESSION  1. Resolved retroperitoneal lymphadenopathy. 2.  Decreased peritoneal carcinomatosis. 3.  No ascites. 4.  Nonocclusive thrombus and segmental right upper lobe pulmonary artery  branches which are nonocclusive and appear chronic. 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 She completed six cycles of adjuvant Taxol and Carboplatin chemotherapy. She developed recurrent disease and was treated with the regimen of IV Keytruda and PO Lenvima. She completed 6 cycles before progression.   Since it had been almost 8 years since her adjuvant Taxol/Carboplatin, I recommended that we retreat her with that regimen, though I suggested a lower dose of each. She has now completed 7 cycles of that regimen. She has been tolerating it well. Her CA-125 has responded. Her CT after 3 cycles demonstrated response to therapy. Her CT after 6 cycles demonstrated a continued response, but she still had some residual disease, mostly in the omentum. I recommend continuing with the current regimen for at least 2 more cycles. We will repeat imaging, possibly a PET/CT after that. Fabiana Machado is a 73 y.o. female, evaluated via audio-only technology on 8/5/2021 for No chief complaint on file. Assessment & Plan:   Diagnoses and all orders for this visit:    1. Primary serous adenocarcinoma of endometrium ([facility] [address]  -     PET/CT TUMOR IMAGE SKULL THIGH (SUB); Future          Subjective:       Prior to Admission medications    Medication Sig Start Date End Date Taking? Authorizing Provider   rivaroxaban (Xarelto) 20 mg tab tablet Take 1 Tablet by mouth daily for 180 days. Begin AFTER completing your starter pack  Indications: blood clot in a deep vein of the extremities 7/21/21 1/17/22  Seferino Cline PA-C   dexAMETHasone (Decadron) 4 mg tablet Take 2 tablets by mouth with breakfast the day before chemo also take 2 tablets with breakfast for 2 days after chemotherapy 3/8/21   David Green MD   lisinopriL (PRINIVIL, ZESTRIL) 10 mg tablet Take 1 Tab by mouth daily. 1/6/21   David Green MD   lisinopriL (PRINIVIL, ZESTRIL) 5 mg tablet Take 2 Tabs by mouth daily. 1/5/21   Seferino Cline PA-C   lenvatinib (Lenvima) 8 mg/day (4 mg x 2) cap Take 2 Caps by mouth daily. 12/15/20   David Green MD   ondansetron (ZOFRAN ODT) 4 mg disintegrating tablet Take 1 Tab by mouth every eight (8) hours as needed for Nausea.  Indications: nausea and vomiting caused by cancer drugs 10/22/20   David Green MD   lidocaine-prilocaine (EMLA) topical cream Apply small amount over port area and cover with band aid one hour before chemo 10/22/20   Lavell Hernandez MD   guaifenesin (MUCINEX PO) Take  by mouth. Provider, Historical   loratadine (Claritin) 10 mg tablet Take 10 mg by mouth. Provider, Historical   epinastine 0.05 % drop Apply  to eye. Provider, Historical   raNITIdine (ZANTAC) 150 mg tablet ZANTAC TABS 9/29/11   Provider, Historical   cyclobenzaprine (FLEXERIL) 5 mg tablet take 1 tablet by mouth three times a day if needed 12/5/16   Provider, Historical   valACYclovir (VALTREX) 1 gram tablet Take 1 Tab by mouth three (3) times daily. 12/15/16   Lavell Hernandez MD   EPINEPHrine (EPIPEN) 0.3 mg/0.3 mL injection 0.3 mg by IntraMUSCular route once as needed. Provider, Historical   ketotifen (ZADITOR) 0.025 % (0.035 %) ophthalmic solution Administer 1 Drop to both eyes every morning. Provider, Historical   Cetirizine (ZYRTEC) 10 mg cap Take 10 mg by mouth nightly. Provider, Historical   SAL ACID/UREA/PETROLATUM,WHITE (KERASAL FOOT EX) by Apply Externally route daily as needed. Provider, Historical   ACCU-CHEK HELDER PLUS TEST STRP strip  7/3/15   Provider, Historical   NITROSTAT 0.4 mg SL tablet  9/22/14   Provider, Historical   CALCIUM CARBONATE (MARCELLO-SELTZER ANTACID PO) Take  by mouth daily as needed. Provider, Historical     Patient Active Problem List   Diagnosis Code    Malignant neoplasm of corpus uteri, except isthmus ([facility] [address] C54.9     Patient Active Problem List    Diagnosis Date Noted    Malignant neoplasm of corpus uteri, except isthmus ([facility] [address] 02/28/2013     Current Outpatient Medications   Medication Sig Dispense Refill    rivaroxaban (Xarelto) 20 mg tab tablet Take 1 Tablet by mouth daily for 180 days.  Begin AFTER completing your starter pack  Indications: blood clot in a deep vein of the extremities 30 Tablet 5    dexAMETHasone (Decadron) 4 mg tablet Take 2 tablets by mouth with breakfast the day before chemo also take 2 tablets with breakfast for 2 days after chemotherapy 36 Tab 1    lisinopriL (PRINIVIL, ZESTRIL) 10 mg tablet Take 1 Tab by mouth daily. 30 Tab 2    lisinopriL (PRINIVIL, ZESTRIL) 5 mg tablet Take 2 Tabs by mouth daily. 30 Tab 5    lenvatinib (Lenvima) 8 mg/day (4 mg x 2) cap Take 2 Caps by mouth daily. 60 Cap 2    ondansetron (ZOFRAN ODT) 4 mg disintegrating tablet Take 1 Tab by mouth every eight (8) hours as needed for Nausea. Indications: nausea and vomiting caused by cancer drugs 30 Tab 3    lidocaine-prilocaine (EMLA) topical cream Apply small amount over port area and cover with band aid one hour before chemo 30 g 3    guaifenesin (MUCINEX PO) Take  by mouth.  loratadine (Claritin) 10 mg tablet Take 10 mg by mouth.  epinastine 0.05 % drop Apply  to eye.  raNITIdine (ZANTAC) 150 mg tablet ZANTAC TABS      cyclobenzaprine (FLEXERIL) 5 mg tablet take 1 tablet by mouth three times a day if needed  0    valACYclovir (VALTREX) 1 gram tablet Take 1 Tab by mouth three (3) times daily. 21 Tab 3    EPINEPHrine (EPIPEN) 0.3 mg/0.3 mL injection 0.3 mg by IntraMUSCular route once as needed.  ketotifen (ZADITOR) 0.025 % (0.035 %) ophthalmic solution Administer 1 Drop to both eyes every morning.  Cetirizine (ZYRTEC) 10 mg cap Take 10 mg by mouth nightly.  SAL ACID/UREA/PETROLATUM,WHITE (KERASAL FOOT EX) by Apply Externally route daily as needed.  ACCU-CHEK HELDER PLUS TEST STRP strip   0    NITROSTAT 0.4 mg SL tablet       CALCIUM CARBONATE (MARCELLO-SELTZER ANTACID PO) Take  by mouth daily as needed.        Allergies   Allergen Reactions    Latex Other (comments)     Burns skin    Acetone Shortness of Breath    Amoxicillin Anaphylaxis    Ciprofloxacin Hives    Pcn [Penicillins] Anaphylaxis    Buspar [Buspirone] Unknown (comments)     Has N&V and makes her feel \"weird\"    Azithromycin Hives    Egg Nausea Only    Other Medication Rash     POPPY seeds     Past Medical History:   Diagnosis Date    Abnormal Pap smear 1995    with f/u normal    Anemia     Arthritis     Asthma     Cancer ([facility] [address]     ENDOMETRIAL    Environmental allergies     GERD (gastroesophageal reflux disease)     Goiter     Other unknown and unspecified cause of morbidity or mortality     POSSIBLE ESOPHAGEAL SPASM, ? OBSTRUCTION DUE TO GOITER    PMB (postmenopausal bleeding)     S/P chemotherapy, time since greater than 12 weeks     LAST CHEMO 10/2014 PER PATIENT    Thyroid disease     goiter     Past Surgical History:   Procedure Laterality Date    HX APPENDECTOMY      HX BUNIONECTOMY      HX GYN  3/13/13    TLH/BSO    HX HEENT  4/22/13    TOOTH REMOVED    HX ORTHOPAEDIC  1980'S    BUNIONECTOMY    HX VASCULAR ACCESS      port    IN BREAST SURGERY PROCEDURE UNLISTED  1/12/16    right breast bx     Family History   Problem Relation Age of Onset    Cancer Maternal Aunt         breast    Heart Disease Mother     Diabetes Father     Cancer Sister         CERVICAL    Kidney Disease Brother     Diabetes Brother     Heart Attack Other     Arrhythmia Brother     Diabetes Brother     Anesth Problems Neg Hx      Social History     Tobacco Use    Smoking status: Never Smoker    Smokeless tobacco: Never Used   Substance Use Topics    Alcohol use: Yes     Alcohol/week: 0.0 standard drinks     Comment: RARE OCC       ROS    Patient-Reported Vitals 1/19/2021   Patient-Reported Systolic  360   Patient-Reported Diastolic 96        Ney Isbell, who was evaluated through a patient-initiated, synchronous (real-time) audio only encounter, and/or her healthcare decision maker, is aware that it is a billable service, with coverage as determined by her insurance carrier. She provided verbal consent to proceed: Yes. She has not had a related appointment within my department in the past 7 days or scheduled within the next 24 hours.       Total Time: minutes: 11-20 minutes        An electronic signature was used to sign this note.     Calvin Lopez MD  08/05/21

## 2021-08-05 ENCOUNTER — VIRTUAL VISIT (OUTPATIENT)
Dept: GYNECOLOGY | Age: 66
End: 2021-08-05
Payer: MEDICARE

## 2021-08-05 DIAGNOSIS — C54.1 PRIMARY SEROUS ADENOCARCINOMA OF ENDOMETRIUM (HCC): Primary | ICD-10-CM

## 2021-08-05 PROCEDURE — 99442 PR PHYS/QHP TELEPHONE EVALUATION 11-20 MIN: CPT | Performed by: OBSTETRICS & GYNECOLOGY

## 2021-08-09 ENCOUNTER — TELEPHONE (OUTPATIENT)
Dept: GYNECOLOGY | Age: 66
End: 2021-08-09

## 2021-08-09 NOTE — TELEPHONE ENCOUNTER
Pt calling to inform Dr. Edilma Early she fell on Saturday ( tripped on her own feet). Went to [facility], had x-ray of right shoulder, without fracture, just a sprain. [facility] advised f/u with [address] [place] [address].

## 2021-08-10 ENCOUNTER — HOSPITAL ENCOUNTER (OUTPATIENT)
Dept: INFUSION THERAPY | Age: 66
Discharge: HOME | End: 2021-08-10
Payer: MEDICARE

## 2021-08-10 ENCOUNTER — TELEPHONE (OUTPATIENT)
Dept: GYNECOLOGY | Age: 66
End: 2021-08-10

## 2021-08-10 VITALS
RESPIRATION RATE: 18 BRPM | DIASTOLIC BLOOD PRESSURE: 75 MMHG | BODY MASS INDEX: 29.85 KG/M2 | HEART RATE: 75 BPM | HEIGHT: 65 IN | TEMPERATURE: 97.3 F | SYSTOLIC BLOOD PRESSURE: 135 MMHG | OXYGEN SATURATION: 99 % | WEIGHT: 179.2 LBS

## 2021-08-10 DIAGNOSIS — C54.9 MALIGNANT NEOPLASM OF CORPUS UTERI, EXCEPT ISTHMUS (HCC): Primary | ICD-10-CM

## 2021-08-10 LAB
ALBUMIN SERPL-MCNC: 3.4 G/DL (ref 3.5–5)
ALBUMIN/GLOB SERPL: 1.1 {RATIO} (ref 1.1–2.2)
ALP SERPL-CCNC: 58 U/L (ref 45–117)
ALT SERPL-CCNC: 29 U/L (ref 12–78)
ANION GAP SERPL CALC-SCNC: 8 MMOL/L (ref 5–15)
AST SERPL-CCNC: 16 U/L (ref 15–37)
BASOPHILS # BLD: 0 K/UL (ref 0–0.1)
BASOPHILS NFR BLD: 0 % (ref 0–1)
BILIRUB SERPL-MCNC: 0.4 MG/DL (ref 0.2–1)
BUN SERPL-MCNC: 22 MG/DL (ref 6–20)
BUN/CREAT SERPL: 56 (ref 12–20)
CALCIUM SERPL-MCNC: 8.4 MG/DL (ref 8.5–10.1)
CHLORIDE SERPL-SCNC: 106 MMOL/L (ref 97–108)
CO2 SERPL-SCNC: 25 MMOL/L (ref 21–32)
CREAT SERPL-MCNC: 0.39 MG/DL (ref 0.55–1.02)
DIFFERENTIAL METHOD BLD: ABNORMAL
EOSINOPHIL # BLD: 0 K/UL (ref 0–0.4)
EOSINOPHIL NFR BLD: 0 % (ref 0–7)
ERYTHROCYTE [DISTWIDTH] IN BLOOD BY AUTOMATED COUNT: 15.1 % (ref 11.5–14.5)
EST. AVERAGE GLUCOSE BLD GHB EST-MCNC: 171 MG/DL
GLOBULIN SER CALC-MCNC: 3.1 G/DL (ref 2–4)
GLUCOSE SERPL-MCNC: 168 MG/DL (ref 65–100)
HBA1C MFR BLD: 7.6 % (ref 4–5.6)
HCT VFR BLD AUTO: 32.4 % (ref 35–47)
HGB BLD-MCNC: 10.9 G/DL (ref 11.5–16)
IMM GRANULOCYTES # BLD AUTO: 0.1 K/UL (ref 0–0.04)
IMM GRANULOCYTES NFR BLD AUTO: 1 % (ref 0–0.5)
LYMPHOCYTES # BLD: 1.1 K/UL (ref 0.8–3.5)
LYMPHOCYTES NFR BLD: 19 % (ref 12–49)
MAGNESIUM SERPL-MCNC: 1.8 MG/DL (ref 1.6–2.4)
MCH RBC QN AUTO: 33.3 PG (ref 26–34)
MCHC RBC AUTO-ENTMCNC: 33.6 G/DL (ref 30–36.5)
MCV RBC AUTO: 99.1 FL (ref 80–99)
MONOCYTES # BLD: 0.9 K/UL (ref 0–1)
MONOCYTES NFR BLD: 15 % (ref 5–13)
NEUTS SEG # BLD: 3.7 K/UL (ref 1.8–8)
NEUTS SEG NFR BLD: 65 % (ref 32–75)
NRBC # BLD: 0 K/UL (ref 0–0.01)
NRBC BLD-RTO: 0 PER 100 WBC
PLATELET # BLD AUTO: 104 K/UL (ref 150–400)
PMV BLD AUTO: 9.8 FL (ref 8.9–12.9)
POTASSIUM SERPL-SCNC: 3.3 MMOL/L (ref 3.5–5.1)
PROT SERPL-MCNC: 6.5 G/DL (ref 6.4–8.2)
RBC # BLD AUTO: 3.27 M/UL (ref 3.8–5.2)
SODIUM SERPL-SCNC: 139 MMOL/L (ref 136–145)
WBC # BLD AUTO: 5.8 K/UL (ref 3.6–11)

## 2021-08-10 PROCEDURE — 99215 OFFICE O/P EST HI 40 MIN: CPT | Performed by: PHYSICIAN ASSISTANT

## 2021-08-10 PROCEDURE — 77030012965 HC NDL HUBR BBMI -A

## 2021-08-10 PROCEDURE — 74011000258 HC RX REV CODE- 258: Performed by: PHYSICIAN ASSISTANT

## 2021-08-10 PROCEDURE — 74011000250 HC RX REV CODE- 250: Performed by: PHYSICIAN ASSISTANT

## 2021-08-10 PROCEDURE — 83036 HEMOGLOBIN GLYCOSYLATED A1C: CPT

## 2021-08-10 PROCEDURE — 96415 CHEMO IV INFUSION ADDL HR: CPT

## 2021-08-10 PROCEDURE — 96375 TX/PRO/DX INJ NEW DRUG ADDON: CPT

## 2021-08-10 PROCEDURE — 96417 CHEMO IV INFUS EACH ADDL SEQ: CPT

## 2021-08-10 PROCEDURE — 80053 COMPREHEN METABOLIC PANEL: CPT

## 2021-08-10 PROCEDURE — 96413 CHEMO IV INFUSION 1 HR: CPT

## 2021-08-10 PROCEDURE — 74011250636 HC RX REV CODE- 250/636: Performed by: PHYSICIAN ASSISTANT

## 2021-08-10 PROCEDURE — 36415 COLL VENOUS BLD VENIPUNCTURE: CPT

## 2021-08-10 PROCEDURE — 96366 THER/PROPH/DIAG IV INF ADDON: CPT

## 2021-08-10 PROCEDURE — 86304 IMMUNOASSAY TUMOR CA 125: CPT

## 2021-08-10 PROCEDURE — 85025 COMPLETE CBC W/AUTO DIFF WBC: CPT

## 2021-08-10 PROCEDURE — 83735 ASSAY OF MAGNESIUM: CPT

## 2021-08-10 RX ORDER — HEPARIN 100 UNIT/ML
300-500 SYRINGE INTRAVENOUS AS NEEDED
Status: DISCONTINUED | OUTPATIENT
Start: 2021-08-10 | End: 2021-08-11 | Stop reason: HOSPADM

## 2021-08-10 RX ORDER — SODIUM CHLORIDE 9 MG/ML
25 INJECTION, SOLUTION INTRAVENOUS CONTINUOUS
Status: DISCONTINUED | OUTPATIENT
Start: 2021-08-10 | End: 2021-08-11 | Stop reason: HOSPADM

## 2021-08-10 RX ORDER — SODIUM CHLORIDE 0.9 % (FLUSH) 0.9 %
10 SYRINGE (ML) INJECTION AS NEEDED
Status: DISCONTINUED | OUTPATIENT
Start: 2021-08-10 | End: 2021-08-11 | Stop reason: HOSPADM

## 2021-08-10 RX ORDER — LORAZEPAM 2 MG/ML
0.5 INJECTION INTRAMUSCULAR
Status: DISCONTINUED | OUTPATIENT
Start: 2021-08-10 | End: 2021-08-11 | Stop reason: HOSPADM

## 2021-08-10 RX ORDER — SODIUM CHLORIDE 9 MG/ML
10 INJECTION INTRAMUSCULAR; INTRAVENOUS; SUBCUTANEOUS AS NEEDED
Status: DISCONTINUED | OUTPATIENT
Start: 2021-08-10 | End: 2021-08-11 | Stop reason: HOSPADM

## 2021-08-10 RX ORDER — POTASSIUM CHLORIDE 20 MEQ/1
20 TABLET, EXTENDED RELEASE ORAL DAILY
Qty: 7 TABLET | Refills: 0 | Status: SHIPPED | OUTPATIENT
Start: 2021-08-10 | End: 2021-08-17

## 2021-08-10 RX ORDER — PALONOSETRON 0.05 MG/ML
0.25 INJECTION, SOLUTION INTRAVENOUS ONCE
Status: COMPLETED | OUTPATIENT
Start: 2021-08-10 | End: 2021-08-10

## 2021-08-10 RX ORDER — DIPHENHYDRAMINE HYDROCHLORIDE 50 MG/ML
50 INJECTION, SOLUTION INTRAMUSCULAR; INTRAVENOUS ONCE
Status: COMPLETED | OUTPATIENT
Start: 2021-08-10 | End: 2021-08-10

## 2021-08-10 RX ADMIN — Medication 10 ML: at 19:23

## 2021-08-10 RX ADMIN — FOSAPREPITANT 150 MG: 150 INJECTION, POWDER, LYOPHILIZED, FOR SOLUTION INTRAVENOUS at 14:18

## 2021-08-10 RX ADMIN — CARBOPLATIN 547 MG: 10 INJECTION, SOLUTION INTRAVENOUS at 18:50

## 2021-08-10 RX ADMIN — DEXAMETHASONE SODIUM PHOSPHATE 12 MG: 4 INJECTION, SOLUTION INTRA-ARTICULAR; INTRALESIONAL; INTRAMUSCULAR; INTRAVENOUS; SOFT TISSUE at 14:20

## 2021-08-10 RX ADMIN — SODIUM CHLORIDE 25 ML/HR: 900 INJECTION, SOLUTION INTRAVENOUS at 14:19

## 2021-08-10 RX ADMIN — PALONOSETRON 0.25 MG: 0.25 INJECTION, SOLUTION INTRAVENOUS at 14:19

## 2021-08-10 RX ADMIN — FAMOTIDINE 20 MG: 10 INJECTION INTRAVENOUS at 14:19

## 2021-08-10 RX ADMIN — HEPARIN 500 UNITS: 100 SYRINGE at 19:23

## 2021-08-10 RX ADMIN — DIPHENHYDRAMINE HYDROCHLORIDE 50 MG: 50 INJECTION INTRAMUSCULAR; INTRAVENOUS at 15:10

## 2021-08-10 RX ADMIN — PACLITAXEL 251 MG: 6 INJECTION, SOLUTION INTRAVENOUS at 15:49

## 2021-08-10 NOTE — PROGRESS NOTES
[facility] Progress Note    Date: August 10, 2021    Name: Fremont Lanes    MRN: [id number]         : 1955    Ms. Marko Payne Arrived ambulatory and in no distress for C8D1 of Taxol/Carbo Regimen. Assessment was completed, no acute issues at this time, no new complaints voiced. Left chest wall port accessed with difficulty, labs drawn & sent for processing. Chemotherapy Flowsheet 8/10/2021   Cycle C8   Date 8/10/2021   Drug / Regimen Taxol/Carbo   Pre Meds given   Notes given           Ms. Sandra Fajardo vitals were reviewed. Visit Vitals  /74   Pulse 78   Temp 97.3 °F (36.3 °C)   Resp 18   Ht 5' 5\" (1.651 m)   Wt 81.3 kg (179 lb 3.2 oz)   SpO2 99%   BMI 29.82 kg/m²       Lab results were obtained and reviewed. No results found for this or any previous visit (from the past 12 hour(s)). Medications:    1700 TRANSFER - OUT REPORT:    Verbal report given to [facility]) on Fremont Lanes    Report consisted of patients Situation, Background, Assessment and   Recommendations(SBAR). Information from the following report(s) SBAR and MAR was reviewed with the receiving nurse. Lines:   Venous Access Device Port Upper chest (subclavicular area), left (Active)   Central Line Being Utilized Yes 08/10/21 1251   Criteria for Appropriate Use Irritant/vesicant medication 08/10/21 1251   Site Assessment Clean, dry, & intact 08/10/21 1251   Date of Last Dressing Change 08/10/21 08/10/21 1251   Dressing Status Clean, dry, & intact; New 08/10/21 1251   Dressing Type Transparent 08/10/21 1251   Action Taken Blood drawn 21 1031   Date Accessed (Medial Site) 08/10/21 08/10/21 1251   Access Time (Medial Site) 1212 08/10/21 1251   Access Needle Size (Site #1) 20 G 08/10/21 1251   Access Needle Length (Medial Site) 1 inch 08/10/21 1251   Positive Blood Return (Medial Site) Yes 08/10/21 1251   Action Taken (Medial Site) Flushed; De-accessed 21 1748   Alcohol Cap Used Yes 08/10/21 1251 Opportunity for questions and clarification was provided.                      Roe Cohen RN  August 10, 2021

## 2021-08-10 NOTE — PROGRESS NOTES
[facility] Chemo Progress Note    Date: August 10, 2021    Name: Antonio Martinez    MRN: [id number]         : 1955    1700 Ms. Adler Plane care assumed from Eliazar Napoles RN. Patient currently infusing with Taxol in left chest port. Patient resting comfortably in chair. Chemotherapy Flowsheet 8/10/2021   Cycle C8   Date 8/10/2021   Drug / Regimen Taxol/Carbo   Pre Meds given   Notes given         Patient denies SOB, fever, cough, general not feeling well. Patient denies recent exposure to someone who has tested positive for COVID-19. Patient denies having contact with anyone who has a pending COVID test.      Ms. Naga Noble vitals were reviewed. Patient Vitals for the past 12 hrs:   Temp Pulse Resp BP SpO2   08/10/21 1923  75  135/75    08/10/21 1009 97.3 °F (36.3 °C) 78 18 122/74 99 %         Lab results were obtained and reviewed. Recent Results (from the past 12 hour(s))   CBC WITH AUTOMATED DIFF    Collection Time: 08/10/21 10:11 AM   Result Value Ref Range    WBC 5.8 3.6 - 11.0 K/uL    RBC 3.27 (L) 3.80 - 5.20 M/uL    HGB 10.9 (L) 11.5 - 16.0 g/dL    HCT 32.4 (L) 35.0 - 47.0 %    MCV 99.1 (H) 80.0 - 99.0 FL    MCH 33.3 26.0 - 34.0 PG    MCHC 33.6 30.0 - 36.5 g/dL    RDW 15.1 (H) 11.5 - 14.5 %    PLATELET 744 (L) 316 - 400 K/uL    MPV 9.8 8.9 - 12.9 FL    NRBC 0.0 0  WBC    ABSOLUTE NRBC 0.00 0.00 - 0.01 K/uL    NEUTROPHILS 65 32 - 75 %    LYMPHOCYTES 19 12 - 49 %    MONOCYTES 15 (H) 5 - 13 %    EOSINOPHILS 0 0 - 7 %    BASOPHILS 0 0 - 1 %    IMMATURE GRANULOCYTES 1 (H) 0.0 - 0.5 %    ABS. NEUTROPHILS 3.7 1.8 - 8.0 K/UL    ABS. LYMPHOCYTES 1.1 0.8 - 3.5 K/UL    ABS. MONOCYTES 0.9 0.0 - 1.0 K/UL    ABS. EOSINOPHILS 0.0 0.0 - 0.4 K/UL    ABS. BASOPHILS 0.0 0.0 - 0.1 K/UL    ABS. IMM.  GRANS. 0.1 (H) 0.00 - 0.04 K/UL    DF AUTOMATED     METABOLIC PANEL, COMPREHENSIVE    Collection Time: 08/10/21 10:11 AM   Result Value Ref Range    Sodium 139 136 - 145 mmol/L    Potassium 3.3 (L) 3.5 - 5.1 mmol/L    Chloride 106 97 - 108 mmol/L    CO2 25 21 - 32 mmol/L    Anion gap 8 5 - 15 mmol/L    Glucose 168 (H) 65 - 100 mg/dL    BUN 22 (H) 6 - 20 MG/DL    Creatinine 0.39 (L) 0.55 - 1.02 MG/DL    BUN/Creatinine ratio 56 (H) 12 - 20      GFR est AA >60 >60 ml/min/1.73m2    GFR est non-AA >60 >60 ml/min/1.73m2    Calcium 8.4 (L) 8.5 - 10.1 MG/DL    Bilirubin, total 0.4 0.2 - 1.0 MG/DL    ALT (SGPT) 29 12 - 78 U/L    AST (SGOT) 16 15 - 37 U/L    Alk. phosphatase 58 45 - 117 U/L    Protein, total 6.5 6.4 - 8.2 g/dL    Albumin 3.4 (L) 3.5 - 5.0 g/dL    Globulin 3.1 2.0 - 4.0 g/dL    A-G Ratio 1.1 1.1 - 2.2     MAGNESIUM    Collection Time: 08/10/21 10:11 AM   Result Value Ref Range    Magnesium 1.8 1.6 - 2.4 mg/dL   HEMOGLOBIN A1C WITH EAG    Collection Time: 08/10/21 10:11 AM   Result Value Ref Range    Hemoglobin A1c 7.6 (H) 4.0 - 5.6 %    Est. average glucose 171 mg/dL       Pre-medications  were administered as ordered and chemotherapy was initiated.   Medications Administered     0.9% sodium chloride infusion     Admin Date  08/10/2021 Action  New Bag Dose  25 mL/hr Rate  25 mL/hr Route  IntraVENous Administered By  Dino Oneil RN          CARBOplatin (PARAPLATIN) 547 mg in 0.9% sodium chloride 250 mL, overfill volume 25 mL chemo infusion (GOG)     Admin Date  08/10/2021 Action  New Bag Dose  547 mg Rate  659.4 mL/hr Route  IntraVENous Administered By  Nishi Carrasco RN          dexamethasone (DECADRON) 12 mg in 0.9% sodium chloride 50 mL, overfill volume 5 mL IVPB     Admin Date  08/10/2021 Action  New Bag Dose  12 mg Rate  232 mL/hr Route  IntraVENous Administered By  Dino Oneil RN          diphenhydrAMINE (BENADRYL) injection 50 mg     Admin Date  08/10/2021 Action  Given Dose  50 mg Route  IntraVENous Administered By  Dino Oneil RN          famotidine (PF) (PEPCID) 20 mg in 0.9% sodium chloride 10 mL injection     Admin Date  08/10/2021 Action  Given Dose  20 mg Route  IntraVENous Administered By  Gil Hsu RN          fosaprepitant (EMEND) 150 mg in 0.9% sodium chloride 150 mL IVPB     Admin Date  08/10/2021 Action  New Bag Dose  150 mg Rate  450 mL/hr Route  IntraVENous Administered By  Gil Hsu RN          heparin (porcine) pf 300-500 Units     Admin Date  08/10/2021 Action  Given Dose  500 Units Route  InterCATHeter Administered By  Sofia Mitchell RN          PACLitaxeL (TAXOL) 251 mg in 0.9% sodium chloride 250 mL, overfill volume 25 mL chemo infusion     Admin Date  08/10/2021 Action  New Bag Dose  251 mg Rate  105.6 mL/hr Route  IntraVENous Administered By  Gil Hsu RN          palonosetron HCl (ALOXI) injection 0.25 mg     Admin Date  08/10/2021 Action  Given Dose  0.25 mg Route  IntraVENous Administered By  Gil Hsu RN          sodium chloride (NS) flush 10 mL     Admin Date  08/10/2021 Action  Given Dose  10 mL Route  IntraVENous Administered By  Sofia Mitchell RN                  [id number] Patient tolerated treatment well. Port maintained positive blood return throughout treatment. Port flushed, heparinized and de accessed per protocol. Patient was discharged from [facility] in stable condition.      Future Appointments   Date Time Provider David Jara   8/27/2021 12:00 PM [facility] RCR PET DOSE 1 JUDCHPET JIMENES HERNANDEZ   8/27/2021  1:00 PM [facility] RCR PET 1 MARÍA Zhang RN  August 10, 2021

## 2021-08-10 NOTE — PROGRESS NOTES
[address] [employer] [address] [facility]  P [phone]  F [phone]      Patient ID:  Name:  Coni Miguel  MRN:  [id number]  :  1955/66 y.o. Date:  8/10/2021      Shabana Chapa MD: Yasmin Mills MD  PCP: Judie Epps MD     Primary Diagnosis: uterine cancer  Date of Diagnosis: 3/2013      Current Agent: Taxol/carboplatin  Cycle: 8    HPI:  73 y.o. 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 She was found with recurrence following 7 years DFI. Noted on imaging studies pelvic adenopathy and peritoneal carcinomatosis. She most recently progressed on combination Keytruda/Lenvima. She is now recommended retreatment with carboplatin/taxol couplet. OncTx History:  3/2013 [facility] Hyst/BSO  FINAL PATHOLOGIC DIAGNOSIS  1.  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  Histologic grade: High grade  Myometrial invasion: Depth of invasion: 0.3 cm  Myometrial thickness: 1.9 cm  Involvement of cervix: Not involved  Extent of involvement of other organs:  Right ovary: Not involved  Left ovary: Not involved  Right fallopian tube: Not involved  Left fallopian tube: Not involved  Right parametrial tissue: Not involved  Left parametrial tissue: Not involved  Lymphovascular invasion: Not identified  Additional pathologic findings:  Focal adenomyosis  Benign simple cyst of left ovary  Paratubal cysts  Pathologic staging (pTNM):  Primary tumor (pT): pT1a  Regional lymph nodes (pN): pN0  Pelvic lymph nodes:  Number examined: 17  Number involved: 0  Distant metastasis (M): Not applicable  2. Omentum, omentectomy:Benign adipose tissue, negative for carcinoma  3. Lymph nodes, right pelvic, excision:Seven lymph nodes, negative for metastatic carcinoma (0/7)  4. Lymph nodes, left pelvic, excision:Ten lymph nodes, negative for metastatic carcinoma (0/10)    MMR proficient   5/2013 - 9/2013 Taxol/carboplatin x 6 cycles   10/9/20 PET/CT:    1. Peritoneal carcinomatosis. 2. 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 3. Left pelvic cul-de-sac tumor   11/3/20 - 2/16/21 Keytruda/lenvima x 6 cycles     Reduced lenvima 8mg d/t HTN   12/30/20 CT CAP:   Mixed response in the abdomen and pelvis compared to 10/9/2020 with mildly increased anterior peritoneal carcinomatosis. Stable to slightly increased retroperitoneal lymphadenopathy. Decreased peritoneal soft tissue nodularity in the deep left pelvis. No evidence for metastatic disease in the chest.   2/25/21 CT CAP:   1. Overall increase in omental caking along the anterior peritoneum (the prior study. 2.  Retroperitoneal adenopathy demonstrating variable response to therapy. Some of these have increased in interval  3. Pelvic soft tissue in the left deep pelvis is not significantly changed although not well visualized. 4.  Slight interval increase in pelvic free fluid   3/9/21 [id number]/Carboplatin AUC5 initiated   5/5/21 CT AP:  There has been a mild decrease in the peritoneal carcinomatosis, There has been slight to mild decrease in the retroperitoneal adenopathy, No ascites is identified. No definite pelvic mass is identified. 7/12/21 CT CAP:   1. Resolved retroperitoneal lymphadenopathy. 2.  Decreased peritoneal carcinomatosis. 3.  No ascites.   4.  Nonocclusive thrombus and segmental right upper lobe pulmonary artery branches which are nonocclusive and appear chronic. SUBJECTIVE:  Sandra Vega presents for chemotherapy. Overall doing well. Recent fall while working outside, sprained her shoulder. Tripped on a fence post.  No bruising/bleeding. She is working full time with infrequent time off if she is too fatigued to work. Bilateral LE swelling resolved. No CP/SOB, wheezing, palpitations. Able to ambulate, remains independent, minimal \"neuropathy\" with cold feet. She has a good appetite, has occasional acid reflux using Pepcid and delayed GI function with dulcolax/benefiber/MoM. Remains active at work full time, unable to work from home. No residual effects s/p her therapy in 2013. She lives alone. Does not feel overly close to her children. Her daughter lives next door and brother nearby. Her son lives near [place]. She feels most supported by her brother Mainor Unger, close friend Mylene and her work family. She still has difficulty feeling comfortable asking family for help. Looking into/questions correction with SS. Work for her is a positive stressor, has few hobbies and uncertain of purpose w/o work. ROS  Constitutional: no weight loss, fever, night sweats  Respiratory: above  Cardiovascular: above  Heme: No abnormal bleeding, no epistaxis.   Gastrointestinal: no abdominal pain, no dysphagia, change in bowel habits, or black or bloody stools  Genito-Urinary: no dysuria, trouble voiding, or hematuria  Musculoskeletal: negative for - gait disturbance or joint swelling  Neurological: negative for - behavioral changes, dizziness, headaches, memory loss or numbness/tingling  Derm: negative  Psych: negative for anxiety and depression       OBJECTIVE:  Physical Exam  Visit Vitals  /74   Pulse 78   Temp 97.3 °F (36.3 °C)   Resp 18   Ht 5' 5\" (1.651 m)   Wt 179 lb 3.2 oz (81.3 kg)   SpO2 99%   BMI 29.82 kg/m²          General:  alert, cooperative, no distress       HEENT: without pallor, sclera without jaundice, oral mucosa without lesions,      Cardiac:  Regular rate and rhythm        Lungs:  clear to auscultation bilaterally          Port:  clean, dry, no drainage  Abdomen:  soft, protuberant, nondistended, nontender, no gross fluid wave. Lymph:  no lymphadenopathy   Extremity: no edema    Wt Readings from Last 3 Encounters:   08/10/21 179 lb 3.2 oz (81.3 kg)   07/20/21 177 lb 9.6 oz (80.6 kg)   07/15/21 178 lb 3.2 oz (80.8 kg)       Recent Labs     08/10/21  1011   WBC 5.8   ANEU 3.7   HGB 10.9*   HCT 32.4*   MCV 99.1*   MCH 33.3   *     Recent Labs     08/10/21  1011      K 3.3*      *   BUN 22*   CREA 0.39*   CA 8.4*   MG 1.8   ALB 3.4*   TBILI 0.4   ALT 29         Tumor markers  CA-125   Date Value Ref Range Status   07/20/2021 15 1.5 - 35.0 U/mL Final     Comment:     ** Note new reference range and method **  Results may vary depending on . Method used is [employer]     06/22/2021 11 1.5 - 35.0 U/mL Final     Comment:     ** Note new reference range and method **  Results may vary depending on . Method used is [employer]     06/01/2021 11 1.5 - 35.0 U/mL Final     Comment:     ** Note new reference range and method **  Results may vary depending on . Method used is [employer]     05/11/2021 12 1.5 - 35.0 U/mL Final     Comment:     ** Note new reference range and method **  Results may vary depending on . Method used is [employer]     04/20/2021 17 1.5 - 35.0 U/mL Final     Comment:     ** Note new reference range and method **  Results may vary depending on . Method used is [employer]     03/30/2021 27 1.5 - 35.0 U/mL Final     Comment:     ** Note new reference range and method **  Results may vary depending on .   Method used is [employer]           Patient Active Problem List   Diagnosis Code    Malignant neoplasm of corpus uteri, except isthmus (HCC) C54.9     Past Medical History:   Diagnosis Date    Abnormal Pap smear 1995    with f/u normal    Anemia     Arthritis     Asthma     Cancer ([facility] [address]     ENDOMETRIAL    Environmental allergies     GERD (gastroesophageal reflux disease)     Goiter     Other unknown and unspecified cause of morbidity or mortality     POSSIBLE ESOPHAGEAL SPASM, ? OBSTRUCTION DUE TO GOITER    PMB (postmenopausal bleeding)     S/P chemotherapy, time since greater than 12 weeks     LAST CHEMO 10/2014 PER PATIENT    Thyroid disease     goiter     Prior to Admission medications    Medication Sig Start Date End Date Taking? Authorizing Provider   rivaroxaban (Xarelto) 20 mg tab tablet Take 1 Tablet by mouth daily for 180 days. Begin AFTER completing your starter pack  Indications: blood clot in a deep vein of the extremities 7/21/21 1/17/22  Kassi Cline PA-C   dexAMETHasone (Decadron) 4 mg tablet Take 2 tablets by mouth with breakfast the day before chemo also take 2 tablets with breakfast for 2 days after chemotherapy 3/8/21   Monie Gannon MD   lisinopriL (PRINIVIL, ZESTRIL) 10 mg tablet Take 1 Tab by mouth daily. 1/6/21   Monie Gannon MD   lisinopriL (PRINIVIL, ZESTRIL) 5 mg tablet Take 2 Tabs by mouth daily. 1/5/21   Kassi Cline PA-C   lenvatinib (Lenvima) 8 mg/day (4 mg x 2) cap Take 2 Caps by mouth daily. 12/15/20   Monie Gannon MD   ondansetron (ZOFRAN ODT) 4 mg disintegrating tablet Take 1 Tab by mouth every eight (8) hours as needed for Nausea. Indications: nausea and vomiting caused by cancer drugs 10/22/20   Monie Gannon MD   lidocaine-prilocaine (EMLA) topical cream Apply small amount over port area and cover with band aid one hour before chemo 10/22/20   Monie Gannon MD   guaifenesin (MUCINEX PO) Take  by mouth. Provider, Historical   loratadine (Claritin) 10 mg tablet Take 10 mg by mouth. Provider, Historical   epinastine 0.05 % drop Apply  to eye.     Provider, Historical   raNITIdine (ZANTAC) 150 mg tablet ZANTAC TABS 11   Provider, Historical   cyclobenzaprine (FLEXERIL) 5 mg tablet take 1 tablet by mouth three times a day if needed 16   Provider, Historical   valACYclovir (VALTREX) 1 gram tablet Take 1 Tab by mouth three (3) times daily. 12/15/16   Monie Gannon MD   EPINEPHrine (EPIPEN) 0.3 mg/0.3 mL injection 0.3 mg by IntraMUSCular route once as needed. Provider, Historical   ketotifen (ZADITOR) 0.025 % (0.035 %) ophthalmic solution Administer 1 Drop to both eyes every morning. Provider, Historical   Cetirizine (ZYRTEC) 10 mg cap Take 10 mg by mouth nightly. Provider, Historical   SAL ACID/UREA/PETROLATUM,WHITE (KERASAL FOOT EX) by Apply Externally route daily as needed. Provider, Historical   ACCU-CHEK HELDER PLUS TEST STRP strip  7/3/15   Provider, Historical   NITROSTAT 0.4 mg SL tablet  14   Provider, Historical   CALCIUM CARBONATE (MARCELLO-SELTZER ANTACID PO) Take  by mouth daily as needed.     Provider, Historical     Allergies   Allergen Reactions    Latex Other (comments)     Burns skin    Acetone Shortness of Breath    Amoxicillin Anaphylaxis    Ciprofloxacin Hives    Pcn [Penicillins] Anaphylaxis    Buspar [Buspirone] Unknown (comments)     Has N&V and makes her feel \"weird\"    Azithromycin Hives    Egg Nausea Only    Other Medication Rash     POPPY seeds     Family History   Problem Relation Age of Onset    Cancer Maternal Aunt         breast    Heart Disease Mother     Diabetes Father     Cancer Sister         CERVICAL    Kidney Disease Brother     Diabetes Brother     Heart Attack Other     Arrhythmia Brother     Diabetes Brother     Anesth Problems Neg Hx    Maternal grandmother \"female\" cancer  in 45s      CT Results (most recent):  Results from Hospital Encounter encounter on 21    CT CHEST W CONT    Narrative  EXAM:  CT CHEST W CONT, CT ABD PELV W CONT    INDICATION: Primary serous adenocarcinoma of endometrium. COMPARISON: February 25, 2021    CONTRAST:  100 mL of Isovue-370. TECHNIQUE:  Following the uneventful intravenous administration of contrast, thin axial  images were obtained through the chest, abdomen and pelvis. Coronal and sagittal  reconstructions were generated. Oral contrast was administered. CT dose  reduction was achieved through use of a standardized protocol tailored for this  examination and automatic exposure control for dose modulation. FINDINGS:    THYROID: Heterogeneous thyroid gland with multiple nodules bilaterally including  in the isthmus. MEDIASTINUM: Left subclavian chest port terminates in the SVC. No mediastinal  adenopathy. RAMEZ: No mass or lymphadenopathy. THORACIC AORTA: No dissection or aneurysm. MAIN PULMONARY ARTERY: Nonocclusive thrombus in segmental branches of the right  upper lobe pulmonary artery (3:47, 52), which are nonocclusive and likely  chronic. TRACHEA/BRONCHI: Patent. ESOPHAGUS: No wall thickening or dilatation. HEART: Normal in size. PLEURA: No effusion or pneumothorax. LUNGS: No nodule, mass, or airspace disease. LIVER: No mass or biliary dilatation. GALLBLADDER: Sludge in a nondistended gallbladder. SPLEEN: No mass. PANCREAS: No mass or ductal dilatation. ADRENALS: Unremarkable. KIDNEYS: No mass, calculus, or hydronephrosis. STOMACH: Unremarkable. SMALL BOWEL: No dilatation or wall thickening. COLON: Scattered diverticula. APPENDIX: Surgically absent  PERITONEUM: Decreased volume and size of the omental nodularity along the  anterior peritoneal surface just above the umbilicus. No ascites. No  pneumoperitoneum. RETROPERITONEUM: No lymphadenopathy or aortic aneurysm. REPRODUCTIVE ORGANS: Surgically absent. URINARY BLADDER: No mass or calculus. BONES: No destructive bone lesion. ADDITIONAL COMMENTS: N/A    Impression  1. Resolved retroperitoneal lymphadenopathy. 2.  Decreased peritoneal carcinomatosis. 3.  No ascites.   4. Nonocclusive thrombus and segmental right upper lobe pulmonary artery  branches which are nonocclusive and appear chronic. IMPRESSION/PLAN:  73 y.o. 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 ECO    Labs/chart reviewed  -Taxol/carboplatin today, cycle 8. Appreciate normalization of her . +tumor response.   -Low/moderate-grade cytopenias. -HTN: Controlled. She has stopped lisinopril.  -Hypokalemia: supplement  -Hyperglycemia: increased A1C. Discussed diet. Referred to PCP for oral therapy. -KNIGHT: Chronic/stable fatigue d/t chemo, deconditioning. -LE edema: LLE DVT 2021. On Xarelto. No bleeding  -Hx of thyroid nodular goiter, benign follicular bx.  -Chronic med issues:    Hx asthma - no issues. inhaler PRN   Hx esophageal spasm - uses nitroglycerin  -Reflux: continue Pepcid  -Stool irregularity: continue stool softeners  Psychosocial: In good spirits and feels well supported by her friends/work family. Asks many appropriate questions. She is doing very well at the moment. Feels work is essential for her well-being at this point. Encouraged her again to open dialogue with her family re: her diagnosis and possible future needs expected. Advised to call with questions/concerns. 08/10/21 I have spent 40 minutes reviewing previous notes, results and face to face with the patient discussing the diagnosis and treatment plan as outlined above.    Electronically signed,        Eliseo Gomez PA-C

## 2021-08-11 ENCOUNTER — OFFICE VISIT (OUTPATIENT)
Dept: GYNECOLOGY | Age: 66
End: 2021-08-11
Payer: MEDICARE
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
## 2021-08-11 DIAGNOSIS — Z79.01 ON ANTICOAGULANT THERAPY: ICD-10-CM

## 2021-08-11 DIAGNOSIS — Z79.899 ON ANTINEOPLASTIC CHEMOTHERAPY: Primary | ICD-10-CM

## 2021-08-11 PROCEDURE — G0463 HOSPITAL OUTPT CLINIC VISIT: HCPCS | Performed by: PHYSICIAN ASSISTANT

## 2021-08-11 NOTE — PROGRESS NOTES
Patient seen at her request.  Recent fall on right shoulder as documented. No lesions. Today noted bruising. Seen by orthopedist today, advised limited activity/ROM. Large ecchymotic patch over right chest toward right breast. No axillary/scuplar/shoulder bruising. Reassured, expectant bruising s/p fall likely to progress in color. Hold anticoag, repeat labs next week s/p chemotherapy.     Sharita Vance PA-C

## 2021-08-12 LAB — CA 125 IN THE PRESENCE OF HAMA, 144738: 18.2 U/ML (ref 0–38.1)

## 2021-08-16 DIAGNOSIS — Z79.01 ON ANTICOAGULANT THERAPY: Primary | ICD-10-CM

## 2021-08-18 ENCOUNTER — TELEPHONE (OUTPATIENT)
Dept: GYNECOLOGY | Age: 66
End: 2021-08-18

## 2021-08-18 DIAGNOSIS — Z79.01 ON ANTICOAGULANT THERAPY: Primary | ICD-10-CM

## 2021-08-18 DIAGNOSIS — Z79.899 ON ANTINEOPLASTIC CHEMOTHERAPY: ICD-10-CM

## 2021-08-18 DIAGNOSIS — C54.9 MALIGNANT NEOPLASM OF CORPUS UTERI, EXCEPT ISTHMUS (HCC): ICD-10-CM
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
## 2021-08-18 NOTE — TELEPHONE ENCOUNTER
Patient calling to follow up. She states she had labs drawn this morning. I advised that we will call her with the results and discuss resuming her blood thinner.

## 2021-08-19 LAB
BASOPHILS # BLD AUTO: 0 X10E3/UL (ref 0–0.2)
BASOPHILS NFR BLD AUTO: 0 %
EOSINOPHIL # BLD AUTO: 0 X10E3/UL (ref 0–0.4)
EOSINOPHIL NFR BLD AUTO: 1 %
ERYTHROCYTE [DISTWIDTH] IN BLOOD BY AUTOMATED COUNT: 15.1 % (ref 11.7–15.4)
HCT VFR BLD AUTO: 33.6 % (ref 34–46.6)
HGB BLD-MCNC: 11.5 G/DL (ref 11.1–15.9)
IMM GRANULOCYTES # BLD AUTO: 0.1 X10E3/UL (ref 0–0.1)
IMM GRANULOCYTES NFR BLD AUTO: 2 %
LYMPHOCYTES # BLD AUTO: 0.9 X10E3/UL (ref 0.7–3.1)
LYMPHOCYTES NFR BLD AUTO: 34 %
MCH RBC QN AUTO: 33.1 PG (ref 26.6–33)
MCHC RBC AUTO-ENTMCNC: 34.2 G/DL (ref 31.5–35.7)
MCV RBC AUTO: 97 FL (ref 79–97)
MONOCYTES # BLD AUTO: 0.3 X10E3/UL (ref 0.1–0.9)
MONOCYTES NFR BLD AUTO: 10 %
MORPHOLOGY BLD-IMP: ABNORMAL
NEUTROPHILS # BLD AUTO: 1.5 X10E3/UL (ref 1.4–7)
NEUTROPHILS NFR BLD AUTO: 53 %
PLATELET # BLD AUTO: 71 X10E3/UL (ref 150–450)
RBC # BLD AUTO: 3.47 X10E6/UL (ref 3.77–5.28)
WBC # BLD AUTO: 2.8 X10E3/UL (ref 3.4–10.8)

## 2021-08-19 NOTE — TELEPHONE ENCOUNTER
Called the patient, advised platelets 71 and to continue to hold her Xarelto. Advised will consult with Aniyah Mathis to see if he wants her to repeat labs in a week to reevaluate. She also wanted him to know she finished the potassium yesterday. Bleeding precautions reviewed with the patient.

## 2021-08-26 LAB
BASOPHILS # BLD AUTO: 0 X10E3/UL (ref 0–0.2)
BASOPHILS NFR BLD AUTO: 0 %
EOSINOPHIL # BLD AUTO: 0 X10E3/UL (ref 0–0.4)
EOSINOPHIL NFR BLD AUTO: 1 %
ERYTHROCYTE [DISTWIDTH] IN BLOOD BY AUTOMATED COUNT: 15.8 % (ref 11.7–15.4)
HCT VFR BLD AUTO: 33.1 % (ref 34–46.6)
HGB BLD-MCNC: 11.5 G/DL (ref 11.1–15.9)
IMM GRANULOCYTES # BLD AUTO: 0 X10E3/UL (ref 0–0.1)
IMM GRANULOCYTES NFR BLD AUTO: 1 %
LYMPHOCYTES # BLD AUTO: 0.7 X10E3/UL (ref 0.7–3.1)
LYMPHOCYTES NFR BLD AUTO: 32 %
MCH RBC QN AUTO: 34.3 PG (ref 26.6–33)
MCHC RBC AUTO-ENTMCNC: 34.7 G/DL (ref 31.5–35.7)
MCV RBC AUTO: 99 FL (ref 79–97)
MONOCYTES # BLD AUTO: 0.4 X10E3/UL (ref 0.1–0.9)
MONOCYTES NFR BLD AUTO: 15 %
MORPHOLOGY BLD-IMP: ABNORMAL
NEUTROPHILS # BLD AUTO: 1.2 X10E3/UL (ref 1.4–7)
NEUTROPHILS NFR BLD AUTO: 51 %
PLATELET # BLD AUTO: 85 X10E3/UL (ref 150–450)
RBC # BLD AUTO: 3.35 X10E6/UL (ref 3.77–5.28)
WBC # BLD AUTO: 2.3 X10E3/UL (ref 3.4–10.8)

## 2021-08-27 ENCOUNTER — HOSPITAL ENCOUNTER (OUTPATIENT)
Dept: PET IMAGING | Age: 66
Discharge: HOME | End: 2021-08-27
Attending: OBSTETRICS & GYNECOLOGY
Payer: MEDICARE

## 2021-08-27 VITALS — HEIGHT: 65 IN | BODY MASS INDEX: 29.99 KG/M2 | WEIGHT: 180 LBS

## 2021-08-27 DIAGNOSIS — C54.1 PRIMARY SEROUS ADENOCARCINOMA OF ENDOMETRIUM (HCC): ICD-10-CM

## 2021-08-27 LAB
GLUCOSE BLD STRIP.AUTO-MCNC: 144 MG/DL (ref 65–117)
SERVICE CMNT-IMP: ABNORMAL

## 2021-08-27 PROCEDURE — A9552 F18 FDG: HCPCS

## 2021-08-27 RX ORDER — FLUDEOXYGLUCOSE F-18 200 MCI/ML
10 INJECTION INTRAVENOUS ONCE
Status: COMPLETED | OUTPATIENT
Start: 2021-08-27 | End: 2021-08-27

## 2021-08-27 RX ORDER — SODIUM CHLORIDE 0.9 % (FLUSH) 0.9 %
10 SYRINGE (ML) INJECTION
Status: COMPLETED | OUTPATIENT
Start: 2021-08-27 | End: 2021-08-27

## 2021-08-27 RX ADMIN — FLUDEOXYGLUCOSE F-18 10 MILLICURIE: 200 INJECTION INTRAVENOUS at 12:04

## 2021-08-27 RX ADMIN — Medication 10 ML: at 12:04

## 2021-08-31 ENCOUNTER — OFFICE VISIT (OUTPATIENT)
Dept: GYNECOLOGY | Age: 66
End: 2021-08-31
Payer: MEDICARE

## 2021-08-31 VITALS
HEIGHT: 65 IN | SYSTOLIC BLOOD PRESSURE: 138 MMHG | BODY MASS INDEX: 29.99 KG/M2 | DIASTOLIC BLOOD PRESSURE: 79 MMHG | WEIGHT: 180 LBS | HEART RATE: 79 BPM

## 2021-08-31 DIAGNOSIS — C54.1 PRIMARY SEROUS ADENOCARCINOMA OF ENDOMETRIUM (HCC): Primary | ICD-10-CM

## 2021-08-31 DIAGNOSIS — Z79.899 ON ANTINEOPLASTIC CHEMOTHERAPY: ICD-10-CM

## 2021-08-31 PROCEDURE — G8400 PT W/DXA NO RESULTS DOC: HCPCS | Performed by: OBSTETRICS & GYNECOLOGY

## 2021-08-31 PROCEDURE — G0463 HOSPITAL OUTPT CLINIC VISIT: HCPCS | Performed by: OBSTETRICS & GYNECOLOGY

## 2021-08-31 PROCEDURE — 3017F COLORECTAL CA SCREEN DOC REV: CPT | Performed by: OBSTETRICS & GYNECOLOGY

## 2021-08-31 PROCEDURE — G8536 NO DOC ELDER MAL SCRN: HCPCS | Performed by: OBSTETRICS & GYNECOLOGY

## 2021-08-31 PROCEDURE — 1090F PRES/ABSN URINE INCON ASSESS: CPT | Performed by: OBSTETRICS & GYNECOLOGY

## 2021-08-31 PROCEDURE — 99213 OFFICE O/P EST LOW 20 MIN: CPT | Performed by: OBSTETRICS & GYNECOLOGY

## 2021-08-31 PROCEDURE — G8419 CALC BMI OUT NRM PARAM NOF/U: HCPCS | Performed by: OBSTETRICS & GYNECOLOGY

## 2021-08-31 PROCEDURE — 1101F PT FALLS ASSESS-DOCD LE1/YR: CPT | Performed by: OBSTETRICS & GYNECOLOGY

## 2021-08-31 PROCEDURE — G8432 DEP SCR NOT DOC, RNG: HCPCS | Performed by: OBSTETRICS & GYNECOLOGY

## 2021-08-31 PROCEDURE — G8427 DOCREV CUR MEDS BY ELIG CLIN: HCPCS | Performed by: OBSTETRICS & GYNECOLOGY

## 2021-08-31 RX ORDER — HEPARIN 100 UNIT/ML
500 SYRINGE INTRAVENOUS AS NEEDED
Status: CANCELLED | OUTPATIENT
Start: 2021-10-05

## 2021-08-31 RX ORDER — SODIUM CHLORIDE 9 MG/ML
10 INJECTION INTRAMUSCULAR; INTRAVENOUS; SUBCUTANEOUS AS NEEDED
Status: CANCELLED | OUTPATIENT
Start: 2021-10-05

## 2021-08-31 RX ORDER — SODIUM CHLORIDE 0.9 % (FLUSH) 0.9 %
5-10 SYRINGE (ML) INJECTION AS NEEDED
Status: CANCELLED | OUTPATIENT
Start: 2021-10-05

## 2021-08-31 NOTE — PROGRESS NOTES
[facility]  [address] [facility], [address] [facility]  [phone] [facility] [phone]    Office Visit    Patient ID:  Name: Coni Miguel  MRN: [id number]   : 1955/66 y.o. Visit date: 2021     INTERVAL HISTORY:  Coni Miguel is a  female who is an established patient with stage IA, grade 3 uterine carcinoma. She underwent laparoscopic hysterectomy with staging in 2013. She was recommended six cycles of adjuvant Taxol and Carboplatin, which she completed. We elected not to treat with pelvic radiation therapy due to her difficulty with chemotherapy. She had a CT of the abdomen/pelvis at [facility] that revealed retroperitoneal lymphadenopathy, peritoneal carcinomatosis, and trace ascites. I sent her for a PET/CT to better evaluate the extent of disease. She presented to review the results and discuss treatment. Her disease is not amenable to surgical resection. Systemic therapy was her only viable option. I thought immunotherapy would be the best choice, ahead of additional chemotherapy, specifically IV Keytruda and PO Lenvima. If that were not successful, we would consider retreatment with Taxol/Carboplatin or single agent Doxil. She completed 6 cycles of immunotherapy before demonstrating progression of disease. We decided upon retreatment with Taxol/Carboplatin. She has completed 8 cycles so far. Her CA-125 has responded and her CT after three cycles demonstrated a response. Her CT after completion of 6 cycles demonstrated further response. Her recent PET/CT demonstrates resolution of most of the abnormal PET activity, but there still is some residual activity in the omentum, suggesting persistent disease.       PMH:  Past Medical History:   Diagnosis Date    Abnormal Pap smear     with f/u normal    Anemia     Arthritis     Asthma     Cancer ([address]     ENDOMETRIAL    Environmental allergies     GERD (gastroesophageal reflux disease)     Goiter     Other unknown and unspecified cause of morbidity or mortality     POSSIBLE ESOPHAGEAL SPASM, ? OBSTRUCTION DUE TO GOITER    PMB (postmenopausal bleeding)     S/P chemotherapy, time since greater than 12 weeks     LAST CHEMO 10/2014 PER PATIENT    Thyroid disease     goiter     PSH:  Past Surgical History:   Procedure Laterality Date    HX APPENDECTOMY      HX BUNIONECTOMY      HX GYN  3/13/13    TLH/BSO    HX HEENT  4/22/13    TOOTH REMOVED    HX ORTHOPAEDIC  1980'S    BUNIONECTOMY    HX VASCULAR ACCESS      port    AR BREAST SURGERY PROCEDURE UNLISTED  1/12/16    right breast bx     SOC:  Social History     Socioeconomic History    Marital status:      Spouse name: Not on file    Number of children: Not on file    Years of education: Not on file    Highest education level: Not on file   Occupational History    Not on file   Tobacco Use    Smoking status: Never Smoker    Smokeless tobacco: Never Used   Substance and Sexual Activity    Alcohol use: Yes     Alcohol/week: 0.0 standard drinks     Comment: RARE OCC    Drug use: No    Sexual activity: Not on file   Other Topics Concern    Not on file   Social History Narrative    Not on file     Social Determinants of Health     Financial Resource Strain:     Difficulty of Paying Living Expenses:    Food Insecurity:     Worried About Running Out of Food in the Last Year:     Ran Out of Food in the Last Year:    Transportation Needs:     Lack of Transportation (Medical):      Lack of Transportation (Non-Medical):    Physical Activity:     Days of Exercise per Week:     Minutes of Exercise per Session:    Stress:     Feeling of Stress :    Social Connections:     Frequency of Communication with Friends and Family:     Frequency of Social Gatherings with Friends and Family:     Attends Temple Services:     Active Member of Clubs or Organizations:     Attends Club or Organization Meetings:     Marital Status:    Intimate Partner Violence:     Fear of Current or Ex-Partner:     Emotionally Abused:     Physically Abused:     Sexually Abused:      Family History  Family History   Problem Relation Age of Onset    Cancer Maternal Aunt         breast    Heart Disease Mother     Diabetes Father     Cancer Sister         CERVICAL    Kidney Disease Brother     Diabetes Brother     Heart Attack Other     Arrhythmia Brother     Diabetes Brother     Anesth Problems Neg Hx      Medications:  Current Outpatient Medications on File Prior to Visit   Medication Sig Dispense Refill    dexAMETHasone (Decadron) 4 mg tablet Take 2 tablets by mouth with breakfast the day before chemo also take 2 tablets with breakfast for 2 days after chemotherapy 36 Tab 1    lisinopriL (PRINIVIL, ZESTRIL) 10 mg tablet Take 1 Tab by mouth daily. 30 Tab 2    lisinopriL (PRINIVIL, ZESTRIL) 5 mg tablet Take 2 Tabs by mouth daily. 30 Tab 5    ondansetron (ZOFRAN ODT) 4 mg disintegrating tablet Take 1 Tab by mouth every eight (8) hours as needed for Nausea. Indications: nausea and vomiting caused by cancer drugs 30 Tab 3    lidocaine-prilocaine (EMLA) topical cream Apply small amount over port area and cover with band aid one hour before chemo 30 g 3    guaifenesin (MUCINEX PO) Take  by mouth.  loratadine (Claritin) 10 mg tablet Take 10 mg by mouth.  epinastine 0.05 % drop Apply  to eye.  raNITIdine (ZANTAC) 150 mg tablet ZANTAC TABS      cyclobenzaprine (FLEXERIL) 5 mg tablet take 1 tablet by mouth three times a day if needed  0    valACYclovir (VALTREX) 1 gram tablet Take 1 Tab by mouth three (3) times daily. 21 Tab 3    EPINEPHrine (EPIPEN) 0.3 mg/0.3 mL injection 0.3 mg by IntraMUSCular route once as needed.  ketotifen (ZADITOR) 0.025 % (0.035 %) ophthalmic solution Administer 1 Drop to both eyes every morning.  Cetirizine (ZYRTEC) 10 mg cap Take 10 mg by mouth nightly.       SAL ACID/UREA/PETROLATUM,WHITE (KERASAL FOOT EX) by Apply Externally route daily as needed.  ACCU-CHEK HELDER PLUS TEST STRP strip   0    NITROSTAT 0.4 mg SL tablet       CALCIUM CARBONATE (MARCELLO-SELTZER ANTACID PO) Take  by mouth daily as needed.  rivaroxaban (Xarelto) 20 mg tab tablet Take 1 Tablet by mouth daily for 180 days. Begin AFTER completing your starter pack  Indications: blood clot in a deep vein of the extremities (Patient not taking: Reported on 8/31/2021) 30 Tablet 5     No current facility-administered medications on file prior to visit. Allergies:   Allergies   Allergen Reactions    Latex Other (comments)     Burns skin    Acetone Shortness of Breath    Amoxicillin Anaphylaxis    Ciprofloxacin Hives    Pcn [Penicillins] Anaphylaxis    Buspar [Buspirone] Unknown (comments)     Has N&V and makes her feel \"weird\"    Azithromycin Hives    Egg Nausea Only    Other Medication Rash     POPPY seeds       ROS:  Negative     OBJECTIVE:    PHYSICAL EXAM  VITAL SIGNS: Visit Vitals  /79 (BP 1 Location: Right upper arm, BP Patient Position: Sitting)   Pulse 79   Ht 5' 5\" (1.651 m)   Wt 180 lb (81.6 kg)   BMI 29.95 kg/m²      GENERAL KAIA: WD, WN, WF in NAD   HEENT: WNL   RESPIRATORY: CTA   CARDIOVASC: RRR   GASTROINT: Soft, NT, ND   MUSCULOSKEL: WNL   EXTREMITIES: Bilateral edema   PELVIC: Deferred   RECTAL: Deferred   PRIYANKA SURVEY: Negative   NEURO: Grossly intact     Lab Results   Component Value Date/Time    WBC 2.3 (LL) 08/25/2021 09:58 AM    Hemoglobin (POC) 12.9 05/01/2013 09:53 AM    HGB 11.5 08/25/2021 09:58 AM    Hematocrit (POC) 38 05/01/2013 09:53 AM    HCT 33.1 (L) 08/25/2021 09:58 AM    PLATELET 85 (LL) 99/32/1242 09:58 AM    MCV 99 (H) 08/25/2021 09:58 AM     Lab Results   Component Value Date/Time    Sodium 139 08/10/2021 10:11 AM    Potassium 3.3 (L) 08/10/2021 10:11 AM    Chloride 106 08/10/2021 10:11 AM    CO2 25 08/10/2021 10:11 AM    Anion gap 8 08/10/2021 10:11 AM Glucose 168 (H) 08/10/2021 10:11 AM    BUN 22 (H) 08/10/2021 10:11 AM    Creatinine 0.39 (L) 08/10/2021 10:11 AM    BUN/Creatinine ratio 56 (H) 08/10/2021 10:11 AM    GFR est AA >60 08/10/2021 10:11 AM    GFR est non-AA >60 08/10/2021 10:11 AM    Calcium 8.4 (L) 08/10/2021 10:11 AM    Bilirubin, total 0.4 08/10/2021 10:11 AM    Alk. phosphatase 58 08/10/2021 10:11 AM    Protein, total 6.5 08/10/2021 10:11 AM    Albumin 3.4 (L) 08/10/2021 10:11 AM    Globulin 3.1 08/10/2021 10:11 AM    A-G Ratio 1.1 08/10/2021 10:11 AM    ALT (SGPT) 29 08/10/2021 10:11 AM    AST (SGOT) 16 08/10/2021 10:11 AM     Lab Results   Component Value Date/Time    CA-125 15 07/20/2021 10:37 AM    Cancer Ag (CA) 125 98.9 (H) 10/23/2020 10:02 AM         CT of abdomen/pelvis (8/13/20)  LOWER CHEST: The visualized portions of the lung bases are clear. ABDOMEN:  Liver: The liver is normal in size and contour with no focal abnormality. The  attenuation of the liver is decreased throughout. Gallbladder and bile ducts: There is no calcified gallstone or biliary  dilatation. Spleen: No abnormality. Pancreas: No abnormality. Adrenal glands: No abnormality. Kidneys: No abnormality. PELVIS:  Reproductive organs: The uterus and ovaries are absent. Bladder: No abnormality. BOWEL AND MESENTERY: The small bowel is normal.  There is no mesenteric mass or  adenopathy. The appendix is absent. PERITONEUM: There is no ascites or free intraperitoneal air. RETROPERITONEUM: The aorta  tapers without aneurysm. There is no retroperitoneal  adenopathy or mass. There is no pelvic mass or adenopathy. BONES AND SOFT TISSUES: The bones and soft tissues of the abdominal wall are  within normal limits.     IMPRESSION:   1. Status post hysterectomy and bilateral oophorectomy. 2. No evidence of recurrent or metastatic disease within the abdomen or pelvis. 3. Hepatic steatosis.   4. Status post appendectomy.       PET/CT (10/9/20)  HEAD/NECK: No apparent foci of abnormal hypermetabolism. Cerebral evaluation is  limited by normal intense activity.     CHEST: No foci of abnormal hypermetabolism.     ABDOMEN/PELVIS:   Peritoneal nodules are hypermetabolic, SUV 12. Retroperitoneal adenopathy is hypermetabolic, SUV 6. Right deep pelvic adenopathy is hypermetabolic, SUV 8. Left pelvic cul-de-sac mass is hypermetabolic, SUV 11.     SKELETON: No foci of abnormal hypermetabolism in the axial and visualized  appendicular skeleton.     IMPRESSION:   1. Peritoneal carcinomatosis. 2. 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 3. Left pelvic cul-de-sac tumor. CT of chest/abdomen/pelvis (12/30/20)  CT chest:    There is stable multinodular thyroid enlargement. Left chest Port-A-Cath  terminates in the SVC. The aorta and main pulmonary artery are normal in  caliber. The heart size is normal.  There is no pericardial or pleural effusion.        There are no enlarged axillary, mediastinal, or hilar lymph nodes.      There is no lung mass or airspace opacity. There is no pneumothorax. The  central airways are clear.     CT abdomen and pelvis: The liver, spleen, pancreas, and adrenal glands are normal. The gall bladder is  present  without intra- or extra-hepatic biliary dilatation.       The kidneys are symmetric without hydronephrosis.      There are no dilated bowel loops. The appendix is surgically absent.       Enlarged retroperitoneal lymph nodes are again demonstrated with a  representative left para-aortic lymph node measuring 1.3 x 1.8 cm (series 3,  image 76), previously 1.5 x 1.7 cm on 10/9/2020.  A left common iliac lymph node  measures 1.1 x 1.5 cm (series 3, image 91), previously 0.8 x 1.4 cm.       Anterior peritoneal/mesenteric soft tissue nodularity is overall mildly  increased since 10/9/2020 with a representative measurement of 2.3 x 6.8 cm  (series 3, image 77), previously 2.6 x 5.5 cm.     Peritoneal soft tissue nodularity in the deep left pelvis measures 1.4 x 2.2 cm  (series 3, image 116), previously 2.9 x 3.1 cm.     There is no free fluid or free air. The aorta tapers without aneurysm.     The urinary bladder is normal. The uterus and ovaries are surgically absent.     There is no aggressive bony lesion.     IMPRESSION:   1. Mixed response in the abdomen and pelvis compared to 10/9/2020 with mildly  increased anterior peritoneal carcinomatosis. Stable to slightly increased  retroperitoneal lymphadenopathy. Decreased peritoneal soft tissue nodularity in  the deep left pelvis.     2. No evidence for metastatic disease in the chest.      CT of chest/abdomen/pelvis (2/25/21)  CHEST WALL:No axillary or supraclavicular adenopathy. THYROID: Large hypodensity in the thyroid gland unchanged. MEDIASTINUM: 12 mm cardiophrenic lymph node unchanged. RAMEZ: No mass or lymphadenopathy. THORACIC AORTA: No dissection or aneurysm. MAIN PULMONARY ARTERY: Normal in caliber. TRACHEA/BRONCHI: Patent. ESOPHAGUS: No wall thickening or dilatation. HEART: Coronary atherosclerotic disease  PLEURA: No effusion or pneumothorax. LUNGS: No nodule, mass, or airspace disease. LIVER: No mass or biliary dilatation. GALLBLADDER: Unremarkable. BILIARY TREE: Unremarkable  SPLEEN: Unremarkable  PANCREAS: No mass or ductal dilatation. ADRENALS: Unremarkable. KIDNEYS: No mass, calculus, or hydronephrosis. STOMACH: Unremarkable. SMALL BOWEL: No dilatation or wall thickening. COLON: No dilatation or wall thickening. APPENDIX: Not visualized  PERITONEUM: Peritoneal carcinomatosis is again noted. 6.8 x 3.1 cm peritoneal  mass anteriorly is slightly increased in size. There is adjacent probably  confluent lesion measuring 3.4 x 2.6 cm which is increased in size from the  prior study. Deep left peritoneal nodularity is not significantly changed.  Free  fluid in the pelvis increase in interval  RETROPERITONEUM: Left retroperitoneal lymph node measuring 1.5 x 1.4 cm slightly  decreased in size from 0.8 x 1.3 cm. Right retroperitoneal lymph node measuring  0.8 x 0.9 cm is increase in size of common iliac lymph node now measures 1.4 x  1.2 cm not significantly changed in size. REPRODUCTIVE ORGANS: Hysterectomy  URINARY BLADDER: No mass or calculus. BONES: No destructive bone lesion. ADDITIONAL COMMENTS: N/A     IMPRESSION  1. Overall increase in omental caking along the anterior peritoneum (the prior  study.     2.  Retroperitoneal adenopathy demonstrating variable response to therapy. Some  of these have increased in interval.     3.  Pelvic soft tissue in the left deep pelvis is not significantly changed  although not well visualized.     4.  Slight interval increase in pelvic free fluid      CT of abdomen/pelvis (5/5/21)  LOWER THORAX: Right cardiophrenic lymph node measures 2.9 cm and previously  measured 1.2 cm on image number 15. There is minor basilar atelectasis/scarring  LIVER: No mass. BILIARY TREE: There is suggestion of gallstones CBD is not dilated. SPLEEN: within normal limits. PANCREAS: No mass or ductal dilatation. ADRENALS: Unremarkable. KIDNEYS: No mass, calculus, or hydronephrosis. STOMACH: Unremarkable. SMALL BOWEL: No dilatation or wall thickening. COLON: No dilatation or wall thickening. APPENDIX: Status post appendectomy  PERITONEUM: Peritoneal carcinomatosis appears improved. There is a confluent  density anteriorly in the peritoneum on image number 43 measuring 5.9 x 1.4 cm  which previously measured 9.8 x 1.9 cm. RETROPERITONEUM: Left retroperitoneal lymph node measures 0.7 cm image 45 and  previously measured 1.6 cm. Right retroperitoneal lymph node measures 0.7 cm image 46 and previously  measured 0.8 cm.     Left iliac lymph node image 57 measures 0.9 cm and previously measured 1.1 cm. REPRODUCTIVE ORGANS: Status post hysterectomy and oophorectomy. URINARY BLADDER: No mass or calculus. BONES: No destructive bone lesion.   ABDOMINAL WALL: No mass or hernia. ADDITIONAL COMMENTS: N/A     IMPRESSION  1. There has been a mild decrease in the peritoneal carcinomatosis.      2. There has been slight to mild decrease in the retroperitoneal adenopathy.     3. No ascites is identified. No definite pelvic mass is identified. CT of chests/abdomen/pelvis (7/12/21)  THYROID: Heterogeneous thyroid gland with multiple nodules bilaterally including  in the isthmus. MEDIASTINUM: Left subclavian chest port terminates in the SVC. No mediastinal  adenopathy. RAMEZ: No mass or lymphadenopathy. THORACIC AORTA: No dissection or aneurysm. MAIN PULMONARY ARTERY: Nonocclusive thrombus in segmental branches of the right  upper lobe pulmonary artery (3:47, 52), which are nonocclusive and likely  chronic. TRACHEA/BRONCHI: Patent. ESOPHAGUS: No wall thickening or dilatation. HEART: Normal in size. PLEURA: No effusion or pneumothorax. LUNGS: No nodule, mass, or airspace disease. LIVER: No mass or biliary dilatation. GALLBLADDER: Sludge in a nondistended gallbladder. SPLEEN: No mass. PANCREAS: No mass or ductal dilatation. ADRENALS: Unremarkable. KIDNEYS: No mass, calculus, or hydronephrosis. STOMACH: Unremarkable. SMALL BOWEL: No dilatation or wall thickening. COLON: Scattered diverticula. APPENDIX: Surgically absent  PERITONEUM: Decreased volume and size of the omental nodularity along the  anterior peritoneal surface just above the umbilicus. No ascites. No  pneumoperitoneum. RETROPERITONEUM: No lymphadenopathy or aortic aneurysm. REPRODUCTIVE ORGANS: Surgically absent. URINARY BLADDER: No mass or calculus. BONES: No destructive bone lesion. ADDITIONAL COMMENTS: N/A     IMPRESSION  1. Resolved retroperitoneal lymphadenopathy. 2.  Decreased peritoneal carcinomatosis. 3.  No ascites. 4.  Nonocclusive thrombus and segmental right upper lobe pulmonary artery  branches which are nonocclusive and appear chronic.        PET/CT (8/27/21)  HEAD/NECK: No apparent foci of abnormal hypermetabolism. Cerebral evaluation is  limited by normal intense activity.     CHEST: No foci of abnormal hypermetabolism. Resolution of left supraclavicular  jasper activity.     ABDOMEN/PELVIS:   Omental/peritoneal disease is near completely resolved; current max SUV, midline  omentum 2.4, previous max SUV 12. Resolved retroperitoneal and right deep pelvic jasper activity. Resolved left pelvic cul-de-sac mass/activity.     SKELETON: No foci of abnormal hypermetabolism in the axial and visualized  appendicular skeleton.     IMPRESSION  Abnormal PET activity is resolved other than minimal residual  activity in the omentum. 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 She completed six cycles of adjuvant Taxol and Carboplatin chemotherapy. She developed recurrent disease and was treated with the regimen of IV Keytruda and PO Lenvima. She completed 6 cycles before progression. Since it had been almost 8 years since her adjuvant Taxol/Carboplatin, I recommended that we retreat her with that regimen, though I suggested a lower dose of each. She has now completed 8 cycles of that regimen. She has been tolerating it well. Her CA-125 has responded. Her CT after 3 cycles demonstrated response to therapy. Her CT after 6 cycles demonstrated a continued response, but she still had some residual disease, mostly in the omentum. I recommend continuing with the current regimen for at least 2 more cycles. Her latest PET/CT demonstrates some minimal residual activity in the omentum. I suggested that we stop the chemotherapy at this point, as she has no measurable disease. She agrees with that plan. We will repeat a scan in 3 months. An electronic signature was used to sign this note.     Rafat Cobb MD  08/31/21

## 2021-09-01 ENCOUNTER — TELEPHONE (OUTPATIENT)
Dept: GYNECOLOGY | Age: 66
End: 2021-09-01

## 2021-09-02 ENCOUNTER — TELEPHONE (OUTPATIENT)
Dept: GYNECOLOGY | Age: 66
End: 2021-09-02

## 2021-10-05 ENCOUNTER — HOSPITAL ENCOUNTER (OUTPATIENT)
Dept: INFUSION THERAPY | Age: 66
Discharge: HOME | End: 2021-10-05
Payer: MEDICARE

## 2021-10-05 VITALS
HEART RATE: 83 BPM | DIASTOLIC BLOOD PRESSURE: 68 MMHG | TEMPERATURE: 96.8 F | RESPIRATION RATE: 18 BRPM | SYSTOLIC BLOOD PRESSURE: 123 MMHG

## 2021-10-05 DIAGNOSIS — C54.9 MALIGNANT NEOPLASM OF CORPUS UTERI, EXCEPT ISTHMUS (HCC): Primary | ICD-10-CM

## 2021-10-05 LAB
ALBUMIN SERPL-MCNC: 3.4 G/DL (ref 3.5–5)
ALBUMIN/GLOB SERPL: 1 {RATIO} (ref 1.1–2.2)
ALP SERPL-CCNC: 62 U/L (ref 45–117)
ALT SERPL-CCNC: 22 U/L (ref 12–78)
ANION GAP SERPL CALC-SCNC: 6 MMOL/L (ref 5–15)
AST SERPL-CCNC: 16 U/L (ref 15–37)
BASOPHILS # BLD: 0 K/UL (ref 0–0.1)
BASOPHILS NFR BLD: 1 % (ref 0–1)
BILIRUB SERPL-MCNC: 0.3 MG/DL (ref 0.2–1)
BUN SERPL-MCNC: 16 MG/DL (ref 6–20)
BUN/CREAT SERPL: 30 (ref 12–20)
CALCIUM SERPL-MCNC: 8.7 MG/DL (ref 8.5–10.1)
CANCER AG125 SERPL-ACNC: 68 U/ML (ref 1.5–35)
CHLORIDE SERPL-SCNC: 107 MMOL/L (ref 97–108)
CO2 SERPL-SCNC: 25 MMOL/L (ref 21–32)
CREAT SERPL-MCNC: 0.53 MG/DL (ref 0.55–1.02)
DIFFERENTIAL METHOD BLD: ABNORMAL
EOSINOPHIL # BLD: 0.1 K/UL (ref 0–0.4)
EOSINOPHIL NFR BLD: 2 % (ref 0–7)
ERYTHROCYTE [DISTWIDTH] IN BLOOD BY AUTOMATED COUNT: 13.7 % (ref 11.5–14.5)
GLOBULIN SER CALC-MCNC: 3.3 G/DL (ref 2–4)
GLUCOSE SERPL-MCNC: 213 MG/DL (ref 65–100)
HCT VFR BLD AUTO: 36.5 % (ref 35–47)
HGB BLD-MCNC: 12 G/DL (ref 11.5–16)
IMM GRANULOCYTES # BLD AUTO: 0 K/UL (ref 0–0.04)
IMM GRANULOCYTES NFR BLD AUTO: 1 % (ref 0–0.5)
LYMPHOCYTES # BLD: 1 K/UL (ref 0.8–3.5)
LYMPHOCYTES NFR BLD: 23 % (ref 12–49)
MAGNESIUM SERPL-MCNC: 2 MG/DL (ref 1.6–2.4)
MCH RBC QN AUTO: 32.8 PG (ref 26–34)
MCHC RBC AUTO-ENTMCNC: 32.9 G/DL (ref 30–36.5)
MCV RBC AUTO: 99.7 FL (ref 80–99)
MONOCYTES # BLD: 0.5 K/UL (ref 0–1)
MONOCYTES NFR BLD: 12 % (ref 5–13)
NEUTS SEG # BLD: 2.7 K/UL (ref 1.8–8)
NEUTS SEG NFR BLD: 61 % (ref 32–75)
NRBC # BLD: 0 K/UL (ref 0–0.01)
NRBC BLD-RTO: 0 PER 100 WBC
PLATELET # BLD AUTO: 178 K/UL (ref 150–400)
PMV BLD AUTO: 9.4 FL (ref 8.9–12.9)
POTASSIUM SERPL-SCNC: 3.6 MMOL/L (ref 3.5–5.1)
PROT SERPL-MCNC: 6.7 G/DL (ref 6.4–8.2)
RBC # BLD AUTO: 3.66 M/UL (ref 3.8–5.2)
SODIUM SERPL-SCNC: 138 MMOL/L (ref 136–145)
WBC # BLD AUTO: 4.4 K/UL (ref 3.6–11)

## 2021-10-05 PROCEDURE — 85025 COMPLETE CBC W/AUTO DIFF WBC: CPT

## 2021-10-05 PROCEDURE — 77030012965 HC NDL HUBR BBMI -A

## 2021-10-05 PROCEDURE — 86304 IMMUNOASSAY TUMOR CA 125: CPT

## 2021-10-05 PROCEDURE — 83735 ASSAY OF MAGNESIUM: CPT

## 2021-10-05 PROCEDURE — 36591 DRAW BLOOD OFF VENOUS DEVICE: CPT

## 2021-10-05 PROCEDURE — 80053 COMPREHEN METABOLIC PANEL: CPT

## 2021-10-05 RX ORDER — HEPARIN 100 UNIT/ML
500 SYRINGE INTRAVENOUS AS NEEDED
Status: DISCONTINUED | OUTPATIENT
Start: 2021-10-05 | End: 2021-10-06 | Stop reason: HOSPADM

## 2021-10-05 RX ORDER — SODIUM CHLORIDE 9 MG/ML
10 INJECTION INTRAMUSCULAR; INTRAVENOUS; SUBCUTANEOUS AS NEEDED
Status: DISCONTINUED | OUTPATIENT
Start: 2021-10-05 | End: 2021-10-06 | Stop reason: HOSPADM

## 2021-10-05 RX ORDER — SODIUM CHLORIDE 0.9 % (FLUSH) 0.9 %
5-10 SYRINGE (ML) INJECTION AS NEEDED
Status: DISCONTINUED | OUTPATIENT
Start: 2021-10-05 | End: 2021-10-06 | Stop reason: HOSPADM

## 2021-10-11 RX ORDER — SODIUM CHLORIDE 0.9 % (FLUSH) 0.9 %
5-10 SYRINGE (ML) INJECTION AS NEEDED
Status: CANCELLED | OUTPATIENT
Start: 2021-11-30

## 2021-10-11 RX ORDER — HEPARIN 100 UNIT/ML
500 SYRINGE INTRAVENOUS AS NEEDED
Status: CANCELLED | OUTPATIENT
Start: 2021-11-30

## 2021-10-11 RX ORDER — SODIUM CHLORIDE 9 MG/ML
10 INJECTION INTRAMUSCULAR; INTRAVENOUS; SUBCUTANEOUS AS NEEDED
Status: CANCELLED | OUTPATIENT
Start: 2021-11-30

## 2021-11-03 ENCOUNTER — TELEPHONE (OUTPATIENT)
Dept: GYNECOLOGY | Age: 66
End: 2021-11-03

## 2021-11-03 NOTE — TELEPHONE ENCOUNTER
C/o 2 weeks ago started with feeling bloated in stomach and unable to eat at times d/t a full feeling with some sob, lasting for 2 to 3 days. Thought she was doing better but started with lower left abdominal pain last night 8 on pain scale of 0-10, still having hurting with pressure this am.  Bowels are moving every day.

## 2021-11-04 ENCOUNTER — OFFICE VISIT (OUTPATIENT)
Dept: GYNECOLOGY | Age: 66
End: 2021-11-04
Payer: MEDICARE

## 2021-11-04 VITALS
HEART RATE: 98 BPM | HEIGHT: 65 IN | DIASTOLIC BLOOD PRESSURE: 79 MMHG | BODY MASS INDEX: 30.32 KG/M2 | SYSTOLIC BLOOD PRESSURE: 121 MMHG | WEIGHT: 182 LBS

## 2021-11-04 DIAGNOSIS — C54.1 PRIMARY SEROUS ADENOCARCINOMA OF ENDOMETRIUM (HCC): Primary | ICD-10-CM

## 2021-11-04 PROCEDURE — G8417 CALC BMI ABV UP PARAM F/U: HCPCS | Performed by: OBSTETRICS & GYNECOLOGY

## 2021-11-04 PROCEDURE — G8536 NO DOC ELDER MAL SCRN: HCPCS | Performed by: OBSTETRICS & GYNECOLOGY

## 2021-11-04 PROCEDURE — G8427 DOCREV CUR MEDS BY ELIG CLIN: HCPCS | Performed by: OBSTETRICS & GYNECOLOGY

## 2021-11-04 PROCEDURE — 99213 OFFICE O/P EST LOW 20 MIN: CPT | Performed by: OBSTETRICS & GYNECOLOGY

## 2021-11-04 PROCEDURE — G8432 DEP SCR NOT DOC, RNG: HCPCS | Performed by: OBSTETRICS & GYNECOLOGY

## 2021-11-04 PROCEDURE — G0463 HOSPITAL OUTPT CLINIC VISIT: HCPCS | Performed by: OBSTETRICS & GYNECOLOGY

## 2021-11-04 PROCEDURE — 3017F COLORECTAL CA SCREEN DOC REV: CPT | Performed by: OBSTETRICS & GYNECOLOGY

## 2021-11-04 PROCEDURE — 1090F PRES/ABSN URINE INCON ASSESS: CPT | Performed by: OBSTETRICS & GYNECOLOGY

## 2021-11-04 PROCEDURE — G8400 PT W/DXA NO RESULTS DOC: HCPCS | Performed by: OBSTETRICS & GYNECOLOGY

## 2021-11-04 PROCEDURE — 1101F PT FALLS ASSESS-DOCD LE1/YR: CPT | Performed by: OBSTETRICS & GYNECOLOGY

## 2021-11-04 NOTE — PROGRESS NOTES
Patient c/o bloating and abdominal pain that started 2 weeks ago. Patient states she is feeling better today but her abdomen in tender.

## 2021-11-04 NOTE — PROGRESS NOTES
[facility]YNECOLOGIC ONCOLOGY  [address] [facility], [address], [address]  [phone] CXO [phone]    Office Visit    Patient ID:  Name: Thee Deras  MRN: [id number]   : 1955/66 y.o. Visit date: 2021     INTERVAL HISTORY:  Thee Deras is a  female who is an established patient with stage IA, grade 3 uterine carcinoma. She underwent laparoscopic hysterectomy with staging in 2013. She was recommended six cycles of adjuvant Taxol and Carboplatin, which she completed. We elected not to treat with pelvic radiation therapy due to her difficulty with chemotherapy. She had a CT of the abdomen/pelvis at [facility] that revealed retroperitoneal lymphadenopathy, peritoneal carcinomatosis, and trace ascites. I sent her for a PET/CT to better evaluate the extent of disease. She presented to review the results and discuss treatment. Her disease is not amenable to surgical resection. Systemic therapy was her only viable option. I thought immunotherapy would be the best choice, ahead of additional chemotherapy, specifically IV Keytruda and PO Lenvima. If that were not successful, we would consider retreatment with Taxol/Carboplatin or single agent Doxil. She completed 6 cycles of immunotherapy before demonstrating progression of disease. We decided upon retreatment with Taxol/Carboplatin. She completed 8 cycles of that regimen. Her CA-125 has responded and her CT after three cycles demonstrated a response. Her CT after completion of 6 cycles demonstrated further response. Her recent PET/CT demonstrated resolution of most of the abnormal PET activity, but there still was some residual activity in the omentum, suggesting persistent disease. We stopped treatment at that time, as she was beginning not to toleratinng well. She presents today complaining of abdominal pain and bloating. She states it is a stabbing pain in her left abdomen.   The bloating has also been causing a little shortness of breath. PMH:  Past Medical History:   Diagnosis Date    Abnormal Pap smear 1995    with f/u normal    Anemia     Arthritis     Asthma     Cancer ([facility] [address]     ENDOMETRIAL    Environmental allergies     GERD (gastroesophageal reflux disease)     Goiter     Other unknown and unspecified cause of morbidity or mortality     POSSIBLE ESOPHAGEAL SPASM, ? OBSTRUCTION DUE TO GOITER    PMB (postmenopausal bleeding)     S/P chemotherapy, time since greater than 12 weeks     LAST CHEMO 10/2014 PER PATIENT    Thyroid disease     goiter     PSH:  Past Surgical History:   Procedure Laterality Date    HX APPENDECTOMY      HX BUNIONECTOMY      HX GYN  3/13/13    TLH/BSO    HX HEENT  4/22/13    TOOTH REMOVED    HX ORTHOPAEDIC  1980'S    BUNIONECTOMY    HX VASCULAR ACCESS      port    VA BREAST SURGERY PROCEDURE UNLISTED  1/12/16    right breast bx     SOC:  Social History     Socioeconomic History    Marital status:      Spouse name: Not on file    Number of children: Not on file    Years of education: Not on file    Highest education level: Not on file   Occupational History    Not on file   Tobacco Use    Smoking status: Never Smoker    Smokeless tobacco: Never Used   Substance and Sexual Activity    Alcohol use: Yes     Alcohol/week: 0.0 standard drinks     Comment: RARE OCC    Drug use: No    Sexual activity: Not on file   Other Topics Concern    Not on file   Social History Narrative    Not on file     Social Determinants of Health     Financial Resource Strain:     Difficulty of Paying Living Expenses: Not on file   Food Insecurity:     Worried About Running Out of Food in the Last Year: Not on file    Blaze of Food in the Last Year: Not on file   Transportation Needs:     Lack of Transportation (Medical): Not on file    Lack of Transportation (Non-Medical):  Not on file   Physical Activity:     Days of Exercise per Week: Not on file    Minutes of Exercise per Session: Not on file   Stress:     Feeling of Stress : Not on file   Social Connections:     Frequency of Communication with Friends and Family: Not on file    Frequency of Social Gatherings with Friends and Family: Not on file    Attends Faith Services: Not on file    Active Member of Clubs or Organizations: Not on file    Attends Club or Organization Meetings: Not on file    Marital Status: Not on file   Intimate Partner Violence:     Fear of Current or Ex-Partner: Not on file    Emotionally Abused: Not on file    Physically Abused: Not on file    Sexually Abused: Not on file   Housing Stability:     Unable to Pay for Housing in the Last Year: Not on file    Number of [place] in the Last Year: Not on file    Unstable Housing in the Last Year: Not on file     Family History  Family History   Problem Relation Age of Onset    Cancer Maternal Aunt         breast    Heart Disease Mother     Diabetes Father     Cancer Sister         CERVICAL    Kidney Disease Brother     Diabetes Brother     Heart Attack Other     Arrhythmia Brother     Diabetes Brother     Anesth Problems Neg Hx      Medications:  Current Outpatient Medications on File Prior to Visit   Medication Sig Dispense Refill    rivaroxaban (Xarelto) 20 mg tab tablet Take 1 Tablet by mouth daily for 180 days. Begin AFTER completing your starter pack  Indications: blood clot in a deep vein of the extremities 30 Tablet 5    dexAMETHasone (Decadron) 4 mg tablet Take 2 tablets by mouth with breakfast the day before chemo also take 2 tablets with breakfast for 2 days after chemotherapy 36 Tab 1    lisinopriL (PRINIVIL, ZESTRIL) 10 mg tablet Take 1 Tab by mouth daily. 30 Tab 2    lisinopriL (PRINIVIL, ZESTRIL) 5 mg tablet Take 2 Tabs by mouth daily. 30 Tab 5    ondansetron (ZOFRAN ODT) 4 mg disintegrating tablet Take 1 Tab by mouth every eight (8) hours as needed for Nausea.  Indications: nausea and vomiting caused by cancer drugs 30 Tab 3    lidocaine-prilocaine (EMLA) topical cream Apply small amount over port area and cover with band aid one hour before chemo 30 g 3    guaifenesin (MUCINEX PO) Take  by mouth.  loratadine (Claritin) 10 mg tablet Take 10 mg by mouth.  epinastine 0.05 % drop Apply  to eye.  raNITIdine (ZANTAC) 150 mg tablet ZANTAC TABS      cyclobenzaprine (FLEXERIL) 5 mg tablet take 1 tablet by mouth three times a day if needed  0    valACYclovir (VALTREX) 1 gram tablet Take 1 Tab by mouth three (3) times daily. 21 Tab 3    EPINEPHrine (EPIPEN) 0.3 mg/0.3 mL injection 0.3 mg by IntraMUSCular route once as needed.  ketotifen (ZADITOR) 0.025 % (0.035 %) ophthalmic solution Administer 1 Drop to both eyes every morning.  Cetirizine (ZYRTEC) 10 mg cap Take 10 mg by mouth nightly.  SAL ACID/UREA/PETROLATUM,WHITE (KERASAL FOOT EX) by Apply Externally route daily as needed.  ACCU-CHEK HELDER PLUS TEST STRP strip   0    NITROSTAT 0.4 mg SL tablet       CALCIUM CARBONATE (MARCELLO-SELTZER ANTACID PO) Take  by mouth daily as needed. No current facility-administered medications on file prior to visit. Allergies:   Allergies   Allergen Reactions    Latex Other (comments)     Burns skin    Acetone Shortness of Breath    Amoxicillin Anaphylaxis    Ciprofloxacin Hives    Pcn [Penicillins] Anaphylaxis    Buspar [Buspirone] Unknown (comments)     Has N&V and makes her feel \"weird\"    Azithromycin Hives    Egg Nausea Only    Other Medication Rash     POPPY seeds       ROS:  Negative     OBJECTIVE:    PHYSICAL EXAM  VITAL SIGNS: Visit Vitals  /79 (BP 1 Location: Right upper arm, BP Patient Position: Sitting)   Pulse 98   Ht 5' 5\" (1.651 m)   Wt 182 lb (82.6 kg)   BMI 30.29 kg/m²      GENERAL KAIA: WD, WN, WF in NAD   HEENT: WNL   RESPIRATORY: CTA   CARDIOVASC: RRR   GASTROINT: Soft, NT, ND   MUSCULOSKEL: WNL   EXTREMITIES: Bilateral edema   PELVIC: Deferred   RECTAL: Deferred   PRIYANKA SURVEY: Negative   NEURO: Grossly intact     Lab Results   Component Value Date/Time    WBC 4.4 10/05/2021 05:00 PM    Hemoglobin (POC) 12.9 05/01/2013 09:53 AM    HGB 12.0 10/05/2021 05:00 PM    Hematocrit (POC) 38 05/01/2013 09:53 AM    HCT 36.5 10/05/2021 05:00 PM    PLATELET 577 35/27/2149 05:00 PM    MCV 99.7 (H) 10/05/2021 05:00 PM     Lab Results   Component Value Date/Time    Sodium 138 10/05/2021 05:00 PM    Potassium 3.6 10/05/2021 05:00 PM    Chloride 107 10/05/2021 05:00 PM    CO2 25 10/05/2021 05:00 PM    Anion gap 6 10/05/2021 05:00 PM    Glucose 213 (H) 10/05/2021 05:00 PM    BUN 16 10/05/2021 05:00 PM    Creatinine 0.53 (L) 10/05/2021 05:00 PM    BUN/Creatinine ratio 30 (H) 10/05/2021 05:00 PM    GFR est AA >60 10/05/2021 05:00 PM    GFR est non-AA >60 10/05/2021 05:00 PM    Calcium 8.7 10/05/2021 05:00 PM    Bilirubin, total 0.3 10/05/2021 05:00 PM    Alk. phosphatase 62 10/05/2021 05:00 PM    Protein, total 6.7 10/05/2021 05:00 PM    Albumin 3.4 (L) 10/05/2021 05:00 PM    Globulin 3.3 10/05/2021 05:00 PM    A-G Ratio 1.0 (L) 10/05/2021 05:00 PM    ALT (SGPT) 22 10/05/2021 05:00 PM    AST (SGOT) 16 10/05/2021 05:00 PM     Lab Results   Component Value Date/Time    CA-125 68 (H) 10/05/2021 05:00 PM    Cancer Ag (CA) 125 98.9 (H) 10/23/2020 10:02 AM       CT of chest/abdomen/pelvis (12/30/20)  CT chest:    There is stable multinodular thyroid enlargement. Left chest Port-A-Cath  terminates in the SVC. The aorta and main pulmonary artery are normal in  caliber. The heart size is normal.  There is no pericardial or pleural effusion.        There are no enlarged axillary, mediastinal, or hilar lymph nodes.      There is no lung mass or airspace opacity. There is no pneumothorax. The  central airways are clear.     CT abdomen and pelvis:     The liver, spleen, pancreas, and adrenal glands are normal. The gall bladder is  present  without intra- or extra-hepatic biliary dilatation.       The kidneys are symmetric without hydronephrosis.      There are no dilated bowel loops. The appendix is surgically absent.       Enlarged retroperitoneal lymph nodes are again demonstrated with a  representative left para-aortic lymph node measuring 1.3 x 1.8 cm (series 3,  image 76), previously 1.5 x 1.7 cm on 10/9/2020. A left common iliac lymph node  measures 1.1 x 1.5 cm (series 3, image 91), previously 0.8 x 1.4 cm.       Anterior peritoneal/mesenteric soft tissue nodularity is overall mildly  increased since 10/9/2020 with a representative measurement of 2.3 x 6.8 cm  (series 3, image 77), previously 2.6 x 5.5 cm.     Peritoneal soft tissue nodularity in the deep left pelvis measures 1.4 x 2.2 cm  (series 3, image 116), previously 2.9 x 3.1 cm.     There is no free fluid or free air. The aorta tapers without aneurysm.     The urinary bladder is normal. The uterus and ovaries are surgically absent.     There is no aggressive bony lesion.     IMPRESSION:   1. Mixed response in the abdomen and pelvis compared to 10/9/2020 with mildly  increased anterior peritoneal carcinomatosis. Stable to slightly increased  retroperitoneal lymphadenopathy. Decreased peritoneal soft tissue nodularity in  the deep left pelvis.     2. No evidence for metastatic disease in the chest.      CT of chest/abdomen/pelvis (2/25/21)  CHEST WALL:No axillary or supraclavicular adenopathy. THYROID: Large hypodensity in the thyroid gland unchanged. MEDIASTINUM: 12 mm cardiophrenic lymph node unchanged. RAMEZ: No mass or lymphadenopathy. THORACIC AORTA: No dissection or aneurysm. MAIN PULMONARY ARTERY: Normal in caliber. TRACHEA/BRONCHI: Patent. ESOPHAGUS: No wall thickening or dilatation. HEART: Coronary atherosclerotic disease  PLEURA: No effusion or pneumothorax. LUNGS: No nodule, mass, or airspace disease. LIVER: No mass or biliary dilatation. GALLBLADDER: Unremarkable.   BILIARY TREE: Unremarkable  SPLEEN: Unremarkable  PANCREAS: No mass or ductal dilatation. ADRENALS: Unremarkable. KIDNEYS: No mass, calculus, or hydronephrosis. STOMACH: Unremarkable. SMALL BOWEL: No dilatation or wall thickening. COLON: No dilatation or wall thickening. APPENDIX: Not visualized  PERITONEUM: Peritoneal carcinomatosis is again noted. 6.8 x 3.1 cm peritoneal  mass anteriorly is slightly increased in size. There is adjacent probably  confluent lesion measuring 3.4 x 2.6 cm which is increased in size from the  prior study. Deep left peritoneal nodularity is not significantly changed. Free  fluid in the pelvis increase in interval  RETROPERITONEUM: Left retroperitoneal lymph node measuring 1.5 x 1.4 cm slightly  decreased in size from 0.8 x 1.3 cm. Right retroperitoneal lymph node measuring  0.8 x 0.9 cm is increase in size of common iliac lymph node now measures 1.4 x  1.2 cm not significantly changed in size. REPRODUCTIVE ORGANS: Hysterectomy  URINARY BLADDER: No mass or calculus. BONES: No destructive bone lesion. ADDITIONAL COMMENTS: N/A     IMPRESSION  1. Overall increase in omental caking along the anterior peritoneum (the prior  study.     2.  Retroperitoneal adenopathy demonstrating variable response to therapy. Some  of these have increased in interval.     3.  Pelvic soft tissue in the left deep pelvis is not significantly changed  although not well visualized.     4.  Slight interval increase in pelvic free fluid      CT of abdomen/pelvis (5/5/21)  LOWER THORAX: Right cardiophrenic lymph node measures 2.9 cm and previously  measured 1.2 cm on image number 15. There is minor basilar atelectasis/scarring  LIVER: No mass. BILIARY TREE: There is suggestion of gallstones CBD is not dilated. SPLEEN: within normal limits. PANCREAS: No mass or ductal dilatation. ADRENALS: Unremarkable. KIDNEYS: No mass, calculus, or hydronephrosis. STOMACH: Unremarkable. SMALL BOWEL: No dilatation or wall thickening.   COLON: No dilatation or wall thickening. APPENDIX: Status post appendectomy  PERITONEUM: Peritoneal carcinomatosis appears improved. There is a confluent  density anteriorly in the peritoneum on image number 43 measuring 5.9 x 1.4 cm  which previously measured 9.8 x 1.9 cm. RETROPERITONEUM: Left retroperitoneal lymph node measures 0.7 cm image 45 and  previously measured 1.6 cm. Right retroperitoneal lymph node measures 0.7 cm image 46 and previously  measured 0.8 cm.     Left iliac lymph node image 57 measures 0.9 cm and previously measured 1.1 cm. REPRODUCTIVE ORGANS: Status post hysterectomy and oophorectomy. URINARY BLADDER: No mass or calculus. BONES: No destructive bone lesion. ABDOMINAL WALL: No mass or hernia. ADDITIONAL COMMENTS: N/A     IMPRESSION  1. There has been a mild decrease in the peritoneal carcinomatosis.      2. There has been slight to mild decrease in the retroperitoneal adenopathy.     3. No ascites is identified. No definite pelvic mass is identified. CT of chests/abdomen/pelvis (7/12/21)  THYROID: Heterogeneous thyroid gland with multiple nodules bilaterally including  in the isthmus. MEDIASTINUM: Left subclavian chest port terminates in the SVC. No mediastinal  adenopathy. RAMEZ: No mass or lymphadenopathy. THORACIC AORTA: No dissection or aneurysm. MAIN PULMONARY ARTERY: Nonocclusive thrombus in segmental branches of the right  upper lobe pulmonary artery (3:47, 52), which are nonocclusive and likely  chronic. TRACHEA/BRONCHI: Patent. ESOPHAGUS: No wall thickening or dilatation. HEART: Normal in size. PLEURA: No effusion or pneumothorax. LUNGS: No nodule, mass, or airspace disease. LIVER: No mass or biliary dilatation. GALLBLADDER: Sludge in a nondistended gallbladder. SPLEEN: No mass. PANCREAS: No mass or ductal dilatation. ADRENALS: Unremarkable. KIDNEYS: No mass, calculus, or hydronephrosis. STOMACH: Unremarkable.   SMALL BOWEL: No dilatation or wall thickening. COLON: Scattered diverticula. APPENDIX: Surgically absent  PERITONEUM: Decreased volume and size of the omental nodularity along the  anterior peritoneal surface just above the umbilicus. No ascites. No  pneumoperitoneum. RETROPERITONEUM: No lymphadenopathy or aortic aneurysm. REPRODUCTIVE ORGANS: Surgically absent. URINARY BLADDER: No mass or calculus. BONES: No destructive bone lesion. ADDITIONAL COMMENTS: N/A     IMPRESSION  1. Resolved retroperitoneal lymphadenopathy. 2.  Decreased peritoneal carcinomatosis. 3.  No ascites. 4.  Nonocclusive thrombus and segmental right upper lobe pulmonary artery  branches which are nonocclusive and appear chronic. PET/CT (8/27/21)  HEAD/NECK: No apparent foci of abnormal hypermetabolism. Cerebral evaluation is  limited by normal intense activity.     CHEST: No foci of abnormal hypermetabolism. Resolution of left supraclavicular  jasper activity.     ABDOMEN/PELVIS:   Omental/peritoneal disease is near completely resolved; current max SUV, midline  omentum 2.4, previous max SUV 12. Resolved retroperitoneal and right deep pelvic jasper activity. Resolved left pelvic cul-de-sac mass/activity.     SKELETON: No foci of abnormal hypermetabolism in the axial and visualized  appendicular skeleton.     IMPRESSION  Abnormal PET activity is resolved other than minimal residual  activity in the omentum. 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 She completed six cycles of adjuvant Taxol and Carboplatin chemotherapy. She developed recurrent disease and was treated with the regimen of IV Keytruda and PO Lenvima. She completed 6 cycles before progression. Since it had been almost 8 years since her adjuvant Taxol/Carboplatin, I recommended that we retreat her with that regimen, though I suggested a lower dose of each.   She completed 8 cycles of that regimen with an excellent response. She now likely has recurrence once again, based upon her presentation, but we need a CT to confirm that. She is actually scheduled for a CT at the end of the month. I recommended that we move that up and get the CT as soon as possible. We will then discuss treatment options. An electronic signature was used to sign this note.     Claudia Graff MD  11/04/21

## 2021-11-09 ENCOUNTER — HOSPITAL ENCOUNTER (OUTPATIENT)
Dept: CT IMAGING | Age: 66
End: 2021-11-09
Attending: OBSTETRICS & GYNECOLOGY
Payer: MEDICARE

## 2021-11-09 ENCOUNTER — HOSPITAL ENCOUNTER (OUTPATIENT)
Dept: CT IMAGING | Age: 66
Discharge: HOME | End: 2021-11-09
Attending: OBSTETRICS & GYNECOLOGY
Payer: MEDICARE

## 2021-11-09 DIAGNOSIS — C54.1 PRIMARY SEROUS ADENOCARCINOMA OF ENDOMETRIUM (HCC): ICD-10-CM

## 2021-11-09 PROCEDURE — 74011000250 HC RX REV CODE- 250: Performed by: OBSTETRICS & GYNECOLOGY

## 2021-11-09 PROCEDURE — 74011000636 HC RX REV CODE- 636: Performed by: OBSTETRICS & GYNECOLOGY

## 2021-11-09 PROCEDURE — 71260 CT THORAX DX C+: CPT

## 2021-11-09 PROCEDURE — 74177 CT ABD & PELVIS W/CONTRAST: CPT

## 2021-11-09 RX ORDER — BARIUM SULFATE 20 MG/ML
450 SUSPENSION ORAL
Status: COMPLETED | OUTPATIENT
Start: 2021-11-09 | End: 2021-11-09

## 2021-11-09 RX ADMIN — BARIUM SULFATE 450 ML: 20 SUSPENSION ORAL at 09:45

## 2021-11-09 RX ADMIN — IOPAMIDOL 100 ML: 612 INJECTION, SOLUTION INTRAVENOUS at 11:15

## 2021-11-11 ENCOUNTER — OFFICE VISIT (OUTPATIENT)
Dept: GYNECOLOGY | Age: 66
End: 2021-11-11
Payer: MEDICARE

## 2021-11-11 VITALS
DIASTOLIC BLOOD PRESSURE: 79 MMHG | HEART RATE: 79 BPM | BODY MASS INDEX: 30.09 KG/M2 | WEIGHT: 180.6 LBS | SYSTOLIC BLOOD PRESSURE: 128 MMHG | HEIGHT: 65 IN

## 2021-11-11 DIAGNOSIS — C54.1 PRIMARY SEROUS ADENOCARCINOMA OF ENDOMETRIUM (HCC): Primary | ICD-10-CM

## 2021-11-11 DIAGNOSIS — Z79.899 NEED FOR PROPHYLACTIC CHEMOTHERAPY: ICD-10-CM

## 2021-11-11 PROCEDURE — G8417 CALC BMI ABV UP PARAM F/U: HCPCS | Performed by: OBSTETRICS & GYNECOLOGY

## 2021-11-11 PROCEDURE — 3017F COLORECTAL CA SCREEN DOC REV: CPT | Performed by: OBSTETRICS & GYNECOLOGY

## 2021-11-11 PROCEDURE — G8536 NO DOC ELDER MAL SCRN: HCPCS | Performed by: OBSTETRICS & GYNECOLOGY

## 2021-11-11 PROCEDURE — G8432 DEP SCR NOT DOC, RNG: HCPCS | Performed by: OBSTETRICS & GYNECOLOGY

## 2021-11-11 PROCEDURE — G8400 PT W/DXA NO RESULTS DOC: HCPCS | Performed by: OBSTETRICS & GYNECOLOGY

## 2021-11-11 PROCEDURE — 99213 OFFICE O/P EST LOW 20 MIN: CPT | Performed by: OBSTETRICS & GYNECOLOGY

## 2021-11-11 PROCEDURE — 1090F PRES/ABSN URINE INCON ASSESS: CPT | Performed by: OBSTETRICS & GYNECOLOGY

## 2021-11-11 PROCEDURE — G0463 HOSPITAL OUTPT CLINIC VISIT: HCPCS | Performed by: OBSTETRICS & GYNECOLOGY

## 2021-11-11 PROCEDURE — G8427 DOCREV CUR MEDS BY ELIG CLIN: HCPCS | Performed by: OBSTETRICS & GYNECOLOGY

## 2021-11-11 PROCEDURE — 1101F PT FALLS ASSESS-DOCD LE1/YR: CPT | Performed by: OBSTETRICS & GYNECOLOGY

## 2021-11-11 NOTE — PROGRESS NOTES
[facility] GYNECOLOGIC ONCOLOGY  [address] [facility], [address], [address] [facility]  [phone] [facility] [phone]    Office Visit    Patient ID:  Name: Alan Potter  MRN: [id number]   : 1955/66 y.o. Visit date: 2021     INTERVAL HISTORY:  Alan Potter is a  female who is an established patient with stage IA, grade 3 uterine carcinoma. She underwent laparoscopic hysterectomy with staging in 2013. She was recommended six cycles of adjuvant Taxol and Carboplatin, which she completed. We elected not to treat with pelvic radiation therapy due to her difficulty with chemotherapy. She had a CT of the abdomen/pelvis at [facility] that revealed retroperitoneal lymphadenopathy, peritoneal carcinomatosis, and trace ascites. I sent her for a PET/CT to better evaluate the extent of disease. She presented to review the results and discuss treatment. Her disease is not amenable to surgical resection. Systemic therapy was her only viable option. I thought immunotherapy would be the best choice, ahead of additional chemotherapy, specifically IV Keytruda and PO Lenvima. If that were not successful, we would consider retreatment with Taxol/Carboplatin or single agent Doxil. She completed 6 cycles of immunotherapy before demonstrating progression of disease. We decided upon retreatment with Taxol/Carboplatin. She completed 8 cycles of that regimen. Her CA-125 has responded and her CT after three cycles demonstrated a response. Her CT after completion of 6 cycles demonstrated further response. Her recent PET/CT demonstrated resolution of most of the abnormal PET activity, but there still was some residual activity in the omentum, suggesting persistent disease. We stopped treatment in 2021, as she was beginning not to tolerate treatment well. She presented recently complaining of abdominal pain and bloating.   She stated it was a stabbing pain in her left abdomen. The bloating has also been causing a little shortness of breath. I sent her for a CT of the chest/abdomen/pelvis to evaluate. She presents today to review those results. The scan demonstrated recurrent disease throughout the abdomen and pelvis. PMH:  Past Medical History:   Diagnosis Date    Abnormal Pap smear 1995    with f/u normal    Anemia     Arthritis     Asthma     Cancer ([facility] [address]     ENDOMETRIAL    Environmental allergies     GERD (gastroesophageal reflux disease)     Goiter     Other unknown and unspecified cause of morbidity or mortality     POSSIBLE ESOPHAGEAL SPASM, ? OBSTRUCTION DUE TO GOITER    PMB (postmenopausal bleeding)     S/P chemotherapy, time since greater than 12 weeks     LAST CHEMO 10/2014 PER PATIENT    Thyroid disease     goiter     PSH:  Past Surgical History:   Procedure Laterality Date    HX APPENDECTOMY      HX BUNIONECTOMY      HX GYN  3/13/13    TLH/BSO    HX HEENT  4/22/13    TOOTH REMOVED    HX ORTHOPAEDIC  1980'S    BUNIONECTOMY    HX VASCULAR ACCESS      port    [place] BREAST SURGERY PROCEDURE UNLISTED  1/12/16    right breast bx     SOC:  Social History     Socioeconomic History    Marital status:      Spouse name: Not on file    Number of children: Not on file    Years of education: Not on file    Highest education level: Not on file   Occupational History    Not on file   Tobacco Use    Smoking status: Never Smoker    Smokeless tobacco: Never Used   Substance and Sexual Activity    Alcohol use:  Yes     Alcohol/week: 0.0 standard drinks     Comment: RARE OCC    Drug use: No    Sexual activity: Not on file   Other Topics Concern    Not on file   Social History Narrative    Not on file     Social Determinants of Health     Financial Resource Strain:     Difficulty of Paying Living Expenses: Not on file   Food Insecurity:     Worried About Running Out of Food in the Last Year: Not on file    Blaze of Food in the Last Year: Not on file   Transportation Needs:     Lack of Transportation (Medical): Not on file    Lack of Transportation (Non-Medical): Not on file   Physical Activity:     Days of Exercise per Week: Not on file    Minutes of Exercise per Session: Not on file   Stress:     Feeling of Stress : Not on file   Social Connections:     Frequency of Communication with Friends and Family: Not on file    Frequency of Social Gatherings with Friends and Family: Not on file    Attends Christianity Services: Not on file    Active Member of [address] [place] [address] "[employer] or Organizations: Not on file    Attends Club or Organization Meetings: Not on file    Marital Status: Not on file   Intimate Partner Violence:     Fear of Current or Ex-Partner: Not on file    Emotionally Abused: Not on file    Physically Abused: Not on file    Sexually Abused: Not on file   Housing Stability:     Unable to Pay for Housing in the Last Year: Not on file    Number of [place] in the Last Year: Not on file    Unstable Housing in the Last Year: Not on file     Family History  Family History   Problem Relation Age of Onset    Cancer Maternal Aunt         breast    Heart Disease Mother     Diabetes Father     Cancer Sister         CERVICAL    Kidney Disease Brother     Diabetes Brother     Heart Attack Other     Arrhythmia Brother     Diabetes Brother     Anesth Problems Neg Hx      Medications:  Current Outpatient Medications on File Prior to Visit   Medication Sig Dispense Refill    rivaroxaban (Xarelto) 20 mg tab tablet Take 1 Tablet by mouth daily for 180 days. Begin AFTER completing your starter pack  Indications: blood clot in a deep vein of the extremities 30 Tablet 5    dexAMETHasone (Decadron) 4 mg tablet Take 2 tablets by mouth with breakfast the day before chemo also take 2 tablets with breakfast for 2 days after chemotherapy 36 Tab 1    lisinopriL (PRINIVIL, ZESTRIL) 10 mg tablet Take 1 Tab by mouth daily.  30 Tab 2    lisinopriL (PRINIVIL, ZESTRIL) 5 mg tablet Take 2 Tabs by mouth daily. 30 Tab 5    ondansetron (ZOFRAN ODT) 4 mg disintegrating tablet Take 1 Tab by mouth every eight (8) hours as needed for Nausea. Indications: nausea and vomiting caused by cancer drugs 30 Tab 3    lidocaine-prilocaine (EMLA) topical cream Apply small amount over port area and cover with band aid one hour before chemo 30 g 3    guaifenesin (MUCINEX PO) Take  by mouth.  loratadine (Claritin) 10 mg tablet Take 10 mg by mouth.  epinastine 0.05 % drop Apply  to eye.  raNITIdine (ZANTAC) 150 mg tablet ZANTAC TABS      cyclobenzaprine (FLEXERIL) 5 mg tablet take 1 tablet by mouth three times a day if needed  0    valACYclovir (VALTREX) 1 gram tablet Take 1 Tab by mouth three (3) times daily. 21 Tab 3    EPINEPHrine (EPIPEN) 0.3 mg/0.3 mL injection 0.3 mg by IntraMUSCular route once as needed.  ketotifen (ZADITOR) 0.025 % (0.035 %) ophthalmic solution Administer 1 Drop to both eyes every morning.  Cetirizine (ZYRTEC) 10 mg cap Take 10 mg by mouth nightly.  SAL ACID/UREA/PETROLATUM,WHITE (KERASAL FOOT EX) by Apply Externally route daily as needed.  ACCU-CHEK HELDER PLUS TEST STRP strip   0    NITROSTAT 0.4 mg SL tablet       CALCIUM CARBONATE (MARCELLO-SELTZER ANTACID PO) Take  by mouth daily as needed. No current facility-administered medications on file prior to visit. Allergies:   Allergies   Allergen Reactions    Latex Other (comments)     Burns skin    Acetone Shortness of Breath    Amoxicillin Anaphylaxis    Ciprofloxacin Hives    Pcn [Penicillins] Anaphylaxis    Buspar [Buspirone] Unknown (comments)     Has N&V and makes her feel \"weird\"    Azithromycin Hives    Egg Nausea Only    Other Medication Rash     POPPY seeds       ROS:  Negative     OBJECTIVE:    PHYSICAL EXAM  VITAL SIGNS: Visit Vitals  /79 (BP 1 Location: Right upper arm, BP Patient Position: Sitting)   Pulse 79   Ht 5' 5\" (1.651 m)   Wt 180 lb 9.6 oz (81.9 kg)   BMI 30.05 kg/m²      GENERAL KAIA: WD, WN, WF in NAD   HEENT: WNL   RESPIRATORY: CTA   CARDIOVASC: RRR   GASTROINT: Soft, NT, ND   MUSCULOSKEL: WNL   EXTREMITIES: Bilateral edema   PELVIC: Deferred   RECTAL: Deferred   PRIYANKA SURVEY: Negative   NEURO: Grossly intact     Lab Results   Component Value Date/Time    WBC 4.4 10/05/2021 05:00 PM    Hemoglobin (POC) 12.9 05/01/2013 09:53 AM    HGB 12.0 10/05/2021 05:00 PM    Hematocrit (POC) 38 05/01/2013 09:53 AM    HCT 36.5 10/05/2021 05:00 PM    PLATELET 614 41/40/3425 05:00 PM    MCV 99.7 (H) 10/05/2021 05:00 PM     Lab Results   Component Value Date/Time    Sodium 138 10/05/2021 05:00 PM    Potassium 3.6 10/05/2021 05:00 PM    Chloride 107 10/05/2021 05:00 PM    CO2 25 10/05/2021 05:00 PM    Anion gap 6 10/05/2021 05:00 PM    Glucose 213 (H) 10/05/2021 05:00 PM    BUN 16 10/05/2021 05:00 PM    Creatinine 0.53 (L) 10/05/2021 05:00 PM    BUN/Creatinine ratio 30 (H) 10/05/2021 05:00 PM    GFR est AA >60 10/05/2021 05:00 PM    GFR est non-AA >60 10/05/2021 05:00 PM    Calcium 8.7 10/05/2021 05:00 PM    Bilirubin, total 0.3 10/05/2021 05:00 PM    Alk. phosphatase 62 10/05/2021 05:00 PM    Protein, total 6.7 10/05/2021 05:00 PM    Albumin 3.4 (L) 10/05/2021 05:00 PM    Globulin 3.3 10/05/2021 05:00 PM    A-G Ratio 1.0 (L) 10/05/2021 05:00 PM    ALT (SGPT) 22 10/05/2021 05:00 PM    AST (SGOT) 16 10/05/2021 05:00 PM     Lab Results   Component Value Date/Time    CA-125 68 (H) 10/05/2021 05:00 PM    Cancer Ag (CA) 125 98.9 (H) 10/23/2020 10:02 AM       CT of chest/abdomen/pelvis (12/30/20)  CT chest:    There is stable multinodular thyroid enlargement. Left chest Port-A-Cath  terminates in the SVC. The aorta and main pulmonary artery are normal in  caliber. The heart size is normal.  There is no pericardial or pleural effusion.        There are no enlarged axillary, mediastinal, or hilar lymph nodes.      There is no lung mass or airspace opacity.   There is no pneumothorax. The  central airways are clear.     CT abdomen and pelvis: The liver, spleen, pancreas, and adrenal glands are normal. The gall bladder is  present  without intra- or extra-hepatic biliary dilatation.       The kidneys are symmetric without hydronephrosis.      There are no dilated bowel loops. The appendix is surgically absent.       Enlarged retroperitoneal lymph nodes are again demonstrated with a  representative left para-aortic lymph node measuring 1.3 x 1.8 cm (series 3,  image 76), previously 1.5 x 1.7 cm on 10/9/2020. A left common iliac lymph node  measures 1.1 x 1.5 cm (series 3, image 91), previously 0.8 x 1.4 cm.       Anterior peritoneal/mesenteric soft tissue nodularity is overall mildly  increased since 10/9/2020 with a representative measurement of 2.3 x 6.8 cm  (series 3, image 77), previously 2.6 x 5.5 cm.     Peritoneal soft tissue nodularity in the deep left pelvis measures 1.4 x 2.2 cm  (series 3, image 116), previously 2.9 x 3.1 cm.     There is no free fluid or free air. The aorta tapers without aneurysm.     The urinary bladder is normal. The uterus and ovaries are surgically absent.     There is no aggressive bony lesion.     IMPRESSION:   1. Mixed response in the abdomen and pelvis compared to 10/9/2020 with mildly  increased anterior peritoneal carcinomatosis. Stable to slightly increased  retroperitoneal lymphadenopathy. Decreased peritoneal soft tissue nodularity in  the deep left pelvis.     2. No evidence for metastatic disease in the chest.      CT of chest/abdomen/pelvis (2/25/21)  CHEST WALL:No axillary or supraclavicular adenopathy. THYROID: Large hypodensity in the thyroid gland unchanged. MEDIASTINUM: 12 mm cardiophrenic lymph node unchanged. RAMEZ: No mass or lymphadenopathy. THORACIC AORTA: No dissection or aneurysm. MAIN PULMONARY ARTERY: Normal in caliber. TRACHEA/BRONCHI: Patent. ESOPHAGUS: No wall thickening or dilatation.   HEART: Coronary atherosclerotic disease  PLEURA: No effusion or pneumothorax. LUNGS: No nodule, mass, or airspace disease. LIVER: No mass or biliary dilatation. GALLBLADDER: Unremarkable. BILIARY TREE: Unremarkable  SPLEEN: Unremarkable  PANCREAS: No mass or ductal dilatation. ADRENALS: Unremarkable. KIDNEYS: No mass, calculus, or hydronephrosis. STOMACH: Unremarkable. SMALL BOWEL: No dilatation or wall thickening. COLON: No dilatation or wall thickening. APPENDIX: Not visualized  PERITONEUM: Peritoneal carcinomatosis is again noted. 6.8 x 3.1 cm peritoneal  mass anteriorly is slightly increased in size. There is adjacent probably  confluent lesion measuring 3.4 x 2.6 cm which is increased in size from the  prior study. Deep left peritoneal nodularity is not significantly changed. Free  fluid in the pelvis increase in interval  RETROPERITONEUM: Left retroperitoneal lymph node measuring 1.5 x 1.4 cm slightly  decreased in size from 0.8 x 1.3 cm. Right retroperitoneal lymph node measuring  0.8 x 0.9 cm is increase in size of common iliac lymph node now measures 1.4 x  1.2 cm not significantly changed in size. REPRODUCTIVE ORGANS: Hysterectomy  URINARY BLADDER: No mass or calculus. BONES: No destructive bone lesion. ADDITIONAL COMMENTS: N/A     IMPRESSION  1. Overall increase in omental caking along the anterior peritoneum (the prior  study.     2.  Retroperitoneal adenopathy demonstrating variable response to therapy. Some  of these have increased in interval.     3.  Pelvic soft tissue in the left deep pelvis is not significantly changed  although not well visualized.     4.  Slight interval increase in pelvic free fluid      CT of abdomen/pelvis (5/5/21)  LOWER THORAX: Right cardiophrenic lymph node measures 2.9 cm and previously  measured 1.2 cm on image number 15. There is minor basilar atelectasis/scarring  LIVER: No mass. BILIARY TREE: There is suggestion of gallstones CBD is not dilated.   SPLEEN: within normal limits. PANCREAS: No mass or ductal dilatation. ADRENALS: Unremarkable. KIDNEYS: No mass, calculus, or hydronephrosis. STOMACH: Unremarkable. SMALL BOWEL: No dilatation or wall thickening. COLON: No dilatation or wall thickening. APPENDIX: Status post appendectomy  PERITONEUM: Peritoneal carcinomatosis appears improved. There is a confluent  density anteriorly in the peritoneum on image number 43 measuring 5.9 x 1.4 cm  which previously measured 9.8 x 1.9 cm. RETROPERITONEUM: Left retroperitoneal lymph node measures 0.7 cm image 45 and  previously measured 1.6 cm. Right retroperitoneal lymph node measures 0.7 cm image 46 and previously  measured 0.8 cm.     Left iliac lymph node image 57 measures 0.9 cm and previously measured 1.1 cm. REPRODUCTIVE ORGANS: Status post hysterectomy and oophorectomy. URINARY BLADDER: No mass or calculus. BONES: No destructive bone lesion. ABDOMINAL WALL: No mass or hernia. ADDITIONAL COMMENTS: N/A     IMPRESSION  1. There has been a mild decrease in the peritoneal carcinomatosis.      2. There has been slight to mild decrease in the retroperitoneal adenopathy.     3. No ascites is identified. No definite pelvic mass is identified. CT of chests/abdomen/pelvis (7/12/21)  THYROID: Heterogeneous thyroid gland with multiple nodules bilaterally including  in the isthmus. MEDIASTINUM: Left subclavian chest port terminates in the SVC. No mediastinal  adenopathy. RAMEZ: No mass or lymphadenopathy. THORACIC AORTA: No dissection or aneurysm. MAIN PULMONARY ARTERY: Nonocclusive thrombus in segmental branches of the right  upper lobe pulmonary artery (3:47, 52), which are nonocclusive and likely  chronic. TRACHEA/BRONCHI: Patent. ESOPHAGUS: No wall thickening or dilatation. HEART: Normal in size. PLEURA: No effusion or pneumothorax. LUNGS: No nodule, mass, or airspace disease. LIVER: No mass or biliary dilatation.   GALLBLADDER: Sludge in a nondistended gallbladder. SPLEEN: No mass. PANCREAS: No mass or ductal dilatation. ADRENALS: Unremarkable. KIDNEYS: No mass, calculus, or hydronephrosis. STOMACH: Unremarkable. SMALL BOWEL: No dilatation or wall thickening. COLON: Scattered diverticula. APPENDIX: Surgically absent  PERITONEUM: Decreased volume and size of the omental nodularity along the  anterior peritoneal surface just above the umbilicus. No ascites. No  pneumoperitoneum. RETROPERITONEUM: No lymphadenopathy or aortic aneurysm. REPRODUCTIVE ORGANS: Surgically absent. URINARY BLADDER: No mass or calculus. BONES: No destructive bone lesion. ADDITIONAL COMMENTS: N/A     IMPRESSION  1. Resolved retroperitoneal lymphadenopathy. 2.  Decreased peritoneal carcinomatosis. 3.  No ascites. 4.  Nonocclusive thrombus and segmental right upper lobe pulmonary artery  branches which are nonocclusive and appear chronic. PET/CT (8/27/21)  HEAD/NECK: No apparent foci of abnormal hypermetabolism. Cerebral evaluation is  limited by normal intense activity.     CHEST: No foci of abnormal hypermetabolism. Resolution of left supraclavicular  jasper activity.     ABDOMEN/PELVIS:   Omental/peritoneal disease is near completely resolved; current max SUV, midline  omentum 2.4, previous max SUV 12. Resolved retroperitoneal and right deep pelvic jasper activity. Resolved left pelvic cul-de-sac mass/activity.     SKELETON: No foci of abnormal hypermetabolism in the axial and visualized  appendicular skeleton.     IMPRESSION  Abnormal PET activity is resolved other than minimal residual  activity in the omentum. CT of chest/abdomen/pelvis (11/9/21)  Chest:     Tubes and lines: Central venous port catheter extends to the SVC.     Lungs: There is mild bibasilar atelectasis. No pulmonary nodule or mass is seen.     Lymph nodes: There is no mediastinal, hilar or axillary lymphadenopathy. Subcentimeter axillary and mediastinal lymph nodes are noted.     Pleura:  There is trace bilateral pleural fluid, new compared to prior CT dated  July 2021.     Heart: The heart is normal in size and there is no pericardial fluid.     Bones: No lytic or sclerotic osseous lesion is visualized.     The thyroid gland: Thyroid gland is normal in size. There are several nodules  within the thyroid gland, unchanged compared to prior CT dated July 2021.     Abdomen/pelvis:     Lymph nodes\peritoneum\retroperitoneum\omentum: There is a 1.3 cm x 1.3 cm  cardiophrenic lymph node which measured 8 mm x 7 mm on prior CT dated May 2021. There is a small amount of free fluid in the pelvis, new compared to prior CT  dated July 2021. There has been interval worsening of coalescing of omental  caking and intraperitoneal soft tissue nodularity. For example, within the  anterior mid abdomen, there is an omental soft tissue mass measuring  approximately 13.8 cm x 5.6 cm and measuring approximately 9.5 cm x 1.8 cm on  prior CT dated July 2021. There are also mildly enlarged upper abdominal  mesenteric lymph nodes which have increased in size. For example, there is a 2  cm x 1.4 cm celiac lymph node which measures approximately 6 mm x 4 mm on prior  CT dated July 2021. As an additional example, there is a 1.3 cm x 1.3 cm  mesenteric lymph node within the upper pelvis, measuring several millimeters in  size on prior CT dated July 2021. Retroperitoneal lymph nodes have increased in  size as well. For example there is a 1.8 cm x 1.3 cm right common iliac lymph  node which measured 8 mm x 7 mm on prior CT dated July 2021.     Liver: There is subtle capsular hepatic, new compared to prior CT dated July 2021. No intraparenchymal hepatic lesion is seen.     Spleen: The spleen is normal.     Pancreas: The pancreas is normal.     Adrenals: The adrenals are normal.     Gallbladder: Gallbladder is normal.     Kidneys: The kidneys are normal. There is no hydronephrosis.     Bowel:  There is new ill-defined soft tissue in the pelvis which appears to be  extending from the colon, likely representing subserosal soft tissue nodularity  and tethering. No dilated loop of large or small bowel is seen. Colonic  diverticulosis is noted.     Appendix: The appendix is surgically absent.     Urinary bladder: Urinary bladder is partially filled and grossly normal.     Bones: No lytic or sclerotic osseous lesion is visualized.     Miscellaneous: There is no free intraperitoneal gas. There is no focal fluid  collection to suggest abscess.     IMPRESSION  1. New, trace bilateral pleural fluid. Mild bibasilar atelectasis. 2.: Increase in size of cardiophrenic lymph node, now measuring 1.3 cm x 1.3 cm.  3. Interval worsening of coalescing of omental caking, intraperitoneal soft  tissue nodularity and intraperitoneal and retroperitoneal lymphadenopathy. 4. New, small amount of ascites in the pelvis.       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 She completed six cycles of adjuvant Taxol and Carboplatin chemotherapy. She developed recurrent disease and was treated with the regimen of IV Keytruda and PO Lenvima. She completed 6 cycles before progression. Since it had been almost 8 years since her adjuvant Taxol/Carboplatin, I recommended that we retreat her with that regimen, though I suggested a lower dose of each. She completed 8 cycles of that regimen with an excellent response. She now has recurrence once again. I reviewed her CT images with her and explained the results. I recommended single agent Doxil chemotherapy. She was counseled on the expected side effects and potential toxicities of the proposed regimen. Her questions were answered to her satisfaction and she wishes to proceed with the planned treatment. An electronic signature was used to sign this note.     Shirlene Ham MD  11/11/21

## 2021-11-15 ENCOUNTER — TELEPHONE (OUTPATIENT)
Dept: GYNECOLOGY | Age: 66
End: 2021-11-15

## 2021-11-15 RX ORDER — SODIUM CHLORIDE 0.9 % (FLUSH) 0.9 %
10 SYRINGE (ML) INJECTION AS NEEDED
Status: CANCELLED | OUTPATIENT
Start: 2021-11-22

## 2021-11-15 RX ORDER — DIPHENHYDRAMINE HYDROCHLORIDE 50 MG/ML
50 INJECTION, SOLUTION INTRAMUSCULAR; INTRAVENOUS AS NEEDED
Status: CANCELLED
Start: 2021-11-22

## 2021-11-15 RX ORDER — EPINEPHRINE 1 MG/ML
0.3 INJECTION, SOLUTION, CONCENTRATE INTRAVENOUS AS NEEDED
Status: CANCELLED | OUTPATIENT
Start: 2021-11-22

## 2021-11-15 RX ORDER — SODIUM CHLORIDE 9 MG/ML
10 INJECTION INTRAMUSCULAR; INTRAVENOUS; SUBCUTANEOUS AS NEEDED
Status: CANCELLED | OUTPATIENT
Start: 2021-11-22

## 2021-11-15 RX ORDER — DIPHENHYDRAMINE HYDROCHLORIDE 50 MG/ML
25 INJECTION, SOLUTION INTRAMUSCULAR; INTRAVENOUS AS NEEDED
Status: CANCELLED
Start: 2021-11-22

## 2021-11-15 RX ORDER — HYDROCORTISONE SODIUM SUCCINATE 100 MG/2ML
100 INJECTION, POWDER, FOR SOLUTION INTRAMUSCULAR; INTRAVENOUS AS NEEDED
Status: CANCELLED | OUTPATIENT
Start: 2021-11-22

## 2021-11-15 RX ORDER — ONDANSETRON 2 MG/ML
8 INJECTION INTRAMUSCULAR; INTRAVENOUS AS NEEDED
Status: CANCELLED | OUTPATIENT
Start: 2021-11-22

## 2021-11-15 RX ORDER — ALBUTEROL SULFATE 0.83 MG/ML
2.5 SOLUTION RESPIRATORY (INHALATION) AS NEEDED
Status: CANCELLED
Start: 2021-11-22

## 2021-11-15 RX ORDER — ACETAMINOPHEN 325 MG/1
650 TABLET ORAL AS NEEDED
Status: CANCELLED
Start: 2021-11-22

## 2021-11-15 RX ORDER — DEXTROSE MONOHYDRATE 50 MG/ML
25 INJECTION, SOLUTION INTRAVENOUS CONTINUOUS
Status: CANCELLED
Start: 2021-11-22

## 2021-11-15 RX ORDER — HEPARIN 100 UNIT/ML
300-500 SYRINGE INTRAVENOUS AS NEEDED
Status: CANCELLED
Start: 2021-11-22

## 2021-11-15 RX ORDER — PALONOSETRON 0.05 MG/ML
0.25 INJECTION, SOLUTION INTRAVENOUS ONCE
Status: CANCELLED | OUTPATIENT
Start: 2021-11-22 | End: 2021-11-22

## 2021-11-18 ENCOUNTER — HOSPITAL ENCOUNTER (OUTPATIENT)
Dept: NON INVASIVE DIAGNOSTICS | Age: 66
Discharge: HOME | End: 2021-11-18
Attending: OBSTETRICS & GYNECOLOGY
Payer: MEDICARE

## 2021-11-18 VITALS
BODY MASS INDEX: 30.08 KG/M2 | SYSTOLIC BLOOD PRESSURE: 128 MMHG | WEIGHT: 180.56 LBS | HEIGHT: 65 IN | DIASTOLIC BLOOD PRESSURE: 79 MMHG

## 2021-11-18 DIAGNOSIS — Z79.899 NEED FOR PROPHYLACTIC CHEMOTHERAPY: ICD-10-CM

## 2021-11-18 LAB
ECHO AO ROOT DIAM: 3.35 CM
ECHO AV AREA PEAK VELOCITY: 3.99 CM2
ECHO AV AREA/BSA PEAK VELOCITY: 2.1 CM2/M2
ECHO AV MEAN GRADIENT: 1.77 MMHG
ECHO AV PEAK GRADIENT: 2.81 MMHG
ECHO AV PEAK VELOCITY: 83.54 CM/S
ECHO AV VTI: 14.96 CM
ECHO LA AREA 4C: 12.4 CM2
ECHO LA MAJOR AXIS: 2.86 CM
ECHO LA MINOR AXIS: 1.51 CM
ECHO LA VOL 4C: 32.42 ML (ref 22–52)
ECHO LA VOLUME INDEX A4C: 17.15 ML/M2 (ref 16–28)
ECHO LV E' LATERAL VELOCITY: 5.48 CM/S
ECHO LV E' SEPTAL VELOCITY: 6.05 CM/S
ECHO LV INTERNAL DIMENSION DIASTOLIC: 4.43 CM (ref 3.9–5.3)
ECHO LV INTERNAL DIMENSION SYSTOLIC: 2.71 CM
ECHO LV IVSD: 0.81 CM (ref 0.6–0.9)
ECHO LV MASS 2D: 131.4 G (ref 67–162)
ECHO LV MASS INDEX 2D: 69.5 G/M2 (ref 43–95)
ECHO LV POSTERIOR WALL DIASTOLIC: 1.01 CM (ref 0.6–0.9)
ECHO LVOT DIAM: 2.23 CM
ECHO LVOT PEAK GRADIENT: 2.93 MMHG
ECHO LVOT PEAK VELOCITY: 85.59 CM/S
ECHO MV A VELOCITY: 81.03 CM/S
ECHO MV E DECELERATION TIME (DT): 92.88 MS
ECHO MV E VELOCITY: 55.97 CM/S
ECHO MV E/A RATIO: 0.69
ECHO MV E/E' LATERAL: 10.21
ECHO MV E/E' RATIO (AVERAGED): 9.73
ECHO MV E/E' SEPTAL: 9.25
ECHO PV MAX VELOCITY: 93.13 CM/S
ECHO PV PEAK INSTANTANEOUS GRADIENT SYSTOLIC: 3.47 MMHG

## 2021-11-18 PROCEDURE — 93306 TTE W/DOPPLER COMPLETE: CPT

## 2021-11-19 LAB
ALBUMIN SERPL-MCNC: 3.9 G/DL (ref 3.8–4.8)
ALBUMIN/GLOB SERPL: 1.8 {RATIO} (ref 1.2–2.2)
ALP SERPL-CCNC: 68 IU/L (ref 44–121)
ALT SERPL-CCNC: 19 IU/L (ref 0–32)
AST SERPL-CCNC: 17 IU/L (ref 0–40)
BASOPHILS # BLD AUTO: 0 X10E3/UL (ref 0–0.2)
BASOPHILS NFR BLD AUTO: 1 %
BILIRUB SERPL-MCNC: 0.2 MG/DL (ref 0–1.2)
BUN SERPL-MCNC: 13 MG/DL (ref 8–27)
BUN/CREAT SERPL: 20 (ref 12–28)
CALCIUM SERPL-MCNC: 8.6 MG/DL (ref 8.7–10.3)
CANCER AG125 SERPL-ACNC: 395 U/ML (ref 0–38.1)
CHLORIDE SERPL-SCNC: 104 MMOL/L (ref 96–106)
CO2 SERPL-SCNC: 22 MMOL/L (ref 20–29)
CREAT SERPL-MCNC: 0.65 MG/DL (ref 0.57–1)
EOSINOPHIL # BLD AUTO: 0.1 X10E3/UL (ref 0–0.4)
EOSINOPHIL NFR BLD AUTO: 1 %
ERYTHROCYTE [DISTWIDTH] IN BLOOD BY AUTOMATED COUNT: 13.4 % (ref 11.7–15.4)
GLOBULIN SER CALC-MCNC: 2.2 G/DL (ref 1.5–4.5)
GLUCOSE SERPL-MCNC: 224 MG/DL (ref 65–99)
HCT VFR BLD AUTO: 37.4 % (ref 34–46.6)
HGB BLD-MCNC: 12.2 G/DL (ref 11.1–15.9)
IMM GRANULOCYTES # BLD AUTO: 0 X10E3/UL (ref 0–0.1)
IMM GRANULOCYTES NFR BLD AUTO: 1 %
LYMPHOCYTES # BLD AUTO: 0.9 X10E3/UL (ref 0.7–3.1)
LYMPHOCYTES NFR BLD AUTO: 16 %
MAGNESIUM SERPL-MCNC: 1.9 MG/DL (ref 1.6–2.3)
MCH RBC QN AUTO: 31.5 PG (ref 26.6–33)
MCHC RBC AUTO-ENTMCNC: 32.6 G/DL (ref 31.5–35.7)
MCV RBC AUTO: 97 FL (ref 79–97)
MONOCYTES # BLD AUTO: 0.4 X10E3/UL (ref 0.1–0.9)
MONOCYTES NFR BLD AUTO: 7 %
NEUTROPHILS # BLD AUTO: 4.3 X10E3/UL (ref 1.4–7)
NEUTROPHILS NFR BLD AUTO: 74 %
PLATELET # BLD AUTO: 191 X10E3/UL (ref 150–450)
POTASSIUM SERPL-SCNC: 4.2 MMOL/L (ref 3.5–5.2)
PROT SERPL-MCNC: 6.1 G/DL (ref 6–8.5)
RBC # BLD AUTO: 3.87 X10E6/UL (ref 3.77–5.28)
SODIUM SERPL-SCNC: 141 MMOL/L (ref 134–144)
WBC # BLD AUTO: 5.7 X10E3/UL (ref 3.4–10.8)

## 2021-11-22 ENCOUNTER — HOSPITAL ENCOUNTER (OUTPATIENT)
Dept: INFUSION THERAPY | Age: 66
Discharge: HOME | End: 2021-11-22
Payer: MEDICARE

## 2021-11-22 VITALS
HEIGHT: 65 IN | TEMPERATURE: 97.8 F | HEART RATE: 75 BPM | RESPIRATION RATE: 18 BRPM | SYSTOLIC BLOOD PRESSURE: 117 MMHG | WEIGHT: 180 LBS | BODY MASS INDEX: 29.99 KG/M2 | DIASTOLIC BLOOD PRESSURE: 59 MMHG

## 2021-11-22 DIAGNOSIS — C54.9 MALIGNANT NEOPLASM OF CORPUS UTERI, EXCEPT ISTHMUS (HCC): Primary | ICD-10-CM

## 2021-11-22 PROCEDURE — 77030012965 HC NDL HUBR BBMI -A

## 2021-11-22 PROCEDURE — 74011000258 HC RX REV CODE- 258: Performed by: OBSTETRICS & GYNECOLOGY

## 2021-11-22 PROCEDURE — APPNB15 APP NON BILLABLE TIME 0-15 MINS: Performed by: PHYSICIAN ASSISTANT

## 2021-11-22 PROCEDURE — 96375 TX/PRO/DX INJ NEW DRUG ADDON: CPT

## 2021-11-22 PROCEDURE — 74011250636 HC RX REV CODE- 250/636: Performed by: OBSTETRICS & GYNECOLOGY

## 2021-11-22 PROCEDURE — 96413 CHEMO IV INFUSION 1 HR: CPT

## 2021-11-22 RX ORDER — SODIUM CHLORIDE 9 MG/ML
10 INJECTION INTRAMUSCULAR; INTRAVENOUS; SUBCUTANEOUS AS NEEDED
Status: ACTIVE | OUTPATIENT
Start: 2021-11-22 | End: 2021-11-22

## 2021-11-22 RX ORDER — PALONOSETRON 0.05 MG/ML
0.25 INJECTION, SOLUTION INTRAVENOUS ONCE
Status: COMPLETED | OUTPATIENT
Start: 2021-11-22 | End: 2021-11-22

## 2021-11-22 RX ORDER — SODIUM CHLORIDE 0.9 % (FLUSH) 0.9 %
10 SYRINGE (ML) INJECTION AS NEEDED
Status: DISPENSED | OUTPATIENT
Start: 2021-11-22 | End: 2021-11-22

## 2021-11-22 RX ORDER — DEXTROSE MONOHYDRATE 50 MG/ML
25 INJECTION, SOLUTION INTRAVENOUS CONTINUOUS
Status: DISPENSED | OUTPATIENT
Start: 2021-11-22 | End: 2021-11-22

## 2021-11-22 RX ORDER — HEPARIN 100 UNIT/ML
300-500 SYRINGE INTRAVENOUS AS NEEDED
Status: ACTIVE | OUTPATIENT
Start: 2021-11-22 | End: 2021-11-22

## 2021-11-22 RX ADMIN — PALONOSETRON 0.25 MG: 0.05 INJECTION, SOLUTION INTRAVENOUS at 11:48

## 2021-11-22 RX ADMIN — Medication 10 ML: at 13:36

## 2021-11-22 RX ADMIN — DOXORUBICIN HYDROCHLORIDE 77.6 MG: 2 INJECTABLE, LIPOSOMAL INTRAVENOUS at 12:14

## 2021-11-22 RX ADMIN — DEXTROSE MONOHYDRATE 25 ML/HR: 5 INJECTION, SOLUTION INTRAVENOUS at 11:49

## 2021-11-22 RX ADMIN — HEPARIN 300 UNITS: 100 SYRINGE at 13:37

## 2021-11-22 NOTE — PROGRESS NOTES
[facility] [address]ias [address], [address]  P [phone]  F [phone]      Patient ID:  Name:  Elder Earl  MRN:  [id number]  :  1955/66 y.o. Date:  2021      Loreto Alfaro MD: Kelley Brown MD  PCP: Joceline Escobar MD     Primary Diagnosis: uterine cancer  Date of Diagnosis: 3/2013      Current Agent: Doxil  Cycle: 1    HPI:  73 y.o. 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 She received adjuvant Taxol/carboplatin. She was found with recurrence following 7 years DFI in  presenting with CT pelvic adenopathy and peritoneal carcinomatosis. She was treated with combination Keytruda/Lenvima until progression followed by carboplatin/taxol retreatment with near complete resolution of disease. She showed definitive progression in 2021 demonstrated on CT with new, trace bilateral pleural fluid, increase in size of cardiophrenic lymph node, now measuring 1.3 cm x 1.3 cm and interval worsening of coalescing of omental caking, intraperitoneal soft tissue nodularity and intraperitoneal and retroperitoneal lymphadenopathy. She was recommended Doxil palliative therapy. [place]  [place] [facility] [place]  FINAL PATHOLOGIC DIAGNOSIS  1.  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  Histologic grade: High grade  Myometrial invasion: Depth of invasion: 0.3 cm  Myometrial thickness: 1.9 cm  Involvement of cervix: Not involved  Extent of involvement of other organs:  Right ovary: Not involved  Left ovary: Not involved  Right fallopian tube: Not involved  Left fallopian tube: Not involved  Right parametrial tissue: Not involved  Left parametrial tissue: Not involved  Lymphovascular invasion: Not identified  Additional pathologic findings:  Focal adenomyosis  Benign simple cyst of left ovary  Paratubal cysts  Pathologic staging (pTNM):  Primary tumor (pT): pT1a  Regional lymph nodes (pN): pN0  Pelvic lymph nodes:  Number examined: 17  Number involved: 0  Distant metastasis (M): Not applicable  2. Omentum, omentectomy:Benign adipose tissue, negative for carcinoma  3. Lymph nodes, right pelvic, excision:Seven lymph nodes, negative for metastatic carcinoma (0/7)  4. Lymph nodes, left pelvic, excision:Ten lymph nodes, negative for metastatic carcinoma (0/10)    MMR proficient   5/2013 - 9/2013 Taxol/carboplatin x 6 cycles   10/9/20 PET/CT:    1. Peritoneal carcinomatosis. 2. 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 3. Left pelvic cul-de-sac tumor   11/3/20 - 2/16/21 Keytruda/lenvima x 6 cycles     Reduced lenvima 8mg d/t HTN   12/30/20 CT CAP:   Mixed response in the abdomen and pelvis compared to 10/9/2020 with mildly increased anterior peritoneal carcinomatosis. Stable to slightly increased retroperitoneal lymphadenopathy. Decreased peritoneal soft tissue nodularity in the deep left pelvis. No evidence for metastatic disease in the chest.   2/25/21 CT CAP:   1. Overall increase in omental caking along the anterior peritoneum (the prior study. 2.  Retroperitoneal adenopathy demonstrating variable response to therapy. Some of these have increased in interval  3. Pelvic soft tissue in the left deep pelvis is not significantly changed although not well visualized.   4.  Slight interval increase in pelvic free fluid   3/9/21 - 8/2021 [id number]/Carboplatin AUC5 x 8 cycles   6/2021 LLE DVT on xarelto   7/12/21 CT CAP:   1. Resolved retroperitoneal lymphadenopathy. 2.  Decreased peritoneal carcinomatosis. 3.  No ascites. 4.  Nonocclusive thrombus and segmental right upper lobe pulmonary artery branches which are nonocclusive and appear chronic.    8/2021 PET/CT:  CHEST: No foci of abnormal hypermetabolism. Resolution of left supraclavicular jasper activity. ABDOMEN/PELVIS:   Omental/peritoneal disease is near completely resolved; current max SUV, midline omentum 2.4, previous max SUV 12. Resolved retroperitoneal and right deep pelvic jasper activity. Resolved left pelvic cul-de-sac mass/activity.   SKELETON: No foci of abnormal hypermetabolism in the axial and visualized appendicular skeleton.     IMPRESSION: Abnormal PET activity is resolved other than minimal residual activity in the omentum. 11/2021 CT CAP:  1. New, trace bilateral pleural fluid. Mild bibasilar atelectasis. 2.: Increase in size of cardiophrenic lymph node, now measuring 1.3 cm x 1.3 cm.   3. Interval worsening of coalescing of omental caking, intraperitoneal soft tissue nodularity and intraperitoneal and retroperitoneal lymphadenopathy. 4. New, small amount of ascites in the pelvis. 11/22/21 Doxil initiated               SUBJECTIVE:  Todd Obrien presents for chemotherapy. Overall doing well. Recent fall while working outside, sprained her shoulder. Tripped on a fence post.  No bruising/bleeding. She is working full time with infrequent time off if she is too fatigued to work. Bilateral LE swelling resolved. No CP/SOB, wheezing, palpitations. Able to ambulate, remains independent, minimal \"neuropathy\" with cold feet. She has a good appetite, has occasional acid reflux using Pepcid and delayed GI function with dulcolax/benefiber/MoM. Remains active at work full time, unable to work from home. No residual effects s/p her therapy in 2013. She lives alone. Does not feel overly close to her children.   Her daughter lives next door and brother nearby. Her son lives near [place]. She feels most supported by her brother Gt Aburto, close friend Mylene and her work family. She still has difficulty feeling comfortable asking family for help. Looking into/questions [facility] with SS. Work for her is a positive stressor, has few hobbies and uncertain of purpose w/o work. ROS  Constitutional: no weight loss, fever, night sweats  Respiratory: above  Cardiovascular: above  Heme: No abnormal bleeding, no epistaxis. Gastrointestinal: no abdominal pain, no dysphagia, change in bowel habits, or black or bloody stools  Genito-Urinary: no dysuria, trouble voiding, or hematuria  Musculoskeletal: negative for - gait disturbance or joint swelling  Neurological: negative for - behavioral changes, dizziness, headaches, memory loss or numbness/tingling  Derm: negative  Psych: negative for anxiety and depression       OBJECTIVE:  Physical Exam  Visit Vitals  BP (!) 117/59   Pulse 75   Temp 97.8 °F (36.6 °C)   Resp 18   Ht 5' 5\" (1.651 m)   Wt 180 lb (81.6 kg)   Breastfeeding No   BMI 29.95 kg/m²          General:  alert, cooperative, no distress       HEENT: without pallor, sclera without jaundice, oral mucosa without lesions,      Cardiac:  Regular rate and rhythm        Lungs:  clear to auscultation bilaterally          Port:  clean, dry, no drainage  Abdomen:  soft, protuberant, nondistended, nontender, no gross fluid wave.        Lymph:  no lymphadenopathy   Extremity: no edema    Wt Readings from Last 3 Encounters:   11/18/21 180 lb 8.9 oz (81.9 kg)   11/11/21 180 lb 9.6 oz (81.9 kg)   11/04/21 182 lb (82.6 kg)     Lab Results   Component Value Date/Time    Sodium 141 11/18/2021 01:41 PM    Potassium 4.2 11/18/2021 01:41 PM    Chloride 104 11/18/2021 01:41 PM    CO2 22 11/18/2021 01:41 PM    Anion gap 6 10/05/2021 05:00 PM    Glucose 224 (H) 11/18/2021 01:41 PM    BUN 13 11/18/2021 01:41 PM    Creatinine 0.65 11/18/2021 01:41 PM BUN/Creatinine ratio 20 11/18/2021 01:41 PM    GFR est  11/18/2021 01:41 PM    GFR est non-AA 93 11/18/2021 01:41 PM    Calcium 8.6 (L) 11/18/2021 01:41 PM    Bilirubin, total 0.2 11/18/2021 01:41 PM    Alk. phosphatase 68 11/18/2021 01:41 PM    Protein, total 6.1 11/18/2021 01:41 PM    Albumin 3.9 11/18/2021 01:41 PM    Globulin 3.3 10/05/2021 05:00 PM    A-G Ratio 1.8 11/18/2021 01:41 PM    ALT (SGPT) 19 11/18/2021 01:41 PM    AST (SGOT) 17 11/18/2021 01:41 PM     Lab Results   Component Value Date/Time    WBC 5.7 11/18/2021 01:41 PM    Hemoglobin (POC) 12.9 05/01/2013 09:53 AM    HGB 12.2 11/18/2021 01:41 PM    Hematocrit (POC) 38 05/01/2013 09:53 AM    HCT 37.4 11/18/2021 01:41 PM    PLATELET 873 08/29/5419 01:41 PM    MCV 97 11/18/2021 01:41 PM     Lab Results   Component Value Date/Time    ABS. NEUTROPHILS 4.3 11/18/2021 01:41 PM         No results for input(s): WBC, ANEU, HGB, HCT, MCV, MCH, PLT, HGBEXT, HCTEXT, PLTEXT, HGBEXT, HCTEXT, PLTEXT in the last 72 hours. No results for input(s): NA, K, CL, GLU, BUN, CREA, CA, MG, PHOS, ALB, TBIL, TBILI, ALT in the last 72 hours. No lab exists for component: CO3, ALBP, SGOT      Tumor markers  CA-125   Date Value Ref Range Status   10/05/2021 68 (H) 1.5 - 35.0 U/mL Final     Comment:     ** Note new reference range and method **  Results may vary depending on . Method used is [employer]     07/20/2021 15 1.5 - 35.0 U/mL Final     Comment:     ** Note new reference range and method **  Results may vary depending on . Method used is [employer]     06/22/2021 11 1.5 - 35.0 U/mL Final     Comment:     ** Note new reference range and method **  Results may vary depending on . Method used is [employer]     06/01/2021 11 1.5 - 35.0 U/mL Final     Comment:     ** Note new reference range and method **  Results may vary depending on .   Method used is [employer] 05/11/2021 12 1.5 - 35.0 U/mL Final     Comment:     ** Note new reference range and method **  Results may vary depending on . Method used is [employer]     04/20/2021 17 1.5 - 35.0 U/mL Final     Comment:     ** Note new reference range and method **  Results may vary depending on . Method used is [employer]           Patient Active Problem List   Diagnosis Code    Malignant neoplasm of corpus uteri, except isthmus ([facility] C54.9     Past Medical History:   Diagnosis Date    Abnormal Pap smear 1995    with f/u normal    Anemia     Arthritis     Asthma     Cancer ([facility] [address] [facility]     ENDOMETRIAL    Environmental allergies     GERD (gastroesophageal reflux disease)     Goiter     Other unknown and unspecified cause of morbidity or mortality     POSSIBLE ESOPHAGEAL SPASM, ? OBSTRUCTION DUE TO GOITER    PMB (postmenopausal bleeding)     S/P chemotherapy, time since greater than 12 weeks     LAST CHEMO 10/2014 PER PATIENT    Thyroid disease     goiter     Prior to Admission medications    Medication Sig Start Date End Date Taking? Authorizing Provider   rivaroxaban (Xarelto) 20 mg tab tablet Take 1 Tablet by mouth daily for 180 days. Begin AFTER completing your starter pack  Indications: blood clot in a deep vein of the extremities 7/21/21 1/17/22  Daniele Cline PA-C   dexAMETHasone (Decadron) 4 mg tablet Take 2 tablets by mouth with breakfast the day before chemo also take 2 tablets with breakfast for 2 days after chemotherapy 3/8/21   Kayli Jackson MD   lisinopriL (PRINIVIL, ZESTRIL) 10 mg tablet Take 1 Tab by mouth daily. 1/6/21   Kayli Jackson MD   lisinopriL (PRINIVIL, ZESTRIL) 5 mg tablet Take 2 Tabs by mouth daily. 1/5/21   Hieu Cline PA-C   ondansetron (ZOFRAN ODT) 4 mg disintegrating tablet Take 1 Tab by mouth every eight (8) hours as needed for Nausea.  Indications: nausea and vomiting caused by cancer drugs 10/22/20   Kayli Jackson MD lidocaine-prilocaine (EMLA) topical cream Apply small amount over port area and cover with band aid one hour before chemo 10/22/20   Antonio Sánchez MD   guaifenesin (MUCINEX PO) Take  by mouth. Provider, Historical   loratadine (Claritin) 10 mg tablet Take 10 mg by mouth. Provider, Historical   epinastine 0.05 % drop Apply  to eye. Provider, Historical   raNITIdine (ZANTAC) 150 mg tablet ZANTAC TABS 9/29/11   Provider, Historical   cyclobenzaprine (FLEXERIL) 5 mg tablet take 1 tablet by mouth three times a day if needed 12/5/16   Provider, Historical   valACYclovir (VALTREX) 1 gram tablet Take 1 Tab by mouth three (3) times daily. 12/15/16   Antonio Sánchez MD   EPINEPHrine (EPIPEN) 0.3 mg/0.3 mL injection 0.3 mg by IntraMUSCular route once as needed. Provider, Historical   ketotifen (ZADITOR) 0.025 % (0.035 %) ophthalmic solution Administer 1 Drop to both eyes every morning. Provider, Historical   Cetirizine (ZYRTEC) 10 mg cap Take 10 mg by mouth nightly. Provider, Historical   SAL ACID/UREA/PETROLATUM,WHITE (KERASAL FOOT EX) by Apply Externally route daily as needed. Provider, Historical   ACCU-CHEK HELDER PLUS TEST STRP strip  7/3/15   Provider, Historical   NITROSTAT 0.4 mg SL tablet  9/22/14   Provider, Historical   CALCIUM CARBONATE (MARCELLO-SELTZER ANTACID PO) Take  by mouth daily as needed.     Provider, Historical     Allergies   Allergen Reactions    Latex Other (comments)     Burns skin    Acetone Shortness of Breath    Amoxicillin Anaphylaxis    Ciprofloxacin Hives    Pcn [Penicillins] Anaphylaxis    Buspar [Buspirone] Unknown (comments)     Has N&V and makes her feel \"weird\"    Azithromycin Hives    Egg Nausea Only    Other Medication Rash     POPPY seeds     Family History   Problem Relation Age of Onset    Cancer Maternal Aunt         breast    Heart Disease Mother     Diabetes Father     Cancer Sister         CERVICAL    Kidney Disease Brother     Diabetes Brother    Ferny Rodriges Heart Attack Other     Arrhythmia Brother     Diabetes Brother     Anesth Problems Neg Hx    Maternal grandmother \"female\" cancer  in 45s      CT Results (most recent):  [place] from [place] encounter on 21    CT CHEST W CONT    Narrative  INDICATION: Primary serious adenocarcinoma of endometrium. CT of the chest, abdomen and pelvis is performed with 5 mm collimation. Study is  performed with PO and 100 cc of nonionic IV [id number]. Sagittal and coronal  reformatted images were also performed. CT dose reduction was achieved with the use of the standardized protocol  tailored for this examination and automatic exposure control for dose  modulation. Direct comparison is made to prior CT of the chest, abdomen and pelvis dated  2021. Comparison is also made to prior CT PET scan dated 2021. Chest:    Tubes and lines: Central venous port catheter extends to the SVC. Lungs: There is mild bibasilar atelectasis. No pulmonary nodule or mass is seen. Lymph nodes: There is no mediastinal, hilar or axillary lymphadenopathy. Subcentimeter axillary and mediastinal lymph nodes are noted. Pleura: There is trace bilateral pleural fluid, new compared to prior CT dated  2021. Heart: The heart is normal in size and there is no pericardial fluid. Bones: No lytic or sclerotic osseous lesion is visualized. The thyroid gland: Thyroid gland is normal in size. There are several nodules  within the thyroid gland, unchanged compared to prior CT dated 2021. Abdomen/pelvis:    Lymph nodes\peritoneum\retroperitoneum\omentum: There is a 1.3 cm x 1.3 cm  cardiophrenic lymph node which measured 8 mm x 7 mm on prior CT dated May 2021. There is a small amount of free fluid in the pelvis, new compared to prior CT  dated 2021. There has been interval worsening of coalescing of omental  caking and intraperitoneal soft tissue nodularity.  For example, within the  anterior mid abdomen, there is an omental soft tissue mass measuring  approximately 13.8 cm x 5.6 cm and measuring approximately 9.5 cm x 1.8 cm on  prior CT dated July 2021. There are also mildly enlarged upper abdominal  mesenteric lymph nodes which have increased in size. For example, there is a 2  cm x 1.4 cm celiac lymph node which measures approximately 6 mm x 4 mm on prior  CT dated July 2021. As an additional example, there is a 1.3 cm x 1.3 cm  mesenteric lymph node within the upper pelvis, measuring several millimeters in  size on prior CT dated July 2021. Retroperitoneal lymph nodes have increased in  size as well. For example there is a 1.8 cm x 1.3 cm right common iliac lymph  node which measured 8 mm x 7 mm on prior CT dated July 2021. Liver: There is subtle capsular hepatic, new compared to prior CT dated July 2021. No intraparenchymal hepatic lesion is seen. Spleen: The spleen is normal.    Pancreas: The pancreas is normal.    Adrenals: The adrenals are normal.    Gallbladder: Gallbladder is normal.    Kidneys: The kidneys are normal. There is no hydronephrosis. Bowel: There is new ill-defined soft tissue in the pelvis which appears to be  extending from the colon, likely representing subserosal soft tissue nodularity  and tethering. No dilated loop of large or small bowel is seen. Colonic  diverticulosis is noted. Appendix: The appendix is surgically absent. Urinary bladder: Urinary bladder is partially filled and grossly normal.    Bones: No lytic or sclerotic osseous lesion is visualized. Miscellaneous: There is no free intraperitoneal gas. There is no focal fluid  collection to suggest abscess. Impression  1. New, trace bilateral pleural fluid. Mild bibasilar atelectasis.   2.: Increase in size of cardiophrenic lymph node, now measuring 1.3 cm x 1.3 cm.  3. Interval worsening of coalescing of omental caking, intraperitoneal soft  tissue nodularity and intraperitoneal and retroperitoneal lymphadenopathy. 4. New, small amount of ascites in the pelvis. IMPRESSION/PLAN:  73 y.o. 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 She has again progressed and prescribed Doxil therapy. ECO    Labs/chart reviewed. Chart review - nonbillable  -Doxil cycle 1. -HTN: Controlled. -Hyperglycemia: increased A1C. Needs diabetic mgmt. -LLE DVT 2021.  On Xarelto.  -Hx of thyroid nodular goiter, benign follicular bx.  -Chronic med issues:    Hx asthma - inhaler PRN   Hx esophageal spasm - uses nitroglycerin  -Reflux: Pepcid          Nicole Palma PA-C

## 2021-11-22 NOTE — PROGRESS NOTES
[facility] Chemo Progress Note    Date: 2021    Name: Rodolfo Berkowitz    MRN: [id number]         : 1955    [address] Ms. Shalonda Joel Arrived to [facility] for D1 C1 Liposomal Doxorubicin ambulatory in stable condition. Assessment was completed, no acute issues at this time, no new complaints voiced. Port accessed with positive blood return. Labs drawn and sent for processing. Chemotherapy Flowsheet 2021   Cycle C1 D1   Date 2021   Drug / Regimen Doxorubicin   Pre Meds given   Notes given         Patient denies SOB, fever, cough, general not feeling well. Patient denies recent exposure to someone who has tested positive for COVID-19. Patient denies having contact with anyone who has a pending COVID test.      Ms. Junie Douglass vitals were reviewed. Patient Vitals for the past 12 hrs:   Temp Pulse Resp BP   21 1331  75  (!) 117/59   21 1039 97.8 °F (36.6 °C) 88 18 116/76         Lab results were obtained and reviewed. No results found for this or any previous visit (from the past 12 hour(s)). Pre-medications  were administered as ordered and chemotherapy was initiated.   Medications Administered     dextrose 5% infusion     Admin Date  2021 Action  New Bag Dose  25 mL/hr Rate  25 mL/hr Route  IntraVENous Administered By  Vaishnavi Ely RN          heparin (porcine) pf 300-500 Units     Admin Date  2021 Action  Given Dose  300 Units Route  InterCATHeter Administered By  Vaishnavi Ely RN          liposomal DOXOrubicin (DOXIL/LIPODOX) 77.6 mg in dextrose 5% 250 mL, overfill volume 25 mL chemo infusion     Admin Date  2021 Action  New Bag Dose  77.6 mg Rate  242.6 mL/hr Route  IntraVENous Administered By  Vaishnavi Ely RN          palonosetron HCl (ALOXI) injection 0.25 mg     Admin Date  2021 Action  Given Dose  0.25 mg Route  IntraVENous Administered By  Vaishnavi Ely RN          sodium chloride (NS) flush 10 mL     Admin Date  2021 Action  Given Dose  10 mL Route  IntraVENous Administered By  Priya Ramirez RN                  [id number] Patient tolerated treatment well.  port maintained positive blood return throughout treatment. Port flushed, heparinized and de accessed per protocol. Patient was discharged from [facility] in stable condition. Patient aware of next appointment.      Future Appointments   Date Time Provider David Jara   12/16/2021  8:45 AM Gianni Holder MD CAR[facility]   12/21/2021  1:00 PM C1 [facility] [address] [place] [address]   1/18/2022 10:30 AM MD GREER Byrd [facility]   1/18/2022 11:00 AM B3 [facility] Yolis Melchor RN  November 22, 2021

## 2021-11-29 ENCOUNTER — APPOINTMENT (OUTPATIENT)
Dept: INFUSION THERAPY | Age: 66
End: 2021-11-29
Payer: MEDICARE

## 2021-11-30 ENCOUNTER — APPOINTMENT (OUTPATIENT)
Dept: INFUSION THERAPY | Age: 66
End: 2021-11-30
Payer: MEDICARE

## 2021-12-14 RX ORDER — PALONOSETRON 0.05 MG/ML
0.25 INJECTION, SOLUTION INTRAVENOUS ONCE
Status: CANCELLED | OUTPATIENT
Start: 2021-12-21 | End: 2021-12-21

## 2021-12-14 RX ORDER — DIPHENHYDRAMINE HYDROCHLORIDE 50 MG/ML
25 INJECTION, SOLUTION INTRAMUSCULAR; INTRAVENOUS AS NEEDED
Status: CANCELLED
Start: 2021-12-21

## 2021-12-14 RX ORDER — DEXTROSE MONOHYDRATE 50 MG/ML
25 INJECTION, SOLUTION INTRAVENOUS CONTINUOUS
Status: CANCELLED
Start: 2021-12-21

## 2021-12-14 RX ORDER — SODIUM CHLORIDE 9 MG/ML
10 INJECTION INTRAMUSCULAR; INTRAVENOUS; SUBCUTANEOUS AS NEEDED
Status: CANCELLED | OUTPATIENT
Start: 2021-12-21

## 2021-12-14 RX ORDER — DIPHENHYDRAMINE HYDROCHLORIDE 50 MG/ML
50 INJECTION, SOLUTION INTRAMUSCULAR; INTRAVENOUS AS NEEDED
Status: CANCELLED
Start: 2021-12-21

## 2021-12-14 RX ORDER — HEPARIN 100 UNIT/ML
300-500 SYRINGE INTRAVENOUS AS NEEDED
Status: CANCELLED
Start: 2021-12-21

## 2021-12-14 RX ORDER — HYDROCORTISONE SODIUM SUCCINATE 100 MG/2ML
100 INJECTION, POWDER, FOR SOLUTION INTRAMUSCULAR; INTRAVENOUS AS NEEDED
Status: CANCELLED | OUTPATIENT
Start: 2021-12-21

## 2021-12-14 RX ORDER — ACETAMINOPHEN 325 MG/1
650 TABLET ORAL AS NEEDED
Status: CANCELLED
Start: 2021-12-21

## 2021-12-14 RX ORDER — SODIUM CHLORIDE 0.9 % (FLUSH) 0.9 %
10 SYRINGE (ML) INJECTION AS NEEDED
Status: CANCELLED | OUTPATIENT
Start: 2021-12-21

## 2021-12-14 RX ORDER — EPINEPHRINE 1 MG/ML
0.3 INJECTION, SOLUTION, CONCENTRATE INTRAVENOUS AS NEEDED
Status: CANCELLED | OUTPATIENT
Start: 2021-12-21

## 2021-12-14 RX ORDER — ONDANSETRON 2 MG/ML
8 INJECTION INTRAMUSCULAR; INTRAVENOUS AS NEEDED
Status: CANCELLED | OUTPATIENT
Start: 2021-12-21

## 2021-12-14 RX ORDER — ALBUTEROL SULFATE 0.83 MG/ML
2.5 SOLUTION RESPIRATORY (INHALATION) AS NEEDED
Status: CANCELLED
Start: 2021-12-21

## 2021-12-15 NOTE — TELEPHONE ENCOUNTER
Lab order mailed to the pt as requested.
Mail lab order to patient prior to appt on June 22, 2017 with Dr. Deepali Garrett.
CHF

## 2021-12-16 ENCOUNTER — VIRTUAL VISIT (OUTPATIENT)
Dept: GYNECOLOGY | Age: 66
End: 2021-12-16
Payer: MEDICARE

## 2021-12-16 ENCOUNTER — HOSPITAL ENCOUNTER (OUTPATIENT)
Dept: ULTRASOUND IMAGING | Age: 66
Discharge: HOME | End: 2021-12-16
Attending: OBSTETRICS & GYNECOLOGY
Payer: MEDICARE

## 2021-12-16 VITALS
WEIGHT: 175 LBS | OXYGEN SATURATION: 99 % | DIASTOLIC BLOOD PRESSURE: 72 MMHG | HEIGHT: 66 IN | BODY MASS INDEX: 28.12 KG/M2 | HEART RATE: 82 BPM | RESPIRATION RATE: 16 BRPM | SYSTOLIC BLOOD PRESSURE: 138 MMHG | TEMPERATURE: 98 F

## 2021-12-16 DIAGNOSIS — R18.0 MALIGNANT ASCITES: ICD-10-CM

## 2021-12-16 DIAGNOSIS — C54.1 PRIMARY SEROUS ADENOCARCINOMA OF ENDOMETRIUM (HCC): ICD-10-CM

## 2021-12-16 DIAGNOSIS — C54.1 PRIMARY SEROUS ADENOCARCINOMA OF ENDOMETRIUM (HCC): Primary | ICD-10-CM

## 2021-12-16 PROCEDURE — 49083 ABD PARACENTESIS W/IMAGING: CPT

## 2021-12-16 PROCEDURE — 99442 PR PHYS/QHP TELEPHONE EVALUATION 11-20 MIN: CPT | Performed by: OBSTETRICS & GYNECOLOGY

## 2021-12-16 NOTE — PROGRESS NOTES
[facility] GYNECOLOGIC ONCOLOGY  [address] [place] [address], [address], [address] [facility]  [phone] [facility] [phone]    Office Visit    Patient ID:  Name: Corrina Reed  MRN: [id number]   : 1955/66 y.o. Visit date: 2021     INTERVAL HISTORY:  Corrina Reed is a  female who is an established patient with stage IA, grade 3 uterine carcinoma. She underwent laparoscopic hysterectomy with staging in 2013. She was recommended six cycles of adjuvant Taxol and Carboplatin, which she completed. We elected not to treat with pelvic radiation therapy due to her difficulty with chemotherapy. She had a CT of the abdomen/pelvis at [facility] that revealed retroperitoneal lymphadenopathy, peritoneal carcinomatosis, and trace ascites. I sent her for a PET/CT to better evaluate the extent of disease. She presented to review the results and discuss treatment. Her disease is not amenable to surgical resection. Systemic therapy was her only viable option. I thought immunotherapy would be the best choice, ahead of additional chemotherapy, specifically IV Keytruda and PO Lenvima. If that were not successful, we would consider retreatment with Taxol/Carboplatin or single agent Doxil. She completed 6 cycles of immunotherapy before demonstrating progression of disease. We decided upon retreatment with Taxol/Carboplatin. She completed 8 cycles of that regimen. Her CA-125 has responded and her CT after three cycles demonstrated a response. Her CT after completion of 6 cycles demonstrated further response. A subsequent PET/CT demonstrated resolution of most of the abnormal PET activity, but there still was some residual activity in the omentum, suggesting persistent disease. We stopped treatment in 2021, as she was beginning not to tolerate treatment well. She presented recently complaining of abdominal pain and bloating.   She stated it was a stabbing pain in her left abdomen. The bloating has also been causing a little shortness of breath. I sent her for a CT of the chest/abdomen/pelvis to evaluate. She presented to review those results. The scan demonstrated recurrent disease throughout the abdomen and pelvis. I recommended single agent Doxil. She has completed one cycle of Doxil. She reports worsening abdominal distention and is not able to eat very much. Reports early satiety. PMH:  Past Medical History:   Diagnosis Date    Abnormal Pap smear 1995    with f/u normal    Anemia     Arthritis     Asthma     Cancer ([facility] [address]     ENDOMETRIAL    Environmental allergies     GERD (gastroesophageal reflux disease)     Goiter     Other unknown and unspecified cause of morbidity or mortality     POSSIBLE ESOPHAGEAL SPASM, ? OBSTRUCTION DUE TO GOITER    PMB (postmenopausal bleeding)     S/P chemotherapy, time since greater than 12 weeks     LAST CHEMO 10/2014 PER PATIENT    Thyroid disease     goiter     PSH:  Past Surgical History:   Procedure Laterality Date    HX APPENDECTOMY      HX BUNIONECTOMY      HX GYN  3/13/13    TLH/BSO    HX HEENT  4/22/13    TOOTH REMOVED    HX ORTHOPAEDIC  1980'S    BUNIONECTOMY    HX VASCULAR ACCESS      port    [place] BREAST SURGERY PROCEDURE UNLISTED  1/12/16    right breast bx     SOC:  Social History     Socioeconomic History    Marital status:      Spouse name: Not on file    Number of children: Not on file    Years of education: Not on file    Highest education level: Not on file   Occupational History    Not on file   Tobacco Use    Smoking status: Never Smoker    Smokeless tobacco: Never Used   Substance and Sexual Activity    Alcohol use:  Yes     Alcohol/week: 0.0 standard drinks     Comment: RARE OCC    Drug use: No    Sexual activity: Not on file   Other Topics Concern    Not on file   Social History Narrative    Not on file     Social Determinants of Health     Financial Resource Strain:  Difficulty of Paying Living Expenses: Not on file   Food Insecurity:     Worried About Running Out of Food in the Last Year: Not on file    Ran Out of Food in the Last Year: Not on file   Transportation Needs:     Lack of Transportation (Medical): Not on file    Lack of Transportation (Non-Medical): Not on file   Physical Activity:     Days of Exercise per Week: Not on file    Minutes of Exercise per Session: Not on file   Stress:     Feeling of Stress : Not on file   Social Connections:     Frequency of Communication with Friends and Family: Not on file    Frequency of Social Gatherings with Friends and Family: Not on file    Attends Baptism Services: Not on file    Active Member of [address] [place] [address] [employer] or Organizations: Not on file    Attends Club or Organization Meetings: Not on file    Marital Status: Not on file   Intimate Partner Violence:     Fear of Current or Ex-Partner: Not on file    Emotionally Abused: Not on file    Physically Abused: Not on file    Sexually Abused: Not on file   Housing Stability:     Unable to Pay for Housing in the Last Year: Not on file    Number of [place] in the Last Year: Not on file    Unstable Housing in the Last Year: Not on file     Family History  Family History   Problem Relation Age of Onset    Cancer Maternal Aunt         breast    Heart Disease Mother     Diabetes Father     Cancer Sister         CERVICAL    Kidney Disease Brother     Diabetes Brother     Heart Attack Other     Arrhythmia Brother     Diabetes Brother     Anesth Problems Neg Hx      Medications:  Current Outpatient Medications on File Prior to Visit   Medication Sig Dispense Refill    rivaroxaban (Xarelto) 20 mg tab tablet Take 1 Tablet by mouth daily for 180 days.  Begin AFTER completing your starter pack  Indications: blood clot in a deep vein of the extremities 30 Tablet 5    dexAMETHasone (Decadron) 4 mg tablet Take 2 tablets by mouth with breakfast the day before chemo also take 2 tablets with breakfast for 2 days after chemotherapy 36 Tab 1    lisinopriL (PRINIVIL, ZESTRIL) 10 mg tablet Take 1 Tab by mouth daily. 30 Tab 2    lisinopriL (PRINIVIL, ZESTRIL) 5 mg tablet Take 2 Tabs by mouth daily. 30 Tab 5    ondansetron (ZOFRAN ODT) 4 mg disintegrating tablet Take 1 Tab by mouth every eight (8) hours as needed for Nausea. Indications: nausea and vomiting caused by cancer drugs 30 Tab 3    lidocaine-prilocaine (EMLA) topical cream Apply small amount over port area and cover with band aid one hour before chemo 30 g 3    guaifenesin (MUCINEX PO) Take  by mouth.  loratadine (Claritin) 10 mg tablet Take 10 mg by mouth.  epinastine 0.05 % drop Apply  to eye.  raNITIdine (ZANTAC) 150 mg tablet ZANTAC TABS      cyclobenzaprine (FLEXERIL) 5 mg tablet take 1 tablet by mouth three times a day if needed  0    valACYclovir (VALTREX) 1 gram tablet Take 1 Tab by mouth three (3) times daily. 21 Tab 3    EPINEPHrine (EPIPEN) 0.3 mg/0.3 mL injection 0.3 mg by IntraMUSCular route once as needed.  ketotifen (ZADITOR) 0.025 % (0.035 %) ophthalmic solution Administer 1 Drop to both eyes every morning.  Cetirizine (ZYRTEC) 10 mg cap Take 10 mg by mouth nightly.  SAL ACID/UREA/PETROLATUM,WHITE (KERASAL FOOT EX) by Apply Externally route daily as needed.  ACCU-CHEK HELDER PLUS TEST STRP strip   0    NITROSTAT 0.4 mg SL tablet       CALCIUM CARBONATE (MARCELLO-SELTZER ANTACID PO) Take  by mouth daily as needed. No current facility-administered medications on file prior to visit. Allergies:   Allergies   Allergen Reactions    Latex Other (comments)     Burns skin    Acetone Shortness of Breath    Amoxicillin Anaphylaxis    Ciprofloxacin Hives    Pcn [Penicillins] Anaphylaxis    Buspar [Buspirone] Unknown (comments)     Has N&V and makes her feel \"weird\"    Azithromycin Hives    Egg Nausea Only    Other Medication Rash     POPPY seeds       ROS:  Negative OBJECTIVE:    PHYSICAL EXAM  VITAL SIGNS: There were no vitals taken for this visit. GENERAL KAIA:    HEENT:    RESPIRATORY:    CARDIOVASC:    GASTROINT:    MUSCULOSKEL:    EXTREMITIES:    PELVIC:    RECTAL:    PRIYANKA SURVEY:    NEURO:      Lab Results   Component Value Date/Time    WBC 5.7 11/18/2021 01:41 PM    Hemoglobin (POC) 12.9 05/01/2013 09:53 AM    HGB 12.2 11/18/2021 01:41 PM    Hematocrit (POC) 38 05/01/2013 09:53 AM    HCT 37.4 11/18/2021 01:41 PM    PLATELET 732 35/91/1112 01:41 PM    MCV 97 11/18/2021 01:41 PM     Lab Results   Component Value Date/Time    Sodium 141 11/18/2021 01:41 PM    Potassium 4.2 11/18/2021 01:41 PM    Chloride 104 11/18/2021 01:41 PM    CO2 22 11/18/2021 01:41 PM    Anion gap 6 10/05/2021 05:00 PM    Glucose 224 (H) 11/18/2021 01:41 PM    BUN 13 11/18/2021 01:41 PM    Creatinine 0.65 11/18/2021 01:41 PM    BUN/Creatinine ratio 20 11/18/2021 01:41 PM    GFR est  11/18/2021 01:41 PM    GFR est non-AA 93 11/18/2021 01:41 PM    Calcium 8.6 (L) 11/18/2021 01:41 PM    Bilirubin, total 0.2 11/18/2021 01:41 PM    Alk. phosphatase 68 11/18/2021 01:41 PM    Protein, total 6.1 11/18/2021 01:41 PM    Albumin 3.9 11/18/2021 01:41 PM    Globulin 3.3 10/05/2021 05:00 PM    A-G Ratio 1.8 11/18/2021 01:41 PM    ALT (SGPT) 19 11/18/2021 01:41 PM    AST (SGOT) 17 11/18/2021 01:41 PM     Lab Results   Component Value Date/Time    CA-125 68 (H) 10/05/2021 05:00 PM    Cancer Ag (CA) 125 395.0 (H) 11/18/2021 01:41 PM       CT of chest/abdomen/pelvis (12/30/20)  CT chest:    There is stable multinodular thyroid enlargement. Left chest Port-A-Cath  terminates in the SVC. The aorta and main pulmonary artery are normal in  caliber. The heart size is normal.  There is no pericardial or pleural effusion.        There are no enlarged axillary, mediastinal, or hilar lymph nodes.      There is no lung mass or airspace opacity. There is no pneumothorax.   The  central airways are clear.     CT abdomen and pelvis: The liver, spleen, pancreas, and adrenal glands are normal. The gall bladder is  present  without intra- or extra-hepatic biliary dilatation.       The kidneys are symmetric without hydronephrosis.      There are no dilated bowel loops. The appendix is surgically absent.       Enlarged retroperitoneal lymph nodes are again demonstrated with a  representative left para-aortic lymph node measuring 1.3 x 1.8 cm (series 3,  image 76), previously 1.5 x 1.7 cm on 10/9/2020. A left common iliac lymph node  measures 1.1 x 1.5 cm (series 3, image 91), previously 0.8 x 1.4 cm.       Anterior peritoneal/mesenteric soft tissue nodularity is overall mildly  increased since 10/9/2020 with a representative measurement of 2.3 x 6.8 cm  (series 3, image 77), previously 2.6 x 5.5 cm.     Peritoneal soft tissue nodularity in the deep left pelvis measures 1.4 x 2.2 cm  (series 3, image 116), previously 2.9 x 3.1 cm.     There is no free fluid or free air. The aorta tapers without aneurysm.     The urinary bladder is normal. The uterus and ovaries are surgically absent.     There is no aggressive bony lesion.     IMPRESSION:   1. Mixed response in the abdomen and pelvis compared to 10/9/2020 with mildly  increased anterior peritoneal carcinomatosis. Stable to slightly increased  retroperitoneal lymphadenopathy. Decreased peritoneal soft tissue nodularity in  the deep left pelvis.     2. No evidence for metastatic disease in the chest.      CT of chest/abdomen/pelvis (2/25/21)  CHEST WALL:No axillary or supraclavicular adenopathy. THYROID: Large hypodensity in the thyroid gland unchanged. MEDIASTINUM: 12 mm cardiophrenic lymph node unchanged. RAMEZ: No mass or lymphadenopathy. THORACIC AORTA: No dissection or aneurysm. MAIN PULMONARY ARTERY: Normal in caliber. TRACHEA/BRONCHI: Patent. ESOPHAGUS: No wall thickening or dilatation.   HEART: Coronary atherosclerotic disease  PLEURA: No effusion or pneumothorax. LUNGS: No nodule, mass, or airspace disease. LIVER: No mass or biliary dilatation. GALLBLADDER: Unremarkable. BILIARY TREE: Unremarkable  SPLEEN: Unremarkable  PANCREAS: No mass or ductal dilatation. ADRENALS: Unremarkable. KIDNEYS: No mass, calculus, or hydronephrosis. STOMACH: Unremarkable. SMALL BOWEL: No dilatation or wall thickening. COLON: No dilatation or wall thickening. APPENDIX: Not visualized  PERITONEUM: Peritoneal carcinomatosis is again noted. 6.8 x 3.1 cm peritoneal  mass anteriorly is slightly increased in size. There is adjacent probably  confluent lesion measuring 3.4 x 2.6 cm which is increased in size from the  prior study. Deep left peritoneal nodularity is not significantly changed. Free  fluid in the pelvis increase in interval  RETROPERITONEUM: Left retroperitoneal lymph node measuring 1.5 x 1.4 cm slightly  decreased in size from 0.8 x 1.3 cm. Right retroperitoneal lymph node measuring  0.8 x 0.9 cm is increase in size of common iliac lymph node now measures 1.4 x  1.2 cm not significantly changed in size. REPRODUCTIVE ORGANS: Hysterectomy  URINARY BLADDER: No mass or calculus. BONES: No destructive bone lesion. ADDITIONAL COMMENTS: N/A     IMPRESSION  1. Overall increase in omental caking along the anterior peritoneum (the prior  study.     2.  Retroperitoneal adenopathy demonstrating variable response to therapy. Some  of these have increased in interval.     3.  Pelvic soft tissue in the left deep pelvis is not significantly changed  although not well visualized.     4.  Slight interval increase in pelvic free fluid      CT of abdomen/pelvis (5/5/21)  LOWER THORAX: Right cardiophrenic lymph node measures 2.9 cm and previously  measured 1.2 cm on image number 15. There is minor basilar atelectasis/scarring  LIVER: No mass. BILIARY TREE: There is suggestion of gallstones CBD is not dilated. SPLEEN: within normal limits.   PANCREAS: No mass or ductal dilatation. ADRENALS: Unremarkable. KIDNEYS: No mass, calculus, or hydronephrosis. STOMACH: Unremarkable. SMALL BOWEL: No dilatation or wall thickening. COLON: No dilatation or wall thickening. APPENDIX: Status post appendectomy  PERITONEUM: Peritoneal carcinomatosis appears improved. There is a confluent  density anteriorly in the peritoneum on image number 43 measuring 5.9 x 1.4 cm  which previously measured 9.8 x 1.9 cm. RETROPERITONEUM: Left retroperitoneal lymph node measures 0.7 cm image 45 and  previously measured 1.6 cm. Right retroperitoneal lymph node measures 0.7 cm image 46 and previously  measured 0.8 cm.     Left iliac lymph node image 57 measures 0.9 cm and previously measured 1.1 cm. REPRODUCTIVE ORGANS: Status post hysterectomy and oophorectomy. URINARY BLADDER: No mass or calculus. BONES: No destructive bone lesion. ABDOMINAL WALL: No mass or hernia. ADDITIONAL COMMENTS: N/A     IMPRESSION  1. There has been a mild decrease in the peritoneal carcinomatosis.      2. There has been slight to mild decrease in the retroperitoneal adenopathy.     3. No ascites is identified. No definite pelvic mass is identified. CT of chests/abdomen/pelvis (7/12/21)  THYROID: Heterogeneous thyroid gland with multiple nodules bilaterally including  in the isthmus. MEDIASTINUM: Left subclavian chest port terminates in the SVC. No mediastinal  adenopathy. RAMEZ: No mass or lymphadenopathy. THORACIC AORTA: No dissection or aneurysm. MAIN PULMONARY ARTERY: Nonocclusive thrombus in segmental branches of the right  upper lobe pulmonary artery (3:47, 52), which are nonocclusive and likely  chronic. TRACHEA/BRONCHI: Patent. ESOPHAGUS: No wall thickening or dilatation. HEART: Normal in size. PLEURA: No effusion or pneumothorax. LUNGS: No nodule, mass, or airspace disease. LIVER: No mass or biliary dilatation. GALLBLADDER: Sludge in a nondistended gallbladder. SPLEEN: No mass.   PANCREAS: No mass or ductal dilatation. ADRENALS: Unremarkable. KIDNEYS: No mass, calculus, or hydronephrosis. STOMACH: Unremarkable. SMALL BOWEL: No dilatation or wall thickening. COLON: Scattered diverticula. APPENDIX: Surgically absent  PERITONEUM: Decreased volume and size of the omental nodularity along the  anterior peritoneal surface just above the umbilicus. No ascites. No  pneumoperitoneum. RETROPERITONEUM: No lymphadenopathy or aortic aneurysm. REPRODUCTIVE ORGANS: Surgically absent. URINARY BLADDER: No mass or calculus. BONES: No destructive bone lesion. ADDITIONAL COMMENTS: N/A     IMPRESSION  1. Resolved retroperitoneal lymphadenopathy. 2.  Decreased peritoneal carcinomatosis. 3.  No ascites. 4.  Nonocclusive thrombus and segmental right upper lobe pulmonary artery  branches which are nonocclusive and appear chronic. PET/CT (8/27/21)  HEAD/NECK: No apparent foci of abnormal hypermetabolism. Cerebral evaluation is  limited by normal intense activity.     CHEST: No foci of abnormal hypermetabolism. Resolution of left supraclavicular  jasper activity.     ABDOMEN/PELVIS:   Omental/peritoneal disease is near completely resolved; current max SUV, midline  omentum 2.4, previous max SUV 12. Resolved retroperitoneal and right deep pelvic jasper activity. Resolved left pelvic cul-de-sac mass/activity.     SKELETON: No foci of abnormal hypermetabolism in the axial and visualized  appendicular skeleton.     IMPRESSION  Abnormal PET activity is resolved other than minimal residual  activity in the omentum. CT of chest/abdomen/pelvis (11/9/21)  Chest:     Tubes and lines: Central venous port catheter extends to the SVC.     Lungs: There is mild bibasilar atelectasis. No pulmonary nodule or mass is seen.     Lymph nodes: There is no mediastinal, hilar or axillary lymphadenopathy. Subcentimeter axillary and mediastinal lymph nodes are noted.     Pleura:  There is trace bilateral pleural fluid, new compared to prior CT dated  July 2021.     Heart: The heart is normal in size and there is no pericardial fluid.     Bones: No lytic or sclerotic osseous lesion is visualized.     The thyroid gland: Thyroid gland is normal in size. There are several nodules  within the thyroid gland, unchanged compared to prior CT dated July 2021.     Abdomen/pelvis:     Lymph nodes\peritoneum\retroperitoneum\omentum: There is a 1.3 cm x 1.3 cm  cardiophrenic lymph node which measured 8 mm x 7 mm on prior CT dated May 2021. There is a small amount of free fluid in the pelvis, new compared to prior CT  dated July 2021. There has been interval worsening of coalescing of omental  caking and intraperitoneal soft tissue nodularity. For example, within the  anterior mid abdomen, there is an omental soft tissue mass measuring  approximately 13.8 cm x 5.6 cm and measuring approximately 9.5 cm x 1.8 cm on  prior CT dated July 2021. There are also mildly enlarged upper abdominal  mesenteric lymph nodes which have increased in size. For example, there is a 2  cm x 1.4 cm celiac lymph node which measures approximately 6 mm x 4 mm on prior  CT dated July 2021. As an additional example, there is a 1.3 cm x 1.3 cm  mesenteric lymph node within the upper pelvis, measuring several millimeters in  size on prior CT dated July 2021. Retroperitoneal lymph nodes have increased in  size as well. For example there is a 1.8 cm x 1.3 cm right common iliac lymph  node which measured 8 mm x 7 mm on prior CT dated July 2021.     Liver: There is subtle capsular hepatic, new compared to prior CT dated July 2021. No intraparenchymal hepatic lesion is seen.     Spleen: The spleen is normal.     Pancreas: The pancreas is normal.     Adrenals: The adrenals are normal.     Gallbladder: Gallbladder is normal.     Kidneys: The kidneys are normal. There is no hydronephrosis.     Bowel:  There is new ill-defined soft tissue in the pelvis which appears to be  extending from the colon, likely representing subserosal soft tissue nodularity  and tethering. No dilated loop of large or small bowel is seen. Colonic  diverticulosis is noted.     Appendix: The appendix is surgically absent.     Urinary bladder: Urinary bladder is partially filled and grossly normal.     Bones: No lytic or sclerotic osseous lesion is visualized.     Miscellaneous: There is no free intraperitoneal gas. There is no focal fluid  collection to suggest abscess.     IMPRESSION  1. New, trace bilateral pleural fluid. Mild bibasilar atelectasis. 2.: Increase in size of cardiophrenic lymph node, now measuring 1.3 cm x 1.3 cm.  3. Interval worsening of coalescing of omental caking, intraperitoneal soft  tissue nodularity and intraperitoneal and retroperitoneal lymphadenopathy. 4. New, small amount of ascites in the pelvis.       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 She completed six cycles of adjuvant Taxol and Carboplatin chemotherapy. She developed recurrent disease and was treated with the regimen of IV Keytruda and PO Lenvima. She completed 6 cycles before progression. Since it had been almost 8 years since her adjuvant Taxol/Carboplatin, I recommended that we retreat her with that regimen, though I suggested a lower dose of each. She completed 8 cycles of that regimen with an excellent response. She now has recurrence once again. I recommended single agent Doxil chemotherapy. She has completed one cycle so far. She's not doing that well, but I suspect that is more from disease than from treatment. She likely has a build-up of ascites. I have recommended that she get a paracentesis. Hopefully the fluid will not reaccumulate as she continues on her chemotherapy. Breann Oconnor is a 73 y.o. female, evaluated via audio-only technology on 12/16/2021 for Chemotherapy (Virtual telephone visit. Labs on 12/17 and cor C2 doxil on 12/21)      Assessment & Plan:   Diagnoses and all orders for this visit:    1. Primary serous adenocarcinoma of endometrium (HCC)  -     US PARACENTESIS ABD W IMAGING; Future    2. Malignant ascites  -     US PARACENTESIS ABD W IMAGING; Future          Subjective:       Prior to Admission medications    Medication Sig Start Date End Date Taking? Authorizing Provider   rivaroxaban (Xarelto) 20 mg tab tablet Take 1 Tablet by mouth daily for 180 days. Begin AFTER completing your starter pack  Indications: blood clot in a deep vein of the extremities 7/21/21 1/17/22 Yes Hieu Cline PA-C   dexAMETHasone (Decadron) 4 mg tablet Take 2 tablets by mouth with breakfast the day before chemo also take 2 tablets with breakfast for 2 days after chemotherapy 3/8/21  Yes Bailee Truong MD   lisinopriL (PRINIVIL, ZESTRIL) 10 mg tablet Take 1 Tab by mouth daily. 1/6/21  Yes Bailee Truong MD   lisinopriL (PRINIVIL, ZESTRIL) 5 mg tablet Take 2 Tabs by mouth daily. 1/5/21  Yes Hieu Cline PA-C   ondansetron (ZOFRAN ODT) 4 mg disintegrating tablet Take 1 Tab by mouth every eight (8) hours as needed for Nausea. Indications: nausea and vomiting caused by cancer drugs 10/22/20  Yes Bailee Truong MD   lidocaine-prilocaine (EMLA) topical cream Apply small amount over port area and cover with band aid one hour before chemo 10/22/20  Yes Bailee Truong MD   guaifenesin (MUCINEX PO) Take  by mouth. Yes Provider, Historical   loratadine (Claritin) 10 mg tablet Take 10 mg by mouth. Yes Provider, Historical   epinastine 0.05 % drop Apply  to eye. Yes Provider, Historical   raNITIdine (ZANTAC) 150 mg tablet ZANTAC TABS 9/29/11  Yes Provider, Historical   cyclobenzaprine (FLEXERIL) 5 mg tablet take 1 tablet by mouth three times a day if needed 12/5/16  Yes Provider, Historical   valACYclovir (VALTREX) 1 gram tablet Take 1 Tab by mouth three (3) times daily.  12/15/16  Yes Bailee Truong MD   EPINEPHrine (EPIPEN) 0.3 mg/0.3 mL injection 0.3 mg by IntraMUSCular route once as needed. Yes Provider, Historical   ketotifen (ZADITOR) 0.025 % (0.035 %) ophthalmic solution Administer 1 Drop to both eyes every morning. Yes Provider, Historical   Cetirizine (ZYRTEC) 10 mg cap Take 10 mg by mouth nightly. Yes Provider, Historical   SAL ACID/UREA/PETROLATUM,WHITE (KERASAL FOOT EX) by Apply Externally route daily as needed. Yes Provider, Historical   ACCU-CHEK HELDER PLUS TEST STRP strip  7/3/15  Yes Provider, Historical   NITROSTAT 0.4 mg SL tablet  9/22/14  Yes Provider, Historical   CALCIUM CARBONATE (MARCELLO-SELTZER ANTACID PO) Take  by mouth daily as needed. Yes Provider, Historical     Patient Active Problem List   Diagnosis Code    Malignant neoplasm of corpus uteri, except isthmus ([facility] [address] C54.9     Patient Active Problem List    Diagnosis Date Noted    Malignant neoplasm of corpus uteri, except isthmus ([address] 02/28/2013     Current Outpatient Medications   Medication Sig Dispense Refill    rivaroxaban (Xarelto) 20 mg tab tablet Take 1 Tablet by mouth daily for 180 days. Begin AFTER completing your starter pack  Indications: blood clot in a deep vein of the extremities 30 Tablet 5    dexAMETHasone (Decadron) 4 mg tablet Take 2 tablets by mouth with breakfast the day before chemo also take 2 tablets with breakfast for 2 days after chemotherapy 36 Tab 1    lisinopriL (PRINIVIL, ZESTRIL) 10 mg tablet Take 1 Tab by mouth daily. 30 Tab 2    lisinopriL (PRINIVIL, ZESTRIL) 5 mg tablet Take 2 Tabs by mouth daily. 30 Tab 5    ondansetron (ZOFRAN ODT) 4 mg disintegrating tablet Take 1 Tab by mouth every eight (8) hours as needed for Nausea. Indications: nausea and vomiting caused by cancer drugs 30 Tab 3    lidocaine-prilocaine (EMLA) topical cream Apply small amount over port area and cover with band aid one hour before chemo 30 g 3    guaifenesin (MUCINEX PO) Take  by mouth.       loratadine (Claritin) 10 mg tablet Take 10 mg by mouth.  epinastine 0.05 % drop Apply  to eye.  raNITIdine (ZANTAC) 150 mg tablet ZANTAC TABS      cyclobenzaprine (FLEXERIL) 5 mg tablet take 1 tablet by mouth three times a day if needed  0    valACYclovir (VALTREX) 1 gram tablet Take 1 Tab by mouth three (3) times daily. 21 Tab 3    EPINEPHrine (EPIPEN) 0.3 mg/0.3 mL injection 0.3 mg by IntraMUSCular route once as needed.  ketotifen (ZADITOR) 0.025 % (0.035 %) ophthalmic solution Administer 1 Drop to both eyes every morning.  Cetirizine (ZYRTEC) 10 mg cap Take 10 mg by mouth nightly.  SAL ACID/UREA/PETROLATUM,WHITE (KERASAL FOOT EX) by Apply Externally route daily as needed.  ACCU-CHEK HELDER PLUS TEST STRP strip   0    NITROSTAT 0.4 mg SL tablet       CALCIUM CARBONATE (MARCELLO-SELTZER ANTACID PO) Take  by mouth daily as needed. Allergies   Allergen Reactions    Latex Other (comments)     Burns skin    Acetone Shortness of Breath    Amoxicillin Anaphylaxis    Ciprofloxacin Hives    Pcn [Penicillins] Anaphylaxis    Buspar [Buspirone] Unknown (comments)     Has N&V and makes her feel \"weird\"    Azithromycin Hives    Egg Nausea Only    Other Medication Rash     POPPY seeds     Past Medical History:   Diagnosis Date    Abnormal Pap smear 1995    with f/u normal    Anemia     Arthritis     Asthma     Cancer ([facility] [address]     ENDOMETRIAL    Environmental allergies     GERD (gastroesophageal reflux disease)     Goiter     Other unknown and unspecified cause of morbidity or mortality     POSSIBLE ESOPHAGEAL SPASM, ?  OBSTRUCTION DUE TO GOITER    PMB (postmenopausal bleeding)     S/P chemotherapy, time since greater than 12 weeks     LAST CHEMO 10/2014 PER PATIENT    Thyroid disease     goiter     Past Surgical History:   Procedure Laterality Date    HX APPENDECTOMY      HX BUNIONECTOMY      HX GYN  3/13/13    TLH/BSO    HX HEENT  4/22/13    TOOTH REMOVED    HX ORTHOPAEDIC  1980'S    BUNIONECTOMY    HX VASCULAR ACCESS      port    MT BREAST SURGERY PROCEDURE UNLISTED  1/12/16    right breast bx     Family History   Problem Relation Age of Onset    Cancer Maternal Aunt         breast    Heart Disease Mother     Diabetes Father     Cancer Sister         CERVICAL    Kidney Disease Brother     Diabetes Brother     Heart Attack Other     Arrhythmia Brother     Diabetes Brother     Anesth Problems Neg Hx      Social History     Tobacco Use    Smoking status: Never Smoker    Smokeless tobacco: Never Used   Substance Use Topics    Alcohol use: Yes     Alcohol/week: 0.0 standard drinks     Comment: RARE OCC       ROS    Patient-Reported Vitals 1/19/2021   Patient-Reported Systolic  040   Patient-Reported Diastolic 96        Sitniavaldo Zayas, who was evaluated through a patient-initiated, synchronous (real-time) audio only encounter, and/or her healthcare decision maker, is aware that it is a billable service, with coverage as determined by her insurance carrier. She provided verbal consent to proceed: Yes. She has not had a related appointment within my department in the past 7 days or scheduled within the next 24 hours. Total Time: minutes: 11-20 minutes      An electronic signature was used to sign this note.     Claudia Graff MD  12/16/21

## 2021-12-16 NOTE — PROGRESS NOTES
Pt arrives to IR department for scheduled OP paracentesis. Pt is A&Ox4 with c/o \"belly tightness\". She is in the acute distress. Pt awaiting consent at this time.

## 2021-12-16 NOTE — PROGRESS NOTES
Pt did not have enough fluid to perform a paracentesis today. Pt discharged at this time and ambulatory to waiting room.

## 2021-12-16 NOTE — DISCHARGE INSTRUCTIONS
[address]  Radiology Department  [phone]    Radiologist: Dr. Vandana Sow    Date: 12/16/2021      Paracentesis Discharge Instructions    You may have an aching pain in your abdomen at the puncture site tonight as the numbing medicine wears off. You may take Tylenol if allowed, as directed on the label. Resume your previous diet and prescribed medications. Rest the remainder of today. If we have removed a large volume of fluid, you could be lightheaded or dizzy when making position changes. You may shower in 24 hours. Keep your dressing clean and dry. You may replace the dressing or band-aid if it becomes moist or soiled. Watch for signs of infection at the puncture site:  redness, swelling, pus, fever or chills. Should any of of these occur contact your physician immediately. If you experience severe sweating and new, severe abdominal pain seek emergency medical treatment. If any lab samples were sent during your procedure today the resutls will be sent to your Physician. Follow up with your physician as previously planned. If you have any questions please call and ask to speak to a radiology nurse.

## 2021-12-18 LAB
ALBUMIN SERPL-MCNC: 3.9 G/DL (ref 3.8–4.8)
ALBUMIN/GLOB SERPL: 1.7 {RATIO} (ref 1.2–2.2)
ALP SERPL-CCNC: 65 IU/L (ref 44–121)
ALT SERPL-CCNC: 17 IU/L (ref 0–32)
AST SERPL-CCNC: 21 IU/L (ref 0–40)
BASOPHILS # BLD AUTO: 0 X10E3/UL (ref 0–0.2)
BASOPHILS NFR BLD AUTO: 1 %
BILIRUB SERPL-MCNC: 0.3 MG/DL (ref 0–1.2)
BUN SERPL-MCNC: 13 MG/DL (ref 8–27)
BUN/CREAT SERPL: 24 (ref 12–28)
CALCIUM SERPL-MCNC: 8.6 MG/DL (ref 8.7–10.3)
CANCER AG125 SERPL-ACNC: 548 U/ML (ref 0–38.1)
CHLORIDE SERPL-SCNC: 103 MMOL/L (ref 96–106)
CO2 SERPL-SCNC: 22 MMOL/L (ref 20–29)
CREAT SERPL-MCNC: 0.54 MG/DL (ref 0.57–1)
EOSINOPHIL # BLD AUTO: 0 X10E3/UL (ref 0–0.4)
EOSINOPHIL NFR BLD AUTO: 1 %
ERYTHROCYTE [DISTWIDTH] IN BLOOD BY AUTOMATED COUNT: 14.2 % (ref 11.7–15.4)
GLOBULIN SER CALC-MCNC: 2.3 G/DL (ref 1.5–4.5)
GLUCOSE SERPL-MCNC: 206 MG/DL (ref 65–99)
HCT VFR BLD AUTO: 34.1 % (ref 34–46.6)
HGB BLD-MCNC: 11.7 G/DL (ref 11.1–15.9)
IMM GRANULOCYTES # BLD AUTO: 0 X10E3/UL (ref 0–0.1)
IMM GRANULOCYTES NFR BLD AUTO: 1 %
LYMPHOCYTES # BLD AUTO: 0.5 X10E3/UL (ref 0.7–3.1)
LYMPHOCYTES NFR BLD AUTO: 10 %
MAGNESIUM SERPL-MCNC: 1.9 MG/DL (ref 1.6–2.3)
MCH RBC QN AUTO: 32.1 PG (ref 26.6–33)
MCHC RBC AUTO-ENTMCNC: 34.3 G/DL (ref 31.5–35.7)
MCV RBC AUTO: 94 FL (ref 79–97)
MONOCYTES # BLD AUTO: 0.6 X10E3/UL (ref 0.1–0.9)
MONOCYTES NFR BLD AUTO: 12 %
NEUTROPHILS # BLD AUTO: 3.7 X10E3/UL (ref 1.4–7)
NEUTROPHILS NFR BLD AUTO: 75 %
PLATELET # BLD AUTO: 248 X10E3/UL (ref 150–450)
POTASSIUM SERPL-SCNC: 4 MMOL/L (ref 3.5–5.2)
PROT SERPL-MCNC: 6.2 G/DL (ref 6–8.5)
RBC # BLD AUTO: 3.64 X10E6/UL (ref 3.77–5.28)
SODIUM SERPL-SCNC: 140 MMOL/L (ref 134–144)
WBC # BLD AUTO: 4.8 X10E3/UL (ref 3.4–10.8)

## 2021-12-21 ENCOUNTER — HOSPITAL ENCOUNTER (OUTPATIENT)
Dept: INFUSION THERAPY | Age: 66
Discharge: HOME | End: 2021-12-21
Payer: MEDICARE

## 2021-12-21 VITALS
RESPIRATION RATE: 18 BRPM | TEMPERATURE: 96.8 F | HEART RATE: 82 BPM | SYSTOLIC BLOOD PRESSURE: 125 MMHG | DIASTOLIC BLOOD PRESSURE: 74 MMHG | HEIGHT: 65 IN | WEIGHT: 179 LBS | BODY MASS INDEX: 29.82 KG/M2

## 2021-12-21 DIAGNOSIS — C54.9 MALIGNANT NEOPLASM OF CORPUS UTERI, EXCEPT ISTHMUS (HCC): Primary | ICD-10-CM

## 2021-12-21 PROCEDURE — 74011250636 HC RX REV CODE- 250/636: Performed by: PHYSICIAN ASSISTANT

## 2021-12-21 PROCEDURE — 96413 CHEMO IV INFUSION 1 HR: CPT

## 2021-12-21 PROCEDURE — 74011000258 HC RX REV CODE- 258: Performed by: PHYSICIAN ASSISTANT

## 2021-12-21 PROCEDURE — 96375 TX/PRO/DX INJ NEW DRUG ADDON: CPT

## 2021-12-21 PROCEDURE — 77030012965 HC NDL HUBR BBMI -A

## 2021-12-21 RX ORDER — PALONOSETRON 0.05 MG/ML
0.25 INJECTION, SOLUTION INTRAVENOUS ONCE
Status: COMPLETED | OUTPATIENT
Start: 2021-12-21 | End: 2021-12-21

## 2021-12-21 RX ORDER — SODIUM CHLORIDE 9 MG/ML
10 INJECTION INTRAMUSCULAR; INTRAVENOUS; SUBCUTANEOUS AS NEEDED
Status: DISCONTINUED | OUTPATIENT
Start: 2021-12-21 | End: 2021-12-22 | Stop reason: HOSPADM

## 2021-12-21 RX ORDER — HEPARIN 100 UNIT/ML
300-500 SYRINGE INTRAVENOUS AS NEEDED
Status: DISCONTINUED | OUTPATIENT
Start: 2021-12-21 | End: 2021-12-22 | Stop reason: HOSPADM

## 2021-12-21 RX ORDER — SODIUM CHLORIDE 0.9 % (FLUSH) 0.9 %
10 SYRINGE (ML) INJECTION AS NEEDED
Status: DISCONTINUED | OUTPATIENT
Start: 2021-12-21 | End: 2021-12-22 | Stop reason: HOSPADM

## 2021-12-21 RX ORDER — DEXTROSE MONOHYDRATE 50 MG/ML
25 INJECTION, SOLUTION INTRAVENOUS CONTINUOUS
Status: DISCONTINUED | OUTPATIENT
Start: 2021-12-21 | End: 2021-12-22 | Stop reason: HOSPADM

## 2021-12-21 RX ADMIN — PALONOSETRON 0.25 MG: 0.25 INJECTION, SOLUTION INTRAVENOUS at 13:59

## 2021-12-21 RX ADMIN — Medication 10 ML: at 13:00

## 2021-12-21 RX ADMIN — DEXTROSE MONOHYDRATE 77.6 MG: 5 INJECTION, SOLUTION INTRAVENOUS at 14:30

## 2021-12-21 RX ADMIN — SODIUM CHLORIDE 10 ML: 9 INJECTION INTRAMUSCULAR; INTRAVENOUS; SUBCUTANEOUS at 14:38

## 2021-12-21 RX ADMIN — DEXTROSE MONOHYDRATE 25 ML/HR: 5 INJECTION, SOLUTION INTRAVENOUS at 14:30

## 2021-12-21 RX ADMIN — HEPARIN 500 UNITS: 100 SYRINGE at 15:55

## 2021-12-21 NOTE — PROGRESS NOTES
[facility] Chemo Progress Note    Date: 2021    Name: Tee Mercado    MRN: [id number]         : 1955    [address] Ms[address] Man[address] Covarrubias Arrived to [facility] for C2 Doxil ambulatory in stable condition. Assessment was completed, no acute issues at this time, no new complaints voiced. Port accessed with positive blood return. Labs drawn and sent for processing. Dextrose 5% started at [facility]. Chemotherapy Flowsheet 2021   Cycle C2   Date 2021   Drug / Regimen Doxil   Pre Meds given   Notes given         Patient denies SOB, fever, cough, general not feeling well. Patient denies recent exposure to someone who has tested positive for COVID-19. Patient denies having contact with anyone who has a pending COVID test.      Ms. Waqar Brewster vitals were reviewed. Patient Vitals for the past 12 hrs:   Temp Pulse Resp BP   21 1555  82 18 125/74   21 1300 96.8 °F (36 °C) 81 18 118/75         Lab results were obtained and reviewed. No results found for this or any previous visit (from the past 12 hour(s)). Pre-medications  were administered as ordered and chemotherapy was initiated.   Medications Administered     0.9% sodium chloride injection 10 mL     Admin Date  2021 Action  Given Dose  10 mL Route  IntraVENous Administered By  Tabitha Luis RN          dextrose 5% infusion     Admin Date  2021 Action  New Bag Dose  25 mL/hr Rate  25 mL/hr Route  IntraVENous Administered By  Tabitha Luis RN          heparin (porcine) pf 300-500 Units     Admin Date  2021 Action  Given Dose  500 Units Route  InterCATHeter Administered By  Tabitha Luis RN          liposomal DOXOrubicin (DOXIL/LIPODOX) 77.6 mg in dextrose 5% 250 mL, overfill volume 25 mL chemo infusion     Admin Date  2021 Action  New Bag Dose  77.6 mg Rate  313.8 mL/hr Route  IntraVENous Administered By  Tabitha Luis RN          palonosetron HCl (ALOXI) injection 0.25 mg     Admin Date  2021 Action  Given Dose  0.25 mg Route  IntraVENous Administered By  Alla Chow RN          sodium chloride (NS) flush 10 mL     Admin Date  12/21/2021 Action  Given Dose  10 mL Route  IntraVENous Administered By  Alla Chow RN                  0903 Patient tolerated treatment well. Port maintained positive blood return throughout treatment. Port flushed, heparinized and de accessed per protocol. Patient was discharged from [facility] in stable condition. Patient aware of next appointment.      Future Appointments   Date Time Provider David Jara   1/18/2022 10:30 AM Jose Brasher MD [facility] AMB   1/18/2022 11:00 AM [address], RN  December 21, 2021

## 2022-01-11 RX ORDER — DIPHENHYDRAMINE HYDROCHLORIDE 50 MG/ML
50 INJECTION, SOLUTION INTRAMUSCULAR; INTRAVENOUS AS NEEDED
Status: CANCELLED
Start: 2022-01-27

## 2022-01-11 RX ORDER — HYDROCORTISONE SODIUM SUCCINATE 100 MG/2ML
100 INJECTION, POWDER, FOR SOLUTION INTRAMUSCULAR; INTRAVENOUS AS NEEDED
Status: CANCELLED | OUTPATIENT
Start: 2022-01-27

## 2022-01-11 RX ORDER — ACETAMINOPHEN 325 MG/1
650 TABLET ORAL AS NEEDED
Status: CANCELLED
Start: 2022-01-27

## 2022-01-11 RX ORDER — PALONOSETRON 0.05 MG/ML
0.25 INJECTION, SOLUTION INTRAVENOUS ONCE
Status: CANCELLED | OUTPATIENT
Start: 2022-01-27 | End: 2022-01-18

## 2022-01-11 RX ORDER — ALBUTEROL SULFATE 0.83 MG/ML
2.5 SOLUTION RESPIRATORY (INHALATION) AS NEEDED
Status: CANCELLED
Start: 2022-01-27

## 2022-01-11 RX ORDER — HEPARIN 100 UNIT/ML
300-500 SYRINGE INTRAVENOUS AS NEEDED
Status: CANCELLED
Start: 2022-01-27

## 2022-01-11 RX ORDER — EPINEPHRINE 1 MG/ML
0.3 INJECTION, SOLUTION, CONCENTRATE INTRAVENOUS AS NEEDED
Status: CANCELLED | OUTPATIENT
Start: 2022-01-27

## 2022-01-11 RX ORDER — SODIUM CHLORIDE 0.9 % (FLUSH) 0.9 %
10 SYRINGE (ML) INJECTION AS NEEDED
Status: CANCELLED | OUTPATIENT
Start: 2022-01-27

## 2022-01-11 RX ORDER — DEXTROSE MONOHYDRATE 50 MG/ML
25 INJECTION, SOLUTION INTRAVENOUS CONTINUOUS
Status: CANCELLED
Start: 2022-01-27

## 2022-01-11 RX ORDER — DIPHENHYDRAMINE HYDROCHLORIDE 50 MG/ML
25 INJECTION, SOLUTION INTRAMUSCULAR; INTRAVENOUS AS NEEDED
Status: CANCELLED
Start: 2022-01-27

## 2022-01-11 RX ORDER — SODIUM CHLORIDE 9 MG/ML
10 INJECTION INTRAMUSCULAR; INTRAVENOUS; SUBCUTANEOUS AS NEEDED
Status: CANCELLED | OUTPATIENT
Start: 2022-01-27

## 2022-01-11 RX ORDER — ONDANSETRON 2 MG/ML
8 INJECTION INTRAMUSCULAR; INTRAVENOUS AS NEEDED
Status: CANCELLED | OUTPATIENT
Start: 2022-01-27

## 2022-01-13 ENCOUNTER — TELEPHONE (OUTPATIENT)
Dept: GYNECOLOGY | Age: 67
End: 2022-01-13

## 2022-01-13 NOTE — TELEPHONE ENCOUNTER
Pt started with fever last Friday through Monday morning with nasal and eye drainage and body aches and coughing. Tuesday became congested and continues with same symptoms, COVID test X2 negative and flu test negative. MD feels pt is \"coming off it and no antibiotics given\". If pt feeling better [facility].

## 2022-01-18 ENCOUNTER — HOSPITAL ENCOUNTER (OUTPATIENT)
Dept: INFUSION THERAPY | Age: 67
Discharge: HOME | End: 2022-01-18

## 2022-01-27 ENCOUNTER — HOSPITAL ENCOUNTER (OUTPATIENT)
Dept: INFUSION THERAPY | Age: 67
Discharge: HOME | End: 2022-01-27
Payer: MEDICARE

## 2022-01-27 ENCOUNTER — OFFICE VISIT (OUTPATIENT)
Dept: GYNECOLOGY | Age: 67
End: 2022-01-27
Payer: MEDICARE

## 2022-01-27 VITALS
TEMPERATURE: 97 F | SYSTOLIC BLOOD PRESSURE: 117 MMHG | HEART RATE: 98 BPM | RESPIRATION RATE: 16 BRPM | BODY MASS INDEX: 27.96 KG/M2 | DIASTOLIC BLOOD PRESSURE: 70 MMHG | WEIGHT: 168 LBS

## 2022-01-27 VITALS
OXYGEN SATURATION: 96 % | WEIGHT: 168 LBS | SYSTOLIC BLOOD PRESSURE: 107 MMHG | HEIGHT: 65 IN | DIASTOLIC BLOOD PRESSURE: 74 MMHG | HEART RATE: 104 BPM | BODY MASS INDEX: 27.99 KG/M2

## 2022-01-27 DIAGNOSIS — C54.9 MALIGNANT NEOPLASM OF CORPUS UTERI, EXCEPT ISTHMUS (HCC): Primary | ICD-10-CM

## 2022-01-27 DIAGNOSIS — C54.1 PRIMARY SEROUS ADENOCARCINOMA OF ENDOMETRIUM (HCC): Primary | ICD-10-CM

## 2022-01-27 LAB
ALBUMIN SERPL-MCNC: 2.9 G/DL (ref 3.5–5)
ALBUMIN/GLOB SERPL: 0.8 {RATIO} (ref 1.1–2.2)
ALP SERPL-CCNC: 65 U/L (ref 45–117)
ALT SERPL-CCNC: 30 U/L (ref 12–78)
ANION GAP SERPL CALC-SCNC: 6 MMOL/L (ref 5–15)
AST SERPL-CCNC: 27 U/L (ref 15–37)
BASOPHILS # BLD: 0 K/UL (ref 0–0.1)
BASOPHILS NFR BLD: 1 % (ref 0–1)
BILIRUB SERPL-MCNC: 0.4 MG/DL (ref 0.2–1)
BUN SERPL-MCNC: 19 MG/DL (ref 6–20)
BUN/CREAT SERPL: 40 (ref 12–20)
CALCIUM SERPL-MCNC: 8.7 MG/DL (ref 8.5–10.1)
CANCER AG125 SERPL-ACNC: 312 U/ML (ref 1.5–35)
CHLORIDE SERPL-SCNC: 103 MMOL/L (ref 97–108)
CO2 SERPL-SCNC: 27 MMOL/L (ref 21–32)
CREAT SERPL-MCNC: 0.47 MG/DL (ref 0.55–1.02)
DIFFERENTIAL METHOD BLD: ABNORMAL
EOSINOPHIL # BLD: 0.1 K/UL (ref 0–0.4)
EOSINOPHIL NFR BLD: 2 % (ref 0–7)
ERYTHROCYTE [DISTWIDTH] IN BLOOD BY AUTOMATED COUNT: 17.1 % (ref 11.5–14.5)
GLOBULIN SER CALC-MCNC: 3.6 G/DL (ref 2–4)
GLUCOSE SERPL-MCNC: 131 MG/DL (ref 65–100)
HCT VFR BLD AUTO: 31.3 % (ref 35–47)
HGB BLD-MCNC: 9.6 G/DL (ref 11.5–16)
IMM GRANULOCYTES # BLD AUTO: 0.1 K/UL (ref 0–0.04)
IMM GRANULOCYTES NFR BLD AUTO: 1 % (ref 0–0.5)
LYMPHOCYTES # BLD: 0.9 K/UL (ref 0.8–3.5)
LYMPHOCYTES NFR BLD: 11 % (ref 12–49)
MCH RBC QN AUTO: 28.4 PG (ref 26–34)
MCHC RBC AUTO-ENTMCNC: 30.7 G/DL (ref 30–36.5)
MCV RBC AUTO: 92.6 FL (ref 80–99)
MONOCYTES # BLD: 0.8 K/UL (ref 0–1)
MONOCYTES NFR BLD: 10 % (ref 5–13)
NEUTS SEG # BLD: 6.2 K/UL (ref 1.8–8)
NEUTS SEG NFR BLD: 75 % (ref 32–75)
NRBC # BLD: 0 K/UL (ref 0–0.01)
NRBC BLD-RTO: 0 PER 100 WBC
PLATELET # BLD AUTO: 287 K/UL (ref 150–400)
PMV BLD AUTO: 9.3 FL (ref 8.9–12.9)
POTASSIUM SERPL-SCNC: 4 MMOL/L (ref 3.5–5.1)
PROT SERPL-MCNC: 6.5 G/DL (ref 6.4–8.2)
RBC # BLD AUTO: 3.38 M/UL (ref 3.8–5.2)
SODIUM SERPL-SCNC: 136 MMOL/L (ref 136–145)
WBC # BLD AUTO: 8.1 K/UL (ref 3.6–11)

## 2022-01-27 PROCEDURE — 96413 CHEMO IV INFUSION 1 HR: CPT

## 2022-01-27 PROCEDURE — 99213 OFFICE O/P EST LOW 20 MIN: CPT | Performed by: OBSTETRICS & GYNECOLOGY

## 2022-01-27 PROCEDURE — G8400 PT W/DXA NO RESULTS DOC: HCPCS | Performed by: OBSTETRICS & GYNECOLOGY

## 2022-01-27 PROCEDURE — 86304 IMMUNOASSAY TUMOR CA 125: CPT

## 2022-01-27 PROCEDURE — G8536 NO DOC ELDER MAL SCRN: HCPCS | Performed by: OBSTETRICS & GYNECOLOGY

## 2022-01-27 PROCEDURE — 96375 TX/PRO/DX INJ NEW DRUG ADDON: CPT

## 2022-01-27 PROCEDURE — 74011000258 HC RX REV CODE- 258: Performed by: PHYSICIAN ASSISTANT

## 2022-01-27 PROCEDURE — 77030012965 HC NDL HUBR BBMI -A

## 2022-01-27 PROCEDURE — 1101F PT FALLS ASSESS-DOCD LE1/YR: CPT | Performed by: OBSTETRICS & GYNECOLOGY

## 2022-01-27 PROCEDURE — G8417 CALC BMI ABV UP PARAM F/U: HCPCS | Performed by: OBSTETRICS & GYNECOLOGY

## 2022-01-27 PROCEDURE — 74011250636 HC RX REV CODE- 250/636: Performed by: PHYSICIAN ASSISTANT

## 2022-01-27 PROCEDURE — 80053 COMPREHEN METABOLIC PANEL: CPT

## 2022-01-27 PROCEDURE — 36415 COLL VENOUS BLD VENIPUNCTURE: CPT

## 2022-01-27 PROCEDURE — 99213 OFFICE O/P EST LOW 20 MIN: CPT | Performed by: PHYSICIAN ASSISTANT

## 2022-01-27 PROCEDURE — 1090F PRES/ABSN URINE INCON ASSESS: CPT | Performed by: OBSTETRICS & GYNECOLOGY

## 2022-01-27 PROCEDURE — G0463 HOSPITAL OUTPT CLINIC VISIT: HCPCS | Performed by: OBSTETRICS & GYNECOLOGY

## 2022-01-27 PROCEDURE — 3017F COLORECTAL CA SCREEN DOC REV: CPT | Performed by: OBSTETRICS & GYNECOLOGY

## 2022-01-27 PROCEDURE — 85025 COMPLETE CBC W/AUTO DIFF WBC: CPT

## 2022-01-27 PROCEDURE — 74011000250 HC RX REV CODE- 250: Performed by: PHYSICIAN ASSISTANT

## 2022-01-27 PROCEDURE — G8432 DEP SCR NOT DOC, RNG: HCPCS | Performed by: OBSTETRICS & GYNECOLOGY

## 2022-01-27 PROCEDURE — G8427 DOCREV CUR MEDS BY ELIG CLIN: HCPCS | Performed by: OBSTETRICS & GYNECOLOGY

## 2022-01-27 RX ORDER — ACETAMINOPHEN 325 MG/1
650 TABLET ORAL AS NEEDED
Status: DISCONTINUED | OUTPATIENT
Start: 2022-01-27 | End: 2022-01-28 | Stop reason: HOSPADM

## 2022-01-27 RX ORDER — DIPHENHYDRAMINE HYDROCHLORIDE 50 MG/ML
25 INJECTION, SOLUTION INTRAMUSCULAR; INTRAVENOUS AS NEEDED
Status: DISCONTINUED | OUTPATIENT
Start: 2022-01-27 | End: 2022-01-28 | Stop reason: HOSPADM

## 2022-01-27 RX ORDER — PALONOSETRON 0.05 MG/ML
0.25 INJECTION, SOLUTION INTRAVENOUS ONCE
Status: COMPLETED | OUTPATIENT
Start: 2022-01-27 | End: 2022-01-27

## 2022-01-27 RX ORDER — HYDROCORTISONE SODIUM SUCCINATE 100 MG/2ML
100 INJECTION, POWDER, FOR SOLUTION INTRAMUSCULAR; INTRAVENOUS AS NEEDED
Status: DISCONTINUED | OUTPATIENT
Start: 2022-01-27 | End: 2022-01-28 | Stop reason: HOSPADM

## 2022-01-27 RX ORDER — ONDANSETRON 2 MG/ML
8 INJECTION INTRAMUSCULAR; INTRAVENOUS AS NEEDED
Status: DISCONTINUED | OUTPATIENT
Start: 2022-01-27 | End: 2022-01-28 | Stop reason: HOSPADM

## 2022-01-27 RX ORDER — SODIUM CHLORIDE 9 MG/ML
10 INJECTION INTRAMUSCULAR; INTRAVENOUS; SUBCUTANEOUS AS NEEDED
Status: DISCONTINUED | OUTPATIENT
Start: 2022-01-27 | End: 2022-01-28 | Stop reason: HOSPADM

## 2022-01-27 RX ORDER — DIPHENHYDRAMINE HYDROCHLORIDE 50 MG/ML
50 INJECTION, SOLUTION INTRAMUSCULAR; INTRAVENOUS AS NEEDED
Status: DISCONTINUED | OUTPATIENT
Start: 2022-01-27 | End: 2022-01-28 | Stop reason: HOSPADM

## 2022-01-27 RX ORDER — ACETAMINOPHEN 325 MG/1
TABLET ORAL AS NEEDED
COMMUNITY
End: 2022-10-04

## 2022-01-27 RX ORDER — SODIUM CHLORIDE 0.9 % (FLUSH) 0.9 %
10 SYRINGE (ML) INJECTION AS NEEDED
Status: DISCONTINUED | OUTPATIENT
Start: 2022-01-27 | End: 2022-01-28 | Stop reason: HOSPADM

## 2022-01-27 RX ORDER — EPINEPHRINE 1 MG/ML
0.3 INJECTION, SOLUTION, CONCENTRATE INTRAVENOUS AS NEEDED
Status: DISCONTINUED | OUTPATIENT
Start: 2022-01-27 | End: 2022-01-28 | Stop reason: HOSPADM

## 2022-01-27 RX ORDER — HEPARIN 100 UNIT/ML
300-500 SYRINGE INTRAVENOUS AS NEEDED
Status: DISCONTINUED | OUTPATIENT
Start: 2022-01-27 | End: 2022-01-28 | Stop reason: HOSPADM

## 2022-01-27 RX ORDER — ALBUTEROL SULFATE 0.83 MG/ML
2.5 SOLUTION RESPIRATORY (INHALATION) AS NEEDED
Status: DISCONTINUED | OUTPATIENT
Start: 2022-01-27 | End: 2022-01-28 | Stop reason: HOSPADM

## 2022-01-27 RX ORDER — DEXTROSE MONOHYDRATE 50 MG/ML
25 INJECTION, SOLUTION INTRAVENOUS CONTINUOUS
Status: DISCONTINUED | OUTPATIENT
Start: 2022-01-27 | End: 2022-01-28 | Stop reason: HOSPADM

## 2022-01-27 RX ADMIN — PALONOSETRON 0.25 MG: 0.25 INJECTION, SOLUTION INTRAVENOUS at 16:54

## 2022-01-27 RX ADMIN — DEXTROSE MONOHYDRATE 25 ML/HR: 5 INJECTION, SOLUTION INTRAVENOUS at 16:54

## 2022-01-27 RX ADMIN — SODIUM CHLORIDE, PRESERVATIVE FREE 10 ML: 5 INJECTION INTRAVENOUS at 14:15

## 2022-01-27 RX ADMIN — HEPARIN 500 UNITS: 100 SYRINGE at 18:30

## 2022-01-27 RX ADMIN — DEXTROSE MONOHYDRATE 77.6 MG: 5 INJECTION, SOLUTION INTRAVENOUS at 17:05

## 2022-01-27 NOTE — PROGRESS NOTES
[facility] GYNECOLOGIC ONCOLOGY  [address] [facility], [address], [address] [facility]  [phone] [facility] [phone]    Office Visit    Patient ID:  Name: Ellouise Prader  MRN: [id number]   : 1955/67 y.o. Visit date: 2022     INTERVAL HISTORY:  Ellouise Prader is a  female who is an established patient with stage IA, grade 3 uterine carcinoma. She underwent laparoscopic hysterectomy with staging in 2013. She was recommended six cycles of adjuvant Taxol and Carboplatin, which she completed. We elected not to treat with pelvic radiation therapy due to her difficulty with chemotherapy. She had a CT of the abdomen/pelvis at [facility] that revealed retroperitoneal lymphadenopathy, peritoneal carcinomatosis, and trace ascites. I sent her for a PET/CT to better evaluate the extent of disease. She presented to review the results and discuss treatment. Her disease is not amenable to surgical resection. Systemic therapy was her only viable option. I thought immunotherapy would be the best choice, ahead of additional chemotherapy, specifically IV Keytruda and PO Lenvima. If that were not successful, we would consider retreatment with Taxol/Carboplatin or single agent Doxil. She completed 6 cycles of immunotherapy before demonstrating progression of disease. We decided upon retreatment with Taxol/Carboplatin. She completed 8 cycles of that regimen. Her CA-125 has responded and her CT after three cycles demonstrated a response. Her CT after completion of 6 cycles demonstrated further response. A subsequent PET/CT demonstrated resolution of most of the abnormal PET activity, but there still was some residual activity in the omentum, suggesting persistent disease. We stopped treatment in 2021, as she was beginning not to tolerate treatment well. She presented recently complaining of abdominal pain and bloating.   She stated it was a stabbing pain in her left abdomen. The bloating has also been causing a little shortness of breath. I sent her for a CT of the chest/abdomen/pelvis to evaluate. She presented to review those results. The scan demonstrated recurrent disease throughout the abdomen and pelvis. I recommended single agent Doxil. She has completed 2 cycle of Doxil. Due to early satiety and complaints of bloating and abdominal distention, I sent her for a paracentesis in December. They did not see enough fluid on ultrasound to attempt drainage. Since then the patient was also diagnosed with COVID and she is still recovering. She has since tested negative. PMH:  Past Medical History:   Diagnosis Date    Abnormal Pap smear 1995    with f/u normal    Anemia     Arthritis     Asthma     Cancer ([facility] [address]     ENDOMETRIAL    Environmental allergies     GERD (gastroesophageal reflux disease)     Goiter     Other unknown and unspecified cause of morbidity or mortality     POSSIBLE ESOPHAGEAL SPASM, ? OBSTRUCTION DUE TO GOITER    PMB (postmenopausal bleeding)     S/P chemotherapy, time since greater than 12 weeks     LAST CHEMO 10/2014 PER PATIENT    Thyroid disease     goiter     PSH:  Past Surgical History:   Procedure Laterality Date    HX APPENDECTOMY      HX BUNIONECTOMY      HX GYN  3/13/13    TLH/BSO    HX HEENT  4/22/13    TOOTH REMOVED    HX ORTHOPAEDIC  1980'S    BUNIONECTOMY    HX VASCULAR ACCESS      port    SD BREAST SURGERY PROCEDURE UNLISTED  1/12/16    right breast bx     SOC:  Social History     Socioeconomic History    Marital status:      Spouse name: Not on file    Number of children: Not on file    Years of education: Not on file    Highest education level: Not on file   Occupational History    Not on file   Tobacco Use    Smoking status: Never Smoker    Smokeless tobacco: Never Used   Substance and Sexual Activity    Alcohol use:  Yes     Alcohol/week: 0.0 standard drinks     Comment: RARE OCC    Drug use: No    Sexual activity: Not on file   Other Topics Concern    Not on file   Social History Narrative    Not on file     Social Determinants of Health     Financial Resource Strain:     Difficulty of Paying Living Expenses: Not on file   Food Insecurity:     Worried About Running Out of Food in the Last Year: Not on file    Blaze of Food in the Last Year: Not on file   Transportation Needs:     Lack of Transportation (Medical): Not on file    Lack of Transportation (Non-Medical): Not on file   Physical Activity:     Days of Exercise per Week: Not on file    Minutes of Exercise per Session: Not on file   Stress:     Feeling of Stress : Not on file   Social Connections:     Frequency of Communication with Friends and Family: Not on file    Frequency of Social Gatherings with Friends and Family: Not on file    Attends Temple Services: Not on file    Active Member of Clubs or Organizations: Not on file    Attends Club or Organization Meetings: Not on file    Marital Status: Not on file   Intimate Partner Violence:     Fear of Current or Ex-Partner: Not on file    Emotionally Abused: Not on file    Physically Abused: Not on file    Sexually Abused: Not on file   Housing Stability:     Unable to Pay for Housing in the Last Year: Not on file    Number of [place] in the Last Year: Not on file    Unstable Housing in the Last Year: Not on file     Family History  Family History   Problem Relation Age of Onset    Cancer Maternal Aunt         breast    Heart Disease Mother     Diabetes Father     Cancer Sister         CERVICAL    Kidney Disease Brother     Diabetes Brother     Heart Attack Other     Arrhythmia Brother     Diabetes Brother     Anesth Problems Neg Hx      Medications:  Current Outpatient Medications on File Prior to Visit   Medication Sig Dispense Refill    acetaminophen (TylenoL) 325 mg tablet Take  by mouth as needed.       dexAMETHasone (Decadron) 4 mg tablet Take 2 tablets by mouth with breakfast the day before chemo also take 2 tablets with breakfast for 2 days after chemotherapy 36 Tab 1    lisinopriL (PRINIVIL, ZESTRIL) 10 mg tablet Take 1 Tab by mouth daily. 30 Tab 2    lisinopriL (PRINIVIL, ZESTRIL) 5 mg tablet Take 2 Tabs by mouth daily. 30 Tab 5    ondansetron (ZOFRAN ODT) 4 mg disintegrating tablet Take 1 Tab by mouth every eight (8) hours as needed for Nausea. Indications: nausea and vomiting caused by cancer drugs 30 Tab 3    lidocaine-prilocaine (EMLA) topical cream Apply small amount over port area and cover with band aid one hour before chemo 30 g 3    guaifenesin (MUCINEX PO) Take  by mouth.  loratadine (Claritin) 10 mg tablet Take 10 mg by mouth.  epinastine 0.05 % drop Apply  to eye.  raNITIdine (ZANTAC) 150 mg tablet ZANTAC TABS      cyclobenzaprine (FLEXERIL) 5 mg tablet take 1 tablet by mouth three times a day if needed  0    valACYclovir (VALTREX) 1 gram tablet Take 1 Tab by mouth three (3) times daily. 21 Tab 3    EPINEPHrine (EPIPEN) 0.3 mg/0.3 mL injection 0.3 mg by IntraMUSCular route once as needed.  ketotifen (ZADITOR) 0.025 % (0.035 %) ophthalmic solution Administer 1 Drop to both eyes every morning.  Cetirizine (ZYRTEC) 10 mg cap Take 10 mg by mouth nightly.  SAL ACID/UREA/PETROLATUM,WHITE (KERASAL FOOT EX) by Apply Externally route daily as needed.  ACCU-CHEK HELDER PLUS TEST STRP strip   0    NITROSTAT 0.4 mg SL tablet       CALCIUM CARBONATE (MARCELLO-SELTZER ANTACID PO) Take  by mouth daily as needed. No current facility-administered medications on file prior to visit. Allergies:   Allergies   Allergen Reactions    Latex Other (comments)     Burns skin    Acetone Shortness of Breath    Amoxicillin Anaphylaxis    Ciprofloxacin Hives    Pcn [Penicillins] Anaphylaxis    Buspar [Buspirone] Unknown (comments)     Has N&V and makes her feel \"weird\"    Azithromycin Hives    Egg Nausea Only    Other Medication Rash     POPPY seeds       ROS:  Negative     OBJECTIVE:    PHYSICAL EXAM  VITAL SIGNS: Visit Vitals  /74 (BP 1 Location: Right upper arm, BP Patient Position: Sitting)   Pulse (!) 104   Ht 5' 5\" (1.651 m)   Wt 168 lb (76.2 kg)   SpO2 96%   BMI 27.96 kg/m²      GENERAL KAIA: WD, WN, WF in NAD   HEENT: WNL   RESPIRATORY: CTA, decreased BS in bilateral bases   CARDIOVASC: RRR   GASTROINT: Soft, NT, ND   MUSCULOSKEL: WNL   EXTREMITIES: No edema   PELVIC: Deferred   RECTAL: Deferred   PRIYANKA SURVEY: Negative    NEURO: Grossly intact     Lab Results   Component Value Date/Time    WBC 4.8 12/17/2021 10:32 AM    Hemoglobin (POC) 12.9 05/01/2013 09:53 AM    HGB 11.7 12/17/2021 10:32 AM    Hematocrit (POC) 38 05/01/2013 09:53 AM    HCT 34.1 12/17/2021 10:32 AM    PLATELET 223 10/90/6407 10:32 AM    MCV 94 12/17/2021 10:32 AM     Lab Results   Component Value Date/Time    Sodium 140 12/17/2021 10:32 AM    Potassium 4.0 12/17/2021 10:32 AM    Chloride 103 12/17/2021 10:32 AM    CO2 22 12/17/2021 10:32 AM    Anion gap 6 10/05/2021 05:00 PM    Glucose 206 (H) 12/17/2021 10:32 AM    BUN 13 12/17/2021 10:32 AM    Creatinine 0.54 (L) 12/17/2021 10:32 AM    BUN/Creatinine ratio 24 12/17/2021 10:32 AM    GFR est  12/17/2021 10:32 AM    GFR est non-AA 99 12/17/2021 10:32 AM    Calcium 8.6 (L) 12/17/2021 10:32 AM    Bilirubin, total 0.3 12/17/2021 10:32 AM    Alk. phosphatase 65 12/17/2021 10:32 AM    Protein, total 6.2 12/17/2021 10:32 AM    Albumin 3.9 12/17/2021 10:32 AM    Globulin 3.3 10/05/2021 05:00 PM    A-G Ratio 1.7 12/17/2021 10:32 AM    ALT (SGPT) 17 12/17/2021 10:32 AM    AST (SGOT) 21 12/17/2021 10:32 AM     Lab Results   Component Value Date/Time    CA-125 68 (H) 10/05/2021 05:00 PM    Cancer Ag (CA) 125 548.0 (H) 12/17/2021 10:32 AM       CT of chest/abdomen/pelvis (12/30/20)  CT chest:    There is stable multinodular thyroid enlargement.  Left chest Port-A-Cath  terminates in the SVC. The aorta and main pulmonary artery are normal in  caliber. The heart size is normal.  There is no pericardial or pleural effusion.        There are no enlarged axillary, mediastinal, or hilar lymph nodes.      There is no lung mass or airspace opacity. There is no pneumothorax. The  central airways are clear.     CT abdomen and pelvis: The liver, spleen, pancreas, and adrenal glands are normal. The gall bladder is  present  without intra- or extra-hepatic biliary dilatation.       The kidneys are symmetric without hydronephrosis.      There are no dilated bowel loops. The appendix is surgically absent.       Enlarged retroperitoneal lymph nodes are again demonstrated with a  representative left para-aortic lymph node measuring 1.3 x 1.8 cm (series 3,  image 76), previously 1.5 x 1.7 cm on 10/9/2020. A left common iliac lymph node  measures 1.1 x 1.5 cm (series 3, image 91), previously 0.8 x 1.4 cm.       Anterior peritoneal/mesenteric soft tissue nodularity is overall mildly  increased since 10/9/2020 with a representative measurement of 2.3 x 6.8 cm  (series 3, image 77), previously 2.6 x 5.5 cm.     Peritoneal soft tissue nodularity in the deep left pelvis measures 1.4 x 2.2 cm  (series 3, image 116), previously 2.9 x 3.1 cm.     There is no free fluid or free air. The aorta tapers without aneurysm.     The urinary bladder is normal. The uterus and ovaries are surgically absent.     There is no aggressive bony lesion.     IMPRESSION:   1. Mixed response in the abdomen and pelvis compared to 10/9/2020 with mildly  increased anterior peritoneal carcinomatosis. Stable to slightly increased  retroperitoneal lymphadenopathy. Decreased peritoneal soft tissue nodularity in  the deep left pelvis.     2. No evidence for metastatic disease in the chest.      CT of chest/abdomen/pelvis (2/25/21)  CHEST WALL:No axillary or supraclavicular adenopathy.   THYROID: Large hypodensity in the thyroid gland unchanged. MEDIASTINUM: 12 mm cardiophrenic lymph node unchanged. RAMEZ: No mass or lymphadenopathy. THORACIC AORTA: No dissection or aneurysm. MAIN PULMONARY ARTERY: Normal in caliber. TRACHEA/BRONCHI: Patent. ESOPHAGUS: No wall thickening or dilatation. HEART: Coronary atherosclerotic disease  PLEURA: No effusion or pneumothorax. LUNGS: No nodule, mass, or airspace disease. LIVER: No mass or biliary dilatation. GALLBLADDER: Unremarkable. BILIARY TREE: Unremarkable  SPLEEN: Unremarkable  PANCREAS: No mass or ductal dilatation. ADRENALS: Unremarkable. KIDNEYS: No mass, calculus, or hydronephrosis. STOMACH: Unremarkable. SMALL BOWEL: No dilatation or wall thickening. COLON: No dilatation or wall thickening. APPENDIX: Not visualized  PERITONEUM: Peritoneal carcinomatosis is again noted. 6.8 x 3.1 cm peritoneal  mass anteriorly is slightly increased in size. There is adjacent probably  confluent lesion measuring 3.4 x 2.6 cm which is increased in size from the  prior study. Deep left peritoneal nodularity is not significantly changed. Free  fluid in the pelvis increase in interval  RETROPERITONEUM: Left retroperitoneal lymph node measuring 1.5 x 1.4 cm slightly  decreased in size from 0.8 x 1.3 cm. Right retroperitoneal lymph node measuring  0.8 x 0.9 cm is increase in size of common iliac lymph node now measures 1.4 x  1.2 cm not significantly changed in size. REPRODUCTIVE ORGANS: Hysterectomy  URINARY BLADDER: No mass or calculus. BONES: No destructive bone lesion. ADDITIONAL COMMENTS: N/A     IMPRESSION  1. Overall increase in omental caking along the anterior peritoneum (the prior  study.     2.  Retroperitoneal adenopathy demonstrating variable response to therapy.  Some  of these have increased in interval.     3.  Pelvic soft tissue in the left deep pelvis is not significantly changed  although not well visualized.     4.  Slight interval increase in pelvic free fluid      CT of abdomen/pelvis (5/5/21)  LOWER THORAX: Right cardiophrenic lymph node measures 2.9 cm and previously  measured 1.2 cm on image number 15. There is minor basilar atelectasis/scarring  LIVER: No mass. BILIARY TREE: There is suggestion of gallstones CBD is not dilated. SPLEEN: within normal limits. PANCREAS: No mass or ductal dilatation. ADRENALS: Unremarkable. KIDNEYS: No mass, calculus, or hydronephrosis. STOMACH: Unremarkable. SMALL BOWEL: No dilatation or wall thickening. COLON: No dilatation or wall thickening. APPENDIX: Status post appendectomy  PERITONEUM: Peritoneal carcinomatosis appears improved. There is a confluent  density anteriorly in the peritoneum on image number 43 measuring 5.9 x 1.4 cm  which previously measured 9.8 x 1.9 cm. RETROPERITONEUM: Left retroperitoneal lymph node measures 0.7 cm image 45 and  previously measured 1.6 cm. Right retroperitoneal lymph node measures 0.7 cm image 46 and previously  measured 0.8 cm.     Left iliac lymph node image 57 measures 0.9 cm and previously measured 1.1 cm. REPRODUCTIVE ORGANS: Status post hysterectomy and oophorectomy. URINARY BLADDER: No mass or calculus. BONES: No destructive bone lesion. ABDOMINAL WALL: No mass or hernia. ADDITIONAL COMMENTS: N/A     IMPRESSION  1. There has been a mild decrease in the peritoneal carcinomatosis.      2. There has been slight to mild decrease in the retroperitoneal adenopathy.     3. No ascites is identified. No definite pelvic mass is identified. CT of chests/abdomen/pelvis (7/12/21)  THYROID: Heterogeneous thyroid gland with multiple nodules bilaterally including  in the isthmus. MEDIASTINUM: Left subclavian chest port terminates in the SVC. No mediastinal  adenopathy. RAMEZ: No mass or lymphadenopathy. THORACIC AORTA: No dissection or aneurysm.   MAIN PULMONARY ARTERY: Nonocclusive thrombus in segmental branches of the right  upper lobe pulmonary artery (3:47, 52), which are nonocclusive and likely  chronic. TRACHEA/BRONCHI: Patent. ESOPHAGUS: No wall thickening or dilatation. HEART: Normal in size. PLEURA: No effusion or pneumothorax. LUNGS: No nodule, mass, or airspace disease. LIVER: No mass or biliary dilatation. GALLBLADDER: Sludge in a nondistended gallbladder. SPLEEN: No mass. PANCREAS: No mass or ductal dilatation. ADRENALS: Unremarkable. KIDNEYS: No mass, calculus, or hydronephrosis. STOMACH: Unremarkable. SMALL BOWEL: No dilatation or wall thickening. COLON: Scattered diverticula. APPENDIX: Surgically absent  PERITONEUM: Decreased volume and size of the omental nodularity along the  anterior peritoneal surface just above the umbilicus. No ascites. No  pneumoperitoneum. RETROPERITONEUM: No lymphadenopathy or aortic aneurysm. REPRODUCTIVE ORGANS: Surgically absent. URINARY BLADDER: No mass or calculus. BONES: No destructive bone lesion. ADDITIONAL COMMENTS: N/A     IMPRESSION  1. Resolved retroperitoneal lymphadenopathy. 2.  Decreased peritoneal carcinomatosis. 3.  No ascites. 4.  Nonocclusive thrombus and segmental right upper lobe pulmonary artery  branches which are nonocclusive and appear chronic. PET/CT (8/27/21)  HEAD/NECK: No apparent foci of abnormal hypermetabolism. Cerebral evaluation is  limited by normal intense activity.     CHEST: No foci of abnormal hypermetabolism. Resolution of left supraclavicular  jasper activity.     ABDOMEN/PELVIS:   Omental/peritoneal disease is near completely resolved; current max SUV, midline  omentum 2.4, previous max SUV 12. Resolved retroperitoneal and right deep pelvic jasper activity. Resolved left pelvic cul-de-sac mass/activity.     SKELETON: No foci of abnormal hypermetabolism in the axial and visualized  appendicular skeleton.     IMPRESSION  Abnormal PET activity is resolved other than minimal residual  activity in the omentum.       CT of chest/abdomen/pelvis (11/9/21)  Chest:     Tubes and lines: Central venous port catheter extends to the SVC.     Lungs: There is mild bibasilar atelectasis. No pulmonary nodule or mass is seen.     Lymph nodes: There is no mediastinal, hilar or axillary lymphadenopathy. Subcentimeter axillary and mediastinal lymph nodes are noted.     Pleura: There is trace bilateral pleural fluid, new compared to prior CT dated  July 2021.     Heart: The heart is normal in size and there is no pericardial fluid.     Bones: No lytic or sclerotic osseous lesion is visualized.     The thyroid gland: Thyroid gland is normal in size. There are several nodules  within the thyroid gland, unchanged compared to prior CT dated July 2021.     Abdomen/pelvis:  Lymph nodes/peritoneum/retroperitoneum/omentum: There is a 1.3 cm x 1.3 cm  cardiophrenic lymph node which measured 8 mm x 7 mm on prior CT dated May 2021. There is a small amount of free fluid in the pelvis, new compared to prior CT  dated July 2021. There has been interval worsening of coalescing of omental  caking and intraperitoneal soft tissue nodularity. For example, within the  anterior mid abdomen, there is an omental soft tissue mass measuring  approximately 13.8 cm x 5.6 cm and measuring approximately 9.5 cm x 1.8 cm on  prior CT dated July 2021. There are also mildly enlarged upper abdominal  mesenteric lymph nodes which have increased in size. For example, there is a 2  cm x 1.4 cm celiac lymph node which measures approximately 6 mm x 4 mm on prior  CT dated July 2021. As an additional example, there is a 1.3 cm x 1.3 cm  mesenteric lymph node within the upper pelvis, measuring several millimeters in  size on prior CT dated July 2021. Retroperitoneal lymph nodes have increased in  size as well. For example there is a 1.8 cm x 1.3 cm right common iliac lymph  node which measured 8 mm x 7 mm on prior CT dated July 2021.     Liver: There is subtle capsular hepatic, new compared to prior CT dated July 2021.  No intraparenchymal hepatic lesion is seen.     Spleen: The spleen is normal.     Pancreas: The pancreas is normal.     Adrenals: The adrenals are normal.     Gallbladder: Gallbladder is normal.     Kidneys: The kidneys are normal. There is no hydronephrosis.     Bowel: There is new ill-defined soft tissue in the pelvis which appears to be  extending from the colon, likely representing subserosal soft tissue nodularity  and tethering. No dilated loop of large or small bowel is seen. Colonic  diverticulosis is noted.     Appendix: The appendix is surgically absent.     Urinary bladder: Urinary bladder is partially filled and grossly normal.     Bones: No lytic or sclerotic osseous lesion is visualized.     Miscellaneous: There is no free intraperitoneal gas. There is no focal fluid  collection to suggest abscess.     IMPRESSION  1. New, trace bilateral pleural fluid. Mild bibasilar atelectasis. 2.: Increase in size of cardiophrenic lymph node, now measuring 1.3 cm x 1.3 cm.  3. Interval worsening of coalescing of omental caking, intraperitoneal soft  tissue nodularity and intraperitoneal and retroperitoneal lymphadenopathy. 4. New, small amount of ascites in the pelvis.       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 She completed six cycles of adjuvant Taxol and Carboplatin chemotherapy. She developed recurrent disease and was treated with the regimen of IV Keytruda and PO Lenvima. She completed 6 cycles before progression. Since it had been almost 8 years since her adjuvant Taxol/Carboplatin, I recommended that we retreat her with that regimen, though I suggested a lower dose of each. She completed 8 cycles of that regimen with an excellent response. She now has recurrence once again. I recommended single agent Doxil chemotherapy. She has completed 2 cycles so far. We will continue with the current regimen, despite her recent COVID diagnosis. We will repeat a CT after this coming cycle. An electronic signature was used to sign this note.     Miri Roy MD  01/27/22

## 2022-01-27 NOTE — PROGRESS NOTES
[address] [facility] [address], [address] [facility]  P [phone]  F [phone]      Patient ID:  Name:  Nacho Quinones  MRN:  [id number]  :  1955/67 y.o. Date:  2022      Bethanie Haq MD: Puja Gasca MD  PCP: Tigist Connors MD     Primary Diagnosis: uterine cancer  Date of Diagnosis: 3/2013      Current Agent: Doxil  Cycle: 3    HPI:  78 y.o. 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 She received adjuvant Taxol/carboplatin. She was found with recurrence following 7 years DFI in  presenting with CT pelvic adenopathy and peritoneal carcinomatosis. She was treated with combination Keytruda/Lenvima until progression followed by carboplatin/taxol retreatment with near complete resolution of disease. She showed definitive progression in 2021 demonstrated on CT with new, trace bilateral pleural fluid, increase in size of cardiophrenic lymph node, now measuring 1.3 cm x 1.3 cm and interval worsening of coalescing of omental caking, intraperitoneal soft tissue nodularity and intraperitoneal and retroperitoneal lymphadenopathy. She was recommended Doxil palliative therapy. OncTx History:  3/2013 [facility]/BSO  FINAL PATHOLOGIC DIAGNOSIS  1.  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  Histologic grade: High grade  Myometrial invasion: Depth of invasion: 0.3 cm  Myometrial thickness: 1.9 cm  Involvement of cervix: Not involved  Extent of involvement of other organs:  Right ovary: Not involved  Left ovary: Not involved  Right fallopian tube: Not involved  Left fallopian tube: Not involved  Right parametrial tissue: Not involved  Left parametrial tissue: Not involved  Lymphovascular invasion: Not identified  Additional pathologic findings:  Focal adenomyosis  Benign simple cyst of left ovary  Paratubal cysts  Pathologic staging (pTNM):  Primary tumor (pT): pT1a  Regional lymph nodes (pN): pN0  Pelvic lymph nodes:  Number examined: 17  Number involved: 0  Distant metastasis (M): Not applicable  2. Omentum, omentectomy:Benign adipose tissue, negative for carcinoma  3. Lymph nodes, right pelvic, excision:Seven lymph nodes, negative for metastatic carcinoma (0/7)  4. Lymph nodes, left pelvic, excision:Ten lymph nodes, negative for metastatic carcinoma (0/10)    MMR proficient   5/2013 - 9/2013 Taxol/carboplatin x 6 cycles   10/9/20 PET/CT:    1. Peritoneal carcinomatosis. 2. 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 3. Left pelvic cul-de-sac tumor   11/3/20 - 2/16/21 Keytruda/lenvima x 6 cycles     Reduced lenvima 8mg d/t HTN   12/30/20 CT CAP:   Mixed response in the abdomen and pelvis compared to 10/9/2020 with mildly increased anterior peritoneal carcinomatosis. Stable to slightly increased retroperitoneal lymphadenopathy. Decreased peritoneal soft tissue nodularity in the deep left pelvis. No evidence for metastatic disease in the chest.   2/25/21 CT CAP:   1. Overall increase in omental caking along the anterior peritoneum (the prior study. 2.  Retroperitoneal adenopathy demonstrating variable response to therapy. Some of these have increased in interval  3. Pelvic soft tissue in the left deep pelvis is not significantly changed although not well visualized.   4.  Slight interval increase in pelvic free fluid   3/9/21 - 8/2021 Vylen117tl/m2/Carboplatin AUC5 x 8 cycles   6/2021 LLE DVT on xarelto   7/12/21 CT CAP:   1. Resolved retroperitoneal lymphadenopathy. 2.  Decreased peritoneal carcinomatosis. 3.  No ascites. 4.  Nonocclusive thrombus and segmental right upper lobe pulmonary artery branches which are nonocclusive and appear chronic.    8/2021 PET/CT:  CHEST: No foci of abnormal hypermetabolism. Resolution of left supraclavicular jasper activity. ABDOMEN/PELVIS:   Omental/peritoneal disease is near completely resolved; current max SUV, midline omentum 2.4, previous max SUV 12. Resolved retroperitoneal and right deep pelvic jasper activity. Resolved left pelvic cul-de-sac mass/activity.   SKELETON: No foci of abnormal hypermetabolism in the axial and visualized appendicular skeleton.     IMPRESSION: Abnormal PET activity is resolved other than minimal residual activity in the omentum. 11/2021 CT CAP:  1. New, trace bilateral pleural fluid. Mild bibasilar atelectasis. 2.: Increase in size of cardiophrenic lymph node, now measuring 1.3 cm x 1.3 cm.   3. Interval worsening of coalescing of omental caking, intraperitoneal soft tissue nodularity and intraperitoneal and retroperitoneal lymphadenopathy. 4. New, small amount of ascites in the pelvis. 11/22/21 Doxil initiated               SUBJECTIVE:  Tony Jacob presents for chemotherapy. Recovered from recent COVID dx. No SOB. Still feels winded/fatigued easily, pre-existing COVID. More recent lack of appetite and taste most days. No recent falls. She is working full time with infrequent time off if she is too fatigued to work. Bilateral LE swelling resolved. No CP/SOB, wheezing, palpitations. Able to ambulate, remains independent, minimal \"neuropathy\" with cold feet. Routine delayed GI function with dulcolax/benefiber/MoM. Remains active at work full time, unable to work from home. No residual effects s/p her therapy in 2013. She lives alone. Does not feel overly close to her children.   Her daughter lives next door and brother nearby. Her son lives near [place]. She feels most supported by her brother [address] [place] [address], close friend Mylene and her work family. She still has difficulty feeling comfortable asking family for help. Looking into/questions residential with SS. Work for her is a positive stressor, has few hobbies and uncertain of purpose w/o work. ROS  Constitutional: no weight loss, fever, night sweats  Respiratory: above  Cardiovascular: above  Heme: No abnormal bleeding, no epistaxis. Gastrointestinal: no abdominal pain, no dysphagia, change in bowel habits, or black or bloody stools  Genito-Urinary: no dysuria, trouble voiding, or hematuria  Musculoskeletal: negative for - gait disturbance or joint swelling  Neurological: negative for - behavioral changes, dizziness, headaches, memory loss or numbness/tingling  Derm: negative  Psych: negative for anxiety and depression       OBJECTIVE:  Physical Exam  Visit Vitals  /70   Pulse 98   Temp 97 °F (36.1 °C)   Resp 16   Wt 168 lb (76.2 kg)   BMI 27.96 kg/m²          General:  alert, cooperative, no distress       HEENT: without pallor, sclera without jaundice, oral mucosa without lesions,      Cardiac:  Regular rate and rhythm        Lungs:  clear to auscultation bilaterally          Port:  clean, dry, no drainage  Abdomen:  soft, protuberant, palpable epigastric/right abd mass. No gross fluid wave, likely ascites present.         Lymph:  no lymphadenopathy   Extremity: no edema    Wt Readings from Last 3 Encounters:   01/27/22 168 lb (76.2 kg)   01/27/22 168 lb (76.2 kg)   12/21/21 179 lb (81.2 kg)       Recent Labs     01/27/22  1418   WBC 8.1   ANEU 6.2   HGB 9.6*   HCT 31.3*   MCV 92.6   MCH 28.4        Recent Labs     01/27/22  1418      K 4.0      *   BUN 19   CREA 0.47*   CA 8.7   ALB 2.9*   TBILI 0.4   ALT 30         Tumor markers:  Cancer Ag (CA) 125   Date Value Ref Range Status   12/17/2021 548.0 (H) 0.0 - 38.1 U/mL Final Comment:     [employer] Electrochemiluminescence Immunoassay (ECLIA)  Values obtained with different assay methods or kits cannot be  used interchangeably. Results cannot be interpreted as absolute  evidence of the presence or absence of malignant disease. 11/18/2021 395.0 (H) 0.0 - 38.1 U/mL Final     Comment:     [employer] Electrochemiluminescence Immunoassay (ECLIA)  Values obtained with different assay methods or kits cannot be  used interchangeably. Results cannot be interpreted as absolute  evidence of the presence or absence of malignant disease. 10/23/2020 98.9 (H) 0.0 - 38.1 U/mL Final     Comment:     [employer] Electrochemiluminescence Immunoassay (ECLIA)  Values obtained with different assay methods or kits cannot be  used interchangeably. Results cannot be interpreted as absolute  evidence of the presence or absence of malignant disease. 07/24/2020 14.3 0.0 - 38.1 U/mL Final     Comment:     [employer] Electrochemiluminescence Immunoassay (ECLIA)  Values obtained with different assay methods or kits cannot be  used interchangeably. Results cannot be interpreted as absolute  evidence of the presence or absence of malignant disease. Patient Active Problem List   Diagnosis Code    Malignant neoplasm of corpus uteri, except isthmus ([facility] [address] C54.9     Past Medical History:   Diagnosis Date    Abnormal Pap smear 1995    with f/u normal    Anemia     Arthritis     Asthma     Cancer ([facility] [address]     ENDOMETRIAL    Environmental allergies     GERD (gastroesophageal reflux disease)     Goiter     Other unknown and unspecified cause of morbidity or mortality     POSSIBLE ESOPHAGEAL SPASM, ? OBSTRUCTION DUE TO GOITER    PMB (postmenopausal bleeding)     S/P chemotherapy, time since greater than 12 weeks     LAST CHEMO 10/2014 PER PATIENT    Thyroid disease     goiter     Prior to Admission medications    Medication Sig Start Date End Date Taking?  Authorizing Provider   dexAMETHasone (Decadron) 4 mg tablet Take 2 tablets by mouth with breakfast the day before chemo also take 2 tablets with breakfast for 2 days after chemotherapy 3/8/21   Anna Spence MD   lisinopriL (PRINIVIL, ZESTRIL) 10 mg tablet Take 1 Tab by mouth daily. 1/6/21   Anna Spence MD   lisinopriL (PRINIVIL, ZESTRIL) 5 mg tablet Take 2 Tabs by mouth daily. 1/5/21   Hieu Cline PA-C   ondansetron (ZOFRAN ODT) 4 mg disintegrating tablet Take 1 Tab by mouth every eight (8) hours as needed for Nausea. Indications: nausea and vomiting caused by cancer drugs 10/22/20   Anna Spence MD   lidocaine-prilocaine (EMLA) topical cream Apply small amount over port area and cover with band aid one hour before chemo 10/22/20   Anna Spence MD   guaifenesin (MUCINEX PO) Take  by mouth. Provider, Historical   loratadine (Claritin) 10 mg tablet Take 10 mg by mouth. Provider, Historical   epinastine 0.05 % drop Apply  to eye. Provider, Historical   raNITIdine (ZANTAC) 150 mg tablet ZANTAC TABS 9/29/11   Provider, Historical   cyclobenzaprine (FLEXERIL) 5 mg tablet take 1 tablet by mouth three times a day if needed 12/5/16   Provider, Historical   valACYclovir (VALTREX) 1 gram tablet Take 1 Tab by mouth three (3) times daily. 12/15/16   Anna Spence MD   EPINEPHrine (EPIPEN) 0.3 mg/0.3 mL injection 0.3 mg by IntraMUSCular route once as needed. Provider, Historical   ketotifen (ZADITOR) 0.025 % (0.035 %) ophthalmic solution Administer 1 Drop to both eyes every morning. Provider, Historical   Cetirizine (ZYRTEC) 10 mg cap Take 10 mg by mouth nightly. Provider, Historical   SAL ACID/UREA/PETROLATUM,WHITE (KERASAL FOOT EX) by Apply Externally route daily as needed. Provider, Historical   ACCU-CHEK HELDER PLUS TEST STRP strip  7/3/15   Provider, Historical   NITROSTAT 0.4 mg SL tablet  9/22/14   Provider, Historical   CALCIUM CARBONATE (MARCELLO-SELTZER ANTACID PO) Take  by mouth daily as needed. Provider, Historical     Allergies   Allergen Reactions    Latex Other (comments)     Burns skin    Acetone Shortness of Breath    Amoxicillin Anaphylaxis    Ciprofloxacin Hives    Pcn [Penicillins] Anaphylaxis    Buspar [Buspirone] Unknown (comments)     Has N&V and makes her feel \"weird\"    Azithromycin Hives    Egg Nausea Only    Other Medication Rash     POPPY seeds     Family History   Problem Relation Age of Onset    Cancer Maternal Aunt         breast    Heart Disease Mother     Diabetes Father     Cancer Sister         CERVICAL    Kidney Disease Brother     Diabetes Brother     Heart Attack Other     Arrhythmia Brother     Diabetes Brother     Anesth Problems Neg Hx    Maternal grandmother \"female\" cancer  in 45s      CT Results (most recent):  Results from Hospital Encounter encounter on 21    CT CHEST W CONT    Narrative  INDICATION: Primary serious adenocarcinoma of endometrium. CT of the chest, abdomen and pelvis is performed with 5 mm collimation. Study is  performed with PO and 100 cc of nonionic IV Isovue-370. Sagittal and coronal  reformatted images were also performed. CT dose reduction was achieved with the use of the standardized protocol  tailored for this examination and automatic exposure control for dose  modulation. Direct comparison is made to prior CT of the chest, abdomen and pelvis dated  2021. Comparison is also made to prior CT PET scan dated 2021. Chest:    Tubes and lines: Central venous port catheter extends to the SVC. Lungs: There is mild bibasilar atelectasis. No pulmonary nodule or mass is seen. Lymph nodes: There is no mediastinal, hilar or axillary lymphadenopathy. Subcentimeter axillary and mediastinal lymph nodes are noted. Pleura: There is trace bilateral pleural fluid, new compared to prior CT dated  2021. Heart: The heart is normal in size and there is no pericardial fluid.     Bones: No lytic or sclerotic osseous lesion is visualized. The thyroid gland: Thyroid gland is normal in size. There are several nodules  within the thyroid gland, unchanged compared to prior CT dated July 2021. Abdomen/pelvis:    Lymph nodes\peritoneum\retroperitoneum\omentum: There is a 1.3 cm x 1.3 cm  cardiophrenic lymph node which measured 8 mm x 7 mm on prior CT dated May 2021. There is a small amount of free fluid in the pelvis, new compared to prior CT  dated July 2021. There has been interval worsening of coalescing of omental  caking and intraperitoneal soft tissue nodularity. For example, within the  anterior mid abdomen, there is an omental soft tissue mass measuring  approximately 13.8 cm x 5.6 cm and measuring approximately 9.5 cm x 1.8 cm on  prior CT dated July 2021. There are also mildly enlarged upper abdominal  mesenteric lymph nodes which have increased in size. For example, there is a 2  cm x 1.4 cm celiac lymph node which measures approximately 6 mm x 4 mm on prior  CT dated July 2021. As an additional example, there is a 1.3 cm x 1.3 cm  mesenteric lymph node within the upper pelvis, measuring several millimeters in  size on prior CT dated July 2021. Retroperitoneal lymph nodes have increased in  size as well. For example there is a 1.8 cm x 1.3 cm right common iliac lymph  node which measured 8 mm x 7 mm on prior CT dated July 2021. Liver: There is subtle capsular hepatic, new compared to prior CT dated July 2021. No intraparenchymal hepatic lesion is seen. Spleen: The spleen is normal.    Pancreas: The pancreas is normal.    Adrenals: The adrenals are normal.    Gallbladder: Gallbladder is normal.    Kidneys: The kidneys are normal. There is no hydronephrosis. Bowel: There is new ill-defined soft tissue in the pelvis which appears to be  extending from the colon, likely representing subserosal soft tissue nodularity  and tethering. No dilated loop of large or small bowel is seen. Colonic  diverticulosis is noted. Appendix: The appendix is surgically absent. Urinary bladder: Urinary bladder is partially filled and grossly normal.    Bones: No lytic or sclerotic osseous lesion is visualized. Miscellaneous: There is no free intraperitoneal gas. There is no focal fluid  collection to suggest abscess. Impression  1. New, trace bilateral pleural fluid. Mild bibasilar atelectasis. 2.: Increase in size of cardiophrenic lymph node, now measuring 1.3 cm x 1.3 cm.  3. Interval worsening of coalescing of omental caking, intraperitoneal soft  tissue nodularity and intraperitoneal and retroperitoneal lymphadenopathy. 4. New, small amount of ascites in the pelvis. 21  ECHO ADULT COMPLETE 2021  Interpretation Summary  · LV: Estimated LVEF is 55 - 60%. Visually measured ejection fraction. Normal cavity size, wall thickness and systolic function (ejection fraction normal). Wall motion: normal.            IMPRESSION/PLAN:  78 y.o. 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 She has again progressed and prescribed Doxil therapy. ECO    Labs/chart reviewed. Chart review - nonbillable  -Doxil cycle 3   -Anemia: monitor w/ chemo. No blood loss. -Fatigue/KNIGHT: post COVID, anemia, cancer related  -Wt loss/hypoalbuminemia: carcinomatosis. Diet and habits discussed, continue protein. -HTN: Controlled. -Hyperglycemia: increased A1C. Dietary changes discussed  -LLE DVT 2021. On Xarelto.  -Hx of thyroid nodular goiter, benign follicular bx.  -Chronic med issues:    Hx asthma - inhaler PRN   Hx esophageal spasm - uses nitroglycerin  -Reflux: Pepcid  Psychosocial: Feeling down/some depression sx. Few family/friends she can reach out to. Currently happy to be working.           Naima Cassidy PA-C

## 2022-01-28 NOTE — PROGRESS NOTES
[facility] - Chemotherapy Progress Note    [id number] Pt admit to [facility] for Doxil/C3 ambulatory in stable condition. Assessment completed. No new concerns voiced. Port accessed with positive blood return. Labs drawn per order and sent. Line flushed, clamped, Curos Cap applied to end clave. Awaiting lab results. Pt reports recently having COVID; recently tested NEG    Visit Vitals  /70   Pulse 98   Temp 97 °F (36.1 °C)   Resp 16   Wt 76.2 kg (168 lb)   Breastfeeding No   BMI 27.96 kg/m²       Medications Administered     0.9% sodium chloride injection 10 mL     Admin Date  01/27/2022 Action  Given Dose  10 mL Route  IntraVENous Administered By  Chantelle Cooper, LISANDRA          dextrose 5% infusion     Admin Date  01/27/2022 Action  New Bag Dose  25 mL/hr Rate  25 mL/hr Route  IntraVENous Administered By  Chantelle Cooper RN          heparin (porcine) pf 300-500 Units     Admin Date  01/27/2022 Action  Given Dose  500 Units Route  InterCATHeter Administered By  Chantelle Cooper RN          liposomal DOXOrubicin (DOXIL/LIPODOX) 77.6 mg in dextrose 5% 250 mL, overfill volume 25 mL chemo infusion     Admin Date  01/27/2022 Action  New Bag Dose  77.6 mg Rate  313.8 mL/hr Route  IntraVENous Administered By  Chantelle Cooper RN          palonosetron HCl (ALOXI) injection 0.25 mg     Admin Date  01/27/2022 Action  Given Dose  0.25 mg Route  IntraVENous Administered By  Chantelle Cooper RN          sodium chloride (NS) flush 10 mL     Admin Date  01/27/2022 Action  Given Dose  10 mL Route  IntraVENous Administered By  Chantelle Cooper, RN                  1063 Pt tolerated treatment well. Port maintained positive blood return throughout treatment, flushed with positive blood return at conclusion, heparinized and de-accessed. D/c home ambulatory in no distress. Pt aware that she will be contacted regarding next [facility] appointment.     Recent Results (from the past 12 hour(s))   CBC WITH AUTOMATED DIFF Collection Time: 01/27/22  2:18 PM   Result Value Ref Range    WBC 8.1 3.6 - 11.0 K/uL    RBC 3.38 (L) 3.80 - 5.20 M/uL    HGB 9.6 (L) 11.5 - 16.0 g/dL    HCT 31.3 (L) 35.0 - 47.0 %    MCV 92.6 80.0 - 99.0 FL    MCH 28.4 26.0 - 34.0 PG    MCHC 30.7 30.0 - 36.5 g/dL    RDW 17.1 (H) 11.5 - 14.5 %    PLATELET 817 759 - 910 K/uL    MPV 9.3 8.9 - 12.9 FL    NRBC 0.0 0  WBC    ABSOLUTE NRBC 0.00 0.00 - 0.01 K/uL    NEUTROPHILS 75 32 - 75 %    LYMPHOCYTES 11 (L) 12 - 49 %    MONOCYTES 10 5 - 13 %    EOSINOPHILS 2 0 - 7 %    BASOPHILS 1 0 - 1 %    IMMATURE GRANULOCYTES 1 (H) 0.0 - 0.5 %    ABS. NEUTROPHILS 6.2 1.8 - 8.0 K/UL    ABS. LYMPHOCYTES 0.9 0.8 - 3.5 K/UL    ABS. MONOCYTES 0.8 0.0 - 1.0 K/UL    ABS. EOSINOPHILS 0.1 0.0 - 0.4 K/UL    ABS. BASOPHILS 0.0 0.0 - 0.1 K/UL    ABS. IMM. GRANS. 0.1 (H) 0.00 - 0.04 K/UL    DF AUTOMATED     METABOLIC PANEL, COMPREHENSIVE    Collection Time: 01/27/22  2:18 PM   Result Value Ref Range    Sodium 136 136 - 145 mmol/L    Potassium 4.0 3.5 - 5.1 mmol/L    Chloride 103 97 - 108 mmol/L    CO2 27 21 - 32 mmol/L    Anion gap 6 5 - 15 mmol/L    Glucose 131 (H) 65 - 100 mg/dL    BUN 19 6 - 20 MG/DL    Creatinine 0.47 (L) 0.55 - 1.02 MG/DL    BUN/Creatinine ratio 40 (H) 12 - 20      GFR est AA >60 >60 ml/min/1.73m2    GFR est non-AA >60 >60 ml/min/1.73m2    Calcium 8.7 8.5 - 10.1 MG/DL    Bilirubin, total 0.4 0.2 - 1.0 MG/DL    ALT (SGPT) 30 12 - 78 U/L    AST (SGOT) 27 15 - 37 U/L    Alk.  phosphatase 65 45 - 117 U/L    Protein, total 6.5 6.4 - 8.2 g/dL    Albumin 2.9 (L) 3.5 - 5.0 g/dL    Globulin 3.6 2.0 - 4.0 g/dL    A-G Ratio 0.8 (L) 1.1 - 2.2     CANCER ANTIGEN 125    Collection Time: 01/27/22  4:56 PM   Result Value Ref Range    CA-125 312 (H) 1.5 - 35.0 U/mL

## 2022-02-01 ENCOUNTER — TELEPHONE (OUTPATIENT)
Dept: GYNECOLOGY | Age: 67
End: 2022-02-01

## 2022-02-11 ENCOUNTER — TELEPHONE (OUTPATIENT)
Dept: GYNECOLOGY | Age: 67
End: 2022-02-11

## 2022-02-11 NOTE — TELEPHONE ENCOUNTER
Patient called in to say she has an open sore she can't seem to get rid of for about 3 weeks. Wants to know what she can do or use to get rid of it.   She will be home until 1:45 pm than she has to go to work at [employer]

## 2022-02-17 ENCOUNTER — HOSPITAL ENCOUNTER (OUTPATIENT)
Dept: CT IMAGING | Age: 67
Discharge: HOME | End: 2022-02-17
Attending: OBSTETRICS & GYNECOLOGY
Payer: MEDICARE

## 2022-02-17 DIAGNOSIS — C54.1 PRIMARY SEROUS ADENOCARCINOMA OF ENDOMETRIUM (HCC): ICD-10-CM

## 2022-02-17 PROCEDURE — 74177 CT ABD & PELVIS W/CONTRAST: CPT

## 2022-02-17 PROCEDURE — 74011000636 HC RX REV CODE- 636: Performed by: OBSTETRICS & GYNECOLOGY

## 2022-02-17 RX ORDER — ALBUTEROL SULFATE 0.83 MG/ML
2.5 SOLUTION RESPIRATORY (INHALATION) AS NEEDED
Status: CANCELLED
Start: 2022-02-24

## 2022-02-17 RX ORDER — EPINEPHRINE 1 MG/ML
0.3 INJECTION, SOLUTION, CONCENTRATE INTRAVENOUS AS NEEDED
Status: CANCELLED | OUTPATIENT
Start: 2022-02-24

## 2022-02-17 RX ORDER — ONDANSETRON 2 MG/ML
8 INJECTION INTRAMUSCULAR; INTRAVENOUS AS NEEDED
Status: CANCELLED | OUTPATIENT
Start: 2022-02-24

## 2022-02-17 RX ORDER — DIPHENHYDRAMINE HYDROCHLORIDE 50 MG/ML
50 INJECTION, SOLUTION INTRAMUSCULAR; INTRAVENOUS AS NEEDED
Status: CANCELLED
Start: 2022-02-24

## 2022-02-17 RX ORDER — DEXTROSE MONOHYDRATE 50 MG/ML
25 INJECTION, SOLUTION INTRAVENOUS CONTINUOUS
Status: CANCELLED
Start: 2022-02-24

## 2022-02-17 RX ORDER — HYDROCORTISONE SODIUM SUCCINATE 100 MG/2ML
100 INJECTION, POWDER, FOR SOLUTION INTRAMUSCULAR; INTRAVENOUS AS NEEDED
Status: CANCELLED | OUTPATIENT
Start: 2022-02-24

## 2022-02-17 RX ORDER — PALONOSETRON 0.05 MG/ML
0.25 INJECTION, SOLUTION INTRAVENOUS ONCE
Status: CANCELLED | OUTPATIENT
Start: 2022-02-24 | End: 2022-02-24

## 2022-02-17 RX ORDER — SODIUM CHLORIDE 0.9 % (FLUSH) 0.9 %
10 SYRINGE (ML) INJECTION AS NEEDED
Status: CANCELLED | OUTPATIENT
Start: 2022-02-24

## 2022-02-17 RX ORDER — DIPHENHYDRAMINE HYDROCHLORIDE 50 MG/ML
25 INJECTION, SOLUTION INTRAMUSCULAR; INTRAVENOUS AS NEEDED
Status: CANCELLED
Start: 2022-02-24

## 2022-02-17 RX ORDER — SODIUM CHLORIDE 9 MG/ML
10 INJECTION INTRAMUSCULAR; INTRAVENOUS; SUBCUTANEOUS AS NEEDED
Status: CANCELLED | OUTPATIENT
Start: 2022-02-24

## 2022-02-17 RX ORDER — ACETAMINOPHEN 325 MG/1
650 TABLET ORAL AS NEEDED
Status: CANCELLED
Start: 2022-02-24

## 2022-02-17 RX ORDER — HEPARIN 100 UNIT/ML
300-500 SYRINGE INTRAVENOUS AS NEEDED
Status: CANCELLED
Start: 2022-02-24

## 2022-02-17 RX ADMIN — IOPAMIDOL 100 ML: 755 INJECTION, SOLUTION INTRAVENOUS at 11:40

## 2022-02-18 NOTE — TELEPHONE ENCOUNTER
Pt c/o an area on side wall of rectal area, has been there 3 weeks, she used lotion, it scabbed and now scab has come off and now has an open area. Is now using Bacitrician Zinc ointment and she feels it is somewhat better. Pt has an appt on 2/22/22 with Dr. Lilian Chapman and will have him look at it.

## 2022-02-22 ENCOUNTER — APPOINTMENT (OUTPATIENT)
Dept: INFUSION THERAPY | Age: 67
End: 2022-02-22

## 2022-02-23 NOTE — PROGRESS NOTES
Follow up to review ct scan results. Patient c/o of a sore area on her bottom. She has been using bacitracin with zinc ointment.

## 2022-02-24 ENCOUNTER — HOSPITAL ENCOUNTER (OUTPATIENT)
Dept: INFUSION THERAPY | Age: 67
End: 2022-02-24

## 2022-02-24 ENCOUNTER — OFFICE VISIT (OUTPATIENT)
Dept: GYNECOLOGY | Age: 67
End: 2022-02-24
Payer: MEDICARE

## 2022-02-24 VITALS
HEIGHT: 65 IN | BODY MASS INDEX: 27.56 KG/M2 | WEIGHT: 165.4 LBS | DIASTOLIC BLOOD PRESSURE: 74 MMHG | HEART RATE: 98 BPM | SYSTOLIC BLOOD PRESSURE: 111 MMHG

## 2022-02-24 DIAGNOSIS — C54.1 PRIMARY SEROUS ADENOCARCINOMA OF ENDOMETRIUM (HCC): Primary | ICD-10-CM

## 2022-02-24 PROCEDURE — G0463 HOSPITAL OUTPT CLINIC VISIT: HCPCS | Performed by: OBSTETRICS & GYNECOLOGY

## 2022-02-24 PROCEDURE — G8417 CALC BMI ABV UP PARAM F/U: HCPCS | Performed by: OBSTETRICS & GYNECOLOGY

## 2022-02-24 PROCEDURE — 1101F PT FALLS ASSESS-DOCD LE1/YR: CPT | Performed by: OBSTETRICS & GYNECOLOGY

## 2022-02-24 PROCEDURE — 99213 OFFICE O/P EST LOW 20 MIN: CPT | Performed by: OBSTETRICS & GYNECOLOGY

## 2022-02-24 PROCEDURE — 1090F PRES/ABSN URINE INCON ASSESS: CPT | Performed by: OBSTETRICS & GYNECOLOGY

## 2022-02-24 PROCEDURE — G8536 NO DOC ELDER MAL SCRN: HCPCS | Performed by: OBSTETRICS & GYNECOLOGY

## 2022-02-24 PROCEDURE — G8432 DEP SCR NOT DOC, RNG: HCPCS | Performed by: OBSTETRICS & GYNECOLOGY

## 2022-02-24 PROCEDURE — G8427 DOCREV CUR MEDS BY ELIG CLIN: HCPCS | Performed by: OBSTETRICS & GYNECOLOGY

## 2022-02-24 PROCEDURE — G8400 PT W/DXA NO RESULTS DOC: HCPCS | Performed by: OBSTETRICS & GYNECOLOGY

## 2022-02-24 PROCEDURE — 3017F COLORECTAL CA SCREEN DOC REV: CPT | Performed by: OBSTETRICS & GYNECOLOGY

## 2022-02-24 NOTE — PROGRESS NOTES
[facility] GYNECOLOGIC ONCOLOGY  [address] [facility], [address], [address] [facility]  [phone] [facility] [phone]    Office Visit    Patient ID:  Name: Brad Leavitt  MRN: [id number]   : 1955/67 y.o. Visit date: 2022     INTERVAL HISTORY:  Brad Leavitt is a  female who is an established patient with stage IA, grade 3 uterine carcinoma. She underwent laparoscopic hysterectomy with staging in 2013. She was recommended six cycles of adjuvant Taxol and Carboplatin, which she completed. We elected not to treat with pelvic radiation therapy due to her difficulty with chemotherapy. She had a CT of the abdomen/pelvis at [facility] that revealed retroperitoneal lymphadenopathy, peritoneal carcinomatosis, and trace ascites. I sent her for a PET/CT to better evaluate the extent of disease. She presented to review the results and discuss treatment. Her disease is not amenable to surgical resection. Systemic therapy was her only viable option. I thought immunotherapy would be the best choice, ahead of additional chemotherapy, specifically IV Keytruda and PO Lenvima. If that were not successful, we would consider retreatment with Taxol/Carboplatin or single agent Doxil. She completed 6 cycles of immunotherapy before demonstrating progression of disease. We decided upon retreatment with Taxol/Carboplatin. She completed 8 cycles of that regimen. Her CA-125 has responded and her CT after three cycles demonstrated a response. Her CT after completion of 6 cycles demonstrated further response. A subsequent PET/CT demonstrated resolution of most of the abnormal PET activity, but there still was some residual activity in the omentum, suggesting persistent disease. We stopped treatment in 2021, as she was beginning not to tolerate treatment well. She presented recently complaining of abdominal pain and bloating.   She stated it was a stabbing pain in her left abdomen. The bloating has also been causing a little shortness of breath. I sent her for a CT of the chest/abdomen/pelvis to evaluate. She presented to review those results. The scan demonstrated recurrent disease throughout the abdomen and pelvis. I recommended single agent Doxil. She has completed 3 cycle of Doxil. Due to early satiety and complaints of bloating and abdominal distention, I sent her for a paracentesis in December. They did not see enough fluid on ultrasound to attempt drainage. Since then the patient was also diagnosed with COVID and she is still recovering. She has since tested negative. She presents today to review her interval CT scan after 3 cycles. PMH:  Past Medical History:   Diagnosis Date    Abnormal Pap smear 1995    with f/u normal    Anemia     Arthritis     Asthma     Cancer ([facility] [address]     ENDOMETRIAL    Environmental allergies     GERD (gastroesophageal reflux disease)     Goiter     Other unknown and unspecified cause of morbidity or mortality     POSSIBLE ESOPHAGEAL SPASM, ?  OBSTRUCTION DUE TO GOITER    PMB (postmenopausal bleeding)     S/P chemotherapy, time since greater than 12 weeks     LAST CHEMO 10/2014 PER PATIENT    Thyroid disease     goiter     PSH:  Past Surgical History:   Procedure Laterality Date    HX APPENDECTOMY      HX BUNIONECTOMY      HX GYN  3/13/13    TLH/BSO    HX HEENT  4/22/13    TOOTH REMOVED    HX ORTHOPAEDIC  1980'S    BUNIONECTOMY    HX VASCULAR ACCESS      port    IA BREAST SURGERY PROCEDURE UNLISTED  1/12/16    right breast bx     SOC:  Social History     Socioeconomic History    Marital status:      Spouse name: Not on file    Number of children: Not on file    Years of education: Not on file    Highest education level: Not on file   Occupational History    Not on file   Tobacco Use    Smoking status: Never Smoker    Smokeless tobacco: Never Used   Substance and Sexual Activity    Alcohol use: Yes     Alcohol/week: 0.0 standard drinks     Comment: RARE OCC    Drug use: No    Sexual activity: Not on file   Other Topics Concern    Not on file   Social History Narrative    Not on file     Social Determinants of Health     Financial Resource Strain:     Difficulty of Paying Living Expenses: Not on file   Food Insecurity:     Worried About Running Out of Food in the Last Year: Not on file    Blaze of Food in the Last Year: Not on file   Transportation Needs:     Lack of Transportation (Medical): Not on file    Lack of Transportation (Non-Medical):  Not on file   Physical Activity:     Days of Exercise per Week: Not on file    Minutes of Exercise per Session: Not on file   Stress:     Feeling of Stress : Not on file   Social Connections:     Frequency of Communication with Friends and Family: Not on file    Frequency of Social Gatherings with Friends and Family: Not on file    Attends Temple Services: Not on file    Active Member of [address] [place] [address] or Organizations: Not on file    Attends Club or Organization Meetings: Not on file    Marital Status: Not on file   Intimate Partner Violence:     Fear of Current or Ex-Partner: Not on file    Emotionally Abused: Not on file    Physically Abused: Not on file    Sexually Abused: Not on file   Housing Stability:     Unable to Pay for Housing in the Last Year: Not on file    Number of [place] in the Last Year: Not on file    Unstable Housing in the Last Year: Not on file     Family History  Family History   Problem Relation Age of Onset    Cancer Maternal Aunt         breast    Heart Disease Mother     Diabetes Father     Cancer Sister         CERVICAL    Kidney Disease Brother     Diabetes Brother     Heart Attack Other     Arrhythmia Brother     Diabetes Brother     Anesth Problems Neg Hx      Medications:  Current Outpatient Medications on File Prior to Visit   Medication Sig Dispense Refill    BACITRACIN, BULK, by Does Not Apply route. With zinc ointment      rivaroxaban (Xarelto) 20 mg tab tablet Take 1 Tablet by mouth daily. 30 Tablet 5    acetaminophen (TylenoL) 325 mg tablet Take  by mouth as needed.  dexAMETHasone (Decadron) 4 mg tablet Take 2 tablets by mouth with breakfast the day before chemo also take 2 tablets with breakfast for 2 days after chemotherapy 36 Tab 1    lisinopriL (PRINIVIL, ZESTRIL) 10 mg tablet Take 1 Tab by mouth daily. 30 Tab 2    lisinopriL (PRINIVIL, ZESTRIL) 5 mg tablet Take 2 Tabs by mouth daily. 30 Tab 5    ondansetron (ZOFRAN ODT) 4 mg disintegrating tablet Take 1 Tab by mouth every eight (8) hours as needed for Nausea. Indications: nausea and vomiting caused by cancer drugs 30 Tab 3    lidocaine-prilocaine (EMLA) topical cream Apply small amount over port area and cover with band aid one hour before chemo 30 g 3    guaifenesin (MUCINEX PO) Take  by mouth.  loratadine (Claritin) 10 mg tablet Take 10 mg by mouth.  epinastine 0.05 % drop Apply  to eye.  raNITIdine (ZANTAC) 150 mg tablet ZANTAC TABS      cyclobenzaprine (FLEXERIL) 5 mg tablet take 1 tablet by mouth three times a day if needed  0    valACYclovir (VALTREX) 1 gram tablet Take 1 Tab by mouth three (3) times daily. 21 Tab 3    EPINEPHrine (EPIPEN) 0.3 mg/0.3 mL injection 0.3 mg by IntraMUSCular route once as needed.  ketotifen (ZADITOR) 0.025 % (0.035 %) ophthalmic solution Administer 1 Drop to both eyes every morning.  Cetirizine (ZYRTEC) 10 mg cap Take 10 mg by mouth nightly.  SAL ACID/UREA/PETROLATUM,WHITE (KERASAL FOOT EX) by Apply Externally route daily as needed.  ACCU-CHEK HELDER PLUS TEST STRP strip   0    NITROSTAT 0.4 mg SL tablet       CALCIUM CARBONATE (MARCELLO-SELTZER ANTACID PO) Take  by mouth daily as needed. No current facility-administered medications on file prior to visit. Allergies:   Allergies   Allergen Reactions    Latex Other (comments)     Burns skin    Acetone Shortness of Breath    Amoxicillin Anaphylaxis    Ciprofloxacin Hives    Pcn [Penicillins] Anaphylaxis    Buspar [Buspirone] Unknown (comments)     Has N&V and makes her feel \"weird\"    Azithromycin Hives    Egg Nausea Only    Other Medication Rash     POPPY seeds       ROS:  Negative     OBJECTIVE:    PHYSICAL EXAM  VITAL SIGNS: Visit Vitals  /74 (BP 1 Location: Right upper arm, BP Patient Position: Sitting)   Pulse 98   Ht 5' 5\" (1.651 m)   Wt 165 lb 6.4 oz (75 kg)   BMI 27.52 kg/m²      GENERAL KAIA: WD, WN, WF in NAD   HEENT: WNL   RESPIRATORY: CTA, decreased BS in bilateral bases   CARDIOVASC: RRR   GASTROINT: Soft, NT, ND   MUSCULOSKEL: WNL   EXTREMITIES: No edema   PELVIC: Deferred   RECTAL: Deferred   PRIYANKA SURVEY: Negative    NEURO: Grossly intact     Lab Results   Component Value Date/Time    WBC 8.1 01/27/2022 02:18 PM    Hemoglobin (POC) 12.9 05/01/2013 09:53 AM    HGB 9.6 (L) 01/27/2022 02:18 PM    Hematocrit (POC) 38 05/01/2013 09:53 AM    HCT 31.3 (L) 01/27/2022 02:18 PM    PLATELET 490 37/05/7638 02:18 PM    MCV 92.6 01/27/2022 02:18 PM     Lab Results   Component Value Date/Time    Sodium 136 01/27/2022 02:18 PM    Potassium 4.0 01/27/2022 02:18 PM    Chloride 103 01/27/2022 02:18 PM    CO2 27 01/27/2022 02:18 PM    Anion gap 6 01/27/2022 02:18 PM    Glucose 131 (H) 01/27/2022 02:18 PM    BUN 19 01/27/2022 02:18 PM    Creatinine 0.47 (L) 01/27/2022 02:18 PM    BUN/Creatinine ratio 40 (H) 01/27/2022 02:18 PM    GFR est AA >60 01/27/2022 02:18 PM    GFR est non-AA >60 01/27/2022 02:18 PM    Calcium 8.7 01/27/2022 02:18 PM    Bilirubin, total 0.4 01/27/2022 02:18 PM    Alk.  phosphatase 65 01/27/2022 02:18 PM    Protein, total 6.5 01/27/2022 02:18 PM    Albumin 2.9 (L) 01/27/2022 02:18 PM    Globulin 3.6 01/27/2022 02:18 PM    A-G Ratio 0.8 (L) 01/27/2022 02:18 PM    ALT (SGPT) 30 01/27/2022 02:18 PM    AST (SGOT) 27 01/27/2022 02:18 PM     Lab Results   Component Value Date/Time    CA-125 312 (H) 01/27/2022 04:56 PM    Cancer Ag (CA) 125 548.0 (H) 12/17/2021 10:32 AM       CT of chest/abdomen/pelvis (12/30/20)  CT chest:    There is stable multinodular thyroid enlargement. Left chest Port-A-Cath  terminates in the SVC. The aorta and main pulmonary artery are normal in  caliber. The heart size is normal.  There is no pericardial or pleural effusion.        There are no enlarged axillary, mediastinal, or hilar lymph nodes.      There is no lung mass or airspace opacity. There is no pneumothorax. The  central airways are clear.     CT abdomen and pelvis: The liver, spleen, pancreas, and adrenal glands are normal. The gall bladder is  present  without intra- or extra-hepatic biliary dilatation.       The kidneys are symmetric without hydronephrosis.      There are no dilated bowel loops. The appendix is surgically absent.       Enlarged retroperitoneal lymph nodes are again demonstrated with a  representative left para-aortic lymph node measuring 1.3 x 1.8 cm (series 3,  image 76), previously 1.5 x 1.7 cm on 10/9/2020. A left common iliac lymph node  measures 1.1 x 1.5 cm (series 3, image 91), previously 0.8 x 1.4 cm.       Anterior peritoneal/mesenteric soft tissue nodularity is overall mildly  increased since 10/9/2020 with a representative measurement of 2.3 x 6.8 cm  (series 3, image 77), previously 2.6 x 5.5 cm.     Peritoneal soft tissue nodularity in the deep left pelvis measures 1.4 x 2.2 cm  (series 3, image 116), previously 2.9 x 3.1 cm.     There is no free fluid or free air. The aorta tapers without aneurysm.     The urinary bladder is normal. The uterus and ovaries are surgically absent.     There is no aggressive bony lesion.     IMPRESSION:   1. Mixed response in the abdomen and pelvis compared to 10/9/2020 with mildly  increased anterior peritoneal carcinomatosis. Stable to slightly increased  retroperitoneal lymphadenopathy.  Decreased peritoneal soft tissue nodularity in  the deep left pelvis.     2. No evidence for metastatic disease in the chest.      CT of chest/abdomen/pelvis (2/25/21)  CHEST WALL:No axillary or supraclavicular adenopathy. THYROID: Large hypodensity in the thyroid gland unchanged. MEDIASTINUM: 12 mm cardiophrenic lymph node unchanged. RAMEZ: No mass or lymphadenopathy. THORACIC AORTA: No dissection or aneurysm. MAIN PULMONARY ARTERY: Normal in caliber. TRACHEA/BRONCHI: Patent. ESOPHAGUS: No wall thickening or dilatation. HEART: Coronary atherosclerotic disease  PLEURA: No effusion or pneumothorax. LUNGS: No nodule, mass, or airspace disease. LIVER: No mass or biliary dilatation. GALLBLADDER: Unremarkable. BILIARY TREE: Unremarkable  SPLEEN: Unremarkable  PANCREAS: No mass or ductal dilatation. ADRENALS: Unremarkable. KIDNEYS: No mass, calculus, or hydronephrosis. STOMACH: Unremarkable. SMALL BOWEL: No dilatation or wall thickening. COLON: No dilatation or wall thickening. APPENDIX: Not visualized  PERITONEUM: Peritoneal carcinomatosis is again noted. 6.8 x 3.1 cm peritoneal  mass anteriorly is slightly increased in size. There is adjacent probably  confluent lesion measuring 3.4 x 2.6 cm which is increased in size from the  prior study. Deep left peritoneal nodularity is not significantly changed. Free  fluid in the pelvis increase in interval  RETROPERITONEUM: Left retroperitoneal lymph node measuring 1.5 x 1.4 cm slightly  decreased in size from 0.8 x 1.3 cm. Right retroperitoneal lymph node measuring  0.8 x 0.9 cm is increase in size of common iliac lymph node now measures 1.4 x  1.2 cm not significantly changed in size. REPRODUCTIVE ORGANS: Hysterectomy  URINARY BLADDER: No mass or calculus. BONES: No destructive bone lesion. ADDITIONAL COMMENTS: N/A     IMPRESSION  1. Overall increase in omental caking along the anterior peritoneum (the prior  study.     2.  Retroperitoneal adenopathy demonstrating variable response to therapy.  Some  of these have increased in interval.     3.  Pelvic soft tissue in the left deep pelvis is not significantly changed  although not well visualized.     4.  Slight interval increase in pelvic free fluid      CT of abdomen/pelvis (5/5/21)  LOWER THORAX: Right cardiophrenic lymph node measures 2.9 cm and previously  measured 1.2 cm on image number 15. There is minor basilar atelectasis/scarring  LIVER: No mass. BILIARY TREE: There is suggestion of gallstones CBD is not dilated. SPLEEN: within normal limits. PANCREAS: No mass or ductal dilatation. ADRENALS: Unremarkable. KIDNEYS: No mass, calculus, or hydronephrosis. STOMACH: Unremarkable. SMALL BOWEL: No dilatation or wall thickening. COLON: No dilatation or wall thickening. APPENDIX: Status post appendectomy  PERITONEUM: Peritoneal carcinomatosis appears improved. There is a confluent  density anteriorly in the peritoneum on image number 43 measuring 5.9 x 1.4 cm  which previously measured 9.8 x 1.9 cm. RETROPERITONEUM: Left retroperitoneal lymph node measures 0.7 cm image 45 and  previously measured 1.6 cm. Right retroperitoneal lymph node measures 0.7 cm image 46 and previously  measured 0.8 cm.     Left iliac lymph node image 57 measures 0.9 cm and previously measured 1.1 cm. REPRODUCTIVE ORGANS: Status post hysterectomy and oophorectomy. URINARY BLADDER: No mass or calculus. BONES: No destructive bone lesion. ABDOMINAL WALL: No mass or hernia. ADDITIONAL COMMENTS: N/A     IMPRESSION  1. There has been a mild decrease in the peritoneal carcinomatosis.      2. There has been slight to mild decrease in the retroperitoneal adenopathy.     3. No ascites is identified. No definite pelvic mass is identified. CT of chests/abdomen/pelvis (7/12/21)  THYROID: Heterogeneous thyroid gland with multiple nodules bilaterally including  in the isthmus. MEDIASTINUM: Left subclavian chest port terminates in the SVC. No mediastinal  adenopathy.   RAMEZ: No mass or lymphadenopathy. THORACIC AORTA: No dissection or aneurysm. MAIN PULMONARY ARTERY: Nonocclusive thrombus in segmental branches of the right  upper lobe pulmonary artery (3:47, 52), which are nonocclusive and likely  chronic. TRACHEA/BRONCHI: Patent. ESOPHAGUS: No wall thickening or dilatation. HEART: Normal in size. PLEURA: No effusion or pneumothorax. LUNGS: No nodule, mass, or airspace disease. LIVER: No mass or biliary dilatation. GALLBLADDER: Sludge in a nondistended gallbladder. SPLEEN: No mass. PANCREAS: No mass or ductal dilatation. ADRENALS: Unremarkable. KIDNEYS: No mass, calculus, or hydronephrosis. STOMACH: Unremarkable. SMALL BOWEL: No dilatation or wall thickening. COLON: Scattered diverticula. APPENDIX: Surgically absent  PERITONEUM: Decreased volume and size of the omental nodularity along the  anterior peritoneal surface just above the umbilicus. No ascites. No  pneumoperitoneum. RETROPERITONEUM: No lymphadenopathy or aortic aneurysm. REPRODUCTIVE ORGANS: Surgically absent. URINARY BLADDER: No mass or calculus. BONES: No destructive bone lesion. ADDITIONAL COMMENTS: N/A     IMPRESSION  1. Resolved retroperitoneal lymphadenopathy. 2.  Decreased peritoneal carcinomatosis. 3.  No ascites. 4.  Nonocclusive thrombus and segmental right upper lobe pulmonary artery  branches which are nonocclusive and appear chronic. PET/CT (8/27/21)  HEAD/NECK: No apparent foci of abnormal hypermetabolism. Cerebral evaluation is  limited by normal intense activity.     CHEST: No foci of abnormal hypermetabolism. Resolution of left supraclavicular  jasper activity.     ABDOMEN/PELVIS:   Omental/peritoneal disease is near completely resolved; current max SUV, midline  omentum 2.4, previous max SUV 12. Resolved retroperitoneal and right deep pelvic jasper activity.   Resolved left pelvic cul-de-sac mass/activity.     SKELETON: No foci of abnormal hypermetabolism in the axial and visualized  appendicular skeleton.     IMPRESSION  Abnormal PET activity is resolved other than minimal residual  activity in the omentum. CT of chest/abdomen/pelvis (11/9/21)  Chest:     Tubes and lines: Central venous port catheter extends to the SVC.     Lungs: There is mild bibasilar atelectasis. No pulmonary nodule or mass is seen.     Lymph nodes: There is no mediastinal, hilar or axillary lymphadenopathy. Subcentimeter axillary and mediastinal lymph nodes are noted.     Pleura: There is trace bilateral pleural fluid, new compared to prior CT dated  July 2021.     Heart: The heart is normal in size and there is no pericardial fluid.     Bones: No lytic or sclerotic osseous lesion is visualized.     The thyroid gland: Thyroid gland is normal in size. There are several nodules  within the thyroid gland, unchanged compared to prior CT dated July 2021.     Abdomen/pelvis:  Lymph nodes/peritoneum/retroperitoneum/omentum: There is a 1.3 cm x 1.3 cm  cardiophrenic lymph node which measured 8 mm x 7 mm on prior CT dated May 2021. There is a small amount of free fluid in the pelvis, new compared to prior CT  dated July 2021. There has been interval worsening of coalescing of omental  caking and intraperitoneal soft tissue nodularity. For example, within the  anterior mid abdomen, there is an omental soft tissue mass measuring  approximately 13.8 cm x 5.6 cm and measuring approximately 9.5 cm x 1.8 cm on  prior CT dated July 2021. There are also mildly enlarged upper abdominal  mesenteric lymph nodes which have increased in size. For example, there is a 2  cm x 1.4 cm celiac lymph node which measures approximately 6 mm x 4 mm on prior  CT dated July 2021. As an additional example, there is a 1.3 cm x 1.3 cm  mesenteric lymph node within the upper pelvis, measuring several millimeters in  size on prior CT dated July 2021. Retroperitoneal lymph nodes have increased in  size as well.  For example there is a 1.8 cm x 1.3 cm right common iliac lymph  node which measured 8 mm x 7 mm on prior CT dated July 2021.     Liver: There is subtle capsular hepatic, new compared to prior CT dated July 2021. No intraparenchymal hepatic lesion is seen.     Spleen: The spleen is normal.     Pancreas: The pancreas is normal.     Adrenals: The adrenals are normal.     Gallbladder: Gallbladder is normal.     Kidneys: The kidneys are normal. There is no hydronephrosis.     Bowel: There is new ill-defined soft tissue in the pelvis which appears to be  extending from the colon, likely representing subserosal soft tissue nodularity  and tethering. No dilated loop of large or small bowel is seen. Colonic  diverticulosis is noted.     Appendix: The appendix is surgically absent.     Urinary bladder: Urinary bladder is partially filled and grossly normal.     Bones: No lytic or sclerotic osseous lesion is visualized.     Miscellaneous: There is no free intraperitoneal gas. There is no focal fluid  collection to suggest abscess.     IMPRESSION  1. New, trace bilateral pleural fluid. Mild bibasilar atelectasis. 2.: Increase in size of cardiophrenic lymph node, now measuring 1.3 cm x 1.3 cm.  3. Interval worsening of coalescing of omental caking, intraperitoneal soft  tissue nodularity and intraperitoneal and retroperitoneal lymphadenopathy. 4. New, small amount of ascites in the pelvis.       CT of abdomen/pelvis (2/17/22)  LOWER THORAX: Small bilateral pleural effusions. No visualized lung nodules  LIVER: No mass. BILIARY TREE: Gallbladder is within normal limits. CBD is not dilated. SPLEEN: within normal limits. PANCREAS: No mass or ductal dilatation. ADRENALS: Unremarkable. KIDNEYS: No mass, calculus, or hydronephrosis. STOMACH: Unremarkable. SMALL BOWEL: No dilatation or wall thickening. COLON: No dilatation or wall thickening.   APPENDIX: Surgically absent  PERITONEUM: Omental and peritoneal nodularity with small volume scattered  ascites consistent with peritoneal carcinomatosis. Mesenteric, portacaval, and  gastrohepatic ligament lymphadenopathy. RETROPERITONEUM: Retroperitoneal lymphadenopathy along the IVC. REPRODUCTIVE ORGANS: Status post hysterectomy  URINARY BLADDER: No mass or calculus. BONES: No destructive bone lesion. ABDOMINAL WALL: No mass or hernia. ADDITIONAL COMMENTS: N/A     IMPRESSION  1. Peritoneal and omental nodularity with small volume ascites consistent with  peritoneal carcinomatosis. 2.  Lymphadenopathy in the mesentery, gastrohepatic ligament, portacaval, and  retroperitoneal stations. 3.  Small bilateral pleural effusions. 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 She completed six cycles of adjuvant Taxol and Carboplatin chemotherapy. She developed recurrent disease and was treated with the regimen of IV Keytruda and PO Lenvima. She completed 6 cycles before progression. Since it had been almost 8 years since her adjuvant Taxol/Carboplatin, I recommended that we retreat her with that regimen, though I suggested a lower dose of each. She completed 8 cycles of that regimen with an excellent response. She now has recurrence once again. I recommended single agent Doxil chemotherapy. She has completed 3 cycles so far. Unfortunately, her CT demonstrates progression of disease. We will stop the Doxil at this point. I discussed other treatment options with her, including the possibility of a clinical trial at [facility]. Ultimately we decided upon single agent Avastin. I explained that it won't make the tumor \"go away\", but it may slow down the growth and help manage the ascites. She was counseled on the expected side effects and potential toxicities of the proposed regimen. Her questions were answered to her satisfaction and she wishes to proceed with the planned treatment.         An electronic signature was used to sign this note.     Arelis Lopez MD  02/24/22

## 2022-02-28 ENCOUNTER — TELEPHONE (OUTPATIENT)
Dept: GYNECOLOGY | Age: 67
End: 2022-02-28

## 2022-02-28 RX ORDER — ACETAMINOPHEN 325 MG/1
650 TABLET ORAL AS NEEDED
Status: CANCELLED
Start: 2022-03-10

## 2022-02-28 RX ORDER — SODIUM CHLORIDE 9 MG/ML
25 INJECTION, SOLUTION INTRAVENOUS CONTINUOUS
Status: CANCELLED | OUTPATIENT
Start: 2022-03-10

## 2022-02-28 RX ORDER — ALBUTEROL SULFATE 0.83 MG/ML
2.5 SOLUTION RESPIRATORY (INHALATION) AS NEEDED
Status: CANCELLED
Start: 2022-03-10

## 2022-02-28 RX ORDER — ONDANSETRON 2 MG/ML
8 INJECTION INTRAMUSCULAR; INTRAVENOUS AS NEEDED
Status: CANCELLED | OUTPATIENT
Start: 2022-03-10

## 2022-02-28 RX ORDER — EPINEPHRINE 1 MG/ML
0.3 INJECTION, SOLUTION, CONCENTRATE INTRAVENOUS AS NEEDED
Status: CANCELLED | OUTPATIENT
Start: 2022-03-10

## 2022-02-28 RX ORDER — DIPHENHYDRAMINE HYDROCHLORIDE 50 MG/ML
25 INJECTION, SOLUTION INTRAMUSCULAR; INTRAVENOUS AS NEEDED
Status: CANCELLED
Start: 2022-03-10

## 2022-02-28 RX ORDER — HEPARIN 100 UNIT/ML
300-500 SYRINGE INTRAVENOUS AS NEEDED
Status: CANCELLED
Start: 2022-03-10

## 2022-02-28 RX ORDER — HYDROCORTISONE SODIUM SUCCINATE 100 MG/2ML
100 INJECTION, POWDER, FOR SOLUTION INTRAMUSCULAR; INTRAVENOUS AS NEEDED
Status: CANCELLED | OUTPATIENT
Start: 2022-03-10

## 2022-02-28 RX ORDER — SODIUM CHLORIDE 9 MG/ML
10 INJECTION INTRAMUSCULAR; INTRAVENOUS; SUBCUTANEOUS AS NEEDED
Status: CANCELLED | OUTPATIENT
Start: 2022-03-10

## 2022-02-28 RX ORDER — SODIUM CHLORIDE 0.9 % (FLUSH) 0.9 %
10 SYRINGE (ML) INJECTION AS NEEDED
Status: CANCELLED | OUTPATIENT
Start: 2022-03-10

## 2022-02-28 RX ORDER — DIPHENHYDRAMINE HYDROCHLORIDE 50 MG/ML
50 INJECTION, SOLUTION INTRAMUSCULAR; INTRAVENOUS AS NEEDED
Status: CANCELLED
Start: 2022-03-10

## 2022-02-28 NOTE — TELEPHONE ENCOUNTER
Patient stated that she told you she needed Thursdays not Tuesdays. Also she doesn't know what is normal on the oxymeter.

## 2022-03-01 ENCOUNTER — TELEPHONE (OUTPATIENT)
Dept: GYNECOLOGY | Age: 67
End: 2022-03-01

## 2022-03-01 NOTE — TELEPHONE ENCOUNTER
Patient called and said she usually goes to work from 2-6 but her boss wants her to come in at 12pm,  She tried but with everything going on she can't make it. So she wants to talk to you and see what she needs to do.

## 2022-03-01 NOTE — TELEPHONE ENCOUNTER
Pt requesting letter to be faxed to [phone] to allow her to work from 2:00pm to 6:00pm daily, as this would be cutting her hours to 20 per week d/t feeling weak and tiredness.

## 2022-03-07 ENCOUNTER — TELEPHONE (OUTPATIENT)
Dept: GYNECOLOGY | Age: 67
End: 2022-03-07

## 2022-03-07 NOTE — TELEPHONE ENCOUNTER
Pt calling with questions about oxygen levels with pulse ox, which she is now checking daily and states it reads from 92 to 95. I have advised pt to keep a journal of date/time and activity with PO readings and if continues to be low to contact her PCP as pt has had this since having Covid.

## 2022-03-10 ENCOUNTER — HOSPITAL ENCOUNTER (OUTPATIENT)
Dept: INFUSION THERAPY | Age: 67
Discharge: HOME | End: 2022-03-10
Payer: MEDICARE

## 2022-03-10 VITALS
RESPIRATION RATE: 17 BRPM | TEMPERATURE: 98.1 F | SYSTOLIC BLOOD PRESSURE: 116 MMHG | DIASTOLIC BLOOD PRESSURE: 59 MMHG | BODY MASS INDEX: 27.66 KG/M2 | WEIGHT: 166.01 LBS | HEIGHT: 65 IN | HEART RATE: 89 BPM

## 2022-03-10 DIAGNOSIS — C54.9 MALIGNANT NEOPLASM OF CORPUS UTERI, EXCEPT ISTHMUS (HCC): Primary | ICD-10-CM

## 2022-03-10 LAB
ALBUMIN SERPL-MCNC: 2.5 G/DL (ref 3.5–5)
ALBUMIN/GLOB SERPL: 0.6 {RATIO} (ref 1.1–2.2)
ALP SERPL-CCNC: 55 U/L (ref 45–117)
ALT SERPL-CCNC: 16 U/L (ref 12–78)
ANION GAP SERPL CALC-SCNC: 7 MMOL/L (ref 5–15)
APPEARANCE UR: CLEAR
AST SERPL-CCNC: 22 U/L (ref 15–37)
BACTERIA URNS QL MICRO: NEGATIVE /HPF
BASOPHILS # BLD: 0.1 K/UL (ref 0–0.1)
BASOPHILS NFR BLD: 1 % (ref 0–1)
BILIRUB SERPL-MCNC: 0.3 MG/DL (ref 0.2–1)
BILIRUB UR QL: NEGATIVE
BUN SERPL-MCNC: 14 MG/DL (ref 6–20)
BUN/CREAT SERPL: 32 (ref 12–20)
CALCIUM SERPL-MCNC: 8.9 MG/DL (ref 8.5–10.1)
CANCER AG125 SERPL-ACNC: 269 U/ML (ref 1.5–35)
CHLORIDE SERPL-SCNC: 103 MMOL/L (ref 97–108)
CO2 SERPL-SCNC: 26 MMOL/L (ref 21–32)
COLOR UR: NORMAL
CREAT SERPL-MCNC: 0.44 MG/DL (ref 0.55–1.02)
DIFFERENTIAL METHOD BLD: ABNORMAL
EOSINOPHIL # BLD: 0.1 K/UL (ref 0–0.4)
EOSINOPHIL NFR BLD: 1 % (ref 0–7)
EPITH CASTS URNS QL MICRO: NORMAL /LPF
ERYTHROCYTE [DISTWIDTH] IN BLOOD BY AUTOMATED COUNT: 18.8 % (ref 11.5–14.5)
GLOBULIN SER CALC-MCNC: 4.4 G/DL (ref 2–4)
GLUCOSE SERPL-MCNC: 111 MG/DL (ref 65–100)
GLUCOSE UR STRIP.AUTO-MCNC: NEGATIVE MG/DL
HCT VFR BLD AUTO: 30.5 % (ref 35–47)
HGB BLD-MCNC: 8.7 G/DL (ref 11.5–16)
HGB UR QL STRIP: NEGATIVE
HYALINE CASTS URNS QL MICRO: NORMAL /LPF (ref 0–5)
IMM GRANULOCYTES # BLD AUTO: 0.1 K/UL (ref 0–0.04)
IMM GRANULOCYTES NFR BLD AUTO: 1 % (ref 0–0.5)
KETONES UR QL STRIP.AUTO: NEGATIVE MG/DL
LEUKOCYTE ESTERASE UR QL STRIP.AUTO: NEGATIVE
LYMPHOCYTES # BLD: 0.9 K/UL (ref 0.8–3.5)
LYMPHOCYTES NFR BLD: 12 % (ref 12–49)
MCH RBC QN AUTO: 25.7 PG (ref 26–34)
MCHC RBC AUTO-ENTMCNC: 28.5 G/DL (ref 30–36.5)
MCV RBC AUTO: 90.2 FL (ref 80–99)
MONOCYTES # BLD: 0.6 K/UL (ref 0–1)
MONOCYTES NFR BLD: 8 % (ref 5–13)
NEUTS SEG # BLD: 5.3 K/UL (ref 1.8–8)
NEUTS SEG NFR BLD: 77 % (ref 32–75)
NITRITE UR QL STRIP.AUTO: NEGATIVE
NRBC # BLD: 0 K/UL (ref 0–0.01)
NRBC BLD-RTO: 0 PER 100 WBC
PH UR STRIP: 6.5 [PH] (ref 5–8)
PLATELET # BLD AUTO: 340 K/UL (ref 150–400)
PLATELET COMMENTS,PCOM: ABNORMAL
PMV BLD AUTO: 9.2 FL (ref 8.9–12.9)
POTASSIUM SERPL-SCNC: 3.8 MMOL/L (ref 3.5–5.1)
PROT SERPL-MCNC: 6.9 G/DL (ref 6.4–8.2)
PROT UR STRIP-MCNC: NEGATIVE MG/DL
RBC # BLD AUTO: 3.38 M/UL (ref 3.8–5.2)
RBC #/AREA URNS HPF: NORMAL /HPF (ref 0–5)
RBC MORPH BLD: ABNORMAL
SODIUM SERPL-SCNC: 136 MMOL/L (ref 136–145)
SP GR UR REFRACTOMETRY: 1.02 (ref 1–1.03)
UA: UC IF INDICATED,UAUC: NORMAL
UROBILINOGEN UR QL STRIP.AUTO: 1 EU/DL (ref 0.2–1)
WBC # BLD AUTO: 7.1 K/UL (ref 3.6–11)
WBC URNS QL MICRO: NORMAL /HPF (ref 0–4)

## 2022-03-10 PROCEDURE — 74011250636 HC RX REV CODE- 250/636: Performed by: OBSTETRICS & GYNECOLOGY

## 2022-03-10 PROCEDURE — 96413 CHEMO IV INFUSION 1 HR: CPT

## 2022-03-10 PROCEDURE — 86304 IMMUNOASSAY TUMOR CA 125: CPT

## 2022-03-10 PROCEDURE — 36415 COLL VENOUS BLD VENIPUNCTURE: CPT

## 2022-03-10 PROCEDURE — 81001 URINALYSIS AUTO W/SCOPE: CPT

## 2022-03-10 PROCEDURE — 77030012965 HC NDL HUBR BBMI -A

## 2022-03-10 PROCEDURE — 80053 COMPREHEN METABOLIC PANEL: CPT

## 2022-03-10 PROCEDURE — 85025 COMPLETE CBC W/AUTO DIFF WBC: CPT

## 2022-03-10 PROCEDURE — 74011000250 HC RX REV CODE- 250: Performed by: OBSTETRICS & GYNECOLOGY

## 2022-03-10 PROCEDURE — 74011000258 HC RX REV CODE- 258: Performed by: OBSTETRICS & GYNECOLOGY

## 2022-03-10 RX ORDER — SODIUM CHLORIDE 9 MG/ML
10 INJECTION INTRAMUSCULAR; INTRAVENOUS; SUBCUTANEOUS AS NEEDED
Status: DISCONTINUED | OUTPATIENT
Start: 2022-03-10 | End: 2022-03-11 | Stop reason: HOSPADM

## 2022-03-10 RX ORDER — SODIUM CHLORIDE 0.9 % (FLUSH) 0.9 %
10 SYRINGE (ML) INJECTION AS NEEDED
Status: DISCONTINUED | OUTPATIENT
Start: 2022-03-10 | End: 2022-03-11 | Stop reason: HOSPADM

## 2022-03-10 RX ORDER — ACETAMINOPHEN 325 MG/1
650 TABLET ORAL AS NEEDED
Status: DISCONTINUED | OUTPATIENT
Start: 2022-03-10 | End: 2022-03-11 | Stop reason: HOSPADM

## 2022-03-10 RX ORDER — SODIUM CHLORIDE 9 MG/ML
25 INJECTION, SOLUTION INTRAVENOUS CONTINUOUS
Status: DISCONTINUED | OUTPATIENT
Start: 2022-03-10 | End: 2022-03-11 | Stop reason: HOSPADM

## 2022-03-10 RX ORDER — DIPHENHYDRAMINE HYDROCHLORIDE 50 MG/ML
25 INJECTION, SOLUTION INTRAMUSCULAR; INTRAVENOUS AS NEEDED
Status: DISCONTINUED | OUTPATIENT
Start: 2022-03-10 | End: 2022-03-11 | Stop reason: HOSPADM

## 2022-03-10 RX ORDER — ONDANSETRON 2 MG/ML
8 INJECTION INTRAMUSCULAR; INTRAVENOUS AS NEEDED
Status: DISCONTINUED | OUTPATIENT
Start: 2022-03-10 | End: 2022-03-11 | Stop reason: HOSPADM

## 2022-03-10 RX ORDER — HEPARIN 100 UNIT/ML
300-500 SYRINGE INTRAVENOUS AS NEEDED
Status: DISCONTINUED | OUTPATIENT
Start: 2022-03-10 | End: 2022-03-11 | Stop reason: HOSPADM

## 2022-03-10 RX ADMIN — Medication 500 UNITS: at 16:57

## 2022-03-10 RX ADMIN — SODIUM CHLORIDE, PRESERVATIVE FREE 10 ML: 5 INJECTION INTRAVENOUS at 16:55

## 2022-03-10 RX ADMIN — SODIUM CHLORIDE 1125 MG: 900 INJECTION, SOLUTION INTRAVENOUS at 15:21

## 2022-03-10 RX ADMIN — SODIUM CHLORIDE 25 ML/HR: 9 INJECTION, SOLUTION INTRAVENOUS at 14:59

## 2022-03-10 NOTE — PROGRESS NOTES
\[facility]\""C Chemo Progress Note    Date: March 10, 2022    Name: Doris Rodriguez    MRN: [id number]         : 1955    1244 Ms. Salma Larkin Arrived to [facility] for D1 C1 Zirabev ambulatory in stable condition. Assessment was completed, no acute issues at this time, no new complaints voiced. Port accessed with positive blood return. Labs drawn and sent for processing. Chemotherapy Flowsheet 3/10/2022   Cycle C1D1   Date 3/10/2022   Drug / Regimen Charlanne Goltz   Pre Meds given   Notes given         Patient denies SOB, fever, cough, general not feeling well. Patient denies recent exposure to someone who has tested positive for COVID-19. Patient denies having contact with anyone who has a pending COVID test.      Ms. Kamryn Rhodes vitals were reviewed. Patient Vitals for the past 12 hrs:   Temp Pulse Resp BP   03/10/22 1653  89  (!) 116/59   03/10/22 1259 98.1 °F (36.7 °C) (!) 102 17 110/72         Lab results were obtained and reviewed.   Recent Results (from the past 12 hour(s))   URINALYSIS W/ REFLEX CULTURE    Collection Time: 03/10/22  1:06 PM    Specimen: Urine   Result Value Ref Range    Color YELLOW/STRAW      Appearance CLEAR CLEAR      Specific gravity 1.017 1.003 - 1.030      pH (UA) 6.5 5.0 - 8.0      Protein Negative NEG mg/dL    Glucose Negative NEG mg/dL    Ketone Negative NEG mg/dL    Bilirubin Negative NEG      Blood Negative NEG      Urobilinogen 1.0 0.2 - 1.0 EU/dL    Nitrites Negative NEG      Leukocyte Esterase Negative NEG      UA:UC IF INDICATED CULTURE NOT INDICATED BY UA RESULT CNI      WBC 0-4 0 - 4 /hpf    RBC 0-5 0 - 5 /hpf    Epithelial cells FEW FEW /lpf    Bacteria Negative NEG /hpf    Hyaline cast 0-2 0 - 5 /lpf   CBC WITH AUTOMATED DIFF    Collection Time: 03/10/22  1:06 PM   Result Value Ref Range    WBC 7.1 3.6 - 11.0 K/uL    RBC 3.38 (L) 3.80 - 5.20 M/uL    HGB 8.7 (L) 11.5 - 16.0 g/dL    HCT 30.5 (L) 35.0 - 47.0 %    MCV 90.2 80.0 - 99.0 FL    MCH 25.7 (L) 26.0 - 34.0 PG    MCHC 28.5 (L) 30.0 - 36.5 g/dL    RDW 18.8 (H) 11.5 - 14.5 %    PLATELET 046 718 - 270 K/uL    MPV 9.2 8.9 - 12.9 FL    NRBC 0.0 0  WBC    ABSOLUTE NRBC 0.00 0.00 - 0.01 K/uL    NEUTROPHILS 77 (H) 32 - 75 %    LYMPHOCYTES 12 12 - 49 %    MONOCYTES 8 5 - 13 %    EOSINOPHILS 1 0 - 7 %    BASOPHILS 1 0 - 1 %    IMMATURE GRANULOCYTES 1 (H) 0.0 - 0.5 %    ABS. NEUTROPHILS 5.3 1.8 - 8.0 K/UL    ABS. LYMPHOCYTES 0.9 0.8 - 3.5 K/UL    ABS. MONOCYTES 0.6 0.0 - 1.0 K/UL    ABS. EOSINOPHILS 0.1 0.0 - 0.4 K/UL    ABS. BASOPHILS 0.1 0.0 - 0.1 K/UL    ABS. IMM. GRANS. 0.1 (H) 0.00 - 0.04 K/UL    DF SMEAR SCANNED      PLATELET COMMENTS Large Platelets      RBC COMMENTS ANISOCYTOSIS  1+       METABOLIC PANEL, COMPREHENSIVE    Collection Time: 03/10/22  1:06 PM   Result Value Ref Range    Sodium 136 136 - 145 mmol/L    Potassium 3.8 3.5 - 5.1 mmol/L    Chloride 103 97 - 108 mmol/L    CO2 26 21 - 32 mmol/L    Anion gap 7 5 - 15 mmol/L    Glucose 111 (H) 65 - 100 mg/dL    BUN 14 6 - 20 MG/DL    Creatinine 0.44 (L) 0.55 - 1.02 MG/DL    BUN/Creatinine ratio 32 (H) 12 - 20      GFR est AA >60 >60 ml/min/1.73m2    GFR est non-AA >60 >60 ml/min/1.73m2    Calcium 8.9 8.5 - 10.1 MG/DL    Bilirubin, total 0.3 0.2 - 1.0 MG/DL    ALT (SGPT) 16 12 - 78 U/L    AST (SGOT) 22 15 - 37 U/L    Alk. phosphatase 55 45 - 117 U/L    Protein, total 6.9 6.4 - 8.2 g/dL    Albumin 2.5 (L) 3.5 - 5.0 g/dL    Globulin 4.4 (H) 2.0 - 4.0 g/dL    A-G Ratio 0.6 (L) 1.1 - 2.2     CANCER ANTIGEN 125    Collection Time: 03/10/22  1:06 PM   Result Value Ref Range    CA-125 269 (H) 1.5 - 35.0 U/mL       Pre-medications  were administered as ordered and chemotherapy was initiated.   Medications Administered     0.9% sodium chloride infusion     Admin Date  03/10/2022 Action  New Bag Dose  25 mL/hr Rate  25 mL/hr Route  IntraVENous Administered By  Milly Erazo RN          0.9% sodium chloride injection 10 mL     Admin Date  03/10/2022 Action  Given Dose  10 mL Route  IntraVENous Administered By  Yesy Sorenson RN          bevacizumab-bvzr (ZIRABEV) 1,125 mg in 0.9% sodium chloride 100 mL, overfill volume 10 mL IVPB     Admin Date  03/10/2022 Action  New Bag Dose  1,125 mg Rate  103.3 mL/hr Route  IntraVENous Administered By  Yesy Sorenson RN          heparin (porcine) pf 300-500 Units     Admin Date  03/10/2022 Action  Given Dose  500 Units Route  InterCATHeter Administered By  Yesy Sorenson RN                  [id number] Patient tolerated treatment well.  port maintained positive blood return throughout treatment. port flushed, heparinized and de accessed per protocol. Patient was discharged from [facility] in stable condition. Patient aware of next appointment.      Future Appointments   Date Time Provider David Jaar   3/29/2022  9:45 AM MD GREER Hansen [facility]   3/31/2022  1:00 PM A3 [facility] [id number] [facility]   4/19/2022  9:45 AM MD GREER Hansen [facility]   4/21/2022  1:00 PM A3 [facility] [address] [place] [facility]   5/12/2022 11:45 AM MD GREER Hansen [facility]   5/12/2022  1:00 PM E3 [facility] [place] [id number] Antoinette Appiah RN  March 10, 2022

## 2022-03-11 RX ORDER — ALBUTEROL SULFATE 0.83 MG/ML
2.5 SOLUTION RESPIRATORY (INHALATION) AS NEEDED
Status: CANCELLED
Start: 2022-03-31

## 2022-03-11 RX ORDER — DIPHENHYDRAMINE HYDROCHLORIDE 50 MG/ML
25 INJECTION, SOLUTION INTRAMUSCULAR; INTRAVENOUS AS NEEDED
Status: CANCELLED
Start: 2022-03-31

## 2022-03-11 RX ORDER — HYDROCORTISONE SODIUM SUCCINATE 100 MG/2ML
100 INJECTION, POWDER, FOR SOLUTION INTRAMUSCULAR; INTRAVENOUS AS NEEDED
Status: CANCELLED | OUTPATIENT
Start: 2022-03-31

## 2022-03-11 RX ORDER — EPINEPHRINE 1 MG/ML
0.3 INJECTION, SOLUTION, CONCENTRATE INTRAVENOUS AS NEEDED
Status: CANCELLED | OUTPATIENT
Start: 2022-03-31

## 2022-03-11 RX ORDER — SODIUM CHLORIDE 9 MG/ML
25 INJECTION, SOLUTION INTRAVENOUS CONTINUOUS
Status: CANCELLED | OUTPATIENT
Start: 2022-03-31

## 2022-03-11 RX ORDER — SODIUM CHLORIDE 9 MG/ML
10 INJECTION INTRAMUSCULAR; INTRAVENOUS; SUBCUTANEOUS AS NEEDED
Status: CANCELLED | OUTPATIENT
Start: 2022-03-31

## 2022-03-11 RX ORDER — HEPARIN 100 UNIT/ML
300-500 SYRINGE INTRAVENOUS AS NEEDED
Status: CANCELLED
Start: 2022-03-31

## 2022-03-11 RX ORDER — ONDANSETRON 2 MG/ML
8 INJECTION INTRAMUSCULAR; INTRAVENOUS AS NEEDED
Status: CANCELLED | OUTPATIENT
Start: 2022-03-31

## 2022-03-11 RX ORDER — ACETAMINOPHEN 325 MG/1
650 TABLET ORAL AS NEEDED
Status: CANCELLED
Start: 2022-03-31

## 2022-03-11 RX ORDER — DIPHENHYDRAMINE HYDROCHLORIDE 50 MG/ML
50 INJECTION, SOLUTION INTRAMUSCULAR; INTRAVENOUS AS NEEDED
Status: CANCELLED
Start: 2022-03-31

## 2022-03-11 RX ORDER — SODIUM CHLORIDE 0.9 % (FLUSH) 0.9 %
10 SYRINGE (ML) INJECTION AS NEEDED
Status: CANCELLED | OUTPATIENT
Start: 2022-03-31

## 2022-03-17 ENCOUNTER — TELEPHONE (OUTPATIENT)
Dept: GYNECOLOGY | Age: 67
End: 2022-03-17

## 2022-03-17 DIAGNOSIS — C54.9 MALIGNANT NEOPLASM OF CORPUS UTERI, EXCEPT ISTHMUS (HCC): ICD-10-CM

## 2022-03-17 NOTE — TELEPHONE ENCOUNTER
Patient c/o pain at a 10 right now in both knees. She reports it started right after her treatment 3/10/22 and seems to be worse. She says it feels like a bad tooth ache anterior on both knees. She does not want anything stronger for pain. She is taking tylenol, but wants to know if from chemo or if she is lacking something. Please advise.

## 2022-03-18 RX ORDER — DEXAMETHASONE 4 MG/1
8 TABLET ORAL
Qty: 6 TABLET | Refills: 0 | Status: SHIPPED | OUTPATIENT
Start: 2022-03-18 | End: 2022-03-21

## 2022-03-18 NOTE — TELEPHONE ENCOUNTER
Experiencing polyarthralgia following cycle 1 Avastin. Had pain in shoulders, upper and lower back, hips, knees. Other symptoms resolved but knee pain continues. Discussed several options. Will call in Rx for steroids for next 2-3 days.

## 2022-03-28 NOTE — PROGRESS NOTES
[facility] GYNECOLOGIC ONCOLOGY  [address] [facility], [address], [address] [facility]  [phone] [facility] [phone]    Office Visit    Patient ID:  Name: Jessie Cotton  MRN: [id number]   : 67 y.o. Visit date: 3/29/2022     INTERVAL HISTORY:  Jessie Cotton is a  female who is an established patient with stage IA, grade 3 uterine carcinoma. She underwent laparoscopic hysterectomy with staging in 2013. She was recommended six cycles of adjuvant Taxol and Carboplatin, which she completed. We elected not to treat with pelvic radiation therapy due to her difficulty with chemotherapy. She had a CT of the abdomen/pelvis at [facility] that revealed retroperitoneal lymphadenopathy, peritoneal carcinomatosis, and trace ascites. I sent her for a PET/CT to better evaluate the extent of disease. She presented to review the results and discuss treatment. Her disease is not amenable to surgical resection. Systemic therapy was her only viable option. I thought immunotherapy would be the best choice, ahead of additional chemotherapy, specifically IV Keytruda and PO Lenvima. If that were not successful, we would consider retreatment with Taxol/Carboplatin or single agent Doxil. She completed 6 cycles of immunotherapy before demonstrating progression of disease. We decided upon retreatment with Taxol/Carboplatin. She completed 8 cycles of that regimen. Her CA-125 has responded and her CT after three cycles demonstrated a response. Her CT after completion of 6 cycles demonstrated further response. A subsequent PET/CT demonstrated resolution of most of the abnormal PET activity, but there still was some residual activity in the omentum, suggesting persistent disease. We stopped treatment in 2021, as she was beginning not to tolerate treatment well. She presented recently complaining of abdominal pain and bloating.   She stated it was a stabbing pain in her left abdomen. The bloating has also been causing a little shortness of breath. I sent her for a CT of the chest/abdomen/pelvis to evaluate. She presented to review those results. The scan demonstrated recurrent disease throughout the abdomen and pelvis. I recommended single agent Doxil. She completed 3 cycles of Doxil. Due to early satiety and complaints of bloating and abdominal distention, I sent her for a paracentesis in December. They did not see enough fluid on ultrasound to attempt drainage. She was also diagnosed with COVID around that time and is still recovering, but has since tested negative. She presented to review her interval CT scan after 3 cycles. Unfortunately it demonstrated progression. I discussed other treatment options with her, including the possibility of a clinical trial at [facility]. Ultimately we decided upon single agent Avastin. I explained that it won't make the tumor \"go away\", but it may slow down the growth and help manage the ascites. She has completed one cycle so far. She is feeling better and appears to be tolerating well so far. PMH:  Past Medical History:   Diagnosis Date    Abnormal Pap smear 1995    with f/u normal    Anemia     Arthritis     Asthma     Cancer ([facility] [address]     ENDOMETRIAL    Environmental allergies     GERD (gastroesophageal reflux disease)     Goiter     Other unknown and unspecified cause of morbidity or mortality     POSSIBLE ESOPHAGEAL SPASM, ?  OBSTRUCTION DUE TO GOITER    PMB (postmenopausal bleeding)     S/P chemotherapy, time since greater than 12 weeks     LAST CHEMO 10/2014 PER PATIENT    Thyroid disease     goiter     PSH:  Past Surgical History:   Procedure Laterality Date    HX APPENDECTOMY      HX BUNIONECTOMY      HX GYN  3/13/13    TLH/BSO    HX HEENT  4/22/13    TOOTH REMOVED    HX ORTHOPAEDIC  1980'S    BUNIONECTOMY    HX VASCULAR ACCESS      port    WA BREAST SURGERY PROCEDURE UNLISTED  1/12/16    right breast bx     SOC:  Social History     Socioeconomic History    Marital status:      Spouse name: Not on file    Number of children: Not on file    Years of education: Not on file    Highest education level: Not on file   Occupational History    Not on file   Tobacco Use    Smoking status: Never Smoker    Smokeless tobacco: Never Used   Substance and Sexual Activity    Alcohol use: Yes     Alcohol/week: 0.0 standard drinks     Comment: RARE OCC    Drug use: No    Sexual activity: Not on file   Other Topics Concern    Not on file   Social History Narrative    Not on file     Social Determinants of Health     Financial Resource Strain:     Difficulty of Paying Living Expenses: Not on file   Food Insecurity:     Worried About Running Out of Food in the Last Year: Not on file    Blaze of Food in the Last Year: Not on file   Transportation Needs:     Lack of Transportation (Medical): Not on file    Lack of Transportation (Non-Medical):  Not on file   Physical Activity:     Days of Exercise per Week: Not on file    Minutes of Exercise per Session: Not on file   Stress:     Feeling of Stress : Not on file   Social Connections:     Frequency of Communication with Friends and Family: Not on file    Frequency of Social Gatherings with Friends and Family: Not on file    Attends Faith Services: Not on file    Active Member of [address] [place] [address] [employer] or Organizations: Not on file    Attends Club or Organization Meetings: Not on file    Marital Status: Not on file   Intimate Partner Violence:     Fear of Current or Ex-Partner: Not on file    Emotionally Abused: Not on file    Physically Abused: Not on file    Sexually Abused: Not on file   Housing Stability:     Unable to Pay for Housing in the Last Year: Not on file    Number of [place] in the Last Year: Not on file    Unstable Housing in the Last Year: Not on file     Family History  Family History   Problem Relation Age of Onset    Cancer Maternal Aunt         breast    Heart Disease Mother     Diabetes Father     Cancer Sister         CERVICAL    Kidney Disease Brother     Diabetes Brother     Heart Attack Other     Arrhythmia Brother     Diabetes Brother     Anesth Problems Neg Hx      Medications:  Current Outpatient Medications on File Prior to Visit   Medication Sig Dispense Refill    BACITRACIN, BULK, by Does Not Apply route. With zinc ointment      rivaroxaban (Xarelto) 20 mg tab tablet Take 1 Tablet by mouth daily. 30 Tablet 5    acetaminophen (TylenoL) 325 mg tablet Take  by mouth as needed.  lisinopriL (PRINIVIL, ZESTRIL) 10 mg tablet Take 1 Tab by mouth daily. 30 Tab 2    lisinopriL (PRINIVIL, ZESTRIL) 5 mg tablet Take 2 Tabs by mouth daily. 30 Tab 5    ondansetron (ZOFRAN ODT) 4 mg disintegrating tablet Take 1 Tab by mouth every eight (8) hours as needed for Nausea. Indications: nausea and vomiting caused by cancer drugs 30 Tab 3    lidocaine-prilocaine (EMLA) topical cream Apply small amount over port area and cover with band aid one hour before chemo 30 g 3    guaifenesin (MUCINEX PO) Take  by mouth.  loratadine (Claritin) 10 mg tablet Take 10 mg by mouth.  epinastine 0.05 % drop Apply  to eye.  raNITIdine (ZANTAC) 150 mg tablet ZANTAC TABS      cyclobenzaprine (FLEXERIL) 5 mg tablet take 1 tablet by mouth three times a day if needed  0    valACYclovir (VALTREX) 1 gram tablet Take 1 Tab by mouth three (3) times daily. 21 Tab 3    EPINEPHrine (EPIPEN) 0.3 mg/0.3 mL injection 0.3 mg by IntraMUSCular route once as needed.  ketotifen (ZADITOR) 0.025 % (0.035 %) ophthalmic solution Administer 1 Drop to both eyes every morning.  Cetirizine (ZYRTEC) 10 mg cap Take 10 mg by mouth nightly.  SAL ACID/UREA/PETROLATUM,WHITE (KERASAL FOOT EX) by Apply Externally route daily as needed.       ACCU-CHEK HELDER PLUS TEST STRP strip   0    NITROSTAT 0.4 mg SL tablet       CALCIUM CARBONATE (MARCELLO-SELTZER ANTACID PO) Take  by mouth daily as needed. No current facility-administered medications on file prior to visit. Allergies: Allergies   Allergen Reactions    Latex Other (comments)     Burns skin    Acetone Shortness of Breath    Amoxicillin Anaphylaxis    Ciprofloxacin Hives    Pcn [Penicillins] Anaphylaxis    Buspar [Buspirone] Unknown (comments)     Has N&V and makes her feel \"weird\"    Azithromycin Hives    Egg Nausea Only    Other Medication Rash     POPPY seeds       ROS:  Negative     OBJECTIVE:    PHYSICAL EXAM  VITAL SIGNS: There were no vitals taken for this visit. GENERAL KAIA:    HEENT:    RESPIRATORY:    CARDIOVASC:    GASTROINT:    MUSCULOSKEL:    EXTREMITIES:    PELVIC:    RECTAL:    PRIYANKA SURVEY:    NEURO:      Lab Results   Component Value Date/Time    WBC 7.1 03/10/2022 01:06 PM    Hemoglobin (POC) 12.9 05/01/2013 09:53 AM    HGB 8.7 (L) 03/10/2022 01:06 PM    Hematocrit (POC) 38 05/01/2013 09:53 AM    HCT 30.5 (L) 03/10/2022 01:06 PM    PLATELET 273 79/09/5048 01:06 PM    MCV 90.2 03/10/2022 01:06 PM     Lab Results   Component Value Date/Time    Sodium 136 03/10/2022 01:06 PM    Potassium 3.8 03/10/2022 01:06 PM    Chloride 103 03/10/2022 01:06 PM    CO2 26 03/10/2022 01:06 PM    Anion gap 7 03/10/2022 01:06 PM    Glucose 111 (H) 03/10/2022 01:06 PM    BUN 14 03/10/2022 01:06 PM    Creatinine 0.44 (L) 03/10/2022 01:06 PM    BUN/Creatinine ratio 32 (H) 03/10/2022 01:06 PM    GFR est AA >60 03/10/2022 01:06 PM    GFR est non-AA >60 03/10/2022 01:06 PM    Calcium 8.9 03/10/2022 01:06 PM    Bilirubin, total 0.3 03/10/2022 01:06 PM    Alk.  phosphatase 55 03/10/2022 01:06 PM    Protein, total 6.9 03/10/2022 01:06 PM    Albumin 2.5 (L) 03/10/2022 01:06 PM    Globulin 4.4 (H) 03/10/2022 01:06 PM    A-G Ratio 0.6 (L) 03/10/2022 01:06 PM    ALT (SGPT) 16 03/10/2022 01:06 PM    AST (SGOT) 22 03/10/2022 01:06 PM     Lab Results   Component Value Date/Time    CA-125 269 (H) 03/10/2022 01:06 PM    Cancer Ag (CA) 125 548.0 (H) 12/17/2021 10:32 AM       CT of chest/abdomen/pelvis (12/30/20)  CT chest:    There is stable multinodular thyroid enlargement. Left chest Port-A-Cath  terminates in the SVC. The aorta and main pulmonary artery are normal in  caliber. The heart size is normal.  There is no pericardial or pleural effusion.        There are no enlarged axillary, mediastinal, or hilar lymph nodes.      There is no lung mass or airspace opacity. There is no pneumothorax. The  central airways are clear.     CT abdomen and pelvis: The liver, spleen, pancreas, and adrenal glands are normal. The gall bladder is  present  without intra- or extra-hepatic biliary dilatation.       The kidneys are symmetric without hydronephrosis.      There are no dilated bowel loops. The appendix is surgically absent.       Enlarged retroperitoneal lymph nodes are again demonstrated with a  representative left para-aortic lymph node measuring 1.3 x 1.8 cm (series 3,  image 76), previously 1.5 x 1.7 cm on 10/9/2020. A left common iliac lymph node  measures 1.1 x 1.5 cm (series 3, image 91), previously 0.8 x 1.4 cm.       Anterior peritoneal/mesenteric soft tissue nodularity is overall mildly  increased since 10/9/2020 with a representative measurement of 2.3 x 6.8 cm  (series 3, image 77), previously 2.6 x 5.5 cm.     Peritoneal soft tissue nodularity in the deep left pelvis measures 1.4 x 2.2 cm  (series 3, image 116), previously 2.9 x 3.1 cm.     There is no free fluid or free air. The aorta tapers without aneurysm.     The urinary bladder is normal. The uterus and ovaries are surgically absent.     There is no aggressive bony lesion.     IMPRESSION:   1. Mixed response in the abdomen and pelvis compared to 10/9/2020 with mildly  increased anterior peritoneal carcinomatosis. Stable to slightly increased  retroperitoneal lymphadenopathy.  Decreased peritoneal soft tissue nodularity in  the deep left pelvis.     2. No evidence for metastatic disease in the chest.      CT of chest/abdomen/pelvis (2/25/21)  CHEST WALL:No axillary or supraclavicular adenopathy. THYROID: Large hypodensity in the thyroid gland unchanged. MEDIASTINUM: 12 mm cardiophrenic lymph node unchanged. RAMEZ: No mass or lymphadenopathy. THORACIC AORTA: No dissection or aneurysm. MAIN PULMONARY ARTERY: Normal in caliber. TRACHEA/BRONCHI: Patent. ESOPHAGUS: No wall thickening or dilatation. HEART: Coronary atherosclerotic disease  PLEURA: No effusion or pneumothorax. LUNGS: No nodule, mass, or airspace disease. LIVER: No mass or biliary dilatation. GALLBLADDER: Unremarkable. BILIARY TREE: Unremarkable  SPLEEN: Unremarkable  PANCREAS: No mass or ductal dilatation. ADRENALS: Unremarkable. KIDNEYS: No mass, calculus, or hydronephrosis. STOMACH: Unremarkable. SMALL BOWEL: No dilatation or wall thickening. COLON: No dilatation or wall thickening. APPENDIX: Not visualized  PERITONEUM: Peritoneal carcinomatosis is again noted. 6.8 x 3.1 cm peritoneal  mass anteriorly is slightly increased in size. There is adjacent probably  confluent lesion measuring 3.4 x 2.6 cm which is increased in size from the  prior study. Deep left peritoneal nodularity is not significantly changed. Free  fluid in the pelvis increase in interval  RETROPERITONEUM: Left retroperitoneal lymph node measuring 1.5 x 1.4 cm slightly  decreased in size from 0.8 x 1.3 cm. Right retroperitoneal lymph node measuring  0.8 x 0.9 cm is increase in size of common iliac lymph node now measures 1.4 x  1.2 cm not significantly changed in size. REPRODUCTIVE ORGANS: Hysterectomy  URINARY BLADDER: No mass or calculus. BONES: No destructive bone lesion. ADDITIONAL COMMENTS: N/A     IMPRESSION  1. Overall increase in omental caking along the anterior peritoneum (the prior  study.     2.  Retroperitoneal adenopathy demonstrating variable response to therapy.  Some  of these have increased in interval.     3.  Pelvic soft tissue in the left deep pelvis is not significantly changed  although not well visualized.     4.  Slight interval increase in pelvic free fluid      CT of abdomen/pelvis (5/5/21)  LOWER THORAX: Right cardiophrenic lymph node measures 2.9 cm and previously  measured 1.2 cm on image number 15. There is minor basilar atelectasis/scarring  LIVER: No mass. BILIARY TREE: There is suggestion of gallstones CBD is not dilated. SPLEEN: within normal limits. PANCREAS: No mass or ductal dilatation. ADRENALS: Unremarkable. KIDNEYS: No mass, calculus, or hydronephrosis. STOMACH: Unremarkable. SMALL BOWEL: No dilatation or wall thickening. COLON: No dilatation or wall thickening. APPENDIX: Status post appendectomy  PERITONEUM: Peritoneal carcinomatosis appears improved. There is a confluent  density anteriorly in the peritoneum on image number 43 measuring 5.9 x 1.4 cm  which previously measured 9.8 x 1.9 cm. RETROPERITONEUM: Left retroperitoneal lymph node measures 0.7 cm image 45 and  previously measured 1.6 cm. Right retroperitoneal lymph node measures 0.7 cm image 46 and previously  measured 0.8 cm.     Left iliac lymph node image 57 measures 0.9 cm and previously measured 1.1 cm. REPRODUCTIVE ORGANS: Status post hysterectomy and oophorectomy. URINARY BLADDER: No mass or calculus. BONES: No destructive bone lesion. ABDOMINAL WALL: No mass or hernia. ADDITIONAL COMMENTS: N/A     IMPRESSION  1. There has been a mild decrease in the peritoneal carcinomatosis.      2. There has been slight to mild decrease in the retroperitoneal adenopathy.     3. No ascites is identified. No definite pelvic mass is identified. CT of chests/abdomen/pelvis (7/12/21)  THYROID: Heterogeneous thyroid gland with multiple nodules bilaterally including  in the isthmus. MEDIASTINUM: Left subclavian chest port terminates in the SVC. No mediastinal  adenopathy.   RAMEZ: No mass or lymphadenopathy. THORACIC AORTA: No dissection or aneurysm. MAIN PULMONARY ARTERY: Nonocclusive thrombus in segmental branches of the right  upper lobe pulmonary artery (3:47, 52), which are nonocclusive and likely  chronic. TRACHEA/BRONCHI: Patent. ESOPHAGUS: No wall thickening or dilatation. HEART: Normal in size. PLEURA: No effusion or pneumothorax. LUNGS: No nodule, mass, or airspace disease. LIVER: No mass or biliary dilatation. GALLBLADDER: Sludge in a nondistended gallbladder. SPLEEN: No mass. PANCREAS: No mass or ductal dilatation. ADRENALS: Unremarkable. KIDNEYS: No mass, calculus, or hydronephrosis. STOMACH: Unremarkable. SMALL BOWEL: No dilatation or wall thickening. COLON: Scattered diverticula. APPENDIX: Surgically absent  PERITONEUM: Decreased volume and size of the omental nodularity along the  anterior peritoneal surface just above the umbilicus. No ascites. No  pneumoperitoneum. RETROPERITONEUM: No lymphadenopathy or aortic aneurysm. REPRODUCTIVE ORGANS: Surgically absent. URINARY BLADDER: No mass or calculus. BONES: No destructive bone lesion. ADDITIONAL COMMENTS: N/A     IMPRESSION  1. Resolved retroperitoneal lymphadenopathy. 2.  Decreased peritoneal carcinomatosis. 3.  No ascites. 4.  Nonocclusive thrombus and segmental right upper lobe pulmonary artery  branches which are nonocclusive and appear chronic. PET/CT (8/27/21)  HEAD/NECK: No apparent foci of abnormal hypermetabolism. Cerebral evaluation is  limited by normal intense activity.     CHEST: No foci of abnormal hypermetabolism. Resolution of left supraclavicular  jasper activity.     ABDOMEN/PELVIS:   Omental/peritoneal disease is near completely resolved; current max SUV, midline  omentum 2.4, previous max SUV 12. Resolved retroperitoneal and right deep pelvic jasper activity.   Resolved left pelvic cul-de-sac mass/activity.     SKELETON: No foci of abnormal hypermetabolism in the axial and visualized  appendicular skeleton.     IMPRESSION  Abnormal PET activity is resolved other than minimal residual  activity in the omentum. CT of chest/abdomen/pelvis (11/9/21)  Chest:     Tubes and lines: Central venous port catheter extends to the SVC.     Lungs: There is mild bibasilar atelectasis. No pulmonary nodule or mass is seen.     Lymph nodes: There is no mediastinal, hilar or axillary lymphadenopathy. Subcentimeter axillary and mediastinal lymph nodes are noted.     Pleura: There is trace bilateral pleural fluid, new compared to prior CT dated  July 2021.     Heart: The heart is normal in size and there is no pericardial fluid.     Bones: No lytic or sclerotic osseous lesion is visualized.     The thyroid gland: Thyroid gland is normal in size. There are several nodules  within the thyroid gland, unchanged compared to prior CT dated July 2021.     Abdomen/pelvis:  Lymph nodes/peritoneum/retroperitoneum/omentum: There is a 1.3 cm x 1.3 cm  cardiophrenic lymph node which measured 8 mm x 7 mm on prior CT dated May 2021. There is a small amount of free fluid in the pelvis, new compared to prior CT  dated July 2021. There has been interval worsening of coalescing of omental  caking and intraperitoneal soft tissue nodularity. For example, within the  anterior mid abdomen, there is an omental soft tissue mass measuring  approximately 13.8 cm x 5.6 cm and measuring approximately 9.5 cm x 1.8 cm on  prior CT dated July 2021. There are also mildly enlarged upper abdominal  mesenteric lymph nodes which have increased in size. For example, there is a 2  cm x 1.4 cm celiac lymph node which measures approximately 6 mm x 4 mm on prior  CT dated July 2021. As an additional example, there is a 1.3 cm x 1.3 cm  mesenteric lymph node within the upper pelvis, measuring several millimeters in  size on prior CT dated July 2021. Retroperitoneal lymph nodes have increased in  size as well.  For example there is a 1.8 cm x 1.3 cm right common iliac lymph  node which measured 8 mm x 7 mm on prior CT dated July 2021.     Liver: There is subtle capsular hepatic, new compared to prior CT dated July 2021. No intraparenchymal hepatic lesion is seen.     Spleen: The spleen is normal.     Pancreas: The pancreas is normal.     Adrenals: The adrenals are normal.     Gallbladder: Gallbladder is normal.     Kidneys: The kidneys are normal. There is no hydronephrosis.     Bowel: There is new ill-defined soft tissue in the pelvis which appears to be  extending from the colon, likely representing subserosal soft tissue nodularity  and tethering. No dilated loop of large or small bowel is seen. Colonic  diverticulosis is noted.     Appendix: The appendix is surgically absent.     Urinary bladder: Urinary bladder is partially filled and grossly normal.     Bones: No lytic or sclerotic osseous lesion is visualized.     Miscellaneous: There is no free intraperitoneal gas. There is no focal fluid  collection to suggest abscess.     IMPRESSION  1. New, trace bilateral pleural fluid. Mild bibasilar atelectasis. 2.: Increase in size of cardiophrenic lymph node, now measuring 1.3 cm x 1.3 cm.  3. Interval worsening of coalescing of omental caking, intraperitoneal soft  tissue nodularity and intraperitoneal and retroperitoneal lymphadenopathy. 4. New, small amount of ascites in the pelvis.       CT of abdomen/pelvis (2/17/22)  LOWER THORAX: Small bilateral pleural effusions. No visualized lung nodules  LIVER: No mass. BILIARY TREE: Gallbladder is within normal limits. CBD is not dilated. SPLEEN: within normal limits. PANCREAS: No mass or ductal dilatation. ADRENALS: Unremarkable. KIDNEYS: No mass, calculus, or hydronephrosis. STOMACH: Unremarkable. SMALL BOWEL: No dilatation or wall thickening. COLON: No dilatation or wall thickening.   APPENDIX: Surgically absent  PERITONEUM: Omental and peritoneal nodularity with small volume scattered  ascites consistent with peritoneal carcinomatosis. Mesenteric, portacaval, and  gastrohepatic ligament lymphadenopathy. RETROPERITONEUM: Retroperitoneal lymphadenopathy along the IVC. REPRODUCTIVE ORGANS: Status post hysterectomy  URINARY BLADDER: No mass or calculus. BONES: No destructive bone lesion. ABDOMINAL WALL: No mass or hernia. ADDITIONAL COMMENTS: N/A     IMPRESSION  1. Peritoneal and omental nodularity with small volume ascites consistent with  peritoneal carcinomatosis. 2.  Lymphadenopathy in the mesentery, gastrohepatic ligament, portacaval, and  retroperitoneal stations. 3.  Small bilateral pleural effusions. 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 She completed six cycles of adjuvant Taxol and Carboplatin chemotherapy. She developed recurrent disease and was treated with the regimen of IV Keytruda and PO Lenvima. She completed 6 cycles before progression. Since it had been almost 8 years since her adjuvant Taxol/Carboplatin, I recommended that we retreat her with that regimen, though I suggested a lower dose of each. She completed 8 cycles of that regimen with an excellent response. She now has recurrence once again. I recommended single agent Doxil chemotherapy. She completed 3 cycles before a CT demonstrated progression of disease. We stopped the Doxil and I discussed other treatment options with her, including the possibility of a clinical trial at [address] [place] [address]. Ultimately we decided upon single agent Avastin. I explained that it won't make the tumor \"go away\", but it may slow down the growth and help manage the ascites. She has completed one cycle so far. We will continue with the current regimen and repeat imaging after the third cycle.       Antelmo Baron is a 78 y.o. female, evaluated via audio-only technology on 3/29/2022 for Chemotherapy      Assessment & Plan:   Diagnoses and all orders for this visit:    1. Malignant neoplasm of corpus uteri, except isthmus (HCC)          Subjective:       Prior to Admission medications    Medication Sig Start Date End Date Taking? Authorizing Provider   BACITRACIN, BULK, by Does Not Apply route. With zinc ointment   Yes Provider, Historical   rivaroxaban (Xarelto) 20 mg tab tablet Take 1 Tablet by mouth daily. 2/1/22  Yes Jason Urban MD   acetaminophen (TylenoL) 325 mg tablet Take  by mouth as needed. Yes Provider, Historical   lisinopriL (PRINIVIL, ZESTRIL) 10 mg tablet Take 1 Tab by mouth daily. 1/6/21  Yes Jason Urban MD   lisinopriL (PRINIVIL, ZESTRIL) 5 mg tablet Take 2 Tabs by mouth daily. 1/5/21  Yes Hieu Cline PA-C   ondansetron (ZOFRAN ODT) 4 mg disintegrating tablet Take 1 Tab by mouth every eight (8) hours as needed for Nausea. Indications: nausea and vomiting caused by cancer drugs 10/22/20  Yes Jason Urban MD   lidocaine-prilocaine (EMLA) topical cream Apply small amount over port area and cover with band aid one hour before chemo 10/22/20  Yes Jason Urban MD   guaifenesin (MUCINEX PO) Take  by mouth. Yes Provider, Historical   loratadine (Claritin) 10 mg tablet Take 10 mg by mouth. Yes Provider, Historical   epinastine 0.05 % drop Apply  to eye. Yes Provider, Historical   raNITIdine (ZANTAC) 150 mg tablet ZANTAC TABS 9/29/11  Yes Provider, Historical   cyclobenzaprine (FLEXERIL) 5 mg tablet take 1 tablet by mouth three times a day if needed 12/5/16  Yes Provider, Historical   valACYclovir (VALTREX) 1 gram tablet Take 1 Tab by mouth three (3) times daily. 12/15/16  Yes Jason Urban MD   EPINEPHrine (EPIPEN) 0.3 mg/0.3 mL injection 0.3 mg by IntraMUSCular route once as needed. Yes Provider, Historical   ketotifen (ZADITOR) 0.025 % (0.035 %) ophthalmic solution Administer 1 Drop to both eyes every morning. Yes Provider, Historical   Cetirizine (ZYRTEC) 10 mg cap Take 10 mg by mouth nightly.    Yes Provider, Historical   SAL ACID/UREA/PETROLATUM,WHITE (KERASAL FOOT EX) by Apply Externally route daily as needed. Yes Provider, Historical   ACCU-CHEK HELDER PLUS TEST STRP strip  7/3/15  Yes Provider, Historical   NITROSTAT 0.4 mg SL tablet  9/22/14  Yes Provider, Historical   CALCIUM CARBONATE (MARCELLO-SELTZER ANTACID PO) Take  by mouth daily as needed. Yes Provider, Historical     Patient Active Problem List   Diagnosis Code    Malignant neoplasm of corpus uteri, except isthmus ([address] C54.9     Patient Active Problem List    Diagnosis Date Noted    Malignant neoplasm of corpus uteri, except isthmus ([facility] [address] 02/28/2013     Current Outpatient Medications   Medication Sig Dispense Refill    BACITRACIN, BULK, by Does Not Apply route. With zinc ointment      rivaroxaban (Xarelto) 20 mg tab tablet Take 1 Tablet by mouth daily. 30 Tablet 5    acetaminophen (TylenoL) 325 mg tablet Take  by mouth as needed.  lisinopriL (PRINIVIL, ZESTRIL) 10 mg tablet Take 1 Tab by mouth daily. 30 Tab 2    lisinopriL (PRINIVIL, ZESTRIL) 5 mg tablet Take 2 Tabs by mouth daily. 30 Tab 5    ondansetron (ZOFRAN ODT) 4 mg disintegrating tablet Take 1 Tab by mouth every eight (8) hours as needed for Nausea. Indications: nausea and vomiting caused by cancer drugs 30 Tab 3    lidocaine-prilocaine (EMLA) topical cream Apply small amount over port area and cover with band aid one hour before chemo 30 g 3    guaifenesin (MUCINEX PO) Take  by mouth.  loratadine (Claritin) 10 mg tablet Take 10 mg by mouth.  epinastine 0.05 % drop Apply  to eye.  raNITIdine (ZANTAC) 150 mg tablet ZANTAC TABS      cyclobenzaprine (FLEXERIL) 5 mg tablet take 1 tablet by mouth three times a day if needed  0    valACYclovir (VALTREX) 1 gram tablet Take 1 Tab by mouth three (3) times daily. 21 Tab 3    EPINEPHrine (EPIPEN) 0.3 mg/0.3 mL injection 0.3 mg by IntraMUSCular route once as needed.       ketotifen (ZADITOR) 0.025 % (0.035 %) ophthalmic solution Administer 1 Drop to both eyes every morning.  Cetirizine (ZYRTEC) 10 mg cap Take 10 mg by mouth nightly.  SAL ACID/UREA/PETROLATUM,WHITE (KERASAL FOOT EX) by Apply Externally route daily as needed.  ACCU-CHEK HELDER PLUS TEST STRP strip   0    NITROSTAT 0.4 mg SL tablet       CALCIUM CARBONATE (MARCELLO-SELTZER ANTACID PO) Take  by mouth daily as needed. Allergies   Allergen Reactions    Latex Other (comments)     Burns skin    Acetone Shortness of Breath    Amoxicillin Anaphylaxis    Ciprofloxacin Hives    Pcn [Penicillins] Anaphylaxis    Buspar [Buspirone] Unknown (comments)     Has N&V and makes her feel \"weird\"    Azithromycin Hives    Egg Nausea Only    Other Medication Rash     POPPY seeds     Past Medical History:   Diagnosis Date    Abnormal Pap smear 1995    with f/u normal    Anemia     Arthritis     Asthma     Cancer ([facility] [address]     ENDOMETRIAL    Environmental allergies     GERD (gastroesophageal reflux disease)     Goiter     Other unknown and unspecified cause of morbidity or mortality     POSSIBLE ESOPHAGEAL SPASM, ?  OBSTRUCTION DUE TO GOITER    PMB (postmenopausal bleeding)     S/P chemotherapy, time since greater than 12 weeks     LAST CHEMO 10/2014 PER PATIENT    Thyroid disease     goiter     Past Surgical History:   Procedure Laterality Date    HX APPENDECTOMY      HX BUNIONECTOMY      HX GYN  3/13/13    TLH/BSO    HX HEENT  4/22/13    TOOTH REMOVED    HX ORTHOPAEDIC  1980'S    BUNIONECTOMY    HX VASCULAR ACCESS      port    IA BREAST SURGERY PROCEDURE UNLISTED  1/12/16    right breast bx     Family History   Problem Relation Age of Onset    Cancer Maternal Aunt         breast    Heart Disease Mother     Diabetes Father     Cancer Sister         CERVICAL    Kidney Disease Brother     Diabetes Brother     Heart Attack Other     Arrhythmia Brother     Diabetes Brother     Anesth Problems Neg Hx      Social History     Tobacco Use    Smoking status: Never Smoker    Smokeless tobacco: Never Used   Substance Use Topics    Alcohol use: Yes     Alcohol/week: 0.0 standard drinks     Comment: RARE OCC       ROS    Patient-Reported Vitals 1/19/2021   Patient-Reported Systolic  274   Patient-Reported Diastolic 96        Tahir Arias, who was evaluated through a patient-initiated, synchronous (real-time) audio only encounter, and/or her healthcare decision maker, is aware that it is a billable service, with coverage as determined by her insurance carrier. She provided verbal consent to proceed: Yes. She has not had a related appointment within my department in the past 7 days or scheduled within the next 24 hours. Total Time: minutes: 11-20 minutes      An electronic signature was used to sign this note.     Gretta Fabian MD  03/29/22

## 2022-03-29 ENCOUNTER — VIRTUAL VISIT (OUTPATIENT)
Dept: GYNECOLOGY | Age: 67
End: 2022-03-29
Payer: MEDICARE

## 2022-03-29 DIAGNOSIS — C54.9 MALIGNANT NEOPLASM OF CORPUS UTERI, EXCEPT ISTHMUS (HCC): Primary | ICD-10-CM

## 2022-03-29 PROCEDURE — 99442 PR PHYS/QHP TELEPHONE EVALUATION 11-20 MIN: CPT | Performed by: OBSTETRICS & GYNECOLOGY

## 2022-03-31 ENCOUNTER — HOSPITAL ENCOUNTER (OUTPATIENT)
Dept: INFUSION THERAPY | Age: 67
Discharge: HOME | End: 2022-03-31
Payer: MEDICARE

## 2022-03-31 VITALS
TEMPERATURE: 97.3 F | BODY MASS INDEX: 26.13 KG/M2 | WEIGHT: 157 LBS | HEART RATE: 77 BPM | RESPIRATION RATE: 18 BRPM | DIASTOLIC BLOOD PRESSURE: 61 MMHG | OXYGEN SATURATION: 98 % | SYSTOLIC BLOOD PRESSURE: 127 MMHG

## 2022-03-31 DIAGNOSIS — C54.9 MALIGNANT NEOPLASM OF CORPUS UTERI, EXCEPT ISTHMUS (HCC): Primary | ICD-10-CM

## 2022-03-31 LAB
ALBUMIN SERPL-MCNC: 3.2 G/DL (ref 3.5–5)
ALBUMIN/GLOB SERPL: 0.8 {RATIO} (ref 1.1–2.2)
ALP SERPL-CCNC: 64 U/L (ref 45–117)
ALT SERPL-CCNC: 20 U/L (ref 12–78)
ANION GAP SERPL CALC-SCNC: 7 MMOL/L (ref 5–15)
APPEARANCE UR: CLEAR
AST SERPL-CCNC: 18 U/L (ref 15–37)
BASOPHILS # BLD: 0 K/UL (ref 0–0.1)
BASOPHILS NFR BLD: 1 % (ref 0–1)
BILIRUB SERPL-MCNC: 0.4 MG/DL (ref 0.2–1)
BILIRUB UR QL: NEGATIVE
BUN SERPL-MCNC: 15 MG/DL (ref 6–20)
BUN/CREAT SERPL: 32 (ref 12–20)
CALCIUM SERPL-MCNC: 9.1 MG/DL (ref 8.5–10.1)
CANCER AG125 SERPL-ACNC: 422 U/ML (ref 1.5–35)
CHLORIDE SERPL-SCNC: 104 MMOL/L (ref 97–108)
CO2 SERPL-SCNC: 26 MMOL/L (ref 21–32)
COLOR UR: ABNORMAL
CREAT SERPL-MCNC: 0.47 MG/DL (ref 0.55–1.02)
DIFFERENTIAL METHOD BLD: ABNORMAL
EOSINOPHIL # BLD: 0.1 K/UL (ref 0–0.4)
EOSINOPHIL NFR BLD: 1 % (ref 0–7)
ERYTHROCYTE [DISTWIDTH] IN BLOOD BY AUTOMATED COUNT: 19.7 % (ref 11.5–14.5)
GLOBULIN SER CALC-MCNC: 4 G/DL (ref 2–4)
GLUCOSE SERPL-MCNC: 90 MG/DL (ref 65–100)
GLUCOSE UR STRIP.AUTO-MCNC: NEGATIVE MG/DL
HCT VFR BLD AUTO: 33.8 % (ref 35–47)
HGB BLD-MCNC: 10.1 G/DL (ref 11.5–16)
HGB UR QL STRIP: NEGATIVE
IMM GRANULOCYTES # BLD AUTO: 0 K/UL (ref 0–0.04)
IMM GRANULOCYTES NFR BLD AUTO: 0 % (ref 0–0.5)
KETONES UR QL STRIP.AUTO: ABNORMAL MG/DL
LEUKOCYTE ESTERASE UR QL STRIP.AUTO: NEGATIVE
LYMPHOCYTES # BLD: 1 K/UL (ref 0.8–3.5)
LYMPHOCYTES NFR BLD: 15 % (ref 12–49)
MCH RBC QN AUTO: 27.4 PG (ref 26–34)
MCHC RBC AUTO-ENTMCNC: 29.9 G/DL (ref 30–36.5)
MCV RBC AUTO: 91.6 FL (ref 80–99)
MONOCYTES # BLD: 0.5 K/UL (ref 0–1)
MONOCYTES NFR BLD: 9 % (ref 5–13)
NEUTS SEG # BLD: 4.5 K/UL (ref 1.8–8)
NEUTS SEG NFR BLD: 74 % (ref 32–75)
NITRITE UR QL STRIP.AUTO: NEGATIVE
NRBC # BLD: 0 K/UL (ref 0–0.01)
NRBC BLD-RTO: 0 PER 100 WBC
PH UR STRIP: 6.5 [PH] (ref 5–8)
PLATELET # BLD AUTO: 246 K/UL (ref 150–400)
PMV BLD AUTO: 8.8 FL (ref 8.9–12.9)
POTASSIUM SERPL-SCNC: 3.9 MMOL/L (ref 3.5–5.1)
PROT SERPL-MCNC: 7.2 G/DL (ref 6.4–8.2)
PROT UR STRIP-MCNC: NEGATIVE MG/DL
RBC # BLD AUTO: 3.69 M/UL (ref 3.8–5.2)
SODIUM SERPL-SCNC: 137 MMOL/L (ref 136–145)
SP GR UR REFRACTOMETRY: 1.02 (ref 1–1.03)
UROBILINOGEN UR QL STRIP.AUTO: 0.2 EU/DL (ref 0.2–1)
WBC # BLD AUTO: 6.2 K/UL (ref 3.6–11)

## 2022-03-31 PROCEDURE — 81003 URINALYSIS AUTO W/O SCOPE: CPT

## 2022-03-31 PROCEDURE — 80053 COMPREHEN METABOLIC PANEL: CPT

## 2022-03-31 PROCEDURE — 77030012965 HC NDL HUBR BBMI -A

## 2022-03-31 PROCEDURE — 99213 OFFICE O/P EST LOW 20 MIN: CPT | Performed by: PHYSICIAN ASSISTANT

## 2022-03-31 PROCEDURE — 74011250636 HC RX REV CODE- 250/636: Performed by: OBSTETRICS & GYNECOLOGY

## 2022-03-31 PROCEDURE — 86304 IMMUNOASSAY TUMOR CA 125: CPT

## 2022-03-31 PROCEDURE — 74011000250 HC RX REV CODE- 250: Performed by: OBSTETRICS & GYNECOLOGY

## 2022-03-31 PROCEDURE — 85025 COMPLETE CBC W/AUTO DIFF WBC: CPT

## 2022-03-31 PROCEDURE — 96413 CHEMO IV INFUSION 1 HR: CPT

## 2022-03-31 PROCEDURE — 74011000258 HC RX REV CODE- 258: Performed by: OBSTETRICS & GYNECOLOGY

## 2022-03-31 RX ORDER — SODIUM CHLORIDE 9 MG/ML
10 INJECTION INTRAMUSCULAR; INTRAVENOUS; SUBCUTANEOUS AS NEEDED
Status: DISCONTINUED | OUTPATIENT
Start: 2022-03-31 | End: 2022-04-01 | Stop reason: HOSPADM

## 2022-03-31 RX ORDER — ONDANSETRON 2 MG/ML
8 INJECTION INTRAMUSCULAR; INTRAVENOUS AS NEEDED
Status: DISCONTINUED | OUTPATIENT
Start: 2022-03-31 | End: 2022-04-01 | Stop reason: HOSPADM

## 2022-03-31 RX ORDER — ALBUTEROL SULFATE 0.83 MG/ML
2.5 SOLUTION RESPIRATORY (INHALATION) AS NEEDED
Status: DISCONTINUED | OUTPATIENT
Start: 2022-03-31 | End: 2022-04-01 | Stop reason: HOSPADM

## 2022-03-31 RX ORDER — HYDROCORTISONE SODIUM SUCCINATE 100 MG/2ML
100 INJECTION, POWDER, FOR SOLUTION INTRAMUSCULAR; INTRAVENOUS AS NEEDED
Status: DISCONTINUED | OUTPATIENT
Start: 2022-03-31 | End: 2022-04-01 | Stop reason: HOSPADM

## 2022-03-31 RX ORDER — EPINEPHRINE 1 MG/ML
0.3 INJECTION, SOLUTION, CONCENTRATE INTRAVENOUS AS NEEDED
Status: DISCONTINUED | OUTPATIENT
Start: 2022-03-31 | End: 2022-04-01 | Stop reason: HOSPADM

## 2022-03-31 RX ORDER — SODIUM CHLORIDE 9 MG/ML
25 INJECTION, SOLUTION INTRAVENOUS CONTINUOUS
Status: DISCONTINUED | OUTPATIENT
Start: 2022-03-31 | End: 2022-04-01 | Stop reason: HOSPADM

## 2022-03-31 RX ORDER — ACETAMINOPHEN 325 MG/1
650 TABLET ORAL AS NEEDED
Status: DISCONTINUED | OUTPATIENT
Start: 2022-03-31 | End: 2022-04-01 | Stop reason: HOSPADM

## 2022-03-31 RX ORDER — DIPHENHYDRAMINE HYDROCHLORIDE 50 MG/ML
50 INJECTION, SOLUTION INTRAMUSCULAR; INTRAVENOUS AS NEEDED
Status: DISCONTINUED | OUTPATIENT
Start: 2022-03-31 | End: 2022-04-01 | Stop reason: HOSPADM

## 2022-03-31 RX ORDER — DIPHENHYDRAMINE HYDROCHLORIDE 50 MG/ML
25 INJECTION, SOLUTION INTRAMUSCULAR; INTRAVENOUS AS NEEDED
Status: DISCONTINUED | OUTPATIENT
Start: 2022-03-31 | End: 2022-04-01 | Stop reason: HOSPADM

## 2022-03-31 RX ORDER — SODIUM CHLORIDE 0.9 % (FLUSH) 0.9 %
10 SYRINGE (ML) INJECTION AS NEEDED
Status: DISCONTINUED | OUTPATIENT
Start: 2022-03-31 | End: 2022-04-01 | Stop reason: HOSPADM

## 2022-03-31 RX ORDER — HEPARIN 100 UNIT/ML
300-500 SYRINGE INTRAVENOUS AS NEEDED
Status: DISCONTINUED | OUTPATIENT
Start: 2022-03-31 | End: 2022-04-01 | Stop reason: HOSPADM

## 2022-03-31 RX ADMIN — SODIUM CHLORIDE 25 ML/HR: 9 INJECTION, SOLUTION INTRAVENOUS at 14:45

## 2022-03-31 RX ADMIN — SODIUM CHLORIDE 1125 MG: 9 INJECTION, SOLUTION INTRAVENOUS at 15:39

## 2022-03-31 RX ADMIN — SODIUM CHLORIDE, PRESERVATIVE FREE 10 ML: 5 INJECTION INTRAVENOUS at 16:15

## 2022-03-31 RX ADMIN — SODIUM CHLORIDE, PRESERVATIVE FREE 10 ML: 5 INJECTION INTRAVENOUS at 13:00

## 2022-03-31 RX ADMIN — HEPARIN 500 UNITS: 100 SYRINGE at 16:15

## 2022-03-31 NOTE — PROGRESS NOTES
[facility] [address], [address]  P [phone]  F [phone]      Patient ID:  Name:  Maikol North  MRN:  [id number]  :  1955/67 y.o. Date:  3/31/2022      Monica Germain MD: Delmi Shaffer MD  PCP: Mike Quinteros MD     Primary Diagnosis: uterine cancer  Date of Diagnosis: 3/2013      Current Agent: Avastin  Cycle: 2    HPI:  78 y.o. 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 She received adjuvant Taxol/carboplatin followed by a 7 year DFI. In  presenting with CT pelvic adenopathy and peritoneal carcinomatosis. She has been followed on multiple regimens for recurrent/persistent disease including Keytruda/Lenvima, carboplatin/taxol, Doxil. She has demonstrated progression on most recent interval scan and recommended Avastin. OncTx History:  3/2013 C Hyst/BSO  FINAL PATHOLOGIC DIAGNOSIS  1.  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  Histologic grade: High grade  Myometrial invasion: Depth of invasion: 0.3 cm  Myometrial thickness: 1.9 cm  Involvement of cervix: Not involved  Extent of involvement of other organs:  Right ovary: Not involved  Left ovary: Not involved  Right fallopian tube: Not involved  Left fallopian tube: Not involved  Right parametrial tissue: Not involved  Left parametrial tissue: Not involved  Lymphovascular invasion: Not identified  Additional pathologic findings:  Focal adenomyosis  Benign simple cyst of left ovary  Paratubal cysts  Pathologic staging (pTNM):  Primary tumor (pT): pT1a  Regional lymph nodes (pN): pN0  Pelvic lymph nodes:  Number examined: 17  Number involved: 0  Distant metastasis (M): Not applicable  2. Omentum, omentectomy:Benign adipose tissue, negative for carcinoma  3. Lymph nodes, right pelvic, excision:Seven lymph nodes, negative for metastatic carcinoma (0/7)  4. Lymph nodes, left pelvic, excision:Ten lymph nodes, negative for metastatic carcinoma (0/10)    MMR proficient   5/2013 - 9/2013 Taxol/carboplatin x 6 cycles   10/9/20 PET/CT:    1. Peritoneal carcinomatosis. 2. 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 3. Left pelvic cul-de-sac tumor   11/3/20 - 2/16/21 Keytruda/lenvima x 6 cycles     Reduced lenvima 8mg d/t HTN   12/30/20 CT CAP:   Mixed response in the abdomen and pelvis compared to 10/9/2020 with mildly increased anterior peritoneal carcinomatosis. Stable to slightly increased retroperitoneal lymphadenopathy. Decreased peritoneal soft tissue nodularity in the deep left pelvis. No evidence for metastatic disease in the chest.   2/25/21 CT CAP:   1. Overall increase in omental caking along the anterior peritoneum (the prior study. 2.  Retroperitoneal adenopathy demonstrating variable response to therapy. Some of these have increased in interval  3. Pelvic soft tissue in the left deep pelvis is not significantly changed although not well visualized. 4.  Slight interval increase in pelvic free fluid   3/9/21 - 8/2021 [id number]/Carboplatin AUC5 x 8 cycles   6/2021 LLE DVT on xarelto   7/12/21 CT CAP:   1. Resolved retroperitoneal lymphadenopathy. 2.  Decreased peritoneal carcinomatosis. 3.  No ascites. 4.  Nonocclusive thrombus and segmental right upper lobe pulmonary artery branches which are nonocclusive and appear chronic.    8/2021 PET/CT:  CHEST: No foci of abnormal hypermetabolism. Resolution of left supraclavicular jasper activity. ABDOMEN/PELVIS:   Omental/peritoneal disease is near completely resolved; current max SUV, midline omentum 2.4, previous max SUV 12. Resolved retroperitoneal and right deep pelvic jasper activity. Resolved left pelvic cul-de-sac mass/activity.   SKELETON: No foci of abnormal hypermetabolism in the axial and visualized appendicular skeleton.     IMPRESSION: Abnormal PET activity is resolved other than minimal residual activity in the omentum. 11/2021 CT CAP:  1. New, trace bilateral pleural fluid. Mild bibasilar atelectasis. 2.: Increase in size of cardiophrenic lymph node, now measuring 1.3 cm x 1.3 cm.   3. Interval worsening of coalescing of omental caking, intraperitoneal soft tissue nodularity and intraperitoneal and retroperitoneal lymphadenopathy. 4. New, small amount of ascites in the pelvis. 11/22/21 - 1/27/22 Doxil 3 cycles     2/27/22 CT A/P:   1. Peritoneal and omental nodularity with small volume ascites consistent with peritoneal carcinomatosis. 2.  Lymphadenopathy in the mesentery, gastrohepatic ligament, portacaval, and retroperitoneal stations. 3.  Small bilateral pleural effusions. 3/10/22 Avastin           SUBJECTIVE:  Paul Moy presents for chemotherapy. Feeling well, Noted wt loss. She fatigues easily, though improved since her COVID dx. Following recent therapy noted intense arthralgias managed by tylenol and \"sorted out\" following a visit with her chiropractor. Took steroids though developed a yeast infection. She is eating well, trying to eat. Feels abd pain is better. She reduced her work to part time due to fatigued to work. Getting help with lifting heavy things. Able to ambulate, remains independent. Routine delayed GI function with dulcolax/benefiber/MoM. No residual effects s/p her therapy in 2013. She lives alone. Does not feel overly close to her children. Her daughter/grandkids live next door and brother nearby. Her son lives near [place] [employer]. She feels most supported by her brother Jordan Perez, close friend Mylene and her work family. She still has difficulty feeling comfortable asking family for help. Looking into/questions FDC with SS. Work for her is a positive stressor, has few hobbies and uncertain of purpose w/o work. ROS  Constitutional: no weight loss, fever, night sweats  Respiratory: above  Cardiovascular: no CP, edema  Heme: No abnormal bleeding, no epistaxis. Gastrointestinal: no abdominal pain, no dysphagia, change in bowel habits, or black or bloody stools  Genito-Urinary: no dysuria, trouble voiding, or hematuria  Musculoskeletal: negative for - gait disturbance or joint swelling  Neurological: negative for - behavioral changes, dizziness, headaches, memory loss or numbness/tingling  Derm: negative  Psych: negative for anxiety and depression       OBJECTIVE:  Physical Exam  Visit Vitals  /76   Pulse 83   Temp 97.3 °F (36.3 °C)   Resp 18   Wt 157 lb (71.2 kg)   SpO2 98%   BMI 26.13 kg/m²          General:  alert, cooperative, no distress       HEENT: without pallor, sclera without jaundice, oral mucosa without lesions,      Cardiac:  Regular rate and rhythm        Lungs:  clear to auscultation bilaterally          Port:  clean, dry, no drainage  Abdomen:  soft, protuberant, palpable epigastric/right abd mass. No gross fluid wave, likely ascites present.         Lymph:  no lymphadenopathy   Extremity: no edema    Wt Readings from Last 3 Encounters:   03/10/22 166 lb 0.1 oz (75.3 kg)   02/24/22 165 lb 6.4 oz (75 kg)   01/27/22 168 lb (76.2 kg)       Recent Labs     03/31/22  1300   WBC 6.2   ANEU 4.5   HGB 10.1*   HCT 33.8*   MCV 91.6   MCH 27.4        Recent Labs     03/31/22  1300      K 3.9      GLU 90   BUN 15   CREA 0.47*   CA 9.1   ALB 3.2*   TBILI 0.4   ALT 20         Tumor markers:  CA-125   Date Value Ref Range Status   03/10/2022 269 (H) 1.5 - 35.0 U/mL Final Comment:     ** Note new reference range and method **  Results may vary depending on . Method used is VGBio     01/27/2022 312 (H) 1.5 - 35.0 U/mL Final     Comment:     ** Note new reference range and method **  Results may vary depending on . Method used is VGBio     10/05/2021 68 (H) 1.5 - 35.0 U/mL Final     Comment:     ** Note new reference range and method **  Results may vary depending on . Method used is VGBio     07/20/2021 15 1.5 - 35.0 U/mL Final     Comment:     ** Note new reference range and method **  Results may vary depending on . Method used is VGBio           Patient Active Problem List   Diagnosis Code    Malignant neoplasm of corpus uteri, except isthmus ([facility] [address] C54.9     Past Medical History:   Diagnosis Date    Abnormal Pap smear 1995    with f/u normal    Anemia     Arthritis     Asthma     Cancer ([facility] [address]     ENDOMETRIAL    Environmental allergies     GERD (gastroesophageal reflux disease)     Goiter     Other unknown and unspecified cause of morbidity or mortality     POSSIBLE ESOPHAGEAL SPASM, ? OBSTRUCTION DUE TO GOITER    PMB (postmenopausal bleeding)     S/P chemotherapy, time since greater than 12 weeks     LAST CHEMO 10/2014 PER PATIENT    Thyroid disease     goiter     Prior to Admission medications    Medication Sig Start Date End Date Taking? Authorizing Provider   BACITRACIN, BULK, by Does Not Apply route. With zinc ointment    Provider, Historical   rivaroxaban (Xarelto) 20 mg tab tablet Take 1 Tablet by mouth daily. 2/1/22   Luiz Nolasco MD   acetaminophen (TylenoL) 325 mg tablet Take  by mouth as needed. Provider, Historical   lisinopriL (PRINIVIL, ZESTRIL) 10 mg tablet Take 1 Tab by mouth daily. 1/6/21   Luiz Nolasco MD   lisinopriL (PRINIVIL, ZESTRIL) 5 mg tablet Take 2 Tabs by mouth daily.  1/5/21   Cherrie Cline PA-C   ondansetron (ZOFRAN ODT) 4 mg disintegrating tablet Take 1 Tab by mouth every eight (8) hours as needed for Nausea. Indications: nausea and vomiting caused by cancer drugs 10/22/20   Tim Vail MD   lidocaine-prilocaine (EMLA) topical cream Apply small amount over port area and cover with band aid one hour before chemo 10/22/20   Tim Vail MD   guaifenesin (MUCINEX PO) Take  by mouth. Provider, Historical   loratadine (Claritin) 10 mg tablet Take 10 mg by mouth. Provider, Historical   epinastine 0.05 % drop Apply  to eye. Provider, Historical   raNITIdine (ZANTAC) 150 mg tablet ZANTAC TABS 9/29/11   Provider, Historical   cyclobenzaprine (FLEXERIL) 5 mg tablet take 1 tablet by mouth three times a day if needed 12/5/16   Provider, Historical   valACYclovir (VALTREX) 1 gram tablet Take 1 Tab by mouth three (3) times daily. 12/15/16   Tim Vail MD   EPINEPHrine (EPIPEN) 0.3 mg/0.3 mL injection 0.3 mg by IntraMUSCular route once as needed. Provider, Historical   ketotifen (ZADITOR) 0.025 % (0.035 %) ophthalmic solution Administer 1 Drop to both eyes every morning. Provider, Historical   Cetirizine (ZYRTEC) 10 mg cap Take 10 mg by mouth nightly. Provider, Historical   SAL ACID/UREA/PETROLATUM,WHITE (KERASAL FOOT EX) by Apply Externally route daily as needed. Provider, Historical   ACCU-CHEK HELDER PLUS TEST STRP strip  7/3/15   Provider, Historical   NITROSTAT 0.4 mg SL tablet  9/22/14   Provider, Historical   CALCIUM CARBONATE (MARCELLO-SELTZER ANTACID PO) Take  by mouth daily as needed.     Provider, Historical     Allergies   Allergen Reactions    Latex Other (comments)     Burns skin    Acetone Shortness of Breath    Amoxicillin Anaphylaxis    Ciprofloxacin Hives    Pcn [Penicillins] Anaphylaxis    Buspar [Buspirone] Unknown (comments)     Has N&V and makes her feel \"weird\"    Azithromycin Hives    Egg Nausea Only    Other Medication Rash     POPPY seeds     Family History   Problem Relation Age of Onset    Cancer Maternal Aunt         breast    Heart Disease Mother     Diabetes Father     Cancer Sister         CERVICAL    Kidney Disease Brother     Diabetes Brother     Heart Attack Other     Arrhythmia Brother     Diabetes Brother     Anesth Problems Neg Hx        CT Results (most recent):  Results from Hospital Encounter encounter on 02/17/22    CT ABD PELV W CONT    Narrative  EXAM: CT ABD PELV W CONT    INDICATION: Primary serous adenocarcinoma of endometrium    COMPARISON: November 9, 2021    CONTRAST: 100 mL of [id number]. ORAL CONTRAST: Oral contrast was administered for optimal bowel visualization    TECHNIQUE:  Following the uneventful intravenous administration of contrast, thin axial  images were obtained through the abdomen and pelvis. Coronal and sagittal  reconstructions were generated. CT dose reduction was achieved through use of a  standardized protocol tailored for this examination and automatic exposure  control for dose modulation. FINDINGS:  LOWER THORAX: Small bilateral pleural effusions. No visualized lung nodules  LIVER: No mass. BILIARY TREE: Gallbladder is within normal limits. CBD is not dilated. SPLEEN: within normal limits. PANCREAS: No mass or ductal dilatation. ADRENALS: Unremarkable. KIDNEYS: No mass, calculus, or hydronephrosis. STOMACH: Unremarkable. SMALL BOWEL: No dilatation or wall thickening. COLON: No dilatation or wall thickening. APPENDIX: Surgically absent  PERITONEUM: Omental and peritoneal nodularity with small volume scattered  ascites consistent with peritoneal carcinomatosis. Mesenteric, portacaval, and  gastrohepatic ligament lymphadenopathy. RETROPERITONEUM: Retroperitoneal lymphadenopathy along the IVC. REPRODUCTIVE ORGANS: Status post hysterectomy  URINARY BLADDER: No mass or calculus. BONES: No destructive bone lesion. ABDOMINAL WALL: No mass or hernia. ADDITIONAL COMMENTS: N/A    Impression  1.   Peritoneal and omental nodularity with small volume ascites consistent with  peritoneal carcinomatosis. 2.  Lymphadenopathy in the mesentery, gastrohepatic ligament, portacaval, and  retroperitoneal stations. 3.  Small bilateral pleural effusions. IMPRESSION/PLAN:  78 y.o. 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 She has again progressed and prescribed Doxil therapy. ECO    Labs/chart reviewed. ASE discussed and interventions, monitoring reviewed. -Avastin cycle 2   -post infusion arthralgia: opioid/tylenol Rx PRN. Refused today. -Anemia: improved  -Fatigue/KNIGHT: post COVID, anemia, cancer related  -Wt loss/hypoalbuminemia: carcinomatosis. Diet and habits discussed, continue protein. -HTN: resolved following lenvatinib therapy. Reviewed home monitoring during Avastin. -LLE DVT 2021.  On Xarelto.  -Hx of thyroid nodular goiter, benign follicular bx.  -Chronic med issues:    Hx asthma - inhaler PRN   Hx esophageal spasm - uses nitroglycerin  -Reflux: Pepcid        Lizzy Yoder PA-C

## 2022-03-31 NOTE — PROGRESS NOTES
[facility] Chemo Progress Note    Date: 2022    Name: Bella De Jesus    MRN: [id number]         : 1955    [address]55 Ms. Patricia [place] Arrived to [facility] for C2 [facility]ir[facility]ev ambulatory in stable condition. Assessment was completed, no acute issues at this time, no new complaints voiced. Port accessed with positive blood return. Labs drawn and sent for processing. Chemotherapy Flowsheet 3/31/2022   Cycle C2   Date 3/31/2022   Drug / Regimen Ankita Hernandez   Pre Meds -   Notes given         Ms. Analy Griggs vitals were reviewed. Patient Vitals for the past 12 hrs:   Temp Pulse Resp BP SpO2   22 1621  77  127/61    22 1257 97.3 °F (36.3 °C) 83 18 116/76 98 %         Lab results were obtained and reviewed. Recent Results (from the past 12 hour(s))   CBC WITH AUTOMATED DIFF    Collection Time: 22  1:00 PM   Result Value Ref Range    WBC 6.2 3.6 - 11.0 K/uL    RBC 3.69 (L) 3.80 - 5.20 M/uL    HGB 10.1 (L) 11.5 - 16.0 g/dL    HCT 33.8 (L) 35.0 - 47.0 %    MCV 91.6 80.0 - 99.0 FL    MCH 27.4 26.0 - 34.0 PG    MCHC 29.9 (L) 30.0 - 36.5 g/dL    RDW 19.7 (H) 11.5 - 14.5 %    PLATELET 392 097 - 230 K/uL    MPV 8.8 (L) 8.9 - 12.9 FL    NRBC 0.0 0  WBC    ABSOLUTE NRBC 0.00 0.00 - 0.01 K/uL    NEUTROPHILS 74 32 - 75 %    LYMPHOCYTES 15 12 - 49 %    MONOCYTES 9 5 - 13 %    EOSINOPHILS 1 0 - 7 %    BASOPHILS 1 0 - 1 %    IMMATURE GRANULOCYTES 0 0.0 - 0.5 %    ABS. NEUTROPHILS 4.5 1.8 - 8.0 K/UL    ABS. LYMPHOCYTES 1.0 0.8 - 3.5 K/UL    ABS. MONOCYTES 0.5 0.0 - 1.0 K/UL    ABS. EOSINOPHILS 0.1 0.0 - 0.4 K/UL    ABS. BASOPHILS 0.0 0.0 - 0.1 K/UL    ABS. IMM.  GRANS. 0.0 0.00 - 0.04 K/UL    DF AUTOMATED     URINALYSIS W/ RFLX MICROSCOPIC    Collection Time: 22  1:00 PM   Result Value Ref Range    Color YELLOW/STRAW      Appearance CLEAR CLEAR      Specific gravity 1.022 1.003 - 1.030      pH (UA) 6.5 5.0 - 8.0      Protein Negative NEG mg/dL    Glucose Negative NEG mg/dL    Ketone TRACE (A) NEG mg/dL Bilirubin Negative NEG      Blood Negative NEG      Urobilinogen 0.2 0.2 - 1.0 EU/dL    Nitrites Negative NEG      Leukocyte Esterase Negative NEG     METABOLIC PANEL, COMPREHENSIVE    Collection Time: 03/31/22  1:00 PM   Result Value Ref Range    Sodium 137 136 - 145 mmol/L    Potassium 3.9 3.5 - 5.1 mmol/L    Chloride 104 97 - 108 mmol/L    CO2 26 21 - 32 mmol/L    Anion gap 7 5 - 15 mmol/L    Glucose 90 65 - 100 mg/dL    BUN 15 6 - 20 MG/DL    Creatinine 0.47 (L) 0.55 - 1.02 MG/DL    BUN/Creatinine ratio 32 (H) 12 - 20      GFR est AA >60 >60 ml/min/1.73m2    GFR est non-AA >60 >60 ml/min/1.73m2    Calcium 9.1 8.5 - 10.1 MG/DL    Bilirubin, total 0.4 0.2 - 1.0 MG/DL    ALT (SGPT) 20 12 - 78 U/L    AST (SGOT) 18 15 - 37 U/L    Alk. phosphatase 64 45 - 117 U/L    Protein, total 7.2 6.4 - 8.2 g/dL    Albumin 3.2 (L) 3.5 - 5.0 g/dL    Globulin 4.0 2.0 - 4.0 g/dL    A-G Ratio 0.8 (L) 1.1 - 2.2     CANCER ANTIGEN 125    Collection Time: 03/31/22  1:00 PM   Result Value Ref Range    CA-125 422 (H) 1.5 - 35.0 U/mL       Pre-medications  were administered as ordered and chemotherapy was initiated.   Medications Administered     0.9% sodium chloride infusion     Admin Date  03/31/2022 Action  New Bag Dose  25 mL/hr Rate  25 mL/hr Route  IntraVENous Administered By  Yarossana Bustos, RN          0.9% sodium chloride injection 10 mL     Admin Date  03/31/2022 Action  Given Dose  10 mL Route  IntraVENous Administered By  Anurag Bustos RN          bevacizumab-bvzr (ZIRABEV) 1,125 mg in 0.9% sodium chloride 100 mL, overfill volume 10 mL IVPB     Admin Date  03/31/2022 Action  New Bag Dose  1,125 mg Rate  310 mL/hr Route  IntraVENous Administered By  Anurag Bustos RN          heparin (porcine) pf 300-500 Units     Admin Date  03/31/2022 Action  Given Dose  500 Units Route  InterCATHeter Administered By  [facility], RN          sodium chloride (NS) flush 10 mL     Admin Date  03/31/2022 Action  Given Dose  10 mL Route  IntraVENous Administered By  Sandy Lucio RN                  0916 Patient tolerated treatment well.  port maintained positive blood return throughout treatment. port flushed, heparinized and de accessed per protocol. Patient was discharged from [facility] in stable condition. Patient aware of next appointment.      Future Appointments   Date Time Provider David Jara   4/19/2022  9:45 AM MD GREER Gregory BS [facility]   4/21/2022  1:00 PM A3 [facility] [id number] [facility]   5/12/2022 11:45 AM MD GREER Gregory [facility]   5/12/2022  1:00 PM E3 GRECIA [facility] [address] RN  March 31, 2022

## 2022-04-04 RX ORDER — ACETAMINOPHEN 325 MG/1
650 TABLET ORAL AS NEEDED
Status: CANCELLED
Start: 2022-04-21

## 2022-04-04 RX ORDER — DIPHENHYDRAMINE HYDROCHLORIDE 50 MG/ML
25 INJECTION, SOLUTION INTRAMUSCULAR; INTRAVENOUS AS NEEDED
Status: CANCELLED
Start: 2022-04-21

## 2022-04-04 RX ORDER — HEPARIN 100 UNIT/ML
300-500 SYRINGE INTRAVENOUS AS NEEDED
Status: CANCELLED
Start: 2022-04-21

## 2022-04-04 RX ORDER — DIPHENHYDRAMINE HYDROCHLORIDE 50 MG/ML
50 INJECTION, SOLUTION INTRAMUSCULAR; INTRAVENOUS AS NEEDED
Status: CANCELLED
Start: 2022-04-21

## 2022-04-04 RX ORDER — SODIUM CHLORIDE 0.9 % (FLUSH) 0.9 %
10 SYRINGE (ML) INJECTION AS NEEDED
Status: CANCELLED | OUTPATIENT
Start: 2022-04-21

## 2022-04-04 RX ORDER — ONDANSETRON 2 MG/ML
8 INJECTION INTRAMUSCULAR; INTRAVENOUS AS NEEDED
Status: CANCELLED | OUTPATIENT
Start: 2022-04-21

## 2022-04-04 RX ORDER — EPINEPHRINE 1 MG/ML
0.3 INJECTION, SOLUTION, CONCENTRATE INTRAVENOUS AS NEEDED
Status: CANCELLED | OUTPATIENT
Start: 2022-04-21

## 2022-04-04 RX ORDER — ALBUTEROL SULFATE 0.83 MG/ML
2.5 SOLUTION RESPIRATORY (INHALATION) AS NEEDED
Status: CANCELLED
Start: 2022-04-21

## 2022-04-04 RX ORDER — SODIUM CHLORIDE 9 MG/ML
10 INJECTION INTRAMUSCULAR; INTRAVENOUS; SUBCUTANEOUS AS NEEDED
Status: CANCELLED | OUTPATIENT
Start: 2022-04-21

## 2022-04-04 RX ORDER — SODIUM CHLORIDE 9 MG/ML
25 INJECTION, SOLUTION INTRAVENOUS CONTINUOUS
Status: CANCELLED | OUTPATIENT
Start: 2022-04-21

## 2022-04-04 RX ORDER — HYDROCORTISONE SODIUM SUCCINATE 100 MG/2ML
100 INJECTION, POWDER, FOR SOLUTION INTRAMUSCULAR; INTRAVENOUS AS NEEDED
Status: CANCELLED | OUTPATIENT
Start: 2022-04-21

## 2022-04-19 ENCOUNTER — VIRTUAL VISIT (OUTPATIENT)
Dept: GYNECOLOGY | Age: 67
End: 2022-04-19
Payer: MEDICARE

## 2022-04-19 DIAGNOSIS — C54.1 PRIMARY SEROUS ADENOCARCINOMA OF ENDOMETRIUM (HCC): Primary | ICD-10-CM

## 2022-04-19 PROCEDURE — 99442 PR PHYS/QHP TELEPHONE EVALUATION 11-20 MIN: CPT | Performed by: OBSTETRICS & GYNECOLOGY

## 2022-04-19 NOTE — PROGRESS NOTES
[facility] GYNECOLOGIC ONCOLOGY  [address] [facility], [address], [address] [facility]  [phone] [facility] [phone]    Office Visit    Patient ID:  Name: Idania Oviedo  MRN: [id number]   :  y.o. Visit date: 2022     INTERVAL HISTORY:  Idania Oviedo is a  female who is an established patient with stage IA, grade 3 uterine carcinoma. She underwent laparoscopic hysterectomy with staging in 2013. She was recommended six cycles of adjuvant Taxol and Carboplatin, which she completed. We elected not to treat with pelvic radiation therapy due to her difficulty with chemotherapy. She had a CT of the abdomen/pelvis at [facility] that revealed retroperitoneal lymphadenopathy, peritoneal carcinomatosis, and trace ascites. I sent her for a PET/CT to better evaluate the extent of disease. She presented to review the results and discuss treatment. Her disease is not amenable to surgical resection. Systemic therapy was her only viable option. I thought immunotherapy would be the best choice, ahead of additional chemotherapy, specifically IV Keytruda and PO Lenvima. If that were not successful, we would consider retreatment with Taxol/Carboplatin or single agent Doxil. She completed 6 cycles of immunotherapy before demonstrating progression of disease. We decided upon retreatment with Taxol/Carboplatin. She completed 8 cycles of that regimen. Her CA-125 has responded and her CT after three cycles demonstrated a response. Her CT after completion of 6 cycles demonstrated further response. A subsequent PET/CT demonstrated resolution of most of the abnormal PET activity, but there still was some residual activity in the omentum, suggesting persistent disease. We stopped treatment in 2021, as she was beginning not to tolerate treatment well. She presented recently complaining of abdominal pain and bloating.   She stated it was a stabbing pain in her left abdomen. The bloating has also been causing a little shortness of breath. I sent her for a CT of the chest/abdomen/pelvis to evaluate. She presented to review those results. The scan demonstrated recurrent disease throughout the abdomen and pelvis. I recommended single agent Doxil. She completed 3 cycles of Doxil. Due to early satiety and complaints of bloating and abdominal distention, I sent her for a paracentesis in December. They did not see enough fluid on ultrasound to attempt drainage. She was also diagnosed with COVID around that time and is still recovering, but has since tested negative. She presented to review her interval CT scan after 3 cycles. Unfortunately it demonstrated progression. I discussed other treatment options with her, including the possibility of a clinical trial at [facility]. Ultimately we decided upon single agent Avastin. I explained that it won't make the tumor \"go away\", but it may slow down the growth and help manage the ascites. She has completed 2 cycles so far. She is feeling better and appears to be tolerating well so far. PMH:  Past Medical History:   Diagnosis Date    Abnormal Pap smear 1995    with f/u normal    Anemia     Arthritis     Asthma     Cancer ([facility] [address]     ENDOMETRIAL    Environmental allergies     GERD (gastroesophageal reflux disease)     Goiter     Other unknown and unspecified cause of morbidity or mortality     POSSIBLE ESOPHAGEAL SPASM, ?  OBSTRUCTION DUE TO GOITER    PMB (postmenopausal bleeding)     S/P chemotherapy, time since greater than 12 weeks     LAST CHEMO 10/2014 PER PATIENT    Thyroid disease     goiter     PSH:  Past Surgical History:   Procedure Laterality Date    HX APPENDECTOMY      HX BUNIONECTOMY      HX GYN  3/13/13    TLH/BSO    HX HEENT  4/22/13    TOOTH REMOVED    HX ORTHOPAEDIC  1980'S    BUNIONECTOMY    HX VASCULAR ACCESS      port    CO BREAST SURGERY PROCEDURE UNLISTED  1/12/16    right breast bx     SOC:  Social History     Socioeconomic History    Marital status:      Spouse name: Not on file    Number of children: Not on file    Years of education: Not on file    Highest education level: Not on file   Occupational History    Not on file   Tobacco Use    Smoking status: Never Smoker    Smokeless tobacco: Never Used   Substance and Sexual Activity    Alcohol use: Yes     Alcohol/week: 0.0 standard drinks     Comment: RARE OCC    Drug use: No    Sexual activity: Not on file   Other Topics Concern    Not on file   Social History Narrative    Not on file     Social Determinants of Health     Financial Resource Strain:     Difficulty of Paying Living Expenses: Not on file   Food Insecurity:     Worried About Running Out of Food in the Last Year: Not on file    Blaze of Food in the Last Year: Not on file   Transportation Needs:     Lack of Transportation (Medical): Not on file    Lack of Transportation (Non-Medical):  Not on file   Physical Activity:     Days of Exercise per Week: Not on file    Minutes of Exercise per Session: Not on file   Stress:     Feeling of Stress : Not on file   Social Connections:     Frequency of Communication with Friends and Family: Not on file    Frequency of Social Gatherings with Friends and Family: Not on file    Attends Buddhism Services: Not on file    Active Member of [address] [place] [address] [employer] or Organizations: Not on file    Attends Club or Organization Meetings: Not on file    Marital Status: Not on file   Intimate Partner Violence:     Fear of Current or Ex-Partner: Not on file    Emotionally Abused: Not on file    Physically Abused: Not on file    Sexually Abused: Not on file   Housing Stability:     Unable to Pay for Housing in the Last Year: Not on file    Number of [place] in the Last Year: Not on file    Unstable Housing in the Last Year: Not on file     Family History  Family History   Problem Relation Age of Onset    Cancer Maternal Aunt         breast    Heart Disease Mother     Diabetes Father     Cancer Sister         CERVICAL    Kidney Disease Brother     Diabetes Brother     Heart Attack Other     Arrhythmia Brother     Diabetes Brother     Anesth Problems Neg Hx      Medications:  Current Outpatient Medications on File Prior to Visit   Medication Sig Dispense Refill    BACITRACIN, BULK, by Does Not Apply route. With zinc ointment      rivaroxaban (Xarelto) 20 mg tab tablet Take 1 Tablet by mouth daily. 30 Tablet 5    acetaminophen (TylenoL) 325 mg tablet Take  by mouth as needed.  lisinopriL (PRINIVIL, ZESTRIL) 10 mg tablet Take 1 Tab by mouth daily. 30 Tab 2    lisinopriL (PRINIVIL, ZESTRIL) 5 mg tablet Take 2 Tabs by mouth daily. 30 Tab 5    ondansetron (ZOFRAN ODT) 4 mg disintegrating tablet Take 1 Tab by mouth every eight (8) hours as needed for Nausea. Indications: nausea and vomiting caused by cancer drugs 30 Tab 3    lidocaine-prilocaine (EMLA) topical cream Apply small amount over port area and cover with band aid one hour before chemo 30 g 3    guaifenesin (MUCINEX PO) Take  by mouth.  loratadine (Claritin) 10 mg tablet Take 10 mg by mouth.  epinastine 0.05 % drop Apply  to eye.  raNITIdine (ZANTAC) 150 mg tablet ZANTAC TABS      cyclobenzaprine (FLEXERIL) 5 mg tablet take 1 tablet by mouth three times a day if needed  0    valACYclovir (VALTREX) 1 gram tablet Take 1 Tab by mouth three (3) times daily. 21 Tab 3    EPINEPHrine (EPIPEN) 0.3 mg/0.3 mL injection 0.3 mg by IntraMUSCular route once as needed.  ketotifen (ZADITOR) 0.025 % (0.035 %) ophthalmic solution Administer 1 Drop to both eyes every morning.  Cetirizine (ZYRTEC) 10 mg cap Take 10 mg by mouth nightly.  SAL ACID/UREA/PETROLATUM,WHITE (KERASAL FOOT EX) by Apply Externally route daily as needed.       ACCU-CHEK HELDER PLUS TEST STRP strip   0    NITROSTAT 0.4 mg SL tablet       CALCIUM CARBONATE (MARCELLO-SELTZER ANTACID PO) Take  by mouth daily as needed. No current facility-administered medications on file prior to visit. Allergies: Allergies   Allergen Reactions    Latex Other (comments)     Burns skin    Acetone Shortness of Breath    Amoxicillin Anaphylaxis    Ciprofloxacin Hives    Pcn [Penicillins] Anaphylaxis    Buspar [Buspirone] Unknown (comments)     Has N&V and makes her feel \"weird\"    Azithromycin Hives    Egg Nausea Only    Other Medication Rash     POPPY seeds       ROS:  Negative     OBJECTIVE:    PHYSICAL EXAM  VITAL SIGNS: There were no vitals taken for this visit. GENERAL KAIA:    HEENT:    RESPIRATORY:    CARDIOVASC:    GASTROINT:    MUSCULOSKEL:    EXTREMITIES:    PELVIC:    RECTAL:    PRIYANKA SURVEY:    NEURO:      Lab Results   Component Value Date/Time    WBC 6.2 03/31/2022 01:00 PM    Hemoglobin (POC) 12.9 05/01/2013 09:53 AM    HGB 10.1 (L) 03/31/2022 01:00 PM    Hematocrit (POC) 38 05/01/2013 09:53 AM    HCT 33.8 (L) 03/31/2022 01:00 PM    PLATELET 079 00/23/9077 01:00 PM    MCV 91.6 03/31/2022 01:00 PM     Lab Results   Component Value Date/Time    Sodium 137 03/31/2022 01:00 PM    Potassium 3.9 03/31/2022 01:00 PM    Chloride 104 03/31/2022 01:00 PM    CO2 26 03/31/2022 01:00 PM    Anion gap 7 03/31/2022 01:00 PM    Glucose 90 03/31/2022 01:00 PM    BUN 15 03/31/2022 01:00 PM    Creatinine 0.47 (L) 03/31/2022 01:00 PM    BUN/Creatinine ratio 32 (H) 03/31/2022 01:00 PM    GFR est AA >60 03/31/2022 01:00 PM    GFR est non-AA >60 03/31/2022 01:00 PM    Calcium 9.1 03/31/2022 01:00 PM    Bilirubin, total 0.4 03/31/2022 01:00 PM    Alk.  phosphatase 64 03/31/2022 01:00 PM    Protein, total 7.2 03/31/2022 01:00 PM    Albumin 3.2 (L) 03/31/2022 01:00 PM    Globulin 4.0 03/31/2022 01:00 PM    A-G Ratio 0.8 (L) 03/31/2022 01:00 PM    ALT (SGPT) 20 03/31/2022 01:00 PM    AST (SGOT) 18 03/31/2022 01:00 PM     Lab Results   Component Value Date/Time    CA-125 422 (H) 03/31/2022 01:00 PM    Cancer Ag (CA) 125 548.0 (H) 12/17/2021 10:32 AM       CT of chest/abdomen/pelvis (12/30/20)  CT chest:    There is stable multinodular thyroid enlargement. Left chest Port-A-Cath  terminates in the SVC. The aorta and main pulmonary artery are normal in  caliber. The heart size is normal.  There is no pericardial or pleural effusion.        There are no enlarged axillary, mediastinal, or hilar lymph nodes.      There is no lung mass or airspace opacity. There is no pneumothorax. The  central airways are clear.     CT abdomen and pelvis: The liver, spleen, pancreas, and adrenal glands are normal. The gall bladder is  present  without intra- or extra-hepatic biliary dilatation.       The kidneys are symmetric without hydronephrosis.      There are no dilated bowel loops. The appendix is surgically absent.       Enlarged retroperitoneal lymph nodes are again demonstrated with a  representative left para-aortic lymph node measuring 1.3 x 1.8 cm (series 3,  image 76), previously 1.5 x 1.7 cm on 10/9/2020. A left common iliac lymph node  measures 1.1 x 1.5 cm (series 3, image 91), previously 0.8 x 1.4 cm.       Anterior peritoneal/mesenteric soft tissue nodularity is overall mildly  increased since 10/9/2020 with a representative measurement of 2.3 x 6.8 cm  (series 3, image 77), previously 2.6 x 5.5 cm.     Peritoneal soft tissue nodularity in the deep left pelvis measures 1.4 x 2.2 cm  (series 3, image 116), previously 2.9 x 3.1 cm.     There is no free fluid or free air. The aorta tapers without aneurysm.     The urinary bladder is normal. The uterus and ovaries are surgically absent.     There is no aggressive bony lesion.     IMPRESSION:   1. Mixed response in the abdomen and pelvis compared to 10/9/2020 with mildly  increased anterior peritoneal carcinomatosis. Stable to slightly increased  retroperitoneal lymphadenopathy.  Decreased peritoneal soft tissue nodularity in  the deep left pelvis.     2. No evidence for metastatic disease in the chest.      CT of chest/abdomen/pelvis (2/25/21)  CHEST WALL:No axillary or supraclavicular adenopathy. THYROID: Large hypodensity in the thyroid gland unchanged. MEDIASTINUM: 12 mm cardiophrenic lymph node unchanged. RAMEZ: No mass or lymphadenopathy. THORACIC AORTA: No dissection or aneurysm. MAIN PULMONARY ARTERY: Normal in caliber. TRACHEA/BRONCHI: Patent. ESOPHAGUS: No wall thickening or dilatation. HEART: Coronary atherosclerotic disease  PLEURA: No effusion or pneumothorax. LUNGS: No nodule, mass, or airspace disease. LIVER: No mass or biliary dilatation. GALLBLADDER: Unremarkable. BILIARY TREE: Unremarkable  SPLEEN: Unremarkable  PANCREAS: No mass or ductal dilatation. ADRENALS: Unremarkable. KIDNEYS: No mass, calculus, or hydronephrosis. STOMACH: Unremarkable. SMALL BOWEL: No dilatation or wall thickening. COLON: No dilatation or wall thickening. APPENDIX: Not visualized  PERITONEUM: Peritoneal carcinomatosis is again noted. 6.8 x 3.1 cm peritoneal  mass anteriorly is slightly increased in size. There is adjacent probably  confluent lesion measuring 3.4 x 2.6 cm which is increased in size from the  prior study. Deep left peritoneal nodularity is not significantly changed. Free  fluid in the pelvis increase in interval  RETROPERITONEUM: Left retroperitoneal lymph node measuring 1.5 x 1.4 cm slightly  decreased in size from 0.8 x 1.3 cm. Right retroperitoneal lymph node measuring  0.8 x 0.9 cm is increase in size of common iliac lymph node now measures 1.4 x  1.2 cm not significantly changed in size. REPRODUCTIVE ORGANS: Hysterectomy  URINARY BLADDER: No mass or calculus. BONES: No destructive bone lesion. ADDITIONAL COMMENTS: N/A     IMPRESSION  1. Overall increase in omental caking along the anterior peritoneum (the prior  study.     2.  Retroperitoneal adenopathy demonstrating variable response to therapy.  Some  of these have increased in interval.     3.  Pelvic soft tissue in the left deep pelvis is not significantly changed  although not well visualized.     4.  Slight interval increase in pelvic free fluid      CT of abdomen/pelvis (5/5/21)  LOWER THORAX: Right cardiophrenic lymph node measures 2.9 cm and previously  measured 1.2 cm on image number 15. There is minor basilar atelectasis/scarring  LIVER: No mass. BILIARY TREE: There is suggestion of gallstones CBD is not dilated. SPLEEN: within normal limits. PANCREAS: No mass or ductal dilatation. ADRENALS: Unremarkable. KIDNEYS: No mass, calculus, or hydronephrosis. STOMACH: Unremarkable. SMALL BOWEL: No dilatation or wall thickening. COLON: No dilatation or wall thickening. APPENDIX: Status post appendectomy  PERITONEUM: Peritoneal carcinomatosis appears improved. There is a confluent  density anteriorly in the peritoneum on image number 43 measuring 5.9 x 1.4 cm  which previously measured 9.8 x 1.9 cm. RETROPERITONEUM: Left retroperitoneal lymph node measures 0.7 cm image 45 and  previously measured 1.6 cm. Right retroperitoneal lymph node measures 0.7 cm image 46 and previously  measured 0.8 cm.     Left iliac lymph node image 57 measures 0.9 cm and previously measured 1.1 cm. REPRODUCTIVE ORGANS: Status post hysterectomy and oophorectomy. URINARY BLADDER: No mass or calculus. BONES: No destructive bone lesion. ABDOMINAL WALL: No mass or hernia. ADDITIONAL COMMENTS: N/A     IMPRESSION  1. There has been a mild decrease in the peritoneal carcinomatosis.      2. There has been slight to mild decrease in the retroperitoneal adenopathy.     3. No ascites is identified. No definite pelvic mass is identified. CT of chests/abdomen/pelvis (7/12/21)  THYROID: Heterogeneous thyroid gland with multiple nodules bilaterally including  in the isthmus. MEDIASTINUM: Left subclavian chest port terminates in the SVC. No mediastinal  adenopathy.   RAMEZ: No mass or lymphadenopathy. THORACIC AORTA: No dissection or aneurysm. MAIN PULMONARY ARTERY: Nonocclusive thrombus in segmental branches of the right  upper lobe pulmonary artery (3:47, 52), which are nonocclusive and likely  chronic. TRACHEA/BRONCHI: Patent. ESOPHAGUS: No wall thickening or dilatation. HEART: Normal in size. PLEURA: No effusion or pneumothorax. LUNGS: No nodule, mass, or airspace disease. LIVER: No mass or biliary dilatation. GALLBLADDER: Sludge in a nondistended gallbladder. SPLEEN: No mass. PANCREAS: No mass or ductal dilatation. ADRENALS: Unremarkable. KIDNEYS: No mass, calculus, or hydronephrosis. STOMACH: Unremarkable. SMALL BOWEL: No dilatation or wall thickening. COLON: Scattered diverticula. APPENDIX: Surgically absent  PERITONEUM: Decreased volume and size of the omental nodularity along the  anterior peritoneal surface just above the umbilicus. No ascites. No  pneumoperitoneum. RETROPERITONEUM: No lymphadenopathy or aortic aneurysm. REPRODUCTIVE ORGANS: Surgically absent. URINARY BLADDER: No mass or calculus. BONES: No destructive bone lesion. ADDITIONAL COMMENTS: N/A     IMPRESSION  1. Resolved retroperitoneal lymphadenopathy. 2.  Decreased peritoneal carcinomatosis. 3.  No ascites. 4.  Nonocclusive thrombus and segmental right upper lobe pulmonary artery  branches which are nonocclusive and appear chronic. PET/CT (8/27/21)  HEAD/NECK: No apparent foci of abnormal hypermetabolism. Cerebral evaluation is  limited by normal intense activity.     CHEST: No foci of abnormal hypermetabolism. Resolution of left supraclavicular  jasper activity.     ABDOMEN/PELVIS:   Omental/peritoneal disease is near completely resolved; current max SUV, midline  omentum 2.4, previous max SUV 12. Resolved retroperitoneal and right deep pelvic jasper activity.   Resolved left pelvic cul-de-sac mass/activity.     SKELETON: No foci of abnormal hypermetabolism in the axial and visualized  appendicular skeleton.     IMPRESSION  Abnormal PET activity is resolved other than minimal residual  activity in the omentum. CT of chest/abdomen/pelvis (11/9/21)  Chest:     Tubes and lines: Central venous port catheter extends to the SVC.     Lungs: There is mild bibasilar atelectasis. No pulmonary nodule or mass is seen.     Lymph nodes: There is no mediastinal, hilar or axillary lymphadenopathy. Subcentimeter axillary and mediastinal lymph nodes are noted.     Pleura: There is trace bilateral pleural fluid, new compared to prior CT dated  July 2021.     Heart: The heart is normal in size and there is no pericardial fluid.     Bones: No lytic or sclerotic osseous lesion is visualized.     The thyroid gland: Thyroid gland is normal in size. There are several nodules  within the thyroid gland, unchanged compared to prior CT dated July 2021.     Abdomen/pelvis:  Lymph nodes/peritoneum/retroperitoneum/omentum: There is a 1.3 cm x 1.3 cm  cardiophrenic lymph node which measured 8 mm x 7 mm on prior CT dated May 2021. There is a small amount of free fluid in the pelvis, new compared to prior CT  dated July 2021. There has been interval worsening of coalescing of omental  caking and intraperitoneal soft tissue nodularity. For example, within the  anterior mid abdomen, there is an omental soft tissue mass measuring  approximately 13.8 cm x 5.6 cm and measuring approximately 9.5 cm x 1.8 cm on  prior CT dated July 2021. There are also mildly enlarged upper abdominal  mesenteric lymph nodes which have increased in size. For example, there is a 2  cm x 1.4 cm celiac lymph node which measures approximately 6 mm x 4 mm on prior  CT dated July 2021. As an additional example, there is a 1.3 cm x 1.3 cm  mesenteric lymph node within the upper pelvis, measuring several millimeters in  size on prior CT dated July 2021. Retroperitoneal lymph nodes have increased in  size as well.  For example there is a 1.8 cm x 1.3 cm right common iliac lymph  node which measured 8 mm x 7 mm on prior CT dated July 2021.     Liver: There is subtle capsular hepatic, new compared to prior CT dated July 2021. No intraparenchymal hepatic lesion is seen.     Spleen: The spleen is normal.     Pancreas: The pancreas is normal.     Adrenals: The adrenals are normal.     Gallbladder: Gallbladder is normal.     Kidneys: The kidneys are normal. There is no hydronephrosis.     Bowel: There is new ill-defined soft tissue in the pelvis which appears to be  extending from the colon, likely representing subserosal soft tissue nodularity  and tethering. No dilated loop of large or small bowel is seen. Colonic  diverticulosis is noted.     Appendix: The appendix is surgically absent.     Urinary bladder: Urinary bladder is partially filled and grossly normal.     Bones: No lytic or sclerotic osseous lesion is visualized.     Miscellaneous: There is no free intraperitoneal gas. There is no focal fluid  collection to suggest abscess.     IMPRESSION  1. New, trace bilateral pleural fluid. Mild bibasilar atelectasis. 2.: Increase in size of cardiophrenic lymph node, now measuring 1.3 cm x 1.3 cm.  3. Interval worsening of coalescing of omental caking, intraperitoneal soft  tissue nodularity and intraperitoneal and retroperitoneal lymphadenopathy. 4. New, small amount of ascites in the pelvis.       CT of abdomen/pelvis (2/17/22)  LOWER THORAX: Small bilateral pleural effusions. No visualized lung nodules  LIVER: No mass. BILIARY TREE: Gallbladder is within normal limits. CBD is not dilated. SPLEEN: within normal limits. PANCREAS: No mass or ductal dilatation. ADRENALS: Unremarkable. KIDNEYS: No mass, calculus, or hydronephrosis. STOMACH: Unremarkable. SMALL BOWEL: No dilatation or wall thickening. COLON: No dilatation or wall thickening.   APPENDIX: Surgically absent  PERITONEUM: Omental and peritoneal nodularity with small volume scattered  ascites consistent with peritoneal carcinomatosis. Mesenteric, portacaval, and  gastrohepatic ligament lymphadenopathy. RETROPERITONEUM: Retroperitoneal lymphadenopathy along the IVC. REPRODUCTIVE ORGANS: Status post hysterectomy  URINARY BLADDER: No mass or calculus. BONES: No destructive bone lesion. ABDOMINAL WALL: No mass or hernia. ADDITIONAL COMMENTS: N/A     IMPRESSION  1. Peritoneal and omental nodularity with small volume ascites consistent with  peritoneal carcinomatosis. 2.  Lymphadenopathy in the mesentery, gastrohepatic ligament, portacaval, and  retroperitoneal stations. 3.  Small bilateral pleural effusions. 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 She completed six cycles of adjuvant Taxol and Carboplatin chemotherapy. She developed recurrent disease and was treated with the regimen of IV Keytruda and PO Lenvima. She completed 6 cycles before progression. Since it had been almost 8 years since her adjuvant Taxol/Carboplatin, I recommended that we retreat her with that regimen, though I suggested a lower dose of each. She completed 8 cycles of that regimen with an excellent response. She now has recurrence once again. I recommended single agent Doxil chemotherapy. She completed 3 cycles before a CT demonstrated progression of disease. We stopped the Doxil and I discussed other treatment options with her, including the possibility of a clinical trial at [facility]. Ultimately we decided upon single agent Avastin. I explained that it won't make the tumor \"go away\", but it may slow down the growth and help manage the ascites. She has completed 2 cycles so far. We will continue with the current regimen and repeat imaging after the third cycle. Andrews Prasad is a 78 y.o. female, evaluated via audio-only technology on 4/19/2022 for Chemotherapy (Virtual telephone visit.  Pre chemo, labs and C2 avastin on 3/32.)      Assessment & Plan:   Diagnoses and all orders for this visit:    1. Primary serous adenocarcinoma of endometrium (HCC)  -     CT CHEST ABD PELV W CONT; Future          Subjective:       Prior to Admission medications    Medication Sig Start Date End Date Taking? Authorizing Provider   BACITRACIN, BULK, by Does Not Apply route. With zinc ointment   Yes Provider, Historical   rivaroxaban (Xarelto) 20 mg tab tablet Take 1 Tablet by mouth daily. 2/1/22  Yes Calrence Claire MD   acetaminophen (TylenoL) 325 mg tablet Take  by mouth as needed. Yes Provider, Historical   lisinopriL (PRINIVIL, ZESTRIL) 10 mg tablet Take 1 Tab by mouth daily. 1/6/21  Yes Clarence Claire MD   lisinopriL (PRINIVIL, ZESTRIL) 5 mg tablet Take 2 Tabs by mouth daily. 1/5/21  Yes Hieu Cline PA-C   ondansetron (ZOFRAN ODT) 4 mg disintegrating tablet Take 1 Tab by mouth every eight (8) hours as needed for Nausea. Indications: nausea and vomiting caused by cancer drugs 10/22/20  Yes Clarence Claire MD   lidocaine-prilocaine (EMLA) topical cream Apply small amount over port area and cover with band aid one hour before chemo 10/22/20  Yes Clarence Claire MD   guaifenesin (MUCINEX PO) Take  by mouth. Yes Provider, Historical   loratadine (Claritin) 10 mg tablet Take 10 mg by mouth. Yes Provider, Historical   epinastine 0.05 % drop Apply  to eye. Yes Provider, Historical   raNITIdine (ZANTAC) 150 mg tablet ZANTAC TABS 9/29/11  Yes Provider, Historical   cyclobenzaprine (FLEXERIL) 5 mg tablet take 1 tablet by mouth three times a day if needed 12/5/16  Yes Provider, Historical   valACYclovir (VALTREX) 1 gram tablet Take 1 Tab by mouth three (3) times daily. 12/15/16  Yes Clarence Claire MD   EPINEPHrine (EPIPEN) 0.3 mg/0.3 mL injection 0.3 mg by IntraMUSCular route once as needed. Yes Provider, Historical   ketotifen (ZADITOR) 0.025 % (0.035 %) ophthalmic solution Administer 1 Drop to both eyes every morning.    Yes Provider, Historical   Cetirizine (ZYRTEC) 10 mg cap Take 10 mg by mouth nightly. Yes Provider, Historical   SAL ACID/UREA/PETROLATUM,WHITE (KERASAL FOOT EX) by Apply Externally route daily as needed. Yes Provider, Historical   ACCU-CHEK HELDER PLUS TEST STRP strip  7/3/15  Yes Provider, Historical   NITROSTAT 0.4 mg SL tablet  9/22/14  Yes Provider, Historical   CALCIUM CARBONATE (MARCELLO-SELTZER ANTACID PO) Take  by mouth daily as needed. Yes Provider, Historical     Patient Active Problem List   Diagnosis Code    Malignant neoplasm of corpus uteri, except isthmus ([facility] [address] C54.9     Patient Active Problem List    Diagnosis Date Noted    Malignant neoplasm of corpus uteri, except isthmus ([facility] [address] 02/28/2013     Current Outpatient Medications   Medication Sig Dispense Refill    BACITRACIN, BULK, by Does Not Apply route. With zinc ointment      rivaroxaban (Xarelto) 20 mg tab tablet Take 1 Tablet by mouth daily. 30 Tablet 5    acetaminophen (TylenoL) 325 mg tablet Take  by mouth as needed.  lisinopriL (PRINIVIL, ZESTRIL) 10 mg tablet Take 1 Tab by mouth daily. 30 Tab 2    lisinopriL (PRINIVIL, ZESTRIL) 5 mg tablet Take 2 Tabs by mouth daily. 30 Tab 5    ondansetron (ZOFRAN ODT) 4 mg disintegrating tablet Take 1 Tab by mouth every eight (8) hours as needed for Nausea. Indications: nausea and vomiting caused by cancer drugs 30 Tab 3    lidocaine-prilocaine (EMLA) topical cream Apply small amount over port area and cover with band aid one hour before chemo 30 g 3    guaifenesin (MUCINEX PO) Take  by mouth.  loratadine (Claritin) 10 mg tablet Take 10 mg by mouth.  epinastine 0.05 % drop Apply  to eye.  raNITIdine (ZANTAC) 150 mg tablet ZANTAC TABS      cyclobenzaprine (FLEXERIL) 5 mg tablet take 1 tablet by mouth three times a day if needed  0    valACYclovir (VALTREX) 1 gram tablet Take 1 Tab by mouth three (3) times daily.  21 Tab 3    EPINEPHrine (EPIPEN) 0.3 mg/0.3 mL injection 0.3 mg by IntraMUSCular route once as needed.  ketotifen (ZADITOR) 0.025 % (0.035 %) ophthalmic solution Administer 1 Drop to both eyes every morning.  Cetirizine (ZYRTEC) 10 mg cap Take 10 mg by mouth nightly.  SAL ACID/UREA/PETROLATUM,WHITE (KERASAL FOOT EX) by Apply Externally route daily as needed.  ACCU-CHEK HELDER PLUS TEST STRP strip   0    NITROSTAT 0.4 mg SL tablet       CALCIUM CARBONATE (MARCELLO-SELTZER ANTACID PO) Take  by mouth daily as needed. Allergies   Allergen Reactions    Latex Other (comments)     Burns skin    Acetone Shortness of Breath    Amoxicillin Anaphylaxis    Ciprofloxacin Hives    Pcn [Penicillins] Anaphylaxis    Buspar [Buspirone] Unknown (comments)     Has N&V and makes her feel \"weird\"    Azithromycin Hives    Egg Nausea Only    Other Medication Rash     POPPY seeds     Past Medical History:   Diagnosis Date    Abnormal Pap smear 1995    with f/u normal    Anemia     Arthritis     Asthma     Cancer ([facility] [address]     ENDOMETRIAL    Environmental allergies     GERD (gastroesophageal reflux disease)     Goiter     Other unknown and unspecified cause of morbidity or mortality     POSSIBLE ESOPHAGEAL SPASM, ?  OBSTRUCTION DUE TO GOITER    PMB (postmenopausal bleeding)     S/P chemotherapy, time since greater than 12 weeks     LAST CHEMO 10/2014 PER PATIENT    Thyroid disease     goiter     Past Surgical History:   Procedure Laterality Date    HX APPENDECTOMY      HX BUNIONECTOMY      HX GYN  3/13/13    TLH/BSO    HX HEENT  4/22/13    TOOTH REMOVED    HX ORTHOPAEDIC  1980'S    BUNIONECTOMY    HX VASCULAR ACCESS      port    CA BREAST SURGERY PROCEDURE UNLISTED  1/12/16    right breast bx     Family History   Problem Relation Age of Onset    Cancer Maternal Aunt         breast    Heart Disease Mother     Diabetes Father     Cancer Sister         CERVICAL    Kidney Disease Brother     Diabetes Brother     Heart Attack Other     Arrhythmia Brother     Diabetes Brother     Anesth Problems Neg Hx      Social History     Tobacco Use    Smoking status: Never Smoker    Smokeless tobacco: Never Used   Substance Use Topics    Alcohol use: Yes     Alcohol/week: 0.0 standard drinks     Comment: RARE OCC       ROS    Patient-Reported Vitals 1/19/2021   Patient-Reported Systolic  323   Patient-Reported Diastolic 96        Andrews Prasad, who was evaluated through a patient-initiated, synchronous (real-time) audio only encounter, and/or her healthcare decision maker, is aware that it is a billable service, with coverage as determined by her insurance carrier. She provided verbal consent to proceed: Yes. She has not had a related appointment within my department in the past 7 days or scheduled within the next 24 hours. Total Time: minutes: 11-20 minutes      An electronic signature was used to sign this note.     Jean Beck MD  04/19/22

## 2022-04-21 ENCOUNTER — HOSPITAL ENCOUNTER (OUTPATIENT)
Dept: INFUSION THERAPY | Age: 67
Discharge: HOME | End: 2022-04-21
Payer: MEDICARE

## 2022-04-21 VITALS
BODY MASS INDEX: 26.13 KG/M2 | RESPIRATION RATE: 18 BRPM | TEMPERATURE: 97.5 F | SYSTOLIC BLOOD PRESSURE: 125 MMHG | DIASTOLIC BLOOD PRESSURE: 88 MMHG | WEIGHT: 157 LBS | HEART RATE: 72 BPM

## 2022-04-21 DIAGNOSIS — Z51.11 ENCOUNTER FOR ANTINEOPLASTIC CHEMOTHERAPY: ICD-10-CM

## 2022-04-21 DIAGNOSIS — C54.9 MALIGNANT NEOPLASM OF CORPUS UTERI, EXCEPT ISTHMUS (HCC): Primary | ICD-10-CM

## 2022-04-21 DIAGNOSIS — B37.9 YEAST INFECTION: Primary | ICD-10-CM

## 2022-04-21 LAB
ALBUMIN SERPL-MCNC: 3.4 G/DL (ref 3.5–5)
ALBUMIN/GLOB SERPL: 1 {RATIO} (ref 1.1–2.2)
ALP SERPL-CCNC: 70 U/L (ref 45–117)
ALT SERPL-CCNC: 17 U/L (ref 12–78)
ANION GAP SERPL CALC-SCNC: 4 MMOL/L (ref 5–15)
APPEARANCE UR: CLEAR
AST SERPL-CCNC: 25 U/L (ref 15–37)
BASOPHILS # BLD: 0 K/UL (ref 0–0.1)
BASOPHILS NFR BLD: 0 % (ref 0–1)
BILIRUB SERPL-MCNC: 0.4 MG/DL (ref 0.2–1)
BILIRUB UR QL: NEGATIVE
BUN SERPL-MCNC: 14 MG/DL (ref 6–20)
BUN/CREAT SERPL: 33 (ref 12–20)
CALCIUM SERPL-MCNC: 8.8 MG/DL (ref 8.5–10.1)
CANCER AG125 SERPL-ACNC: 337 U/ML (ref 1.5–35)
CHLORIDE SERPL-SCNC: 105 MMOL/L (ref 97–108)
CO2 SERPL-SCNC: 28 MMOL/L (ref 21–32)
COLOR UR: NORMAL
CREAT SERPL-MCNC: 0.42 MG/DL (ref 0.55–1.02)
DIFFERENTIAL METHOD BLD: ABNORMAL
EOSINOPHIL # BLD: 0.1 K/UL (ref 0–0.4)
EOSINOPHIL NFR BLD: 1 % (ref 0–7)
ERYTHROCYTE [DISTWIDTH] IN BLOOD BY AUTOMATED COUNT: 18.1 % (ref 11.5–14.5)
GLOBULIN SER CALC-MCNC: 3.3 G/DL (ref 2–4)
GLUCOSE SERPL-MCNC: 105 MG/DL (ref 65–100)
GLUCOSE UR STRIP.AUTO-MCNC: NEGATIVE MG/DL
HCT VFR BLD AUTO: 37.5 % (ref 35–47)
HGB BLD-MCNC: 12 G/DL (ref 11.5–16)
HGB UR QL STRIP: NEGATIVE
IMM GRANULOCYTES # BLD AUTO: 0 K/UL (ref 0–0.04)
IMM GRANULOCYTES NFR BLD AUTO: 1 % (ref 0–0.5)
KETONES UR QL STRIP.AUTO: NEGATIVE MG/DL
LEUKOCYTE ESTERASE UR QL STRIP.AUTO: NEGATIVE
LYMPHOCYTES # BLD: 0.8 K/UL (ref 0.8–3.5)
LYMPHOCYTES NFR BLD: 15 % (ref 12–49)
MCH RBC QN AUTO: 29.1 PG (ref 26–34)
MCHC RBC AUTO-ENTMCNC: 32 G/DL (ref 30–36.5)
MCV RBC AUTO: 90.8 FL (ref 80–99)
MONOCYTES # BLD: 0.5 K/UL (ref 0–1)
MONOCYTES NFR BLD: 9 % (ref 5–13)
NEUTS SEG # BLD: 4.1 K/UL (ref 1.8–8)
NEUTS SEG NFR BLD: 74 % (ref 32–75)
NITRITE UR QL STRIP.AUTO: NEGATIVE
NRBC # BLD: 0 K/UL (ref 0–0.01)
NRBC BLD-RTO: 0 PER 100 WBC
PH UR STRIP: 6.5 [PH] (ref 5–8)
PLATELET # BLD AUTO: 239 K/UL (ref 150–400)
PMV BLD AUTO: 8.9 FL (ref 8.9–12.9)
POTASSIUM SERPL-SCNC: 4.3 MMOL/L (ref 3.5–5.1)
PROT SERPL-MCNC: 6.7 G/DL (ref 6.4–8.2)
PROT UR STRIP-MCNC: NEGATIVE MG/DL
RBC # BLD AUTO: 4.13 M/UL (ref 3.8–5.2)
SODIUM SERPL-SCNC: 137 MMOL/L (ref 136–145)
SP GR UR REFRACTOMETRY: 1.01 (ref 1–1.03)
UROBILINOGEN UR QL STRIP.AUTO: 0.2 EU/DL (ref 0.2–1)
WBC # BLD AUTO: 5.5 K/UL (ref 3.6–11)

## 2022-04-21 PROCEDURE — 77030012965 HC NDL HUBR BBMI -A

## 2022-04-21 PROCEDURE — 74011250636 HC RX REV CODE- 250/636: Performed by: OBSTETRICS & GYNECOLOGY

## 2022-04-21 PROCEDURE — 36415 COLL VENOUS BLD VENIPUNCTURE: CPT

## 2022-04-21 PROCEDURE — 99213 OFFICE O/P EST LOW 20 MIN: CPT | Performed by: PHYSICIAN ASSISTANT

## 2022-04-21 PROCEDURE — 81003 URINALYSIS AUTO W/O SCOPE: CPT

## 2022-04-21 PROCEDURE — 85025 COMPLETE CBC W/AUTO DIFF WBC: CPT

## 2022-04-21 PROCEDURE — 80053 COMPREHEN METABOLIC PANEL: CPT

## 2022-04-21 PROCEDURE — 96413 CHEMO IV INFUSION 1 HR: CPT

## 2022-04-21 PROCEDURE — 86304 IMMUNOASSAY TUMOR CA 125: CPT

## 2022-04-21 PROCEDURE — 74011000258 HC RX REV CODE- 258: Performed by: OBSTETRICS & GYNECOLOGY

## 2022-04-21 RX ORDER — DOXYLAMINE SUCCINATE 25 MG
TABLET ORAL 2 TIMES DAILY
Qty: 198 G | Refills: 1 | Status: SHIPPED | OUTPATIENT
Start: 2022-04-21 | End: 2022-05-05

## 2022-04-21 RX ORDER — HEPARIN 100 UNIT/ML
300-500 SYRINGE INTRAVENOUS AS NEEDED
Status: DISCONTINUED | OUTPATIENT
Start: 2022-04-21 | End: 2022-04-22 | Stop reason: HOSPADM

## 2022-04-21 RX ORDER — EPINEPHRINE 1 MG/ML
0.3 INJECTION, SOLUTION, CONCENTRATE INTRAVENOUS AS NEEDED
Status: DISCONTINUED | OUTPATIENT
Start: 2022-04-21 | End: 2022-04-22 | Stop reason: HOSPADM

## 2022-04-21 RX ORDER — DIPHENHYDRAMINE HYDROCHLORIDE 50 MG/ML
25 INJECTION, SOLUTION INTRAMUSCULAR; INTRAVENOUS AS NEEDED
Status: DISCONTINUED | OUTPATIENT
Start: 2022-04-21 | End: 2022-04-22 | Stop reason: HOSPADM

## 2022-04-21 RX ORDER — ACETAMINOPHEN 325 MG/1
650 TABLET ORAL AS NEEDED
Status: DISCONTINUED | OUTPATIENT
Start: 2022-04-21 | End: 2022-04-22 | Stop reason: HOSPADM

## 2022-04-21 RX ORDER — HYDROCORTISONE SODIUM SUCCINATE 100 MG/2ML
100 INJECTION, POWDER, FOR SOLUTION INTRAMUSCULAR; INTRAVENOUS AS NEEDED
Status: DISCONTINUED | OUTPATIENT
Start: 2022-04-21 | End: 2022-04-22 | Stop reason: HOSPADM

## 2022-04-21 RX ORDER — SODIUM CHLORIDE 0.9 % (FLUSH) 0.9 %
10 SYRINGE (ML) INJECTION AS NEEDED
Status: DISCONTINUED | OUTPATIENT
Start: 2022-04-21 | End: 2022-04-22 | Stop reason: HOSPADM

## 2022-04-21 RX ORDER — DIPHENHYDRAMINE HYDROCHLORIDE 50 MG/ML
50 INJECTION, SOLUTION INTRAMUSCULAR; INTRAVENOUS AS NEEDED
Status: DISCONTINUED | OUTPATIENT
Start: 2022-04-21 | End: 2022-04-22 | Stop reason: HOSPADM

## 2022-04-21 RX ORDER — ALBUTEROL SULFATE 0.83 MG/ML
2.5 SOLUTION RESPIRATORY (INHALATION) AS NEEDED
Status: DISCONTINUED | OUTPATIENT
Start: 2022-04-21 | End: 2022-04-22 | Stop reason: HOSPADM

## 2022-04-21 RX ORDER — SODIUM CHLORIDE 9 MG/ML
10 INJECTION INTRAMUSCULAR; INTRAVENOUS; SUBCUTANEOUS AS NEEDED
Status: DISCONTINUED | OUTPATIENT
Start: 2022-04-21 | End: 2022-04-22 | Stop reason: HOSPADM

## 2022-04-21 RX ORDER — ONDANSETRON 2 MG/ML
8 INJECTION INTRAMUSCULAR; INTRAVENOUS AS NEEDED
Status: DISCONTINUED | OUTPATIENT
Start: 2022-04-21 | End: 2022-04-22 | Stop reason: HOSPADM

## 2022-04-21 RX ADMIN — SODIUM CHLORIDE 1125 MG: 9 INJECTION, SOLUTION INTRAVENOUS at 14:56

## 2022-04-21 RX ADMIN — SODIUM CHLORIDE 250 ML: 9 INJECTION, SOLUTION INTRAVENOUS at 14:55

## 2022-04-21 NOTE — PROGRESS NOTES
[facility] Chemotherapy Progress Note    Date: 2022    Name: Anahi Shields    MRN: [id number]         : 1955      1300 Pt admit to [facility] for [place] ambulatory in stable condition. Assessment completed. No new concerns voiced. Port with positive blood return. Labs drawn and reviewed. Patient denies SOB, fever, cough, general not feeling well. Patient denies recent exposure to someone who has tested positive for COVID-19. Patient denies having contact with anyone who has a pending COVID test.         Chemotherapy Flowsheet 2022   Cycle C3   Date 2022   Drug / Regimen [place]   Pre Meds -   Notes given         Ms. Brandan Choi vitals were reviewed. Patient Vitals for the past 12 hrs:   Temp Pulse Resp BP   22 1600 -- 72 -- 125/88   22 1312 97.5 °F (36.4 °C) 77 18 132/75         Lab results were obtained and reviewed. Recent Results (from the past 12 hour(s))   CBC WITH AUTOMATED DIFF    Collection Time: 22  1:14 PM   Result Value Ref Range    WBC 5.5 3.6 - 11.0 K/uL    RBC 4.13 3.80 - 5.20 M/uL    HGB 12.0 11.5 - 16.0 g/dL    HCT 37.5 35.0 - 47.0 %    MCV 90.8 80.0 - 99.0 FL    MCH 29.1 26.0 - 34.0 PG    MCHC 32.0 30.0 - 36.5 g/dL    RDW 18.1 (H) 11.5 - 14.5 %    PLATELET 815 221 - 726 K/uL    MPV 8.9 8.9 - 12.9 FL    NRBC 0.0 0  WBC    ABSOLUTE NRBC 0.00 0.00 - 0.01 K/uL    NEUTROPHILS 74 32 - 75 %    LYMPHOCYTES 15 12 - 49 %    MONOCYTES 9 5 - 13 %    EOSINOPHILS 1 0 - 7 %    BASOPHILS 0 0 - 1 %    IMMATURE GRANULOCYTES 1 (H) 0.0 - 0.5 %    ABS. NEUTROPHILS 4.1 1.8 - 8.0 K/UL    ABS. LYMPHOCYTES 0.8 0.8 - 3.5 K/UL    ABS. MONOCYTES 0.5 0.0 - 1.0 K/UL    ABS. EOSINOPHILS 0.1 0.0 - 0.4 K/UL    ABS. BASOPHILS 0.0 0.0 - 0.1 K/UL    ABS. IMM.  GRANS. 0.0 0.00 - 0.04 K/UL    DF AUTOMATED     METABOLIC PANEL, COMPREHENSIVE    Collection Time: 22  1:14 PM   Result Value Ref Range    Sodium 137 136 - 145 mmol/L    Potassium 4.3 3.5 - 5.1 mmol/L    Chloride 105 97 - 108 mmol/L    CO2 28 21 - 32 mmol/L    Anion gap 4 (L) 5 - 15 mmol/L    Glucose 105 (H) 65 - 100 mg/dL    BUN 14 6 - 20 MG/DL    Creatinine 0.42 (L) 0.55 - 1.02 MG/DL    BUN/Creatinine ratio 33 (H) 12 - 20      GFR est AA >60 >60 ml/min/1.73m2    GFR est non-AA >60 >60 ml/min/1.73m2    Calcium 8.8 8.5 - 10.1 MG/DL    Bilirubin, total 0.4 0.2 - 1.0 MG/DL    ALT (SGPT) 17 12 - 78 U/L    AST (SGOT) 25 15 - 37 U/L    Alk. phosphatase 70 45 - 117 U/L    Protein, total 6.7 6.4 - 8.2 g/dL    Albumin 3.4 (L) 3.5 - 5.0 g/dL    Globulin 3.3 2.0 - 4.0 g/dL    A-G Ratio 1.0 (L) 1.1 - 2.2     CANCER ANTIGEN 125    Collection Time: 04/21/22  1:14 PM   Result Value Ref Range    CA-125 337 (H) 1.5 - 35.0 U/mL   URINALYSIS W/ RFLX MICROSCOPIC    Collection Time: 04/21/22  1:14 PM   Result Value Ref Range    Color YELLOW/STRAW      Appearance CLEAR CLEAR      Specific gravity 1.013 1.003 - 1.030      pH (UA) 6.5 5.0 - 8.0      Protein Negative NEG mg/dL    Glucose Negative NEG mg/dL    Ketone Negative NEG mg/dL    Bilirubin Negative NEG      Blood Negative NEG      Urobilinogen 0.2 0.2 - 1.0 EU/dL    Nitrites Negative NEG      Leukocyte Esterase Negative NEG         Pre-medications  were administered as ordered and chemotherapy was initiated. Medications Administered     bevacizumab-bvzr (ZIRABEV) 1,125 mg in 0.9% sodium chloride 100 mL, overfill volume 10 mL IVPB     Admin Date  04/21/2022 Action  New Bag Dose  1,125 mg Rate  310 mL/hr Route  IntraVENous Administered By  Chava Springer RN          sodium chloride 0.9 % bolus infusion 500 mL     Admin Date  04/21/2022 Action  New Bag Dose  250 mL Route  IntraVENous Administered By  Chava Springer RN                1600 Pt tolerated treatment well. Port maintained positive blood return throughout treatment. Flushed, heparinized and de-accessed per protocol. D/c home ambulatory in no distress.      Future Appointments   Date Time Provider David Jara   5/7/2022 11:00 AM [facility] CT ER 3 [facility] H   5/12/2022 11:45 AM MD GREER Faust BS AMB   5/12/2022  1:00 PM E3 [facility] [address] [place] [address]   5/31/2022  9:45 AM MD GREER Faust BS AMB   6/2/2022  3:00 PM B1 [facility] [address], RN

## 2022-04-21 NOTE — PROGRESS NOTES
[facility] [address], [address]  P [phone]  F [phone]      Patient ID:  Name:  Summer Rea  MRN:  [id number]  :  1955/67 y.o. Date:  2022      Jl Siegel MD: Yomaira Patricia MD  PCP: Martin Granger MD     Primary Diagnosis: uterine cancer  Date of Diagnosis: 3/2013      Current Agent: Avastin  Cycle: 3    HPI:  78 y.o. 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 She received adjuvant Taxol/carboplatin followed by a 7 year DFI. In  presenting with CT pelvic adenopathy and peritoneal carcinomatosis. She has been followed on multiple regimens for recurrent/persistent disease including Keytruda/Lenvima, carboplatin/taxol, Doxil. She has demonstrated progression on most recent interval scan and recommended Avastin. SUBJECTIVE:  Ms. Félix Gotti presents to [facility] today for cycle 3 single agent Avastin. She feels well overall. Feels that her shortness of breath and fatigue are improving since her COVID diagnosis. She was happy to see that her weight remained stable since her last cycle. Appetite has been better. Arthralgias managed with tylenol. Following cycle 1, she had developed intense arthralgias. Was treated with steroids but unfortunately developed a yeast infection along her buttocks and anal/perineal area. States that she treated the area with over the counter Monistat cream which worked initially but now her symptoms have returned. She also mentions that she has been intermittently using an ointment for her hemorrhoids and is concerned that the hemorrhoidal cream is responsible for her yeast infection. She reduced her work to part time due to fatigued to work. Getting help with lifting heavy things. Able to ambulate, remains independent. Routine delayed GI function with dulcolax/benefiber/MoM.       No residual effects s/p her therapy in 2013. She lives alone. Does not feel overly close to her children. Her daughter/grandkids live next door and brother nearby. Her son lives near [place]. She feels most supported by her brother Luciana Brewster, close friend Mylene and her work family. She still has difficulty feeling comfortable asking family for help. Looking into/questions prison with SS. Work for her is a positive stressor, has few hobbies and uncertain of purpose w/o work. ROS  Constitutional: no weight loss, fever, night sweats  Respiratory: above  Cardiovascular: no CP, edema  Heme: No abnormal bleeding, no epistaxis. Gastrointestinal: no abdominal pain, no dysphagia, change in bowel habits, or black or bloody stools  Genito-Urinary: no dysuria, trouble voiding, or hematuria  Musculoskeletal: negative for - gait disturbance or joint swelling  Neurological: negative for - behavioral changes, dizziness, headaches, memory loss or numbness/tingling  Derm: negative  Psych: negative for anxiety and depression       OBJECTIVE:  Physical Exam  Visit Vitals  /75   Pulse 77   Temp 97.5 °F (36.4 °C)   Resp 18   Wt 157 lb (71.2 kg)   BMI 26.13 kg/m²          General:  alert, cooperative, no distress       HEENT: without pallor, sclera without jaundice, oral mucosa without lesions,      Cardiac:  Regular rate and rhythm        Lungs:  clear to auscultation bilaterally          Port:  clean, dry, no drainage  Abdomen:  soft, protuberant, palpable epigastric/right abd mass.         Lymph:  no lymphadenopathy   Extremity: no edema    Wt Readings from Last 3 Encounters:   04/21/22 157 lb (71.2 kg)   03/31/22 157 lb (71.2 kg)   03/10/22 166 lb 0.1 oz (75.3 kg)       Recent Labs     04/21/22  1314   WBC 5.5   ANEU 4.1   HGB 12.0   HCT 37.5   MCV 90.8   MCH 29.1        Recent Labs     04/21/22  1314      K 4.3      *   BUN 14   CREA 0.42*   CA 8.8   ALB 3.4*   TBILI 0.4   ALT 17         Tumor markers:  CA-125   Date Value Ref Range Status   04/21/2022 337 (H) 1.5 - 35.0 U/mL Final     Comment:     ** Note new reference range and method **  Results may vary depending on . Method used is [employer]     03/31/2022 422 (H) 1.5 - 35.0 U/mL Final     Comment:     ** Note new reference range and method **  Results may vary depending on . Method used is [employer]     03/10/2022 269 (H) 1.5 - 35.0 U/mL Final     Comment:     ** Note new reference range and method **  Results may vary depending on . Method used is [employer]     01/27/2022 312 (H) 1.5 - 35.0 U/mL Final     Comment:     ** Note new reference range and method **  Results may vary depending on . Method used is [employer]           Patient Active Problem List   Diagnosis Code    Malignant neoplasm of corpus uteri, except isthmus ([facility] [address] C54.9     Past Medical History:   Diagnosis Date    Abnormal Pap smear 1995    with f/u normal    Anemia     Arthritis     Asthma     Cancer ([facility] [address]     ENDOMETRIAL    Environmental allergies     GERD (gastroesophageal reflux disease)     Goiter     Other unknown and unspecified cause of morbidity or mortality     POSSIBLE ESOPHAGEAL SPASM, ? OBSTRUCTION DUE TO GOITER    PMB (postmenopausal bleeding)     S/P chemotherapy, time since greater than 12 weeks     LAST CHEMO 10/2014 PER PATIENT    Thyroid disease     goiter     Prior to Admission medications    Medication Sig Start Date End Date Taking? Authorizing Provider   BACITRACIN, BULK, by Does Not Apply route. With zinc ointment    Provider, Historical   rivaroxaban (Xarelto) 20 mg tab tablet Take 1 Tablet by mouth daily. 2/1/22   Kayden Tolliver MD   acetaminophen (TylenoL) 325 mg tablet Take  by mouth as needed. Provider, Historical   lisinopriL (PRINIVIL, ZESTRIL) 10 mg tablet Take 1 Tab by mouth daily.  1/6/21   Kayden Tolliver MD   lisinopriL (PRINIVIL, ZESTRIL) 5 mg tablet Take 2 Tabs by mouth daily. 1/5/21   Hieu Cline, VIRY   ondansetron (ZOFRAN ODT) 4 mg disintegrating tablet Take 1 Tab by mouth every eight (8) hours as needed for Nausea. Indications: nausea and vomiting caused by cancer drugs 10/22/20   Luiz Nolasco MD   lidocaine-prilocaine (EMLA) topical cream Apply small amount over port area and cover with band aid one hour before chemo 10/22/20   Luiz Nolasco MD   guaifenesin (MUCINEX PO) Take  by mouth. Provider, Historical   loratadine (Claritin) 10 mg tablet Take 10 mg by mouth. Provider, Historical   epinastine 0.05 % drop Apply  to eye. Provider, Historical   raNITIdine (ZANTAC) 150 mg tablet ZANTAC TABS 9/29/11   Provider, Historical   cyclobenzaprine (FLEXERIL) 5 mg tablet take 1 tablet by mouth three times a day if needed 12/5/16   Provider, Historical   valACYclovir (VALTREX) 1 gram tablet Take 1 Tab by mouth three (3) times daily. 12/15/16   Luiz Nolasco MD   EPINEPHrine (EPIPEN) 0.3 mg/0.3 mL injection 0.3 mg by IntraMUSCular route once as needed. Provider, Historical   ketotifen (ZADITOR) 0.025 % (0.035 %) ophthalmic solution Administer 1 Drop to both eyes every morning. Provider, Historical   Cetirizine (ZYRTEC) 10 mg cap Take 10 mg by mouth nightly. Provider, Historical   SAL ACID/UREA/PETROLATUM,WHITE (KERASAL FOOT EX) by Apply Externally route daily as needed. Provider, Historical   ACCU-CHEK HELDER PLUS TEST STRP strip  7/3/15   Provider, Historical   NITROSTAT 0.4 mg SL tablet  9/22/14   Provider, Historical   CALCIUM CARBONATE (MARCELLO-SELTZER ANTACID PO) Take  by mouth daily as needed.     Provider, Historical     Allergies   Allergen Reactions    Latex Other (comments)     Burns skin    Acetone Shortness of Breath    Amoxicillin Anaphylaxis    Ciprofloxacin Hives    Pcn [Penicillins] Anaphylaxis    Buspar [Buspirone] Unknown (comments)     Has N&V and makes her feel \"weird\"    Azithromycin Hives    Egg Nausea Only    Other Medication Rash     POPPY seeds     Family History   Problem Relation Age of Onset    Cancer Maternal Aunt         breast    Heart Disease Mother     Diabetes Father     Cancer Sister         CERVICAL    Kidney Disease Brother     Diabetes Brother     Heart Attack Other     Arrhythmia Brother     Diabetes Brother     Anesth Problems Neg Hx        CT Results (most recent):  Results from Hospital Encounter encounter on 02/17/22    CT ABD PELV W CONT    Narrative  EXAM: CT ABD PELV W CONT    INDICATION: Primary serous adenocarcinoma of endometrium    COMPARISON: November 9, 2021    CONTRAST: 100 mL of Isovue-370. ORAL CONTRAST: Oral contrast was administered for optimal bowel visualization    TECHNIQUE:  Following the uneventful intravenous administration of contrast, thin axial  images were obtained through the abdomen and pelvis. Coronal and sagittal  reconstructions were generated. CT dose reduction was achieved through use of a  standardized protocol tailored for this examination and automatic exposure  control for dose modulation. FINDINGS:  LOWER THORAX: Small bilateral pleural effusions. No visualized lung nodules  LIVER: No mass. BILIARY TREE: Gallbladder is within normal limits. CBD is not dilated. SPLEEN: within normal limits. PANCREAS: No mass or ductal dilatation. ADRENALS: Unremarkable. KIDNEYS: No mass, calculus, or hydronephrosis. STOMACH: Unremarkable. SMALL BOWEL: No dilatation or wall thickening. COLON: No dilatation or wall thickening. APPENDIX: Surgically absent  PERITONEUM: Omental and peritoneal nodularity with small volume scattered  ascites consistent with peritoneal carcinomatosis. Mesenteric, portacaval, and  gastrohepatic ligament lymphadenopathy. RETROPERITONEUM: Retroperitoneal lymphadenopathy along the IVC. REPRODUCTIVE ORGANS: Status post hysterectomy  URINARY BLADDER: No mass or calculus. BONES: No destructive bone lesion.   ABDOMINAL WALL: No mass or hernia. ADDITIONAL COMMENTS: N/A    Impression  1. Peritoneal and omental nodularity with small volume ascites consistent with  peritoneal carcinomatosis. 2.  Lymphadenopathy in the mesentery, gastrohepatic ligament, portacaval, and  retroperitoneal stations. 3.  Small bilateral pleural effusions. IMPRESSION/PLAN:  78 y.o. 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 She has again progressed and prescribed Doxil therapy. ECO    Labs/chart reviewed. ASE discussed and interventions, monitoring reviewed. Chemotherapy: Avastin cycle 3. Proceed with treatment today  Post infusion arthralgia: not as severe following cycle 2. stable  Anemia: improved. Hgb 12.0 today. Fatigue/KNIGHT: post COVID, anemia, cancer related. Feels that her fatigue and shortness of breath are improving. She is approaching her previous baseline of activity. Weight loss/hypoalbuminemia: carcinomatosis. Diet and habits discussed, continue protein. Appetite improved since the past cycle. Weight has remained stable. HTN: resolved following lenvatinib therapy. Reviewed home monitoring during Avastin. LLE DVT 2021. On Xarelto. Hx of thyroid nodular goiter, benign follicular bx. Chronic med issues:    Hx asthma - inhaler PRN   Hx esophageal spasm - uses nitroglycerin  Reflux: Pepcid  Yeast/fungal infection: began following steroid treatment. Appeared to improve with over the counter monistat although symptoms then returned. Possibly incompletely treated infection. Rx for Miconazole cream sent to pharmacy. Ingredients on hemorrhoidal cream reviewed. I do not believe that these are making her more susceptible to infection. Monitor symptoms. Can consider PO fluconazole if symptoms persist.     Electronically signed.      Bay Harrell PA-C

## 2022-04-25 RX ORDER — SODIUM CHLORIDE 9 MG/ML
25 INJECTION, SOLUTION INTRAVENOUS CONTINUOUS
Status: CANCELLED | OUTPATIENT
Start: 2022-05-12

## 2022-04-25 RX ORDER — ACETAMINOPHEN 325 MG/1
650 TABLET ORAL AS NEEDED
Status: CANCELLED
Start: 2022-05-12

## 2022-04-25 RX ORDER — ALBUTEROL SULFATE 0.83 MG/ML
2.5 SOLUTION RESPIRATORY (INHALATION) AS NEEDED
Status: CANCELLED
Start: 2022-05-12

## 2022-04-25 RX ORDER — ONDANSETRON 2 MG/ML
8 INJECTION INTRAMUSCULAR; INTRAVENOUS AS NEEDED
Status: CANCELLED | OUTPATIENT
Start: 2022-05-12

## 2022-04-25 RX ORDER — DIPHENHYDRAMINE HYDROCHLORIDE 50 MG/ML
50 INJECTION, SOLUTION INTRAMUSCULAR; INTRAVENOUS AS NEEDED
Status: CANCELLED
Start: 2022-05-12

## 2022-04-25 RX ORDER — HYDROCORTISONE SODIUM SUCCINATE 100 MG/2ML
100 INJECTION, POWDER, FOR SOLUTION INTRAMUSCULAR; INTRAVENOUS AS NEEDED
Status: CANCELLED | OUTPATIENT
Start: 2022-05-12

## 2022-04-25 RX ORDER — SODIUM CHLORIDE 0.9 % (FLUSH) 0.9 %
10 SYRINGE (ML) INJECTION AS NEEDED
Status: CANCELLED | OUTPATIENT
Start: 2022-05-12

## 2022-04-25 RX ORDER — HEPARIN 100 UNIT/ML
300-500 SYRINGE INTRAVENOUS AS NEEDED
Status: CANCELLED
Start: 2022-05-12

## 2022-04-25 RX ORDER — EPINEPHRINE 1 MG/ML
0.3 INJECTION, SOLUTION, CONCENTRATE INTRAVENOUS AS NEEDED
Status: CANCELLED | OUTPATIENT
Start: 2022-05-12

## 2022-04-25 RX ORDER — DIPHENHYDRAMINE HYDROCHLORIDE 50 MG/ML
25 INJECTION, SOLUTION INTRAMUSCULAR; INTRAVENOUS AS NEEDED
Status: CANCELLED
Start: 2022-05-12

## 2022-04-25 RX ORDER — SODIUM CHLORIDE 9 MG/ML
10 INJECTION INTRAMUSCULAR; INTRAVENOUS; SUBCUTANEOUS AS NEEDED
Status: CANCELLED | OUTPATIENT
Start: 2022-05-12

## 2022-04-28 ENCOUNTER — TELEPHONE (OUTPATIENT)
Dept: GYNECOLOGY | Age: 67
End: 2022-04-28

## 2022-04-28 NOTE — TELEPHONE ENCOUNTER
Called pt back and was told by her she was upset with johnie royal last week during Avastin treatment, and requests not to have same nurse.

## 2022-04-28 NOTE — TELEPHONE ENCOUNTER
Patient calling to complain about her treatment last week. Will be home until 12 today [phone]. After 12 you can reach her at work [phone].

## 2022-05-07 ENCOUNTER — HOSPITAL ENCOUNTER (OUTPATIENT)
Dept: CT IMAGING | Age: 67
Discharge: HOME | End: 2022-05-07
Attending: OBSTETRICS & GYNECOLOGY
Payer: MEDICARE

## 2022-05-07 DIAGNOSIS — C54.1 PRIMARY SEROUS ADENOCARCINOMA OF ENDOMETRIUM (HCC): ICD-10-CM

## 2022-05-07 PROCEDURE — 74011000636 HC RX REV CODE- 636: Performed by: RADIOLOGY

## 2022-05-07 PROCEDURE — 74177 CT ABD & PELVIS W/CONTRAST: CPT

## 2022-05-07 RX ADMIN — IOPAMIDOL 100 ML: 755 INJECTION, SOLUTION INTRAVENOUS at 11:22

## 2022-05-12 ENCOUNTER — HOSPITAL ENCOUNTER (OUTPATIENT)
Dept: INFUSION THERAPY | Age: 67
Discharge: HOME | End: 2022-05-12
Payer: MEDICARE

## 2022-05-12 ENCOUNTER — OFFICE VISIT (OUTPATIENT)
Dept: GYNECOLOGY | Age: 67
End: 2022-05-12
Payer: MEDICARE

## 2022-05-12 VITALS
RESPIRATION RATE: 17 BRPM | HEIGHT: 65 IN | WEIGHT: 157 LBS | HEART RATE: 75 BPM | BODY MASS INDEX: 26.16 KG/M2 | DIASTOLIC BLOOD PRESSURE: 69 MMHG | SYSTOLIC BLOOD PRESSURE: 129 MMHG

## 2022-05-12 VITALS
BODY MASS INDEX: 26.33 KG/M2 | SYSTOLIC BLOOD PRESSURE: 132 MMHG | WEIGHT: 158 LBS | DIASTOLIC BLOOD PRESSURE: 81 MMHG | HEIGHT: 65 IN | HEART RATE: 77 BPM

## 2022-05-12 DIAGNOSIS — C54.1 PRIMARY SEROUS ADENOCARCINOMA OF ENDOMETRIUM (HCC): Primary | ICD-10-CM

## 2022-05-12 DIAGNOSIS — C54.9 MALIGNANT NEOPLASM OF CORPUS UTERI, EXCEPT ISTHMUS (HCC): Primary | ICD-10-CM

## 2022-05-12 LAB
ALBUMIN SERPL-MCNC: 3.2 G/DL (ref 3.5–5)
ALBUMIN/GLOB SERPL: 0.7 {RATIO} (ref 1.1–2.2)
ALP SERPL-CCNC: 66 U/L (ref 45–117)
ALT SERPL-CCNC: 13 U/L (ref 12–78)
ANION GAP SERPL CALC-SCNC: 7 MMOL/L (ref 5–15)
APPEARANCE UR: CLEAR
AST SERPL-CCNC: 28 U/L (ref 15–37)
BACTERIA URNS QL MICRO: NEGATIVE /HPF
BASOPHILS # BLD: 0 K/UL (ref 0–0.1)
BASOPHILS NFR BLD: 0 % (ref 0–1)
BILIRUB SERPL-MCNC: 0.4 MG/DL (ref 0.2–1)
BILIRUB UR QL: NEGATIVE
BUN SERPL-MCNC: 14 MG/DL (ref 6–20)
BUN/CREAT SERPL: 30 (ref 12–20)
CALCIUM SERPL-MCNC: 9.7 MG/DL (ref 8.5–10.1)
CANCER AG125 SERPL-ACNC: 333 U/ML (ref 1.5–35)
CHLORIDE SERPL-SCNC: 103 MMOL/L (ref 97–108)
CO2 SERPL-SCNC: 26 MMOL/L (ref 21–32)
COLOR UR: ABNORMAL
CREAT SERPL-MCNC: 0.46 MG/DL (ref 0.55–1.02)
DIFFERENTIAL METHOD BLD: ABNORMAL
EOSINOPHIL # BLD: 0.1 K/UL (ref 0–0.4)
EOSINOPHIL NFR BLD: 1 % (ref 0–7)
EPITH CASTS URNS QL MICRO: ABNORMAL /LPF
ERYTHROCYTE [DISTWIDTH] IN BLOOD BY AUTOMATED COUNT: 16.5 % (ref 11.5–14.5)
GLOBULIN SER CALC-MCNC: 4.5 G/DL (ref 2–4)
GLUCOSE SERPL-MCNC: 119 MG/DL (ref 65–100)
GLUCOSE UR STRIP.AUTO-MCNC: NEGATIVE MG/DL
HCT VFR BLD AUTO: 40.1 % (ref 35–47)
HGB BLD-MCNC: 12.6 G/DL (ref 11.5–16)
HGB UR QL STRIP: NEGATIVE
HYALINE CASTS URNS QL MICRO: ABNORMAL /LPF (ref 0–5)
IMM GRANULOCYTES # BLD AUTO: 0.1 K/UL (ref 0–0.04)
IMM GRANULOCYTES NFR BLD AUTO: 1 % (ref 0–0.5)
KETONES UR QL STRIP.AUTO: ABNORMAL MG/DL
LEUKOCYTE ESTERASE UR QL STRIP.AUTO: NEGATIVE
LYMPHOCYTES # BLD: 0.6 K/UL (ref 0.8–3.5)
LYMPHOCYTES NFR BLD: 7 % (ref 12–49)
MCH RBC QN AUTO: 28.8 PG (ref 26–34)
MCHC RBC AUTO-ENTMCNC: 31.4 G/DL (ref 30–36.5)
MCV RBC AUTO: 91.6 FL (ref 80–99)
MONOCYTES # BLD: 0.5 K/UL (ref 0–1)
MONOCYTES NFR BLD: 6 % (ref 5–13)
NEUTS SEG # BLD: 6.6 K/UL (ref 1.8–8)
NEUTS SEG NFR BLD: 85 % (ref 32–75)
NITRITE UR QL STRIP.AUTO: NEGATIVE
NRBC # BLD: 0 K/UL (ref 0–0.01)
NRBC BLD-RTO: 0 PER 100 WBC
PH UR STRIP: 6 [PH] (ref 5–8)
PLATELET # BLD AUTO: 268 K/UL (ref 150–400)
PMV BLD AUTO: 8.8 FL (ref 8.9–12.9)
POTASSIUM SERPL-SCNC: 3.8 MMOL/L (ref 3.5–5.1)
PROT SERPL-MCNC: 7.7 G/DL (ref 6.4–8.2)
PROT UR STRIP-MCNC: NEGATIVE MG/DL
RBC # BLD AUTO: 4.38 M/UL (ref 3.8–5.2)
RBC #/AREA URNS HPF: ABNORMAL /HPF (ref 0–5)
RBC MORPH BLD: ABNORMAL
SODIUM SERPL-SCNC: 136 MMOL/L (ref 136–145)
SP GR UR REFRACTOMETRY: 1.02 (ref 1–1.03)
UA: UC IF INDICATED,UAUC: ABNORMAL
UROBILINOGEN UR QL STRIP.AUTO: 0.2 EU/DL (ref 0.2–1)
WBC # BLD AUTO: 7.9 K/UL (ref 3.6–11)
WBC URNS QL MICRO: ABNORMAL /HPF (ref 0–4)

## 2022-05-12 PROCEDURE — 86304 IMMUNOASSAY TUMOR CA 125: CPT

## 2022-05-12 PROCEDURE — 80053 COMPREHEN METABOLIC PANEL: CPT

## 2022-05-12 PROCEDURE — 81001 URINALYSIS AUTO W/SCOPE: CPT

## 2022-05-12 PROCEDURE — 99213 OFFICE O/P EST LOW 20 MIN: CPT | Performed by: OBSTETRICS & GYNECOLOGY

## 2022-05-12 PROCEDURE — G8400 PT W/DXA NO RESULTS DOC: HCPCS | Performed by: OBSTETRICS & GYNECOLOGY

## 2022-05-12 PROCEDURE — 3017F COLORECTAL CA SCREEN DOC REV: CPT | Performed by: OBSTETRICS & GYNECOLOGY

## 2022-05-12 PROCEDURE — G8432 DEP SCR NOT DOC, RNG: HCPCS | Performed by: OBSTETRICS & GYNECOLOGY

## 2022-05-12 PROCEDURE — 1090F PRES/ABSN URINE INCON ASSESS: CPT | Performed by: OBSTETRICS & GYNECOLOGY

## 2022-05-12 PROCEDURE — 85025 COMPLETE CBC W/AUTO DIFF WBC: CPT

## 2022-05-12 PROCEDURE — G8536 NO DOC ELDER MAL SCRN: HCPCS | Performed by: OBSTETRICS & GYNECOLOGY

## 2022-05-12 PROCEDURE — 74011250636 HC RX REV CODE- 250/636: Performed by: OBSTETRICS & GYNECOLOGY

## 2022-05-12 PROCEDURE — 96413 CHEMO IV INFUSION 1 HR: CPT

## 2022-05-12 PROCEDURE — G8417 CALC BMI ABV UP PARAM F/U: HCPCS | Performed by: OBSTETRICS & GYNECOLOGY

## 2022-05-12 PROCEDURE — 77030012965 HC NDL HUBR BBMI -A

## 2022-05-12 PROCEDURE — 74011000258 HC RX REV CODE- 258: Performed by: OBSTETRICS & GYNECOLOGY

## 2022-05-12 PROCEDURE — G0463 HOSPITAL OUTPT CLINIC VISIT: HCPCS | Performed by: OBSTETRICS & GYNECOLOGY

## 2022-05-12 PROCEDURE — 1101F PT FALLS ASSESS-DOCD LE1/YR: CPT | Performed by: OBSTETRICS & GYNECOLOGY

## 2022-05-12 PROCEDURE — G8427 DOCREV CUR MEDS BY ELIG CLIN: HCPCS | Performed by: OBSTETRICS & GYNECOLOGY

## 2022-05-12 RX ORDER — SODIUM CHLORIDE 0.9 % (FLUSH) 0.9 %
10 SYRINGE (ML) INJECTION AS NEEDED
Status: DISCONTINUED | OUTPATIENT
Start: 2022-05-12 | End: 2022-05-13 | Stop reason: HOSPADM

## 2022-05-12 RX ORDER — SODIUM CHLORIDE 9 MG/ML
25 INJECTION, SOLUTION INTRAVENOUS CONTINUOUS
Status: DISCONTINUED | OUTPATIENT
Start: 2022-05-12 | End: 2022-05-13 | Stop reason: HOSPADM

## 2022-05-12 RX ORDER — HEPARIN 100 UNIT/ML
300-500 SYRINGE INTRAVENOUS AS NEEDED
Status: DISCONTINUED | OUTPATIENT
Start: 2022-05-12 | End: 2022-05-13 | Stop reason: HOSPADM

## 2022-05-12 RX ORDER — SODIUM CHLORIDE 9 MG/ML
10 INJECTION INTRAMUSCULAR; INTRAVENOUS; SUBCUTANEOUS AS NEEDED
Status: DISCONTINUED | OUTPATIENT
Start: 2022-05-12 | End: 2022-05-13 | Stop reason: HOSPADM

## 2022-05-12 RX ADMIN — SODIUM CHLORIDE 25 ML/HR: 9 INJECTION, SOLUTION INTRAVENOUS at 16:25

## 2022-05-12 RX ADMIN — SODIUM CHLORIDE 1125 MG: 9 INJECTION, SOLUTION INTRAVENOUS at 17:10

## 2022-05-12 NOTE — PROGRESS NOTES
[facility]YNECOLOGIC ONCOLOGY  [address] [place] [address], [address] [id number] [facility]  [phone] [facility] [phone]    Office Visit    Patient ID:  Name: Teja Singh  MRN: [id number]   :  y.o. Visit date: 2022     INTERVAL HISTORY:  Teja Singh is a  female who is an established patient with stage IA, grade 3 uterine carcinoma. She underwent laparoscopic hysterectomy with staging in 2013. She was recommended six cycles of adjuvant Taxol and Carboplatin, which she completed. We elected not to treat with pelvic radiation therapy due to her difficulty with chemotherapy. She had a CT of the abdomen/pelvis at [facility] that revealed retroperitoneal lymphadenopathy, peritoneal carcinomatosis, and trace ascites. I sent her for a PET/CT to better evaluate the extent of disease. She presented to review the results and discuss treatment. Her disease is not amenable to surgical resection. Systemic therapy was her only viable option. I thought immunotherapy would be the best choice, ahead of additional chemotherapy, specifically IV Keytruda and PO Lenvima. If that were not successful, we would consider retreatment with Taxol/Carboplatin or single agent Doxil. She completed 6 cycles of immunotherapy before demonstrating progression of disease. We decided upon retreatment with Taxol/Carboplatin. She completed 8 cycles of that regimen. Her CA-125 has responded and her CT after three cycles demonstrated a response. Her CT after completion of 6 cycles demonstrated further response. A subsequent PET/CT demonstrated resolution of most of the abnormal PET activity, but there still was some residual activity in the omentum, suggesting persistent disease. We stopped treatment in 2021, as she was beginning not to tolerate treatment well. She presented recently complaining of abdominal pain and bloating.   She stated it was a stabbing pain in her left abdomen. The bloating has also been causing a little shortness of breath. I sent her for a CT of the chest/abdomen/pelvis to evaluate. She presented to review those results. The scan demonstrated recurrent disease throughout the abdomen and pelvis. I recommended single agent Doxil. She completed 3 cycles of Doxil. Due to early satiety and complaints of bloating and abdominal distention, I sent her for a paracentesis in December. They did not see enough fluid on ultrasound to attempt drainage. She was also diagnosed with COVID around that time and is still recovering, but has since tested negative. She presented to review her interval CT scan after 3 cycles. Unfortunately it demonstrated progression. I discussed other treatment options with her, including the possibility of a clinical trial at [facility]. Ultimately we decided upon single agent Avastin. I explained that it won't make the tumor \"go away\", but it may slow down the growth and help manage the ascites. She has completed 3 cycles so far. She is feeling better and appears to be tolerating well so far. She presents today to review her latest CT scan. PMH:  Past Medical History:   Diagnosis Date    Abnormal Pap smear 1995    with f/u normal    Anemia     Arthritis     Asthma     Cancer ([facility] [address]     ENDOMETRIAL    Environmental allergies     GERD (gastroesophageal reflux disease)     Goiter     Other unknown and unspecified cause of morbidity or mortality     POSSIBLE ESOPHAGEAL SPASM, ?  OBSTRUCTION DUE TO GOITER    PMB (postmenopausal bleeding)     S/P chemotherapy, time since greater than 12 weeks     LAST CHEMO 10/2014 PER PATIENT    Thyroid disease     goiter     PSH:  Past Surgical History:   Procedure Laterality Date    HX APPENDECTOMY      HX BUNIONECTOMY      HX GYN  3/13/13    TLH/BSO    HX HEENT  4/22/13    TOOTH REMOVED    HX ORTHOPAEDIC  1980'S    BUNIONECTOMY    HX VASCULAR ACCESS      port    MI BREAST SURGERY PROCEDURE UNLISTED  1/12/16    right breast bx     SOC:  Social History     Socioeconomic History    Marital status:      Spouse name: Not on file    Number of children: Not on file    Years of education: Not on file    Highest education level: Not on file   Occupational History    Not on file   Tobacco Use    Smoking status: Never Smoker    Smokeless tobacco: Never Used   Substance and Sexual Activity    Alcohol use: Yes     Alcohol/week: 0.0 standard drinks     Comment: RARE OCC    Drug use: No    Sexual activity: Not on file   Other Topics Concern    Not on file   Social History Narrative    Not on file     Social Determinants of Health     Financial Resource Strain:     Difficulty of Paying Living Expenses: Not on file   Food Insecurity:     Worried About Running Out of Food in the Last Year: Not on file    Blaze of Food in the Last Year: Not on file   Transportation Needs:     Lack of Transportation (Medical): Not on file    Lack of Transportation (Non-Medical):  Not on file   Physical Activity:     Days of Exercise per Week: Not on file    Minutes of Exercise per Session: Not on file   Stress:     Feeling of Stress : Not on file   Social Connections:     Frequency of Communication with Friends and Family: Not on file    Frequency of Social Gatherings with Friends and Family: Not on file    Attends Protestant Services: Not on file    Active Member of [address] [place] [address] or Organizations: Not on file    Attends Club or Organization Meetings: Not on file    Marital Status: Not on file   Intimate Partner Violence:     Fear of Current or Ex-Partner: Not on file    Emotionally Abused: Not on file    Physically Abused: Not on file    Sexually Abused: Not on file   Housing Stability:     Unable to Pay for Housing in the Last Year: Not on file    Number of [place] in the Last Year: Not on file    Unstable Housing in the Last Year: Not on file     Family History  Family History Problem Relation Age of Onset    Cancer Maternal Aunt         breast    Heart Disease Mother     Diabetes Father     Cancer Sister         CERVICAL    Kidney Disease Brother     Diabetes Brother     Heart Attack Other     Arrhythmia Brother     Diabetes Brother     Anesth Problems Neg Hx      Medications:  Current Outpatient Medications on File Prior to Visit   Medication Sig Dispense Refill    BACITRACIN, BULK, by Does Not Apply route. With zinc ointment      rivaroxaban (Xarelto) 20 mg tab tablet Take 1 Tablet by mouth daily. 30 Tablet 5    acetaminophen (TylenoL) 325 mg tablet Take  by mouth as needed.  lisinopriL (PRINIVIL, ZESTRIL) 10 mg tablet Take 1 Tab by mouth daily. 30 Tab 2    lisinopriL (PRINIVIL, ZESTRIL) 5 mg tablet Take 2 Tabs by mouth daily. 30 Tab 5    ondansetron (ZOFRAN ODT) 4 mg disintegrating tablet Take 1 Tab by mouth every eight (8) hours as needed for Nausea. Indications: nausea and vomiting caused by cancer drugs 30 Tab 3    lidocaine-prilocaine (EMLA) topical cream Apply small amount over port area and cover with band aid one hour before chemo 30 g 3    guaifenesin (MUCINEX PO) Take  by mouth.  loratadine (Claritin) 10 mg tablet Take 10 mg by mouth.  epinastine 0.05 % drop Apply  to eye.  raNITIdine (ZANTAC) 150 mg tablet ZANTAC TABS (Patient not taking: Reported on 5/12/2022)      cyclobenzaprine (FLEXERIL) 5 mg tablet take 1 tablet by mouth three times a day if needed  0    valACYclovir (VALTREX) 1 gram tablet Take 1 Tab by mouth three (3) times daily. 21 Tab 3    EPINEPHrine (EPIPEN) 0.3 mg/0.3 mL injection 0.3 mg by IntraMUSCular route once as needed.  ketotifen (ZADITOR) 0.025 % (0.035 %) ophthalmic solution Administer 1 Drop to both eyes every morning.  Cetirizine (ZYRTEC) 10 mg cap Take 10 mg by mouth nightly.  (Patient not taking: Reported on 5/12/2022)      SAL ACID/UREA/PETROLATUM,WHITE (KERASAL FOOT EX) by Apply Externally route daily as needed.  ACCU-CHEK HELDER PLUS TEST STRP strip   0    NITROSTAT 0.4 mg SL tablet       CALCIUM CARBONATE (MARCELLO-SELTZER ANTACID PO) Take  by mouth daily as needed. No current facility-administered medications on file prior to visit. Allergies: Allergies   Allergen Reactions    Latex Other (comments)     Burns skin    Acetone Shortness of Breath    Amoxicillin Anaphylaxis    Ciprofloxacin Hives    Pcn [Penicillins] Anaphylaxis    Buspar [Buspirone] Unknown (comments)     Has N&V and makes her feel \"weird\"    Azithromycin Hives    Egg Nausea Only    Other Medication Rash     POPPY seeds       ROS:  Negative     OBJECTIVE:    PHYSICAL EXAM  VITAL SIGNS: Visit Vitals  /81 (BP 1 Location: Right upper arm, BP Patient Position: Sitting)   Pulse 77   Ht 5' 5\" (1.651 m)   Wt 158 lb (71.7 kg)   BMI 26.29 kg/m²      GENERAL KAIA: WD, WN, WF in NAD   HEENT: WNL   RESPIRATORY: CTA   CARDIOVASC: RRR   GASTROINT: Soft, NT, ND   MUSCULOSKEL: WNL   EXTREMITIES: No edema   PELVIC: Deferred   RECTAL: Deferred   PRIYANKA SURVEY: Negative   NEURO: Grossly intact     Lab Results   Component Value Date/Time    WBC 5.5 04/21/2022 01:14 PM    Hemoglobin (POC) 12.9 05/01/2013 09:53 AM    HGB 12.0 04/21/2022 01:14 PM    Hematocrit (POC) 38 05/01/2013 09:53 AM    HCT 37.5 04/21/2022 01:14 PM    PLATELET 554 99/41/4781 01:14 PM    MCV 90.8 04/21/2022 01:14 PM     Lab Results   Component Value Date/Time    Sodium 137 04/21/2022 01:14 PM    Potassium 4.3 04/21/2022 01:14 PM    Chloride 105 04/21/2022 01:14 PM    CO2 28 04/21/2022 01:14 PM    Anion gap 4 (L) 04/21/2022 01:14 PM    Glucose 105 (H) 04/21/2022 01:14 PM    BUN 14 04/21/2022 01:14 PM    Creatinine 0.42 (L) 04/21/2022 01:14 PM    BUN/Creatinine ratio 33 (H) 04/21/2022 01:14 PM    GFR est AA >60 04/21/2022 01:14 PM    GFR est non-AA >60 04/21/2022 01:14 PM    Calcium 8.8 04/21/2022 01:14 PM    Bilirubin, total 0.4 04/21/2022 01:14 PM    Alk. phosphatase 70 04/21/2022 01:14 PM    Protein, total 6.7 04/21/2022 01:14 PM    Albumin 3.4 (L) 04/21/2022 01:14 PM    Globulin 3.3 04/21/2022 01:14 PM    A-G Ratio 1.0 (L) 04/21/2022 01:14 PM    ALT (SGPT) 17 04/21/2022 01:14 PM    AST (SGOT) 25 04/21/2022 01:14 PM     Lab Results   Component Value Date/Time    CA-125 337 (H) 04/21/2022 01:14 PM    Cancer Ag (CA) 125 548.0 (H) 12/17/2021 10:32 AM       CT of abdomen/pelvis (5/5/21)  LOWER THORAX: Right cardiophrenic lymph node measures 2.9 cm and previously  measured 1.2 cm on image number 15. There is minor basilar atelectasis/scarring  LIVER: No mass. BILIARY TREE: There is suggestion of gallstones CBD is not dilated. SPLEEN: within normal limits. PANCREAS: No mass or ductal dilatation. ADRENALS: Unremarkable. KIDNEYS: No mass, calculus, or hydronephrosis. STOMACH: Unremarkable. SMALL BOWEL: No dilatation or wall thickening. COLON: No dilatation or wall thickening. APPENDIX: Status post appendectomy  PERITONEUM: Peritoneal carcinomatosis appears improved. There is a confluent  density anteriorly in the peritoneum on image number 43 measuring 5.9 x 1.4 cm  which previously measured 9.8 x 1.9 cm. RETROPERITONEUM: Left retroperitoneal lymph node measures 0.7 cm image 45 and  previously measured 1.6 cm. Right retroperitoneal lymph node measures 0.7 cm image 46 and previously  measured 0.8 cm.     Left iliac lymph node image 57 measures 0.9 cm and previously measured 1.1 cm. REPRODUCTIVE ORGANS: Status post hysterectomy and oophorectomy. URINARY BLADDER: No mass or calculus. BONES: No destructive bone lesion. ABDOMINAL WALL: No mass or hernia. ADDITIONAL COMMENTS: N/A     IMPRESSION  1. There has been a mild decrease in the peritoneal carcinomatosis.      2. There has been slight to mild decrease in the retroperitoneal adenopathy.     3. No ascites is identified. No definite pelvic mass is identified.       CT of chests/abdomen/pelvis (7/12/21)  THYROID: Heterogeneous thyroid gland with multiple nodules bilaterally including  in the isthmus. MEDIASTINUM: Left subclavian chest port terminates in the SVC. No mediastinal  adenopathy. RAMEZ: No mass or lymphadenopathy. THORACIC AORTA: No dissection or aneurysm. MAIN PULMONARY ARTERY: Nonocclusive thrombus in segmental branches of the right  upper lobe pulmonary artery (3:47, 52), which are nonocclusive and likely  chronic. TRACHEA/BRONCHI: Patent. ESOPHAGUS: No wall thickening or dilatation. HEART: Normal in size. PLEURA: No effusion or pneumothorax. LUNGS: No nodule, mass, or airspace disease. LIVER: No mass or biliary dilatation. GALLBLADDER: Sludge in a nondistended gallbladder. SPLEEN: No mass. PANCREAS: No mass or ductal dilatation. ADRENALS: Unremarkable. KIDNEYS: No mass, calculus, or hydronephrosis. STOMACH: Unremarkable. SMALL BOWEL: No dilatation or wall thickening. COLON: Scattered diverticula. APPENDIX: Surgically absent  PERITONEUM: Decreased volume and size of the omental nodularity along the  anterior peritoneal surface just above the umbilicus. No ascites. No  pneumoperitoneum. RETROPERITONEUM: No lymphadenopathy or aortic aneurysm. REPRODUCTIVE ORGANS: Surgically absent. URINARY BLADDER: No mass or calculus. BONES: No destructive bone lesion. ADDITIONAL COMMENTS: N/A     IMPRESSION  1. Resolved retroperitoneal lymphadenopathy. 2.  Decreased peritoneal carcinomatosis. 3.  No ascites. 4.  Nonocclusive thrombus and segmental right upper lobe pulmonary artery  branches which are nonocclusive and appear chronic. PET/CT (8/27/21)  HEAD/NECK: No apparent foci of abnormal hypermetabolism. Cerebral evaluation is  limited by normal intense activity.     CHEST: No foci of abnormal hypermetabolism.  Resolution of left supraclavicular  jasper activity.     ABDOMEN/PELVIS:   Omental/peritoneal disease is near completely resolved; current max SUV, midline  omentum 2.4, previous max SUV 12. Resolved retroperitoneal and right deep pelvic jasper activity. Resolved left pelvic cul-de-sac mass/activity.     SKELETON: No foci of abnormal hypermetabolism in the axial and visualized  appendicular skeleton.     IMPRESSION  Abnormal PET activity is resolved other than minimal residual  activity in the omentum. CT of chest/abdomen/pelvis (11/9/21)  Chest:     Tubes and lines: Central venous port catheter extends to the SVC.     Lungs: There is mild bibasilar atelectasis. No pulmonary nodule or mass is seen.     Lymph nodes: There is no mediastinal, hilar or axillary lymphadenopathy. Subcentimeter axillary and mediastinal lymph nodes are noted.     Pleura: There is trace bilateral pleural fluid, new compared to prior CT dated  July 2021.     Heart: The heart is normal in size and there is no pericardial fluid.     Bones: No lytic or sclerotic osseous lesion is visualized.     The thyroid gland: Thyroid gland is normal in size. There are several nodules  within the thyroid gland, unchanged compared to prior CT dated July 2021.     Abdomen/pelvis:  Lymph nodes/peritoneum/retroperitoneum/omentum: There is a 1.3 cm x 1.3 cm  cardiophrenic lymph node which measured 8 mm x 7 mm on prior CT dated May 2021. There is a small amount of free fluid in the pelvis, new compared to prior CT  dated July 2021. There has been interval worsening of coalescing of omental  caking and intraperitoneal soft tissue nodularity. For example, within the  anterior mid abdomen, there is an omental soft tissue mass measuring  approximately 13.8 cm x 5.6 cm and measuring approximately 9.5 cm x 1.8 cm on  prior CT dated July 2021. There are also mildly enlarged upper abdominal  mesenteric lymph nodes which have increased in size. For example, there is a 2  cm x 1.4 cm celiac lymph node which measures approximately 6 mm x 4 mm on prior  CT dated July 2021.  As an additional example, there is a 1.3 cm x 1.3 cm  mesenteric lymph node within the upper pelvis, measuring several millimeters in  size on prior CT dated July 2021. Retroperitoneal lymph nodes have increased in  size as well. For example there is a 1.8 cm x 1.3 cm right common iliac lymph  node which measured 8 mm x 7 mm on prior CT dated July 2021.     Liver: There is subtle capsular hepatic, new compared to prior CT dated July 2021. No intraparenchymal hepatic lesion is seen.     Spleen: The spleen is normal.     Pancreas: The pancreas is normal.     Adrenals: The adrenals are normal.     Gallbladder: Gallbladder is normal.     Kidneys: The kidneys are normal. There is no hydronephrosis.     Bowel: There is new ill-defined soft tissue in the pelvis which appears to be  extending from the colon, likely representing subserosal soft tissue nodularity  and tethering. No dilated loop of large or small bowel is seen. Colonic  diverticulosis is noted.     Appendix: The appendix is surgically absent.     Urinary bladder: Urinary bladder is partially filled and grossly normal.     Bones: No lytic or sclerotic osseous lesion is visualized.     Miscellaneous: There is no free intraperitoneal gas. There is no focal fluid  collection to suggest abscess.     IMPRESSION  1. New, trace bilateral pleural fluid. Mild bibasilar atelectasis. 2.: Increase in size of cardiophrenic lymph node, now measuring 1.3 cm x 1.3 cm.  3. Interval worsening of coalescing of omental caking, intraperitoneal soft  tissue nodularity and intraperitoneal and retroperitoneal lymphadenopathy. 4. New, small amount of ascites in the pelvis.       CT of abdomen/pelvis (2/17/22)  LOWER THORAX: Small bilateral pleural effusions. No visualized lung nodules  LIVER: No mass. BILIARY TREE: Gallbladder is within normal limits. CBD is not dilated. SPLEEN: within normal limits. PANCREAS: No mass or ductal dilatation. ADRENALS: Unremarkable. KIDNEYS: No mass, calculus, or hydronephrosis.   STOMACH: Unremarkable. SMALL BOWEL: No dilatation or wall thickening. COLON: No dilatation or wall thickening. APPENDIX: Surgically absent  PERITONEUM: Omental and peritoneal nodularity with small volume scattered  ascites consistent with peritoneal carcinomatosis. Mesenteric, portacaval, and  gastrohepatic ligament lymphadenopathy. RETROPERITONEUM: Retroperitoneal lymphadenopathy along the IVC. REPRODUCTIVE ORGANS: Status post hysterectomy  URINARY BLADDER: No mass or calculus. BONES: No destructive bone lesion. ABDOMINAL WALL: No mass or hernia. ADDITIONAL COMMENTS: N/A     IMPRESSION  1. Peritoneal and omental nodularity with small volume ascites consistent with  peritoneal carcinomatosis. 2.  Lymphadenopathy in the mesentery, gastrohepatic ligament, portacaval, and  retroperitoneal stations. 3.  Small bilateral pleural effusions. CT of chest/abdomen/pelvis (5/7/22)  CHEST WALL: No mass or axillary lymphadenopathy. THYROID: No significant change in multiple small nodules. Otherwise  unremarkable. MEDIASTINUM: 1.3 cm precarinal node. No mass or other enlarged lymphadenopathy. RAMEZ: No mass or lymphadenopathy. THORACIC AORTA: No dissection or aneurysm. MAIN PULMONARY ARTERY: Normal in caliber. TRACHEA/BRONCHI: Patent. ESOPHAGUS: No wall thickening or dilatation. HEART: Normal in size. PLEURA: Moderate bilateral pleural effusions with no pneumothorax. LUNGS: Compressive atelectasis overlies pleural effusions with no nodule, mass,  or other airspace disease.     LIVER: No mass. BILIARY TREE: Gallbladder is within normal limits. CBD is not dilated. SPLEEN: within normal limits. PANCREAS: No mass or ductal dilatation. ADRENALS: Unremarkable. KIDNEYS: No mass, calculus, or hydronephrosis. STOMACH: Unremarkable. SMALL BOWEL: No dilatation or wall thickening. COLON: No dilation or wall thickening. Noninflamed appearing left and sigmoid  diverticula. APPENDIX: Surgically absent.   PERITONEUM: Extensive omental nodularity and caking redemonstrated with anterior  omental mass measuring 5.0 x 12.2 cm, previously 6.6 x 14.4 cm. Just lesion is along the greater curvature of the stomach measuring 2.7 x 3.2  cm, previously 3.8 x 4.7 cm. There is small ascites, portions of which appear  loculated on around the liver margin and splenic margin, not significant change  from prior. RETROPERITONEUM: There is diffuse adenopathy measuring up to 1.5 x 2.1 cm in the  portacaval station, previously 1.3 x 2.1 cm, 1.5 x 1.9 cm at the celiac,  previously 1.3 x 1.8 cm, 1.3 x 1.7 cm right aortocaval, previously 0.9 x 1.7 cm. Right common iliac adenopathy measures 1.7 x 1.7 cm, previously 1.7 x 1.9 cm. REPRODUCTIVE ORGANS: Uterus and ovaries are surgically absent. URINARY BLADDER: No mass or calculus. BONES: Degenerative spine change. No acute fracture or aggressive lesion. ABDOMINAL WALL: No mass or hernia. ADDITIONAL COMMENTS: Left Port-A-Cath in place with catheter traversing to the  atriocaval junction.     IMPRESSION  1. Pleural effusions with overlying atelectasis. 2. Peritoneal carcinomatosis with overall interval mild decrease in bulk of  disease. 3. Retroperitoneal lymphadenopathy slightly worsened compared to prior  examinations suspicious for mildly progressive metastatic disease. 4. Incidentals as above including colonic diverticulosis. 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 She completed six cycles of adjuvant Taxol and Carboplatin chemotherapy. She developed recurrent disease and was treated with the regimen of IV Keytruda and PO Lenvima. She completed 6 cycles before progression. Since it had been almost 8 years since her adjuvant Taxol/Carboplatin, I recommended that we retreat her with that regimen, though I suggested a lower dose of each.   She completed 8 cycles of that regimen with an excellent response. She now has recurrence once again. I recommended single agent Doxil chemotherapy. She completed 3 cycles before a CT demonstrated progression of disease. We stopped the Doxil and I discussed other treatment options with her, including the possibility of a clinical trial at [address] [place] [address]. Ultimately we decided upon single agent Avastin. I explained that it won't make the tumor \"go away\", but it may slow down the growth and help manage the ascites. She has completed 3 cycles so far. Her latest CT demonstrates a mixed response, but overall stable disease. I recommend continuing with the current regimen. An electronic signature was used to sign this note.     Marlen Levy MD  05/12/22

## 2022-05-12 NOTE — PROGRESS NOTES
[facility] Chemo Progress Note    Date: May 12, 2022    Name: Teja Singh    MRN: [id number]         : 1955    1250 Ms. Casi Guzman Arrived to [address] [place] [address] for KELSIE Mckoy [facility] ambulatory in stable condition. Assessment and port access completed Venice Soriano RN, no acute issues at this time, no new complaints voiced. Port accessed with positive blood return. Labs drawn and sent for processing. NS started at [facility]. Chemotherapy Flowsheet 2022   Cycle C4   Date 2022   Drug / Regimen Shanna Thelma   Pre Meds -   Notes given         Ms. Archana Blackman vitals were reviewed. Patient Vitals for the past 12 hrs:   Pulse Resp BP   22 1800 75 -- 129/69   22 1252 77 17 132/81         Lab results were obtained and reviewed. Recent Results (from the past 12 hour(s))   CBC WITH AUTOMATED DIFF    Collection Time: 22  1:03 PM   Result Value Ref Range    WBC 7.9 3.6 - 11.0 K/uL    RBC 4.38 3.80 - 5.20 M/uL    HGB 12.6 11.5 - 16.0 g/dL    HCT 40.1 35.0 - 47.0 %    MCV 91.6 80.0 - 99.0 FL    MCH 28.8 26.0 - 34.0 PG    MCHC 31.4 30.0 - 36.5 g/dL    RDW 16.5 (H) 11.5 - 14.5 %    PLATELET 891 610 - 995 K/uL    MPV 8.8 (L) 8.9 - 12.9 FL    NRBC 0.0 0  WBC    ABSOLUTE NRBC 0.00 0.00 - 0.01 K/uL    NEUTROPHILS 85 (H) 32 - 75 %    LYMPHOCYTES 7 (L) 12 - 49 %    MONOCYTES 6 5 - 13 %    EOSINOPHILS 1 0 - 7 %    BASOPHILS 0 0 - 1 %    IMMATURE GRANULOCYTES 1 (H) 0.0 - 0.5 %    ABS. NEUTROPHILS 6.6 1.8 - 8.0 K/UL    ABS. LYMPHOCYTES 0.6 (L) 0.8 - 3.5 K/UL    ABS. MONOCYTES 0.5 0.0 - 1.0 K/UL    ABS. EOSINOPHILS 0.1 0.0 - 0.4 K/UL    ABS. BASOPHILS 0.0 0.0 - 0.1 K/UL    ABS. IMM.  GRANS. 0.1 (H) 0.00 - 0.04 K/UL    DF SMEAR SCANNED      RBC COMMENTS ANISOCYTOSIS  1+       METABOLIC PANEL, COMPREHENSIVE    Collection Time: 22  1:03 PM   Result Value Ref Range    Sodium 136 136 - 145 mmol/L    Potassium 3.8 3.5 - 5.1 mmol/L    Chloride 103 97 - 108 mmol/L    CO2 26 21 - 32 mmol/L    Anion gap 7 5 - 15 mmol/L    Glucose 119 (H) 65 - 100 mg/dL    BUN 14 6 - 20 MG/DL    Creatinine 0.46 (L) 0.55 - 1.02 MG/DL    BUN/Creatinine ratio 30 (H) 12 - 20      GFR est AA >60 >60 ml/min/1.73m2    GFR est non-AA >60 >60 ml/min/1.73m2    Calcium 9.7 8.5 - 10.1 MG/DL    Bilirubin, total 0.4 0.2 - 1.0 MG/DL    ALT (SGPT) 13 12 - 78 U/L    AST (SGOT) 28 15 - 37 U/L    Alk. phosphatase 66 45 - 117 U/L    Protein, total 7.7 6.4 - 8.2 g/dL    Albumin 3.2 (L) 3.5 - 5.0 g/dL    Globulin 4.5 (H) 2.0 - 4.0 g/dL    A-G Ratio 0.7 (L) 1.1 - 2.2     CANCER ANTIGEN 125    Collection Time: 05/12/22  1:03 PM   Result Value Ref Range    CA-125 333 (H) 1.5 - 35.0 U/mL   URINALYSIS W/ REFLEX CULTURE    Collection Time: 05/12/22  1:03 PM    Specimen: Urine   Result Value Ref Range    Color YELLOW/STRAW      Appearance CLEAR CLEAR      Specific gravity 1.022 1.003 - 1.030      pH (UA) 6.0 5.0 - 8.0      Protein Negative NEG mg/dL    Glucose Negative NEG mg/dL    Ketone TRACE (A) NEG mg/dL    Bilirubin Negative NEG      Blood Negative NEG      Urobilinogen 0.2 0.2 - 1.0 EU/dL    Nitrites Negative NEG      Leukocyte Esterase Negative NEG      UA:UC IF INDICATED CULTURE NOT INDICATED BY UA RESULT CNI      WBC 0-4 0 - 4 /hpf    RBC 0-5 0 - 5 /hpf    Epithelial cells FEW FEW /lpf    Bacteria Negative NEG /hpf    Hyaline cast 2-5 0 - 5 /lpf       Pre-medications  were administered as ordered and chemotherapy was initiated. Medications Administered     0.9% sodium chloride infusion     Admin Date  05/12/2022 Action  New Bag Dose  25 mL/hr Rate  25 mL/hr Route  IntraVENous Administered By  Steffen Lees RN          bevacizumab-bvzr (ZIRABEV) 1,125 mg in 0.9% sodium chloride 100 mL, overfill volume 10 mL IVPB     Admin Date  05/12/2022 Action  New Bag Dose  1,125 mg Rate  310 mL/hr Route  IntraVENous Administered By  Steffen Lees RN                    [id number] Patient tolerated treatment well.  port maintained positive blood return throughout treatment.  port flushed, heparinized and de accessed per protocol. Patient was discharged from [facility] in stable condition. Patient aware of next appointment.      Future Appointments   Date Time Provider David Estefani   5/31/2022  9:45 AM MD AMANDA SadlerO BS AMB   6/2/2022  3:00 PM GEOVANNY [address] LISANDRA  May 12, 2022

## 2022-05-19 ENCOUNTER — TELEPHONE (OUTPATIENT)
Dept: GYNECOLOGY | Age: 67
End: 2022-05-19

## 2022-05-19 NOTE — TELEPHONE ENCOUNTER
Pt accidentally took 2 Eliquis tabs yesterday and states she felt better than she has in a long time. Pt wants to know if she should get her 3rd booster for Covid while taking Avastin, states \" I believe Dr. Steffi Villa told me not to get it\".

## 2022-05-25 NOTE — PROGRESS NOTES
Virtual telephone visit. Pre chemo, for labs and C5 avastin on 6/2. Patient reports an episode of fullness in her chest. She has not had that anymore. Blood pressure readings have been good.    130/73  128/76  130/84  124/78  111/70  112/75

## 2022-05-26 RX ORDER — DIPHENHYDRAMINE HYDROCHLORIDE 50 MG/ML
25 INJECTION, SOLUTION INTRAMUSCULAR; INTRAVENOUS AS NEEDED
Status: CANCELLED
Start: 2022-06-02

## 2022-05-26 RX ORDER — ONDANSETRON 2 MG/ML
8 INJECTION INTRAMUSCULAR; INTRAVENOUS AS NEEDED
Status: CANCELLED | OUTPATIENT
Start: 2022-06-02

## 2022-05-26 RX ORDER — DIPHENHYDRAMINE HYDROCHLORIDE 50 MG/ML
50 INJECTION, SOLUTION INTRAMUSCULAR; INTRAVENOUS AS NEEDED
Status: CANCELLED
Start: 2022-06-02

## 2022-05-26 RX ORDER — EPINEPHRINE 1 MG/ML
0.3 INJECTION, SOLUTION, CONCENTRATE INTRAVENOUS AS NEEDED
Status: CANCELLED | OUTPATIENT
Start: 2022-06-02

## 2022-05-26 RX ORDER — HEPARIN 100 UNIT/ML
300-500 SYRINGE INTRAVENOUS AS NEEDED
Status: CANCELLED
Start: 2022-06-02

## 2022-05-26 RX ORDER — ACETAMINOPHEN 325 MG/1
650 TABLET ORAL AS NEEDED
Status: CANCELLED
Start: 2022-06-02

## 2022-05-26 RX ORDER — ALBUTEROL SULFATE 0.83 MG/ML
2.5 SOLUTION RESPIRATORY (INHALATION) AS NEEDED
Status: CANCELLED
Start: 2022-06-02

## 2022-05-26 RX ORDER — SODIUM CHLORIDE 9 MG/ML
25 INJECTION, SOLUTION INTRAVENOUS CONTINUOUS
Status: CANCELLED | OUTPATIENT
Start: 2022-06-02

## 2022-05-26 RX ORDER — SODIUM CHLORIDE 0.9 % (FLUSH) 0.9 %
10 SYRINGE (ML) INJECTION AS NEEDED
Status: CANCELLED | OUTPATIENT
Start: 2022-06-02

## 2022-05-26 RX ORDER — SODIUM CHLORIDE 9 MG/ML
10 INJECTION INTRAMUSCULAR; INTRAVENOUS; SUBCUTANEOUS AS NEEDED
Status: CANCELLED | OUTPATIENT
Start: 2022-06-02

## 2022-05-26 RX ORDER — HYDROCORTISONE SODIUM SUCCINATE 100 MG/2ML
100 INJECTION, POWDER, FOR SOLUTION INTRAMUSCULAR; INTRAVENOUS AS NEEDED
Status: CANCELLED | OUTPATIENT
Start: 2022-06-02

## 2022-05-31 ENCOUNTER — VIRTUAL VISIT (OUTPATIENT)
Dept: GYNECOLOGY | Age: 67
End: 2022-05-31
Payer: MEDICARE

## 2022-05-31 DIAGNOSIS — C54.1 PRIMARY SEROUS ADENOCARCINOMA OF ENDOMETRIUM (HCC): Primary | ICD-10-CM

## 2022-05-31 DIAGNOSIS — Z79.899 ON ANTINEOPLASTIC CHEMOTHERAPY: ICD-10-CM

## 2022-05-31 PROCEDURE — 99442 PR PHYS/QHP TELEPHONE EVALUATION 11-20 MIN: CPT | Performed by: OBSTETRICS & GYNECOLOGY

## 2022-05-31 NOTE — PROGRESS NOTES
[facility] GYNECOLOGIC ONCOLOGY  [address] [facility], [address], [address] [facility]  [phone] [facility] [phone]    Office Visit    Patient ID:  Name: Omar Dewitt  MRN: [id number]   : 67 y.o. Visit date: 2022     INTERVAL HISTORY:  Omar Dewitt is a  female who is an established patient with stage IA, grade 3 uterine carcinoma. She underwent laparoscopic hysterectomy with staging in 2013. She was recommended six cycles of adjuvant Taxol and Carboplatin, which she completed. We elected not to treat with pelvic radiation therapy due to her difficulty with chemotherapy. She had a CT of the abdomen/pelvis at [facility] that revealed retroperitoneal lymphadenopathy, peritoneal carcinomatosis, and trace ascites. I sent her for a PET/CT to better evaluate the extent of disease. She presented to review the results and discuss treatment. Her disease is not amenable to surgical resection. Systemic therapy was her only viable option. I thought immunotherapy would be the best choice, ahead of additional chemotherapy, specifically IV Keytruda and PO Lenvima. If that were not successful, we would consider retreatment with Taxol/Carboplatin or single agent Doxil. She completed 6 cycles of immunotherapy before demonstrating progression of disease. We decided upon retreatment with Taxol/Carboplatin. She completed 8 cycles of that regimen. Her CA-125 has responded and her CT after three cycles demonstrated a response. Her CT after completion of 6 cycles demonstrated further response. A subsequent PET/CT demonstrated resolution of most of the abnormal PET activity, but there still was some residual activity in the omentum, suggesting persistent disease. We stopped treatment in 2021, as she was beginning not to tolerate treatment well. She presented recently complaining of abdominal pain and bloating.   She stated it was a stabbing pain in her left abdomen. The bloating has also been causing a little shortness of breath. I sent her for a CT of the chest/abdomen/pelvis to evaluate. She presented to review those results. The scan demonstrated recurrent disease throughout the abdomen and pelvis. I recommended single agent Doxil. She completed 3 cycles of Doxil. Due to early satiety and complaints of bloating and abdominal distention, I sent her for a paracentesis in December. They did not see enough fluid on ultrasound to attempt drainage. She was also diagnosed with COVID around that time and is still recovering, but has since tested negative. She presented to review her interval CT scan after 3 cycles. Unfortunately it demonstrated progression. I discussed other treatment options with her, including the possibility of a clinical trial at [facility]. Ultimately we decided upon single agent Avastin. I explained that it won't make the tumor \"go away\", but it may slow down the growth and help manage the ascites. She has completed 4 cycles so far. She is feeling better and appears to be tolerating well so far. Her most recent CT demonstrated a mixed response, but overall stable disease. PMH:  Past Medical History:   Diagnosis Date    Abnormal Pap smear 1995    with f/u normal    Anemia     Arthritis     Asthma     Cancer ([facility] [address]     ENDOMETRIAL    Environmental allergies     GERD (gastroesophageal reflux disease)     Goiter     Other unknown and unspecified cause of morbidity or mortality     POSSIBLE ESOPHAGEAL SPASM, ?  OBSTRUCTION DUE TO GOITER    PMB (postmenopausal bleeding)     S/P chemotherapy, time since greater than 12 weeks     LAST CHEMO 10/2014 PER PATIENT    Thyroid disease     goiter     PSH:  Past Surgical History:   Procedure Laterality Date    HX APPENDECTOMY      HX BUNIONECTOMY      HX GYN  3/13/13    TLH/BSO    HX HEENT  4/22/13    TOOTH REMOVED    HX ORTHOPAEDIC  1980'S    BUNIONECTOMY    HX VASCULAR ACCESS      port    [place] BREAST SURGERY PROCEDURE UNLISTED  1/12/16    right breast bx     SOC:  Social History     Socioeconomic History    Marital status:      Spouse name: Not on file    Number of children: Not on file    Years of education: Not on file    Highest education level: Not on file   Occupational History    Not on file   Tobacco Use    Smoking status: Never Smoker    Smokeless tobacco: Never Used   Substance and Sexual Activity    Alcohol use: Yes     Alcohol/week: 0.0 standard drinks     Comment: RARE OCC    Drug use: No    Sexual activity: Not on file   Other Topics Concern    Not on file   Social History Narrative    Not on file     Social Determinants of Health     Financial Resource Strain:     Difficulty of Paying Living Expenses: Not on file   Food Insecurity:     Worried About Running Out of Food in the Last Year: Not on file    Blaze of Food in the Last Year: Not on file   Transportation Needs:     Lack of Transportation (Medical): Not on file    Lack of Transportation (Non-Medical):  Not on file   Physical Activity:     Days of Exercise per Week: Not on file    Minutes of Exercise per Session: Not on file   Stress:     Feeling of Stress : Not on file   Social Connections:     Frequency of Communication with Friends and Family: Not on file    Frequency of Social Gatherings with Friends and Family: Not on file    Attends Holiness Services: Not on file    Active Member of [address] [place] [address] or Organizations: Not on file    Attends Club or Organization Meetings: Not on file    Marital Status: Not on file   Intimate Partner Violence:     Fear of Current or Ex-Partner: Not on file    Emotionally Abused: Not on file    Physically Abused: Not on file    Sexually Abused: Not on file   Housing Stability:     Unable to Pay for Housing in the Last Year: Not on file    Number of [place] in the Last Year: Not on file    Unstable Housing in the Last Year: Not on file Family History  Family History   Problem Relation Age of Onset    Cancer Maternal Aunt         breast    Heart Disease Mother     Diabetes Father     Cancer Sister         CERVICAL    Kidney Disease Brother     Diabetes Brother     Heart Attack Other     Arrhythmia Brother     Diabetes Brother     Anesth Problems Neg Hx      Medications:  Current Outpatient Medications on File Prior to Visit   Medication Sig Dispense Refill    BACITRACIN, BULK, by Does Not Apply route. With zinc ointment      rivaroxaban (Xarelto) 20 mg tab tablet Take 1 Tablet by mouth daily. 30 Tablet 5    acetaminophen (TylenoL) 325 mg tablet Take  by mouth as needed.  lisinopriL (PRINIVIL, ZESTRIL) 10 mg tablet Take 1 Tab by mouth daily. 30 Tab 2    lisinopriL (PRINIVIL, ZESTRIL) 5 mg tablet Take 2 Tabs by mouth daily. 30 Tab 5    ondansetron (ZOFRAN ODT) 4 mg disintegrating tablet Take 1 Tab by mouth every eight (8) hours as needed for Nausea. Indications: nausea and vomiting caused by cancer drugs 30 Tab 3    lidocaine-prilocaine (EMLA) topical cream Apply small amount over port area and cover with band aid one hour before chemo 30 g 3    guaifenesin (MUCINEX PO) Take  by mouth.  loratadine (Claritin) 10 mg tablet Take 10 mg by mouth.  epinastine 0.05 % drop Apply  to eye.  raNITIdine (ZANTAC) 150 mg tablet ZANTAC TABS      cyclobenzaprine (FLEXERIL) 5 mg tablet take 1 tablet by mouth three times a day if needed  0    valACYclovir (VALTREX) 1 gram tablet Take 1 Tab by mouth three (3) times daily. 21 Tab 3    EPINEPHrine (EPIPEN) 0.3 mg/0.3 mL injection 0.3 mg by IntraMUSCular route once as needed.  ketotifen (ZADITOR) 0.025 % (0.035 %) ophthalmic solution Administer 1 Drop to both eyes every morning.  SAL ACID/UREA/PETROLATUM,WHITE (KERASAL FOOT EX) by Apply Externally route daily as needed.       ACCU-CHEK HELDER PLUS TEST STRP strip   0    NITROSTAT 0.4 mg SL tablet       CALCIUM CARBONATE (MARCELLO-SELTZER ANTACID PO) Take  by mouth daily as needed.  Cetirizine (ZYRTEC) 10 mg cap Take 10 mg by mouth nightly. (Patient not taking: Reported on 5/12/2022)       No current facility-administered medications on file prior to visit. Allergies: Allergies   Allergen Reactions    Latex Other (comments)     Burns skin    Acetone Shortness of Breath    Amoxicillin Anaphylaxis    Ciprofloxacin Hives    Pcn [Penicillins] Anaphylaxis    Buspar [Buspirone] Unknown (comments)     Has N&V and makes her feel \"weird\"    Azithromycin Hives    Egg Nausea Only    Other Medication Rash     POPPY seeds       ROS:  Negative     OBJECTIVE:    PHYSICAL EXAM  VITAL SIGNS: There were no vitals taken for this visit. GENERAL KAIA: WD, WN, WF in NAD   HEENT: WNL   RESPIRATORY: CTA   CARDIOVASC: RRR   GASTROINT: Soft, NT, ND   MUSCULOSKEL: WNL   EXTREMITIES: No edema   PELVIC: Deferred   RECTAL: Deferred   PRIYANKA SURVEY: Negative   NEURO: Grossly intact     Lab Results   Component Value Date/Time    WBC 7.9 05/12/2022 01:03 PM    Hemoglobin (POC) 12.9 05/01/2013 09:53 AM    HGB 12.6 05/12/2022 01:03 PM    Hematocrit (POC) 38 05/01/2013 09:53 AM    HCT 40.1 05/12/2022 01:03 PM    PLATELET 517 22/96/3688 01:03 PM    MCV 91.6 05/12/2022 01:03 PM     Lab Results   Component Value Date/Time    Sodium 136 05/12/2022 01:03 PM    Potassium 3.8 05/12/2022 01:03 PM    Chloride 103 05/12/2022 01:03 PM    CO2 26 05/12/2022 01:03 PM    Anion gap 7 05/12/2022 01:03 PM    Glucose 119 (H) 05/12/2022 01:03 PM    BUN 14 05/12/2022 01:03 PM    Creatinine 0.46 (L) 05/12/2022 01:03 PM    BUN/Creatinine ratio 30 (H) 05/12/2022 01:03 PM    GFR est AA >60 05/12/2022 01:03 PM    GFR est non-AA >60 05/12/2022 01:03 PM    Calcium 9.7 05/12/2022 01:03 PM    Bilirubin, total 0.4 05/12/2022 01:03 PM    Alk.  phosphatase 66 05/12/2022 01:03 PM    Protein, total 7.7 05/12/2022 01:03 PM    Albumin 3.2 (L) 05/12/2022 01:03 PM    Globulin 4.5 (H) 05/12/2022 01:03 PM    A-G Ratio 0.7 (L) 05/12/2022 01:03 PM    ALT (SGPT) 13 05/12/2022 01:03 PM    AST (SGOT) 28 05/12/2022 01:03 PM     Lab Results   Component Value Date/Time    CA-125 333 (H) 05/12/2022 01:03 PM    Cancer Ag (CA) 125 548.0 (H) 12/17/2021 10:32 AM       CT of abdomen/pelvis (5/5/21)  LOWER THORAX: Right cardiophrenic lymph node measures 2.9 cm and previously  measured 1.2 cm on image number 15. There is minor basilar atelectasis/scarring  LIVER: No mass. BILIARY TREE: There is suggestion of gallstones CBD is not dilated. SPLEEN: within normal limits. PANCREAS: No mass or ductal dilatation. ADRENALS: Unremarkable. KIDNEYS: No mass, calculus, or hydronephrosis. STOMACH: Unremarkable. SMALL BOWEL: No dilatation or wall thickening. COLON: No dilatation or wall thickening. APPENDIX: Status post appendectomy  PERITONEUM: Peritoneal carcinomatosis appears improved. There is a confluent  density anteriorly in the peritoneum on image number 43 measuring 5.9 x 1.4 cm  which previously measured 9.8 x 1.9 cm. RETROPERITONEUM: Left retroperitoneal lymph node measures 0.7 cm image 45 and  previously measured 1.6 cm. Right retroperitoneal lymph node measures 0.7 cm image 46 and previously  measured 0.8 cm.     Left iliac lymph node image 57 measures 0.9 cm and previously measured 1.1 cm. REPRODUCTIVE ORGANS: Status post hysterectomy and oophorectomy. URINARY BLADDER: No mass or calculus. BONES: No destructive bone lesion. ABDOMINAL WALL: No mass or hernia. ADDITIONAL COMMENTS: N/A     IMPRESSION  1. There has been a mild decrease in the peritoneal carcinomatosis.      2. There has been slight to mild decrease in the retroperitoneal adenopathy.     3. No ascites is identified. No definite pelvic mass is identified. CT of chests/abdomen/pelvis (7/12/21)  THYROID: Heterogeneous thyroid gland with multiple nodules bilaterally including  in the isthmus.   MEDIASTINUM: Left subclavian chest port terminates in the SVC. No mediastinal  adenopathy. RAMEZ: No mass or lymphadenopathy. THORACIC AORTA: No dissection or aneurysm. MAIN PULMONARY ARTERY: Nonocclusive thrombus in segmental branches of the right  upper lobe pulmonary artery (3:47, 52), which are nonocclusive and likely  chronic. TRACHEA/BRONCHI: Patent. ESOPHAGUS: No wall thickening or dilatation. HEART: Normal in size. PLEURA: No effusion or pneumothorax. LUNGS: No nodule, mass, or airspace disease. LIVER: No mass or biliary dilatation. GALLBLADDER: Sludge in a nondistended gallbladder. SPLEEN: No mass. PANCREAS: No mass or ductal dilatation. ADRENALS: Unremarkable. KIDNEYS: No mass, calculus, or hydronephrosis. STOMACH: Unremarkable. SMALL BOWEL: No dilatation or wall thickening. COLON: Scattered diverticula. APPENDIX: Surgically absent  PERITONEUM: Decreased volume and size of the omental nodularity along the  anterior peritoneal surface just above the umbilicus. No ascites. No  pneumoperitoneum. RETROPERITONEUM: No lymphadenopathy or aortic aneurysm. REPRODUCTIVE ORGANS: Surgically absent. URINARY BLADDER: No mass or calculus. BONES: No destructive bone lesion. ADDITIONAL COMMENTS: N/A     IMPRESSION  1. Resolved retroperitoneal lymphadenopathy. 2.  Decreased peritoneal carcinomatosis. 3.  No ascites. 4.  Nonocclusive thrombus and segmental right upper lobe pulmonary artery  branches which are nonocclusive and appear chronic. PET/CT (8/27/21)  HEAD/NECK: No apparent foci of abnormal hypermetabolism. Cerebral evaluation is  limited by normal intense activity.     CHEST: No foci of abnormal hypermetabolism. Resolution of left supraclavicular  jasper activity.     ABDOMEN/PELVIS:   Omental/peritoneal disease is near completely resolved; current max SUV, midline  omentum 2.4, previous max SUV 12. Resolved retroperitoneal and right deep pelvic jasper activity.   Resolved left pelvic cul-de-sac mass/activity.     SKELETON: No foci of abnormal hypermetabolism in the axial and visualized  appendicular skeleton.     IMPRESSION  Abnormal PET activity is resolved other than minimal residual  activity in the omentum. CT of chest/abdomen/pelvis (11/9/21)  Chest:     Tubes and lines: Central venous port catheter extends to the SVC.     Lungs: There is mild bibasilar atelectasis. No pulmonary nodule or mass is seen.     Lymph nodes: There is no mediastinal, hilar or axillary lymphadenopathy. Subcentimeter axillary and mediastinal lymph nodes are noted.     Pleura: There is trace bilateral pleural fluid, new compared to prior CT dated  July 2021.     Heart: The heart is normal in size and there is no pericardial fluid.     Bones: No lytic or sclerotic osseous lesion is visualized.     The thyroid gland: Thyroid gland is normal in size. There are several nodules  within the thyroid gland, unchanged compared to prior CT dated July 2021.     Abdomen/pelvis:  Lymph nodes/peritoneum/retroperitoneum/omentum: There is a 1.3 cm x 1.3 cm  cardiophrenic lymph node which measured 8 mm x 7 mm on prior CT dated May 2021. There is a small amount of free fluid in the pelvis, new compared to prior CT  dated July 2021. There has been interval worsening of coalescing of omental  caking and intraperitoneal soft tissue nodularity. For example, within the  anterior mid abdomen, there is an omental soft tissue mass measuring  approximately 13.8 cm x 5.6 cm and measuring approximately 9.5 cm x 1.8 cm on  prior CT dated July 2021. There are also mildly enlarged upper abdominal  mesenteric lymph nodes which have increased in size. For example, there is a 2  cm x 1.4 cm celiac lymph node which measures approximately 6 mm x 4 mm on prior  CT dated July 2021. As an additional example, there is a 1.3 cm x 1.3 cm  mesenteric lymph node within the upper pelvis, measuring several millimeters in  size on prior CT dated July 2021. Retroperitoneal lymph nodes have increased in  size as well. For example there is a 1.8 cm x 1.3 cm right common iliac lymph  node which measured 8 mm x 7 mm on prior CT dated July 2021.     Liver: There is subtle capsular hepatic, new compared to prior CT dated July 2021. No intraparenchymal hepatic lesion is seen.     Spleen: The spleen is normal.     Pancreas: The pancreas is normal.     Adrenals: The adrenals are normal.     Gallbladder: Gallbladder is normal.     Kidneys: The kidneys are normal. There is no hydronephrosis.     Bowel: There is new ill-defined soft tissue in the pelvis which appears to be  extending from the colon, likely representing subserosal soft tissue nodularity  and tethering. No dilated loop of large or small bowel is seen. Colonic  diverticulosis is noted.     Appendix: The appendix is surgically absent.     Urinary bladder: Urinary bladder is partially filled and grossly normal.     Bones: No lytic or sclerotic osseous lesion is visualized.     Miscellaneous: There is no free intraperitoneal gas. There is no focal fluid  collection to suggest abscess.     IMPRESSION  1. New, trace bilateral pleural fluid. Mild bibasilar atelectasis. 2.: Increase in size of cardiophrenic lymph node, now measuring 1.3 cm x 1.3 cm.  3. Interval worsening of coalescing of omental caking, intraperitoneal soft  tissue nodularity and intraperitoneal and retroperitoneal lymphadenopathy. 4. New, small amount of ascites in the pelvis.       CT of abdomen/pelvis (2/17/22)  LOWER THORAX: Small bilateral pleural effusions. No visualized lung nodules  LIVER: No mass. BILIARY TREE: Gallbladder is within normal limits. CBD is not dilated. SPLEEN: within normal limits. PANCREAS: No mass or ductal dilatation. ADRENALS: Unremarkable. KIDNEYS: No mass, calculus, or hydronephrosis. STOMACH: Unremarkable. SMALL BOWEL: No dilatation or wall thickening. COLON: No dilatation or wall thickening.   APPENDIX: Surgically absent  PERITONEUM: Omental and peritoneal nodularity with small volume scattered  ascites consistent with peritoneal carcinomatosis. Mesenteric, portacaval, and  gastrohepatic ligament lymphadenopathy. RETROPERITONEUM: Retroperitoneal lymphadenopathy along the IVC. REPRODUCTIVE ORGANS: Status post hysterectomy  URINARY BLADDER: No mass or calculus. BONES: No destructive bone lesion. ABDOMINAL WALL: No mass or hernia. ADDITIONAL COMMENTS: N/A     IMPRESSION  1. Peritoneal and omental nodularity with small volume ascites consistent with  peritoneal carcinomatosis. 2.  Lymphadenopathy in the mesentery, gastrohepatic ligament, portacaval, and  retroperitoneal stations. 3.  Small bilateral pleural effusions. CT of chest/abdomen/pelvis (5/7/22)  CHEST WALL: No mass or axillary lymphadenopathy. THYROID: No significant change in multiple small nodules. Otherwise  unremarkable. MEDIASTINUM: 1.3 cm precarinal node. No mass or other enlarged lymphadenopathy. RAMEZ: No mass or lymphadenopathy. THORACIC AORTA: No dissection or aneurysm. MAIN PULMONARY ARTERY: Normal in caliber. TRACHEA/BRONCHI: Patent. ESOPHAGUS: No wall thickening or dilatation. HEART: Normal in size. PLEURA: Moderate bilateral pleural effusions with no pneumothorax. LUNGS: Compressive atelectasis overlies pleural effusions with no nodule, mass,  or other airspace disease.     LIVER: No mass. BILIARY TREE: Gallbladder is within normal limits. CBD is not dilated. SPLEEN: within normal limits. PANCREAS: No mass or ductal dilatation. ADRENALS: Unremarkable. KIDNEYS: No mass, calculus, or hydronephrosis. STOMACH: Unremarkable. SMALL BOWEL: No dilatation or wall thickening. COLON: No dilation or wall thickening. Noninflamed appearing left and sigmoid  diverticula. APPENDIX: Surgically absent.   PERITONEUM: Extensive omental nodularity and caking redemonstrated with anterior  omental mass measuring 5.0 x 12.2 cm, previously 6.6 x 14.4 cm.  Just lesion is along the greater curvature of the stomach measuring 2.7 x 3.2  cm, previously 3.8 x 4.7 cm. There is small ascites, portions of which appear  loculated on around the liver margin and splenic margin, not significant change  from prior. RETROPERITONEUM: There is diffuse adenopathy measuring up to 1.5 x 2.1 cm in the  portacaval station, previously 1.3 x 2.1 cm, 1.5 x 1.9 cm at the celiac,  previously 1.3 x 1.8 cm, 1.3 x 1.7 cm right aortocaval, previously 0.9 x 1.7 cm. Right common iliac adenopathy measures 1.7 x 1.7 cm, previously 1.7 x 1.9 cm. REPRODUCTIVE ORGANS: Uterus and ovaries are surgically absent. URINARY BLADDER: No mass or calculus. BONES: Degenerative spine change. No acute fracture or aggressive lesion. ABDOMINAL WALL: No mass or hernia. ADDITIONAL COMMENTS: Left Port-A-Cath in place with catheter traversing to the  atriocaval junction.     IMPRESSION  1. Pleural effusions with overlying atelectasis. 2. Peritoneal carcinomatosis with overall interval mild decrease in bulk of  disease. 3. Retroperitoneal lymphadenopathy slightly worsened compared to prior  examinations suspicious for mildly progressive metastatic disease. 4. Incidentals as above including colonic diverticulosis. 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 She completed six cycles of adjuvant Taxol and Carboplatin chemotherapy. She developed recurrent disease and was treated with the regimen of IV Keytruda and PO Lenvima. She completed 6 cycles before progression. Since it had been almost 8 years since her adjuvant Taxol/Carboplatin, I recommended that we retreat her with that regimen, though I suggested a lower dose of each. She completed 8 cycles of that regimen with an excellent response. She now has recurrence once again. I recommended single agent Doxil chemotherapy.   She completed 3 cycles before a CT demonstrated progression of disease. We stopped the Doxil and I discussed other treatment options with her, including the possibility of a clinical trial at [facility]. Ultimately we decided upon single agent Avastin. I explained that it won't make the tumor \"go away\", but it may slow down the growth and help manage the ascites. She has completed 4 cycles so far. Her most recent CT demonstrated a mixed response, but overall stable disease. I recommend continuing with the current regimen. We will repeat imaging after another couple of cycles. Leo Contreras is a 78 y.o. female, evaluated via audio-only technology on 5/31/2022 for Chemotherapy (Virtual telephone visit. Pre chemo, for labs and C5 avastin on 6/2. )      Assessment & Plan:   Diagnoses and all orders for this visit:    1. Primary serous adenocarcinoma of endometrium ([facility] [address]    2. On antineoplastic chemotherapy          Subjective:       Prior to Admission medications    Medication Sig Start Date End Date Taking? Authorizing Provider   BACITRACIN, BULK, by Does Not Apply route. With zinc ointment   Yes Provider, Historical   rivaroxaban (Xarelto) 20 mg tab tablet Take 1 Tablet by mouth daily. 2/1/22  Yes Tim Vail MD   acetaminophen (TylenoL) 325 mg tablet Take  by mouth as needed. Yes Provider, Historical   lisinopriL (PRINIVIL, ZESTRIL) 10 mg tablet Take 1 Tab by mouth daily. 1/6/21  Yes Tim Vail MD   lisinopriL (PRINIVIL, ZESTRIL) 5 mg tablet Take 2 Tabs by mouth daily. 1/5/21  Yes Hieu Cline PA-C   ondansetron (ZOFRAN ODT) 4 mg disintegrating tablet Take 1 Tab by mouth every eight (8) hours as needed for Nausea. Indications: nausea and vomiting caused by cancer drugs 10/22/20  Yes Tim Vail MD   lidocaine-prilocaine (EMLA) topical cream Apply small amount over port area and cover with band aid one hour before chemo 10/22/20  Yes Tim Vail MD   guaifenesin (MUCINEX PO) Take  by mouth.    Yes Provider, Historical   loratadine (Claritin) 10 mg tablet Take 10 mg by mouth. Yes Provider, Historical   epinastine 0.05 % drop Apply  to eye. Yes Provider, Historical   raNITIdine (ZANTAC) 150 mg tablet ZANTAC TABS 9/29/11  Yes Provider, Historical   cyclobenzaprine (FLEXERIL) 5 mg tablet take 1 tablet by mouth three times a day if needed 12/5/16  Yes Provider, Historical   valACYclovir (VALTREX) 1 gram tablet Take 1 Tab by mouth three (3) times daily. 12/15/16  Yes Cristela Harrison MD   EPINEPHrine (EPIPEN) 0.3 mg/0.3 mL injection 0.3 mg by IntraMUSCular route once as needed. Yes Provider, Historical   ketotifen (ZADITOR) 0.025 % (0.035 %) ophthalmic solution Administer 1 Drop to both eyes every morning. Yes Provider, Historical   SAL ACID/UREA/PETROLATUM,WHITE (KERASAL FOOT EX) by Apply Externally route daily as needed. Yes Provider, Historical   ACCU-CHEK HELDER PLUS TEST STRP strip  7/3/15  Yes Provider, Historical   NITROSTAT 0.4 mg SL tablet  9/22/14  Yes Provider, Historical   CALCIUM CARBONATE (MARCELLO-SELTZER ANTACID PO) Take  by mouth daily as needed. Yes Provider, Historical   Cetirizine (ZYRTEC) 10 mg cap Take 10 mg by mouth nightly. Patient not taking: Reported on 5/12/2022    Provider, Historical     Patient Active Problem List   Diagnosis Code    Malignant neoplasm of corpus uteri, except isthmus ([facility] [address] C54.9     Patient Active Problem List    Diagnosis Date Noted    Malignant neoplasm of corpus uteri, except isthmus ([facility] [address] 02/28/2013     Current Outpatient Medications   Medication Sig Dispense Refill    BACITRACIN, BULK, by Does Not Apply route. With zinc ointment      rivaroxaban (Xarelto) 20 mg tab tablet Take 1 Tablet by mouth daily. 30 Tablet 5    acetaminophen (TylenoL) 325 mg tablet Take  by mouth as needed.  lisinopriL (PRINIVIL, ZESTRIL) 10 mg tablet Take 1 Tab by mouth daily. 30 Tab 2    lisinopriL (PRINIVIL, ZESTRIL) 5 mg tablet Take 2 Tabs by mouth daily.  30 Tab 5    ondansetron (ZOFRAN ODT) 4 mg disintegrating tablet Take 1 Tab by mouth every eight (8) hours as needed for Nausea. Indications: nausea and vomiting caused by cancer drugs 30 Tab 3    lidocaine-prilocaine (EMLA) topical cream Apply small amount over port area and cover with band aid one hour before chemo 30 g 3    guaifenesin (MUCINEX PO) Take  by mouth.  loratadine (Claritin) 10 mg tablet Take 10 mg by mouth.  epinastine 0.05 % drop Apply  to eye.  raNITIdine (ZANTAC) 150 mg tablet ZANTAC TABS      cyclobenzaprine (FLEXERIL) 5 mg tablet take 1 tablet by mouth three times a day if needed  0    valACYclovir (VALTREX) 1 gram tablet Take 1 Tab by mouth three (3) times daily. 21 Tab 3    EPINEPHrine (EPIPEN) 0.3 mg/0.3 mL injection 0.3 mg by IntraMUSCular route once as needed.  ketotifen (ZADITOR) 0.025 % (0.035 %) ophthalmic solution Administer 1 Drop to both eyes every morning.  SAL ACID/UREA/PETROLATUM,WHITE (KERASAL FOOT EX) by Apply Externally route daily as needed.  ACCU-CHEK HELDER PLUS TEST STRP strip   0    NITROSTAT 0.4 mg SL tablet       CALCIUM CARBONATE (MARCELLO-SELTZER ANTACID PO) Take  by mouth daily as needed.  Cetirizine (ZYRTEC) 10 mg cap Take 10 mg by mouth nightly.  (Patient not taking: Reported on 5/12/2022)       Allergies   Allergen Reactions    Latex Other (comments)     Burns skin    Acetone Shortness of Breath    Amoxicillin Anaphylaxis    Ciprofloxacin Hives    Pcn [Penicillins] Anaphylaxis    Buspar [Buspirone] Unknown (comments)     Has N&V and makes her feel \"weird\"    Azithromycin Hives    Egg Nausea Only    Other Medication Rash     POPPY seeds     Past Medical History:   Diagnosis Date    Abnormal Pap smear 1995    with f/u normal    Anemia     Arthritis     Asthma     Cancer ([address]     ENDOMETRIAL    Environmental allergies     GERD (gastroesophageal reflux disease)     Goiter     Other unknown and unspecified cause of morbidity or mortality POSSIBLE ESOPHAGEAL SPASM, ? OBSTRUCTION DUE TO GOITER    PMB (postmenopausal bleeding)     S/P chemotherapy, time since greater than 12 weeks     LAST CHEMO 10/2014 PER PATIENT    Thyroid disease     goiter     Past Surgical History:   Procedure Laterality Date    HX APPENDECTOMY      HX BUNIONECTOMY      HX GYN  3/13/13    TLH/BSO    HX HEENT  4/22/13    TOOTH REMOVED    HX ORTHOPAEDIC  1980'S    BUNIONECTOMY    HX VASCULAR ACCESS      port    NE BREAST SURGERY PROCEDURE UNLISTED  1/12/16    right breast bx     Family History   Problem Relation Age of Onset    Cancer Maternal Aunt         breast    Heart Disease Mother     Diabetes Father     Cancer Sister         CERVICAL    Kidney Disease Brother     Diabetes Brother     Heart Attack Other     Arrhythmia Brother     Diabetes Brother     Anesth Problems Neg Hx      Social History     Tobacco Use    Smoking status: Never Smoker    Smokeless tobacco: Never Used   Substance Use Topics    Alcohol use: Yes     Alcohol/week: 0.0 standard drinks     Comment: RARE OCC       ROS    Patient-Reported Vitals 1/19/2021   Patient-Reported Systolic  572   Patient-Reported Diastolic 96        Guerrero Crump, who was evaluated through a patient-initiated, synchronous (real-time) audio only encounter, and/or her healthcare decision maker, is aware that it is a billable service, with coverage as determined by her insurance carrier. She provided verbal consent to proceed: Yes. She has not had a related appointment within my department in the past 7 days or scheduled within the next 24 hours. Total Time: minutes: 11-20 minutes        An electronic signature was used to sign this note.     Emily Ingram MD  05/31/22

## 2022-06-01 LAB
ALBUMIN SERPL-MCNC: 3.8 G/DL (ref 3.8–4.8)
ALBUMIN/GLOB SERPL: 1.3 {RATIO} (ref 1.2–2.2)
ALP SERPL-CCNC: 70 IU/L (ref 44–121)
ALT SERPL-CCNC: 8 IU/L (ref 0–32)
APPEARANCE UR: CLEAR
AST SERPL-CCNC: 17 IU/L (ref 0–40)
BACTERIA #/AREA URNS HPF: NORMAL /[HPF]
BASOPHILS # BLD AUTO: 0 X10E3/UL (ref 0–0.2)
BASOPHILS NFR BLD AUTO: 0 %
BILIRUB SERPL-MCNC: 0.4 MG/DL (ref 0–1.2)
BILIRUB UR QL STRIP: NEGATIVE
BUN SERPL-MCNC: 10 MG/DL (ref 8–27)
BUN/CREAT SERPL: 20 (ref 12–28)
CALCIUM SERPL-MCNC: 8.9 MG/DL (ref 8.7–10.3)
CANCER AG125 SERPL-ACNC: 468 U/ML (ref 0–38.1)
CASTS URNS QL MICRO: NORMAL /LPF
CHLORIDE SERPL-SCNC: 103 MMOL/L (ref 96–106)
CO2 SERPL-SCNC: 24 MMOL/L (ref 20–29)
COLOR UR: YELLOW
CREAT SERPL-MCNC: 0.51 MG/DL (ref 0.57–1)
EGFR: 103 ML/MIN/1.73
EOSINOPHIL # BLD AUTO: 0.1 X10E3/UL (ref 0–0.4)
EOSINOPHIL NFR BLD AUTO: 1 %
EPI CELLS #/AREA URNS HPF: NORMAL /HPF (ref 0–10)
ERYTHROCYTE [DISTWIDTH] IN BLOOD BY AUTOMATED COUNT: 15.6 % (ref 11.7–15.4)
GLOBULIN SER CALC-MCNC: 2.9 G/DL (ref 1.5–4.5)
GLUCOSE SERPL-MCNC: 103 MG/DL (ref 65–99)
GLUCOSE UR QL STRIP: NEGATIVE
HCT VFR BLD AUTO: 39.8 % (ref 34–46.6)
HGB BLD-MCNC: 12.9 G/DL (ref 11.1–15.9)
HGB UR QL STRIP: NEGATIVE
IMM GRANULOCYTES # BLD AUTO: 0 X10E3/UL (ref 0–0.1)
IMM GRANULOCYTES NFR BLD AUTO: 0 %
KETONES UR QL STRIP: NEGATIVE
LEUKOCYTE ESTERASE UR QL STRIP: NEGATIVE
LYMPHOCYTES # BLD AUTO: 0.7 X10E3/UL (ref 0.7–3.1)
LYMPHOCYTES NFR BLD AUTO: 14 %
MAGNESIUM SERPL-MCNC: 2 MG/DL (ref 1.6–2.3)
MCH RBC QN AUTO: 28.5 PG (ref 26.6–33)
MCHC RBC AUTO-ENTMCNC: 32.4 G/DL (ref 31.5–35.7)
MCV RBC AUTO: 88 FL (ref 79–97)
MICRO URNS: NORMAL
MICRO URNS: NORMAL
MONOCYTES # BLD AUTO: 0.4 X10E3/UL (ref 0.1–0.9)
MONOCYTES NFR BLD AUTO: 9 %
NEUTROPHILS # BLD AUTO: 3.9 X10E3/UL (ref 1.4–7)
NEUTROPHILS NFR BLD AUTO: 76 %
NITRITE UR QL STRIP: NEGATIVE
PH UR STRIP: 7 [PH] (ref 5–7.5)
PLATELET # BLD AUTO: 261 X10E3/UL (ref 150–450)
POTASSIUM SERPL-SCNC: 4.3 MMOL/L (ref 3.5–5.2)
PROT SERPL-MCNC: 6.7 G/DL (ref 6–8.5)
PROT UR QL STRIP: NEGATIVE
RBC # BLD AUTO: 4.53 X10E6/UL (ref 3.77–5.28)
RBC #/AREA URNS HPF: NORMAL /HPF (ref 0–2)
SODIUM SERPL-SCNC: 142 MMOL/L (ref 134–144)
SP GR UR STRIP: 1.01 (ref 1–1.03)
UROBILINOGEN UR STRIP-MCNC: 0.2 MG/DL (ref 0.2–1)
WBC # BLD AUTO: 5.2 X10E3/UL (ref 3.4–10.8)
WBC #/AREA URNS HPF: NORMAL /HPF (ref 0–5)

## 2022-06-02 ENCOUNTER — APPOINTMENT (OUTPATIENT)
Dept: INFUSION THERAPY | Age: 67
End: 2022-06-02
Payer: MEDICARE

## 2022-06-02 ENCOUNTER — HOSPITAL ENCOUNTER (OUTPATIENT)
Dept: INFUSION THERAPY | Age: 67
Discharge: HOME | End: 2022-06-02
Payer: MEDICARE

## 2022-06-02 VITALS
BODY MASS INDEX: 26.26 KG/M2 | RESPIRATION RATE: 18 BRPM | TEMPERATURE: 97.8 F | HEART RATE: 73 BPM | HEIGHT: 65 IN | DIASTOLIC BLOOD PRESSURE: 75 MMHG | SYSTOLIC BLOOD PRESSURE: 128 MMHG | WEIGHT: 157.6 LBS

## 2022-06-02 DIAGNOSIS — C54.9 MALIGNANT NEOPLASM OF CORPUS UTERI, EXCEPT ISTHMUS (HCC): Primary | ICD-10-CM

## 2022-06-02 PROCEDURE — 74011000250 HC RX REV CODE- 250: Performed by: PHYSICIAN ASSISTANT

## 2022-06-02 PROCEDURE — 96413 CHEMO IV INFUSION 1 HR: CPT

## 2022-06-02 PROCEDURE — 74011250636 HC RX REV CODE- 250/636: Performed by: PHYSICIAN ASSISTANT

## 2022-06-02 PROCEDURE — 74011000258 HC RX REV CODE- 258: Performed by: PHYSICIAN ASSISTANT

## 2022-06-02 PROCEDURE — 77030012965 HC NDL HUBR BBMI -A

## 2022-06-02 RX ORDER — SODIUM CHLORIDE 9 MG/ML
25 INJECTION, SOLUTION INTRAVENOUS CONTINUOUS
Status: DISCONTINUED | OUTPATIENT
Start: 2022-06-02 | End: 2022-06-03 | Stop reason: HOSPADM

## 2022-06-02 RX ORDER — SODIUM CHLORIDE 9 MG/ML
10 INJECTION INTRAMUSCULAR; INTRAVENOUS; SUBCUTANEOUS AS NEEDED
Status: DISCONTINUED | OUTPATIENT
Start: 2022-06-02 | End: 2022-06-03 | Stop reason: HOSPADM

## 2022-06-02 RX ORDER — SODIUM CHLORIDE 0.9 % (FLUSH) 0.9 %
10 SYRINGE (ML) INJECTION AS NEEDED
Status: DISCONTINUED | OUTPATIENT
Start: 2022-06-02 | End: 2022-06-03 | Stop reason: HOSPADM

## 2022-06-02 RX ORDER — HEPARIN 100 UNIT/ML
300-500 SYRINGE INTRAVENOUS AS NEEDED
Status: DISCONTINUED | OUTPATIENT
Start: 2022-06-02 | End: 2022-06-03 | Stop reason: HOSPADM

## 2022-06-02 RX ADMIN — SODIUM CHLORIDE, PRESERVATIVE FREE 10 ML: 5 INJECTION INTRAVENOUS at 17:17

## 2022-06-02 RX ADMIN — SODIUM CHLORIDE 25 ML/HR: 9 INJECTION, SOLUTION INTRAVENOUS at 16:32

## 2022-06-02 RX ADMIN — SODIUM CHLORIDE, PRESERVATIVE FREE 10 ML: 5 INJECTION INTRAVENOUS at 15:00

## 2022-06-02 RX ADMIN — SODIUM CHLORIDE 1125 MG: 9 INJECTION, SOLUTION INTRAVENOUS at 16:34

## 2022-06-02 RX ADMIN — HEPARIN 500 UNITS: 100 SYRINGE at 17:17

## 2022-06-02 NOTE — PROGRESS NOTES
[facility]    1500 Ms. Kary Chung Arrived to [facility] for Zirabev (C5) ambulatory in stable condition. Assessment and Port access completed by Zechariah Manzano RN. Labs reviewed from 5/31/22 and WDL for treatment. Chemotherapy Flowsheet 6/2/2022   Cycle C5   Date 6/2/2022   Drug / Regimen Юлияleyla Hernandez   Pre Meds -   Notes given     Ms. Abhishek Kaur vitals were reviewed. Patient Vitals for the past 12 hrs:   Temp Pulse Resp BP   06/02/22 1716 -- 73 -- 128/75   06/02/22 1508 97.8 °F (36.6 °C) 84 18 114/72     Pre-medications  were administered as ordered and chemotherapy was initiated. Medications Administered     0.9% sodium chloride infusion     Admin Date  06/02/2022 Action  New Bag Dose  25 mL/hr Rate  25 mL/hr Route  IntraVENous Administered By  Chanel Boateng RN          0.9% sodium chloride injection 10 mL     Admin Date  06/02/2022 Action  Given Dose  10 mL Route  IntraVENous Administered By  Niru Rey RN          bevacizumab-bvzr (ZIRABEV) 1,125 mg in 0.9% sodium chloride 100 mL, overfill volume 10 mL IVPB     Admin Date  06/02/2022 Action  New Bag Dose  1,125 mg Route  IntraVENous Administered By  Chanel Boateng RN          heparin (porcine) pf 300-500 Units     Admin Date  06/02/2022 Action  Given Dose  500 Units Route  InterCATHeter Administered By  Chanel Boateng RN          sodium chloride (NS) flush 10 mL     Admin Date  06/02/2022 Action  Given Dose  10 mL Route  IntraVENous Administered By  Niru Rey RN           Admin Date  06/02/2022 Action  Given Dose  10 mL Route  IntraVENous Administered By  Chanel Boateng RN              1926 Patient tolerated treatment well. Port maintained positive blood return throughout treatment. Port flushed, heparinized and de accessed per protocol. Patient was discharged from [facility] in stable condition. Patient aware of next appointment.      Future Appointments   Date Time Provider David Jara   6/21/2022  9:30 AM Tim Vail MD CGO BS AMB 6/24/2022  3:30 PM A1 [id number]   7/14/2022 11:45 AM MD GREER Huang BS AMB   7/14/2022  1:00 PM B3 [id number]   8/2/2022  9:15 AM MD GREER Huang BS AMB   8/4/2022 10:00 AM D4 [facility] David Lepe RN  June 2, 2022

## 2022-06-03 RX ORDER — DIPHENHYDRAMINE HYDROCHLORIDE 50 MG/ML
50 INJECTION, SOLUTION INTRAMUSCULAR; INTRAVENOUS AS NEEDED
Status: CANCELLED
Start: 2022-06-24

## 2022-06-03 RX ORDER — ACETAMINOPHEN 325 MG/1
650 TABLET ORAL AS NEEDED
Status: CANCELLED
Start: 2022-06-24

## 2022-06-03 RX ORDER — ONDANSETRON 2 MG/ML
8 INJECTION INTRAMUSCULAR; INTRAVENOUS AS NEEDED
Status: CANCELLED | OUTPATIENT
Start: 2022-06-24

## 2022-06-03 RX ORDER — DIPHENHYDRAMINE HYDROCHLORIDE 50 MG/ML
25 INJECTION, SOLUTION INTRAMUSCULAR; INTRAVENOUS AS NEEDED
Status: CANCELLED
Start: 2022-06-24

## 2022-06-03 RX ORDER — HEPARIN 100 UNIT/ML
300-500 SYRINGE INTRAVENOUS AS NEEDED
Status: CANCELLED
Start: 2022-06-24

## 2022-06-03 RX ORDER — SODIUM CHLORIDE 9 MG/ML
25 INJECTION, SOLUTION INTRAVENOUS CONTINUOUS
Status: CANCELLED | OUTPATIENT
Start: 2022-06-24

## 2022-06-03 RX ORDER — SODIUM CHLORIDE 9 MG/ML
10 INJECTION INTRAMUSCULAR; INTRAVENOUS; SUBCUTANEOUS AS NEEDED
Status: CANCELLED | OUTPATIENT
Start: 2022-06-24

## 2022-06-03 RX ORDER — ALBUTEROL SULFATE 0.83 MG/ML
2.5 SOLUTION RESPIRATORY (INHALATION) AS NEEDED
Status: CANCELLED
Start: 2022-06-24

## 2022-06-03 RX ORDER — EPINEPHRINE 1 MG/ML
0.3 INJECTION, SOLUTION, CONCENTRATE INTRAVENOUS AS NEEDED
Status: CANCELLED | OUTPATIENT
Start: 2022-06-24

## 2022-06-03 RX ORDER — HYDROCORTISONE SODIUM SUCCINATE 100 MG/2ML
100 INJECTION, POWDER, FOR SOLUTION INTRAMUSCULAR; INTRAVENOUS AS NEEDED
Status: CANCELLED | OUTPATIENT
Start: 2022-06-24

## 2022-06-03 RX ORDER — SODIUM CHLORIDE 0.9 % (FLUSH) 0.9 %
10 SYRINGE (ML) INJECTION AS NEEDED
Status: CANCELLED | OUTPATIENT
Start: 2022-06-24

## 2022-06-20 NOTE — PROGRESS NOTES
Virtual visit. Labs and C6 avastin on 6/23. Patient c/o cough for the past few weeks. Redness and heat at site of her covid booster.

## 2022-06-21 ENCOUNTER — VIRTUAL VISIT (OUTPATIENT)
Dept: GYNECOLOGY | Age: 67
End: 2022-06-21
Payer: MEDICARE

## 2022-06-21 DIAGNOSIS — C54.1 PRIMARY SEROUS ADENOCARCINOMA OF ENDOMETRIUM (HCC): Primary | ICD-10-CM

## 2022-06-21 DIAGNOSIS — Z79.899 ON ANTINEOPLASTIC CHEMOTHERAPY: ICD-10-CM

## 2022-06-21 PROCEDURE — 99442 PR PHYS/QHP TELEPHONE EVALUATION 11-20 MIN: CPT | Performed by: OBSTETRICS & GYNECOLOGY

## 2022-06-21 NOTE — PROGRESS NOTES
[facility] GYNECOLOGIC ONCOLOGY  [address] [place] [address], [address], [address] [facility]  [phone] [facility] [phone]    Office Visit    Patient ID:  Name: Maria Antonia Olsen  MRN: [id number]   :  y.o. Visit date: 2022     INTERVAL HISTORY:  Maria Antonia Olsen is a  female who is an established patient with stage IA, grade 3 uterine carcinoma. She underwent laparoscopic hysterectomy with staging in 2013. She was recommended six cycles of adjuvant Taxol and Carboplatin, which she completed. We elected not to treat with pelvic radiation therapy due to her difficulty with chemotherapy. She had a CT of the abdomen/pelvis at [facility] that revealed retroperitoneal lymphadenopathy, peritoneal carcinomatosis, and trace ascites. I sent her for a PET/CT to better evaluate the extent of disease. She presented to review the results and discuss treatment. Her disease is not amenable to surgical resection. Systemic therapy was her only viable option. I thought immunotherapy would be the best choice, ahead of additional chemotherapy, specifically IV Keytruda and PO Lenvima. If that were not successful, we would consider retreatment with Taxol/Carboplatin or single agent Doxil. She completed 6 cycles of immunotherapy before demonstrating progression of disease. We decided upon retreatment with Taxol/Carboplatin. She completed 8 cycles of that regimen. Her CA-125 has responded and her CT after three cycles demonstrated a response. Her CT after completion of 6 cycles demonstrated further response. A subsequent PET/CT demonstrated resolution of most of the abnormal PET activity, but there still was some residual activity in the omentum, suggesting persistent disease. We stopped treatment in 2021, as she was beginning not to tolerate treatment well. She presented recently complaining of abdominal pain and bloating.   She stated it was a stabbing pain in her left abdomen. The bloating has also been causing a little shortness of breath. I sent her for a CT of the chest/abdomen/pelvis to evaluate. She presented to review those results. The scan demonstrated recurrent disease throughout the abdomen and pelvis. I recommended single agent Doxil. She completed 3 cycles of Doxil. Due to early satiety and complaints of bloating and abdominal distention, I sent her for a paracentesis in December. They did not see enough fluid on ultrasound to attempt drainage. She was also diagnosed with COVID around that time and is still recovering, but has since tested negative. She presented to review her interval CT scan after 3 cycles. Unfortunately it demonstrated progression. I discussed other treatment options with her, including the possibility of a clinical trial at [facility]. Ultimately we decided upon single agent Avastin. I explained that it won't make the tumor \"go away\", but it may slow down the growth and help manage the ascites. She has completed 5 cycles so far. She is feeling better and appears to be tolerating well so far. Her most recent CT demonstrated a mixed response, but overall stable disease. PMH:  Past Medical History:   Diagnosis Date    Abnormal Pap smear 1995    with f/u normal    Anemia     Arthritis     Asthma     Cancer ([facility] [address]     ENDOMETRIAL    Environmental allergies     GERD (gastroesophageal reflux disease)     Goiter     Other unknown and unspecified cause of morbidity or mortality     POSSIBLE ESOPHAGEAL SPASM, ?  OBSTRUCTION DUE TO GOITER    PMB (postmenopausal bleeding)     S/P chemotherapy, time since greater than 12 weeks     LAST CHEMO 10/2014 PER PATIENT    Thyroid disease     goiter     PSH:  Past Surgical History:   Procedure Laterality Date    HX APPENDECTOMY      HX BUNIONECTOMY      HX GYN  3/13/13    TLH/BSO    HX HEENT  4/22/13    TOOTH REMOVED    HX ORTHOPAEDIC  1980'S    BUNIONECTOMY    HX VASCULAR ACCESS      port    DE BREAST SURGERY PROCEDURE UNLISTED  1/12/16    right breast bx     SOC:  Social History     Socioeconomic History    Marital status:      Spouse name: Not on file    Number of children: Not on file    Years of education: Not on file    Highest education level: Not on file   Occupational History    Not on file   Tobacco Use    Smoking status: Never Smoker    Smokeless tobacco: Never Used   Substance and Sexual Activity    Alcohol use: Yes     Alcohol/week: 0.0 standard drinks     Comment: RARE OCC    Drug use: No    Sexual activity: Not on file   Other Topics Concern    Not on file   Social History Narrative    Not on file     Social Determinants of Health     Financial Resource Strain:     Difficulty of Paying Living Expenses: Not on file   Food Insecurity:     Worried About Running Out of Food in the Last Year: Not on file    Blaze of Food in the Last Year: Not on file   Transportation Needs:     Lack of Transportation (Medical): Not on file    Lack of Transportation (Non-Medical):  Not on file   Physical Activity:     Days of Exercise per Week: Not on file    Minutes of Exercise per Session: Not on file   Stress:     Feeling of Stress : Not on file   Social Connections:     Frequency of Communication with Friends and Family: Not on file    Frequency of Social Gatherings with Friends and Family: Not on file    Attends Restorationism Services: Not on file    Active Member of [address] [place] [address] or Organizations: Not on file    Attends Club or Organization Meetings: Not on file    Marital Status: Not on file   Intimate Partner Violence:     Fear of Current or Ex-Partner: Not on file    Emotionally Abused: Not on file    Physically Abused: Not on file    Sexually Abused: Not on file   Housing Stability:     Unable to Pay for Housing in the Last Year: Not on file    Number of [place] in the Last Year: Not on file    Unstable Housing in the Last Year: Not on file Family History  Family History   Problem Relation Age of Onset    Cancer Maternal Aunt         breast    Heart Disease Mother     Diabetes Father     Cancer Sister         CERVICAL    Kidney Disease Brother     Diabetes Brother     Heart Attack Other     Arrhythmia Brother     Diabetes Brother     Anesth Problems Neg Hx      Medications:  Current Outpatient Medications on File Prior to Visit   Medication Sig Dispense Refill    BACITRACIN, BULK, by Does Not Apply route. With zinc ointment      rivaroxaban (Xarelto) 20 mg tab tablet Take 1 Tablet by mouth daily. 30 Tablet 5    acetaminophen (TylenoL) 325 mg tablet Take  by mouth as needed.  lisinopriL (PRINIVIL, ZESTRIL) 10 mg tablet Take 1 Tab by mouth daily. 30 Tab 2    lisinopriL (PRINIVIL, ZESTRIL) 5 mg tablet Take 2 Tabs by mouth daily. 30 Tab 5    ondansetron (ZOFRAN ODT) 4 mg disintegrating tablet Take 1 Tab by mouth every eight (8) hours as needed for Nausea. Indications: nausea and vomiting caused by cancer drugs 30 Tab 3    lidocaine-prilocaine (EMLA) topical cream Apply small amount over port area and cover with band aid one hour before chemo 30 g 3    guaifenesin (MUCINEX PO) Take  by mouth.  loratadine (Claritin) 10 mg tablet Take 10 mg by mouth.  epinastine 0.05 % drop Apply  to eye.  raNITIdine (ZANTAC) 150 mg tablet ZANTAC TABS      cyclobenzaprine (FLEXERIL) 5 mg tablet take 1 tablet by mouth three times a day if needed  0    valACYclovir (VALTREX) 1 gram tablet Take 1 Tab by mouth three (3) times daily. 21 Tab 3    EPINEPHrine (EPIPEN) 0.3 mg/0.3 mL injection 0.3 mg by IntraMUSCular route once as needed.  ketotifen (ZADITOR) 0.025 % (0.035 %) ophthalmic solution Administer 1 Drop to both eyes every morning.  SAL ACID/UREA/PETROLATUM,WHITE (KERASAL FOOT EX) by Apply Externally route daily as needed.       ACCU-CHEK HELDER PLUS TEST STRP strip   0    NITROSTAT 0.4 mg SL tablet       CALCIUM CARBONATE (MARCELLO-SELTZER ANTACID PO) Take  by mouth daily as needed.  Cetirizine (ZYRTEC) 10 mg cap Take 10 mg by mouth nightly. (Patient not taking: Reported on 5/12/2022)       No current facility-administered medications on file prior to visit. Allergies: Allergies   Allergen Reactions    Latex Other (comments)     Burns skin    Acetone Shortness of Breath    Amoxicillin Anaphylaxis    Ciprofloxacin Hives    Pcn [Penicillins] Anaphylaxis    Buspar [Buspirone] Unknown (comments)     Has N&V and makes her feel \"weird\"    Azithromycin Hives    Egg Nausea Only    Other Medication Rash     POPPY seeds       ROS:  Negative     OBJECTIVE:    PHYSICAL EXAM  VITAL SIGNS: There were no vitals taken for this visit. GENERAL KAIA:    HEENT:    RESPIRATORY:    CARDIOVASC:    GASTROINT:    MUSCULOSKEL:    EXTREMITIES:    PELVIC:    RECTAL:    PRIYANKA SURVEY:    NEURO:      Lab Results   Component Value Date/Time    WBC 5.2 05/31/2022 11:13 AM    Hemoglobin (POC) 12.9 05/01/2013 09:53 AM    HGB 12.9 05/31/2022 11:13 AM    Hematocrit (POC) 38 05/01/2013 09:53 AM    HCT 39.8 05/31/2022 11:13 AM    PLATELET 744 76/23/2212 11:13 AM    MCV 88 05/31/2022 11:13 AM     Lab Results   Component Value Date/Time    Sodium 142 05/31/2022 11:13 AM    Potassium 4.3 05/31/2022 11:13 AM    Chloride 103 05/31/2022 11:13 AM    CO2 24 05/31/2022 11:13 AM    Anion gap 7 05/12/2022 01:03 PM    Glucose 103 (H) 05/31/2022 11:13 AM    BUN 10 05/31/2022 11:13 AM    Creatinine 0.51 (L) 05/31/2022 11:13 AM    BUN/Creatinine ratio 20 05/31/2022 11:13 AM    GFR est AA >60 05/12/2022 01:03 PM    GFR est non-AA >60 05/12/2022 01:03 PM    Calcium 8.9 05/31/2022 11:13 AM    Bilirubin, total 0.4 05/31/2022 11:13 AM    Alk.  phosphatase 70 05/31/2022 11:13 AM    Protein, total 6.7 05/31/2022 11:13 AM    Albumin 3.8 05/31/2022 11:13 AM    Globulin 4.5 (H) 05/12/2022 01:03 PM    A-G Ratio 1.3 05/31/2022 11:13 AM    ALT (SGPT) 8 05/31/2022 11:13 AM AST (SGOT) 17 05/31/2022 11:13 AM     Lab Results   Component Value Date/Time    CA-125 333 (H) 05/12/2022 01:03 PM    Cancer Ag (CA) 125 468.0 (H) 05/31/2022 11:13 AM       CT of abdomen/pelvis (5/5/21)  LOWER THORAX: Right cardiophrenic lymph node measures 2.9 cm and previously  measured 1.2 cm on image number 15. There is minor basilar atelectasis/scarring  LIVER: No mass. BILIARY TREE: There is suggestion of gallstones CBD is not dilated. SPLEEN: within normal limits. PANCREAS: No mass or ductal dilatation. ADRENALS: Unremarkable. KIDNEYS: No mass, calculus, or hydronephrosis. STOMACH: Unremarkable. SMALL BOWEL: No dilatation or wall thickening. COLON: No dilatation or wall thickening. APPENDIX: Status post appendectomy  PERITONEUM: Peritoneal carcinomatosis appears improved. There is a confluent  density anteriorly in the peritoneum on image number 43 measuring 5.9 x 1.4 cm  which previously measured 9.8 x 1.9 cm. RETROPERITONEUM: Left retroperitoneal lymph node measures 0.7 cm image 45 and  previously measured 1.6 cm. Right retroperitoneal lymph node measures 0.7 cm image 46 and previously  measured 0.8 cm.     Left iliac lymph node image 57 measures 0.9 cm and previously measured 1.1 cm. REPRODUCTIVE ORGANS: Status post hysterectomy and oophorectomy. URINARY BLADDER: No mass or calculus. BONES: No destructive bone lesion. ABDOMINAL WALL: No mass or hernia. ADDITIONAL COMMENTS: N/A     IMPRESSION  1. There has been a mild decrease in the peritoneal carcinomatosis.      2. There has been slight to mild decrease in the retroperitoneal adenopathy.     3. No ascites is identified. No definite pelvic mass is identified. CT of chests/abdomen/pelvis (7/12/21)  THYROID: Heterogeneous thyroid gland with multiple nodules bilaterally including  in the isthmus. MEDIASTINUM: Left subclavian chest port terminates in the SVC. No mediastinal  adenopathy. RAMEZ: No mass or lymphadenopathy.   THORACIC AORTA: No dissection or aneurysm. MAIN PULMONARY ARTERY: Nonocclusive thrombus in segmental branches of the right  upper lobe pulmonary artery (3:47, 52), which are nonocclusive and likely  chronic. TRACHEA/BRONCHI: Patent. ESOPHAGUS: No wall thickening or dilatation. HEART: Normal in size. PLEURA: No effusion or pneumothorax. LUNGS: No nodule, mass, or airspace disease. LIVER: No mass or biliary dilatation. GALLBLADDER: Sludge in a nondistended gallbladder. SPLEEN: No mass. PANCREAS: No mass or ductal dilatation. ADRENALS: Unremarkable. KIDNEYS: No mass, calculus, or hydronephrosis. STOMACH: Unremarkable. SMALL BOWEL: No dilatation or wall thickening. COLON: Scattered diverticula. APPENDIX: Surgically absent  PERITONEUM: Decreased volume and size of the omental nodularity along the  anterior peritoneal surface just above the umbilicus. No ascites. No  pneumoperitoneum. RETROPERITONEUM: No lymphadenopathy or aortic aneurysm. REPRODUCTIVE ORGANS: Surgically absent. URINARY BLADDER: No mass or calculus. BONES: No destructive bone lesion. ADDITIONAL COMMENTS: N/A     IMPRESSION  1. Resolved retroperitoneal lymphadenopathy. 2.  Decreased peritoneal carcinomatosis. 3.  No ascites. 4.  Nonocclusive thrombus and segmental right upper lobe pulmonary artery  branches which are nonocclusive and appear chronic. PET/CT (8/27/21)  HEAD/NECK: No apparent foci of abnormal hypermetabolism. Cerebral evaluation is  limited by normal intense activity.     CHEST: No foci of abnormal hypermetabolism. Resolution of left supraclavicular  jasper activity.     ABDOMEN/PELVIS:   Omental/peritoneal disease is near completely resolved; current max SUV, midline  omentum 2.4, previous max SUV 12. Resolved retroperitoneal and right deep pelvic jasper activity.   Resolved left pelvic cul-de-sac mass/activity.     SKELETON: No foci of abnormal hypermetabolism in the axial and visualized  appendicular skeleton.     IMPRESSION  Abnormal PET activity is resolved other than minimal residual  activity in the omentum. CT of chest/abdomen/pelvis (11/9/21)  Chest:     Tubes and lines: Central venous port catheter extends to the SVC.     Lungs: There is mild bibasilar atelectasis. No pulmonary nodule or mass is seen.     Lymph nodes: There is no mediastinal, hilar or axillary lymphadenopathy. Subcentimeter axillary and mediastinal lymph nodes are noted.     Pleura: There is trace bilateral pleural fluid, new compared to prior CT dated  July 2021.     Heart: The heart is normal in size and there is no pericardial fluid.     Bones: No lytic or sclerotic osseous lesion is visualized.     The thyroid gland: Thyroid gland is normal in size. There are several nodules  within the thyroid gland, unchanged compared to prior CT dated July 2021.     Abdomen/pelvis:  Lymph nodes/peritoneum/retroperitoneum/omentum: There is a 1.3 cm x 1.3 cm  cardiophrenic lymph node which measured 8 mm x 7 mm on prior CT dated May 2021. There is a small amount of free fluid in the pelvis, new compared to prior CT  dated July 2021. There has been interval worsening of coalescing of omental  caking and intraperitoneal soft tissue nodularity. For example, within the  anterior mid abdomen, there is an omental soft tissue mass measuring  approximately 13.8 cm x 5.6 cm and measuring approximately 9.5 cm x 1.8 cm on  prior CT dated July 2021. There are also mildly enlarged upper abdominal  mesenteric lymph nodes which have increased in size. For example, there is a 2  cm x 1.4 cm celiac lymph node which measures approximately 6 mm x 4 mm on prior  CT dated July 2021. As an additional example, there is a 1.3 cm x 1.3 cm  mesenteric lymph node within the upper pelvis, measuring several millimeters in  size on prior CT dated July 2021. Retroperitoneal lymph nodes have increased in  size as well.  For example there is a 1.8 cm x 1.3 cm right common iliac lymph  node which measured 8 mm x 7 mm on prior CT dated July 2021.     Liver: There is subtle capsular hepatic, new compared to prior CT dated July 2021. No intraparenchymal hepatic lesion is seen.     Spleen: The spleen is normal.     Pancreas: The pancreas is normal.     Adrenals: The adrenals are normal.     Gallbladder: Gallbladder is normal.     Kidneys: The kidneys are normal. There is no hydronephrosis.     Bowel: There is new ill-defined soft tissue in the pelvis which appears to be  extending from the colon, likely representing subserosal soft tissue nodularity  and tethering. No dilated loop of large or small bowel is seen. Colonic  diverticulosis is noted.     Appendix: The appendix is surgically absent.     Urinary bladder: Urinary bladder is partially filled and grossly normal.     Bones: No lytic or sclerotic osseous lesion is visualized.     Miscellaneous: There is no free intraperitoneal gas. There is no focal fluid  collection to suggest abscess.     IMPRESSION  1. New, trace bilateral pleural fluid. Mild bibasilar atelectasis. 2.: Increase in size of cardiophrenic lymph node, now measuring 1.3 cm x 1.3 cm.  3. Interval worsening of coalescing of omental caking, intraperitoneal soft  tissue nodularity and intraperitoneal and retroperitoneal lymphadenopathy. 4. New, small amount of ascites in the pelvis.       CT of abdomen/pelvis (2/17/22)  LOWER THORAX: Small bilateral pleural effusions. No visualized lung nodules  LIVER: No mass. BILIARY TREE: Gallbladder is within normal limits. CBD is not dilated. SPLEEN: within normal limits. PANCREAS: No mass or ductal dilatation. ADRENALS: Unremarkable. KIDNEYS: No mass, calculus, or hydronephrosis. STOMACH: Unremarkable. SMALL BOWEL: No dilatation or wall thickening. COLON: No dilatation or wall thickening.   APPENDIX: Surgically absent  PERITONEUM: Omental and peritoneal nodularity with small volume scattered  ascites consistent with peritoneal carcinomatosis. Mesenteric, portacaval, and  gastrohepatic ligament lymphadenopathy. RETROPERITONEUM: Retroperitoneal lymphadenopathy along the IVC. REPRODUCTIVE ORGANS: Status post hysterectomy  URINARY BLADDER: No mass or calculus. BONES: No destructive bone lesion. ABDOMINAL WALL: No mass or hernia. ADDITIONAL COMMENTS: N/A     IMPRESSION  1. Peritoneal and omental nodularity with small volume ascites consistent with  peritoneal carcinomatosis. 2.  Lymphadenopathy in the mesentery, gastrohepatic ligament, portacaval, and  retroperitoneal stations. 3.  Small bilateral pleural effusions. CT of chest/abdomen/pelvis (5/7/22)  CHEST WALL: No mass or axillary lymphadenopathy. THYROID: No significant change in multiple small nodules. Otherwise  unremarkable. MEDIASTINUM: 1.3 cm precarinal node. No mass or other enlarged lymphadenopathy. RAMEZ: No mass or lymphadenopathy. THORACIC AORTA: No dissection or aneurysm. MAIN PULMONARY ARTERY: Normal in caliber. TRACHEA/BRONCHI: Patent. ESOPHAGUS: No wall thickening or dilatation. HEART: Normal in size. PLEURA: Moderate bilateral pleural effusions with no pneumothorax. LUNGS: Compressive atelectasis overlies pleural effusions with no nodule, mass,  or other airspace disease.     LIVER: No mass. BILIARY TREE: Gallbladder is within normal limits. CBD is not dilated. SPLEEN: within normal limits. PANCREAS: No mass or ductal dilatation. ADRENALS: Unremarkable. KIDNEYS: No mass, calculus, or hydronephrosis. STOMACH: Unremarkable. SMALL BOWEL: No dilatation or wall thickening. COLON: No dilation or wall thickening. Noninflamed appearing left and sigmoid  diverticula. APPENDIX: Surgically absent. PERITONEUM: Extensive omental nodularity and caking redemonstrated with anterior  omental mass measuring 5.0 x 12.2 cm, previously 6.6 x 14.4 cm. Just lesion is along the greater curvature of the stomach measuring 2.7 x 3.2  cm, previously 3.8 x 4.7 cm.  There is small ascites, portions of which appear  loculated on around the liver margin and splenic margin, not significant change  from prior. RETROPERITONEUM: There is diffuse adenopathy measuring up to 1.5 x 2.1 cm in the  portacaval station, previously 1.3 x 2.1 cm, 1.5 x 1.9 cm at the celiac,  previously 1.3 x 1.8 cm, 1.3 x 1.7 cm right aortocaval, previously 0.9 x 1.7 cm. Right common iliac adenopathy measures 1.7 x 1.7 cm, previously 1.7 x 1.9 cm. REPRODUCTIVE ORGANS: Uterus and ovaries are surgically absent. URINARY BLADDER: No mass or calculus. BONES: Degenerative spine change. No acute fracture or aggressive lesion. ABDOMINAL WALL: No mass or hernia. ADDITIONAL COMMENTS: Left Port-A-Cath in place with catheter traversing to the  atriocaval junction.     IMPRESSION  1. Pleural effusions with overlying atelectasis. 2. Peritoneal carcinomatosis with overall interval mild decrease in bulk of  disease. 3. Retroperitoneal lymphadenopathy slightly worsened compared to prior  examinations suspicious for mildly progressive metastatic disease. 4. Incidentals as above including colonic diverticulosis. 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 She completed six cycles of adjuvant Taxol and Carboplatin chemotherapy. She developed recurrent disease and was treated with the regimen of IV Keytruda and PO Lenvima. She completed 6 cycles before progression. Since it had been almost 8 years since her adjuvant Taxol/Carboplatin, I recommended that we retreat her with that regimen, though I suggested a lower dose of each. She completed 8 cycles of that regimen with an excellent response. She now has recurrence once again. I recommended single agent Doxil chemotherapy. She completed 3 cycles before a CT demonstrated progression of disease.   We stopped the Doxil and I discussed other treatment options with her, including the possibility of a clinical trial at [address] [place] [address]. Ultimately we decided upon single agent Avastin. I explained that it won't make the tumor \"go away\", but it may slow down the growth and help manage the ascites. She has completed 5 cycles so far. Her most recent CT demonstrated a mixed response, but overall stable disease. I recommend continuing with the current regimen. We will repeat imaging after the next cycle. Jose Rodriguez is a 78 y.o. female, evaluated via audio-only technology on 6/21/2022 for Chemotherapy (Virtual visit. Labs and C6 avastin on 6/23.)      Assessment & Plan:   Diagnoses and all orders for this visit:    1. Primary serous adenocarcinoma of endometrium (HCC)  -     CT CHEST ABD PELV W CONT; Future    2. On antineoplastic chemotherapy          Subjective:       Prior to Admission medications    Medication Sig Start Date End Date Taking? Authorizing Provider   BACITRACIN, BULK, by Does Not Apply route. With zinc ointment   Yes Provider, Historical   rivaroxaban (Xarelto) 20 mg tab tablet Take 1 Tablet by mouth daily. 2/1/22  Yes Norberto Cortez MD   acetaminophen (TylenoL) 325 mg tablet Take  by mouth as needed. Yes Provider, Historical   lisinopriL (PRINIVIL, ZESTRIL) 10 mg tablet Take 1 Tab by mouth daily. 1/6/21  Yes Norberto Cortez MD   lisinopriL (PRINIVIL, ZESTRIL) 5 mg tablet Take 2 Tabs by mouth daily. 1/5/21  Yes Hieu Cline PA-C   ondansetron (ZOFRAN ODT) 4 mg disintegrating tablet Take 1 Tab by mouth every eight (8) hours as needed for Nausea. Indications: nausea and vomiting caused by cancer drugs 10/22/20  Yes Norberto Cortez MD   lidocaine-prilocaine (EMLA) topical cream Apply small amount over port area and cover with band aid one hour before chemo 10/22/20  Yes Norberto Cortez MD   guaifenesin (MUCINEX PO) Take  by mouth. Yes Provider, Historical   loratadine (Claritin) 10 mg tablet Take 10 mg by mouth.    Yes Provider, Historical   epinastine 0.05 % drop Apply  to eye. Yes Provider, Historical   raNITIdine (ZANTAC) 150 mg tablet ZANTAC TABS 9/29/11  Yes Provider, Historical   cyclobenzaprine (FLEXERIL) 5 mg tablet take 1 tablet by mouth three times a day if needed 12/5/16  Yes Provider, Historical   valACYclovir (VALTREX) 1 gram tablet Take 1 Tab by mouth three (3) times daily. 12/15/16  Yes Marla Correa MD   EPINEPHrine (EPIPEN) 0.3 mg/0.3 mL injection 0.3 mg by IntraMUSCular route once as needed. Yes Provider, Historical   ketotifen (ZADITOR) 0.025 % (0.035 %) ophthalmic solution Administer 1 Drop to both eyes every morning. Yes Provider, Historical   SAL ACID/UREA/PETROLATUM,WHITE (KERASAL FOOT EX) by Apply Externally route daily as needed. Yes Provider, Historical   ACCU-CHEK HELDER PLUS TEST STRP strip  7/3/15  Yes Provider, Historical   NITROSTAT 0.4 mg SL tablet  9/22/14  Yes Provider, Historical   CALCIUM CARBONATE (MARCELLO-SELTZER ANTACID PO) Take  by mouth daily as needed. Yes Provider, Historical   Cetirizine (ZYRTEC) 10 mg cap Take 10 mg by mouth nightly. Patient not taking: Reported on 5/12/2022    Provider, Historical     Patient Active Problem List   Diagnosis Code    Malignant neoplasm of corpus uteri, except isthmus ([facility] [address] C54.9     Patient Active Problem List    Diagnosis Date Noted    Malignant neoplasm of corpus uteri, except isthmus ([facility] [address] 02/28/2013     Current Outpatient Medications   Medication Sig Dispense Refill    BACITRACIN, BULK, by Does Not Apply route. With zinc ointment      rivaroxaban (Xarelto) 20 mg tab tablet Take 1 Tablet by mouth daily. 30 Tablet 5    acetaminophen (TylenoL) 325 mg tablet Take  by mouth as needed.  lisinopriL (PRINIVIL, ZESTRIL) 10 mg tablet Take 1 Tab by mouth daily. 30 Tab 2    lisinopriL (PRINIVIL, ZESTRIL) 5 mg tablet Take 2 Tabs by mouth daily. 30 Tab 5    ondansetron (ZOFRAN ODT) 4 mg disintegrating tablet Take 1 Tab by mouth every eight (8) hours as needed for Nausea.  Indications: nausea and vomiting caused by cancer drugs 30 Tab 3    lidocaine-prilocaine (EMLA) topical cream Apply small amount over port area and cover with band aid one hour before chemo 30 g 3    guaifenesin (MUCINEX PO) Take  by mouth.  loratadine (Claritin) 10 mg tablet Take 10 mg by mouth.  epinastine 0.05 % drop Apply  to eye.  raNITIdine (ZANTAC) 150 mg tablet ZANTAC TABS      cyclobenzaprine (FLEXERIL) 5 mg tablet take 1 tablet by mouth three times a day if needed  0    valACYclovir (VALTREX) 1 gram tablet Take 1 Tab by mouth three (3) times daily. 21 Tab 3    EPINEPHrine (EPIPEN) 0.3 mg/0.3 mL injection 0.3 mg by IntraMUSCular route once as needed.  ketotifen (ZADITOR) 0.025 % (0.035 %) ophthalmic solution Administer 1 Drop to both eyes every morning.  SAL ACID/UREA/PETROLATUM,WHITE (KERASAL FOOT EX) by Apply Externally route daily as needed.  ACCU-CHEK HELDER PLUS TEST STRP strip   0    NITROSTAT 0.4 mg SL tablet       CALCIUM CARBONATE (MARCELLO-SELTZER ANTACID PO) Take  by mouth daily as needed.  Cetirizine (ZYRTEC) 10 mg cap Take 10 mg by mouth nightly. (Patient not taking: Reported on 5/12/2022)       Allergies   Allergen Reactions    Latex Other (comments)     Burns skin    Acetone Shortness of Breath    Amoxicillin Anaphylaxis    Ciprofloxacin Hives    Pcn [Penicillins] Anaphylaxis    Buspar [Buspirone] Unknown (comments)     Has N&V and makes her feel \"weird\"    Azithromycin Hives    Egg Nausea Only    Other Medication Rash     POPPY seeds     Past Medical History:   Diagnosis Date    Abnormal Pap smear 1995    with f/u normal    Anemia     Arthritis     Asthma     Cancer ([facility] [address]     ENDOMETRIAL    Environmental allergies     GERD (gastroesophageal reflux disease)     Goiter     Other unknown and unspecified cause of morbidity or mortality     POSSIBLE ESOPHAGEAL SPASM, ?  OBSTRUCTION DUE TO GOITER    PMB (postmenopausal bleeding)     S/P chemotherapy, time since greater than 12 weeks     LAST CHEMO 10/2014 PER PATIENT    Thyroid disease     goiter     Past Surgical History:   Procedure Laterality Date    HX APPENDECTOMY      HX BUNIONECTOMY      HX GYN  3/13/13    TLH/BSO    HX HEENT  4/22/13    TOOTH REMOVED    HX ORTHOPAEDIC  1980'S    BUNIONECTOMY    HX VASCULAR ACCESS      port    CO BREAST SURGERY PROCEDURE UNLISTED  1/12/16    right breast bx     Family History   Problem Relation Age of Onset    Cancer Maternal Aunt         breast    Heart Disease Mother     Diabetes Father     Cancer Sister         CERVICAL    Kidney Disease Brother     Diabetes Brother     Heart Attack Other     Arrhythmia Brother     Diabetes Brother     Anesth Problems Neg Hx      Social History     Tobacco Use    Smoking status: Never Smoker    Smokeless tobacco: Never Used   Substance Use Topics    Alcohol use: Yes     Alcohol/week: 0.0 standard drinks     Comment: RARE OCC       ROS    Patient-Reported Vitals 1/19/2021   Patient-Reported Systolic  056   Patient-Reported Diastolic 96        Teja Singh, who was evaluated through a patient-initiated, synchronous (real-time) audio only encounter, and/or her healthcare decision maker, is aware that it is a billable service, with coverage as determined by her insurance carrier. She provided verbal consent to proceed: Yes. She has not had a related appointment within my department in the past 7 days or scheduled within the next 24 hours. Total Time: minutes: 11-20 minutes        An electronic signature was used to sign this note.     Lin Hunt MD  06/21/22

## 2022-06-22 LAB
ALBUMIN SERPL-MCNC: 3.4 G/DL (ref 3.8–4.8)
ALBUMIN/GLOB SERPL: 1 {RATIO} (ref 1.2–2.2)
ALP SERPL-CCNC: 69 IU/L (ref 44–121)
ALT SERPL-CCNC: 9 IU/L (ref 0–32)
AST SERPL-CCNC: 21 IU/L (ref 0–40)
BASOPHILS # BLD AUTO: 0 X10E3/UL (ref 0–0.2)
BASOPHILS NFR BLD AUTO: 1 %
BILIRUB SERPL-MCNC: 0.4 MG/DL (ref 0–1.2)
BUN SERPL-MCNC: 9 MG/DL (ref 8–27)
BUN/CREAT SERPL: 20 (ref 12–28)
CALCIUM SERPL-MCNC: 8.9 MG/DL (ref 8.7–10.3)
CANCER AG125 SERPL-ACNC: 510 U/ML (ref 0–38.1)
CHLORIDE SERPL-SCNC: 99 MMOL/L (ref 96–106)
CO2 SERPL-SCNC: 22 MMOL/L (ref 20–29)
CREAT SERPL-MCNC: 0.46 MG/DL (ref 0.57–1)
EGFR: 105 ML/MIN/1.73
EOSINOPHIL # BLD AUTO: 0.2 X10E3/UL (ref 0–0.4)
EOSINOPHIL NFR BLD AUTO: 4 %
ERYTHROCYTE [DISTWIDTH] IN BLOOD BY AUTOMATED COUNT: 15.2 % (ref 11.7–15.4)
GLOBULIN SER CALC-MCNC: 3.3 G/DL (ref 1.5–4.5)
GLUCOSE SERPL-MCNC: 100 MG/DL (ref 65–99)
HCT VFR BLD AUTO: 39.5 % (ref 34–46.6)
HGB BLD-MCNC: 12.6 G/DL (ref 11.1–15.9)
IMM GRANULOCYTES # BLD AUTO: 0 X10E3/UL (ref 0–0.1)
IMM GRANULOCYTES NFR BLD AUTO: 1 %
LYMPHOCYTES # BLD AUTO: 0.6 X10E3/UL (ref 0.7–3.1)
LYMPHOCYTES NFR BLD AUTO: 13 %
MAGNESIUM SERPL-MCNC: 2 MG/DL (ref 1.6–2.3)
MCH RBC QN AUTO: 28 PG (ref 26.6–33)
MCHC RBC AUTO-ENTMCNC: 31.9 G/DL (ref 31.5–35.7)
MCV RBC AUTO: 88 FL (ref 79–97)
MONOCYTES # BLD AUTO: 0.5 X10E3/UL (ref 0.1–0.9)
MONOCYTES NFR BLD AUTO: 11 %
NEUTROPHILS # BLD AUTO: 3 X10E3/UL (ref 1.4–7)
NEUTROPHILS NFR BLD AUTO: 70 %
PLATELET # BLD AUTO: 262 X10E3/UL (ref 150–450)
POTASSIUM SERPL-SCNC: 4.3 MMOL/L (ref 3.5–5.2)
PROT SERPL-MCNC: 6.7 G/DL (ref 6–8.5)
RBC # BLD AUTO: 4.5 X10E6/UL (ref 3.77–5.28)
SODIUM SERPL-SCNC: 141 MMOL/L (ref 134–144)
WBC # BLD AUTO: 4.3 X10E3/UL (ref 3.4–10.8)

## 2022-06-23 ENCOUNTER — APPOINTMENT (OUTPATIENT)
Dept: INFUSION THERAPY | Age: 67
End: 2022-06-23
Payer: MEDICARE

## 2022-06-23 LAB
APPEARANCE UR: CLEAR
BACTERIA #/AREA URNS HPF: NORMAL /[HPF]
BILIRUB UR QL STRIP: NEGATIVE
CASTS URNS QL MICRO: NORMAL /LPF
COLOR UR: YELLOW
EPI CELLS #/AREA URNS HPF: NORMAL /HPF (ref 0–10)
GLUCOSE UR QL STRIP: NEGATIVE
HGB UR QL STRIP: NEGATIVE
KETONES UR QL STRIP: NEGATIVE
LEUKOCYTE ESTERASE UR QL STRIP: NEGATIVE
MICRO URNS: ABNORMAL
NITRITE UR QL STRIP: NEGATIVE
PH UR STRIP: 6.5 [PH] (ref 5–7.5)
PROT UR QL STRIP: ABNORMAL
RBC #/AREA URNS HPF: NORMAL /HPF (ref 0–2)
SP GR UR STRIP: >=1.03 (ref 1–1.03)
UROBILINOGEN UR STRIP-MCNC: 0.2 MG/DL (ref 0.2–1)
WBC #/AREA URNS HPF: NORMAL /HPF (ref 0–5)

## 2022-06-24 ENCOUNTER — HOSPITAL ENCOUNTER (OUTPATIENT)
Dept: INFUSION THERAPY | Age: 67
Discharge: HOME | End: 2022-06-24
Payer: MEDICARE

## 2022-06-24 VITALS
DIASTOLIC BLOOD PRESSURE: 77 MMHG | BODY MASS INDEX: 25.99 KG/M2 | WEIGHT: 156 LBS | RESPIRATION RATE: 18 BRPM | TEMPERATURE: 97.2 F | SYSTOLIC BLOOD PRESSURE: 127 MMHG | HEART RATE: 84 BPM | HEIGHT: 65 IN

## 2022-06-24 DIAGNOSIS — C54.9 MALIGNANT NEOPLASM OF CORPUS UTERI, EXCEPT ISTHMUS (HCC): Primary | ICD-10-CM

## 2022-06-24 PROCEDURE — 96413 CHEMO IV INFUSION 1 HR: CPT

## 2022-06-24 PROCEDURE — 74011000250 HC RX REV CODE- 250: Performed by: OBSTETRICS & GYNECOLOGY

## 2022-06-24 PROCEDURE — 74011250636 HC RX REV CODE- 250/636: Performed by: OBSTETRICS & GYNECOLOGY

## 2022-06-24 PROCEDURE — 74011000258 HC RX REV CODE- 258: Performed by: OBSTETRICS & GYNECOLOGY

## 2022-06-24 PROCEDURE — 77030012965 HC NDL HUBR BBMI -A

## 2022-06-24 RX ORDER — SODIUM CHLORIDE 0.9 % (FLUSH) 0.9 %
10 SYRINGE (ML) INJECTION AS NEEDED
Status: DISCONTINUED | OUTPATIENT
Start: 2022-06-24 | End: 2022-06-25 | Stop reason: HOSPADM

## 2022-06-24 RX ORDER — ACETAMINOPHEN 325 MG/1
650 TABLET ORAL AS NEEDED
Status: DISCONTINUED | OUTPATIENT
Start: 2022-06-24 | End: 2022-06-25 | Stop reason: HOSPADM

## 2022-06-24 RX ORDER — SODIUM CHLORIDE 9 MG/ML
25 INJECTION, SOLUTION INTRAVENOUS CONTINUOUS
Status: DISCONTINUED | OUTPATIENT
Start: 2022-06-24 | End: 2022-06-25 | Stop reason: HOSPADM

## 2022-06-24 RX ORDER — HYDROCORTISONE SODIUM SUCCINATE 100 MG/2ML
100 INJECTION, POWDER, FOR SOLUTION INTRAMUSCULAR; INTRAVENOUS AS NEEDED
Status: DISCONTINUED | OUTPATIENT
Start: 2022-06-24 | End: 2022-06-25 | Stop reason: HOSPADM

## 2022-06-24 RX ORDER — HEPARIN 100 UNIT/ML
300-500 SYRINGE INTRAVENOUS AS NEEDED
Status: DISCONTINUED | OUTPATIENT
Start: 2022-06-24 | End: 2022-06-25 | Stop reason: HOSPADM

## 2022-06-24 RX ORDER — DIPHENHYDRAMINE HYDROCHLORIDE 50 MG/ML
50 INJECTION, SOLUTION INTRAMUSCULAR; INTRAVENOUS AS NEEDED
Status: DISCONTINUED | OUTPATIENT
Start: 2022-06-24 | End: 2022-06-25 | Stop reason: HOSPADM

## 2022-06-24 RX ORDER — ALBUTEROL SULFATE 0.83 MG/ML
2.5 SOLUTION RESPIRATORY (INHALATION) AS NEEDED
Status: DISCONTINUED | OUTPATIENT
Start: 2022-06-24 | End: 2022-06-25 | Stop reason: HOSPADM

## 2022-06-24 RX ORDER — SODIUM CHLORIDE 9 MG/ML
10 INJECTION INTRAMUSCULAR; INTRAVENOUS; SUBCUTANEOUS AS NEEDED
Status: DISCONTINUED | OUTPATIENT
Start: 2022-06-24 | End: 2022-06-25 | Stop reason: HOSPADM

## 2022-06-24 RX ORDER — ONDANSETRON 2 MG/ML
8 INJECTION INTRAMUSCULAR; INTRAVENOUS AS NEEDED
Status: DISCONTINUED | OUTPATIENT
Start: 2022-06-24 | End: 2022-06-25 | Stop reason: HOSPADM

## 2022-06-24 RX ORDER — EPINEPHRINE 1 MG/ML
0.3 INJECTION, SOLUTION, CONCENTRATE INTRAVENOUS AS NEEDED
Status: DISCONTINUED | OUTPATIENT
Start: 2022-06-24 | End: 2022-06-25 | Stop reason: HOSPADM

## 2022-06-24 RX ORDER — DIPHENHYDRAMINE HYDROCHLORIDE 50 MG/ML
25 INJECTION, SOLUTION INTRAMUSCULAR; INTRAVENOUS AS NEEDED
Status: DISCONTINUED | OUTPATIENT
Start: 2022-06-24 | End: 2022-06-25 | Stop reason: HOSPADM

## 2022-06-24 RX ADMIN — HEPARIN 500 UNITS: 100 SYRINGE at 17:41

## 2022-06-24 RX ADMIN — SODIUM CHLORIDE 25 ML/HR: 900 INJECTION, SOLUTION INTRAVENOUS at 16:00

## 2022-06-24 RX ADMIN — SODIUM CHLORIDE, PRESERVATIVE FREE 10 ML: 5 INJECTION INTRAVENOUS at 17:41

## 2022-06-24 RX ADMIN — SODIUM CHLORIDE 1125 MG: 9 INJECTION, SOLUTION INTRAVENOUS at 16:51

## 2022-06-24 NOTE — PROGRESS NOTES
Outpatient Infusion Center Short Visit Progress Note    SBAR from [facility] RN    Visit Vitals  /77   Pulse 84   Temp 97.2 °F (36.2 °C)   Resp 18   Ht 5' 5\" (1.651 m)   Wt 70.8 kg (156 lb)   BMI 25.96 kg/m²       Medications:  Saline flush  Heparin    0745 Pt tolerated treatment well. Port flushed, heparinized and de-accessed. D/c home ambulatory in no distress.  Pt aware of next appointment scheduled for 7/14/22

## 2022-06-24 NOTE — PROGRESS NOTES
[facility] Chemo Progress Note    Date: 2022    Name: Breann Oconnor    MRN: [id number]         : 1955    1530 Ms. Smith Campa Arrived to [facility] for [place] ambulatory in stable condition. Assessment was completed, no acute issues at this time, no new complaints voiced. Port accessed with positive blood return. Chemotherapy Flowsheet 2022   Cycle C6   Date 2022   Drug / Regimen [place]   Pre Meds -   Notes given           Ms. Richard Mart vitals were reviewed. Patient Vitals for the past 12 hrs:   Temp Pulse Resp BP   22 1552 97.2 °F (36.2 °C) 92 18 113/81         Lab results were obtained and reviewed. Pre-medications  were administered as ordered and chemotherapy was initiated. Medications Administered     bevacizumab-bvzr (ZIRABEV) 1,125 mg in 0.9% sodium chloride 100 mL, overfill volume 10 mL IVPB     Admin Date  2022 Action  New Bag Dose  1,125 mg Route  IntraVENous Administered By  Jacky Barroso RN                Two nurses verified prior to administering:  Drug name, Drug dose, Infusion volume or drug volume when prepared in a syringe, Rate of administration, Route of administration, Expiration dates and/or times, Appearance and physical integrity of the drugs, Rate set on infusion pump, when used, and Sequencing of drug administration. SBAR given to Jason Bhakta who will assume care of the patient until treatment is complete.      Future Appointments   Date Time Provider David Jara   2022  1:00 PM [facility] [address] [facility] . [facility]   2022 11:45 AM MD GREER Kelly AMB   2022  1:00 PM B3 GREC[facility] MED [facility]   2022  9:15 AM MD GREER Kelly BS AMB   2022 10:00 AM D4 GRECIA Fofana RN  2022

## 2022-06-28 RX ORDER — SODIUM CHLORIDE 9 MG/ML
10 INJECTION INTRAMUSCULAR; INTRAVENOUS; SUBCUTANEOUS AS NEEDED
Status: CANCELLED | OUTPATIENT
Start: 2022-07-14

## 2022-06-28 RX ORDER — ONDANSETRON 2 MG/ML
8 INJECTION INTRAMUSCULAR; INTRAVENOUS AS NEEDED
Status: CANCELLED | OUTPATIENT
Start: 2022-07-14

## 2022-06-28 RX ORDER — HYDROCORTISONE SODIUM SUCCINATE 100 MG/2ML
100 INJECTION, POWDER, FOR SOLUTION INTRAMUSCULAR; INTRAVENOUS AS NEEDED
Status: CANCELLED | OUTPATIENT
Start: 2022-07-14

## 2022-06-28 RX ORDER — ALBUTEROL SULFATE 0.83 MG/ML
2.5 SOLUTION RESPIRATORY (INHALATION) AS NEEDED
Status: CANCELLED | OUTPATIENT
Start: 2022-07-14

## 2022-06-28 RX ORDER — ACETAMINOPHEN 325 MG/1
650 TABLET ORAL AS NEEDED
Status: CANCELLED | OUTPATIENT
Start: 2022-07-14

## 2022-06-28 RX ORDER — SODIUM CHLORIDE 0.9 % (FLUSH) 0.9 %
10 SYRINGE (ML) INJECTION AS NEEDED
Status: CANCELLED | OUTPATIENT
Start: 2022-07-14

## 2022-06-28 RX ORDER — DIPHENHYDRAMINE HYDROCHLORIDE 50 MG/ML
50 INJECTION, SOLUTION INTRAMUSCULAR; INTRAVENOUS AS NEEDED
Status: CANCELLED | OUTPATIENT
Start: 2022-07-14

## 2022-06-28 RX ORDER — SODIUM CHLORIDE 9 MG/ML
25 INJECTION, SOLUTION INTRAVENOUS CONTINUOUS
Status: CANCELLED | OUTPATIENT
Start: 2022-07-14

## 2022-06-28 RX ORDER — DIPHENHYDRAMINE HYDROCHLORIDE 50 MG/ML
25 INJECTION, SOLUTION INTRAMUSCULAR; INTRAVENOUS AS NEEDED
Status: CANCELLED | OUTPATIENT
Start: 2022-07-14

## 2022-06-28 RX ORDER — HEPARIN 100 UNIT/ML
300-500 SYRINGE INTRAVENOUS AS NEEDED
Status: CANCELLED | OUTPATIENT
Start: 2022-07-14

## 2022-06-28 RX ORDER — EPINEPHRINE 1 MG/ML
0.3 INJECTION, SOLUTION, CONCENTRATE INTRAVENOUS AS NEEDED
Status: CANCELLED | OUTPATIENT
Start: 2022-07-14

## 2022-07-09 ENCOUNTER — HOSPITAL ENCOUNTER (OUTPATIENT)
Dept: CT IMAGING | Age: 67
Discharge: HOME | End: 2022-07-09
Attending: OBSTETRICS & GYNECOLOGY
Payer: MEDICARE

## 2022-07-09 DIAGNOSIS — C54.1 PRIMARY SEROUS ADENOCARCINOMA OF ENDOMETRIUM (HCC): ICD-10-CM

## 2022-07-09 PROCEDURE — 74177 CT ABD & PELVIS W/CONTRAST: CPT

## 2022-07-09 PROCEDURE — 74011000636 HC RX REV CODE- 636: Performed by: INTERNAL MEDICINE

## 2022-07-09 RX ADMIN — IOPAMIDOL 100 ML: 755 INJECTION, SOLUTION INTRAVENOUS at 13:24

## 2022-07-12 LAB
ALBUMIN SERPL-MCNC: 3.5 G/DL (ref 3.8–4.8)
ALBUMIN/GLOB SERPL: 1.4 {RATIO} (ref 1.2–2.2)
ALP SERPL-CCNC: 66 IU/L (ref 44–121)
ALT SERPL-CCNC: 6 IU/L (ref 0–32)
AST SERPL-CCNC: 17 IU/L (ref 0–40)
BASOPHILS # BLD AUTO: 0 X10E3/UL (ref 0–0.2)
BASOPHILS NFR BLD AUTO: 0 %
BILIRUB SERPL-MCNC: 0.3 MG/DL (ref 0–1.2)
BUN SERPL-MCNC: 11 MG/DL (ref 8–27)
BUN/CREAT SERPL: 30 (ref 12–28)
CALCIUM SERPL-MCNC: 8.5 MG/DL (ref 8.7–10.3)
CANCER AG125 SERPL-ACNC: 477 U/ML (ref 0–38.1)
CHLORIDE SERPL-SCNC: 97 MMOL/L (ref 96–106)
CO2 SERPL-SCNC: 23 MMOL/L (ref 20–29)
CREAT SERPL-MCNC: 0.37 MG/DL (ref 0.57–1)
EGFR: 111 ML/MIN/1.73
EOSINOPHIL # BLD AUTO: 0.1 X10E3/UL (ref 0–0.4)
EOSINOPHIL NFR BLD AUTO: 1 %
ERYTHROCYTE [DISTWIDTH] IN BLOOD BY AUTOMATED COUNT: 15.2 % (ref 11.7–15.4)
GLOBULIN SER CALC-MCNC: 2.5 G/DL (ref 1.5–4.5)
GLUCOSE SERPL-MCNC: 100 MG/DL (ref 65–99)
HCT VFR BLD AUTO: 37.1 % (ref 34–46.6)
HGB BLD-MCNC: 11.6 G/DL (ref 11.1–15.9)
IMM GRANULOCYTES # BLD AUTO: 0 X10E3/UL (ref 0–0.1)
IMM GRANULOCYTES NFR BLD AUTO: 0 %
LYMPHOCYTES # BLD AUTO: 0.6 X10E3/UL (ref 0.7–3.1)
LYMPHOCYTES NFR BLD AUTO: 11 %
MAGNESIUM SERPL-MCNC: 2 MG/DL (ref 1.6–2.3)
MCH RBC QN AUTO: 26.7 PG (ref 26.6–33)
MCHC RBC AUTO-ENTMCNC: 31.3 G/DL (ref 31.5–35.7)
MCV RBC AUTO: 86 FL (ref 79–97)
MONOCYTES # BLD AUTO: 0.5 X10E3/UL (ref 0.1–0.9)
MONOCYTES NFR BLD AUTO: 9 %
NEUTROPHILS # BLD AUTO: 4.1 X10E3/UL (ref 1.4–7)
NEUTROPHILS NFR BLD AUTO: 79 %
PLATELET # BLD AUTO: 268 X10E3/UL (ref 150–450)
POTASSIUM SERPL-SCNC: 4.3 MMOL/L (ref 3.5–5.2)
PROT SERPL-MCNC: 6 G/DL (ref 6–8.5)
RBC # BLD AUTO: 4.34 X10E6/UL (ref 3.77–5.28)
SODIUM SERPL-SCNC: 135 MMOL/L (ref 134–144)
WBC # BLD AUTO: 5.3 X10E3/UL (ref 3.4–10.8)

## 2022-07-13 LAB
APPEARANCE UR: CLEAR
BACTERIA #/AREA URNS HPF: NORMAL /[HPF]
BILIRUB UR QL STRIP: NEGATIVE
CASTS URNS QL MICRO: NORMAL /LPF
COLOR UR: YELLOW
EPI CELLS #/AREA URNS HPF: NORMAL /HPF (ref 0–10)
GLUCOSE UR QL STRIP: NEGATIVE
HGB UR QL STRIP: NEGATIVE
KETONES UR QL STRIP: NEGATIVE
LEUKOCYTE ESTERASE UR QL STRIP: NEGATIVE
MICRO URNS: NORMAL
MICRO URNS: NORMAL
NITRITE UR QL STRIP: NEGATIVE
PH UR STRIP: 7.5 [PH] (ref 5–7.5)
PROT UR QL STRIP: NEGATIVE
RBC #/AREA URNS HPF: NORMAL /HPF (ref 0–2)
SP GR UR STRIP: 1.01 (ref 1–1.03)
UROBILINOGEN UR STRIP-MCNC: 0.2 MG/DL (ref 0.2–1)
WBC #/AREA URNS HPF: NORMAL /HPF (ref 0–5)

## 2022-07-14 ENCOUNTER — HOSPITAL ENCOUNTER (OUTPATIENT)
Dept: INFUSION THERAPY | Age: 67
Discharge: HOME | End: 2022-07-14
Payer: MEDICARE

## 2022-07-14 ENCOUNTER — OFFICE VISIT (OUTPATIENT)
Dept: GYNECOLOGY | Age: 67
End: 2022-07-14
Payer: MEDICARE

## 2022-07-14 ENCOUNTER — APPOINTMENT (OUTPATIENT)
Dept: INFUSION THERAPY | Age: 67
End: 2022-07-14
Payer: MEDICARE

## 2022-07-14 VITALS
BODY MASS INDEX: 26.26 KG/M2 | DIASTOLIC BLOOD PRESSURE: 72 MMHG | RESPIRATION RATE: 18 BRPM | HEART RATE: 91 BPM | WEIGHT: 157.8 LBS | TEMPERATURE: 97.7 F | SYSTOLIC BLOOD PRESSURE: 118 MMHG

## 2022-07-14 VITALS
HEIGHT: 65 IN | OXYGEN SATURATION: 98 % | DIASTOLIC BLOOD PRESSURE: 82 MMHG | WEIGHT: 157 LBS | BODY MASS INDEX: 26.16 KG/M2 | SYSTOLIC BLOOD PRESSURE: 124 MMHG | HEART RATE: 106 BPM

## 2022-07-14 DIAGNOSIS — J90 PLEURAL EFFUSION, BILATERAL: ICD-10-CM

## 2022-07-14 DIAGNOSIS — C54.9 MALIGNANT NEOPLASM OF CORPUS UTERI, EXCEPT ISTHMUS (HCC): Primary | ICD-10-CM

## 2022-07-14 DIAGNOSIS — C54.1 PRIMARY SEROUS ADENOCARCINOMA OF ENDOMETRIUM (HCC): Primary | ICD-10-CM

## 2022-07-14 DIAGNOSIS — R18.0 MALIGNANT ASCITES: ICD-10-CM

## 2022-07-14 DIAGNOSIS — Z79.899 ON ANTINEOPLASTIC CHEMOTHERAPY: ICD-10-CM

## 2022-07-14 PROCEDURE — 1090F PRES/ABSN URINE INCON ASSESS: CPT | Performed by: OBSTETRICS & GYNECOLOGY

## 2022-07-14 PROCEDURE — 74011250636 HC RX REV CODE- 250/636: Performed by: OBSTETRICS & GYNECOLOGY

## 2022-07-14 PROCEDURE — G8432 DEP SCR NOT DOC, RNG: HCPCS | Performed by: OBSTETRICS & GYNECOLOGY

## 2022-07-14 PROCEDURE — G0463 HOSPITAL OUTPT CLINIC VISIT: HCPCS | Performed by: OBSTETRICS & GYNECOLOGY

## 2022-07-14 PROCEDURE — 74011000250 HC RX REV CODE- 250: Performed by: OBSTETRICS & GYNECOLOGY

## 2022-07-14 PROCEDURE — 1101F PT FALLS ASSESS-DOCD LE1/YR: CPT | Performed by: OBSTETRICS & GYNECOLOGY

## 2022-07-14 PROCEDURE — 77030012965 HC NDL HUBR BBMI -A

## 2022-07-14 PROCEDURE — 99213 OFFICE O/P EST LOW 20 MIN: CPT | Performed by: OBSTETRICS & GYNECOLOGY

## 2022-07-14 PROCEDURE — 1123F ACP DISCUSS/DSCN MKR DOCD: CPT | Performed by: OBSTETRICS & GYNECOLOGY

## 2022-07-14 PROCEDURE — G8417 CALC BMI ABV UP PARAM F/U: HCPCS | Performed by: OBSTETRICS & GYNECOLOGY

## 2022-07-14 PROCEDURE — 3017F COLORECTAL CA SCREEN DOC REV: CPT | Performed by: OBSTETRICS & GYNECOLOGY

## 2022-07-14 PROCEDURE — 96413 CHEMO IV INFUSION 1 HR: CPT

## 2022-07-14 PROCEDURE — G8536 NO DOC ELDER MAL SCRN: HCPCS | Performed by: OBSTETRICS & GYNECOLOGY

## 2022-07-14 PROCEDURE — G8427 DOCREV CUR MEDS BY ELIG CLIN: HCPCS | Performed by: OBSTETRICS & GYNECOLOGY

## 2022-07-14 PROCEDURE — G8400 PT W/DXA NO RESULTS DOC: HCPCS | Performed by: OBSTETRICS & GYNECOLOGY

## 2022-07-14 PROCEDURE — 74011000258 HC RX REV CODE- 258: Performed by: OBSTETRICS & GYNECOLOGY

## 2022-07-14 RX ORDER — ONDANSETRON 2 MG/ML
8 INJECTION INTRAMUSCULAR; INTRAVENOUS AS NEEDED
Status: DISCONTINUED | OUTPATIENT
Start: 2022-07-14 | End: 2022-07-15 | Stop reason: HOSPADM

## 2022-07-14 RX ORDER — EPINEPHRINE 1 MG/ML
0.3 INJECTION, SOLUTION, CONCENTRATE INTRAVENOUS AS NEEDED
Status: DISCONTINUED | OUTPATIENT
Start: 2022-07-14 | End: 2022-07-15 | Stop reason: HOSPADM

## 2022-07-14 RX ORDER — SODIUM CHLORIDE 9 MG/ML
25 INJECTION, SOLUTION INTRAVENOUS CONTINUOUS
Status: DISCONTINUED | OUTPATIENT
Start: 2022-07-14 | End: 2022-07-15 | Stop reason: HOSPADM

## 2022-07-14 RX ORDER — HYDROCORTISONE SODIUM SUCCINATE 100 MG/2ML
100 INJECTION, POWDER, FOR SOLUTION INTRAMUSCULAR; INTRAVENOUS AS NEEDED
Status: DISCONTINUED | OUTPATIENT
Start: 2022-07-14 | End: 2022-07-15 | Stop reason: HOSPADM

## 2022-07-14 RX ORDER — SODIUM CHLORIDE 9 MG/ML
10 INJECTION INTRAMUSCULAR; INTRAVENOUS; SUBCUTANEOUS AS NEEDED
Status: DISCONTINUED | OUTPATIENT
Start: 2022-07-14 | End: 2022-07-15 | Stop reason: HOSPADM

## 2022-07-14 RX ORDER — DIPHENHYDRAMINE HYDROCHLORIDE 50 MG/ML
25 INJECTION, SOLUTION INTRAMUSCULAR; INTRAVENOUS AS NEEDED
Status: DISCONTINUED | OUTPATIENT
Start: 2022-07-14 | End: 2022-07-15 | Stop reason: HOSPADM

## 2022-07-14 RX ORDER — ALBUTEROL SULFATE 0.83 MG/ML
2.5 SOLUTION RESPIRATORY (INHALATION) AS NEEDED
Status: DISCONTINUED | OUTPATIENT
Start: 2022-07-14 | End: 2022-07-15 | Stop reason: HOSPADM

## 2022-07-14 RX ORDER — HEPARIN 100 UNIT/ML
300-500 SYRINGE INTRAVENOUS AS NEEDED
Status: DISCONTINUED | OUTPATIENT
Start: 2022-07-14 | End: 2022-07-15 | Stop reason: HOSPADM

## 2022-07-14 RX ORDER — SODIUM CHLORIDE 0.9 % (FLUSH) 0.9 %
10 SYRINGE (ML) INJECTION AS NEEDED
Status: DISCONTINUED | OUTPATIENT
Start: 2022-07-14 | End: 2022-07-15 | Stop reason: HOSPADM

## 2022-07-14 RX ORDER — DIPHENHYDRAMINE HYDROCHLORIDE 50 MG/ML
50 INJECTION, SOLUTION INTRAMUSCULAR; INTRAVENOUS AS NEEDED
Status: DISCONTINUED | OUTPATIENT
Start: 2022-07-14 | End: 2022-07-15 | Stop reason: HOSPADM

## 2022-07-14 RX ORDER — ACETAMINOPHEN 325 MG/1
650 TABLET ORAL AS NEEDED
Status: DISCONTINUED | OUTPATIENT
Start: 2022-07-14 | End: 2022-07-15 | Stop reason: HOSPADM

## 2022-07-14 RX ADMIN — SODIUM CHLORIDE 25 ML/HR: 9 INJECTION, SOLUTION INTRAVENOUS at 14:10

## 2022-07-14 RX ADMIN — SODIUM CHLORIDE, PRESERVATIVE FREE 10 ML: 5 INJECTION INTRAVENOUS at 13:10

## 2022-07-14 RX ADMIN — Medication 500 UNITS: at 15:15

## 2022-07-14 RX ADMIN — SODIUM CHLORIDE 1125 MG: 9 INJECTION, SOLUTION INTRAVENOUS at 14:26

## 2022-07-14 NOTE — PROGRESS NOTES
Outpatient Infusion Center - Chemotherapy Progress Note    1300 Pt admit to [facility] for Zirabev/C7 ambulatory in stable condition. Assessment completed by access RN. No new concerns voiced. Port accessed by access RN with positive blood return. Labs drawn prior to today's visit; WNL. Line flushed, clamped, Curos Cap applied to end clave. Patient denies SOB, fever, cough, general not feeling well. Patient denies recent exposure to someone who has tested positive for COVID-19.  Patient  denies having contact with anyone who has a pending COVID test.     Visit Vitals  /72 (BP 1 Location: Right arm, BP Patient Position: Sitting)   Pulse 91   Temp 97.7 °F (36.5 °C)   Resp 18   Wt 71.6 kg (157 lb 12.8 oz)   Breastfeeding No   BMI 26.26 kg/m²       Medications Administered     0.9% sodium chloride infusion     Admin Date  07/14/2022 Action  New Bag Dose  25 mL/hr Rate  25 mL/hr Route  IntraVENous Administered By  Michael Alcala RN          0.9% sodium chloride injection 10 mL     Admin Date  07/14/2022 Action  Given Dose  10 mL Route  IntraVENous Administered By  Michael Alcala RN          bevacizumab-bvzr (ZIRABEV) 1,125 mg in 0.9% sodium chloride 100 mL, overfill volume 10 mL IVPB     Admin Date  07/14/2022 Action  New Bag Dose  1,125 mg Rate  310 mL/hr Route  IntraVENous Administered By  Michael Alcala RN          heparin (porcine) pf 300-500 Units     Admin Date  07/14/2022 Action  Given Dose  500 Units Route  InterCATHeter Administered By  Michael Alcala RN          sodium chloride (NS) flush 10 mL     Admin Date  07/14/2022 Action  Given Dose  10 mL Route  IntraVENous Administered By  Michael Alcala RN                  Two nurses verified prior to administering:, Drug name, Drug dose, Infusion volume or drug volume when prepared in a syringe, Rate of administration, Route of administration, Expiration dates and/or times, Appearance and physical integrity of the drugs, Rate set on infusion pump, when used, Sequencing of drug administration         1520 Pt tolerated treatment well. Port maintained positive blood return throughout treatment, flushed with positive blood return at conclusion, heparinized and de-accessed. D/c home ambulatory in no distress. Pt aware of next [facility] appointment scheduled for 08/04/2022.

## 2022-07-14 NOTE — PROGRESS NOTES
[facility] GYNECOLOGIC ONCOLOGY  [address] [facility], [address], [address]  [phone]    Office Visit    Patient ID:  Name: Lisa Lezama  MRN: [id number]   :  y.o. Visit date: 2022     INTERVAL HISTORY:  Lisa Lezama is a  female who is an established patient with stage IA, grade 3 uterine carcinoma. She underwent laparoscopic hysterectomy with staging in 2013. She was recommended six cycles of adjuvant Taxol and Carboplatin, which she completed. We elected not to treat with pelvic radiation therapy due to her difficulty with chemotherapy. She had a CT of the abdomen/pelvis at [facility] that revealed retroperitoneal lymphadenopathy, peritoneal carcinomatosis, and trace ascites. I sent her for a PET/CT to better evaluate the extent of disease. She presented to review the results and discuss treatment. Her disease is not amenable to surgical resection. Systemic therapy was her only viable option. I thought immunotherapy would be the best choice, ahead of additional chemotherapy, specifically IV Keytruda and PO Lenvima. If that were not successful, we would consider retreatment with Taxol/Carboplatin or single agent Doxil. She completed 6 cycles of immunotherapy before demonstrating progression of disease. We decided upon retreatment with Taxol/Carboplatin. She completed 8 cycles of that regimen. Her CA-125 has responded and her CT after three cycles demonstrated a response. Her CT after completion of 6 cycles demonstrated further response. A subsequent PET/CT demonstrated resolution of most of the abnormal PET activity, but there still was some residual activity in the omentum, suggesting persistent disease. We stopped treatment in 2021, as she was beginning not to tolerate treatment well. She presented recently complaining of abdominal pain and bloating.   She stated it was a stabbing pain in her left abdomen. The bloating has also been causing a little shortness of breath. I sent her for a CT of the chest/abdomen/pelvis to evaluate. She presented to review those results. The scan demonstrated recurrent disease throughout the abdomen and pelvis. I recommended single agent Doxil. She completed 3 cycles of Doxil. Due to early satiety and complaints of bloating and abdominal distention, I sent her for a paracentesis in December. They did not see enough fluid on ultrasound to attempt drainage. She was also diagnosed with COVID around that time and is still recovering, but has since tested negative. She presented to review her interval CT scan after 3 cycles. Unfortunately it demonstrated progression. I discussed other treatment options with her, including the possibility of a clinical trial at [facility]. Ultimately we decided upon single agent Avastin. I explained that it won't make the tumor \"go away\", but it may slow down the growth and help manage the ascites. She has completed 6 cycles so far. She is feeling better and appears to be tolerating well so far. Her last CT in May 2022 demonstrated a mixed response, but overall stable disease. Her CT remains elevated, but relatively stable. She presents today to review her latest CT. She reports increased abdominal distention and fullness. PMH:  Past Medical History:   Diagnosis Date    Abnormal Pap smear 1995    with f/u normal    Anemia     Arthritis     Asthma     Cancer ([address]     ENDOMETRIAL    Environmental allergies     GERD (gastroesophageal reflux disease)     Goiter     Other unknown and unspecified cause of morbidity or mortality     POSSIBLE ESOPHAGEAL SPASM, ?  OBSTRUCTION DUE TO GOITER    PMB (postmenopausal bleeding)     S/P chemotherapy, time since greater than 12 weeks     LAST CHEMO 10/2014 PER PATIENT    Thyroid disease     goiter     PSH:  Past Surgical History:   Procedure Laterality Date    HX APPENDECTOMY      HX BUNIONECTOMY      HX GYN  3/13/13    TLH/BSO    HX HEENT  4/22/13    TOOTH REMOVED    HX ORTHOPAEDIC  1980'S    BUNIONECTOMY    HX VASCULAR ACCESS      port    NV BREAST SURGERY PROCEDURE UNLISTED  1/12/16    right breast bx     SOC:  Social History     Socioeconomic History    Marital status:      Spouse name: Not on file    Number of children: Not on file    Years of education: Not on file    Highest education level: Not on file   Occupational History    Not on file   Tobacco Use    Smoking status: Never Smoker    Smokeless tobacco: Never Used   Substance and Sexual Activity    Alcohol use: Yes     Alcohol/week: 0.0 standard drinks     Comment: RARE OCC    Drug use: No    Sexual activity: Not on file   Other Topics Concern    Not on file   Social History Narrative    Not on file     Social Determinants of Health     Financial Resource Strain:     Difficulty of Paying Living Expenses: Not on file   Food Insecurity:     Worried About Running Out of Food in the Last Year: Not on file    Blaze of Food in the Last Year: Not on file   Transportation Needs:     Lack of Transportation (Medical): Not on file    Lack of Transportation (Non-Medical):  Not on file   Physical Activity:     Days of Exercise per Week: Not on file    Minutes of Exercise per Session: Not on file   Stress:     Feeling of Stress : Not on file   Social Connections:     Frequency of Communication with Friends and Family: Not on file    Frequency of Social Gatherings with Friends and Family: Not on file    Attends Congregation Services: Not on file    Active Member of Clubs or Organizations: Not on file    Attends Club or Organization Meetings: Not on file    Marital Status: Not on file   Intimate Partner Violence:     Fear of Current or Ex-Partner: Not on file    Emotionally Abused: Not on file    Physically Abused: Not on file    Sexually Abused: Not on file   Housing Stability:     Unable to Pay for Housing in the Last Year: Not on file    Number of Places Lived in the Last Year: Not on file    Unstable Housing in the Last Year: Not on file     Family History  Family History   Problem Relation Age of Onset    Cancer Maternal Aunt         breast    Heart Disease Mother     Diabetes Father     Cancer Sister         CERVICAL    Kidney Disease Brother     Diabetes Brother     Heart Attack Other     Arrhythmia Brother     Diabetes Brother     Anesth Problems Neg Hx      Medications:  Current Outpatient Medications on File Prior to Visit   Medication Sig Dispense Refill    [address] 13, by Does Not Apply route. With zinc ointment      rivaroxaban (Xarelto) 20 mg tab tablet Take 1 Tablet by mouth daily. 30 Tablet 5    acetaminophen (TylenoL) 325 mg tablet Take  by mouth as needed.  lisinopriL (PRINIVIL, ZESTRIL) 10 mg tablet Take 1 Tab by mouth daily. 30 Tab 2    lisinopriL (PRINIVIL, ZESTRIL) 5 mg tablet Take 2 Tabs by mouth daily. 30 Tab 5    ondansetron (ZOFRAN ODT) 4 mg disintegrating tablet Take 1 Tab by mouth every eight (8) hours as needed for Nausea. Indications: nausea and vomiting caused by cancer drugs 30 Tab 3    lidocaine-prilocaine (EMLA) topical cream Apply small amount over port area and cover with band aid one hour before chemo 30 g 3    guaifenesin (MUCINEX PO) Take  by mouth.  loratadine (Claritin) 10 mg tablet Take 10 mg by mouth.  epinastine 0.05 % drop Apply  to eye.  raNITIdine (ZANTAC) 150 mg tablet ZANTAC TABS      cyclobenzaprine (FLEXERIL) 5 mg tablet take 1 tablet by mouth three times a day if needed  0    valACYclovir (VALTREX) 1 gram tablet Take 1 Tab by mouth three (3) times daily. 21 Tab 3    EPINEPHrine (EPIPEN) 0.3 mg/0.3 mL injection 0.3 mg by IntraMUSCular route once as needed.  ketotifen (ZADITOR) 0.025 % (0.035 %) ophthalmic solution Administer 1 Drop to both eyes every morning.       SAL ACID/UREA/PETROLATUM,WHITE (KERASAL FOOT EX) by Apply Externally route daily as needed.  ACCU-CHEK HELDER PLUS TEST STRP strip   0    NITROSTAT 0.4 mg SL tablet       CALCIUM CARBONATE (MARCELLO-SELTZER ANTACID PO) Take  by mouth daily as needed.  Cetirizine (ZYRTEC) 10 mg cap Take 10 mg by mouth nightly. (Patient not taking: Reported on 5/12/2022)       No current facility-administered medications on file prior to visit. Allergies: Allergies   Allergen Reactions    Latex Other (comments)     Burns skin    Acetone Shortness of Breath    Amoxicillin Anaphylaxis    Ciprofloxacin Hives    Pcn [Penicillins] Anaphylaxis    Buspar [Buspirone] Unknown (comments)     Has N&V and makes her feel \"weird\"    Azithromycin Hives    Egg Nausea Only    Other Medication Rash     POPPY seeds       ROS:  Negative     OBJECTIVE:    PHYSICAL EXAM  VITAL SIGNS: Visit Vitals  /82 (BP 1 Location: Right upper arm, BP Patient Position: Sitting)   Pulse (!) 106   Ht 5' 5\" (1.651 m)   Wt 157 lb (71.2 kg)   SpO2 98%   BMI 26.13 kg/m²      GENERAL KAIA: WD, WN, WF in NAD   HEENT: WNL   RESPIRATORY: CTA   CARDIOVASC: RRR   GASTROINT: Distended and firm.    MUSCULOSKEL: WNL   EXTREMITIES: No edema   PELVIC: Deferred   RECTAL: Deferred   PRIYANKA SURVEY: Negative    NEURO: Grossly intact     Lab Results   Component Value Date/Time    WBC 5.3 07/11/2022 12:20 PM    Hemoglobin (POC) 12.9 05/01/2013 09:53 AM    HGB 11.6 07/11/2022 12:20 PM    Hematocrit (POC) 38 05/01/2013 09:53 AM    HCT 37.1 07/11/2022 12:20 PM    PLATELET 622 43/06/9421 12:20 PM    MCV 86 07/11/2022 12:20 PM     Lab Results   Component Value Date/Time    Sodium 135 07/11/2022 12:20 PM    Potassium 4.3 07/11/2022 12:20 PM    Chloride 97 07/11/2022 12:20 PM    CO2 23 07/11/2022 12:20 PM    Anion gap 7 05/12/2022 01:03 PM    Glucose 100 (H) 07/11/2022 12:20 PM    BUN 11 07/11/2022 12:20 PM    Creatinine 0.37 (L) 07/11/2022 12:20 PM    BUN/Creatinine ratio 30 (H) 07/11/2022 12:20 PM    GFR est AA >60 05/12/2022 01:03 PM    GFR est non-AA >60 05/12/2022 01:03 PM    Calcium 8.5 (L) 07/11/2022 12:20 PM    Bilirubin, total 0.3 07/11/2022 12:20 PM    Alk. phosphatase 66 07/11/2022 12:20 PM    Protein, total 6.0 07/11/2022 12:20 PM    Albumin 3.5 (L) 07/11/2022 12:20 PM    Globulin 4.5 (H) 05/12/2022 01:03 PM    A-G Ratio 1.4 07/11/2022 12:20 PM    ALT (SGPT) 6 07/11/2022 12:20 PM    AST (SGOT) 17 07/11/2022 12:20 PM     Lab Results   Component Value Date/Time    CA-125 333 (H) 05/12/2022 01:03 PM    Cancer Ag (CA) 125 477.0 (H) 07/11/2022 12:20 PM       CT of abdomen/pelvis (5/5/21)  LOWER THORAX: Right cardiophrenic lymph node measures 2.9 cm and previously  measured 1.2 cm on image number 15. There is minor basilar atelectasis/scarring  LIVER: No mass. BILIARY TREE: There is suggestion of gallstones CBD is not dilated. SPLEEN: within normal limits. PANCREAS: No mass or ductal dilatation. ADRENALS: Unremarkable. KIDNEYS: No mass, calculus, or hydronephrosis. STOMACH: Unremarkable. SMALL BOWEL: No dilatation or wall thickening. COLON: No dilatation or wall thickening. APPENDIX: Status post appendectomy  PERITONEUM: Peritoneal carcinomatosis appears improved. There is a confluent  density anteriorly in the peritoneum on image number 43 measuring 5.9 x 1.4 cm  which previously measured 9.8 x 1.9 cm. RETROPERITONEUM: Left retroperitoneal lymph node measures 0.7 cm image 45 and  previously measured 1.6 cm. Right retroperitoneal lymph node measures 0.7 cm image 46 and previously  measured 0.8 cm.     Left iliac lymph node image 57 measures 0.9 cm and previously measured 1.1 cm. REPRODUCTIVE ORGANS: Status post hysterectomy and oophorectomy. URINARY BLADDER: No mass or calculus. BONES: No destructive bone lesion. ABDOMINAL WALL: No mass or hernia. ADDITIONAL COMMENTS: N/A     IMPRESSION  1. There has been a mild decrease in the peritoneal carcinomatosis.      2.  There has been slight to mild decrease in the retroperitoneal adenopathy.     3. No ascites is identified. No definite pelvic mass is identified. CT of chests/abdomen/pelvis (7/12/21)  THYROID: Heterogeneous thyroid gland with multiple nodules bilaterally including  in the isthmus. MEDIASTINUM: Left subclavian chest port terminates in the SVC. No mediastinal  adenopathy. RAMEZ: No mass or lymphadenopathy. THORACIC AORTA: No dissection or aneurysm. MAIN PULMONARY ARTERY: Nonocclusive thrombus in segmental branches of the right  upper lobe pulmonary artery (3:47, 52), which are nonocclusive and likely  chronic. TRACHEA/BRONCHI: Patent. ESOPHAGUS: No wall thickening or dilatation. HEART: Normal in size. PLEURA: No effusion or pneumothorax. LUNGS: No nodule, mass, or airspace disease. LIVER: No mass or biliary dilatation. GALLBLADDER: Sludge in a nondistended gallbladder. SPLEEN: No mass. PANCREAS: No mass or ductal dilatation. ADRENALS: Unremarkable. KIDNEYS: No mass, calculus, or hydronephrosis. STOMACH: Unremarkable. SMALL BOWEL: No dilatation or wall thickening. COLON: Scattered diverticula. APPENDIX: Surgically absent  PERITONEUM: Decreased volume and size of the omental nodularity along the  anterior peritoneal surface just above the umbilicus. No ascites. No  pneumoperitoneum. RETROPERITONEUM: No lymphadenopathy or aortic aneurysm. REPRODUCTIVE ORGANS: Surgically absent. URINARY BLADDER: No mass or calculus. BONES: No destructive bone lesion. ADDITIONAL COMMENTS: N/A     IMPRESSION  1. Resolved retroperitoneal lymphadenopathy. 2.  Decreased peritoneal carcinomatosis. 3.  No ascites. 4.  Nonocclusive thrombus and segmental right upper lobe pulmonary artery  branches which are nonocclusive and appear chronic. PET/CT (8/27/21)  HEAD/NECK: No apparent foci of abnormal hypermetabolism.  Cerebral evaluation is  limited by normal intense activity.     CHEST: No foci of abnormal hypermetabolism. Resolution of left supraclavicular  jasper activity.     ABDOMEN/PELVIS:   Omental/peritoneal disease is near completely resolved; current max SUV, midline  omentum 2.4, previous max SUV 12. Resolved retroperitoneal and right deep pelvic jasper activity. Resolved left pelvic cul-de-sac mass/activity.     SKELETON: No foci of abnormal hypermetabolism in the axial and visualized  appendicular skeleton.     IMPRESSION  Abnormal PET activity is resolved other than minimal residual  activity in the omentum. CT of chest/abdomen/pelvis (11/9/21)  Chest:     Tubes and lines: Central venous port catheter extends to the SVC.     Lungs: There is mild bibasilar atelectasis. No pulmonary nodule or mass is seen.     Lymph nodes: There is no mediastinal, hilar or axillary lymphadenopathy. Subcentimeter axillary and mediastinal lymph nodes are noted.     Pleura: There is trace bilateral pleural fluid, new compared to prior CT dated  July 2021.     Heart: The heart is normal in size and there is no pericardial fluid.     Bones: No lytic or sclerotic osseous lesion is visualized.     The thyroid gland: Thyroid gland is normal in size. There are several nodules  within the thyroid gland, unchanged compared to prior CT dated July 2021.     Abdomen/pelvis:  Lymph nodes/peritoneum/retroperitoneum/omentum: There is a 1.3 cm x 1.3 cm  cardiophrenic lymph node which measured 8 mm x 7 mm on prior CT dated May 2021. There is a small amount of free fluid in the pelvis, new compared to prior CT  dated July 2021. There has been interval worsening of coalescing of omental  caking and intraperitoneal soft tissue nodularity. For example, within the  anterior mid abdomen, there is an omental soft tissue mass measuring  approximately 13.8 cm x 5.6 cm and measuring approximately 9.5 cm x 1.8 cm on  prior CT dated July 2021. There are also mildly enlarged upper abdominal  mesenteric lymph nodes which have increased in size.  For example, there is a 2  cm x 1.4 cm celiac lymph node which measures approximately 6 mm x 4 mm on prior  CT dated July 2021. As an additional example, there is a 1.3 cm x 1.3 cm  mesenteric lymph node within the upper pelvis, measuring several millimeters in  size on prior CT dated July 2021. Retroperitoneal lymph nodes have increased in  size as well. For example there is a 1.8 cm x 1.3 cm right common iliac lymph  node which measured 8 mm x 7 mm on prior CT dated July 2021.     Liver: There is subtle capsular hepatic, new compared to prior CT dated July 2021. No intraparenchymal hepatic lesion is seen.     Spleen: The spleen is normal.     Pancreas: The pancreas is normal.     Adrenals: The adrenals are normal.     Gallbladder: Gallbladder is normal.     Kidneys: The kidneys are normal. There is no hydronephrosis.     Bowel: There is new ill-defined soft tissue in the pelvis which appears to be  extending from the colon, likely representing subserosal soft tissue nodularity  and tethering. No dilated loop of large or small bowel is seen. Colonic  diverticulosis is noted.     Appendix: The appendix is surgically absent.     Urinary bladder: Urinary bladder is partially filled and grossly normal.     Bones: No lytic or sclerotic osseous lesion is visualized.     Miscellaneous: There is no free intraperitoneal gas. There is no focal fluid  collection to suggest abscess.     IMPRESSION  1. New, trace bilateral pleural fluid. Mild bibasilar atelectasis. 2.: Increase in size of cardiophrenic lymph node, now measuring 1.3 cm x 1.3 cm.  3. Interval worsening of coalescing of omental caking, intraperitoneal soft  tissue nodularity and intraperitoneal and retroperitoneal lymphadenopathy. 4. New, small amount of ascites in the pelvis.       CT of abdomen/pelvis (2/17/22)  LOWER THORAX: Small bilateral pleural effusions. No visualized lung nodules  LIVER: No mass. BILIARY TREE: Gallbladder is within normal limits.  CBD is not dilated. SPLEEN: within normal limits. PANCREAS: No mass or ductal dilatation. ADRENALS: Unremarkable. KIDNEYS: No mass, calculus, or hydronephrosis. STOMACH: Unremarkable. SMALL BOWEL: No dilatation or wall thickening. COLON: No dilatation or wall thickening. APPENDIX: Surgically absent  PERITONEUM: Omental and peritoneal nodularity with small volume scattered  ascites consistent with peritoneal carcinomatosis. Mesenteric, portacaval, and  gastrohepatic ligament lymphadenopathy. RETROPERITONEUM: Retroperitoneal lymphadenopathy along the IVC. REPRODUCTIVE ORGANS: Status post hysterectomy  URINARY BLADDER: No mass or calculus. BONES: No destructive bone lesion. ABDOMINAL WALL: No mass or hernia. ADDITIONAL COMMENTS: N/A     IMPRESSION  1. Peritoneal and omental nodularity with small volume ascites consistent with  peritoneal carcinomatosis. 2.  Lymphadenopathy in the mesentery, gastrohepatic ligament, portacaval, and  retroperitoneal stations. 3.  Small bilateral pleural effusions. CT of chest/abdomen/pelvis (5/7/22)  CHEST WALL: No mass or axillary lymphadenopathy. THYROID: No significant change in multiple small nodules. Otherwise  unremarkable. MEDIASTINUM: 1.3 cm precarinal node. No mass or other enlarged lymphadenopathy. RAMEZ: No mass or lymphadenopathy. THORACIC AORTA: No dissection or aneurysm. MAIN PULMONARY ARTERY: Normal in caliber. TRACHEA/BRONCHI: Patent. ESOPHAGUS: No wall thickening or dilatation. HEART: Normal in size. PLEURA: Moderate bilateral pleural effusions with no pneumothorax. LUNGS: Compressive atelectasis overlies pleural effusions with no nodule, mass,  or other airspace disease.     LIVER: No mass. BILIARY TREE: Gallbladder is within normal limits. CBD is not dilated. SPLEEN: within normal limits. PANCREAS: No mass or ductal dilatation. ADRENALS: Unremarkable. KIDNEYS: No mass, calculus, or hydronephrosis. STOMACH: Unremarkable.   SMALL BOWEL: No dilatation or wall thickening. COLON: No dilation or wall thickening. Noninflamed appearing left and sigmoid  diverticula. APPENDIX: Surgically absent. PERITONEUM: Extensive omental nodularity and caking redemonstrated with anterior  omental mass measuring 5.0 x 12.2 cm, previously 6.6 x 14.4 cm. Just lesion is along the greater curvature of the stomach measuring 2.7 x 3.2  cm, previously 3.8 x 4.7 cm. There is small ascites, portions of which appear  loculated on around the liver margin and splenic margin, not significant change  from prior. RETROPERITONEUM: There is diffuse adenopathy measuring up to 1.5 x 2.1 cm in the  portacaval station, previously 1.3 x 2.1 cm, 1.5 x 1.9 cm at the celiac,  previously 1.3 x 1.8 cm, 1.3 x 1.7 cm right aortocaval, previously 0.9 x 1.7 cm. Right common iliac adenopathy measures 1.7 x 1.7 cm, previously 1.7 x 1.9 cm. REPRODUCTIVE ORGANS: Uterus and ovaries are surgically absent. URINARY BLADDER: No mass or calculus. BONES: Degenerative spine change. No acute fracture or aggressive lesion. ABDOMINAL WALL: No mass or hernia. ADDITIONAL COMMENTS: Left Port-A-Cath in place with catheter traversing to the  atriocaval junction.     IMPRESSION  1. Pleural effusions with overlying atelectasis. 2. Peritoneal carcinomatosis with overall interval mild decrease in bulk of  disease. 3. Retroperitoneal lymphadenopathy slightly worsened compared to prior  examinations suspicious for mildly progressive metastatic disease. 4. Incidentals as above including colonic diverticulosis. CT of chest/abdomen/pelvis (7/9/22)  CHEST WALL: Left chest wall port catheter terminates in the SVC. THYROID: Heterogeneous thyroid gland with small nodules. MEDIASTINUM: Enlarged right lower paratracheal lymph node measuring 14 mm  (3:26), stable. Subcarinal lymph node is stable at 10 mm. RAMEZ: Stable right hilar lymphadenopathy. THORACIC AORTA: No dissection or aneurysm.   MAIN PULMONARY ARTERY: Normal in caliber. TRACHEA/BRONCHI: Patent. ESOPHAGUS: No wall thickening or dilatation. HEART: Normal in size. PLEURA: Bilateral pleural effusions increased since the prior study. LUNGS: Bibasilar atelectasis. No suspicious nodule.     LIVER: No mass. BILIARY TREE: Gallbladder is within normal limits. CBD is not dilated. SPLEEN: within normal limits. PANCREAS: No mass or ductal dilatation. ADRENALS: Unremarkable. KIDNEYS: No mass, calculus, or hydronephrosis. STOMACH: Unremarkable. SMALL BOWEL: No dilatation or wall thickening. COLON: No dilatation or wall thickening. APPENDIX: Nonvisualized  PERITONEUM: Anterior peritoneal/omental implant measures 12.3 x 4.1 cm,  minimally changed in size. Persistent omental/peritoneal implants in the left  upper quadrant near the splenic hilum, bilateral paracolic gutters, and in the  dependent portion of the peritoneal reflection. Numerous enlarged mesenteric  lymph nodes, as before. Increased ascites in the abdomen. RETROPERITONEUM: Stable persistent retroperitoneal lymphadenopathy. REPRODUCTIVE ORGANS: Status post hysterectomy. URINARY BLADDER: Nondistended urinary bladder limits evaluation. BONES: No destructive bone lesion. ABDOMINAL WALL: No mass or hernia. ADDITIONAL COMMENTS: N/A     IMPRESSION  1. Increased bilateral pleural effusions and ascites. 2.  Persistent peritoneal/omental nodularity, mesenteric lymphadenopathy, and  retroperitoneal lymphadenopathy, not significantly changed. 3.  Stable mediastinal lymphadenopathy. 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 She completed six cycles of adjuvant Taxol and Carboplatin chemotherapy. She developed recurrent disease and was treated with the regimen of IV Keytruda and PO Lenvima. She completed 6 cycles before progression.   Since it had been almost 8 years since her adjuvant Taxol/Carboplatin, I recommended that we retreat her with that regimen, though I suggested a lower dose of each. She completed 8 cycles of that regimen with an excellent response. She now has recurrence once again. I recommended single agent Doxil chemotherapy. She completed 3 cycles before a CT demonstrated progression of disease. We stopped the Doxil and I discussed other treatment options with her, including the possibility of a clinical trial at [address] [place] [address]. Ultimately we decided upon single agent Avastin. I explained that it won't make the tumor \"go away\", but it may slow down the growth and help manage the ascites. She has completed 6 cycles so far. Her latest CT demonstrates increased effusions and ascites, but the measurable disease appears stable or unchanged. I recommend continuing the same regimen for now, considering the measurable disease is stable. I am going to send her for paracentesis for symptomatic relief. An electronic signature was used to sign this note.     Mathew Amaya MD  07/14/22

## 2022-07-15 RX ORDER — DIPHENHYDRAMINE HYDROCHLORIDE 50 MG/ML
50 INJECTION, SOLUTION INTRAMUSCULAR; INTRAVENOUS AS NEEDED
Status: CANCELLED | OUTPATIENT
Start: 2022-08-04

## 2022-07-15 RX ORDER — SODIUM CHLORIDE 0.9 % (FLUSH) 0.9 %
10 SYRINGE (ML) INJECTION AS NEEDED
Status: CANCELLED | OUTPATIENT
Start: 2022-08-04

## 2022-07-15 RX ORDER — EPINEPHRINE 1 MG/ML
0.3 INJECTION, SOLUTION, CONCENTRATE INTRAVENOUS AS NEEDED
Status: CANCELLED | OUTPATIENT
Start: 2022-08-04

## 2022-07-15 RX ORDER — SODIUM CHLORIDE 9 MG/ML
25 INJECTION, SOLUTION INTRAVENOUS CONTINUOUS
Status: CANCELLED | OUTPATIENT
Start: 2022-08-04

## 2022-07-15 RX ORDER — ACETAMINOPHEN 325 MG/1
650 TABLET ORAL AS NEEDED
Status: CANCELLED | OUTPATIENT
Start: 2022-08-04

## 2022-07-15 RX ORDER — ONDANSETRON 2 MG/ML
8 INJECTION INTRAMUSCULAR; INTRAVENOUS AS NEEDED
Status: CANCELLED | OUTPATIENT
Start: 2022-08-04

## 2022-07-15 RX ORDER — SODIUM CHLORIDE 9 MG/ML
10 INJECTION INTRAMUSCULAR; INTRAVENOUS; SUBCUTANEOUS AS NEEDED
Status: CANCELLED | OUTPATIENT
Start: 2022-08-04

## 2022-07-15 RX ORDER — DIPHENHYDRAMINE HYDROCHLORIDE 50 MG/ML
25 INJECTION, SOLUTION INTRAMUSCULAR; INTRAVENOUS AS NEEDED
Status: CANCELLED | OUTPATIENT
Start: 2022-08-04

## 2022-07-15 RX ORDER — HYDROCORTISONE SODIUM SUCCINATE 100 MG/2ML
100 INJECTION, POWDER, FOR SOLUTION INTRAMUSCULAR; INTRAVENOUS AS NEEDED
Status: CANCELLED | OUTPATIENT
Start: 2022-08-04

## 2022-07-15 RX ORDER — HEPARIN 100 UNIT/ML
300-500 SYRINGE INTRAVENOUS AS NEEDED
Status: CANCELLED | OUTPATIENT
Start: 2022-08-04

## 2022-07-15 RX ORDER — ALBUTEROL SULFATE 0.83 MG/ML
2.5 SOLUTION RESPIRATORY (INHALATION) AS NEEDED
Status: CANCELLED | OUTPATIENT
Start: 2022-08-04

## 2022-07-18 ENCOUNTER — HOSPITAL ENCOUNTER (OUTPATIENT)
Dept: ULTRASOUND IMAGING | Age: 67
Discharge: HOME | End: 2022-07-18
Attending: OBSTETRICS & GYNECOLOGY
Payer: MEDICARE

## 2022-07-18 VITALS
HEART RATE: 83 BPM | SYSTOLIC BLOOD PRESSURE: 125 MMHG | WEIGHT: 158 LBS | HEIGHT: 65 IN | TEMPERATURE: 98.2 F | BODY MASS INDEX: 26.33 KG/M2 | DIASTOLIC BLOOD PRESSURE: 76 MMHG | OXYGEN SATURATION: 97 % | RESPIRATION RATE: 15 BRPM

## 2022-07-18 DIAGNOSIS — R18.0 MALIGNANT ASCITES: ICD-10-CM

## 2022-07-18 DIAGNOSIS — C54.1 PRIMARY SEROUS ADENOCARCINOMA OF ENDOMETRIUM (HCC): ICD-10-CM

## 2022-07-18 LAB
COMMENT, HOLDF: NORMAL
SAMPLES BEING HELD,HOLD: NORMAL

## 2022-07-18 PROCEDURE — 49083 ABD PARACENTESIS W/IMAGING: CPT

## 2022-07-18 PROCEDURE — 74011000250 HC RX REV CODE- 250: Performed by: OBSTETRICS & GYNECOLOGY

## 2022-07-18 RX ORDER — LIDOCAINE HYDROCHLORIDE 10 MG/ML
10 INJECTION, SOLUTION EPIDURAL; INFILTRATION; INTRACAUDAL; PERINEURAL
Status: COMPLETED | OUTPATIENT
Start: 2022-07-18 | End: 2022-07-18

## 2022-07-18 RX ADMIN — LIDOCAINE HYDROCHLORIDE 10 ML: 10 INJECTION, SOLUTION EPIDURAL; INFILTRATION; INTRACAUDAL; PERINEURAL at 08:34

## 2022-07-18 NOTE — PROGRESS NOTES
Name of procedure: Paracentesis    Vital Signs: Stable    Fluids removed:3800 ml opaque, milky white fluid removed    Samples sent to lab: No labs ordered at time of procedure, HOLD                                    sample in lab    Any complications related to procedure: None    Post Procedure Care Needed/order sets placed in [facility].

## 2022-07-18 NOTE — DISCHARGE INSTRUCTIONS
[facility]  Radiology Department  [phone]    Radiologist: Danitza Coley    Date: 07/18/2022      Paracentesis Discharge Instructions    You may have an aching pain in your abdomen at the puncture site tonight as the numbing medicine wears off. You may take Tylenol if allowed, as directed on the label. Resume your previous diet and prescribed medications. Rest the remainder of today. If we have removed a large volume of fluid, you could be lightheaded or dizzy when making position changes. You may shower in 24 hours. Keep your dressing clean and dry. You may replace the dressing or band-aid if it becomes moist or soiled. Watch for signs of infection at the puncture site:  redness, swelling, pus, fever or chills. Should any of of these occur contact your physician immediately. If you experience severe sweating and new, severe abdominal pain seek emergency medical treatment. If any lab samples were sent during your procedure today the resutls will be sent to your Physician. Follow up with your physician as previously planned. If you have any questions please call and ask to speak to a radiology nurse.

## 2022-08-02 ENCOUNTER — VIRTUAL VISIT (OUTPATIENT)
Dept: GYNECOLOGY | Age: 67
End: 2022-08-02
Payer: MEDICARE

## 2022-08-02 DIAGNOSIS — Z79.899 ON ANTINEOPLASTIC CHEMOTHERAPY: ICD-10-CM

## 2022-08-02 DIAGNOSIS — J90 PLEURAL EFFUSION, BILATERAL: ICD-10-CM

## 2022-08-02 DIAGNOSIS — C54.1 PRIMARY SEROUS ADENOCARCINOMA OF ENDOMETRIUM (HCC): Primary | ICD-10-CM

## 2022-08-02 DIAGNOSIS — R18.0 MALIGNANT ASCITES: ICD-10-CM

## 2022-08-02 LAB
ALBUMIN SERPL-MCNC: 3.2 G/DL (ref 3.8–4.8)
ALBUMIN/GLOB SERPL: 1.2 {RATIO} (ref 1.2–2.2)
ALP SERPL-CCNC: 66 IU/L (ref 44–121)
ALT SERPL-CCNC: 8 IU/L (ref 0–32)
AST SERPL-CCNC: 16 IU/L (ref 0–40)
BASOPHILS # BLD AUTO: 0 X10E3/UL (ref 0–0.2)
BASOPHILS NFR BLD AUTO: 1 %
BILIRUB SERPL-MCNC: 0.3 MG/DL (ref 0–1.2)
BUN SERPL-MCNC: 12 MG/DL (ref 8–27)
BUN/CREAT SERPL: 27 (ref 12–28)
CALCIUM SERPL-MCNC: 9 MG/DL (ref 8.7–10.3)
CANCER AG125 SERPL-ACNC: 386 U/ML (ref 0–38.1)
CHLORIDE SERPL-SCNC: 98 MMOL/L (ref 96–106)
CO2 SERPL-SCNC: 23 MMOL/L (ref 20–29)
CREAT SERPL-MCNC: 0.44 MG/DL (ref 0.57–1)
EGFR: 107 ML/MIN/1.73
EOSINOPHIL # BLD AUTO: 0 X10E3/UL (ref 0–0.4)
EOSINOPHIL NFR BLD AUTO: 1 %
ERYTHROCYTE [DISTWIDTH] IN BLOOD BY AUTOMATED COUNT: 16.1 % (ref 11.7–15.4)
GLOBULIN SER CALC-MCNC: 2.7 G/DL (ref 1.5–4.5)
GLUCOSE SERPL-MCNC: 148 MG/DL (ref 65–99)
HCT VFR BLD AUTO: 37.9 % (ref 34–46.6)
HGB BLD-MCNC: 12 G/DL (ref 11.1–15.9)
IMM GRANULOCYTES # BLD AUTO: 0 X10E3/UL (ref 0–0.1)
IMM GRANULOCYTES NFR BLD AUTO: 0 %
LYMPHOCYTES # BLD AUTO: 0.5 X10E3/UL (ref 0.7–3.1)
LYMPHOCYTES NFR BLD AUTO: 9 %
MAGNESIUM SERPL-MCNC: 2 MG/DL (ref 1.6–2.3)
MCH RBC QN AUTO: 27 PG (ref 26.6–33)
MCHC RBC AUTO-ENTMCNC: 31.7 G/DL (ref 31.5–35.7)
MCV RBC AUTO: 85 FL (ref 79–97)
MONOCYTES # BLD AUTO: 0.4 X10E3/UL (ref 0.1–0.9)
MONOCYTES NFR BLD AUTO: 6 %
NEUTROPHILS # BLD AUTO: 5.4 X10E3/UL (ref 1.4–7)
NEUTROPHILS NFR BLD AUTO: 83 %
PLATELET # BLD AUTO: 380 X10E3/UL (ref 150–450)
POTASSIUM SERPL-SCNC: 4.6 MMOL/L (ref 3.5–5.2)
PROT SERPL-MCNC: 5.9 G/DL (ref 6–8.5)
RBC # BLD AUTO: 4.45 X10E6/UL (ref 3.77–5.28)
SODIUM SERPL-SCNC: 135 MMOL/L (ref 134–144)
WBC # BLD AUTO: 6.4 X10E3/UL (ref 3.4–10.8)

## 2022-08-02 PROCEDURE — 99442 PR PHYS/QHP TELEPHONE EVALUATION 11-20 MIN: CPT | Performed by: OBSTETRICS & GYNECOLOGY

## 2022-08-02 NOTE — PROGRESS NOTES
[facility] GYNECOLOGIC ONCOLOGY  [address] [place] [address], [address], [address] [facility]  [phone] [facility] [phone]    Office Visit    Patient ID:  Name: Padmini Mtz  MRN: [id number]   :  y.o. Visit date: 2022     INTERVAL HISTORY:  Padmini Mtz is a  female who is an established patient with stage IA, grade 3 uterine carcinoma. She underwent laparoscopic hysterectomy with staging in 2013. She was recommended six cycles of adjuvant Taxol and Carboplatin, which she completed. We elected not to treat with pelvic radiation therapy due to her difficulty with chemotherapy. She had a CT of the abdomen/pelvis at [facility] that revealed retroperitoneal lymphadenopathy, peritoneal carcinomatosis, and trace ascites. I sent her for a PET/CT to better evaluate the extent of disease. She presented to review the results and discuss treatment. Her disease is not amenable to surgical resection. Systemic therapy was her only viable option. I thought immunotherapy would be the best choice, ahead of additional chemotherapy, specifically IV Keytruda and PO Lenvima. If that were not successful, we would consider retreatment with Taxol/Carboplatin or single agent Doxil. She completed 6 cycles of immunotherapy before demonstrating progression of disease. We decided upon retreatment with Taxol/Carboplatin. She completed 8 cycles of that regimen. Her CA-125 has responded and her CT after three cycles demonstrated a response. Her CT after completion of 6 cycles demonstrated further response. A subsequent PET/CT demonstrated resolution of most of the abnormal PET activity, but there still was some residual activity in the omentum, suggesting persistent disease. We stopped treatment in 2021, as she was beginning not to tolerate treatment well. She presented recently complaining of abdominal pain and bloating.   She stated it was a stabbing pain in her left abdomen. The bloating has also been causing a little shortness of breath. I sent her for a CT of the chest/abdomen/pelvis to evaluate. She presented to review those results. The scan demonstrated recurrent disease throughout the abdomen and pelvis. I recommended single agent Doxil. She completed 3 cycles of Doxil. Due to early satiety and complaints of bloating and abdominal distention, I sent her for a paracentesis in December. They did not see enough fluid on ultrasound to attempt drainage. She was also diagnosed with COVID around that time and is still recovering, but has since tested negative. She presented to review her interval CT scan after 3 cycles. Unfortunately it demonstrated progression. I discussed other treatment options with her, including the possibility of a clinical trial at [facility]. Ultimately we decided upon single agent Avastin. I explained that it won't make the tumor \"go away\", but it may slow down the growth and help manage the ascites. She has completed 7 cycles so far. Her last CT in early July 2022 demonstrated overall stable disease, but she did have more fluid present. Her CA-125 remains elevated, but relatively stable. She had a paracentesis in mid July for symptomatic relief. She felt better for a while, but the fluid is starting to accumulate once again. PMH:  Past Medical History:   Diagnosis Date    Abnormal Pap smear 1995    with f/u normal    Anemia     Arthritis     Asthma     Cancer ([address]     ENDOMETRIAL    Environmental allergies     GERD (gastroesophageal reflux disease)     Goiter     Other unknown and unspecified cause of morbidity or mortality     POSSIBLE ESOPHAGEAL SPASM, ?  OBSTRUCTION DUE TO GOITER    PMB (postmenopausal bleeding)     S/P chemotherapy, time since greater than 12 weeks     LAST CHEMO 10/2014 PER PATIENT    Thyroid disease     goiter     PSH:  Past Surgical History:   Procedure Laterality Date    HX APPENDECTOMY      HX BUNIONECTOMY      HX GYN  3/13/13    TLH/BSO    HX HEENT  4/22/13    TOOTH REMOVED    HX ORTHOPAEDIC  1980'S    BUNIONECTOMY    HX VASCULAR ACCESS      [place]    [place] BREAST SURGERY PROCEDURE UNLISTED  1/12/16    right breast bx     SOC:  Social History     Socioeconomic History    Marital status:      Spouse name: Not on file    Number of children: Not on file    Years of education: Not on file    Highest education level: Not on file   Occupational History    Not on file   Tobacco Use    Smoking status: Never    Smokeless tobacco: Never   Substance and Sexual Activity    Alcohol use: Yes     Alcohol/week: 0.0 standard drinks     Comment: RARE OCC    Drug use: No    Sexual activity: Not on file   Other Topics Concern    Not on file   Social History Narrative    Not on file     Social Determinants of Health     Financial Resource Strain: Not on file   Food Insecurity: Not on file   Transportation Needs: Not on file   Physical Activity: Not on file   Stress: Not on file   Social Connections: Not on file   Intimate Partner Violence: Not on file   Housing Stability: Not on file     Family History  Family History   Problem Relation Age of Onset    Cancer Maternal Aunt         breast    Heart Disease Mother     Diabetes Father     Cancer Sister         CERVICAL    Kidney Disease Brother     Diabetes Brother     Heart Attack Other     Arrhythmia Brother     Diabetes Brother     Anesth Problems Neg Hx      Medications:  Current Outpatient Medications on File Prior to Visit   Medication Sig Dispense Refill    BACITRACIN, BULK, by Does Not Apply route. With zinc ointment      rivaroxaban (Xarelto) 20 mg tab tablet Take 1 Tablet by mouth daily. 30 Tablet 5    acetaminophen (TylenoL) 325 mg tablet Take  by mouth as needed. lisinopriL (PRINIVIL, ZESTRIL) 10 mg tablet Take 1 Tab by mouth daily. 30 Tab 2    lisinopriL (PRINIVIL, ZESTRIL) 5 mg tablet Take 2 Tabs by mouth daily.  30 Tab 5    ondansetron (ZOFRAN ODT) 4 mg disintegrating tablet Take 1 Tab by mouth every eight (8) hours as needed for Nausea. Indications: nausea and vomiting caused by cancer drugs 30 Tab 3    lidocaine-prilocaine (EMLA) topical cream Apply small amount over port area and cover with band aid one hour before chemo 30 g 3    guaifenesin (MUCINEX PO) Take  by mouth.      loratadine (Claritin) 10 mg tablet Take 10 mg by mouth.      epinastine 0.05 % drop Apply  to eye. raNITIdine (ZANTAC) 150 mg tablet ZANTAC TABS      cyclobenzaprine (FLEXERIL) 5 mg tablet take 1 tablet by mouth three times a day if needed  0    valACYclovir (VALTREX) 1 gram tablet Take 1 Tab by mouth three (3) times daily. 21 Tab 3    EPINEPHrine (EPIPEN) 0.3 mg/0.3 mL injection 0.3 mg by IntraMUSCular route once as needed. ketotifen (ZADITOR) 0.025 % (0.035 %) ophthalmic solution Administer 1 Drop to both eyes every morning. Cetirizine (ZYRTEC) 10 mg cap Take 10 mg by mouth nightly. (Patient not taking: Reported on 5/12/2022)      SAL ACID/UREA/PETROLATUM,WHITE (KERASAL FOOT EX) by Apply Externally route daily as needed. ACCU-CHEK HELDER PLUS TEST STRP strip   0    NITROSTAT 0.4 mg SL tablet       CALCIUM CARBONATE (MARCELLO-SELTZER ANTACID PO) Take  by mouth daily as needed. No current facility-administered medications on file prior to visit. Allergies: Allergies   Allergen Reactions    Latex Other (comments)     Burns skin    Acetone Shortness of Breath    Amoxicillin Anaphylaxis    Ciprofloxacin Hives    Pcn [Penicillins] Anaphylaxis    Buspar [Buspirone] Unknown (comments)     Has N&V and makes her feel \"weird\"    Azithromycin Hives    Food [Egg] Nausea Only    Other Medication Rash     POPPY seeds       ROS:  Negative     OBJECTIVE:    PHYSICAL EXAM  VITAL SIGNS: There were no vitals taken for this visit.      GENERAL KAIA:    HEENT:    RESPIRATORY:    CARDIOVASC:    GASTROINT:    MUSCULOSKEL:    EXTREMITIES:    PELVIC:    RECTAL:    PRIYANKA SURVEY:    NEURO: Lab Results   Component Value Date/Time    WBC 5.3 07/11/2022 12:20 PM    Hemoglobin (POC) 12.9 05/01/2013 09:53 AM    HGB 11.6 07/11/2022 12:20 PM    Hematocrit (POC) 38 05/01/2013 09:53 AM    HCT 37.1 07/11/2022 12:20 PM    PLATELET 989 84/53/3924 12:20 PM    MCV 86 07/11/2022 12:20 PM     Lab Results   Component Value Date/Time    Sodium 135 07/11/2022 12:20 PM    Potassium 4.3 07/11/2022 12:20 PM    Chloride 97 07/11/2022 12:20 PM    CO2 23 07/11/2022 12:20 PM    Anion gap 7 05/12/2022 01:03 PM    Glucose 100 (H) 07/11/2022 12:20 PM    BUN 11 07/11/2022 12:20 PM    Creatinine 0.37 (L) 07/11/2022 12:20 PM    BUN/Creatinine ratio 30 (H) 07/11/2022 12:20 PM    GFR est AA >60 05/12/2022 01:03 PM    GFR est non-AA >60 05/12/2022 01:03 PM    Calcium 8.5 (L) 07/11/2022 12:20 PM    Bilirubin, total 0.3 07/11/2022 12:20 PM    Alk. phosphatase 66 07/11/2022 12:20 PM    Protein, total 6.0 07/11/2022 12:20 PM    Albumin 3.5 (L) 07/11/2022 12:20 PM    Globulin 4.5 (H) 05/12/2022 01:03 PM    A-G Ratio 1.4 07/11/2022 12:20 PM    ALT (SGPT) 6 07/11/2022 12:20 PM    AST (SGOT) 17 07/11/2022 12:20 PM     Lab Results   Component Value Date/Time    CA-125 333 (H) 05/12/2022 01:03 PM    Cancer Ag (CA) 125 477.0 (H) 07/11/2022 12:20 PM       CT of abdomen/pelvis (5/5/21)  LOWER THORAX: Right cardiophrenic lymph node measures 2.9 cm and previously  measured 1.2 cm on image number 15. There is minor basilar atelectasis/scarring  LIVER: No mass. BILIARY TREE: There is suggestion of gallstones CBD is not dilated. SPLEEN: within normal limits. PANCREAS: No mass or ductal dilatation. ADRENALS: Unremarkable. KIDNEYS: No mass, calculus, or hydronephrosis. STOMACH: Unremarkable. SMALL BOWEL: No dilatation or wall thickening. COLON: No dilatation or wall thickening. APPENDIX: Status post appendectomy  PERITONEUM: Peritoneal carcinomatosis appears improved.  There is a confluent  density anteriorly in the peritoneum on image number 43 measuring 5.9 x 1.4 cm  which previously measured 9.8 x 1.9 cm. RETROPERITONEUM: Left retroperitoneal lymph node measures 0.7 cm image 45 and  previously measured 1.6 cm. Right retroperitoneal lymph node measures 0.7 cm image 46 and previously  measured 0.8 cm. Left iliac lymph node image 57 measures 0.9 cm and previously measured 1.1 cm. REPRODUCTIVE ORGANS: Status post hysterectomy and oophorectomy. URINARY BLADDER: No mass or calculus. BONES: No destructive bone lesion. ABDOMINAL WALL: No mass or hernia. ADDITIONAL COMMENTS: N/A     IMPRESSION  1. There has been a mild decrease in the peritoneal carcinomatosis. 2. There has been slight to mild decrease in the retroperitoneal adenopathy. 3. No ascites is identified. No definite pelvic mass is identified. CT of chests/abdomen/pelvis (7/12/21)  THYROID: Heterogeneous thyroid gland with multiple nodules bilaterally including  in the isthmus. MEDIASTINUM: Left subclavian chest port terminates in the SVC. No mediastinal  adenopathy. RAMEZ: No mass or lymphadenopathy. THORACIC AORTA: No dissection or aneurysm. MAIN PULMONARY ARTERY: Nonocclusive thrombus in segmental branches of the right  upper lobe pulmonary artery (3:47, 52), which are nonocclusive and likely  chronic. TRACHEA/BRONCHI: Patent. ESOPHAGUS: No wall thickening or dilatation. HEART: Normal in size. PLEURA: No effusion or pneumothorax. LUNGS: No nodule, mass, or airspace disease. LIVER: No mass or biliary dilatation. GALLBLADDER: Sludge in a nondistended gallbladder. SPLEEN: No mass. PANCREAS: No mass or ductal dilatation. ADRENALS: Unremarkable. KIDNEYS: No mass, calculus, or hydronephrosis. STOMACH: Unremarkable. SMALL BOWEL: No dilatation or wall thickening. COLON: Scattered diverticula. APPENDIX: Surgically absent  PERITONEUM: Decreased volume and size of the omental nodularity along the  anterior peritoneal surface just above the umbilicus. No ascites. No  pneumoperitoneum. RETROPERITONEUM: No lymphadenopathy or aortic aneurysm. REPRODUCTIVE ORGANS: Surgically absent. URINARY BLADDER: No mass or calculus. BONES: No destructive bone lesion. ADDITIONAL COMMENTS: N/A     IMPRESSION  1. Resolved retroperitoneal lymphadenopathy. 2.  Decreased peritoneal carcinomatosis. 3.  No ascites. 4.  Nonocclusive thrombus and segmental right upper lobe pulmonary artery  branches which are nonocclusive and appear chronic. PET/CT (8/27/21)  HEAD/NECK: No apparent foci of abnormal hypermetabolism. Cerebral evaluation is  limited by normal intense activity. CHEST: No foci of abnormal hypermetabolism. Resolution of left supraclavicular  jasper activity. ABDOMEN/PELVIS:   Omental/peritoneal disease is near completely resolved; current max SUV, midline  omentum 2.4, previous max SUV 12. Resolved retroperitoneal and right deep pelvic jasper activity. Resolved left pelvic cul-de-sac mass/activity. SKELETON: No foci of abnormal hypermetabolism in the axial and visualized  appendicular skeleton. IMPRESSION  Abnormal PET activity is resolved other than minimal residual  activity in the omentum. CT of chest/abdomen/pelvis (11/9/21)  Chest:     Tubes and lines: Central venous port catheter extends to the SVC. Lungs: There is mild bibasilar atelectasis. No pulmonary nodule or mass is seen. Lymph nodes: There is no mediastinal, hilar or axillary lymphadenopathy. Subcentimeter axillary and mediastinal lymph nodes are noted. Pleura: There is trace bilateral pleural fluid, new compared to prior CT dated  July 2021. Heart: The heart is normal in size and there is no pericardial fluid. Bones: No lytic or sclerotic osseous lesion is visualized. The thyroid gland: Thyroid gland is normal in size. There are several nodules  within the thyroid gland, unchanged compared to prior CT dated July 2021.      Abdomen/pelvis:  Lymph nodes/peritoneum/retroperitoneum/omentum: There is a 1.3 cm x 1.3 cm  cardiophrenic lymph node which measured 8 mm x 7 mm on prior CT dated May 2021. There is a small amount of free fluid in the pelvis, new compared to prior CT  dated July 2021. There has been interval worsening of coalescing of omental  caking and intraperitoneal soft tissue nodularity. For example, within the  anterior mid abdomen, there is an omental soft tissue mass measuring  approximately 13.8 cm x 5.6 cm and measuring approximately 9.5 cm x 1.8 cm on  prior CT dated July 2021. There are also mildly enlarged upper abdominal  mesenteric lymph nodes which have increased in size. For example, there is a 2  cm x 1.4 cm celiac lymph node which measures approximately 6 mm x 4 mm on prior  CT dated July 2021. As an additional example, there is a 1.3 cm x 1.3 cm  mesenteric lymph node within the upper pelvis, measuring several millimeters in  size on prior CT dated July 2021. Retroperitoneal lymph nodes have increased in  size as well. For example there is a 1.8 cm x 1.3 cm right common iliac lymph  node which measured 8 mm x 7 mm on prior CT dated July 2021. Liver: There is subtle capsular hepatic, new compared to prior CT dated July 2021. No intraparenchymal hepatic lesion is seen. Spleen: The spleen is normal.     Pancreas: The pancreas is normal.     Adrenals: The adrenals are normal.     Gallbladder: Gallbladder is normal.     Kidneys: The kidneys are normal. There is no hydronephrosis. Bowel: There is new ill-defined soft tissue in the pelvis which appears to be  extending from the colon, likely representing subserosal soft tissue nodularity  and tethering. No dilated loop of large or small bowel is seen. Colonic  diverticulosis is noted. Appendix: The appendix is surgically absent. Urinary bladder: Urinary bladder is partially filled and grossly normal.     Bones: No lytic or sclerotic osseous lesion is visualized. Miscellaneous: There is no free intraperitoneal gas. There is no focal fluid  collection to suggest abscess. IMPRESSION  1. New, trace bilateral pleural fluid. Mild bibasilar atelectasis. 2.: Increase in size of cardiophrenic lymph node, now measuring 1.3 cm x 1.3 cm.  3. Interval worsening of coalescing of omental caking, intraperitoneal soft  tissue nodularity and intraperitoneal and retroperitoneal lymphadenopathy. 4. New, small amount of ascites in the pelvis. CT of abdomen/pelvis (2/17/22)  LOWER THORAX: Small bilateral pleural effusions. No visualized lung nodules  LIVER: No mass. BILIARY TREE: Gallbladder is within normal limits. CBD is not dilated. SPLEEN: within normal limits. PANCREAS: No mass or ductal dilatation. ADRENALS: Unremarkable. KIDNEYS: No mass, calculus, or hydronephrosis. STOMACH: Unremarkable. SMALL BOWEL: No dilatation or wall thickening. COLON: No dilatation or wall thickening. APPENDIX: Surgically absent  PERITONEUM: Omental and peritoneal nodularity with small volume scattered  ascites consistent with peritoneal carcinomatosis. Mesenteric, portacaval, and  gastrohepatic ligament lymphadenopathy. RETROPERITONEUM: Retroperitoneal lymphadenopathy along the IVC. REPRODUCTIVE ORGANS: Status post hysterectomy  URINARY BLADDER: No mass or calculus. BONES: No destructive bone lesion. ABDOMINAL WALL: No mass or hernia. ADDITIONAL COMMENTS: N/A     IMPRESSION  1. Peritoneal and omental nodularity with small volume ascites consistent with  peritoneal carcinomatosis. 2.  Lymphadenopathy in the mesentery, gastrohepatic ligament, portacaval, and  retroperitoneal stations. 3.  Small bilateral pleural effusions. CT of chest/abdomen/pelvis (5/7/22)  CHEST WALL: No mass or axillary lymphadenopathy. THYROID: No significant change in multiple small nodules. Otherwise  unremarkable. MEDIASTINUM: 1.3 cm precarinal node. No mass or other enlarged lymphadenopathy.   RAMEZ: No mass or lymphadenopathy. THORACIC AORTA: No dissection or aneurysm. MAIN PULMONARY ARTERY: Normal in caliber. TRACHEA/BRONCHI: Patent. ESOPHAGUS: No wall thickening or dilatation. HEART: Normal in size. PLEURA: Moderate bilateral pleural effusions with no pneumothorax. LUNGS: Compressive atelectasis overlies pleural effusions with no nodule, mass,  or other airspace disease. LIVER: No mass. BILIARY TREE: Gallbladder is within normal limits. CBD is not dilated. SPLEEN: within normal limits. PANCREAS: No mass or ductal dilatation. ADRENALS: Unremarkable. KIDNEYS: No mass, calculus, or hydronephrosis. STOMACH: Unremarkable. SMALL BOWEL: No dilatation or wall thickening. COLON: No dilation or wall thickening. Noninflamed appearing left and sigmoid  diverticula. APPENDIX: Surgically absent. PERITONEUM: Extensive omental nodularity and caking redemonstrated with anterior  omental mass measuring 5.0 x 12.2 cm, previously 6.6 x 14.4 cm. Just lesion is along the greater curvature of the stomach measuring 2.7 x 3.2  cm, previously 3.8 x 4.7 cm. There is small ascites, portions of which appear  loculated on around the liver margin and splenic margin, not significant change  from prior. RETROPERITONEUM: There is diffuse adenopathy measuring up to 1.5 x 2.1 cm in the  portacaval station, previously 1.3 x 2.1 cm, 1.5 x 1.9 cm at the celiac,  previously 1.3 x 1.8 cm, 1.3 x 1.7 cm right aortocaval, previously 0.9 x 1.7 cm. Right common iliac adenopathy measures 1.7 x 1.7 cm, previously 1.7 x 1.9 cm. REPRODUCTIVE ORGANS: Uterus and ovaries are surgically absent. URINARY BLADDER: No mass or calculus. BONES: Degenerative spine change. No acute fracture or aggressive lesion. ABDOMINAL WALL: No mass or hernia. ADDITIONAL COMMENTS: Left Port-A-Cath in place with catheter traversing to the  atriocaval junction. IMPRESSION  1. Pleural effusions with overlying atelectasis.   2. Peritoneal carcinomatosis with overall interval mild decrease in bulk of  disease. 3. Retroperitoneal lymphadenopathy slightly worsened compared to prior  examinations suspicious for mildly progressive metastatic disease. 4. Incidentals as above including colonic diverticulosis. CT of chest/abdomen/pelvis (7/9/22)  CHEST WALL: Left chest wall port catheter terminates in the SVC. THYROID: Heterogeneous thyroid gland with small nodules. MEDIASTINUM: Enlarged right lower paratracheal lymph node measuring 14 mm  (3:26), stable. Subcarinal lymph node is stable at 10 mm. RAMEZ: Stable right hilar lymphadenopathy. THORACIC AORTA: No dissection or aneurysm. MAIN PULMONARY ARTERY: Normal in caliber. TRACHEA/BRONCHI: Patent. ESOPHAGUS: No wall thickening or dilatation. HEART: Normal in size. PLEURA: Bilateral pleural effusions increased since the prior study. LUNGS: Bibasilar atelectasis. No suspicious nodule. LIVER: No mass. BILIARY TREE: Gallbladder is within normal limits. CBD is not dilated. SPLEEN: within normal limits. PANCREAS: No mass or ductal dilatation. ADRENALS: Unremarkable. KIDNEYS: No mass, calculus, or hydronephrosis. STOMACH: Unremarkable. SMALL BOWEL: No dilatation or wall thickening. COLON: No dilatation or wall thickening. APPENDIX: Nonvisualized  PERITONEUM: Anterior peritoneal/omental implant measures 12.3 x 4.1 cm,  minimally changed in size. Persistent omental/peritoneal implants in the left  upper quadrant near the splenic hilum, bilateral paracolic gutters, and in the  dependent portion of the peritoneal reflection. Numerous enlarged mesenteric  lymph nodes, as before. Increased ascites in the abdomen. RETROPERITONEUM: Stable persistent retroperitoneal lymphadenopathy. REPRODUCTIVE ORGANS: Status post hysterectomy. URINARY BLADDER: Nondistended urinary bladder limits evaluation. BONES: No destructive bone lesion. ABDOMINAL WALL: No mass or hernia.   ADDITIONAL COMMENTS: N/A     IMPRESSION  1. Increased bilateral pleural effusions and ascites. 2.  Persistent peritoneal/omental nodularity, mesenteric lymphadenopathy, and  retroperitoneal lymphadenopathy, not significantly changed. 3.  Stable mediastinal lymphadenopathy. 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 She completed six cycles of adjuvant Taxol and Carboplatin chemotherapy. She developed recurrent disease and was treated with the regimen of IV Keytruda and PO Lenvima. She completed 6 cycles before progression. Since it had been almost 8 years since her adjuvant Taxol/Carboplatin, I recommended that we retreat her with that regimen, though I suggested a lower dose of each. She completed 8 cycles of that regimen with an excellent response. She now has recurrence once again. I recommended single agent Doxil chemotherapy. She completed 3 cycles before a CT demonstrated progression of disease. We stopped the Doxil and I discussed other treatment options with her, including the possibility of a clinical trial at [facility]. Ultimately we decided upon single agent Avastin. I explained that it won't make the tumor \"go away\", but it may slow down the growth and help manage the ascites. She has completed 7 cycles so far. Her most recent CT demonstrated increased effusions and ascites, but the measurable disease appears stable or unchanged. I recommend continuing the same regimen for now, considering the measurable disease is stable. I am going to get a repeat CT after the next cycle. If her fluid increases, we will likely need to repeat a paracentesis or place an Aspira catheter for management. Suhas Marion is a 78 y.o. female, evaluated via audio-only technology on 8/2/2022 for No chief complaint on file. Assessment & Plan:   Diagnoses and all orders for this visit:    1.  [facility]    2. On antineoplastic chemotherapy    3. Pleural effusion, bilateral    4. Malignant ascites        Subjective:       Prior to Admission medications    Medication Sig Start Date End Date Taking? Authorizing Provider   BACITRACIN, ROBBY, by Does Not Apply route. With zinc ointment    Provider, Historical   rivaroxaban (Xarelto) 20 mg tab tablet Take 1 Tablet by mouth daily. 2/1/22   Roger Pruett MD   acetaminophen (TylenoL) 325 mg tablet Take  by mouth as needed. Provider, Historical   lisinopriL (PRINIVIL, ZESTRIL) 10 mg tablet Take 1 Tab by mouth daily. 1/6/21   Roger Pruett MD   lisinopriL (PRINIVIL, ZESTRIL) 5 mg tablet Take 2 Tabs by mouth daily. 1/5/21   Hieu Cline PA-C   ondansetron (ZOFRAN ODT) 4 mg disintegrating tablet Take 1 Tab by mouth every eight (8) hours as needed for Nausea. Indications: nausea and vomiting caused by cancer drugs 10/22/20   Roger Pruett MD   lidocaine-prilocaine (EMLA) topical cream Apply small amount over port area and cover with band aid one hour before chemo 10/22/20   Roger Pruett MD   guaifenesin (MUCINEX PO) Take  by mouth. Provider, Historical   loratadine (Claritin) 10 mg tablet Take 10 mg by mouth. Provider, Historical   epinastine 0.05 % drop Apply  to eye. Provider, Historical   raNITIdine (ZANTAC) 150 mg tablet ZANTAC TABS 9/29/11   Provider, Historical   cyclobenzaprine (FLEXERIL) 5 mg tablet take 1 tablet by mouth three times a day if needed 12/5/16   Provider, Historical   valACYclovir (VALTREX) 1 gram tablet Take 1 Tab by mouth three (3) times daily. 12/15/16   Roger Pruett MD   EPINEPHrine (EPIPEN) 0.3 mg/0.3 mL injection 0.3 mg by IntraMUSCular route once as needed. Provider, Historical   ketotifen (ZADITOR) 0.025 % (0.035 %) ophthalmic solution Administer 1 Drop to both eyes every morning. Provider, Historical   Cetirizine (ZYRTEC) 10 mg cap Take 10 mg by mouth nightly.   Patient not taking: Reported on 5/12/2022    Provider, Historical   SAL ACID/UREA/PETROLATUM,WHITE (KERASAL FOOT EX) by Apply Externally route daily as needed. Provider, Historical   ACCU-CHEK HELDER PLUS TEST STRP strip  7/3/15   Provider, Historical   NITROSTAT 0.4 mg SL tablet  9/22/14   Provider, Historical   CALCIUM CARBONATE (MARCELLO-SELTZER ANTACID PO) Take  by mouth daily as needed. Provider, Historical     Patient Active Problem List   Diagnosis Code    Malignant neoplasm of corpus uteri, except isthmus ([facility] [address] C54.9     Patient Active Problem List    Diagnosis Date Noted    Malignant neoplasm of corpus uteri, except isthmus ([facility] [address] 02/28/2013     Current Outpatient Medications   Medication Sig Dispense Refill    BACITRACIN, BULK, by Does Not Apply route. With zinc ointment      rivaroxaban (Xarelto) 20 mg tab tablet Take 1 Tablet by mouth daily. 30 Tablet 5    acetaminophen (TylenoL) 325 mg tablet Take  by mouth as needed. lisinopriL (PRINIVIL, ZESTRIL) 10 mg tablet Take 1 Tab by mouth daily. 30 Tab 2    lisinopriL (PRINIVIL, ZESTRIL) 5 mg tablet Take 2 Tabs by mouth daily. 30 Tab 5    ondansetron (ZOFRAN ODT) 4 mg disintegrating tablet Take 1 Tab by mouth every eight (8) hours as needed for Nausea. Indications: nausea and vomiting caused by cancer drugs 30 Tab 3    lidocaine-prilocaine (EMLA) topical cream Apply small amount over port area and cover with band aid one hour before chemo 30 g 3    guaifenesin (MUCINEX PO) Take  by mouth.      loratadine (Claritin) 10 mg tablet Take 10 mg by mouth.      epinastine 0.05 % drop Apply  to eye. raNITIdine (ZANTAC) 150 mg tablet ZANTAC TABS      cyclobenzaprine (FLEXERIL) 5 mg tablet take 1 tablet by mouth three times a day if needed  0    valACYclovir (VALTREX) 1 gram tablet Take 1 Tab by mouth three (3) times daily. 21 Tab 3    EPINEPHrine (EPIPEN) 0.3 mg/0.3 mL injection 0.3 mg by IntraMUSCular route once as needed.       ketotifen (ZADITOR) 0.025 % (0.035 %) ophthalmic solution Administer 1 Drop to both eyes every morning. Cetirizine (ZYRTEC) 10 mg cap Take 10 mg by mouth nightly. (Patient not taking: Reported on 5/12/2022)      SAL ACID/UREA/PETROLATUM,WHITE (KERASAL FOOT EX) by Apply Externally route daily as needed. ACCU-CHEK HELDER PLUS TEST STRP strip   0    NITROSTAT 0.4 mg SL tablet       CALCIUM CARBONATE (MARCELLO-SELTZER ANTACID PO) Take  by mouth daily as needed. Allergies   Allergen Reactions    Latex Other (comments)     Burns skin    Acetone Shortness of Breath    Amoxicillin Anaphylaxis    Ciprofloxacin Hives    Pcn [Penicillins] Anaphylaxis    Buspar [Buspirone] Unknown (comments)     Has N&V and makes her feel \"weird\"    Azithromycin Hives    Food [Egg] Nausea Only    Other Medication Rash     POPPY seeds     Past Medical History:   Diagnosis Date    Abnormal Pap smear 1995    with f/u normal    Anemia     Arthritis     Asthma     Cancer ([facility] [address]     ENDOMETRIAL    Environmental allergies     GERD (gastroesophageal reflux disease)     Goiter     Other unknown and unspecified cause of morbidity or mortality     POSSIBLE ESOPHAGEAL SPASM, ?  OBSTRUCTION DUE TO GOITER    PMB (postmenopausal bleeding)     S/P chemotherapy, time since greater than 12 weeks     LAST CHEMO 10/2014 PER PATIENT    Thyroid disease     goiter     Past Surgical History:   Procedure Laterality Date    HX APPENDECTOMY      HX BUNIONECTOMY      HX GYN  3/13/13    TLH/BSO    HX HEENT  4/22/13    TOOTH REMOVED    HX ORTHOPAEDIC  1980'S    BUNIONECTOMY    HX VASCULAR ACCESS      port    OK BREAST SURGERY PROCEDURE UNLISTED  1/12/16    right breast bx     Family History   Problem Relation Age of Onset    Cancer Maternal Aunt         breast    Heart Disease Mother     Diabetes Father     Cancer Sister         CERVICAL    Kidney Disease Brother     Diabetes Brother     Heart Attack Other     Arrhythmia Brother     Diabetes Brother     Anesth Problems Neg Hx      Social History     Tobacco Use    Smoking status: Never Smokeless tobacco: Never   Substance Use Topics    Alcohol use: Yes     Alcohol/week: 0.0 standard drinks     Comment: RARE OCC       ROS    Patient-Reported Vitals 1/19/2021   Patient-Reported Systolic  996   Patient-Reported Diastolic 96        Tan Serra, who was evaluated through a patient-initiated, synchronous (real-time) audio only encounter, and/or her healthcare decision maker, is aware that it is a billable service, with coverage as determined by her insurance carrier. She provided verbal consent to proceed: Yes. She has not had a related appointment within my department in the past 7 days or scheduled within the next 24 hours. Total Time: minutes: 11-20 minutes      An electronic signature was used to sign this note.     Sigrid Rojas MD  08/02/22

## 2022-08-03 LAB
APPEARANCE UR: CLEAR
BACTERIA #/AREA URNS HPF: NORMAL /[HPF]
BILIRUB UR QL STRIP: NEGATIVE
CASTS URNS QL MICRO: NORMAL /LPF
COLOR UR: YELLOW
EPI CELLS #/AREA URNS HPF: NORMAL /HPF (ref 0–10)
GLUCOSE UR QL STRIP: NEGATIVE
HGB UR QL STRIP: NEGATIVE
KETONES UR QL STRIP: NEGATIVE
LEUKOCYTE ESTERASE UR QL STRIP: NEGATIVE
MICRO URNS: NORMAL
MICRO URNS: NORMAL
NITRITE UR QL STRIP: NEGATIVE
PH UR STRIP: 6.5 [PH] (ref 5–7.5)
PROT UR QL STRIP: NEGATIVE
RBC #/AREA URNS HPF: NORMAL /HPF (ref 0–2)
SP GR UR STRIP: 1.01 (ref 1–1.03)
UROBILINOGEN UR STRIP-MCNC: 0.2 MG/DL (ref 0.2–1)
WBC #/AREA URNS HPF: NORMAL /HPF (ref 0–5)

## 2022-08-04 ENCOUNTER — HOSPITAL ENCOUNTER (OUTPATIENT)
Dept: INFUSION THERAPY | Age: 67
Discharge: HOME | End: 2022-08-04
Payer: MEDICARE

## 2022-08-04 ENCOUNTER — APPOINTMENT (OUTPATIENT)
Dept: INFUSION THERAPY | Age: 67
End: 2022-08-04
Payer: MEDICARE

## 2022-08-04 VITALS
RESPIRATION RATE: 18 BRPM | HEART RATE: 93 BPM | BODY MASS INDEX: 25.56 KG/M2 | WEIGHT: 153.6 LBS | DIASTOLIC BLOOD PRESSURE: 80 MMHG | SYSTOLIC BLOOD PRESSURE: 112 MMHG

## 2022-08-04 DIAGNOSIS — R18.0 MALIGNANT ASCITES: ICD-10-CM

## 2022-08-04 DIAGNOSIS — J90 PLEURAL EFFUSION, BILATERAL: ICD-10-CM

## 2022-08-04 DIAGNOSIS — Z51.11 ENCOUNTER FOR ANTINEOPLASTIC CHEMOTHERAPY: ICD-10-CM

## 2022-08-04 DIAGNOSIS — C54.1 PRIMARY SEROUS ADENOCARCINOMA OF ENDOMETRIUM (HCC): Primary | ICD-10-CM

## 2022-08-04 DIAGNOSIS — C54.9 MALIGNANT NEOPLASM OF CORPUS UTERI, EXCEPT ISTHMUS (HCC): Primary | ICD-10-CM

## 2022-08-04 PROCEDURE — 74011250636 HC RX REV CODE- 250/636: Performed by: OBSTETRICS & GYNECOLOGY

## 2022-08-04 PROCEDURE — 74011000258 HC RX REV CODE- 258: Performed by: OBSTETRICS & GYNECOLOGY

## 2022-08-04 PROCEDURE — 74011250636 HC RX REV CODE- 250/636: Performed by: PHYSICIAN ASSISTANT

## 2022-08-04 PROCEDURE — 96413 CHEMO IV INFUSION 1 HR: CPT

## 2022-08-04 PROCEDURE — 99215 OFFICE O/P EST HI 40 MIN: CPT | Performed by: PHYSICIAN ASSISTANT

## 2022-08-04 PROCEDURE — 77030012965 HC NDL HUBR BBMI -A

## 2022-08-04 RX ORDER — HYDROCORTISONE SODIUM SUCCINATE 100 MG/2ML
100 INJECTION, POWDER, FOR SOLUTION INTRAMUSCULAR; INTRAVENOUS AS NEEDED
Status: CANCELLED | OUTPATIENT
Start: 2022-08-25

## 2022-08-04 RX ORDER — EPINEPHRINE 1 MG/ML
0.3 INJECTION, SOLUTION, CONCENTRATE INTRAVENOUS AS NEEDED
Status: CANCELLED | OUTPATIENT
Start: 2022-08-25

## 2022-08-04 RX ORDER — SODIUM CHLORIDE 9 MG/ML
25 INJECTION, SOLUTION INTRAVENOUS CONTINUOUS
Status: CANCELLED | OUTPATIENT
Start: 2022-08-25

## 2022-08-04 RX ORDER — DIPHENHYDRAMINE HYDROCHLORIDE 50 MG/ML
25 INJECTION, SOLUTION INTRAMUSCULAR; INTRAVENOUS AS NEEDED
Status: ACTIVE | OUTPATIENT
Start: 2022-08-04 | End: 2022-08-04

## 2022-08-04 RX ORDER — EPINEPHRINE 1 MG/ML
0.3 INJECTION, SOLUTION, CONCENTRATE INTRAVENOUS AS NEEDED
Status: ACTIVE | OUTPATIENT
Start: 2022-08-04 | End: 2022-08-04

## 2022-08-04 RX ORDER — ONDANSETRON 2 MG/ML
8 INJECTION INTRAMUSCULAR; INTRAVENOUS AS NEEDED
Status: ACTIVE | OUTPATIENT
Start: 2022-08-04 | End: 2022-08-04

## 2022-08-04 RX ORDER — SODIUM CHLORIDE 0.9 % (FLUSH) 0.9 %
10 SYRINGE (ML) INJECTION AS NEEDED
Status: DISPENSED | OUTPATIENT
Start: 2022-08-04 | End: 2022-08-04

## 2022-08-04 RX ORDER — HEPARIN 100 UNIT/ML
300-500 SYRINGE INTRAVENOUS AS NEEDED
Status: ACTIVE | OUTPATIENT
Start: 2022-08-04 | End: 2022-08-04

## 2022-08-04 RX ORDER — ONDANSETRON 2 MG/ML
8 INJECTION INTRAMUSCULAR; INTRAVENOUS AS NEEDED
Status: CANCELLED | OUTPATIENT
Start: 2022-08-25

## 2022-08-04 RX ORDER — ALBUTEROL SULFATE 0.83 MG/ML
2.5 SOLUTION RESPIRATORY (INHALATION) AS NEEDED
Status: ACTIVE | OUTPATIENT
Start: 2022-08-04 | End: 2022-08-04

## 2022-08-04 RX ORDER — ALBUTEROL SULFATE 0.83 MG/ML
2.5 SOLUTION RESPIRATORY (INHALATION) AS NEEDED
Status: CANCELLED | OUTPATIENT
Start: 2022-08-25

## 2022-08-04 RX ORDER — DIPHENHYDRAMINE HYDROCHLORIDE 50 MG/ML
50 INJECTION, SOLUTION INTRAMUSCULAR; INTRAVENOUS AS NEEDED
Status: CANCELLED | OUTPATIENT
Start: 2022-08-25

## 2022-08-04 RX ORDER — SODIUM CHLORIDE 9 MG/ML
500 INJECTION, SOLUTION INTRAVENOUS ONCE
Status: COMPLETED | OUTPATIENT
Start: 2022-08-04 | End: 2022-08-04

## 2022-08-04 RX ORDER — DIPHENHYDRAMINE HYDROCHLORIDE 50 MG/ML
25 INJECTION, SOLUTION INTRAMUSCULAR; INTRAVENOUS AS NEEDED
Status: CANCELLED | OUTPATIENT
Start: 2022-08-25

## 2022-08-04 RX ORDER — HYDROCORTISONE SODIUM SUCCINATE 100 MG/2ML
100 INJECTION, POWDER, FOR SOLUTION INTRAMUSCULAR; INTRAVENOUS AS NEEDED
Status: ACTIVE | OUTPATIENT
Start: 2022-08-04 | End: 2022-08-04

## 2022-08-04 RX ORDER — SODIUM CHLORIDE 9 MG/ML
10 INJECTION INTRAMUSCULAR; INTRAVENOUS; SUBCUTANEOUS AS NEEDED
Status: CANCELLED | OUTPATIENT
Start: 2022-08-25

## 2022-08-04 RX ORDER — HEPARIN 100 UNIT/ML
300-500 SYRINGE INTRAVENOUS AS NEEDED
Status: CANCELLED | OUTPATIENT
Start: 2022-08-25

## 2022-08-04 RX ORDER — SODIUM CHLORIDE 9 MG/ML
10 INJECTION INTRAMUSCULAR; INTRAVENOUS; SUBCUTANEOUS AS NEEDED
Status: ACTIVE | OUTPATIENT
Start: 2022-08-04 | End: 2022-08-04

## 2022-08-04 RX ORDER — ACETAMINOPHEN 325 MG/1
650 TABLET ORAL AS NEEDED
Status: CANCELLED | OUTPATIENT
Start: 2022-08-25

## 2022-08-04 RX ORDER — SODIUM CHLORIDE 0.9 % (FLUSH) 0.9 %
10 SYRINGE (ML) INJECTION AS NEEDED
Status: CANCELLED | OUTPATIENT
Start: 2022-08-25

## 2022-08-04 RX ORDER — DIPHENHYDRAMINE HYDROCHLORIDE 50 MG/ML
50 INJECTION, SOLUTION INTRAMUSCULAR; INTRAVENOUS AS NEEDED
Status: ACTIVE | OUTPATIENT
Start: 2022-08-04 | End: 2022-08-04

## 2022-08-04 RX ORDER — ACETAMINOPHEN 325 MG/1
650 TABLET ORAL AS NEEDED
Status: ACTIVE | OUTPATIENT
Start: 2022-08-04 | End: 2022-08-04

## 2022-08-04 RX ADMIN — SODIUM CHLORIDE 500 ML/HR: 9 INJECTION, SOLUTION INTRAVENOUS at 12:50

## 2022-08-04 RX ADMIN — SODIUM CHLORIDE 1125 MG: 9 INJECTION, SOLUTION INTRAVENOUS at 11:23

## 2022-08-04 NOTE — PROGRESS NOTES
[facility] GYNECOLOGIC ONCOLOGY  [address] [facility], [address], [address]  P [phone]      Patient ID:  Name:  Pauline Villavicencio  MRN:  [id number]  :  1955/67 y.o. Date:  2022      Corine Gamboa MD: Virgie Rincon MD  PCP: Arsalan Brown MD     Primary Diagnosis: uterine cancer  Date of Diagnosis: 3/2013      Current Agent: Avastin  Cycle: 8    HPI:  78 y.o. 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 She received adjuvant Taxol/carboplatin followed by a 7 year DFI. In  presenting with CT pelvic adenopathy and peritoneal carcinomatosis. She has been followed on multiple regimens for recurrent/persistent disease including Keytruda/Lenvima, carboplatin/taxol, Doxil. She has demonstrated progression on most recent interval scan and recommended Avastin. SUBJECTIVE:  Ms. Mary Oliveira presents to [facility] today for cycle 8 single agent Avastin. She states that she is feeling very fatigued and uncomfortable today. Energy level has been low for several months since she was sick with COVID at the beginning of the year. She has also been experiencing shortness of breath. She had a paracentesis on 2022 and had about 3800cc of fluid removed. She states that the fluid seemed to reaccumulate very quickly. She states that it is difficult for her to be active due to the fatigue and due to her abdominal distention. Her symptoms now are almost as severe as just prior to her paracentesis. She is not sure if she is ready for another paracentesis. She does not feel that she has been urinating as much as her normal.  Admits to taking in very little food and liquid by mouth. Is concerned that she is dehydrated. She reduced her work to part time due to fatigued to work. Getting help with lifting heavy things. Able to ambulate, remains independent.  Routine delayed GI function with dulcolax/benefiber/MoM. No residual effects s/p her therapy in 2013. She lives alone. Does not feel overly close to her children. Her daughter/grandkids live next door and brother nearby. Her son lives near [facility]. She feels most supported by her brother Jean-Pierre Jha, close friend Mylene and her work family. She still has difficulty feeling comfortable asking family for help. Looking into/questions long-term with SS. Work for her is a positive stressor, has few hobbies and uncertain of purpose w/o work. ROS  Constitutional: no weight loss, fever, night sweats  Respiratory: above  Cardiovascular: no CP, edema  Heme: No abnormal bleeding, no epistaxis. Gastrointestinal: no abdominal pain, no dysphagia, change in bowel habits, or black or bloody stools  Genito-Urinary: no dysuria, trouble voiding, or hematuria  Musculoskeletal: negative for - gait disturbance or joint swelling  Neurological: negative for - behavioral changes, dizziness, headaches, memory loss or numbness/tingling  Derm: negative  Psych: negative for anxiety and depression       OBJECTIVE:  Physical Exam  Visit Vitals  /80   Pulse 93   Resp 18   Wt 153 lb 9.6 oz (69.7 kg)   BMI 25.56 kg/m²          General:  fatigued, flat affect, cooperative       HEENT: pallor, sclera without jaundice, oral mucosa without lesions,      Cardiac:  Regular rate and rhythm        Lungs:  clear to auscultation bilaterally          Port:  clean, dry, no drainage  Abdomen:  soft, distended. protuberant, palpable epigastric/right abd mass. + fluid wave       Lymph:  no lymphadenopathy   Extremity: no edema    Wt Readings from Last 3 Encounters:   08/04/22 153 lb 9.6 oz (69.7 kg)   07/18/22 158 lb (71.7 kg)   07/14/22 157 lb 12.8 oz (71.6 kg)       No results for input(s): WBC, ANEU, HGB, HCT, MCV, MCH, PLT, HGBEXT, HCTEXT, PLTEXT, HGBEXT, HCTEXT, PLTEXT in the last 72 hours.     No results for input(s): NA, K, CL, GLU, BUN, CREA, CA, MG, PHOS, ALB, TBIL, TBILI, ALT in the last 72 hours. No lab exists for component: CO3, ALBP, SGOT        Tumor markers:  CA-125   Date Value Ref Range Status   05/12/2022 333 (H) 1.5 - 35.0 U/mL Final     Comment:     ** Note new reference range and method **  Results may vary depending on . Method used is [employer]     04/21/2022 337 (H) 1.5 - 35.0 U/mL Final     Comment:     ** Note new reference range and method **  Results may vary depending on . Method used is [employer]     03/31/2022 422 (H) 1.5 - 35.0 U/mL Final     Comment:     ** Note new reference range and method **  Results may vary depending on . Method used is [employer]     03/10/2022 269 (H) 1.5 - 35.0 U/mL Final     Comment:     ** Note new reference range and method **  Results may vary depending on . Method used is [employer]           Patient Active Problem List   Diagnosis Code    Malignant neoplasm of corpus uteri, except isthmus ([facility] [address] C54.9     Past Medical History:   Diagnosis Date    Abnormal Pap smear 1995    with f/u normal    Anemia     Arthritis     Asthma     Cancer ([facility] [address]     ENDOMETRIAL    Environmental allergies     GERD (gastroesophageal reflux disease)     Goiter     Other unknown and unspecified cause of morbidity or mortality     POSSIBLE ESOPHAGEAL SPASM, ? OBSTRUCTION DUE TO GOITER    PMB (postmenopausal bleeding)     S/P chemotherapy, time since greater than 12 weeks     LAST CHEMO 10/2014 PER PATIENT    Thyroid disease     goiter     Prior to Admission medications    Medication Sig Start Date End Date Taking? Authorizing Provider   BACITRACIN, BULK, by Does Not Apply route. With zinc ointment    Provider, Historical   rivaroxaban (Xarelto) 20 mg tab tablet Take 1 Tablet by mouth daily. 2/1/22   Tabitha See MD   acetaminophen (TylenoL) 325 mg tablet Take  by mouth as needed.     Provider, Historical   lisinopriL (PRINIVIL, ZESTRIL) 10 mg tablet Take 1 Tab by mouth daily. 1/6/21   Lucien Ojeda MD   lisinopriL (PRINIVIL, ZESTRIL) 5 mg tablet Take 2 Tabs by mouth daily. 1/5/21   Hieu Cline PA-C   ondansetron (ZOFRAN ODT) 4 mg disintegrating tablet Take 1 Tab by mouth every eight (8) hours as needed for Nausea. Indications: nausea and vomiting caused by cancer drugs 10/22/20   Lucien Ojeda MD   lidocaine-prilocaine (EMLA) topical cream Apply small amount over port area and cover with band aid one hour before chemo 10/22/20   Lucien Ojeda MD   guaifenesin (MUCINEX PO) Take  by mouth. Provider, Historical   loratadine (Claritin) 10 mg tablet Take 10 mg by mouth. Provider, Historical   epinastine 0.05 % drop Apply  to eye. Provider, Historical   raNITIdine (ZANTAC) 150 mg tablet ZANTAC TABS 9/29/11   Provider, Historical   cyclobenzaprine (FLEXERIL) 5 mg tablet take 1 tablet by mouth three times a day if needed 12/5/16   Provider, Historical   valACYclovir (VALTREX) 1 gram tablet Take 1 Tab by mouth three (3) times daily. 12/15/16   Lucien Ojeda MD   EPINEPHrine (EPIPEN) 0.3 mg/0.3 mL injection 0.3 mg by IntraMUSCular route once as needed. Provider, Historical   ketotifen (ZADITOR) 0.025 % (0.035 %) ophthalmic solution Administer 1 Drop to both eyes every morning. Provider, Historical   Cetirizine (ZYRTEC) 10 mg cap Take 10 mg by mouth nightly. Patient not taking: Reported on 5/12/2022    Provider, Historical   SAL ACID/UREA/PETROLATUM,WHITE (KERASAL FOOT EX) by Apply Externally route daily as needed. Provider, Historical   ACCU-CHEK HELDER PLUS TEST STRP strip  7/3/15   Provider, Historical   NITROSTAT 0.4 mg SL tablet  9/22/14   Provider, Historical   CALCIUM CARBONATE (MARCELLO-SELTZER ANTACID PO) Take  by mouth daily as needed.     Provider, Historical     Allergies   Allergen Reactions    Latex Other (comments)     Burns skin    Acetone Shortness of Breath    Amoxicillin Anaphylaxis    Ciprofloxacin Hives    Pcn [Penicillins] Anaphylaxis    Buspar [Buspirone] Unknown (comments)     Has N&V and makes her feel \"weird\"    Azithromycin Hives    Food [Egg] Nausea Only    Other Medication Rash     POPPY seeds     Family History   Problem Relation Age of Onset    Cancer Maternal Aunt         breast    Heart Disease Mother     Diabetes Father     Cancer Sister         CERVICAL    Kidney Disease Brother     Diabetes Brother     Heart Attack Other     Arrhythmia Brother     Diabetes Brother     Anesth Problems Neg Hx        CT Results (most recent):  Results from Hospital Encounter encounter on 07/09/22    CT CHEST ABD PELV W CONT    Narrative  EXAM: CT CHEST ABD PELV W CONT    INDICATION: Primary serous adenocarcinoma of endometrium    COMPARISON: May 7, 2020    IV CONTRAST: 100 mL of Isovue-370. ORAL CONTRAST: Oral contrast was administered for optimal bowel visualization. TECHNIQUE:  Following the uneventful intravenous administration of contrast, thin axial  images were obtained through the chest, abdomen and pelvis. Coronal and sagittal  reformats were generated. CT dose reduction was achieved through use of a  standardized protocol tailored for this examination and automatic exposure  control for dose modulation. FINDINGS:    CHEST WALL: Left chest wall port catheter terminates in the SVC. THYROID: Heterogeneous thyroid gland with small nodules. MEDIASTINUM: Enlarged right lower paratracheal lymph node measuring 14 mm  (3:26), stable. Subcarinal lymph node is stable at 10 mm. RAMEZ: Stable right hilar lymphadenopathy. THORACIC AORTA: No dissection or aneurysm. MAIN PULMONARY ARTERY: Normal in caliber. TRACHEA/BRONCHI: Patent. ESOPHAGUS: No wall thickening or dilatation. HEART: Normal in size. PLEURA: Bilateral pleural effusions increased since the prior study. LUNGS: Bibasilar atelectasis. No suspicious nodule. LIVER: No mass. BILIARY TREE: Gallbladder is within normal limits. CBD is not dilated.   SPLEEN: within normal limits. PANCREAS: No mass or ductal dilatation. ADRENALS: Unremarkable. KIDNEYS: No mass, calculus, or hydronephrosis. STOMACH: Unremarkable. SMALL BOWEL: No dilatation or wall thickening. COLON: No dilatation or wall thickening. APPENDIX: Nonvisualized  PERITONEUM: Anterior peritoneal/omental implant measures 12.3 x 4.1 cm,  minimally changed in size. Persistent omental/peritoneal implants in the left  upper quadrant near the splenic hilum, bilateral paracolic gutters, and in the  dependent portion of the peritoneal reflection. Numerous enlarged mesenteric  lymph nodes, as before. Increased ascites in the abdomen. RETROPERITONEUM: Stable persistent retroperitoneal lymphadenopathy. REPRODUCTIVE ORGANS: Status post hysterectomy. URINARY BLADDER: Nondistended urinary bladder limits evaluation. BONES: No destructive bone lesion. ABDOMINAL WALL: No mass or hernia. ADDITIONAL COMMENTS: N/A    Impression  1. Increased bilateral pleural effusions and ascites. 2.  Persistent peritoneal/omental nodularity, mesenteric lymphadenopathy, and  retroperitoneal lymphadenopathy, not significantly changed. 3.  Stable mediastinal lymphadenopathy. IMPRESSION/PLAN:  78 y.o. 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 She has again progressed and prescribed Doxil therapy. ECO    Labs/chart reviewed. ASE discussed and interventions, monitoring reviewed. Chemotherapy: Avastin cycle 8. Proceed with treatment today. Post infusion arthralgia: stable. Has not been as severe as it was following cycle 2  Anemia: improved. Hgb 12.0 today. Fatigue/KNIGHT: post COVID, anemia, cancer related. Her energy and activity levels were improving following her covid diagnosis but she feels that over the past several weeks, she is becoming more fatigued. Little interest in activities she used to enjoy.   She develops shortness of breath easily. Weight loss/hypoalbuminemia: carcinomatosis. Slight weight loss since previous cycle. Eating very little. Discussed strategies. HTN: resolved following lenvatinib therapy. Reviewed home monitoring during Avastin. LLE DVT 6/2021. On Xarelto. Hx of thyroid nodular goiter, benign follicular bx. Chronic med issues:    Hx asthma - inhaler PRN   Hx esophageal spasm - uses nitroglycerin  Reflux: Pepcid  Ascites: s/p paracentesis on 7/18/2022. She feels that the ascites has accumulated again. I think she would benefit from a repeat paracentesis. We discussed placing an Aspira catheter at the time of paracentesis as well. She understands the risks and benefits and would like to proceed. She will be scheduled in the second half of next week. Dehydration: 500cc bolus NS today. Hopefully with paracentesis, bowel function will return to normal and she will be able to eat and drink again. Electronically signed.      Steph Myers PA-C

## 2022-08-04 NOTE — PROGRESS NOTES
[facility] Chemo Progress Note    Date: 2022    Name: Maninder Dyer    MRN: [id number]         : 1955    1000 Ms. Nan Rhodes Arrived to [place] for Avastin ambulatory in stable condition. Assessment was completed, no acute issues at this time, no new complaints voiced. Port accessed with positive blood return. Labs drawn and sent for processing. Chemotherapy Flowsheet 2022   Cycle C8   Date 2022   Drug / Regimen Mandie Gale   Pre Meds -   Notes given       Ms. Bernard Fulling vitals were reviewed. Patient Vitals for the past 12 hrs:   Pulse Resp BP   22 1156 93 -- 112/80   22 0958 92 18 116/74         Lab results were obtained and reviewed. Pre-medications  were administered as ordered and chemotherapy was initiated. Medications Administered       0.9% sodium chloride infusion       Admin Date  2022 Action  New Bag Dose  500 mL/hr Rate  500 mL/hr Route  IntraVENous Administered By  Carrie Martinez RN              bevacizumab-bvzr (ZIRABEV) 1,125 mg in 0.9% sodium chloride 100 mL, overfill volume 10 mL IVPB       Admin Date  2022 Action  New Bag Dose  1,125 mg Rate  310 mL/hr Route  IntraVENous Administered By  Carrie Martinez RN                    Two nurses verified prior to administering:  Drug name, Drug dose, Infusion volume or drug volume when prepared in a syringe, Rate of administration, Route of administration, Expiration dates and/or times, Appearance and physical integrity of the drugs, Rate set on infusion pump, when used, and Sequencing of drug administration. 500cc bolus ordered and given per provider    1325 Patient tolerated treatment well. Port maintained positive blood return throughout treatment. Port flushed, heparinized and de accessed per protocol. Patient was discharged from [facility] in stable condition. Patient aware of next appointment.      Future Appointments   Date Time Provider David Jara   2022 11:30 AM [facility] CT ER 3 [facility]    8/23/2022 10:30 AM MD GREER Cedillo BS AMB   8/25/2022 11:00 AM A1 [facility] [address] [place] [address]   9/8/2022  9:30 AM MD GREER Cedillo BS AMB   9/15/2022  1:00 PM A1 [facility] [address] [place] [address]   10/6/2022 10:15 AM MD GREER Cedillo BS AMB   10/6/2022 11:00 AM E3 GRECIA Fofana RN  August 4, 2022

## 2022-08-08 RX ORDER — RIVAROXABAN 20 MG/1
TABLET, FILM COATED ORAL
Qty: 30 TABLET | Refills: 5 | Status: SHIPPED | OUTPATIENT
Start: 2022-08-08

## 2022-08-09 ENCOUNTER — TELEPHONE (OUTPATIENT)
Dept: GYNECOLOGY | Age: 67
End: 2022-08-09

## 2022-08-09 PROBLEM — E86.0 DEHYDRATION: Status: ACTIVE | Noted: 2022-08-09

## 2022-08-09 RX ORDER — SODIUM CHLORIDE 9 MG/ML
5-250 INJECTION, SOLUTION INTRAVENOUS AS NEEDED
Status: CANCELLED | OUTPATIENT
Start: 2022-08-11

## 2022-08-09 RX ORDER — SODIUM CHLORIDE 0.9 % (FLUSH) 0.9 %
5-40 SYRINGE (ML) INJECTION AS NEEDED
Status: CANCELLED | OUTPATIENT
Start: 2022-08-11

## 2022-08-09 RX ORDER — HEPARIN 100 UNIT/ML
500 SYRINGE INTRAVENOUS AS NEEDED
Status: CANCELLED
Start: 2022-08-11

## 2022-08-09 RX ORDER — SODIUM CHLORIDE 9 MG/ML
5-40 INJECTION INTRAMUSCULAR; INTRAVENOUS; SUBCUTANEOUS AS NEEDED
Status: CANCELLED | OUTPATIENT
Start: 2022-08-11

## 2022-08-09 NOTE — TELEPHONE ENCOUNTER
Per Dr. Julito Naranjo pt scheduled for hydration at [facility] on 8/11/22 after her paracentesis. Pt given time and directions.

## 2022-08-10 NOTE — PROGRESS NOTES
Lilly completed with pt. Pt states she took her xarelto this a.m. and will be holding it til after procedure tomorrow.

## 2022-08-11 ENCOUNTER — HOSPITAL ENCOUNTER (OUTPATIENT)
Dept: INTERVENTIONAL RADIOLOGY/VASCULAR | Age: 67
Discharge: HOME | End: 2022-08-11
Attending: PHYSICIAN ASSISTANT
Payer: MEDICARE

## 2022-08-11 ENCOUNTER — HOSPITAL ENCOUNTER (OUTPATIENT)
Dept: INFUSION THERAPY | Age: 67
Discharge: HOME | End: 2022-08-11
Payer: MEDICARE

## 2022-08-11 VITALS
DIASTOLIC BLOOD PRESSURE: 66 MMHG | TEMPERATURE: 98.1 F | WEIGHT: 153 LBS | OXYGEN SATURATION: 95 % | SYSTOLIC BLOOD PRESSURE: 124 MMHG | HEART RATE: 85 BPM | HEIGHT: 65 IN | RESPIRATION RATE: 18 BRPM | BODY MASS INDEX: 25.49 KG/M2

## 2022-08-11 VITALS
SYSTOLIC BLOOD PRESSURE: 118 MMHG | DIASTOLIC BLOOD PRESSURE: 74 MMHG | RESPIRATION RATE: 16 BRPM | HEART RATE: 80 BPM | OXYGEN SATURATION: 97 % | TEMPERATURE: 97.9 F

## 2022-08-11 DIAGNOSIS — R18.0 MALIGNANT ASCITES: ICD-10-CM

## 2022-08-11 DIAGNOSIS — E86.0 DEHYDRATION: Primary | ICD-10-CM

## 2022-08-11 DIAGNOSIS — C54.9 MALIGNANT NEOPLASM OF CORPUS UTERI, EXCEPT ISTHMUS (HCC): ICD-10-CM

## 2022-08-11 DIAGNOSIS — C54.1 PRIMARY SEROUS ADENOCARCINOMA OF ENDOMETRIUM (HCC): ICD-10-CM

## 2022-08-11 DIAGNOSIS — J90 PLEURAL EFFUSION, BILATERAL: ICD-10-CM

## 2022-08-11 LAB
ALBUMIN FLD-MCNC: 1.3 G/DL
AMYLASE FLD-CCNC: 26 U/L
APPEARANCE FLD: ABNORMAL
COLOR FLD: ABNORMAL
GLUCOSE FLD-MCNC: 135 MG/DL
LDH FLD L TO P-CCNC: 1504 U/L
LYMPHOCYTES NFR FLD: 36 %
MONOS+MACROS NFR FLD: 39 %
NEUTROPHILS NFR FLD: 24 %
NUC CELL # FLD: 486 /CU MM
OTHER CELL,FOTHC: 1 %
PATH REV BLD -IMP: ABNORMAL
PATH REV BLD -IMP: NORMAL
PROT FLD-MCNC: NORMAL G/DL
RBC # FLD: >100 /CU MM
SPECIMEN SOURCE FLD: ABNORMAL
SPECIMEN SOURCE FLD: NORMAL

## 2022-08-11 PROCEDURE — 82150 ASSAY OF AMYLASE: CPT

## 2022-08-11 PROCEDURE — 74011250636 HC RX REV CODE- 250/636: Performed by: OBSTETRICS & GYNECOLOGY

## 2022-08-11 PROCEDURE — 88112 CYTOPATH CELL ENHANCE TECH: CPT

## 2022-08-11 PROCEDURE — 87077 CULTURE AEROBIC IDENTIFY: CPT

## 2022-08-11 PROCEDURE — 77030031139 HC SUT VCRL2 J&J -A

## 2022-08-11 PROCEDURE — 74011250636 HC RX REV CODE- 250/636: Performed by: STUDENT IN AN ORGANIZED HEALTH CARE EDUCATION/TRAINING PROGRAM

## 2022-08-11 PROCEDURE — 74011000250 HC RX REV CODE- 250: Performed by: OBSTETRICS & GYNECOLOGY

## 2022-08-11 PROCEDURE — 82945 GLUCOSE OTHER FLUID: CPT

## 2022-08-11 PROCEDURE — 74011000250 HC RX REV CODE- 250: Performed by: STUDENT IN AN ORGANIZED HEALTH CARE EDUCATION/TRAINING PROGRAM

## 2022-08-11 PROCEDURE — C1892 INTRO/SHEATH,FIXED,PEEL-AWAY: HCPCS

## 2022-08-11 PROCEDURE — 49418 INSERT TUN IP CATH PERC: CPT

## 2022-08-11 PROCEDURE — 84157 ASSAY OF PROTEIN OTHER: CPT

## 2022-08-11 PROCEDURE — 77030012965 HC NDL HUBR BBMI -A

## 2022-08-11 PROCEDURE — 87205 SMEAR GRAM STAIN: CPT

## 2022-08-11 PROCEDURE — C1729 CATH, DRAINAGE: HCPCS

## 2022-08-11 PROCEDURE — 96360 HYDRATION IV INFUSION INIT: CPT

## 2022-08-11 PROCEDURE — 77030002996 HC SUT SLK J&J -A

## 2022-08-11 PROCEDURE — 88305 TISSUE EXAM BY PATHOLOGIST: CPT

## 2022-08-11 PROCEDURE — 77030003560 HC NDL HUBR BARD -A

## 2022-08-11 PROCEDURE — 99153 MOD SED SAME PHYS/QHP EA: CPT

## 2022-08-11 PROCEDURE — 89050 BODY FLUID CELL COUNT: CPT

## 2022-08-11 PROCEDURE — 2709999900 HC NON-CHARGEABLE SUPPLY

## 2022-08-11 PROCEDURE — 88108 CYTOPATH CONCENTRATE TECH: CPT

## 2022-08-11 PROCEDURE — 83615 LACTATE (LD) (LDH) ENZYME: CPT

## 2022-08-11 PROCEDURE — 82042 OTHER SOURCE ALBUMIN QUAN EA: CPT

## 2022-08-11 PROCEDURE — 77030010507 HC ADH SKN DERMBND J&J -B

## 2022-08-11 PROCEDURE — 87075 CULTR BACTERIA EXCEPT BLOOD: CPT

## 2022-08-11 RX ORDER — SODIUM CHLORIDE 0.9 % (FLUSH) 0.9 %
5-40 SYRINGE (ML) INJECTION AS NEEDED
Status: DISCONTINUED | OUTPATIENT
Start: 2022-08-11 | End: 2022-08-12 | Stop reason: HOSPADM

## 2022-08-11 RX ORDER — FENTANYL CITRATE 50 UG/ML
100 INJECTION, SOLUTION INTRAMUSCULAR; INTRAVENOUS
Status: DISCONTINUED | OUTPATIENT
Start: 2022-08-11 | End: 2022-08-11

## 2022-08-11 RX ORDER — ONDANSETRON 2 MG/ML
4 INJECTION INTRAMUSCULAR; INTRAVENOUS
Status: COMPLETED | OUTPATIENT
Start: 2022-08-11 | End: 2022-08-11

## 2022-08-11 RX ORDER — HEPARIN 100 UNIT/ML
500 SYRINGE INTRAVENOUS AS NEEDED
Status: DISCONTINUED | OUTPATIENT
Start: 2022-08-11 | End: 2022-08-12 | Stop reason: HOSPADM

## 2022-08-11 RX ORDER — LIDOCAINE HYDROCHLORIDE 20 MG/ML
10 INJECTION, SOLUTION INFILTRATION; PERINEURAL ONCE
Status: COMPLETED | OUTPATIENT
Start: 2022-08-11 | End: 2022-08-11

## 2022-08-11 RX ORDER — HEPARIN SODIUM 200 [USP'U]/100ML
400 INJECTION, SOLUTION INTRAVENOUS ONCE
Status: COMPLETED | OUTPATIENT
Start: 2022-08-11 | End: 2022-08-11

## 2022-08-11 RX ORDER — MIDAZOLAM HYDROCHLORIDE 1 MG/ML
5 INJECTION, SOLUTION INTRAMUSCULAR; INTRAVENOUS
Status: DISCONTINUED | OUTPATIENT
Start: 2022-08-11 | End: 2022-08-11

## 2022-08-11 RX ORDER — LIDOCAINE HYDROCHLORIDE 20 MG/ML
10 INJECTION, SOLUTION INFILTRATION; PERINEURAL ONCE
Status: DISPENSED | OUTPATIENT
Start: 2022-08-11 | End: 2022-08-11

## 2022-08-11 RX ORDER — CLINDAMYCIN PHOSPHATE 900 MG/50ML
900 INJECTION INTRAVENOUS ONCE
Status: COMPLETED | OUTPATIENT
Start: 2022-08-11 | End: 2022-08-11

## 2022-08-11 RX ORDER — SODIUM CHLORIDE 9 MG/ML
25 INJECTION, SOLUTION INTRAVENOUS CONTINUOUS
Status: DISCONTINUED | OUTPATIENT
Start: 2022-08-11 | End: 2022-08-11

## 2022-08-11 RX ADMIN — MIDAZOLAM HYDROCHLORIDE 1 MG: 1 INJECTION, SOLUTION INTRAMUSCULAR; INTRAVENOUS at 10:24

## 2022-08-11 RX ADMIN — ONDANSETRON 4 MG: 2 INJECTION INTRAMUSCULAR; INTRAVENOUS at 09:06

## 2022-08-11 RX ADMIN — HEPARIN SODIUM IN SODIUM CHLORIDE 200 UNITS: 200 INJECTION INTRAVENOUS at 10:42

## 2022-08-11 RX ADMIN — MIDAZOLAM HYDROCHLORIDE 1 MG: 1 INJECTION, SOLUTION INTRAMUSCULAR; INTRAVENOUS at 10:28

## 2022-08-11 RX ADMIN — FENTANYL CITRATE 25 MCG: 50 INJECTION, SOLUTION INTRAMUSCULAR; INTRAVENOUS at 10:28

## 2022-08-11 RX ADMIN — HEPARIN 500 UNITS: 100 SYRINGE at 13:10

## 2022-08-11 RX ADMIN — LIDOCAINE HYDROCHLORIDE 200 MG: 20 INJECTION, SOLUTION INFILTRATION; PERINEURAL at 10:18

## 2022-08-11 RX ADMIN — SODIUM CHLORIDE 25 ML/HR: 9 INJECTION, SOLUTION INTRAVENOUS at 09:05

## 2022-08-11 RX ADMIN — CLINDAMYCIN PHOSPHATE 900 MG: 900 INJECTION, SOLUTION INTRAVENOUS at 09:19

## 2022-08-11 RX ADMIN — SODIUM CHLORIDE, PRESERVATIVE FREE 10 ML: 5 INJECTION INTRAVENOUS at 12:10

## 2022-08-11 RX ADMIN — SODIUM CHLORIDE, PRESERVATIVE FREE 10 ML: 5 INJECTION INTRAVENOUS at 13:10

## 2022-08-11 RX ADMIN — MIDAZOLAM HYDROCHLORIDE 2 MG: 1 INJECTION, SOLUTION INTRAMUSCULAR; INTRAVENOUS at 10:20

## 2022-08-11 RX ADMIN — SODIUM CHLORIDE 1000 ML: 9 INJECTION, SOLUTION INTRAVENOUS at 12:10

## 2022-08-11 RX ADMIN — FENTANYL CITRATE 50 MCG: 50 INJECTION, SOLUTION INTRAMUSCULAR; INTRAVENOUS at 10:20

## 2022-08-11 NOTE — PROGRESS NOTES
Dr. Ciro Pool at bedside to obtain consent for abdominal Aspira. Procedure explained with opportunity to ask questions. Patient states understanding of procedure prior to signing consent. Discharge instructions and expectations reviewed with patient. Written copy given to patient. RN to review again post procedure before discharge.

## 2022-08-11 NOTE — PROGRESS NOTES
Discharge instructions reviewed with patient. Patient verbalizes understanding. Discharged from IR via wheelchair in stable condition. Patient ambulated to vehicle from wheelchair without difficulty. Patient released with sister-in-law, Stephan Dan.

## 2022-08-11 NOTE — ROUTINE PROCESS
Patient arrived ambulatory to IR for abdominal Aspira insertion. A&O x 4. Accompanied by sister-in-law Shreya Howard.

## 2022-08-11 NOTE — DISCHARGE INSTRUCTIONS
[facility]  Interventional Radiology        Radiologist: Dr. Aguilar Sees  Date: 08/11/2022    Drainage Bag Care Instructions    Dressing around the catheter must remain dry and intact. You may take a bath as long as the catheter stays dry. Change the dressing as needed. Watch for signs of infection:  redness, swelling, pus, drainage, fever, chills, etc.  If you notice any of these signs and symptoms, call your physician at once. If you would like to speak with a nurse call [phone] M-F [phone] to [phone] or [phone] after hours or weekends, and ask for the nurse on call.

## 2022-08-11 NOTE — H&P
INTERVENTIONAL RADIOLOGY  Preoperative History and Physical      Patient:  Alan Potter  :  1955  Age:  78 y.o. MRN:  [id number]  Today's Date:  2022      CC / HPI   Alan Potter is a 78 y.o. female with malignant ascites presents for aspira catheter placement    PAST MEDICAL HISTORY  Past Medical History:   Diagnosis Date    Abnormal Pap smear     with f/u normal    Anemia     Arthritis     Asthma     Cancer ([facility] [address]     ENDOMETRIAL    Environmental allergies     GERD (gastroesophageal reflux disease)     Goiter     Other unknown and unspecified cause of morbidity or mortality     POSSIBLE ESOPHAGEAL SPASM, ? OBSTRUCTION DUE TO GOITER    PMB (postmenopausal bleeding)     S/P chemotherapy, time since greater than 12 weeks     LAST CHEMO 10/2014 PER PATIENT    Thyroid disease     goiter       PAST SURGICAL HISTORY  Past Surgical History:   Procedure Laterality Date    HX APPENDECTOMY      HX BUNIONECTOMY      HX GYN  3/13/13    TLH/BSO    HX HEENT  13    TOOTH REMOVED    HX ORTHOPAEDIC      BUNIONECTOMY    HX VASCULAR ACCESS      port    [place] BREAST SURGERY PROCEDURE UNLISTED  16    right breast bx       SOCIAL HISTORY  Social History     Socioeconomic History    Marital status:      Spouse name: Not on file    Number of children: Not on file    Years of education: Not on file    Highest education level: Not on file   Occupational History    Not on file   Tobacco Use    Smoking status: Never    Smokeless tobacco: Never   Substance and Sexual Activity    Alcohol use:  Yes     Alcohol/week: 0.0 standard drinks     Comment: RARE OCC    Drug use: No    Sexual activity: Not on file   Other Topics Concern    Not on file   Social History Narrative    Not on file     Social Determinants of Health     Financial Resource Strain: Not on file   Food Insecurity: Not on file   Transportation Needs: Not on file   Physical Activity: Not on file   Stress: Not on file   Social Connections: Not on file   Intimate Partner Violence: Not on file   Housing Stability: Not on file       FAMILY HISTORY  Family History   Problem Relation Age of Onset    Cancer Maternal Aunt         breast    Heart Disease Mother     Diabetes Father     Cancer Sister         CERVICAL    Kidney Disease Brother     Diabetes Brother     Heart Attack Other     Arrhythmia Brother     Diabetes Brother     Anesth Problems Neg Hx        CURRENT MEDICATIONS  Current Outpatient Medications   Medication Sig Dispense Refill    Xarelto 20 mg tab tablet TAKE 1 TABLET BY MOUTH DAILY 30 Tablet 5    BACITRACIN, BULK, by Does Not Apply route. With zinc ointment      acetaminophen (TylenoL) 325 mg tablet Take  by mouth as needed. lisinopriL (PRINIVIL, ZESTRIL) 10 mg tablet Take 1 Tab by mouth daily. 30 Tab 2    lisinopriL (PRINIVIL, ZESTRIL) 5 mg tablet Take 2 Tabs by mouth daily. 30 Tab 5    ondansetron (ZOFRAN ODT) 4 mg disintegrating tablet Take 1 Tab by mouth every eight (8) hours as needed for Nausea. Indications: nausea and vomiting caused by cancer drugs 30 Tab 3    lidocaine-prilocaine (EMLA) topical cream Apply small amount over port area and cover with band aid one hour before chemo 30 g 3    guaifenesin (MUCINEX PO) Take  by mouth.      loratadine (Claritin) 10 mg tablet Take 10 mg by mouth.      epinastine 0.05 % drop Apply  to eye. raNITIdine (ZANTAC) 150 mg tablet ZANTAC TABS      cyclobenzaprine (FLEXERIL) 5 mg tablet take 1 tablet by mouth three times a day if needed  0    valACYclovir (VALTREX) 1 gram tablet Take 1 Tab by mouth three (3) times daily. 21 Tab 3    EPINEPHrine (EPIPEN) 0.3 mg/0.3 mL injection 0.3 mg by IntraMUSCular route once as needed. ketotifen (ZADITOR) 0.025 % (0.035 %) ophthalmic solution Administer 1 Drop to both eyes every morning. Cetirizine (ZYRTEC) 10 mg cap Take 10 mg by mouth nightly.  (Patient not taking: Reported on 5/12/2022)      SAL ACID/UREA/PETROLATUM,WHITE [facility] FOOT EX) by Apply Externally route daily as needed. ACCU-CHEK HELDER PLUS TEST STRP strip   0    NITROSTAT 0.4 mg SL tablet       CALCIUM CARBONATE (MARCELLO-SELTZER ANTACID PO) Take  by mouth daily as needed.        Current Facility-Administered Medications   Medication Dose Route Frequency Provider Last Rate Last Admin    lidocaine (XYLOCAINE) 20 mg/mL (2 %) injection 200 mg  10 mL SubCUTAneous ONCE Macy Swartz MD        midazolam (VERSED) injection 5 mg  5 mg IntraVENous Multiple Aries MD Spenser   1 mg at 08/11/22 1028    fentaNYL citrate (PF) injection 100 mcg  100 mcg IntraVENous Multiple Aries MD Spenser   25 mcg at 08/11/22 1028    0.9% sodium chloride infusion  25 mL/hr IntraVENous CONTINUOUS Ariestemo Dueñas MD 25 mL/hr at 08/11/22 0905 25 mL/hr at 08/11/22 0905       ALLERGIES  Allergies   Allergen Reactions    Latex Other (comments)     Burns skin    Acetone Shortness of Breath    Amoxicillin Anaphylaxis    Ciprofloxacin Hives    Pcn [Penicillins] Anaphylaxis    Buspar [Buspirone] Unknown (comments)     Has N&V and makes her feel \"weird\"    Azithromycin Hives    Food [Egg] Nausea Only    Other Medication Rash     POPPY seeds       DIAGNOSTIC STUDIES   IMAGING STUDIES  Relevant Imaging studies reviewed    LABS  Lab Results   Component Value Date/Time    WBC 6.4 08/01/2022 12:12 PM    Hemoglobin (POC) 12.9 05/01/2013 09:53 AM    HGB 12.0 08/01/2022 12:12 PM    Hematocrit (POC) 38 05/01/2013 09:53 AM    HCT 37.9 08/01/2022 12:12 PM    PLATELET 535 83/93/8980 12:12 PM    MCV 85 08/01/2022 12:12 PM     Lab Results   Component Value Date/Time    Sodium 135 08/01/2022 12:12 PM    Potassium 4.6 08/01/2022 12:12 PM    Chloride 98 08/01/2022 12:12 PM    CO2 23 08/01/2022 12:12 PM    Anion gap 7 05/12/2022 01:03 PM    Glucose 148 (H) 08/01/2022 12:12 PM    BUN 12 08/01/2022 12:12 PM    Creatinine 0.44 (L) 08/01/2022 12:12 PM    BUN/Creatinine ratio 27 08/01/2022 12:12 PM    GFR est AA >60 05/12/2022 01:03 PM    GFR est non-AA >60 05/12/2022 01:03 PM    Calcium 9.0 08/01/2022 12:12 PM     No results found for: INR, PTMR, PTP, PT1, PT2, INREXT    PHYSICAL EXAM   /74 (BP 1 Location: Right upper arm, BP Patient Position: Supine)   Pulse 92   Temp 98.1 °F (36.7 °C)   Resp 18   Ht 5' 5\" (1.651 m)   Wt 69.4 kg (153 lb)   SpO2 98%   BMI 25.46 kg/m²   General:  NAD  Heart:  RRR  Lungs:  NWOB  Neurological:  AAOX3    PLAN   Procedure to be performed:   Tunneled peritoneal (Aspira) catheter placement for malignant ascites   Plan for sedation:  Moderate  NPO status: Patient NPO   Mallampati: 2  ASA: 3  Post procedure plan:  observation per protocol  Informed consent:  risks, benefits, and alternatives reviewed with the patient / family who agree to proceed  Code status:  No Order      Armando Alvarado MD  [facility]

## 2022-08-11 NOTE — PROGRESS NOTES
Pt arrived to [facility] ambulatory in no acute distress at 1205 for Hydration. Assessment unremarkable except recent angio procedure. L chest port accessed without issue and positive blood return noted. Patient denied having any symptoms of COVID-19, i.e. SOB, coughing, fever, or generally not feeling well. Also denies having been exposed to COVID-19 recently or having had any recent contact with family/friend that has a pending COVID test.    Patient Vitals for the past 12 hrs:   Temp Pulse Resp BP SpO2   08/11/22 1312 -- 80 16 118/74 --   08/11/22 1205 97.9 °F (36.6 °C) 88 18 121/79 97 %        The following medications administered:  Medications Administered       heparin (porcine) pf 500 Units       Admin Date  08/11/2022 Action  Given Dose  500 Units Route  InterCATHeter Administered By  Demario Whitney RN              sodium chloride (NS) flush 5-40 mL       Admin Date  08/11/2022 Action  Given Dose  10 mL Route  IntraVENous Administered By  Lit Taylor RN               Admin Date  08/11/2022 Action  Given Dose  10 mL Route  IntraVENous Administered By  Demario Whitney RN              sodium chloride 0.9 % bolus infusion 1,000 mL       Admin Date  08/11/2022 Action  New Bag Dose  1,000 mL Rate  1,000 mL/hr Route  IntraVENous Administered By  Lit Taylor RN                    Pt tolerated treatment well. Port flushed per policy and de-accessed, 2x2 and tape placed. Pt discharged ambulatory in no acute distress at 1215, accompanied by family. Next appointment 8/25/22 at 1100.

## 2022-08-11 NOTE — PROGRESS NOTES
Name of procedure: abdominal Aspira drain    Sedation medications given:    Versed: 4mg    Fentanyl: 75mcg    Sedation tolerated: well     Total Sedation time: 25 mins    Sedation start:  1020  Sedation end:  1045    Vital Signs: stable    Fluids removed: 1200mL milk colored fluid    Samples sent to lab: yes    Any complications related to procedure: none    Post Procedure Care Needed/order sets placed in connect care: routine discharge instructions

## 2022-08-15 ENCOUNTER — TELEPHONE (OUTPATIENT)
Dept: GYNECOLOGY | Age: 67
End: 2022-08-15

## 2022-08-15 LAB
BACTERIA SPEC CULT: NORMAL
SERVICE CMNT-IMP: NORMAL

## 2022-08-15 NOTE — TELEPHONE ENCOUNTER
Pt calling with c/o \" I feel like my tissue in my hips, and but cheeks are swollen with fluid, and heart is pounding, (B/P 110/70 P 105) I called on call MD Saturday and was told to drain Aspira tube and had 1000  cc out and feel full again this morning, when I drain I have pain in my back and abdomen\". Per Dr. Garza Poag pt will take Tylenol and 30 minutes later drain Aspira tube and call the office back with report of how she is doing.

## 2022-08-16 ENCOUNTER — OFFICE VISIT (OUTPATIENT)
Dept: GYNECOLOGY | Age: 67
End: 2022-08-16
Payer: MEDICARE

## 2022-08-16 VITALS
DIASTOLIC BLOOD PRESSURE: 78 MMHG | HEART RATE: 114 BPM | OXYGEN SATURATION: 97 % | SYSTOLIC BLOOD PRESSURE: 114 MMHG | WEIGHT: 154 LBS | BODY MASS INDEX: 25.66 KG/M2 | HEIGHT: 65 IN

## 2022-08-16 DIAGNOSIS — C54.1 PRIMARY SEROUS ADENOCARCINOMA OF ENDOMETRIUM (HCC): Primary | ICD-10-CM

## 2022-08-16 DIAGNOSIS — Z79.899 ON ANTINEOPLASTIC CHEMOTHERAPY: ICD-10-CM

## 2022-08-16 LAB
BACTERIA SPEC CULT: ABNORMAL
BACTERIA SPEC CULT: ABNORMAL
GRAM STN SPEC: ABNORMAL
GRAM STN SPEC: ABNORMAL
SERVICE CMNT-IMP: ABNORMAL

## 2022-08-16 PROCEDURE — 99213 OFFICE O/P EST LOW 20 MIN: CPT | Performed by: OBSTETRICS & GYNECOLOGY

## 2022-08-16 PROCEDURE — G8536 NO DOC ELDER MAL SCRN: HCPCS | Performed by: OBSTETRICS & GYNECOLOGY

## 2022-08-16 PROCEDURE — G8427 DOCREV CUR MEDS BY ELIG CLIN: HCPCS | Performed by: OBSTETRICS & GYNECOLOGY

## 2022-08-16 PROCEDURE — 1090F PRES/ABSN URINE INCON ASSESS: CPT | Performed by: OBSTETRICS & GYNECOLOGY

## 2022-08-16 PROCEDURE — G8400 PT W/DXA NO RESULTS DOC: HCPCS | Performed by: OBSTETRICS & GYNECOLOGY

## 2022-08-16 PROCEDURE — 1101F PT FALLS ASSESS-DOCD LE1/YR: CPT | Performed by: OBSTETRICS & GYNECOLOGY

## 2022-08-16 PROCEDURE — G8417 CALC BMI ABV UP PARAM F/U: HCPCS | Performed by: OBSTETRICS & GYNECOLOGY

## 2022-08-16 PROCEDURE — 1123F ACP DISCUSS/DSCN MKR DOCD: CPT | Performed by: OBSTETRICS & GYNECOLOGY

## 2022-08-16 PROCEDURE — G0463 HOSPITAL OUTPT CLINIC VISIT: HCPCS | Performed by: OBSTETRICS & GYNECOLOGY

## 2022-08-16 PROCEDURE — 3017F COLORECTAL CA SCREEN DOC REV: CPT | Performed by: OBSTETRICS & GYNECOLOGY

## 2022-08-16 PROCEDURE — G8432 DEP SCR NOT DOC, RNG: HCPCS | Performed by: OBSTETRICS & GYNECOLOGY

## 2022-08-16 NOTE — PROGRESS NOTES
[facility] GYNECOLOGIC ONCOLOGY  [address] [facility], [address], [address] [facility]  [phone] [facility] [phone]    Office Visit    Patient ID:  Name: Padmini Mtz  MRN: [id number]   :  y.o. Visit date: 2022     INTERVAL HISTORY:  Padmini Mtz is a  female who is an established patient with stage IA, grade 3 uterine carcinoma. She underwent laparoscopic hysterectomy with staging in 2013. She was recommended six cycles of adjuvant Taxol and Carboplatin, which she completed. We elected not to treat with pelvic radiation therapy due to her difficulty with chemotherapy. She had a CT of the abdomen/pelvis at [facility] that revealed retroperitoneal lymphadenopathy, peritoneal carcinomatosis, and trace ascites. I sent her for a PET/CT to better evaluate the extent of disease. She presented to review the results and discuss treatment. Her disease is not amenable to surgical resection. Systemic therapy was her only viable option. I thought immunotherapy would be the best choice, ahead of additional chemotherapy, specifically IV Keytruda and PO Lenvima. If that were not successful, we would consider retreatment with Taxol/Carboplatin or single agent Doxil. She completed 6 cycles of immunotherapy before demonstrating progression of disease. We decided upon retreatment with Taxol/Carboplatin. She completed 8 cycles of that regimen. Her CA-125 has responded and her CT after three cycles demonstrated a response. Her CT after completion of 6 cycles demonstrated further response. A subsequent PET/CT demonstrated resolution of most of the abnormal PET activity, but there still was some residual activity in the omentum, suggesting persistent disease. We stopped treatment in 2021, as she was beginning not to tolerate treatment well. She presented recently complaining of abdominal pain and bloating.   She stated it was a stabbing pain in her left abdomen. The bloating has also been causing a little shortness of breath. I sent her for a CT of the chest/abdomen/pelvis to evaluate. She presented to review those results. The scan demonstrated recurrent disease throughout the abdomen and pelvis. I recommended single agent Doxil. She completed 3 cycles of Doxil. Due to early satiety and complaints of bloating and abdominal distention, I sent her for a paracentesis in December. They did not see enough fluid on ultrasound to attempt drainage. She was also diagnosed with COVID around that time and is still recovering, but has since tested negative. She presented to review her interval CT scan after 3 cycles. Unfortunately it demonstrated progression. I discussed other treatment options with her, including the possibility of a clinical trial at [facility]. Ultimately we decided upon single agent Avastin. I explained that it won't make the tumor \"go away\", but it may slow down the growth and help manage the ascites. She has completed 8 cycles so far. Her last CT in early July 2022 demonstrated overall stable disease, but she did have more fluid present. Her CA-125 remains elevated, but relatively stable. She had a paracentesis in mid July for symptomatic relief. She felt better for a while, but the fluid is starting to accumulate once again. On 8/11/22 she had an Aspira catheter placed to help manage her ascites. She is feeling very weak and reports difficulty breathing. PMH:  Past Medical History:   Diagnosis Date    Abnormal Pap smear 1995    with f/u normal    Anemia     Arthritis     Asthma     Cancer ([facility] [address]     ENDOMETRIAL    Environmental allergies     GERD (gastroesophageal reflux disease)     Goiter     Other unknown and unspecified cause of morbidity or mortality     POSSIBLE ESOPHAGEAL SPASM, ?  OBSTRUCTION DUE TO GOITER    PMB (postmenopausal bleeding)     S/P chemotherapy, time since greater than 12 weeks     LAST CHEMO 10/2014 PER PATIENT    Thyroid disease     goiter     PSH:  Past Surgical History:   Procedure Laterality Date    HX APPENDECTOMY      HX BUNIONECTOMY      HX GYN  3/13/13    TLH/BSO    HX HEENT  4/22/13    TOOTH REMOVED    HX ORTHOPAEDIC  1980'S    BUNIONECTOMY    HX VASCULAR ACCESS      port    IR INSERT INTRAPERI TUNL CATH PERC  8/11/2022    MA BREAST SURGERY PROCEDURE UNLISTED  1/12/16    right breast bx     SOC:  Social History     Socioeconomic History    Marital status:      Spouse name: Not on file    Number of children: Not on file    Years of education: Not on file    Highest education level: Not on file   Occupational History    Not on file   Tobacco Use    Smoking status: Never    Smokeless tobacco: Never   Substance and Sexual Activity    Alcohol use: Yes     Alcohol/week: 0.0 standard drinks     Comment: RARE OCC    Drug use: No    Sexual activity: Not on file   Other Topics Concern    Not on file   Social History Narrative    Not on file     Social Determinants of Health     Financial Resource Strain: Not on file   Food Insecurity: Not on file   Transportation Needs: Not on file   Physical Activity: Not on file   Stress: Not on file   Social Connections: Not on file   Intimate Partner Violence: Not on file   Housing Stability: Not on file     Family History  Family History   Problem Relation Age of Onset    Cancer Maternal Aunt         breast    Heart Disease Mother     Diabetes Father     Cancer Sister         CERVICAL    Kidney Disease Brother     Diabetes Brother     Heart Attack Other     Arrhythmia Brother     Diabetes Brother     Anesth Problems Neg Hx      Medications:  Current Outpatient Medications on File Prior to Visit   Medication Sig Dispense Refill    Xarelto 20 mg tab tablet TAKE 1 TABLET BY MOUTH DAILY 30 Tablet 5    BACITRACIN, BULK, by Does Not Apply route. With zinc ointment      acetaminophen (TYLENOL) 325 mg tablet Take  by mouth as needed.       lisinopriL (PRINIVIL, ZESTRIL) 10 mg tablet Take 1 Tab by mouth daily. 30 Tab 2    lisinopriL (PRINIVIL, ZESTRIL) 5 mg tablet Take 2 Tabs by mouth daily. 30 Tab 5    ondansetron (ZOFRAN ODT) 4 mg disintegrating tablet Take 1 Tab by mouth every eight (8) hours as needed for Nausea. Indications: nausea and vomiting caused by cancer drugs 30 Tab 3    lidocaine-prilocaine (EMLA) topical cream Apply small amount over port area and cover with band aid one hour before chemo 30 g 3    guaifenesin (MUCINEX PO) Take  by mouth.      loratadine (CLARITIN) 10 mg tablet Take 10 mg by mouth.      epinastine 0.05 % drop Apply  to eye. raNITIdine (ZANTAC) 150 mg tablet ZANTAC TABS      cyclobenzaprine (FLEXERIL) 5 mg tablet take 1 tablet by mouth three times a day if needed  0    valACYclovir (VALTREX) 1 gram tablet Take 1 Tab by mouth three (3) times daily. 21 Tab 3    EPINEPHrine (EPIPEN) 0.3 mg/0.3 mL injection 0.3 mg by IntraMUSCular route once as needed. ketotifen (ZADITOR) 0.025 % (0.035 %) ophthalmic solution Administer 1 Drop to both eyes every morning. SAL ACID/UREA/PETROLATUM,WHITE (KERASAL FOOT EX) by Apply Externally route daily as needed. ACCU-CHEK HELDER PLUS TEST STRP strip   0    NITROSTAT 0.4 mg SL tablet       CALCIUM CARBONATE (MARCELLO-SELTZER ANTACID PO) Take  by mouth daily as needed. Cetirizine 10 mg cap Take 10 mg by mouth nightly. (Patient not taking: No sig reported)       No current facility-administered medications on file prior to visit. Allergies:   Allergies   Allergen Reactions    Latex Other (comments)     Burns skin    Acetone Shortness of Breath    Amoxicillin Anaphylaxis    Ciprofloxacin Hives    Pcn [Penicillins] Anaphylaxis    Buspar [Buspirone] Unknown (comments)     Has N&V and makes her feel \"weird\"    Azithromycin Hives    Food [Egg] Nausea Only    Other Medication Rash     POPPY seeds       ROS:  Negative     OBJECTIVE:    PHYSICAL EXAM  VITAL SIGNS: Visit Vitals  /78 (BP 1 Location: Right upper arm, BP Patient Position: Sitting)   Pulse (!) 114   Ht 5' 5\" (1.651 m)   Wt 154 lb (69.9 kg)   SpO2 97%   BMI 25.63 kg/m²        GENERAL KAIA: WD, WN, WF in NAD   HEENT: WNL   RESPIRATORY: CTA   CARDIOVASC: RRR   GASTROINT: Soft, mildly distended. Aspira catheter in right abdomen. MUSCULOSKEL: WNL   EXTREMITIES: No edema   PELVIC: Deferred   RECTAL: Deferred   PRIYANKA SURVEY: Negative   NEURO: Grossly intact     Lab Results   Component Value Date/Time    WBC 6.4 08/01/2022 12:12 PM    Hemoglobin (POC) 12.9 05/01/2013 09:53 AM    HGB 12.0 08/01/2022 12:12 PM    Hematocrit (POC) 38 05/01/2013 09:53 AM    HCT 37.9 08/01/2022 12:12 PM    PLATELET 573 17/91/5633 12:12 PM    MCV 85 08/01/2022 12:12 PM     Lab Results   Component Value Date/Time    Sodium 135 08/01/2022 12:12 PM    Potassium 4.6 08/01/2022 12:12 PM    Chloride 98 08/01/2022 12:12 PM    CO2 23 08/01/2022 12:12 PM    Anion gap 7 05/12/2022 01:03 PM    Glucose 148 (H) 08/01/2022 12:12 PM    BUN 12 08/01/2022 12:12 PM    Creatinine 0.44 (L) 08/01/2022 12:12 PM    BUN/Creatinine ratio 27 08/01/2022 12:12 PM    GFR est AA >60 05/12/2022 01:03 PM    GFR est non-AA >60 05/12/2022 01:03 PM    Calcium 9.0 08/01/2022 12:12 PM    Bilirubin, total 0.3 08/01/2022 12:12 PM    Alk. phosphatase 66 08/01/2022 12:12 PM    Protein, total 5.9 (L) 08/01/2022 12:12 PM    Albumin 3.2 (L) 08/01/2022 12:12 PM    Globulin 4.5 (H) 05/12/2022 01:03 PM    A-G Ratio 1.2 08/01/2022 12:12 PM    ALT (SGPT) 8 08/01/2022 12:12 PM    AST (SGOT) 16 08/01/2022 12:12 PM     Lab Results   Component Value Date/Time    CA-125 333 (H) 05/12/2022 01:03 PM    Cancer Ag (CA) 125 386.0 (H) 08/01/2022 12:12 PM       CT of abdomen/pelvis (5/5/21)  LOWER THORAX: Right cardiophrenic lymph node measures 2.9 cm and previously  measured 1.2 cm on image number 15. There is minor basilar atelectasis/scarring  LIVER: No mass.   BILIARY TREE: There is suggestion of gallstones CBD is not dilated. SPLEEN: within normal limits. PANCREAS: No mass or ductal dilatation. ADRENALS: Unremarkable. KIDNEYS: No mass, calculus, or hydronephrosis. STOMACH: Unremarkable. SMALL BOWEL: No dilatation or wall thickening. COLON: No dilatation or wall thickening. APPENDIX: Status post appendectomy  PERITONEUM: Peritoneal carcinomatosis appears improved. There is a confluent  density anteriorly in the peritoneum on image number 43 measuring 5.9 x 1.4 cm  which previously measured 9.8 x 1.9 cm. RETROPERITONEUM: Left retroperitoneal lymph node measures 0.7 cm image 45 and  previously measured 1.6 cm. Right retroperitoneal lymph node measures 0.7 cm image 46 and previously  measured 0.8 cm. Left iliac lymph node image 57 measures 0.9 cm and previously measured 1.1 cm. REPRODUCTIVE ORGANS: Status post hysterectomy and oophorectomy. URINARY BLADDER: No mass or calculus. BONES: No destructive bone lesion. ABDOMINAL WALL: No mass or hernia. ADDITIONAL COMMENTS: N/A     IMPRESSION  1. There has been a mild decrease in the peritoneal carcinomatosis. 2. There has been slight to mild decrease in the retroperitoneal adenopathy. 3. No ascites is identified. No definite pelvic mass is identified. CT of chests/abdomen/pelvis (7/12/21)  THYROID: Heterogeneous thyroid gland with multiple nodules bilaterally including  in the isthmus. MEDIASTINUM: Left subclavian chest port terminates in the SVC. No mediastinal  adenopathy. RAMEZ: No mass or lymphadenopathy. THORACIC AORTA: No dissection or aneurysm. MAIN PULMONARY ARTERY: Nonocclusive thrombus in segmental branches of the right  upper lobe pulmonary artery (3:47, 52), which are nonocclusive and likely  chronic. TRACHEA/BRONCHI: Patent. ESOPHAGUS: No wall thickening or dilatation. HEART: Normal in size. PLEURA: No effusion or pneumothorax. LUNGS: No nodule, mass, or airspace disease. LIVER: No mass or biliary dilatation.   GALLBLADDER: Sludge in a nondistended gallbladder. SPLEEN: No mass. PANCREAS: No mass or ductal dilatation. ADRENALS: Unremarkable. KIDNEYS: No mass, calculus, or hydronephrosis. STOMACH: Unremarkable. SMALL BOWEL: No dilatation or wall thickening. COLON: Scattered diverticula. APPENDIX: Surgically absent  PERITONEUM: Decreased volume and size of the omental nodularity along the  anterior peritoneal surface just above the umbilicus. No ascites. No  pneumoperitoneum. RETROPERITONEUM: No lymphadenopathy or aortic aneurysm. REPRODUCTIVE ORGANS: Surgically absent. URINARY BLADDER: No mass or calculus. BONES: No destructive bone lesion. ADDITIONAL COMMENTS: N/A     IMPRESSION  1. Resolved retroperitoneal lymphadenopathy. 2.  Decreased peritoneal carcinomatosis. 3.  No ascites. 4.  Nonocclusive thrombus and segmental right upper lobe pulmonary artery  branches which are nonocclusive and appear chronic. PET/CT (8/27/21)  HEAD/NECK: No apparent foci of abnormal hypermetabolism. Cerebral evaluation is  limited by normal intense activity. CHEST: No foci of abnormal hypermetabolism. Resolution of left supraclavicular  jasper activity. ABDOMEN/PELVIS:   Omental/peritoneal disease is near completely resolved; current max SUV, midline  omentum 2.4, previous max SUV 12. Resolved retroperitoneal and right deep pelvic jasper activity. Resolved left pelvic cul-de-sac mass/activity. SKELETON: No foci of abnormal hypermetabolism in the axial and visualized  appendicular skeleton. IMPRESSION  Abnormal PET activity is resolved other than minimal residual  activity in the omentum. CT of chest/abdomen/pelvis (11/9/21)  Chest:     Tubes and lines: Central venous port catheter extends to the SVC. Lungs: There is mild bibasilar atelectasis. No pulmonary nodule or mass is seen. Lymph nodes: There is no mediastinal, hilar or axillary lymphadenopathy. Subcentimeter axillary and mediastinal lymph nodes are noted. Pleura: There is trace bilateral pleural fluid, new compared to prior CT dated  July 2021. Heart: The heart is normal in size and there is no pericardial fluid. Bones: No lytic or sclerotic osseous lesion is visualized. The thyroid gland: Thyroid gland is normal in size. There are several nodules  within the thyroid gland, unchanged compared to prior CT dated July 2021. Abdomen/pelvis:  Lymph nodes/peritoneum/retroperitoneum/omentum: There is a 1.3 cm x 1.3 cm  cardiophrenic lymph node which measured 8 mm x 7 mm on prior CT dated May 2021. There is a small amount of free fluid in the pelvis, new compared to prior CT  dated July 2021. There has been interval worsening of coalescing of omental  caking and intraperitoneal soft tissue nodularity. For example, within the  anterior mid abdomen, there is an omental soft tissue mass measuring  approximately 13.8 cm x 5.6 cm and measuring approximately 9.5 cm x 1.8 cm on  prior CT dated July 2021. There are also mildly enlarged upper abdominal  mesenteric lymph nodes which have increased in size. For example, there is a 2  cm x 1.4 cm celiac lymph node which measures approximately 6 mm x 4 mm on prior  CT dated July 2021. As an additional example, there is a 1.3 cm x 1.3 cm  mesenteric lymph node within the upper pelvis, measuring several millimeters in  size on prior CT dated July 2021. Retroperitoneal lymph nodes have increased in  size as well. For example there is a 1.8 cm x 1.3 cm right common iliac lymph  node which measured 8 mm x 7 mm on prior CT dated July 2021. Liver: There is subtle capsular hepatic, new compared to prior CT dated July 2021. No intraparenchymal hepatic lesion is seen. Spleen: The spleen is normal.     Pancreas: The pancreas is normal.     Adrenals: The adrenals are normal.     Gallbladder: Gallbladder is normal.     Kidneys: The kidneys are normal. There is no hydronephrosis. Bowel:  There is new ill-defined soft tissue in the pelvis which appears to be  extending from the colon, likely representing subserosal soft tissue nodularity  and tethering. No dilated loop of large or small bowel is seen. Colonic  diverticulosis is noted. Appendix: The appendix is surgically absent. Urinary bladder: Urinary bladder is partially filled and grossly normal.     Bones: No lytic or sclerotic osseous lesion is visualized. Miscellaneous: There is no free intraperitoneal gas. There is no focal fluid  collection to suggest abscess. IMPRESSION  1. New, trace bilateral pleural fluid. Mild bibasilar atelectasis. 2.: Increase in size of cardiophrenic lymph node, now measuring 1.3 cm x 1.3 cm.  3. Interval worsening of coalescing of omental caking, intraperitoneal soft  tissue nodularity and intraperitoneal and retroperitoneal lymphadenopathy. 4. New, small amount of ascites in the pelvis. CT of abdomen/pelvis (2/17/22)  LOWER THORAX: Small bilateral pleural effusions. No visualized lung nodules  LIVER: No mass. BILIARY TREE: Gallbladder is within normal limits. CBD is not dilated. SPLEEN: within normal limits. PANCREAS: No mass or ductal dilatation. ADRENALS: Unremarkable. KIDNEYS: No mass, calculus, or hydronephrosis. STOMACH: Unremarkable. SMALL BOWEL: No dilatation or wall thickening. COLON: No dilatation or wall thickening. APPENDIX: Surgically absent  PERITONEUM: Omental and peritoneal nodularity with small volume scattered  ascites consistent with peritoneal carcinomatosis. Mesenteric, portacaval, and  gastrohepatic ligament lymphadenopathy. RETROPERITONEUM: Retroperitoneal lymphadenopathy along the IVC. REPRODUCTIVE ORGANS: Status post hysterectomy  URINARY BLADDER: No mass or calculus. BONES: No destructive bone lesion. ABDOMINAL WALL: No mass or hernia. ADDITIONAL COMMENTS: N/A     IMPRESSION  1.   Peritoneal and omental nodularity with small volume ascites consistent with  peritoneal carcinomatosis. 2.  Lymphadenopathy in the mesentery, gastrohepatic ligament, portacaval, and  retroperitoneal stations. 3.  Small bilateral pleural effusions. CT of chest/abdomen/pelvis (5/7/22)  CHEST WALL: No mass or axillary lymphadenopathy. THYROID: No significant change in multiple small nodules. Otherwise  unremarkable. MEDIASTINUM: 1.3 cm precarinal node. No mass or other enlarged lymphadenopathy. RAMEZ: No mass or lymphadenopathy. THORACIC AORTA: No dissection or aneurysm. MAIN PULMONARY ARTERY: Normal in caliber. TRACHEA/BRONCHI: Patent. ESOPHAGUS: No wall thickening or dilatation. HEART: Normal in size. PLEURA: Moderate bilateral pleural effusions with no pneumothorax. LUNGS: Compressive atelectasis overlies pleural effusions with no nodule, mass,  or other airspace disease. LIVER: No mass. BILIARY TREE: Gallbladder is within normal limits. CBD is not dilated. SPLEEN: within normal limits. PANCREAS: No mass or ductal dilatation. ADRENALS: Unremarkable. KIDNEYS: No mass, calculus, or hydronephrosis. STOMACH: Unremarkable. SMALL BOWEL: No dilatation or wall thickening. COLON: No dilation or wall thickening. Noninflamed appearing left and sigmoid  diverticula. APPENDIX: Surgically absent. PERITONEUM: Extensive omental nodularity and caking redemonstrated with anterior  omental mass measuring 5.0 x 12.2 cm, previously 6.6 x 14.4 cm. Just lesion is along the greater curvature of the stomach measuring 2.7 x 3.2  cm, previously 3.8 x 4.7 cm. There is small ascites, portions of which appear  loculated on around the liver margin and splenic margin, not significant change  from prior. RETROPERITONEUM: There is diffuse adenopathy measuring up to 1.5 x 2.1 cm in the  portacaval station, previously 1.3 x 2.1 cm, 1.5 x 1.9 cm at the celiac,  previously 1.3 x 1.8 cm, 1.3 x 1.7 cm right aortocaval, previously 0.9 x 1.7 cm.   Right common iliac adenopathy measures 1.7 x 1.7 cm, previously 1.7 x 1.9 cm. REPRODUCTIVE ORGANS: Uterus and ovaries are surgically absent. URINARY BLADDER: No mass or calculus. BONES: Degenerative spine change. No acute fracture or aggressive lesion. ABDOMINAL WALL: No mass or hernia. ADDITIONAL COMMENTS: Left Port-A-Cath in place with catheter traversing to the  atriocaval junction. IMPRESSION  1. Pleural effusions with overlying atelectasis. 2. Peritoneal carcinomatosis with overall interval mild decrease in bulk of  disease. 3. Retroperitoneal lymphadenopathy slightly worsened compared to prior  examinations suspicious for mildly progressive metastatic disease. 4. Incidentals as above including colonic diverticulosis. CT of chest/abdomen/pelvis (7/9/22)  CHEST WALL: Left chest wall port catheter terminates in the SVC. THYROID: Heterogeneous thyroid gland with small nodules. MEDIASTINUM: Enlarged right lower paratracheal lymph node measuring 14 mm  (3:26), stable. Subcarinal lymph node is stable at 10 mm. RAMEZ: Stable right hilar lymphadenopathy. THORACIC AORTA: No dissection or aneurysm. MAIN PULMONARY ARTERY: Normal in caliber. TRACHEA/BRONCHI: Patent. ESOPHAGUS: No wall thickening or dilatation. HEART: Normal in size. PLEURA: Bilateral pleural effusions increased since the prior study. LUNGS: Bibasilar atelectasis. No suspicious nodule. LIVER: No mass. BILIARY TREE: Gallbladder is within normal limits. CBD is not dilated. SPLEEN: within normal limits. PANCREAS: No mass or ductal dilatation. ADRENALS: Unremarkable. KIDNEYS: No mass, calculus, or hydronephrosis. STOMACH: Unremarkable. SMALL BOWEL: No dilatation or wall thickening. COLON: No dilatation or wall thickening. APPENDIX: Nonvisualized  PERITONEUM: Anterior peritoneal/omental implant measures 12.3 x 4.1 cm,  minimally changed in size.  Persistent omental/peritoneal implants in the left  upper quadrant near the splenic hilum, bilateral paracolic gutters, and in the  dependent portion of the peritoneal reflection. Numerous enlarged mesenteric  lymph nodes, as before. Increased ascites in the abdomen. RETROPERITONEUM: Stable persistent retroperitoneal lymphadenopathy. REPRODUCTIVE ORGANS: Status post hysterectomy. URINARY BLADDER: Nondistended urinary bladder limits evaluation. BONES: No destructive bone lesion. ABDOMINAL WALL: No mass or hernia. ADDITIONAL COMMENTS: N/A     IMPRESSION  1. Increased bilateral pleural effusions and ascites. 2.  Persistent peritoneal/omental nodularity, mesenteric lymphadenopathy, and  retroperitoneal lymphadenopathy, not significantly changed. 3.  Stable mediastinal lymphadenopathy. 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 She completed six cycles of adjuvant Taxol and Carboplatin chemotherapy. She developed recurrent disease and was treated with the regimen of IV Keytruda and PO Lenvima. She completed 6 cycles before progression. Since it had been almost 8 years since her adjuvant Taxol/Carboplatin, I recommended that we retreat her with that regimen, though I suggested a lower dose of each. She completed 8 cycles of that regimen with an excellent response. She now has recurrence once again. I recommended single agent Doxil chemotherapy. She completed 3 cycles before a CT demonstrated progression of disease. We stopped the Doxil and I discussed other treatment options with her, including the possibility of a clinical trial at [facility]. Ultimately we decided upon single agent Avastin. I explained that it won't make the tumor \"go away\", but it may slow down the growth and help manage the ascites. She has completed 8 cycles so far. [facility]11/22 to manage her ascites. She has a CT scheduled for later this week. I will see her back to review those results.   I suspect it will demonstrate progression and we will need to determine what to do next. I'm not sure she is even a candidate for a clinical trial, but I will reach out to [facility] to see what is available. An electronic signature was used to sign this note.     Virgie Rincon MD  08/16/22

## 2022-08-19 ENCOUNTER — HOSPITAL ENCOUNTER (OUTPATIENT)
Dept: VASCULAR SURGERY | Age: 67
Discharge: HOME | End: 2022-08-19
Attending: PHYSICIAN ASSISTANT
Payer: MEDICARE

## 2022-08-19 ENCOUNTER — DOCUMENTATION ONLY (OUTPATIENT)
Dept: GYNECOLOGY | Age: 67
End: 2022-08-19

## 2022-08-19 ENCOUNTER — TELEPHONE (OUTPATIENT)
Dept: GYNECOLOGY | Age: 67
End: 2022-08-19

## 2022-08-19 ENCOUNTER — HOSPITAL ENCOUNTER (OUTPATIENT)
Dept: CT IMAGING | Age: 67
Discharge: HOME | End: 2022-08-19
Attending: OBSTETRICS & GYNECOLOGY
Payer: MEDICARE

## 2022-08-19 DIAGNOSIS — R18.0 MALIGNANT ASCITES: ICD-10-CM

## 2022-08-19 DIAGNOSIS — C54.1 PRIMARY SEROUS ADENOCARCINOMA OF ENDOMETRIUM (HCC): Primary | ICD-10-CM

## 2022-08-19 DIAGNOSIS — N30.00 ACUTE CYSTITIS WITHOUT HEMATURIA: ICD-10-CM

## 2022-08-19 DIAGNOSIS — C54.1 PRIMARY SEROUS ADENOCARCINOMA OF ENDOMETRIUM (HCC): ICD-10-CM

## 2022-08-19 DIAGNOSIS — R30.9 PAINFUL URINATION: Primary | ICD-10-CM

## 2022-08-19 DIAGNOSIS — J90 PLEURAL EFFUSION, BILATERAL: ICD-10-CM

## 2022-08-19 PROCEDURE — 74011000636 HC RX REV CODE- 636: Performed by: INTERNAL MEDICINE

## 2022-08-19 PROCEDURE — 93971 EXTREMITY STUDY: CPT

## 2022-08-19 PROCEDURE — 74177 CT ABD & PELVIS W/CONTRAST: CPT

## 2022-08-19 RX ORDER — NITROFURANTOIN 25; 75 MG/1; MG/1
100 CAPSULE ORAL 2 TIMES DAILY
Qty: 10 CAPSULE | Refills: 0 | Status: SHIPPED | OUTPATIENT
Start: 2022-08-19 | End: 2022-08-24

## 2022-08-19 RX ADMIN — IOPAMIDOL 100 ML: 755 INJECTION, SOLUTION INTRAVENOUS at 12:26

## 2022-08-19 NOTE — PROGRESS NOTES
Ms. Virgil Bliss comes in today with a few questions about her aspira catheter. She feels uncomfortable changing the dressing over the catheter without being shown how to do it. Dressing was changed. Education provided. Patient states that she has not drained any fluid in several days. Drain was connected to collection bag in the office. Fluid appeared opaque and white. Sample taken and sent for cell count and culture. 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 Patient feeling well. Mild discomfort with palpation of abdomen but no peritoneal signs. No fevers. She is scheduled to have labwork on 8/22/2022    Patient also complains of right lower leg swelling. She is on xarelto. Will send her for right lower extremity doppler.

## 2022-08-19 NOTE — TELEPHONE ENCOUNTER
Patient c/o burning with urination and urine has a fishy odor. I advised the patient to go to [employer] to give a urine sample and will see if Philbert Koyanagi, PA would like for her to start an antibiotic for the weekend until we get results back. Patient is allergic to Cipro, PCN, and azithromycin.

## 2022-08-20 LAB
APPEARANCE FLD: ABNORMAL
COLOR FLD: ABNORMAL
LYMPHOCYTES NFR FLD: 41 %
MONOS+MACROS NFR FLD: 3 %
NEUTROPHILS NFR FLD: 56 %
NUC CELL # FLD: 1878 /CU MM
RBC # FLD: >100 /CU MM
SPECIMEN SOURCE FLD: ABNORMAL

## 2022-08-22 ENCOUNTER — FACE TO FACE ENCOUNTER (OUTPATIENT)
Dept: GYNECOLOGY | Age: 67
End: 2022-08-22

## 2022-08-22 RX ORDER — SODIUM CHLORIDE 9 MG/ML
5-40 INJECTION INTRAMUSCULAR; INTRAVENOUS; SUBCUTANEOUS AS NEEDED
OUTPATIENT
Start: 2022-08-22

## 2022-08-22 RX ORDER — HEPARIN 100 UNIT/ML
500 SYRINGE INTRAVENOUS AS NEEDED
Start: 2022-08-22

## 2022-08-22 RX ORDER — SODIUM CHLORIDE 9 MG/ML
5-250 INJECTION, SOLUTION INTRAVENOUS AS NEEDED
OUTPATIENT
Start: 2022-08-22

## 2022-08-22 RX ORDER — SODIUM CHLORIDE 0.9 % (FLUSH) 0.9 %
5-40 SYRINGE (ML) INJECTION AS NEEDED
OUTPATIENT
Start: 2022-08-22

## 2022-08-22 NOTE — PROGRESS NOTES
Late documentation note. Patient seen on 8/19/2022. She did not feel comfortable changing the dressing covering her Aspiral peritoneal catheter on her own and asked that we show her. Old dressing removed and new dressing placed using supplies in Aspira dressing kit. Patient given and shown step by step instructions on how to change the dressing. Patient states that she has not drained any fluid for about 5 days. Drainage catheter connected and activated with pump to drain ascites. About 300cc opaque, white fluid drained. 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 Patient denies fevers or chills. Abdomen soft. Mild discomfort to palpation. No peritoneal signs. Sample of ascites fluid send for cell count and culture. Patient complains of right lower leg swelling as well with occasional discomfort. Will send her for right lower extremity doppler ultrasound. She will start her antibiotics for her presumed UTI. She is scheduled to get a set of bloodwork on Monday, 8/22/2022.

## 2022-08-23 ENCOUNTER — VIRTUAL VISIT (OUTPATIENT)
Dept: GYNECOLOGY | Age: 67
End: 2022-08-23
Payer: MEDICARE

## 2022-08-23 DIAGNOSIS — Z79.899 ON ANTINEOPLASTIC CHEMOTHERAPY: ICD-10-CM

## 2022-08-23 DIAGNOSIS — J90 PLEURAL EFFUSION, BILATERAL: ICD-10-CM

## 2022-08-23 DIAGNOSIS — R18.0 MALIGNANT ASCITES: ICD-10-CM

## 2022-08-23 DIAGNOSIS — C54.1 PRIMARY SEROUS ADENOCARCINOMA OF ENDOMETRIUM (HCC): Primary | ICD-10-CM

## 2022-08-23 LAB
ALBUMIN SERPL-MCNC: 3 G/DL (ref 3.8–4.8)
ALBUMIN/GLOB SERPL: 1 {RATIO} (ref 1.2–2.2)
ALP SERPL-CCNC: 66 IU/L (ref 44–121)
ALT SERPL-CCNC: 7 IU/L (ref 0–32)
APPEARANCE UR: CLEAR
AST SERPL-CCNC: 17 IU/L (ref 0–40)
BACTERIA #/AREA URNS HPF: NORMAL /[HPF]
BASOPHILS # BLD AUTO: 0 X10E3/UL (ref 0–0.2)
BASOPHILS NFR BLD AUTO: 0 %
BILIRUB SERPL-MCNC: 0.3 MG/DL (ref 0–1.2)
BILIRUB UR QL STRIP: NEGATIVE
BUN SERPL-MCNC: 10 MG/DL (ref 8–27)
BUN/CREAT SERPL: 25 (ref 12–28)
CALCIUM SERPL-MCNC: 8.3 MG/DL (ref 8.7–10.3)
CANCER AG125 SERPL-ACNC: 352 U/ML (ref 0–38.1)
CASTS URNS QL MICRO: NORMAL /LPF
CHLORIDE SERPL-SCNC: 99 MMOL/L (ref 96–106)
CO2 SERPL-SCNC: 22 MMOL/L (ref 20–29)
COLOR UR: YELLOW
CREAT SERPL-MCNC: 0.4 MG/DL (ref 0.57–1)
EGFR: 109 ML/MIN/1.73
EOSINOPHIL # BLD AUTO: 0 X10E3/UL (ref 0–0.4)
EOSINOPHIL NFR BLD AUTO: 1 %
EPI CELLS #/AREA URNS HPF: NORMAL /HPF (ref 0–10)
ERYTHROCYTE [DISTWIDTH] IN BLOOD BY AUTOMATED COUNT: 16.9 % (ref 11.7–15.4)
GLOBULIN SER CALC-MCNC: 3.1 G/DL (ref 1.5–4.5)
GLUCOSE SERPL-MCNC: 124 MG/DL (ref 65–99)
GLUCOSE UR QL STRIP: NEGATIVE
HCT VFR BLD AUTO: 37 % (ref 34–46.6)
HGB BLD-MCNC: 11.5 G/DL (ref 11.1–15.9)
HGB UR QL STRIP: NEGATIVE
IMM GRANULOCYTES # BLD AUTO: 0 X10E3/UL (ref 0–0.1)
IMM GRANULOCYTES NFR BLD AUTO: 0 %
KETONES UR QL STRIP: ABNORMAL
LEUKOCYTE ESTERASE UR QL STRIP: NEGATIVE
LYMPHOCYTES # BLD AUTO: 0.4 X10E3/UL (ref 0.7–3.1)
LYMPHOCYTES NFR BLD AUTO: 7 %
MAGNESIUM SERPL-MCNC: 1.9 MG/DL (ref 1.6–2.3)
MCH RBC QN AUTO: 26.4 PG (ref 26.6–33)
MCHC RBC AUTO-ENTMCNC: 31.1 G/DL (ref 31.5–35.7)
MCV RBC AUTO: 85 FL (ref 79–97)
MICRO URNS: ABNORMAL
MICRO URNS: ABNORMAL
MONOCYTES # BLD AUTO: 0.5 X10E3/UL (ref 0.1–0.9)
MONOCYTES NFR BLD AUTO: 9 %
NEUTROPHILS # BLD AUTO: 4.9 X10E3/UL (ref 1.4–7)
NEUTROPHILS NFR BLD AUTO: 83 %
NITRITE UR QL STRIP: NEGATIVE
PH UR STRIP: 7.5 [PH] (ref 5–7.5)
PLATELET # BLD AUTO: 360 X10E3/UL (ref 150–450)
POTASSIUM SERPL-SCNC: 4.4 MMOL/L (ref 3.5–5.2)
PROT SERPL-MCNC: 6.1 G/DL (ref 6–8.5)
PROT UR QL STRIP: NEGATIVE
RBC # BLD AUTO: 4.35 X10E6/UL (ref 3.77–5.28)
RBC #/AREA URNS HPF: NORMAL /HPF (ref 0–2)
SODIUM SERPL-SCNC: 137 MMOL/L (ref 134–144)
SP GR UR STRIP: 1.02 (ref 1–1.03)
UROBILINOGEN UR STRIP-MCNC: 1 MG/DL (ref 0.2–1)
WBC # BLD AUTO: 6 X10E3/UL (ref 3.4–10.8)
WBC #/AREA URNS HPF: NORMAL /HPF (ref 0–5)

## 2022-08-23 PROCEDURE — 99442 PR PHYS/QHP TELEPHONE EVALUATION 11-20 MIN: CPT | Performed by: OBSTETRICS & GYNECOLOGY

## 2022-08-23 NOTE — PROGRESS NOTES
Caller: Brenda Michelle    Relationship: Self    Best call back number: [phone]    Medication needed:   Requested Prescriptions     Pending Prescriptions Disp Refills   • DULoxetine (CYMBALTA) 60 MG capsule 30 capsule 3     Sig: Take 1 capsule by mouth Daily.   • folic acid (FOLVITE) 1 MG tablet 30 tablet 11     Sig: Take 1 tablet by mouth Daily.       When do you need the refill by: 5/27/21    What additional details did the patient provide when requesting the medication: PATIENT HAS 5 DAY SUPPLY OF FOLIC ACID AND 2 WEEKS SUPPLY OF DULOXETINE.     Does the patient have less than a 3 day supply:  [] Yes  [x] No    What is the patient's preferred pharmacy: [facility] - [facility], [address] [phone] [facility] [phone]       Virtual telephone visit. Pre chemo, review ct scan results, for C9 avastin on 8/25.

## 2022-08-23 NOTE — PROGRESS NOTES
[facility] GYNECOLOGIC ONCOLOGY  [address] [facility], [address], [address] [facility]  [phone] [facility] [phone]    Office Visit    Patient ID:  Name: Suhas Marion  MRN: [id number]   :  y.o. Visit date: 2022     INTERVAL HISTORY:  Suhas Marion is a  female who is an established patient with stage IA, grade 3 uterine carcinoma. She underwent laparoscopic hysterectomy with staging in 2013. She was recommended six cycles of adjuvant Taxol and Carboplatin, which she completed. We elected not to treat with pelvic radiation therapy due to her difficulty with chemotherapy. She had a CT of the abdomen/pelvis at [facility] that revealed retroperitoneal lymphadenopathy, peritoneal carcinomatosis, and trace ascites. I sent her for a PET/CT to better evaluate the extent of disease. She presented to review the results and discuss treatment. Her disease is not amenable to surgical resection. Systemic therapy was her only viable option. I thought immunotherapy would be the best choice, ahead of additional chemotherapy, specifically IV Keytruda and PO Lenvima. If that were not successful, we would consider retreatment with Taxol/Carboplatin or single agent Doxil. She completed 6 cycles of immunotherapy before demonstrating progression of disease. We decided upon retreatment with Taxol/Carboplatin. She completed 8 cycles of that regimen. Her CA-125 has responded and her CT after three cycles demonstrated a response. Her CT after completion of 6 cycles demonstrated further response. A subsequent PET/CT demonstrated resolution of most of the abnormal PET activity, but there still was some residual activity in the omentum, suggesting persistent disease. We stopped treatment in 2021, as she was beginning not to tolerate treatment well. She presented recently complaining of abdominal pain and bloating.   She stated it was a stabbing pain in her left abdomen. The bloating has also been causing a little shortness of breath. I sent her for a CT of the chest/abdomen/pelvis to evaluate. She presented to review those results. The scan demonstrated recurrent disease throughout the abdomen and pelvis. I recommended single agent Doxil. She completed 3 cycles of Doxil. Due to early satiety and complaints of bloating and abdominal distention, I sent her for a paracentesis in December. They did not see enough fluid on ultrasound to attempt drainage. She was also diagnosed with COVID around that time and is still recovering, but has since tested negative. She presented to review her interval CT scan after 3 cycles. Unfortunately it demonstrated progression. I discussed other treatment options with her, including the possibility of a clinical trial at [facility]. Ultimately we decided upon single agent Avastin. I explained that it won't make the tumor \"go away\", but it may slow down the growth and help manage the ascites. She has completed 8 cycles so far. Her CT in early July 2022 demonstrated overall stable disease, but she did have more fluid present. Her CA-125 remains elevated, but relatively stable. She had a paracentesis in mid July for symptomatic relief. She felt better for a while, but the fluid is starting to accumulate once again. On 8/11/22 she had an Aspira catheter placed to help manage her ascites. She is feeling very weak and reports difficulty breathing. She presents today to review her latest CT scan. Her measurable disease remains stable. Her CA-125 has actually trended down. PMH:  Past Medical History:   Diagnosis Date    Abnormal Pap smear 1995    with f/u normal    Anemia     Arthritis     Asthma     Cancer ([address]     ENDOMETRIAL    Environmental allergies     GERD (gastroesophageal reflux disease)     Goiter     Other unknown and unspecified cause of morbidity or mortality     POSSIBLE ESOPHAGEAL SPASM, ? OBSTRUCTION DUE TO GOITER    PMB (postmenopausal bleeding)     S/P chemotherapy, time since greater than 12 weeks     LAST CHEMO 10/2014 PER PATIENT    Thyroid disease     goiter     PSH:  Past Surgical History:   Procedure Laterality Date    HX APPENDECTOMY      HX BUNIONECTOMY      HX GYN  3/13/13    TLH/BSO    HX HEENT  4/22/13    TOOTH REMOVED    HX ORTHOPAEDIC  1980'S    BUNIONECTOMY    HX VASCULAR ACCESS      port    IR INSERT INTRAPERI TUNL CATH PERC  8/11/2022    ME BREAST SURGERY PROCEDURE UNLISTED  1/12/16    right breast bx     SOC:  Social History     Socioeconomic History    Marital status:      Spouse name: Not on file    Number of children: Not on file    Years of education: Not on file    Highest education level: Not on file   Occupational History    Not on file   Tobacco Use    Smoking status: Never    Smokeless tobacco: Never   Substance and Sexual Activity    Alcohol use:  Yes     Alcohol/week: 0.0 standard drinks     Comment: RARE OCC    Drug use: No    Sexual activity: Not on file   Other Topics Concern    Not on file   Social History Narrative    Not on file     Social Determinants of Health     Financial Resource Strain: Not on file   Food Insecurity: Not on file   Transportation Needs: Not on file   Physical Activity: Not on file   Stress: Not on file   Social Connections: Not on file   Intimate Partner Violence: Not on file   Housing Stability: Not on file     Family History  Family History   Problem Relation Age of Onset    Cancer Maternal Aunt         breast    Heart Disease Mother     Diabetes Father     Cancer Sister         CERVICAL    Kidney Disease Brother     Diabetes Brother     Heart Attack Other     Arrhythmia Brother     Diabetes Brother     Anesth Problems Neg Hx      Medications:  Current Outpatient Medications on File Prior to Visit   Medication Sig Dispense Refill    nitrofurantoin, macrocrystal-monohydrate, (MACROBID) 100 mg capsule Take 1 Capsule by mouth two (2) times a day for 5 days. Indications: bacterial urinary tract infection 10 Capsule 0    Xarelto 20 mg tab tablet TAKE 1 TABLET BY MOUTH DAILY 30 Tablet 5    BACITRACIN, BULK, by Does Not Apply route. With zinc ointment      acetaminophen (TYLENOL) 325 mg tablet Take  by mouth as needed. lisinopriL (PRINIVIL, ZESTRIL) 10 mg tablet Take 1 Tab by mouth daily. 30 Tab 2    lisinopriL (PRINIVIL, ZESTRIL) 5 mg tablet Take 2 Tabs by mouth daily. 30 Tab 5    ondansetron (ZOFRAN ODT) 4 mg disintegrating tablet Take 1 Tab by mouth every eight (8) hours as needed for Nausea. Indications: nausea and vomiting caused by cancer drugs 30 Tab 3    lidocaine-prilocaine (EMLA) topical cream Apply small amount over port area and cover with band aid one hour before chemo 30 g 3    guaifenesin (MUCINEX PO) Take  by mouth.      loratadine (CLARITIN) 10 mg tablet Take 10 mg by mouth.      epinastine 0.05 % drop Apply  to eye. raNITIdine (ZANTAC) 150 mg tablet ZANTAC TABS      cyclobenzaprine (FLEXERIL) 5 mg tablet take 1 tablet by mouth three times a day if needed  0    valACYclovir (VALTREX) 1 gram tablet Take 1 Tab by mouth three (3) times daily. 21 Tab 3    EPINEPHrine (EPIPEN) 0.3 mg/0.3 mL injection 0.3 mg by IntraMUSCular route once as needed. ketotifen (ZADITOR) 0.025 % (0.035 %) ophthalmic solution Administer 1 Drop to both eyes every morning. Cetirizine 10 mg cap Take 10 mg by mouth nightly. (Patient not taking: No sig reported)      SAL ACID/UREA/PETROLATUM,WHITE (KERASAL FOOT EX) by Apply Externally route daily as needed. ACCU-CHEK HELDER PLUS TEST STRP strip   0    NITROSTAT 0.4 mg SL tablet       CALCIUM CARBONATE (MARCELLO-SELTZER ANTACID PO) Take  by mouth daily as needed. No current facility-administered medications on file prior to visit. Allergies:   Allergies   Allergen Reactions    Latex Other (comments)     Burns skin    Acetone Shortness of Breath    Amoxicillin Anaphylaxis    Ciprofloxacin Hives    Pcn [Penicillins] Anaphylaxis    Buspar [Buspirone] Unknown (comments)     Has N&V and makes her feel \"weird\"    Azithromycin Hives    Food [Egg] Nausea Only    Other Medication Rash     POPPY seeds       ROS:  Negative     OBJECTIVE:    PHYSICAL EXAM  VITAL SIGNS: There were no vitals taken for this visit. GENERAL KAIA:    HEENT:    RESPIRATORY:    CARDIOVASC:    GASTROINT:    MUSCULOSKEL:    EXTREMITIES:    PELVIC:    RECTAL:    PRIYANKA SURVEY:    NEURO:      Lab Results   Component Value Date/Time    WBC 6.4 08/01/2022 12:12 PM    Hemoglobin (POC) 12.9 05/01/2013 09:53 AM    HGB 12.0 08/01/2022 12:12 PM    Hematocrit (POC) 38 05/01/2013 09:53 AM    HCT 37.9 08/01/2022 12:12 PM    PLATELET 160 38/36/1304 12:12 PM    MCV 85 08/01/2022 12:12 PM     Lab Results   Component Value Date/Time    Sodium 135 08/01/2022 12:12 PM    Potassium 4.6 08/01/2022 12:12 PM    Chloride 98 08/01/2022 12:12 PM    CO2 23 08/01/2022 12:12 PM    Anion gap 7 05/12/2022 01:03 PM    Glucose 148 (H) 08/01/2022 12:12 PM    BUN 12 08/01/2022 12:12 PM    Creatinine 0.44 (L) 08/01/2022 12:12 PM    BUN/Creatinine ratio 27 08/01/2022 12:12 PM    GFR est AA >60 05/12/2022 01:03 PM    GFR est non-AA >60 05/12/2022 01:03 PM    Calcium 9.0 08/01/2022 12:12 PM    Bilirubin, total 0.3 08/01/2022 12:12 PM    Alk. phosphatase 66 08/01/2022 12:12 PM    Protein, total 5.9 (L) 08/01/2022 12:12 PM    Albumin 3.2 (L) 08/01/2022 12:12 PM    Globulin 4.5 (H) 05/12/2022 01:03 PM    A-G Ratio 1.2 08/01/2022 12:12 PM    ALT (SGPT) 8 08/01/2022 12:12 PM    AST (SGOT) 16 08/01/2022 12:12 PM     Lab Results   Component Value Date/Time    CA-125 333 (H) 05/12/2022 01:03 PM    Cancer Ag (CA) 125 386.0 (H) 08/01/2022 12:12 PM       CT of abdomen/pelvis (5/5/21)  LOWER THORAX: Right cardiophrenic lymph node measures 2.9 cm and previously  measured 1.2 cm on image number 15. There is minor basilar atelectasis/scarring  LIVER: No mass.   BILIARY TREE: There is suggestion of gallstones CBD is not dilated. SPLEEN: within normal limits. PANCREAS: No mass or ductal dilatation. ADRENALS: Unremarkable. KIDNEYS: No mass, calculus, or hydronephrosis. STOMACH: Unremarkable. SMALL BOWEL: No dilatation or wall thickening. COLON: No dilatation or wall thickening. APPENDIX: Status post appendectomy  PERITONEUM: Peritoneal carcinomatosis appears improved. There is a confluent  density anteriorly in the peritoneum on image number 43 measuring 5.9 x 1.4 cm  which previously measured 9.8 x 1.9 cm. RETROPERITONEUM: Left retroperitoneal lymph node measures 0.7 cm image 45 and  previously measured 1.6 cm. Right retroperitoneal lymph node measures 0.7 cm image 46 and previously  measured 0.8 cm. Left iliac lymph node image 57 measures 0.9 cm and previously measured 1.1 cm. REPRODUCTIVE ORGANS: Status post hysterectomy and oophorectomy. URINARY BLADDER: No mass or calculus. BONES: No destructive bone lesion. ABDOMINAL WALL: No mass or hernia. ADDITIONAL COMMENTS: N/A     IMPRESSION  1. There has been a mild decrease in the peritoneal carcinomatosis. 2. There has been slight to mild decrease in the retroperitoneal adenopathy. 3. No ascites is identified. No definite pelvic mass is identified. CT of chests/abdomen/pelvis (7/12/21)  THYROID: Heterogeneous thyroid gland with multiple nodules bilaterally including  in the isthmus. MEDIASTINUM: Left subclavian chest port terminates in the SVC. No mediastinal  adenopathy. RAMEZ: No mass or lymphadenopathy. THORACIC AORTA: No dissection or aneurysm. MAIN PULMONARY ARTERY: Nonocclusive thrombus in segmental branches of the right  upper lobe pulmonary artery (3:47, 52), which are nonocclusive and likely  chronic. TRACHEA/BRONCHI: Patent. ESOPHAGUS: No wall thickening or dilatation. HEART: Normal in size. PLEURA: No effusion or pneumothorax. LUNGS: No nodule, mass, or airspace disease.   LIVER: No mass or biliary dilatation. GALLBLADDER: Sludge in a nondistended gallbladder. SPLEEN: No mass. PANCREAS: No mass or ductal dilatation. ADRENALS: Unremarkable. KIDNEYS: No mass, calculus, or hydronephrosis. STOMACH: Unremarkable. SMALL BOWEL: No dilatation or wall thickening. COLON: Scattered diverticula. APPENDIX: Surgically absent  PERITONEUM: Decreased volume and size of the omental nodularity along the  anterior peritoneal surface just above the umbilicus. No ascites. No  pneumoperitoneum. RETROPERITONEUM: No lymphadenopathy or aortic aneurysm. REPRODUCTIVE ORGANS: Surgically absent. URINARY BLADDER: No mass or calculus. BONES: No destructive bone lesion. ADDITIONAL COMMENTS: N/A     IMPRESSION  1. Resolved retroperitoneal lymphadenopathy. 2.  Decreased peritoneal carcinomatosis. 3.  No ascites. 4.  Nonocclusive thrombus and segmental right upper lobe pulmonary artery  branches which are nonocclusive and appear chronic. PET/CT (8/27/21)  HEAD/NECK: No apparent foci of abnormal hypermetabolism. Cerebral evaluation is  limited by normal intense activity. CHEST: No foci of abnormal hypermetabolism. Resolution of left supraclavicular  jasper activity. ABDOMEN/PELVIS:   Omental/peritoneal disease is near completely resolved; current max SUV, midline  omentum 2.4, previous max SUV 12. Resolved retroperitoneal and right deep pelvic jasper activity. Resolved left pelvic cul-de-sac mass/activity. SKELETON: No foci of abnormal hypermetabolism in the axial and visualized  appendicular skeleton. IMPRESSION  Abnormal PET activity is resolved other than minimal residual  activity in the omentum. CT of chest/abdomen/pelvis (11/9/21)  Chest:     Tubes and lines: Central venous port catheter extends to the SVC. Lungs: There is mild bibasilar atelectasis. No pulmonary nodule or mass is seen. Lymph nodes: There is no mediastinal, hilar or axillary lymphadenopathy.   Subcentimeter axillary and mediastinal lymph nodes are noted. Pleura: There is trace bilateral pleural fluid, new compared to prior CT dated  July 2021. Heart: The heart is normal in size and there is no pericardial fluid. Bones: No lytic or sclerotic osseous lesion is visualized. The thyroid gland: Thyroid gland is normal in size. There are several nodules  within the thyroid gland, unchanged compared to prior CT dated July 2021. Abdomen/pelvis:  Lymph nodes/peritoneum/retroperitoneum/omentum: There is a 1.3 cm x 1.3 cm  cardiophrenic lymph node which measured 8 mm x 7 mm on prior CT dated May 2021. There is a small amount of free fluid in the pelvis, new compared to prior CT  dated July 2021. There has been interval worsening of coalescing of omental  caking and intraperitoneal soft tissue nodularity. For example, within the  anterior mid abdomen, there is an omental soft tissue mass measuring  approximately 13.8 cm x 5.6 cm and measuring approximately 9.5 cm x 1.8 cm on  prior CT dated July 2021. There are also mildly enlarged upper abdominal  mesenteric lymph nodes which have increased in size. For example, there is a 2  cm x 1.4 cm celiac lymph node which measures approximately 6 mm x 4 mm on prior  CT dated July 2021. As an additional example, there is a 1.3 cm x 1.3 cm  mesenteric lymph node within the upper pelvis, measuring several millimeters in  size on prior CT dated July 2021. Retroperitoneal lymph nodes have increased in  size as well. For example there is a 1.8 cm x 1.3 cm right common iliac lymph  node which measured 8 mm x 7 mm on prior CT dated July 2021. Liver: There is subtle capsular hepatic, new compared to prior CT dated July 2021. No intraparenchymal hepatic lesion is seen. Spleen: The spleen is normal.     Pancreas: The pancreas is normal.     Adrenals: The adrenals are normal.     Gallbladder: Gallbladder is normal.     Kidneys: The kidneys are normal. There is no hydronephrosis. Bowel: There is new ill-defined soft tissue in the pelvis which appears to be  extending from the colon, likely representing subserosal soft tissue nodularity  and tethering. No dilated loop of large or small bowel is seen. Colonic  diverticulosis is noted. Appendix: The appendix is surgically absent. Urinary bladder: Urinary bladder is partially filled and grossly normal.     Bones: No lytic or sclerotic osseous lesion is visualized. Miscellaneous: There is no free intraperitoneal gas. There is no focal fluid  collection to suggest abscess. IMPRESSION  1. New, trace bilateral pleural fluid. Mild bibasilar atelectasis. 2.: Increase in size of cardiophrenic lymph node, now measuring 1.3 cm x 1.3 cm.  3. Interval worsening of coalescing of omental caking, intraperitoneal soft  tissue nodularity and intraperitoneal and retroperitoneal lymphadenopathy. 4. New, small amount of ascites in the pelvis. CT of abdomen/pelvis (2/17/22)  LOWER THORAX: Small bilateral pleural effusions. No visualized lung nodules  LIVER: No mass. BILIARY TREE: Gallbladder is within normal limits. CBD is not dilated. SPLEEN: within normal limits. PANCREAS: No mass or ductal dilatation. ADRENALS: Unremarkable. KIDNEYS: No mass, calculus, or hydronephrosis. STOMACH: Unremarkable. SMALL BOWEL: No dilatation or wall thickening. COLON: No dilatation or wall thickening. APPENDIX: Surgically absent  PERITONEUM: Omental and peritoneal nodularity with small volume scattered  ascites consistent with peritoneal carcinomatosis. Mesenteric, portacaval, and  gastrohepatic ligament lymphadenopathy. RETROPERITONEUM: Retroperitoneal lymphadenopathy along the IVC. REPRODUCTIVE ORGANS: Status post hysterectomy  URINARY BLADDER: No mass or calculus. BONES: No destructive bone lesion. ABDOMINAL WALL: No mass or hernia. ADDITIONAL COMMENTS: N/A     IMPRESSION  1.   Peritoneal and omental nodularity with small volume ascites consistent with  peritoneal carcinomatosis. 2.  Lymphadenopathy in the mesentery, gastrohepatic ligament, portacaval, and  retroperitoneal stations. 3.  Small bilateral pleural effusions. CT of chest/abdomen/pelvis (5/7/22)  CHEST WALL: No mass or axillary lymphadenopathy. THYROID: No significant change in multiple small nodules. Otherwise  unremarkable. MEDIASTINUM: 1.3 cm precarinal node. No mass or other enlarged lymphadenopathy. RAMEZ: No mass or lymphadenopathy. THORACIC AORTA: No dissection or aneurysm. MAIN PULMONARY ARTERY: Normal in caliber. TRACHEA/BRONCHI: Patent. ESOPHAGUS: No wall thickening or dilatation. HEART: Normal in size. PLEURA: Moderate bilateral pleural effusions with no pneumothorax. LUNGS: Compressive atelectasis overlies pleural effusions with no nodule, mass,  or other airspace disease. LIVER: No mass. BILIARY TREE: Gallbladder is within normal limits. CBD is not dilated. SPLEEN: within normal limits. PANCREAS: No mass or ductal dilatation. ADRENALS: Unremarkable. KIDNEYS: No mass, calculus, or hydronephrosis. STOMACH: Unremarkable. SMALL BOWEL: No dilatation or wall thickening. COLON: No dilation or wall thickening. Noninflamed appearing left and sigmoid  diverticula. APPENDIX: Surgically absent. PERITONEUM: Extensive omental nodularity and caking redemonstrated with anterior  omental mass measuring 5.0 x 12.2 cm, previously 6.6 x 14.4 cm. Just lesion is along the greater curvature of the stomach measuring 2.7 x 3.2  cm, previously 3.8 x 4.7 cm. There is small ascites, portions of which appear  loculated on around the liver margin and splenic margin, not significant change  from prior. RETROPERITONEUM: There is diffuse adenopathy measuring up to 1.5 x 2.1 cm in the  portacaval station, previously 1.3 x 2.1 cm, 1.5 x 1.9 cm at the celiac,  previously 1.3 x 1.8 cm, 1.3 x 1.7 cm right aortocaval, previously 0.9 x 1.7 cm.   Right common iliac adenopathy measures 1.7 x 1.7 cm, previously 1.7 x 1.9 cm. REPRODUCTIVE ORGANS: Uterus and ovaries are surgically absent. URINARY BLADDER: No mass or calculus. BONES: Degenerative spine change. No acute fracture or aggressive lesion. ABDOMINAL WALL: No mass or hernia. ADDITIONAL COMMENTS: Left Port-A-Cath in place with catheter traversing to the  atriocaval junction. IMPRESSION  1. Pleural effusions with overlying atelectasis. 2. Peritoneal carcinomatosis with overall interval mild decrease in bulk of  disease. 3. Retroperitoneal lymphadenopathy slightly worsened compared to prior  examinations suspicious for mildly progressive metastatic disease. 4. Incidentals as above including colonic diverticulosis. CT of chest/abdomen/pelvis (7/9/22)  CHEST WALL: Left chest wall port catheter terminates in the SVC. THYROID: Heterogeneous thyroid gland with small nodules. MEDIASTINUM: Enlarged right lower paratracheal lymph node measuring 14 mm  (3:26), stable. Subcarinal lymph node is stable at 10 mm. RAMEZ: Stable right hilar lymphadenopathy. THORACIC AORTA: No dissection or aneurysm. MAIN PULMONARY ARTERY: Normal in caliber. TRACHEA/BRONCHI: Patent. ESOPHAGUS: No wall thickening or dilatation. HEART: Normal in size. PLEURA: Bilateral pleural effusions increased since the prior study. LUNGS: Bibasilar atelectasis. No suspicious nodule. LIVER: No mass. BILIARY TREE: Gallbladder is within normal limits. CBD is not dilated. SPLEEN: within normal limits. PANCREAS: No mass or ductal dilatation. ADRENALS: Unremarkable. KIDNEYS: No mass, calculus, or hydronephrosis. STOMACH: Unremarkable. SMALL BOWEL: No dilatation or wall thickening. COLON: No dilatation or wall thickening. APPENDIX: Nonvisualized  PERITONEUM: Anterior peritoneal/omental implant measures 12.3 x 4.1 cm,  minimally changed in size.  Persistent omental/peritoneal implants in the left  upper quadrant near the splenic hilum, bilateral paracolic gutters, and in the  dependent portion of the peritoneal reflection. Numerous enlarged mesenteric  lymph nodes, as before. Increased ascites in the abdomen. RETROPERITONEUM: Stable persistent retroperitoneal lymphadenopathy. REPRODUCTIVE ORGANS: Status post hysterectomy. URINARY BLADDER: Nondistended urinary bladder limits evaluation. BONES: No destructive bone lesion. ABDOMINAL WALL: No mass or hernia. ADDITIONAL COMMENTS: N/A     IMPRESSION  1. Increased bilateral pleural effusions and ascites. 2.  Persistent peritoneal/omental nodularity, mesenteric lymphadenopathy, and  retroperitoneal lymphadenopathy, not significantly changed. 3.  Stable mediastinal lymphadenopathy. CT of chest/abdomen/pelvis (8/19/22)  CHEST WALL: Left Port-A-Cath in place with catheter traversing expected course  to terminate in the SVC. No mass or enlarged adenopathy. THYROID: No significant change in heterogeneously hypodense 12 mm nodule of the  right thyroid gland with otherwise unremarkable appearance. MEDIASTINUM: Precarinal node measures 1.5 x 1.8 cm (3/24) is, not significantly  changed from prior (3/26). Subcarinal node with heterogeneous enhancement  measures 1.3 x 1.8 cm (3/27) series, not significantly changed from prior (3/30)  series  RAMEZ: 1.7 x 2.0 cm upper right hilar node (3/29) not significantly changed from  prior (3/30). No enlarged left hilar adenopathy. THORACIC AORTA: No dissection or aneurysm. MAIN PULMONARY ARTERY: Normal in caliber. TRACHEA/BRONCHI: Patent. ESOPHAGUS: No wall thickening or dilatation. HEART: Normal in size. PLEURA: Large bilateral pleural effusions. No pneumothorax. LUNGS: Compressive atelectasis overlies large pleural effusions bilaterally. Aerated lungs are clear. LIVER: No mass. BILIARY TREE: Gallbladder is within normal limits. CBD is not dilated. SPLEEN: Unremarkable with no focal lesion. PANCREAS: No mass or ductal dilatation.   ADRENALS: Unremarkable. KIDNEYS: No mass, calculus, or hydronephrosis. STOMACH: Unremarkable. SMALL BOWEL: No dilatation or wall thickening. COLON: No dilatation or wall thickening. APPENDIX: Not visualized. PERITONEUM: Extensive peritoneal implants and dense omental caking is  redemonstrated without significant interval change. Representative measurements  of the largest areas of soft tissue are 4.2 x 11.9 cm in the anterior midline  mid abdomen (3/72) and 4.2 x 5.1 cm interposed between the greater curvature of  the gastric body and the spleen. There has been interval reduced ascites status  post right peritoneal drainage catheter placement which traverses from the right  abdominal wall to along the undersurface of the liver. RETROPERITONEUM: Extensive adenopathy redemonstrated with celiac node measuring  1.5 x 1.9 cm (3/57), previously 1.4 x 2.1 cm (3/59), portacaval node measuring  1.7 x 2.4 cm (3/66), previously 1.9 x 2.3 cm (3/67), and right common iliac node  measuring 1.6 x 2.6 cm (3/85), previously 1.7 x 2.3 cm (3/87). REPRODUCTIVE ORGANS: Uterus and ovaries surgically absent. URINARY BLADDER: No mass or calculus. BONES: Degenerative spine change. No acute fracture or aggressive lesion. ABDOMINAL WALL: Probably edematous with no mass or hernia. ADDITIONAL COMMENTS: N/A     IMPRESSION  No significant change in metastatic disease with mediastinal and  right hilar adenopathy, extensive peritoneal carcinomatosis with omental caking,  and retroperitoneal adenopathy. Large bilateral pleural effusions are  redemonstrated with overlying compressive atelectasis. Interval reduction in  ascites status post peritoneal drainage catheter placement. Incidental findings  as above. 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   She has recurrent disease and has previously been treated with Taxol/Carboplatin x 2, Doxil, and Keytruda. She is currently receiving single agent Avastin. I had previously explained that it won't make the tumor \"go away\", but it may slow down the growth and help manage the ascites. She has completed 8 cycles so far. She had an [facility] on 8/11/22 to manage her ascites. Her most recent CT demonstrates that her measurable disease has remained stable. We will continue with the current regimen. I'm not sure she is even a candidate for a clinical trial, but I have reached out to [facility] to see what is available. I also discussed the possibility of dostarlumab. Dick Ladd is a 78 y.o. female, evaluated via audio-only technology on 8/23/2022 for Chemotherapy (Virtual telephone visit. Pre chemo, review ct scan results, for C9 avastin on 8/25.)      Assessment & Plan:   Diagnoses and all orders for this visit:    1. Primary serous adenocarcinoma of endometrium ([facility] [address]    2. On antineoplastic chemotherapy    3. Pleural effusion, bilateral    4. Malignant ascites        Subjective:       Prior to Admission medications    Medication Sig Start Date End Date Taking? Authorizing Provider   nitrofurantoin, macrocrystal-monohydrate, (MACROBID) 100 mg capsule Take 1 Capsule by mouth two (2) times a day for 5 days. Indications: bacterial urinary tract infection 8/19/22 8/24/22 Yes Alisa Pisano PA-C   Xarelto 20 mg tab tablet TAKE 1 TABLET BY MOUTH DAILY 8/8/22  Yes Shari Nageotte, MD   [place] [employer], by Does Not Apply route. With zinc ointment   Yes Provider, Historical   acetaminophen (TYLENOL) 325 mg tablet Take  by mouth as needed. Yes Provider, Historical   lisinopriL (PRINIVIL, ZESTRIL) 10 mg tablet Take 1 Tab by mouth daily. 1/6/21  Yes Shari Nageotte, MD   lisinopriL (PRINIVIL, ZESTRIL) 5 mg tablet Take 2 Tabs by mouth daily. 1/5/21  Yes Hieu Cline PA-C   ondansetron (ZOFRAN ODT) 4 mg disintegrating tablet Take 1 Tab by mouth every eight (8) hours as needed for Nausea.  Indications: nausea and vomiting caused by cancer drugs 10/22/20  Yes Monica Morgan MD   lidocaine-prilocaine (EMLA) topical cream Apply small amount over port area and cover with band aid one hour before chemo 10/22/20  Yes Monica Morgan MD   guaifenesin (MUCINEX PO) Take  by mouth. Yes Provider, Historical   loratadine (CLARITIN) 10 mg tablet Take 10 mg by mouth. Yes Provider, Historical   epinastine 0.05 % drop Apply  to eye. Yes Provider, Historical   raNITIdine (ZANTAC) 150 mg tablet ZANTAC TABS 9/29/11  Yes Provider, Historical   cyclobenzaprine (FLEXERIL) 5 mg tablet take 1 tablet by mouth three times a day if needed 12/5/16  Yes Provider, Historical   valACYclovir (VALTREX) 1 gram tablet Take 1 Tab by mouth three (3) times daily. 12/15/16  Yes Monica Morgan MD   EPINEPHrine (EPIPEN) 0.3 mg/0.3 mL injection 0.3 mg by IntraMUSCular route once as needed. Yes Provider, Historical   ketotifen (ZADITOR) 0.025 % (0.035 %) ophthalmic solution Administer 1 Drop to both eyes every morning. Yes Provider, Historical   SAL ACID/UREA/PETROLATUM,WHITE (KERASAL FOOT EX) by Apply Externally route daily as needed. Yes Provider, Historical   ACCU-CHEK HELDER PLUS TEST STRP strip  7/3/15  Yes Provider, Historical   NITROSTAT 0.4 mg SL tablet  9/22/14  Yes Provider, Historical   CALCIUM CARBONATE (MARCELLO-SELTZER ANTACID PO) Take  by mouth daily as needed. Yes Provider, Historical   Cetirizine 10 mg cap Take 10 mg by mouth nightly.   Patient not taking: No sig reported    Provider, Historical     Patient Active Problem List   Diagnosis Code    Malignant neoplasm of corpus uteri, except isthmus ([facility] [address] C54.9    Dehydration E86.0     Patient Active Problem List    Diagnosis Date Noted    Dehydration 08/09/2022    Malignant neoplasm of corpus uteri, except isthmus ([facility] [address] 02/28/2013     Current Outpatient Medications   Medication Sig Dispense Refill    nitrofurantoin, macrocrystal-monohydrate, (MACROBID) 100 mg capsule Take 1 Capsule by mouth two (2) times a day for 5 days. Indications: bacterial urinary tract infection 10 Capsule 0    Xarelto 20 mg tab tablet TAKE 1 TABLET BY MOUTH DAILY 30 Tablet 5    BACITRACIN, BULK, by Does Not Apply route. With zinc ointment      acetaminophen (TYLENOL) 325 mg tablet Take  by mouth as needed. lisinopriL (PRINIVIL, ZESTRIL) 10 mg tablet Take 1 Tab by mouth daily. 30 Tab 2    lisinopriL (PRINIVIL, ZESTRIL) 5 mg tablet Take 2 Tabs by mouth daily. 30 Tab 5    ondansetron (ZOFRAN ODT) 4 mg disintegrating tablet Take 1 Tab by mouth every eight (8) hours as needed for Nausea. Indications: nausea and vomiting caused by cancer drugs 30 Tab 3    lidocaine-prilocaine (EMLA) topical cream Apply small amount over port area and cover with band aid one hour before chemo 30 g 3    guaifenesin (MUCINEX PO) Take  by mouth.      loratadine (CLARITIN) 10 mg tablet Take 10 mg by mouth.      epinastine 0.05 % drop Apply  to eye. raNITIdine (ZANTAC) 150 mg tablet ZANTAC TABS      cyclobenzaprine (FLEXERIL) 5 mg tablet take 1 tablet by mouth three times a day if needed  0    valACYclovir (VALTREX) 1 gram tablet Take 1 Tab by mouth three (3) times daily. 21 Tab 3    EPINEPHrine (EPIPEN) 0.3 mg/0.3 mL injection 0.3 mg by IntraMUSCular route once as needed. ketotifen (ZADITOR) 0.025 % (0.035 %) ophthalmic solution Administer 1 Drop to both eyes every morning. SAL ACID/UREA/PETROLATUM,WHITE (KERASAL FOOT EX) by Apply Externally route daily as needed. ACCU-CHEK HELDER PLUS TEST STRP strip   0    NITROSTAT 0.4 mg SL tablet       CALCIUM CARBONATE (MARCELLO-SELTZER ANTACID PO) Take  by mouth daily as needed. Cetirizine 10 mg cap Take 10 mg by mouth nightly.  (Patient not taking: No sig reported)       Allergies   Allergen Reactions    Latex Other (comments)     Burns skin    Acetone Shortness of Breath    Amoxicillin Anaphylaxis    Ciprofloxacin Hives    Pcn [Penicillins] Anaphylaxis    Buspar [Buspirone] Unknown (comments) Has N&V and makes her feel \"weird\"    Azithromycin Hives    Food [Egg] Nausea Only    Other Medication Rash     POPPY seeds     Past Medical History:   Diagnosis Date    Abnormal Pap smear 1995    with f/u normal    Anemia     Arthritis     Asthma     Cancer ([address]     ENDOMETRIAL    Environmental allergies     GERD (gastroesophageal reflux disease)     Goiter     Other unknown and unspecified cause of morbidity or mortality     POSSIBLE ESOPHAGEAL SPASM, ? OBSTRUCTION DUE TO GOITER    PMB (postmenopausal bleeding)     S/P chemotherapy, time since greater than 12 weeks     LAST CHEMO 10/2014 PER PATIENT    Thyroid disease     goiter     Past Surgical History:   Procedure Laterality Date    HX APPENDECTOMY      HX BUNIONECTOMY      HX GYN  3/13/13    TLH/BSO    HX HEENT  4/22/13    TOOTH REMOVED    HX ORTHOPAEDIC  1980'S    BUNIONECTOMY    HX VASCULAR ACCESS      port    IR INSERT INTRAPERI TUNL CATH PERC  8/11/2022    NC BREAST SURGERY PROCEDURE UNLISTED  1/12/16    right breast bx     Family History   Problem Relation Age of Onset    Cancer Maternal Aunt         breast    Heart Disease Mother     Diabetes Father     Cancer Sister         CERVICAL    Kidney Disease Brother     Diabetes Brother     Heart Attack Other     Arrhythmia Brother     Diabetes Brother     Anesth Problems Neg Hx      Social History     Tobacco Use    Smoking status: Never    Smokeless tobacco: Never   Substance Use Topics    Alcohol use: Yes     Alcohol/week: 0.0 standard drinks     Comment: RARE OCC       ROS    Patient-Reported Vitals 1/19/2021   Patient-Reported Systolic  714   Patient-Reported Diastolic 96        Jose Eckert, who was evaluated through a patient-initiated, synchronous (real-time) audio only encounter, and/or her healthcare decision maker, is aware that it is a billable service, with coverage as determined by her insurance carrier. She provided verbal consent to proceed: Yes.  She has not had a related appointment within my department in the past 7 days or scheduled within the next 24 hours. Total Time: minutes: 11-20 minutes        An electronic signature was used to sign this note.     Lucia Mckeon MD  08/23/22

## 2022-08-24 LAB
APPEARANCE UR: ABNORMAL
BACTERIA #/AREA URNS HPF: ABNORMAL /[HPF]
BACTERIA SPEC CULT: NORMAL
BACTERIA SPEC CULT: NORMAL
BACTERIA UR CULT: ABNORMAL
BILIRUB UR QL STRIP: NEGATIVE
CASTS URNS QL MICRO: ABNORMAL /LPF
COLOR UR: YELLOW
EPI CELLS #/AREA URNS HPF: ABNORMAL /HPF (ref 0–10)
GLUCOSE UR QL STRIP: NEGATIVE
GRAM STN SPEC: NORMAL
GRAM STN SPEC: NORMAL
HGB UR QL STRIP: ABNORMAL
KETONES UR QL STRIP: NEGATIVE
LEUKOCYTE ESTERASE UR QL STRIP: ABNORMAL
MICRO URNS: ABNORMAL
NITRITE UR QL STRIP: POSITIVE
PH UR STRIP: 6.5 [PH] (ref 5–7.5)
PROT UR QL STRIP: NEGATIVE
RBC #/AREA URNS HPF: ABNORMAL /HPF (ref 0–2)
SERVICE CMNT-IMP: NORMAL
SERVICE CMNT-IMP: NORMAL
SP GR UR STRIP: >=1.03 (ref 1–1.03)
URINALYSIS REFLEX, 377202: ABNORMAL
UROBILINOGEN UR STRIP-MCNC: 0.2 MG/DL (ref 0.2–1)
WBC #/AREA URNS HPF: >30 /HPF (ref 0–5)

## 2022-08-25 ENCOUNTER — HOSPITAL ENCOUNTER (OUTPATIENT)
Dept: INFUSION THERAPY | Age: 67
Discharge: HOME | End: 2022-08-25
Payer: MEDICARE

## 2022-08-25 VITALS
BODY MASS INDEX: 24.79 KG/M2 | OXYGEN SATURATION: 97 % | RESPIRATION RATE: 18 BRPM | TEMPERATURE: 97.9 F | WEIGHT: 149 LBS | HEART RATE: 98 BPM | SYSTOLIC BLOOD PRESSURE: 112 MMHG | DIASTOLIC BLOOD PRESSURE: 72 MMHG

## 2022-08-25 DIAGNOSIS — Z51.11 ENCOUNTER FOR ANTINEOPLASTIC CHEMOTHERAPY: ICD-10-CM

## 2022-08-25 DIAGNOSIS — C54.9 MALIGNANT NEOPLASM OF CORPUS UTERI, EXCEPT ISTHMUS (HCC): Primary | ICD-10-CM

## 2022-08-25 PROCEDURE — 74011250636 HC RX REV CODE- 250/636: Performed by: OBSTETRICS & GYNECOLOGY

## 2022-08-25 PROCEDURE — 96413 CHEMO IV INFUSION 1 HR: CPT

## 2022-08-25 PROCEDURE — 99213 OFFICE O/P EST LOW 20 MIN: CPT | Performed by: PHYSICIAN ASSISTANT

## 2022-08-25 PROCEDURE — 74011000258 HC RX REV CODE- 258: Performed by: OBSTETRICS & GYNECOLOGY

## 2022-08-25 RX ORDER — SODIUM CHLORIDE 9 MG/ML
25 INJECTION, SOLUTION INTRAVENOUS CONTINUOUS
Status: DISPENSED | OUTPATIENT
Start: 2022-08-25 | End: 2022-08-25

## 2022-08-25 RX ORDER — ACETAMINOPHEN 325 MG/1
650 TABLET ORAL AS NEEDED
Status: ACTIVE | OUTPATIENT
Start: 2022-08-25 | End: 2022-08-25

## 2022-08-25 RX ORDER — HEPARIN 100 UNIT/ML
300-500 SYRINGE INTRAVENOUS AS NEEDED
Status: ACTIVE | OUTPATIENT
Start: 2022-08-25 | End: 2022-08-25

## 2022-08-25 RX ORDER — SODIUM CHLORIDE 0.9 % (FLUSH) 0.9 %
10 SYRINGE (ML) INJECTION AS NEEDED
Status: DISPENSED | OUTPATIENT
Start: 2022-08-25 | End: 2022-08-25

## 2022-08-25 RX ORDER — ONDANSETRON 2 MG/ML
8 INJECTION INTRAMUSCULAR; INTRAVENOUS AS NEEDED
Status: ACTIVE | OUTPATIENT
Start: 2022-08-25 | End: 2022-08-25

## 2022-08-25 RX ORDER — HYDROCORTISONE SODIUM SUCCINATE 100 MG/2ML
100 INJECTION, POWDER, FOR SOLUTION INTRAMUSCULAR; INTRAVENOUS AS NEEDED
Status: ACTIVE | OUTPATIENT
Start: 2022-08-25 | End: 2022-08-25

## 2022-08-25 RX ORDER — EPINEPHRINE 1 MG/ML
0.3 INJECTION, SOLUTION, CONCENTRATE INTRAVENOUS AS NEEDED
Status: ACTIVE | OUTPATIENT
Start: 2022-08-25 | End: 2022-08-25

## 2022-08-25 RX ORDER — DIPHENHYDRAMINE HYDROCHLORIDE 50 MG/ML
50 INJECTION, SOLUTION INTRAMUSCULAR; INTRAVENOUS AS NEEDED
Status: ACTIVE | OUTPATIENT
Start: 2022-08-25 | End: 2022-08-25

## 2022-08-25 RX ORDER — SODIUM CHLORIDE 9 MG/ML
10 INJECTION INTRAMUSCULAR; INTRAVENOUS; SUBCUTANEOUS AS NEEDED
Status: ACTIVE | OUTPATIENT
Start: 2022-08-25 | End: 2022-08-25

## 2022-08-25 RX ORDER — DIPHENHYDRAMINE HYDROCHLORIDE 50 MG/ML
25 INJECTION, SOLUTION INTRAMUSCULAR; INTRAVENOUS AS NEEDED
Status: ACTIVE | OUTPATIENT
Start: 2022-08-25 | End: 2022-08-25

## 2022-08-25 RX ORDER — ALBUTEROL SULFATE 90 UG/1
2 AEROSOL, METERED RESPIRATORY (INHALATION) AS NEEDED
Status: ACTIVE | OUTPATIENT
Start: 2022-08-25 | End: 2022-08-25

## 2022-08-25 RX ADMIN — SODIUM CHLORIDE 1125 MG: 9 INJECTION, SOLUTION INTRAVENOUS at 12:33

## 2022-08-25 RX ADMIN — SODIUM CHLORIDE 25 ML/HR: 9 INJECTION, SOLUTION INTRAVENOUS at 12:29

## 2022-08-25 NOTE — PROGRESS NOTES
[facility] Chemo Progress Note    Date: August 25, 2022        1100: Ms. Mana Covarrubias Arrived to [facility] for  Avastin ambulatory in stable condition. Assessment was completed and port accessed by Mickey Azul. Labs drawn and sent for processing. Went to provider appointment with Medical Oncology. 1225 Returned from provider appointment. Labs reviewed. Criteria for treatment was met. Ms. Waqar Brewster vitals were reviewed. Patient Vitals for the past 12 hrs:   Temp Pulse Resp BP SpO2   08/25/22 1105 97.9 °F (36.6 °C) 98 18 112/72 97 %         Lab results were obtained and reviewed. Pre-medications  were administered as ordered and chemotherapy was initiated. Two nurses verified prior to administering: Drug name, Drug dose, Infusion volume or drug volume when prepared in a syringe, Rate of administration, Route of administration, Expiration dates and/or times, Appearance and physical integrity of the drugs, Rate set on infusion pump, when used, and Sequencing of drug administration.  Patient tolerated treatment well. Port maintained positive blood return throughout treatment. Port flushed, heparinized and de accessed per protocol. Patient was discharged in stable condition. Patient is aware of next scheduled [facility] appointment on 9/15/22.     Future Appointments   Date Time Provider [place]   9/8/2022  9:30 AM MD [facility] Dominguez   9/15/2022  1:00 PM A1 [facility] [address]   10/6/2022 10:15 AM MD GRE[facility] Dominguez   10/6/2022 11:00 AM E3 [facility] [address]         Junior Morel RN  August 25, 2022

## 2022-08-26 NOTE — PROGRESS NOTES
[facility] GYNECOLOGIC ONCOLOGY  [address] [facility], [address], [address]  P [phone]      Patient ID:  Name:  Lisa Lezama  MRN:  [id number]  :  1955/67 y.o. Date:  2022      Araceli Barrios MD: Laura Hurtado MD  PCP: Lit Rivera MD     Primary Diagnosis: uterine cancer  Date of Diagnosis: 3/2013      Current Agent: Avastin  Cycle: 9    HPI:  78 y.o. 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 She received adjuvant Taxol/carboplatin followed by a 7 year DFI. In  presenting with CT pelvic adenopathy and peritoneal carcinomatosis. She has been followed on multiple regimens for recurrent/persistent disease including Keytruda/Lenvima, carboplatin/taxol, Doxil. She has demonstrated progression on most recent interval scan and recommended Avastin. SUBJECTIVE:  Ms. Martin Quinn presents to [facility] today for cycle 9 single agent Avastin. She states that she is feeling ok at the moment though states that she is often fatigued in the mornings. States that it takes her some times to get moving in the mornings as she does not have much energy. She states that her abdominal symptoms are improved. She does not feel uncomfortable and distended as she did before. Aspira peritoneal catheter placed on 2022. About 300cc chylous ascites drained in the office last week. Has not felt the need to drain again over the past week. Though she is not uncomfortable/distended, she does notice that she has had intermittent swelling in her right leg (doppler negative 22) as well as welling around her waist.  Her appetite has not been great but she eats what she can. She reduced her work to part time due to fatigued to work. Getting help with lifting heavy things. Able to ambulate, remains independent. Routine delayed GI function with dulcolax/benefiber/MoM.       No residual effects s/p her therapy in 2013. She lives alone. Does not feel overly close to her children. Her daughter/grandkids live next door and brother nearby. Her son lives near [facility]. She feels most supported by her brother [address] [place] [address], close friend Mylene and her work family. She still has difficulty feeling comfortable asking family for help. Looking into/questions assisted with SS. Work for her is a positive stressor, has few hobbies and uncertain of purpose w/o work. ROS  Constitutional: no weight loss, fever, night sweats  Respiratory: above  Cardiovascular: no CP, edema  Heme: No abnormal bleeding, no epistaxis. Gastrointestinal: no abdominal pain, no dysphagia, change in bowel habits, or black or bloody stools  Genito-Urinary: no dysuria, trouble voiding, or hematuria  Musculoskeletal: negative for - gait disturbance or joint swelling  Neurological: negative for - behavioral changes, dizziness, headaches, memory loss or numbness/tingling  Derm: negative  Psych: negative for anxiety and depression       OBJECTIVE:  Physical Exam  Visit Vitals  /72 (BP 1 Location: Left arm, BP Patient Position: Sitting)   Pulse 98   Temp 97.9 °F (36.6 °C)   Resp 18   Wt 149 lb (67.6 kg)   SpO2 97%   Breastfeeding No   BMI 24.79 kg/m²          General:  fatigued, flat affect, cooperative       HEENT: pallor, sclera without jaundice, oral mucosa without lesions,      Cardiac:  Regular rate and rhythm        Lungs:  clear to auscultation bilaterally          Port:  clean, dry, no drainage  Abdomen:  soft, protuberant, palpable epigastric/right abd mass       Lymph:  right leg swelling, 1+   Extremity: no edema    Wt Readings from Last 3 Encounters:   08/25/22 149 lb (67.6 kg)   08/16/22 154 lb (69.9 kg)   08/11/22 153 lb (69.4 kg)       No results for input(s): WBC, ANEU, HGB, HCT, MCV, MCH, PLT, HGBEXT, HCTEXT, PLTEXT, HGBEXT, HCTEXT, PLTEXT in the last 72 hours.     No results for input(s): NA, K, CL, GLU, BUN, CREA, CA, MG, PHOS, ALB, TBIL, TBILI, ALT in the last 72 hours. No lab exists for component: CO3, ALBP, SGOT        Tumor markers:  CA-125   Date Value Ref Range Status   05/12/2022 333 (H) 1.5 - 35.0 U/mL Final     Comment:     ** Note new reference range and method **  Results may vary depending on . Method used is Ambature     04/21/2022 337 (H) 1.5 - 35.0 U/mL Final     Comment:     ** Note new reference range and method **  Results may vary depending on . Method used is Ambature     03/31/2022 422 (H) 1.5 - 35.0 U/mL Final     Comment:     ** Note new reference range and method **  Results may vary depending on . Method used is Ambature     03/10/2022 269 (H) 1.5 - 35.0 U/mL Final     Comment:     ** Note new reference range and method **  Results may vary depending on . Method used is Ambature           Patient Active Problem List   Diagnosis Code    Malignant neoplasm of corpus uteri, except isthmus ([facility] [address] C54.9    Dehydration E86.0     Past Medical History:   Diagnosis Date    Abnormal Pap smear 1995    with f/u normal    Anemia     Arthritis     Asthma     Cancer ([facility]     ENDOMETRIAL    Environmental allergies     GERD (gastroesophageal reflux disease)     Goiter     Other unknown and unspecified cause of morbidity or mortality     POSSIBLE ESOPHAGEAL SPASM, ? OBSTRUCTION DUE TO GOITER    PMB (postmenopausal bleeding)     S/P chemotherapy, time since greater than 12 weeks     LAST CHEMO 10/2014 PER PATIENT    Thyroid disease     goiter     Prior to Admission medications    Medication Sig Start Date End Date Taking? Authorizing Provider   Xarelto 20 mg tab tablet TAKE 1 TABLET BY MOUTH DAILY 8/8/22   MD Shellie Figueroa, by Does Not Apply route. With zinc ointment    Provider, Historical   acetaminophen (TYLENOL) 325 mg tablet Take  by mouth as needed.     Provider, Historical   lisinopriL (PRINIVIL, ZESTRIL) 10 mg tablet Take 1 Tab by mouth daily. 1/6/21   Bailee Truong MD   lisinopriL (PRINIVIL, ZESTRIL) 5 mg tablet Take 2 Tabs by mouth daily. 1/5/21   Hieu Cline PA-C   ondansetron (ZOFRAN ODT) 4 mg disintegrating tablet Take 1 Tab by mouth every eight (8) hours as needed for Nausea. Indications: nausea and vomiting caused by cancer drugs 10/22/20   Bailee Truong MD   lidocaine-prilocaine (EMLA) topical cream Apply small amount over port area and cover with band aid one hour before chemo 10/22/20   Bailee Truong MD   guaifenesin (MUCINEX PO) Take  by mouth. Provider, Historical   loratadine (CLARITIN) 10 mg tablet Take 10 mg by mouth. Provider, Historical   epinastine 0.05 % drop Apply  to eye. Provider, Historical   raNITIdine (ZANTAC) 150 mg tablet ZANTAC TABS 9/29/11   Provider, Historical   cyclobenzaprine (FLEXERIL) 5 mg tablet take 1 tablet by mouth three times a day if needed 12/5/16   Provider, Historical   valACYclovir (VALTREX) 1 gram tablet Take 1 Tab by mouth three (3) times daily. 12/15/16   Bailee Truong MD   EPINEPHrine (EPIPEN) 0.3 mg/0.3 mL injection 0.3 mg by IntraMUSCular route once as needed. Provider, Historical   ketotifen (ZADITOR) 0.025 % (0.035 %) ophthalmic solution Administer 1 Drop to both eyes every morning. Provider, Historical   Cetirizine 10 mg cap Take 10 mg by mouth nightly. Patient not taking: No sig reported    Provider, Historical   SAL ACID/UREA/PETROLATUM,WHITE (KERASAL FOOT EX) by Apply Externally route daily as needed. Provider, Historical   ACCU-CHEK HELDER PLUS TEST STRP strip  7/3/15   Provider, Historical   NITROSTAT 0.4 mg SL tablet  9/22/14   Provider, Historical   CALCIUM CARBONATE (MARCELLO-SELTZER ANTACID PO) Take  by mouth daily as needed.     Provider, Historical     Allergies   Allergen Reactions    Latex Other (comments)     Burns skin    Acetone Shortness of Breath    Amoxicillin Anaphylaxis    Ciprofloxacin Hives    Pcn [Penicillins] Anaphylaxis    Buspar [Buspirone] Unknown (comments)     Has N&V and makes her feel \"weird\"    Azithromycin Hives    Food [Egg] Nausea Only    Other Medication Rash     POPPY seeds     Family History   Problem Relation Age of Onset    Cancer Maternal Aunt         breast    Heart Disease Mother     Diabetes Father     Cancer Sister         CERVICAL    Kidney Disease Brother     Diabetes Brother     Heart Attack Other     Arrhythmia Brother     Diabetes Brother     Anesth Problems Neg Hx        CT Results (most recent):  Results from Hospital Encounter encounter on 08/19/22    CT CHEST ABD PELV W CONT    Narrative  EXAM: CT CHEST ABD PELV W CONT    INDICATION: Malignant neoplasm of endometrium. COMPARISON: CT 7/9/2022. IV CONTRAST: 100 mL of Isovue-370. ORAL CONTRAST: None. TECHNIQUE:  Following the uneventful intravenous administration of contrast, thin axial  images were obtained through the chest, abdomen and pelvis. Coronal and sagittal  reformats were generated. CT dose reduction was achieved through use of a  standardized protocol tailored for this examination and automatic exposure  control for dose modulation. FINDINGS:    CHEST WALL: Left Port-A-Cath in place with catheter traversing expected course  to terminate in the SVC. No mass or enlarged adenopathy. THYROID: No significant change in heterogeneously hypodense 12 mm nodule of the  right thyroid gland with otherwise unremarkable appearance. MEDIASTINUM: Precarinal node measures 1.5 x 1.8 cm (3/24) is, not significantly  changed from prior (3/26). Subcarinal node with heterogeneous enhancement  measures 1.3 x 1.8 cm (3/27) series, not significantly changed from prior (3/30)  series  RAMEZ: 1.7 x 2.0 cm upper right hilar node (3/29) not significantly changed from  prior (3/30). No enlarged left hilar adenopathy. THORACIC AORTA: No dissection or aneurysm. MAIN PULMONARY ARTERY: Normal in caliber.   TRACHEA/BRONCHI: Patent. ESOPHAGUS: No wall thickening or dilatation. HEART: Normal in size. PLEURA: Large bilateral pleural effusions. No pneumothorax. LUNGS: Compressive atelectasis overlies large pleural effusions bilaterally. Aerated lungs are clear. LIVER: No mass. BILIARY TREE: Gallbladder is within normal limits. CBD is not dilated. SPLEEN: Unremarkable with no focal lesion. PANCREAS: No mass or ductal dilatation. ADRENALS: Unremarkable. KIDNEYS: No mass, calculus, or hydronephrosis. STOMACH: Unremarkable. SMALL BOWEL: No dilatation or wall thickening. COLON: No dilatation or wall thickening. APPENDIX: Not visualized. PERITONEUM: Extensive peritoneal implants and dense omental caking is  redemonstrated without significant interval change. Representative measurements  of the largest areas of soft tissue are 4.2 x 11.9 cm in the anterior midline  mid abdomen (3/72) and 4.2 x 5.1 cm interposed between the greater curvature of  the gastric body and the spleen. There has been interval reduced ascites status  post right peritoneal drainage catheter placement which traverses from the right  abdominal wall to along the undersurface of the liver. RETROPERITONEUM: Extensive adenopathy redemonstrated with celiac node measuring  1.5 x 1.9 cm (3/57), previously 1.4 x 2.1 cm (3/59), portacaval node measuring  1.7 x 2.4 cm (3/66), previously 1.9 x 2.3 cm (3/67), and right common iliac node  measuring 1.6 x 2.6 cm (3/85), previously 1.7 x 2.3 cm (3/87). REPRODUCTIVE ORGANS: Uterus and ovaries surgically absent. URINARY BLADDER: No mass or calculus. BONES: Degenerative spine change. No acute fracture or aggressive lesion. ABDOMINAL WALL: Probably edematous with no mass or hernia. ADDITIONAL COMMENTS: N/A    Impression  No significant change in metastatic disease with mediastinal and  right hilar adenopathy, extensive peritoneal carcinomatosis with omental caking,  and retroperitoneal adenopathy.  Large bilateral pleural effusions are  redemonstrated with overlying compressive atelectasis. Interval reduction in  ascites status post peritoneal drainage catheter placement. Incidental findings  as above. IMPRESSION/PLAN:  78 y.o. 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 She has again progressed and prescribed Doxil therapy. ECO    Patient Active Problem List    Diagnosis Date Noted    Dehydration 2022    Malignant neoplasm of corpus uteri, except isthmus ([address] 2013       Labs/chart reviewed. ASE discussed and interventions, monitoring reviewed. Chemotherapy: Avastin cycle 9. Proceed with treatment today. Post infusion arthralgia: stable. Has not been as severe as it was following cycle 2  Anemia: last Hgb 11.5  Fatigue/KNIGHT: post COVID, anemia, cancer related. Her energy and activity levels were improving following her covid diagnosis but she feels that over the past several weeks, she is becoming more fatigued. Little interest in activities she used to enjoy. She develops shortness of breath easily. Most recent CT scan shows slight worsening of bilateral pleural effusions. Patient states that Dr. Florencia Szymanski mentioned thoracentesis was an option if she was feeling symptomatic from the effusions. We discussed the same briefly. She does not feel that she is ready for that yet. Weight loss/hypoalbuminemia: carcinomatosis. Slight weight loss since previous cycle. Eating very little. Discussed strategies. HTN: Reviewed home monitoring during Avastin. LLE DVT 2021. On Xarelto. Hx of thyroid nodular goiter, benign follicular bx. Chronic med issues:    Hx asthma - inhaler PRN   Hx esophageal spasm - uses nitroglycerin  Reflux: Pepcid  Ascites: s/p paracentesis on 2022. S/p Aspira placement on 2022. Feeling better. Last drainage about a week ago when she was in the office.   Has not felt the need to drain again this week. Electronically signed.      Joann Da Silva PA-C

## 2022-08-30 ENCOUNTER — TELEPHONE (OUTPATIENT)
Dept: GYNECOLOGY | Age: 67
End: 2022-08-30

## 2022-08-30 NOTE — TELEPHONE ENCOUNTER
Pt received C9 Avastin on 8/25/22. Started with extreme fatigue, with sob. Drained Aspira tube on  8/28/22, 1100 ML's drained. Did feel better afterwards. Slept well last night. Remains weak and feeling drained.

## 2022-09-07 NOTE — PROGRESS NOTES
Virtual visit. Labs on 9/13 and for C10 avastin on 9/15. Drained 1300 ml of fluid on Sunday and 1100 ml today. Patient is feeling better today.

## 2022-09-08 ENCOUNTER — VIRTUAL VISIT (OUTPATIENT)
Dept: GYNECOLOGY | Age: 67
End: 2022-09-08
Payer: MEDICARE

## 2022-09-08 DIAGNOSIS — J90 PLEURAL EFFUSION, BILATERAL: ICD-10-CM

## 2022-09-08 DIAGNOSIS — C54.1 PRIMARY SEROUS ADENOCARCINOMA OF ENDOMETRIUM (HCC): Primary | ICD-10-CM

## 2022-09-08 DIAGNOSIS — R18.0 MALIGNANT ASCITES: ICD-10-CM

## 2022-09-08 DIAGNOSIS — Z79.899 ON ANTINEOPLASTIC CHEMOTHERAPY: ICD-10-CM

## 2022-09-08 PROBLEM — E86.0 DEHYDRATION: Status: RESOLVED | Noted: 2022-08-09 | Resolved: 2022-09-08

## 2022-09-08 PROCEDURE — 99442 PR PHYS/QHP TELEPHONE EVALUATION 11-20 MIN: CPT | Performed by: OBSTETRICS & GYNECOLOGY

## 2022-09-08 NOTE — PROGRESS NOTES
[facility] GYNECOLOGIC ONCOLOGY  [address] [facility], [address], [address] [facility]  [phone] Critical access hospital [phone]    Office Visit    Patient ID:  Name: Jeremias Erickson  MRN: [id number]   :  y.o. Visit date: 2022     INTERVAL HISTORY:  Jeremias Erickson is a  female who is an established patient with stage IA, grade 3 uterine carcinoma. She underwent laparoscopic hysterectomy with staging in 2013. She was recommended six cycles of adjuvant Taxol and Carboplatin, which she completed. We elected not to treat with pelvic radiation therapy due to her difficulty with chemotherapy. She had a CT of the abdomen/pelvis at [facility] that revealed retroperitoneal lymphadenopathy, peritoneal carcinomatosis, and trace ascites. I sent her for a PET/CT to better evaluate the extent of disease. She presented to review the results and discuss treatment. Her disease is not amenable to surgical resection. Systemic therapy was her only viable option. I thought immunotherapy would be the best choice, ahead of additional chemotherapy, specifically IV Keytruda and PO Lenvima. If that were not successful, we would consider retreatment with Taxol/Carboplatin or single agent Doxil. She completed 6 cycles of immunotherapy before demonstrating progression of disease. We decided upon retreatment with Taxol/Carboplatin. She completed 8 cycles of that regimen. Her CA-125 has responded and her CT after three cycles demonstrated a response. Her CT after completion of 6 cycles demonstrated further response. A subsequent PET/CT demonstrated resolution of most of the abnormal PET activity, but there still was some residual activity in the omentum, suggesting persistent disease. We stopped treatment in 2021, as she was beginning not to tolerate treatment well. She presented recently complaining of abdominal pain and bloating.   She stated it was a stabbing pain in her left abdomen. The bloating has also been causing a little shortness of breath. I sent her for a CT of the chest/abdomen/pelvis to evaluate. She presented to review those results. The scan demonstrated recurrent disease throughout the abdomen and pelvis. I recommended single agent Doxil. She completed 3 cycles of Doxil. Due to early satiety and complaints of bloating and abdominal distention, I sent her for a paracentesis in December. They did not see enough fluid on ultrasound to attempt drainage. She was also diagnosed with COVID around that time and is still recovering, but has since tested negative. She presented to review her interval CT scan after 3 cycles. Unfortunately it demonstrated progression. I discussed other treatment options with her, including the possibility of a clinical trial at [facility]. Ultimately we decided upon single agent Avastin. I explained that it won't make the tumor \"go away\", but it may slow down the growth and help manage the ascites. She has completed 9 cycles so far. Her CT in early July 2022 demonstrated overall stable disease, but she did have more fluid present. Her CA-125 remains elevated, but relatively stable. She had a paracentesis in mid July for symptomatic relief. She felt better for a while, but the fluid is starting to accumulate once again. On 8/11/22 she had an Aspira catheter placed to help manage her ascites. She has been feeling very weak and reports difficulty breathing. On her latest CT scan, her measurable disease remains stable. Her CA-125 has actually continued to trend down. PMH:  Past Medical History:   Diagnosis Date    Abnormal Pap smear 1995    with f/u normal    Anemia     Arthritis     Asthma     Cancer ([facility] [address]     ENDOMETRIAL    Environmental allergies     GERD (gastroesophageal reflux disease)     Goiter     Other unknown and unspecified cause of morbidity or mortality     POSSIBLE ESOPHAGEAL SPASM, ?  OBSTRUCTION DUE TO GOITER PMB (postmenopausal bleeding)     S/P chemotherapy, time since greater than 12 weeks     LAST CHEMO 10/2014 PER PATIENT    Thyroid disease     goiter     PSH:  Past Surgical History:   Procedure Laterality Date    HX APPENDECTOMY      HX BUNIONECTOMY      HX GYN  3/13/13    TLH/BSO    HX HEENT  4/22/13    TOOTH REMOVED    HX ORTHOPAEDIC  1980'S    BUNIONECTOMY    HX VASCULAR ACCESS      port    IR INSERT INTRAPERI TUNL CATH PERC  8/11/2022    [place] BREAST SURGERY PROCEDURE UNLISTED  1/12/16    right breast bx     SOC:  Social History     Socioeconomic History    Marital status:      Spouse name: Not on file    Number of children: Not on file    Years of education: Not on file    Highest education level: Not on file   Occupational History    Not on file   Tobacco Use    Smoking status: Never    Smokeless tobacco: Never   Substance and Sexual Activity    Alcohol use: Yes     Alcohol/week: 0.0 standard drinks     Comment: RARE OCC    Drug use: No    Sexual activity: Not on file   Other Topics Concern    Not on file   Social History Narrative    Not on file     Social Determinants of Health     Financial Resource Strain: Not on file   Food Insecurity: Not on file   Transportation Needs: Not on file   Physical Activity: Not on file   Stress: Not on file   Social Connections: Not on file   Intimate Partner Violence: Not on file   Housing Stability: Not on file     Family History  Family History   Problem Relation Age of Onset    Cancer Maternal Aunt         breast    Heart Disease Mother     Diabetes Father     Cancer Sister         CERVICAL    Kidney Disease Brother     Diabetes Brother     Heart Attack Other     Arrhythmia Brother     Diabetes Brother     Anesth Problems Neg Hx      Medications:  Current Outpatient Medications on File Prior to Visit   Medication Sig Dispense Refill    Xarelto 20 mg tab tablet TAKE 1 TABLET BY MOUTH DAILY 30 Tablet 5    BACITRACIN, BULK, by Does Not Apply route.  With zinc ointment acetaminophen (TYLENOL) 325 mg tablet Take  by mouth as needed. lisinopriL (PRINIVIL, ZESTRIL) 10 mg tablet Take 1 Tab by mouth daily. 30 Tab 2    lisinopriL (PRINIVIL, ZESTRIL) 5 mg tablet Take 2 Tabs by mouth daily. 30 Tab 5    ondansetron (ZOFRAN ODT) 4 mg disintegrating tablet Take 1 Tab by mouth every eight (8) hours as needed for Nausea. Indications: nausea and vomiting caused by cancer drugs 30 Tab 3    lidocaine-prilocaine (EMLA) topical cream Apply small amount over port area and cover with band aid one hour before chemo 30 g 3    guaifenesin (MUCINEX PO) Take  by mouth.      loratadine (CLARITIN) 10 mg tablet Take 10 mg by mouth.      epinastine 0.05 % drop Apply  to eye. raNITIdine (ZANTAC) 150 mg tablet ZANTAC TABS      cyclobenzaprine (FLEXERIL) 5 mg tablet take 1 tablet by mouth three times a day if needed  0    valACYclovir (VALTREX) 1 gram tablet Take 1 Tab by mouth three (3) times daily. 21 Tab 3    EPINEPHrine (EPIPEN) 0.3 mg/0.3 mL injection 0.3 mg by IntraMUSCular route once as needed. ketotifen (ZADITOR) 0.025 % (0.035 %) ophthalmic solution Administer 1 Drop to both eyes every morning. SAL ACID/UREA/PETROLATUM,WHITE (KERASAL FOOT EX) by Apply Externally route daily as needed. ACCU-CHEK HELDER PLUS TEST STRP strip   0    NITROSTAT 0.4 mg SL tablet       CALCIUM CARBONATE (MARCELLO-SELTZER ANTACID PO) Take  by mouth daily as needed. Cetirizine 10 mg cap Take 10 mg by mouth nightly. (Patient not taking: No sig reported)       No current facility-administered medications on file prior to visit. Allergies:   Allergies   Allergen Reactions    Latex Other (comments)     Burns skin    Acetone Shortness of Breath    Amoxicillin Anaphylaxis    Ciprofloxacin Hives    Pcn [Penicillins] Anaphylaxis    Buspar [Buspirone] Unknown (comments)     Has N&V and makes her feel \"weird\"    Azithromycin Hives    Food [Egg] Nausea Only    Other Medication Rash     POPPY seeds ROS:  Negative     OBJECTIVE:    PHYSICAL EXAM  VITAL SIGNS: There were no vitals taken for this visit. GENERAL KAIA:    HEENT:    RESPIRATORY:    CARDIOVASC:    GASTROINT:    MUSCULOSKEL:    EXTREMITIES:    PELVIC:    RECTAL:    PRIYANKA SURVEY:    NEURO:      Lab Results   Component Value Date/Time    WBC 6.0 08/22/2022 12:04 PM    Hemoglobin (POC) 12.9 05/01/2013 09:53 AM    HGB 11.5 08/22/2022 12:04 PM    Hematocrit (POC) 38 05/01/2013 09:53 AM    HCT 37.0 08/22/2022 12:04 PM    PLATELET 547 92/75/5580 12:04 PM    MCV 85 08/22/2022 12:04 PM     Lab Results   Component Value Date/Time    Sodium 137 08/22/2022 12:04 PM    Potassium 4.4 08/22/2022 12:04 PM    Chloride 99 08/22/2022 12:04 PM    CO2 22 08/22/2022 12:04 PM    Anion gap 7 05/12/2022 01:03 PM    Glucose 124 (H) 08/22/2022 12:04 PM    BUN 10 08/22/2022 12:04 PM    Creatinine 0.40 (L) 08/22/2022 12:04 PM    BUN/Creatinine ratio 25 08/22/2022 12:04 PM    GFR est AA >60 05/12/2022 01:03 PM    GFR est non-AA >60 05/12/2022 01:03 PM    Calcium 8.3 (L) 08/22/2022 12:04 PM    Bilirubin, total 0.3 08/22/2022 12:04 PM    Alk. phosphatase 66 08/22/2022 12:04 PM    Protein, total 6.1 08/22/2022 12:04 PM    Albumin 3.0 (L) 08/22/2022 12:04 PM    Globulin 4.5 (H) 05/12/2022 01:03 PM    A-G Ratio 1.0 (L) 08/22/2022 12:04 PM    ALT (SGPT) 7 08/22/2022 12:04 PM    AST (SGOT) 17 08/22/2022 12:04 PM     Lab Results   Component Value Date/Time    CA-125 333 (H) 05/12/2022 01:03 PM    Cancer Ag (CA) 125 352.0 (H) 08/22/2022 12:04 PM       CT of abdomen/pelvis (5/5/21)  LOWER THORAX: Right cardiophrenic lymph node measures 2.9 cm and previously  measured 1.2 cm on image number 15. There is minor basilar atelectasis/scarring  LIVER: No mass. BILIARY TREE: There is suggestion of gallstones CBD is not dilated. SPLEEN: within normal limits. PANCREAS: No mass or ductal dilatation. ADRENALS: Unremarkable. KIDNEYS: No mass, calculus, or hydronephrosis.   STOMACH: Unremarkable. SMALL BOWEL: No dilatation or wall thickening. COLON: No dilatation or wall thickening. APPENDIX: Status post appendectomy  PERITONEUM: Peritoneal carcinomatosis appears improved. There is a confluent  density anteriorly in the peritoneum on image number 43 measuring 5.9 x 1.4 cm  which previously measured 9.8 x 1.9 cm. RETROPERITONEUM: Left retroperitoneal lymph node measures 0.7 cm image 45 and  previously measured 1.6 cm. Right retroperitoneal lymph node measures 0.7 cm image 46 and previously  measured 0.8 cm. Left iliac lymph node image 57 measures 0.9 cm and previously measured 1.1 cm. REPRODUCTIVE ORGANS: Status post hysterectomy and oophorectomy. URINARY BLADDER: No mass or calculus. BONES: No destructive bone lesion. ABDOMINAL WALL: No mass or hernia. ADDITIONAL COMMENTS: N/A     IMPRESSION  1. There has been a mild decrease in the peritoneal carcinomatosis. 2. There has been slight to mild decrease in the retroperitoneal adenopathy. 3. No ascites is identified. No definite pelvic mass is identified. CT of chests/abdomen/pelvis (7/12/21)  THYROID: Heterogeneous thyroid gland with multiple nodules bilaterally including  in the isthmus. MEDIASTINUM: Left subclavian chest port terminates in the SVC. No mediastinal  adenopathy. RAMEZ: No mass or lymphadenopathy. THORACIC AORTA: No dissection or aneurysm. MAIN PULMONARY ARTERY: Nonocclusive thrombus in segmental branches of the right  upper lobe pulmonary artery (3:47, 52), which are nonocclusive and likely  chronic. TRACHEA/BRONCHI: Patent. ESOPHAGUS: No wall thickening or dilatation. HEART: Normal in size. PLEURA: No effusion or pneumothorax. LUNGS: No nodule, mass, or airspace disease. LIVER: No mass or biliary dilatation. GALLBLADDER: Sludge in a nondistended gallbladder. SPLEEN: No mass. PANCREAS: No mass or ductal dilatation. ADRENALS: Unremarkable.   KIDNEYS: No mass, calculus, or hydronephrosis. STOMACH: Unremarkable. SMALL BOWEL: No dilatation or wall thickening. COLON: Scattered diverticula. APPENDIX: Surgically absent  PERITONEUM: Decreased volume and size of the omental nodularity along the  anterior peritoneal surface just above the umbilicus. No ascites. No  pneumoperitoneum. RETROPERITONEUM: No lymphadenopathy or aortic aneurysm. REPRODUCTIVE ORGANS: Surgically absent. URINARY BLADDER: No mass or calculus. BONES: No destructive bone lesion. ADDITIONAL COMMENTS: N/A     IMPRESSION  1. Resolved retroperitoneal lymphadenopathy. 2.  Decreased peritoneal carcinomatosis. 3.  No ascites. 4.  Nonocclusive thrombus and segmental right upper lobe pulmonary artery  branches which are nonocclusive and appear chronic. PET/CT (8/27/21)  HEAD/NECK: No apparent foci of abnormal hypermetabolism. Cerebral evaluation is  limited by normal intense activity. CHEST: No foci of abnormal hypermetabolism. Resolution of left supraclavicular  jasper activity. ABDOMEN/PELVIS:   Omental/peritoneal disease is near completely resolved; current max SUV, midline  omentum 2.4, previous max SUV 12. Resolved retroperitoneal and right deep pelvic jasper activity. Resolved left pelvic cul-de-sac mass/activity. SKELETON: No foci of abnormal hypermetabolism in the axial and visualized  appendicular skeleton. IMPRESSION  Abnormal PET activity is resolved other than minimal residual  activity in the omentum. CT of chest/abdomen/pelvis (11/9/21)  Chest:     Tubes and lines: Central venous port catheter extends to the SVC. Lungs: There is mild bibasilar atelectasis. No pulmonary nodule or mass is seen. Lymph nodes: There is no mediastinal, hilar or axillary lymphadenopathy. Subcentimeter axillary and mediastinal lymph nodes are noted. Pleura: There is trace bilateral pleural fluid, new compared to prior CT dated  July 2021. Heart:  The heart is normal in size and there is no pericardial fluid. Bones: No lytic or sclerotic osseous lesion is visualized. The thyroid gland: Thyroid gland is normal in size. There are several nodules  within the thyroid gland, unchanged compared to prior CT dated July 2021. Abdomen/pelvis:  Lymph nodes/peritoneum/retroperitoneum/omentum: There is a 1.3 cm x 1.3 cm  cardiophrenic lymph node which measured 8 mm x 7 mm on prior CT dated May 2021. There is a small amount of free fluid in the pelvis, new compared to prior CT  dated July 2021. There has been interval worsening of coalescing of omental  caking and intraperitoneal soft tissue nodularity. For example, within the  anterior mid abdomen, there is an omental soft tissue mass measuring  approximately 13.8 cm x 5.6 cm and measuring approximately 9.5 cm x 1.8 cm on  prior CT dated July 2021. There are also mildly enlarged upper abdominal  mesenteric lymph nodes which have increased in size. For example, there is a 2  cm x 1.4 cm celiac lymph node which measures approximately 6 mm x 4 mm on prior  CT dated July 2021. As an additional example, there is a 1.3 cm x 1.3 cm  mesenteric lymph node within the upper pelvis, measuring several millimeters in  size on prior CT dated July 2021. Retroperitoneal lymph nodes have increased in  size as well. For example there is a 1.8 cm x 1.3 cm right common iliac lymph  node which measured 8 mm x 7 mm on prior CT dated July 2021. Liver: There is subtle capsular hepatic, new compared to prior CT dated July 2021. No intraparenchymal hepatic lesion is seen. Spleen: The spleen is normal.     Pancreas: The pancreas is normal.     Adrenals: The adrenals are normal.     Gallbladder: Gallbladder is normal.     Kidneys: The kidneys are normal. There is no hydronephrosis. Bowel: There is new ill-defined soft tissue in the pelvis which appears to be  extending from the colon, likely representing subserosal soft tissue nodularity  and tethering.  No dilated loop of large or small bowel is seen. Colonic  diverticulosis is noted. Appendix: The appendix is surgically absent. Urinary bladder: Urinary bladder is partially filled and grossly normal.     Bones: No lytic or sclerotic osseous lesion is visualized. Miscellaneous: There is no free intraperitoneal gas. There is no focal fluid  collection to suggest abscess. IMPRESSION  1. New, trace bilateral pleural fluid. Mild bibasilar atelectasis. 2.: Increase in size of cardiophrenic lymph node, now measuring 1.3 cm x 1.3 cm.  3. Interval worsening of coalescing of omental caking, intraperitoneal soft  tissue nodularity and intraperitoneal and retroperitoneal lymphadenopathy. 4. New, small amount of ascites in the pelvis. CT of abdomen/pelvis (2/17/22)  LOWER THORAX: Small bilateral pleural effusions. No visualized lung nodules  LIVER: No mass. BILIARY TREE: Gallbladder is within normal limits. CBD is not dilated. SPLEEN: within normal limits. PANCREAS: No mass or ductal dilatation. ADRENALS: Unremarkable. KIDNEYS: No mass, calculus, or hydronephrosis. STOMACH: Unremarkable. SMALL BOWEL: No dilatation or wall thickening. COLON: No dilatation or wall thickening. APPENDIX: Surgically absent  PERITONEUM: Omental and peritoneal nodularity with small volume scattered  ascites consistent with peritoneal carcinomatosis. Mesenteric, portacaval, and  gastrohepatic ligament lymphadenopathy. RETROPERITONEUM: Retroperitoneal lymphadenopathy along the IVC. REPRODUCTIVE ORGANS: Status post hysterectomy  URINARY BLADDER: No mass or calculus. BONES: No destructive bone lesion. ABDOMINAL WALL: No mass or hernia. ADDITIONAL COMMENTS: N/A     IMPRESSION  1. Peritoneal and omental nodularity with small volume ascites consistent with  peritoneal carcinomatosis. 2.  Lymphadenopathy in the mesentery, gastrohepatic ligament, portacaval, and  retroperitoneal stations.   3.  Small bilateral pleural effusions. CT of chest/abdomen/pelvis (5/7/22)  CHEST WALL: No mass or axillary lymphadenopathy. THYROID: No significant change in multiple small nodules. Otherwise  unremarkable. MEDIASTINUM: 1.3 cm precarinal node. No mass or other enlarged lymphadenopathy. RAMEZ: No mass or lymphadenopathy. THORACIC AORTA: No dissection or aneurysm. MAIN PULMONARY ARTERY: Normal in caliber. TRACHEA/BRONCHI: Patent. ESOPHAGUS: No wall thickening or dilatation. HEART: Normal in size. PLEURA: Moderate bilateral pleural effusions with no pneumothorax. LUNGS: Compressive atelectasis overlies pleural effusions with no nodule, mass,  or other airspace disease. LIVER: No mass. BILIARY TREE: Gallbladder is within normal limits. CBD is not dilated. SPLEEN: within normal limits. PANCREAS: No mass or ductal dilatation. ADRENALS: Unremarkable. KIDNEYS: No mass, calculus, or hydronephrosis. STOMACH: Unremarkable. SMALL BOWEL: No dilatation or wall thickening. COLON: No dilation or wall thickening. Noninflamed appearing left and sigmoid  diverticula. APPENDIX: Surgically absent. PERITONEUM: Extensive omental nodularity and caking redemonstrated with anterior  omental mass measuring 5.0 x 12.2 cm, previously 6.6 x 14.4 cm. Just lesion is along the greater curvature of the stomach measuring 2.7 x 3.2  cm, previously 3.8 x 4.7 cm. There is small ascites, portions of which appear  loculated on around the liver margin and splenic margin, not significant change  from prior. RETROPERITONEUM: There is diffuse adenopathy measuring up to 1.5 x 2.1 cm in the  portacaval station, previously 1.3 x 2.1 cm, 1.5 x 1.9 cm at the celiac,  previously 1.3 x 1.8 cm, 1.3 x 1.7 cm right aortocaval, previously 0.9 x 1.7 cm. Right common iliac adenopathy measures 1.7 x 1.7 cm, previously 1.7 x 1.9 cm. REPRODUCTIVE ORGANS: Uterus and ovaries are surgically absent. URINARY BLADDER: No mass or calculus. BONES: Degenerative spine change. No acute fracture or aggressive lesion. ABDOMINAL WALL: No mass or hernia. ADDITIONAL COMMENTS: Left Port-A-Cath in place with catheter traversing to the  atriocaval junction. IMPRESSION  1. Pleural effusions with overlying atelectasis. 2. Peritoneal carcinomatosis with overall interval mild decrease in bulk of  disease. 3. Retroperitoneal lymphadenopathy slightly worsened compared to prior  examinations suspicious for mildly progressive metastatic disease. 4. Incidentals as above including colonic diverticulosis. CT of chest/abdomen/pelvis (7/9/22)  CHEST WALL: Left chest wall port catheter terminates in the SVC. THYROID: Heterogeneous thyroid gland with small nodules. MEDIASTINUM: Enlarged right lower paratracheal lymph node measuring 14 mm  (3:26), stable. Subcarinal lymph node is stable at 10 mm. RAMEZ: Stable right hilar lymphadenopathy. THORACIC AORTA: No dissection or aneurysm. MAIN PULMONARY ARTERY: Normal in caliber. TRACHEA/BRONCHI: Patent. ESOPHAGUS: No wall thickening or dilatation. HEART: Normal in size. PLEURA: Bilateral pleural effusions increased since the prior study. LUNGS: Bibasilar atelectasis. No suspicious nodule. LIVER: No mass. BILIARY TREE: Gallbladder is within normal limits. CBD is not dilated. SPLEEN: within normal limits. PANCREAS: No mass or ductal dilatation. ADRENALS: Unremarkable. KIDNEYS: No mass, calculus, or hydronephrosis. STOMACH: Unremarkable. SMALL BOWEL: No dilatation or wall thickening. COLON: No dilatation or wall thickening. APPENDIX: Nonvisualized  PERITONEUM: Anterior peritoneal/omental implant measures 12.3 x 4.1 cm,  minimally changed in size. Persistent omental/peritoneal implants in the left  upper quadrant near the splenic hilum, bilateral paracolic gutters, and in the  dependent portion of the peritoneal reflection. Numerous enlarged mesenteric  lymph nodes, as before. Increased ascites in the abdomen.   RETROPERITONEUM: Stable persistent retroperitoneal lymphadenopathy. REPRODUCTIVE ORGANS: Status post hysterectomy. URINARY BLADDER: Nondistended urinary bladder limits evaluation. BONES: No destructive bone lesion. ABDOMINAL WALL: No mass or hernia. ADDITIONAL COMMENTS: N/A     IMPRESSION  1. Increased bilateral pleural effusions and ascites. 2.  Persistent peritoneal/omental nodularity, mesenteric lymphadenopathy, and  retroperitoneal lymphadenopathy, not significantly changed. 3.  Stable mediastinal lymphadenopathy. CT of chest/abdomen/pelvis (8/19/22)  CHEST WALL: Left Port-A-Cath in place with catheter traversing expected course  to terminate in the SVC. No mass or enlarged adenopathy. THYROID: No significant change in heterogeneously hypodense 12 mm nodule of the  right thyroid gland with otherwise unremarkable appearance. MEDIASTINUM: Precarinal node measures 1.5 x 1.8 cm (3/24) is, not significantly  changed from prior (3/26). Subcarinal node with heterogeneous enhancement  measures 1.3 x 1.8 cm (3/27) series, not significantly changed from prior (3/30)  series  RAMEZ: 1.7 x 2.0 cm upper right hilar node (3/29) not significantly changed from  prior (3/30). No enlarged left hilar adenopathy. THORACIC AORTA: No dissection or aneurysm. MAIN PULMONARY ARTERY: Normal in caliber. TRACHEA/BRONCHI: Patent. ESOPHAGUS: No wall thickening or dilatation. HEART: Normal in size. PLEURA: Large bilateral pleural effusions. No pneumothorax. LUNGS: Compressive atelectasis overlies large pleural effusions bilaterally. Aerated lungs are clear. LIVER: No mass. BILIARY TREE: Gallbladder is within normal limits. CBD is not dilated. SPLEEN: Unremarkable with no focal lesion. PANCREAS: No mass or ductal dilatation. ADRENALS: Unremarkable. KIDNEYS: No mass, calculus, or hydronephrosis. STOMACH: Unremarkable. SMALL BOWEL: No dilatation or wall thickening. COLON: No dilatation or wall thickening. APPENDIX: Not visualized. PERITONEUM: Extensive peritoneal implants and dense omental caking is  redemonstrated without significant interval change. Representative measurements  of the largest areas of soft tissue are 4.2 x 11.9 cm in the anterior midline  mid abdomen (3/72) and 4.2 x 5.1 cm interposed between the greater curvature of  the gastric body and the spleen. There has been interval reduced ascites status  post right peritoneal drainage catheter placement which traverses from the right  abdominal wall to along the undersurface of the liver. RETROPERITONEUM: Extensive adenopathy redemonstrated with celiac node measuring  1.5 x 1.9 cm (3/57), previously 1.4 x 2.1 cm (3/59), portacaval node measuring  1.7 x 2.4 cm (3/66), previously 1.9 x 2.3 cm (3/67), and right common iliac node  measuring 1.6 x 2.6 cm (3/85), previously 1.7 x 2.3 cm (3/87). REPRODUCTIVE ORGANS: Uterus and ovaries surgically absent. URINARY BLADDER: No mass or calculus. BONES: Degenerative spine change. No acute fracture or aggressive lesion. ABDOMINAL WALL: Probably edematous with no mass or hernia. ADDITIONAL COMMENTS: N/A     IMPRESSION  No significant change in metastatic disease with mediastinal and  right hilar adenopathy, extensive peritoneal carcinomatosis with omental caking,  and retroperitoneal adenopathy. Large bilateral pleural effusions are  redemonstrated with overlying compressive atelectasis. Interval reduction in  ascites status post peritoneal drainage catheter placement. Incidental findings  as above. 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 She has recurrent disease and has previously been treated with Taxol/Carboplatin x 2, Doxil, and Keytruda. She is currently receiving single agent Avastin. I had previously explained that it won't make the tumor \"go away\", but it may slow down the growth and help manage the ascites.   She has completed 9 cycles so far. She had an [address] on 8/11/22 to manage her ascites. Her most recent CT demonstrates that her measurable disease has remained stable. We will continue with the current regimen and repeat CT after the next cycle. I'm not sure she is even a candidate for a clinical trial, but I have reached out to [facility] to see what is available. I also discussed the possibility of dostarlumab. Marc Mendez is a 78 y.o. female, evaluated via audio-only technology on 9/8/2022 for Chemotherapy (Virtual visit. Labs on 9/13 and for C10 avastin on 9/15.)      Assessment & Plan:   Diagnoses and all orders for this visit:    1. Primary serous adenocarcinoma of endometrium (HCC)  -     CT CHEST ABD PELV W CONT; Future    2. On antineoplastic chemotherapy    3. Pleural effusion, bilateral  -     CT CHEST ABD PELV W CONT; Future    4. Malignant ascites  -     CT CHEST ABD PELV W CONT; Future        Subjective:       Prior to Admission medications    Medication Sig Start Date End Date Taking? Authorizing Provider   Xarelto 20 mg tab tablet TAKE 1 TABLET BY MOUTH DAILY 8/8/22  Yes MD Monica Oliveros, by Does Not Apply route. With zinc ointment   Yes Provider, Historical   acetaminophen (TYLENOL) 325 mg tablet Take  by mouth as needed. Yes Provider, Historical   lisinopriL (PRINIVIL, ZESTRIL) 10 mg tablet Take 1 Tab by mouth daily. 1/6/21  Yes Malinda Glasgow MD   lisinopriL (PRINIVIL, ZESTRIL) 5 mg tablet Take 2 Tabs by mouth daily. 1/5/21  Yes Hieu Cline PA-C   ondansetron (ZOFRAN ODT) 4 mg disintegrating tablet Take 1 Tab by mouth every eight (8) hours as needed for Nausea. Indications: nausea and vomiting caused by cancer drugs 10/22/20  Yes Malinda Glasgow MD   lidocaine-prilocaine (EMLA) topical cream Apply small amount over port area and cover with band aid one hour before chemo 10/22/20  Yes Malinda Glasgow MD   guaifenesin (MUCINEX PO) Take  by mouth.    Yes Provider, Historical   loratadine (CLARITIN) 10 mg tablet Take 10 mg by mouth. Yes Provider, Historical   epinastine 0.05 % drop Apply  to eye. Yes Provider, Historical   raNITIdine (ZANTAC) 150 mg tablet ZANTAC TABS 9/29/11  Yes Provider, Historical   cyclobenzaprine (FLEXERIL) 5 mg tablet take 1 tablet by mouth three times a day if needed 12/5/16  Yes Provider, Historical   valACYclovir (VALTREX) 1 gram tablet Take 1 Tab by mouth three (3) times daily. 12/15/16  Yes Antonio Sánchez MD   EPINEPHrine (EPIPEN) 0.3 mg/0.3 mL injection 0.3 mg by IntraMUSCular route once as needed. Yes Provider, Historical   ketotifen (ZADITOR) 0.025 % (0.035 %) ophthalmic solution Administer 1 Drop to both eyes every morning. Yes Provider, Historical   SAL ACID/UREA/PETROLATUM,WHITE (KERASAL FOOT EX) by Apply Externally route daily as needed. Yes Provider, Historical   ACCU-CHEK HELDER PLUS TEST STRP strip  7/3/15  Yes Provider, Historical   NITROSTAT 0.4 mg SL tablet  9/22/14  Yes Provider, Historical   CALCIUM CARBONATE (MARCELLO-SELTZER ANTACID PO) Take  by mouth daily as needed. Yes Provider, Historical   Cetirizine 10 mg cap Take 10 mg by mouth nightly. Patient not taking: No sig reported    Provider, Historical     Patient Active Problem List   Diagnosis Code    Malignant neoplasm of corpus uteri, except isthmus ([facility] [address] C54.9    Dehydration E86.0     Patient Active Problem List    Diagnosis Date Noted    Dehydration 08/09/2022    Malignant ne[facility] of corpus uteri, except isthmus [facility] 02/28/2013     Current Outpatient Medications   Medication Sig Dispense Refill    Xarelto 20 mg tab tablet TAKE 1 TABLET BY MOUTH DAILY 30 Tablet 5    BACITRACIN, BULK, by Does Not Apply route. With zinc ointment      acetaminophen (TYLENOL) 325 mg tablet Take  by mouth as needed. lisinopriL (PRINIVIL, ZESTRIL) 10 mg tablet Take 1 Tab by mouth daily. 30 Tab 2    lisinopriL (PRINIVIL, ZESTRIL) 5 mg tablet Take 2 Tabs by mouth daily.  30 Tab 5 ondansetron (ZOFRAN ODT) 4 mg disintegrating tablet Take 1 Tab by mouth every eight (8) hours as needed for Nausea. Indications: nausea and vomiting caused by cancer drugs 30 Tab 3    lidocaine-prilocaine (EMLA) topical cream Apply small amount over port area and cover with band aid one hour before chemo 30 g 3    guaifenesin (MUCINEX PO) Take  by mouth.      loratadine (CLARITIN) 10 mg tablet Take 10 mg by mouth.      epinastine 0.05 % drop Apply  to eye. raNITIdine (ZANTAC) 150 mg tablet ZANTAC TABS      cyclobenzaprine (FLEXERIL) 5 mg tablet take 1 tablet by mouth three times a day if needed  0    valACYclovir (VALTREX) 1 gram tablet Take 1 Tab by mouth three (3) times daily. 21 Tab 3    EPINEPHrine (EPIPEN) 0.3 mg/0.3 mL injection 0.3 mg by IntraMUSCular route once as needed. ketotifen (ZADITOR) 0.025 % (0.035 %) ophthalmic solution Administer 1 Drop to both eyes every morning. SAL ACID/UREA/PETROLATUM,WHITE (KERASAL FOOT EX) by Apply Externally route daily as needed. ACCU-CHEK HELDER PLUS TEST STRP strip   0    NITROSTAT 0.4 mg SL tablet       CALCIUM CARBONATE (MARCELLO-SELTZER ANTACID PO) Take  by mouth daily as needed. Cetirizine 10 mg cap Take 10 mg by mouth nightly.  (Patient not taking: No sig reported)       Allergies   Allergen Reactions    Latex Other (comments)     Burns skin    Acetone Shortness of Breath    Amoxicillin Anaphylaxis    Ciprofloxacin Hives    Pcn [Penicillins] Anaphylaxis    Buspar [Buspirone] Unknown (comments)     Has N&V and makes her feel \"weird\"    Azithromycin Hives    Food [Egg] Nausea Only    Other Medication Rash     POPPY seeds     Past Medical History:   Diagnosis Date    Abnormal Pap smear 1995    with f/u normal    Anemia     Arthritis     Asthma     Cancer ([address]     ENDOMETRIAL    Environmental allergies     GERD (gastroesophageal reflux disease)     Goiter     Other unknown and unspecified cause of morbidity or mortality     POSSIBLE ESOPHAGEAL SPASM, ? OBSTRUCTION DUE TO GOITER    PMB (postmenopausal bleeding)     S/P chemotherapy, time since greater than 12 weeks     LAST CHEMO 10/2014 PER PATIENT    Thyroid disease     goiter     Past Surgical History:   Procedure Laterality Date    HX APPENDECTOMY      HX BUNIONECTOMY      HX GYN  3/13/13    TLH/BSO    HX HEENT  4/22/13    TOOTH REMOVED    HX ORTHOPAEDIC  1980'S    BUNIONECTOMY    HX VASCULAR ACCESS      port    IR INSERT INTRAPERI TUNL CATH PERC  8/11/2022    [place] BREAST SURGERY PROCEDURE UNLISTED  1/12/16    right breast bx     Family History   Problem Relation Age of Onset    Cancer Maternal Aunt         breast    Heart Disease Mother     Diabetes Father     Cancer Sister         CERVICAL    Kidney Disease Brother     Diabetes Brother     Heart Attack Other     Arrhythmia Brother     Diabetes Brother     Anesth Problems Neg Hx      Social History     Tobacco Use    Smoking status: Never    Smokeless tobacco: Never   Substance Use Topics    Alcohol use: Yes     Alcohol/week: 0.0 standard drinks     Comment: RARE OCC       ROS    Patient-Reported Vitals 1/19/2021   Patient-Reported Systolic  060   Patient-Reported Diastolic 96        Tee Mercado, who was evaluated through a patient-initiated, synchronous (real-time) audio only encounter, and/or her healthcare decision maker, is aware that it is a billable service, with coverage as determined by her insurance carrier. She provided verbal consent to proceed: Yes. She has not had a related appointment within my department in the past 7 days or scheduled within the next 24 hours. Total Time: minutes: 11-20 minutes        An electronic signature was used to sign this note.     Nadira Cheung MD  09/08/22

## 2022-09-09 RX ORDER — SODIUM CHLORIDE 9 MG/ML
25 INJECTION, SOLUTION INTRAVENOUS CONTINUOUS
Status: CANCELLED | OUTPATIENT
Start: 2022-09-15

## 2022-09-09 RX ORDER — ALBUTEROL SULFATE 0.83 MG/ML
2.5 SOLUTION RESPIRATORY (INHALATION) AS NEEDED
Status: CANCELLED | OUTPATIENT
Start: 2022-09-15

## 2022-09-09 RX ORDER — HYDROCORTISONE SODIUM SUCCINATE 100 MG/2ML
100 INJECTION, POWDER, FOR SOLUTION INTRAMUSCULAR; INTRAVENOUS AS NEEDED
Status: CANCELLED | OUTPATIENT
Start: 2022-09-15

## 2022-09-09 RX ORDER — DIPHENHYDRAMINE HYDROCHLORIDE 50 MG/ML
25 INJECTION, SOLUTION INTRAMUSCULAR; INTRAVENOUS AS NEEDED
Status: CANCELLED | OUTPATIENT
Start: 2022-09-15

## 2022-09-09 RX ORDER — ONDANSETRON 2 MG/ML
8 INJECTION INTRAMUSCULAR; INTRAVENOUS AS NEEDED
Status: CANCELLED | OUTPATIENT
Start: 2022-09-15

## 2022-09-09 RX ORDER — SODIUM CHLORIDE 0.9 % (FLUSH) 0.9 %
10 SYRINGE (ML) INJECTION AS NEEDED
Status: CANCELLED | OUTPATIENT
Start: 2022-09-15

## 2022-09-09 RX ORDER — ACETAMINOPHEN 325 MG/1
650 TABLET ORAL AS NEEDED
Status: CANCELLED | OUTPATIENT
Start: 2022-09-15

## 2022-09-09 RX ORDER — SODIUM CHLORIDE 9 MG/ML
10 INJECTION INTRAMUSCULAR; INTRAVENOUS; SUBCUTANEOUS AS NEEDED
Status: CANCELLED | OUTPATIENT
Start: 2022-09-15

## 2022-09-09 RX ORDER — DIPHENHYDRAMINE HYDROCHLORIDE 50 MG/ML
50 INJECTION, SOLUTION INTRAMUSCULAR; INTRAVENOUS AS NEEDED
Status: CANCELLED | OUTPATIENT
Start: 2022-09-15

## 2022-09-09 RX ORDER — HEPARIN 100 UNIT/ML
300-500 SYRINGE INTRAVENOUS AS NEEDED
Status: CANCELLED | OUTPATIENT
Start: 2022-09-15

## 2022-09-09 RX ORDER — EPINEPHRINE 1 MG/ML
0.3 INJECTION, SOLUTION, CONCENTRATE INTRAVENOUS AS NEEDED
Status: CANCELLED | OUTPATIENT
Start: 2022-09-15

## 2022-09-12 ENCOUNTER — TELEPHONE (OUTPATIENT)
Dept: GYNECOLOGY | Age: 67
End: 2022-09-12

## 2022-09-12 NOTE — TELEPHONE ENCOUNTER
Pt had a feeling of \"heavy chest, sob and flying heart rate of 102 during the night, so she drained her aspira tube this AM, getting 1200 ML's out and is feeling better now\". She is scheduled for a CT scan on 9/29/22 and I informed her if she continued with symptoms and more frequency in draining to call the office for possibly changing the appt for CT scan for earlier.

## 2022-09-14 LAB
ALBUMIN SERPL-MCNC: 3 G/DL (ref 3.8–4.8)
ALBUMIN/GLOB SERPL: 1.1 {RATIO} (ref 1.2–2.2)
ALP SERPL-CCNC: 66 IU/L (ref 44–121)
ALT SERPL-CCNC: 7 IU/L (ref 0–32)
APPEARANCE UR: CLEAR
AST SERPL-CCNC: 17 IU/L (ref 0–40)
BACTERIA #/AREA URNS HPF: ABNORMAL /[HPF]
BASOPHILS # BLD AUTO: 0 X10E3/UL (ref 0–0.2)
BASOPHILS NFR BLD AUTO: 1 %
BILIRUB SERPL-MCNC: 0.3 MG/DL (ref 0–1.2)
BILIRUB UR QL STRIP: NEGATIVE
BUN SERPL-MCNC: 12 MG/DL (ref 8–27)
BUN/CREAT SERPL: 27 (ref 12–28)
CALCIUM SERPL-MCNC: 8.4 MG/DL (ref 8.7–10.3)
CANCER AG125 SERPL-ACNC: 308 U/ML (ref 0–38.1)
CASTS URNS QL MICRO: ABNORMAL /LPF
CHLORIDE SERPL-SCNC: 96 MMOL/L (ref 96–106)
CO2 SERPL-SCNC: 24 MMOL/L (ref 20–29)
COLOR UR: YELLOW
CREAT SERPL-MCNC: 0.44 MG/DL (ref 0.57–1)
CRYSTALS URNS MICRO: ABNORMAL
EGFR: 107 ML/MIN/1.73
EOSINOPHIL # BLD AUTO: 0.1 X10E3/UL (ref 0–0.4)
EOSINOPHIL NFR BLD AUTO: 1 %
EPI CELLS #/AREA URNS HPF: ABNORMAL /HPF (ref 0–10)
ERYTHROCYTE [DISTWIDTH] IN BLOOD BY AUTOMATED COUNT: 17.2 % (ref 11.7–15.4)
GLOBULIN SER CALC-MCNC: 2.8 G/DL (ref 1.5–4.5)
GLUCOSE SERPL-MCNC: 102 MG/DL (ref 65–99)
GLUCOSE UR QL STRIP: NEGATIVE
HCT VFR BLD AUTO: 39.9 % (ref 34–46.6)
HGB BLD-MCNC: 12.3 G/DL (ref 11.1–15.9)
HGB UR QL STRIP: NEGATIVE
IMM GRANULOCYTES # BLD AUTO: 0 X10E3/UL (ref 0–0.1)
IMM GRANULOCYTES NFR BLD AUTO: 0 %
KETONES UR QL STRIP: NEGATIVE
LEUKOCYTE ESTERASE UR QL STRIP: NEGATIVE
LYMPHOCYTES # BLD AUTO: 0.7 X10E3/UL (ref 0.7–3.1)
LYMPHOCYTES NFR BLD AUTO: 10 %
MAGNESIUM SERPL-MCNC: 2 MG/DL (ref 1.6–2.3)
MCH RBC QN AUTO: 26.6 PG (ref 26.6–33)
MCHC RBC AUTO-ENTMCNC: 30.8 G/DL (ref 31.5–35.7)
MCV RBC AUTO: 86 FL (ref 79–97)
MICRO URNS: NORMAL
MICRO URNS: NORMAL
MONOCYTES # BLD AUTO: 0.5 X10E3/UL (ref 0.1–0.9)
MONOCYTES NFR BLD AUTO: 8 %
NEUTROPHILS # BLD AUTO: 5.4 X10E3/UL (ref 1.4–7)
NEUTROPHILS NFR BLD AUTO: 80 %
NITRITE UR QL STRIP: NEGATIVE
PH UR STRIP: 6.5 [PH] (ref 5–7.5)
PLATELET # BLD AUTO: 360 X10E3/UL (ref 150–450)
POTASSIUM SERPL-SCNC: 4.3 MMOL/L (ref 3.5–5.2)
PROT SERPL-MCNC: 5.8 G/DL (ref 6–8.5)
PROT UR QL STRIP: NEGATIVE
RBC # BLD AUTO: 4.62 X10E6/UL (ref 3.77–5.28)
RBC #/AREA URNS HPF: ABNORMAL /HPF (ref 0–2)
SODIUM SERPL-SCNC: 136 MMOL/L (ref 134–144)
SP GR UR STRIP: 1.02 (ref 1–1.03)
UNIDENT CRYS URNS QL MICRO: PRESENT
UROBILINOGEN UR STRIP-MCNC: 0.2 MG/DL (ref 0.2–1)
WBC # BLD AUTO: 6.7 X10E3/UL (ref 3.4–10.8)
WBC #/AREA URNS HPF: ABNORMAL /HPF (ref 0–5)

## 2022-09-15 ENCOUNTER — HOSPITAL ENCOUNTER (OUTPATIENT)
Dept: INFUSION THERAPY | Age: 67
Discharge: HOME | End: 2022-09-15
Payer: MEDICARE

## 2022-09-15 VITALS
RESPIRATION RATE: 20 BRPM | DIASTOLIC BLOOD PRESSURE: 68 MMHG | SYSTOLIC BLOOD PRESSURE: 107 MMHG | BODY MASS INDEX: 23.66 KG/M2 | WEIGHT: 142.2 LBS | TEMPERATURE: 97.9 F | HEART RATE: 66 BPM | OXYGEN SATURATION: 98 %

## 2022-09-15 DIAGNOSIS — C54.9 MALIGNANT NEOPLASM OF CORPUS UTERI, EXCEPT ISTHMUS (HCC): Primary | ICD-10-CM

## 2022-09-15 PROCEDURE — 96413 CHEMO IV INFUSION 1 HR: CPT

## 2022-09-15 PROCEDURE — 74011000258 HC RX REV CODE- 258: Performed by: OBSTETRICS & GYNECOLOGY

## 2022-09-15 PROCEDURE — 74011250636 HC RX REV CODE- 250/636: Performed by: OBSTETRICS & GYNECOLOGY

## 2022-09-15 PROCEDURE — 77030012965 HC NDL HUBR BBMI -A

## 2022-09-15 PROCEDURE — 74011000250 HC RX REV CODE- 250: Performed by: OBSTETRICS & GYNECOLOGY

## 2022-09-15 RX ORDER — SODIUM CHLORIDE 9 MG/ML
25 INJECTION, SOLUTION INTRAVENOUS CONTINUOUS
Status: DISCONTINUED | OUTPATIENT
Start: 2022-09-15 | End: 2022-09-16 | Stop reason: HOSPADM

## 2022-09-15 RX ORDER — HEPARIN 100 UNIT/ML
300-500 SYRINGE INTRAVENOUS AS NEEDED
Status: DISCONTINUED | OUTPATIENT
Start: 2022-09-15 | End: 2022-09-16 | Stop reason: HOSPADM

## 2022-09-15 RX ORDER — DIPHENHYDRAMINE HYDROCHLORIDE 50 MG/ML
25 INJECTION, SOLUTION INTRAMUSCULAR; INTRAVENOUS AS NEEDED
Status: DISCONTINUED | OUTPATIENT
Start: 2022-09-15 | End: 2022-09-16 | Stop reason: HOSPADM

## 2022-09-15 RX ORDER — SODIUM CHLORIDE 0.9 % (FLUSH) 0.9 %
10 SYRINGE (ML) INJECTION AS NEEDED
Status: DISCONTINUED | OUTPATIENT
Start: 2022-09-15 | End: 2022-09-16 | Stop reason: HOSPADM

## 2022-09-15 RX ORDER — ACETAMINOPHEN 325 MG/1
650 TABLET ORAL AS NEEDED
Status: DISCONTINUED | OUTPATIENT
Start: 2022-09-15 | End: 2022-09-16 | Stop reason: HOSPADM

## 2022-09-15 RX ORDER — SODIUM CHLORIDE 9 MG/ML
10 INJECTION INTRAMUSCULAR; INTRAVENOUS; SUBCUTANEOUS AS NEEDED
Status: DISCONTINUED | OUTPATIENT
Start: 2022-09-15 | End: 2022-09-16 | Stop reason: HOSPADM

## 2022-09-15 RX ORDER — ONDANSETRON 2 MG/ML
8 INJECTION INTRAMUSCULAR; INTRAVENOUS AS NEEDED
Status: DISCONTINUED | OUTPATIENT
Start: 2022-09-15 | End: 2022-09-16 | Stop reason: HOSPADM

## 2022-09-15 RX ADMIN — SODIUM CHLORIDE 25 ML/HR: 9 INJECTION, SOLUTION INTRAVENOUS at 13:51

## 2022-09-15 RX ADMIN — SODIUM CHLORIDE 967.5 MG: 9 INJECTION, SOLUTION INTRAVENOUS at 14:37

## 2022-09-15 RX ADMIN — HEPARIN 500 UNITS: 100 SYRINGE at 15:30

## 2022-09-15 RX ADMIN — HEPARIN 500 UNITS: 100 SYRINGE at 15:15

## 2022-09-15 RX ADMIN — SODIUM CHLORIDE, PRESERVATIVE FREE 10 ML: 5 INJECTION INTRAVENOUS at 15:15

## 2022-09-15 NOTE — PROGRESS NOTES
[facility] Progress Note    Date: September 15, 2022    Name: Thee Deras    MRN: [id number]         : 1955    Ms. Loyda Traylor Arrived ambulatory and in no distress for cycle 10 day 1 of ZIRABEV regimen. Follow Up: Proceed with treatment    Assessment was completed and documented in flowsheets. No acute concerns at this time. Port accessed without difficulty, labs drawn and processed. Chemotherapy Flowsheet 9/15/2022   Cycle C10   Date 9/15/2022   Drug / Regimen Vik Ham   Pre Meds -   Notes given         Ms. Kal Benito vitals were reviewed. Patient Vitals for the past 12 hrs:   Temp Pulse Resp BP SpO2   09/15/22 1530 -- 66 -- 107/68 --   09/15/22 1318 97.9 °F (36.6 °C) (!) 102 20 110/81 98 %       Lines:   Venous Access Device Port Upper chest (subclavicular area), left (Active)   Central Line Being Utilized Yes 09/15/22 1300   Criteria for Appropriate Use Irritant/vesicant medication 09/15/22 1300   Site Assessment Clean, dry, & intact 09/15/22 1300   Date of Last Dressing Change 22 1015   Dressing Status Clean, dry, & intact; New 09/15/22 1300   Dressing Type Transparent 09/15/22 1300   Action Taken Blood drawn 22 1200   Date Accessed (Medial Site) 09/15/22 09/15/22 1300   Access Time (Medial Site) 1100 22 1105   Access Needle Size (Site #1) 20 G 09/15/22 1300   Access Needle Length (Medial Site) 0.75 inches 09/15/22 1300   Positive Blood Return (Medial Site) Yes 09/15/22 1300   Action Taken (Medial Site) Flushed; Other (comment) 09/15/22 1300   Alcohol Cap Used Yes 09/15/22 1300        Lab results were obtained and reviewed. Labs within parameter for treatment 22. Pre-medications  were administered as ordered and chemotherapy was initiated.   Medications Administered       0.9% sodium chloride infusion       Admin Date  09/15/2022 Action  New Bag Dose  25 mL/hr Rate  25 mL/hr Route  IntraVENous Administered By  Cassi Guy RN              0.9% sodium chloride injection 10 mL       Admin Date  09/15/2022 Action  Given Dose  10 mL Route  IntraVENous Administered By  Merlin Redden, RN              bevacizumab-bvzr (Niharika Sunday) 967.5 mg in 0.9% sodium chloride 100 mL, overfill volume 10 mL IVPB       Admin Date  09/15/2022 Action  New Bag Dose  967.5 mg Rate  297.4 mL/hr Route  IntraVENous Administered By  Merlin Redden, RN              heparin (porcine) pf 300-500 Units       Admin Date  09/15/2022 Action  Given Dose  500 Units Route  InterCATHeter Administered By  Merlin Redden, RN                    Medication education and side effect management reinforced with patient. They verbalized understanding. Ms. Heena Sánchez tolerated treatment well, port flushed and de accessed, patient was discharged from [place] [address] in stable condition. Patient is aware of upcoming appointments.       Future Appointments   Date Time Provider David Jara   9/30/2022  2:00 PM [facility] CT 1 [facility]   10/6/2022 10:15 AM Vicky Guillaume MD [facility] BS AMB   10/6/2022 11:00 AM [address]         João Junior RN  September 15, 2022

## 2022-09-19 ENCOUNTER — TELEPHONE (OUTPATIENT)
Dept: GYNECOLOGY | Age: 67
End: 2022-09-19

## 2022-09-19 NOTE — TELEPHONE ENCOUNTER
Returned pt call. Pt states she has \"an area the size of a marble on labia, which she has had several times before. Pt advised to take warm sitz bath's and if it does not drain or go away she will need to see Dr. Estephania Jackson.

## 2022-09-19 NOTE — TELEPHONE ENCOUNTER
Pt karla and asked to speak to Q[place]a, would not speak to anyone else, would like to discuss some things with her, she will be at [phone] until noon and after noon please call at work [phone]

## 2022-09-20 RX ORDER — HYDROCORTISONE SODIUM SUCCINATE 100 MG/2ML
100 INJECTION, POWDER, FOR SOLUTION INTRAMUSCULAR; INTRAVENOUS AS NEEDED
OUTPATIENT
Start: 2022-10-06

## 2022-09-20 RX ORDER — EPINEPHRINE 1 MG/ML
0.3 INJECTION, SOLUTION, CONCENTRATE INTRAVENOUS AS NEEDED
OUTPATIENT
Start: 2022-10-06

## 2022-09-20 RX ORDER — ALBUTEROL SULFATE 0.83 MG/ML
2.5 SOLUTION RESPIRATORY (INHALATION) AS NEEDED
OUTPATIENT
Start: 2022-10-06

## 2022-09-20 RX ORDER — SODIUM CHLORIDE 0.9 % (FLUSH) 0.9 %
10 SYRINGE (ML) INJECTION AS NEEDED
OUTPATIENT
Start: 2022-10-06

## 2022-09-20 RX ORDER — SODIUM CHLORIDE 9 MG/ML
10 INJECTION INTRAMUSCULAR; INTRAVENOUS; SUBCUTANEOUS AS NEEDED
OUTPATIENT
Start: 2022-10-06

## 2022-09-20 RX ORDER — SODIUM CHLORIDE 9 MG/ML
25 INJECTION, SOLUTION INTRAVENOUS CONTINUOUS
OUTPATIENT
Start: 2022-10-06

## 2022-09-20 RX ORDER — HEPARIN 100 UNIT/ML
300-500 SYRINGE INTRAVENOUS AS NEEDED
OUTPATIENT
Start: 2022-10-06

## 2022-09-20 RX ORDER — DIPHENHYDRAMINE HYDROCHLORIDE 50 MG/ML
25 INJECTION, SOLUTION INTRAMUSCULAR; INTRAVENOUS AS NEEDED
OUTPATIENT
Start: 2022-10-06

## 2022-09-20 RX ORDER — ONDANSETRON 2 MG/ML
8 INJECTION INTRAMUSCULAR; INTRAVENOUS AS NEEDED
OUTPATIENT
Start: 2022-10-06

## 2022-09-20 RX ORDER — ACETAMINOPHEN 325 MG/1
650 TABLET ORAL AS NEEDED
OUTPATIENT
Start: 2022-10-06

## 2022-09-20 RX ORDER — DIPHENHYDRAMINE HYDROCHLORIDE 50 MG/ML
50 INJECTION, SOLUTION INTRAMUSCULAR; INTRAVENOUS AS NEEDED
OUTPATIENT
Start: 2022-10-06

## 2022-09-22 ENCOUNTER — APPOINTMENT (OUTPATIENT)
Dept: ULTRASOUND IMAGING | Age: 67
DRG: 872 | End: 2022-09-22
Attending: EMERGENCY MEDICINE
Payer: MEDICARE

## 2022-09-22 ENCOUNTER — HOSPITAL ENCOUNTER (INPATIENT)
Age: 67
LOS: 4 days | Discharge: HOME HEALTH | DRG: 872 | End: 2022-09-26
Attending: EMERGENCY MEDICINE | Admitting: FAMILY MEDICINE
Payer: MEDICARE

## 2022-09-22 ENCOUNTER — HOSPITAL ENCOUNTER (EMERGENCY)
Dept: CT IMAGING | Age: 67
Discharge: HOME | DRG: 872 | End: 2022-09-22
Attending: EMERGENCY MEDICINE
Payer: MEDICARE

## 2022-09-22 ENCOUNTER — APPOINTMENT (OUTPATIENT)
Dept: GENERAL RADIOLOGY | Age: 67
DRG: 872 | End: 2022-09-22
Attending: EMERGENCY MEDICINE
Payer: MEDICARE

## 2022-09-22 ENCOUNTER — APPOINTMENT (OUTPATIENT)
Dept: GENERAL RADIOLOGY | Age: 67
DRG: 872 | End: 2022-09-22
Attending: NURSE PRACTITIONER
Payer: MEDICARE

## 2022-09-22 DIAGNOSIS — A41.9 SEPSIS, DUE TO UNSPECIFIED ORGANISM, UNSPECIFIED WHETHER ACUTE ORGAN DYSFUNCTION PRESENT (HCC): ICD-10-CM

## 2022-09-22 DIAGNOSIS — J90 PLEURAL EFFUSION: ICD-10-CM

## 2022-09-22 DIAGNOSIS — C54.9 MALIGNANT NEOPLASM OF CORPUS UTERI, EXCEPT ISTHMUS (HCC): ICD-10-CM

## 2022-09-22 DIAGNOSIS — L03.317 CELLULITIS OF BUTTOCK: ICD-10-CM

## 2022-09-22 DIAGNOSIS — J91.0 MALIGNANT PLEURAL EFFUSION: Primary | ICD-10-CM

## 2022-09-22 DIAGNOSIS — E43 SEVERE PROTEIN-CALORIE MALNUTRITION (HCC): Chronic | ICD-10-CM

## 2022-09-22 LAB
ALBUMIN SERPL-MCNC: 2.2 G/DL (ref 3.5–5)
ALBUMIN/GLOB SERPL: 0.4 {RATIO} (ref 1.1–2.2)
ALP SERPL-CCNC: 72 U/L (ref 45–117)
ALT SERPL-CCNC: 10 U/L (ref 12–78)
ANION GAP SERPL CALC-SCNC: 8 MMOL/L (ref 5–15)
APPEARANCE FLD: ABNORMAL
AST SERPL-CCNC: 20 U/L (ref 15–37)
BASOPHILS # BLD: 0 K/UL (ref 0–0.1)
BASOPHILS NFR BLD: 0 % (ref 0–1)
BILIRUB SERPL-MCNC: 0.4 MG/DL (ref 0.2–1)
BNP SERPL-MCNC: 105 PG/ML
BUN SERPL-MCNC: 14 MG/DL (ref 6–20)
BUN/CREAT SERPL: 29 (ref 12–20)
CALCIUM SERPL-MCNC: 9.7 MG/DL (ref 8.5–10.1)
CHLORIDE SERPL-SCNC: 99 MMOL/L (ref 97–108)
CO2 SERPL-SCNC: 26 MMOL/L (ref 21–32)
COLOR FLD: ABNORMAL
COMMENT, HOLDF: NORMAL
COMMENT, HOLDF: NORMAL
CREAT SERPL-MCNC: 0.49 MG/DL (ref 0.55–1.02)
D DIMER PPP FEU-MCNC: 1.1 MG/L FEU (ref 0–0.65)
DIFFERENTIAL METHOD BLD: ABNORMAL
EOSINOPHIL # BLD: 0 K/UL (ref 0–0.4)
EOSINOPHIL NFR BLD: 0 % (ref 0–7)
ERYTHROCYTE [DISTWIDTH] IN BLOOD BY AUTOMATED COUNT: 18.7 % (ref 11.5–14.5)
GLOBULIN SER CALC-MCNC: 5.3 G/DL (ref 2–4)
GLUCOSE SERPL-MCNC: 123 MG/DL (ref 65–100)
HCT VFR BLD AUTO: 41.2 % (ref 35–47)
HGB BLD-MCNC: 12.8 G/DL (ref 11.5–16)
IMM GRANULOCYTES # BLD AUTO: 0 K/UL (ref 0–0.04)
IMM GRANULOCYTES NFR BLD AUTO: 0 % (ref 0–0.5)
LACTATE SERPL-SCNC: 1.3 MMOL/L (ref 0.4–2)
LYMPHOCYTES # BLD: 0.6 K/UL (ref 0.8–3.5)
LYMPHOCYTES NFR BLD: 5 % (ref 12–49)
LYMPHOCYTES NFR FLD: 42 %
MCH RBC QN AUTO: 26.9 PG (ref 26–34)
MCHC RBC AUTO-ENTMCNC: 31.1 G/DL (ref 30–36.5)
MCV RBC AUTO: 86.6 FL (ref 80–99)
MONOCYTES # BLD: 0.7 K/UL (ref 0–1)
MONOCYTES NFR BLD: 6 % (ref 5–13)
MONOS+MACROS NFR FLD: 10 %
NEUTROPHILS NFR FLD: 48 %
NEUTS SEG # BLD: 10.1 K/UL (ref 1.8–8)
NEUTS SEG NFR BLD: 89 % (ref 32–75)
NRBC # BLD: 0 K/UL (ref 0–0.01)
NRBC BLD-RTO: 0 PER 100 WBC
NUC CELL # FLD: 925 /CU MM
PLATELET # BLD AUTO: 315 K/UL (ref 150–400)
PLATELET COMMENTS,PCOM: ABNORMAL
PMV BLD AUTO: 8.7 FL (ref 8.9–12.9)
POTASSIUM SERPL-SCNC: 4.2 MMOL/L (ref 3.5–5.1)
PROCALCITONIN SERPL-MCNC: 0.09 NG/ML
PROT SERPL-MCNC: 7.5 G/DL (ref 6.4–8.2)
RBC # BLD AUTO: 4.76 M/UL (ref 3.8–5.2)
RBC # FLD: >100 /CU MM
RBC MORPH BLD: ABNORMAL
SAMPLES BEING HELD,HOLD: NORMAL
SAMPLES BEING HELD,HOLD: NORMAL
SODIUM SERPL-SCNC: 133 MMOL/L (ref 136–145)
SPECIMEN SOURCE FLD: ABNORMAL
TROPONIN-HIGH SENSITIVITY: <4 NG/L (ref 0–51)
WBC # BLD AUTO: 11.4 K/UL (ref 3.6–11)

## 2022-09-22 PROCEDURE — 99285 EMERGENCY DEPT VISIT HI MDM: CPT

## 2022-09-22 PROCEDURE — 84484 ASSAY OF TROPONIN QUANT: CPT

## 2022-09-22 PROCEDURE — 0W993ZZ DRAINAGE OF RIGHT PLEURAL CAVITY, PERCUTANEOUS APPROACH: ICD-10-PCS | Performed by: STUDENT IN AN ORGANIZED HEALTH CARE EDUCATION/TRAINING PROGRAM

## 2022-09-22 PROCEDURE — 80053 COMPREHEN METABOLIC PANEL: CPT

## 2022-09-22 PROCEDURE — 74011250636 HC RX REV CODE- 250/636: Performed by: EMERGENCY MEDICINE

## 2022-09-22 PROCEDURE — 85379 FIBRIN DEGRADATION QUANT: CPT

## 2022-09-22 PROCEDURE — 32555 ASPIRATE PLEURA W/ IMAGING: CPT

## 2022-09-22 PROCEDURE — 65270000046 HC RM TELEMETRY

## 2022-09-22 PROCEDURE — C1729 CATH, DRAINAGE: HCPCS

## 2022-09-22 PROCEDURE — 71046 X-RAY EXAM CHEST 2 VIEWS: CPT

## 2022-09-22 PROCEDURE — 74011000636 HC RX REV CODE- 636: Performed by: RADIOLOGY

## 2022-09-22 PROCEDURE — 36415 COLL VENOUS BLD VENIPUNCTURE: CPT

## 2022-09-22 PROCEDURE — 83605 ASSAY OF LACTIC ACID: CPT

## 2022-09-22 PROCEDURE — 71045 X-RAY EXAM CHEST 1 VIEW: CPT

## 2022-09-22 PROCEDURE — 71275 CT ANGIOGRAPHY CHEST: CPT

## 2022-09-22 PROCEDURE — 0W9B3ZZ DRAINAGE OF LEFT PLEURAL CAVITY, PERCUTANEOUS APPROACH: ICD-10-PCS | Performed by: RADIOLOGY

## 2022-09-22 PROCEDURE — 85025 COMPLETE CBC W/AUTO DIFF WBC: CPT

## 2022-09-22 PROCEDURE — 87040 BLOOD CULTURE FOR BACTERIA: CPT

## 2022-09-22 PROCEDURE — 74177 CT ABD & PELVIS W/CONTRAST: CPT

## 2022-09-22 PROCEDURE — 74011250636 HC RX REV CODE- 250/636: Performed by: HOSPITALIST

## 2022-09-22 PROCEDURE — 74011000250 HC RX REV CODE- 250: Performed by: EMERGENCY MEDICINE

## 2022-09-22 PROCEDURE — 87015 SPECIMEN INFECT AGNT CONCNTJ: CPT

## 2022-09-22 PROCEDURE — 93005 ELECTROCARDIOGRAM TRACING: CPT

## 2022-09-22 PROCEDURE — 87205 SMEAR GRAM STAIN: CPT

## 2022-09-22 PROCEDURE — 96374 THER/PROPH/DIAG INJ IV PUSH: CPT

## 2022-09-22 PROCEDURE — 83880 ASSAY OF NATRIURETIC PEPTIDE: CPT

## 2022-09-22 PROCEDURE — 84145 PROCALCITONIN (PCT): CPT

## 2022-09-22 PROCEDURE — 88112 CYTOPATH CELL ENHANCE TECH: CPT

## 2022-09-22 PROCEDURE — 77030040212 HC SYS EVAC ASEPT DISP BBMI -A

## 2022-09-22 PROCEDURE — 88305 TISSUE EXAM BY PATHOLOGIST: CPT

## 2022-09-22 PROCEDURE — 89050 BODY FLUID CELL COUNT: CPT

## 2022-09-22 RX ORDER — LIDOCAINE HYDROCHLORIDE AND EPINEPHRINE 10; 10 MG/ML; UG/ML
10 INJECTION, SOLUTION INFILTRATION; PERINEURAL
Status: CANCELLED | OUTPATIENT
Start: 2022-09-22 | End: 2022-09-22

## 2022-09-22 RX ORDER — VANCOMYCIN/0.9 % SOD CHLORIDE 1.5G/250ML
1500 PLASTIC BAG, INJECTION (ML) INTRAVENOUS ONCE
Status: COMPLETED | OUTPATIENT
Start: 2022-09-22 | End: 2022-09-22

## 2022-09-22 RX ADMIN — VANCOMYCIN HYDROCHLORIDE 1500 MG: 10 INJECTION, POWDER, LYOPHILIZED, FOR SOLUTION INTRAVENOUS at 18:02

## 2022-09-22 RX ADMIN — SODIUM CHLORIDE 1000 ML: 9 INJECTION, SOLUTION INTRAVENOUS at 14:38

## 2022-09-22 RX ADMIN — CEFEPIME 2 G: 2 INJECTION, POWDER, FOR SOLUTION INTRAVENOUS at 14:38

## 2022-09-22 RX ADMIN — IOPAMIDOL 100 ML: 755 INJECTION, SOLUTION INTRAVENOUS at 14:14

## 2022-09-22 NOTE — ED NOTES
TRANSFER - OUT REPORT:    Verbal report given to Sol Cornelius RN(name) on Tahir Arias  being transferred to [facility](unit) for routine progression of care       Report consisted of patients Situation, Background, Assessment and   Recommendations(SBAR). Information from the following report(s) SBAR, ED Summary, Intake/Output, MAR, and Recent Results was reviewed with the receiving nurse. Lines:   Venous Access Device Port Upper chest (subclavicular area), left (Active)       Peripheral IV 09/22/22 Left Antecubital (Active)   Site Assessment Clean, dry, & intact 09/22/22 1256   Phlebitis Assessment 0 09/22/22 1256   Infiltration Assessment 0 09/22/22 1256   Dressing Status Clean, dry, & intact 09/22/22 1256   Dressing Type Transparent 09/22/22 1256   Hub Color/Line Status Pink;Flushed;Patent 09/22/22 1256   Action Taken Blood drawn 09/22/22 1256        Opportunity for questions and clarification was provided.       Patient transported with:   [employer]

## 2022-09-22 NOTE — PROGRESS NOTES
Pharmacist Note - Vancomycin Dosing    Consult provided for this 78 y.o. female for indication of sepsis. - patient reports boil on her buttocks, fevers & SOB    Antibiotic regimen(s): Vanc + Cefepime (adjusted to 2 grams q8h)    Recent Labs     22  1253   WBC 11.4*   CREA 0.49*   BUN 14     Frequency of BMP: daily x 3  Height: 165 cm  Weight: 63.5 kg  Est CrCl: 71 ml/min  Temp (24hrs), Av °F (36.7 °C), Min:98 °F (36.7 °C), Max:98 °F (36.7 °C)    The plan below is expected to result in a target range of AUC/TIFFANIE 400-600    Therapy will be initiated with a loading dose of 1500 mg IV x 1 to be followed by a maintenance dose of 1000 mg IV every 12 hours. Pharmacy to follow patient daily and order levels / make dose adjustments as appropriate. *Vancomycin has been dosed used Bayesian kinetics software to target an AUC/TIFFANIE of 400-600, which provides adequate exposure for an assumed infection due to MRSA with an TIFFANIE of 1 or less while reducing the risk of nephrotoxicity as seen with traditional trough based dosing goals.

## 2022-09-22 NOTE — ED PROVIDER NOTES
Natalia Bates is a 79 yo F with h/o serous adenocarcinoma of her endometrium, followed by Dr. Rico Ahr and currently undergoing chemotherapy who has had increasing shortness of breath with exertion for the past few days as well as fever of 101 last night and a boil on her buttocks which she estimates is the size of a golfball. Past Medical History:   Diagnosis Date    Abnormal Pap smear 1995    with f/u normal    Anemia     Arthritis     Asthma     Cancer ([facility] [address]     ENDOMETRIAL    Environmental allergies     GERD (gastroesophageal reflux disease)     Goiter     Other unknown and unspecified cause of morbidity or mortality     POSSIBLE ESOPHAGEAL SPASM, ? OBSTRUCTION DUE TO GOITER    PMB (postmenopausal bleeding)     S/P chemotherapy, time since greater than 12 weeks     LAST CHEMO 10/2014 PER PATIENT    Thyroid disease     goiter       Past Surgical History:   Procedure Laterality Date    HX APPENDECTOMY      HX BUNIONECTOMY      HX GYN  3/13/13    TLH/BSO    HX HEENT  4/22/13    TOOTH REMOVED    HX ORTHOPAEDIC  1980'S    BUNIONECTOMY    HX VASCULAR ACCESS      port    IR INSERT INTRAPERI TUNL CATH PERC  8/11/2022    [place] BREAST SURGERY PROCEDURE UNLISTED  1/12/16    right breast bx         Family History:   Problem Relation Age of Onset    Cancer Maternal Aunt         breast    Heart Disease Mother     Diabetes Father     Cancer Sister         CERVICAL    Kidney Disease Brother     Diabetes Brother     Heart Attack Other     Arrhythmia Brother     Diabetes Brother     Anesth Problems Neg Hx        Social History     Socioeconomic History    Marital status:      Spouse name: Not on file    Number of children: Not on file    Years of education: Not on file    Highest education level: Not on file   Occupational History    Not on file   Tobacco Use    Smoking status: Never    Smokeless tobacco: Never   Substance and Sexual Activity    Alcohol use:  Yes Alcohol/week: 0.0 standard drinks     Comment: RARE OCC    Drug use: No    Sexual activity: Not on file   Other Topics Concern    Not on file   Social History Narrative    Not on file     Social Determinants of Health     Financial Resource Strain: Not on file   Food Insecurity: Not on file   Transportation Needs: Not on file   Physical Activity: Not on file   Stress: Not on file   Social Connections: Not on file   Intimate Partner Violence: Not on file   Housing Stability: Not on file         ALLERGIES: Latex, Acetone, Amoxicillin, Ciprofloxacin, Pcn [penicillins], Buspar [buspirone], Azithromycin, Food [egg], and Other medication    Review of Systems   Constitutional:  Negative for fever. HENT:  Negative for sore throat. Eyes:  Negative for visual disturbance. Respiratory:  Positive for shortness of breath. Negative for cough. Cardiovascular:  Negative for chest pain. Gastrointestinal:  Negative for abdominal pain. Genitourinary:  Negative for dysuria. Musculoskeletal:  Negative for back pain. Skin:  Negative for rash. Right buttock abscess   Neurological:  Negative for headaches. Vitals:    09/22/22 1203   BP: 112/79   Pulse: (!) 119   Resp: 20   Temp: 98 °F (36.7 °C)   SpO2: 96%            Physical Exam  Vitals and nursing note reviewed. Constitutional:       General: She is not in acute distress. Appearance: She is well-developed. HENT:      Head: Normocephalic and atraumatic. Mouth/Throat:      Mouth: Mucous membranes are moist.   Eyes:      Extraocular Movements: Extraocular movements intact. Conjunctiva/sclera: Conjunctivae normal.   Neck:      Trachea: Phonation normal.   Cardiovascular:      Rate and Rhythm: Normal rate. Pulmonary:      Effort: Pulmonary effort is normal. No respiratory distress. Abdominal:      General: There is no distension. Genitourinary:     Rectum:  Mass (right side perirectal abscess, tender, no drainage), tenderness and external hemorrhoid present. Musculoskeletal:         General: No tenderness. Normal range of motion. Cervical back: Normal range of motion. Skin:     General: Skin is warm and dry. Neurological:      General: No focal deficit present. Mental Status: She is alert. She is not disoriented. Motor: No abnormal muscle tone. [facility]    ED Course as of 09/22/22 1522   Thu Sep 22, 2022   1246 EKG interpretation:   Rhythm: sinus tachycardia and PVC's; and regular . Rate (approx.): 115; Axis: normal; Intervals: normal ; ST/T wave: normal; EKG documented and interpreted by Ruth Parsons. Gerard Avalos MD, Emergency Medicine.     [AL]      ED Course User Index  [AL] Chelsey Officer., MD       Perfect Serve Consult for Admission  3:22 PM    ED Room Number: ER09/09  Patient Name and age:  Tahir Arias 78 y.o.  female  Working Diagnosis:   1. Malignant pleural effusion    2. Sepsis, due to unspecified organism, unspecified whether acute organ dysfunction present ([facility] [address]    3. Cellulitis of buttock        COVID-19 Suspicion:  no  Sepsis present:  yes  Reassessment needed: yes  Code Status:  Full Code  Readmission: no  Isolation Requirements:  no  Recommended Level of Care:  telemetry  Department:[facility] ED - 21   Other:  h/o serous adenocarcinoma of endometrium, Followed by Dr. Yvan Bhakta. Had increased dyspnea and fever 101 last night. Has what appeared to be a perirectal abscess on exam but on CT there was no fluid collection so I did not incise it. Have ordered cefepime and IVNS. Consulted IR for thoracentesis and I spoke with Dr. Yvan Bhakta who will follow.       Procedures

## 2022-09-22 NOTE — ED TRIAGE NOTES
TRIAGE NOTE:  Patient arrives with c/o shortness of breath which increases with exertion. Patient reports boil on her buttocks \"the size of a golf ball\". Patient reports fever last night of 101.

## 2022-09-22 NOTE — PROGRESS NOTES
Code sepsis was called on Randsha[facility] Woodruffling    Sepsis present due to SIRS at least 2/4 Temp Greater Than 100.4F  and HR >90  Sepsis Source  Pneumonia and Other rectal abscess  Lactic Acid Greater > 2, Repeat Lactic Acid ordered within 4 hrs YES  Severe? No  Shock present? No   Sepsis OrderSet Used?  NO ascitics / bilateral effusion    Sepsis Re-Assessment Documentation:     Date: 9/22/2022   Time: 4:47 PM    The sepsis reassessment was performed at 4:45 pm

## 2022-09-22 NOTE — H&P
History & Physical    Primary Care Provider: Marielena Reynolds MD  Source of Information: Patient     Please note that this dictation was completed with [employer], the computer voice recognition software. Quite often unanticipated grammatical, syntax, homophones, and other interpretive errors are inadvertently transcribed by the computer software. Please disregard these errors. Please excuse any errors that have escaped final proofreading. History of Presenting Illness:   Brandy Mejia is a 78 y.o. female who presents with shortness of breath and fever. Patient said that she felt feverish on Tuesday night did not measure the temperature but yesterday it was 1 1 and gradually become more short of breath and he decided to come to the ED. in the ED code sepsis was called. Patient also complained of small boil at the perianal area which is now grown bigger in last 2 days. Patient has past medical history of stage Ia grade 3 uterine carcinoma he underwent laparoscopic hysterectomy in March 2013 and finished 6 cycles of adjuvant chemo which she completed. She follows by Dr. Tiago Conway. She also had a Spira catheter placed on the abdomen on 8/11/2022 to help manage her ascites. Now came to the ED with shortness of breath with bilateral pleural effusion    The patient denies any fever, chills, chest pain, cough, congestion, recent illness, palpitations, or dysuria. Review of Systems:  Pertinent items are noted in the History of Present Illness. Past Medical History:   Diagnosis Date    Abnormal Pap smear 1995    with f/u normal    Anemia     Arthritis     Asthma     Cancer ([facility] [address]     ENDOMETRIAL    Environmental allergies     GERD (gastroesophageal reflux disease)     Goiter     Other unknown and unspecified cause of morbidity or mortality     POSSIBLE ESOPHAGEAL SPASM, ?  OBSTRUCTION DUE TO GOITER    PMB (postmenopausal bleeding)     S/P chemotherapy, time since greater than 12 weeks     LAST CHEMO 10/2014 PER PATIENT    Thyroid disease     goiter      Past Surgical History:   Procedure Laterality Date    HX APPENDECTOMY      HX BUNIONECTOMY      HX GYN  3/13/13    TLH/BSO    HX HEENT  4/22/13    TOOTH REMOVED    HX ORTHOPAEDIC  1980'S    BUNIONECTOMY    HX VASCULAR ACCESS      port    IR INSERT INTRAPERI TUNL CATH PERC  8/11/2022    DE BREAST SURGERY PROCEDURE UNLISTED  1/12/16    right breast bx     Prior to Admission medications    Medication Sig Start Date End Date Taking? Authorizing Provider   Xarelto 20 mg tab tablet TAKE 1 TABLET BY MOUTH DAILY 8/8/22   Nolan Rahman MD   [facility], by Does Not Apply route. With zinc ointment    Provider, Historical   acetaminophen (TYLENOL) 325 mg tablet Take  by mouth as needed. Provider, Historical   lisinopriL (PRINIVIL, ZESTRIL) 10 mg tablet Take 1 Tab by mouth daily. 1/6/21   Nolan Rahman MD   lisinopriL (PRINIVIL, ZESTRIL) 5 mg tablet Take 2 Tabs by mouth daily. 1/5/21   Hieu Cline, VIRY   ondansetron (ZOFRAN ODT) 4 mg disintegrating tablet Take 1 Tab by mouth every eight (8) hours as needed for Nausea. Indications: nausea and vomiting caused by cancer drugs 10/22/20   Nolan Rahman MD   lidocaine-prilocaine (EMLA) topical cream Apply small amount over port area and cover with band aid one hour before chemo 10/22/20   Nolan Rahman MD   guaifenesin (MUCINEX PO) Take  by mouth. Provider, Historical   loratadine (CLARITIN) 10 mg tablet Take 10 mg by mouth. Provider, Historical   epinastine 0.05 % drop Apply  to eye. Provider, Historical   raNITIdine (ZANTAC) 150 mg tablet ZANTAC TABS 9/29/11   Provider, Historical   cyclobenzaprine (FLEXERIL) 5 mg tablet take 1 tablet by mouth three times a day if needed 12/5/16   Provider, Historical   valACYclovir (VALTREX) 1 gram tablet Take 1 Tab by mouth three (3) times daily.  12/15/16   Nolan Rahman MD   EPINEPHrine (EPIPEN) 0.3 mg/0.3 mL injection 0.3 mg by IntraMUSCular route once as needed. Provider, Historical   ketotifen (ZADITOR) 0.025 % (0.035 %) ophthalmic solution Administer 1 Drop to both eyes every morning. Provider, Historical   Cetirizine 10 mg cap Take 10 mg by mouth nightly. Patient not taking: No sig reported    Provider, Historical   SAL ACID/UREA/PETROLATUM,WHITE (KERASAL FOOT EX) by Apply Externally route daily as needed. Provider, Historical   ACCU-CHEK HELDER PLUS TEST STRP strip  7/3/15   Provider, Historical   NITROSTAT 0.4 mg SL tablet  9/22/14   Provider, Historical   CALCIUM CARBONATE (MARCELLO-SELTZER ANTACID PO) Take  by mouth daily as needed. Provider, Historical     Allergies   Allergen Reactions    Latex Other (comments)     Burns skin    Acetone Shortness of Breath    Amoxicillin Anaphylaxis    Ciprofloxacin Hives    Pcn [Penicillins] Anaphylaxis    Buspar [Buspirone] Unknown (comments)     Has N&V and makes her feel \"weird\"    Azithromycin Hives    Food [Egg] Nausea Only    Other Medication Rash     POPPY seeds      Family History   Problem Relation Age of Onset    Cancer Maternal Aunt         breast    Heart Disease Mother     Diabetes Father     Cancer Sister         CERVICAL    Kidney Disease Brother     Diabetes Brother     Heart Attack Other     Arrhythmia Brother     Diabetes Brother     Anesth Problems Neg Hx         SOCIAL HISTORY:  Patient resides:  Independently    Assisted Living    SNF    With family care x      Smoking history:   None x   Former    Chronic      Alcohol history:   None x   Social    Chronic      Ambulates:   Independently x   w/cane    w/walker    w/wc    CODE STATUS:  DNR    Full x   Other      Objective:     Physical Exam:     Visit Vitals  /85   Pulse (!) 110   Temp 98 °F (36.7 °C)   Resp 22   Ht 5' 5\" (1.651 m)   Wt 63.5 kg (140 lb)   SpO2 95%   BMI 23.30 kg/m²      O2 Device: None (Room air)    General:  Alert, cooperative, no distress, appears stated age.    Head:  Normocephalic, without obvious abnormality, atraumatic. Eyes:  Conjunctivae/corneas clear. PERRL, EOMs intact. Nose: Nares normal. Septum midline. Mucosa normal. No drainage or sinus tenderness. Throat: Lips, mucosa, and tongue normal. Teeth and gums normal.   Neck: Supple, symmetrical, trachea midline,  no carotid bruit and no JVD. Lungs:   Clear to auscultation anteriorly diminished posteriorly       Heart:  Regular rate and rhythm, S1, S2 normal, no murmur, click, rub or gallop. Abdomen:   Soft, non-tender. Bowel sounds normal.  There is Aspira catheter   Extremities: Extremities normal, atraumatic, no cyanosis or edema. Pulses: 2+ and symmetric all extremities. Skin: Skin color, texture, turgor normal. No rashes or lesions   Neurologic: CNII-XII intact. EKG:  normal EKG, normal sinus rhythm. Data Review:     Recent Days:  Recent Labs     09/22/22  1253   WBC 11.4*   HGB 12.8   HCT 41.2        Recent Labs     09/22/22  1253   *   K 4.2   CL 99   CO2 26   *   BUN 14   CREA 0.49*   CA 9.7   ALB 2.2*   TBILI 0.4   ALT 10*     No results for input(s): PH, PCO2, PO2, HCO3, FIO2 in the last 72 hours. 24 Hour Results:  Recent Results (from the past 24 hour(s))   CBC WITH AUTOMATED DIFF    Collection Time: 09/22/22 12:53 PM   Result Value Ref Range    WBC 11.4 (H) 3.6 - 11.0 K/uL    RBC 4.76 3.80 - 5.20 M/uL    HGB 12.8 11.5 - 16.0 g/dL    HCT 41.2 35.0 - 47.0 %    MCV 86.6 80.0 - 99.0 FL    MCH 26.9 26.0 - 34.0 PG    MCHC 31.1 30.0 - 36.5 g/dL    RDW 18.7 (H) 11.5 - 14.5 %    PLATELET 773 643 - 803 K/uL    MPV 8.7 (L) 8.9 - 12.9 FL    NRBC 0.0 0  WBC    ABSOLUTE NRBC 0.00 0.00 - 0.01 K/uL    NEUTROPHILS 89 (H) 32 - 75 %    LYMPHOCYTES 5 (L) 12 - 49 %    MONOCYTES 6 5 - 13 %    EOSINOPHILS 0 0 - 7 %    BASOPHILS 0 0 - 1 %    IMMATURE GRANULOCYTES 0 0.0 - 0.5 %    ABS. NEUTROPHILS 10.1 (H) 1.8 - 8.0 K/UL    ABS. LYMPHOCYTES 0.6 (L) 0.8 - 3.5 K/UL    ABS. MONOCYTES 0.7 0.0 - 1.0 K/UL    ABS. EOSINOPHILS 0.0 0.0 - 0.4 K/UL    ABS. BASOPHILS 0.0 0.0 - 0.1 K/UL    ABS. IMM. GRANS. 0.0 0.00 - 0.04 K/UL    DF SMEAR SCANNED      PLATELET COMMENTS Large Platelets      RBC COMMENTS ANISOCYTOSIS  1+       METABOLIC PANEL, COMPREHENSIVE    Collection Time: 09/22/22 12:53 PM   Result Value Ref Range    Sodium 133 (L) 136 - 145 mmol/L    Potassium 4.2 3.5 - 5.1 mmol/L    Chloride 99 97 - 108 mmol/L    CO2 26 21 - 32 mmol/L    Anion gap 8 5 - 15 mmol/L    Glucose 123 (H) 65 - 100 mg/dL    BUN 14 6 - 20 MG/DL    Creatinine 0.49 (L) 0.55 - 1.02 MG/DL    BUN/Creatinine ratio 29 (H) 12 - 20      GFR est AA >60 >60 ml/min/1.73m2    GFR est non-AA >60 >60 ml/min/1.73m2    Calcium 9.7 8.5 - 10.1 MG/DL    Bilirubin, total 0.4 0.2 - 1.0 MG/DL    ALT (SGPT) 10 (L) 12 - 78 U/L    AST (SGOT) 20 15 - 37 U/L    Alk. phosphatase 72 45 - 117 U/L    Protein, total 7.5 6.4 - 8.2 g/dL    Albumin 2.2 (L) 3.5 - 5.0 g/dL    Globulin 5.3 (H) 2.0 - 4.0 g/dL    A-G Ratio 0.4 (L) 1.1 - 2.2     LACTIC ACID    Collection Time: 09/22/22 12:53 PM   Result Value Ref Range    Lactic acid 2.2 (HH) 0.4 - 2.0 MMOL/L   SAMPLES BEING HELD    Collection Time: 09/22/22 12:53 PM   Result Value Ref Range    SAMPLES BEING HELD 1RED 1BLU     COMMENT        Add-on orders for these samples will be processed based on acceptable specimen integrity and analyte stability, which may vary by analyte.    D DIMER    Collection Time: 09/22/22 12:53 PM   Result Value Ref Range    D-dimer 1.10 (H) 0.00 - 0.65 mg/L FEU   PROCALCITONIN    Collection Time: 09/22/22 12:53 PM   Result Value Ref Range    Procalcitonin 0.09 ng/mL   TROPONIN-HIGH SENSITIVITY    Collection Time: 09/22/22 12:53 PM   Result Value Ref Range    Troponin-High Sensitivity <4 0 - 51 ng/L   NT-PRO BNP    Collection Time: 09/22/22 12:53 PM   Result Value Ref Range    NT pro- <125 PG/ML         Imaging:     XR CHEST PA LAT    Result Date: 9/22/2022  Small to moderate bilateral pleural effusions with bibasilar atelectasis not changed compared to prior study     CTA CHEST W OR W WO CONT    Result Date: 9/22/2022  No loculated collection in the perianal/perirectal tissue. Slight interval increase in extensive metastatic disease. Slightly increased mediastinal and hilar adenopathy. Slightly increased mesenteric adenopathy. Extensive peritoneal carcinomatosis with omental caking, not significantly changed. Large bilateral pleural effusions are redemonstrated with overlying compressive atelectasis. CT ABD PELV W CONT    Result Date: 9/22/2022  No loculated collection in the perianal/perirectal tissue. Slight interval increase in extensive metastatic disease. Slightly increased mediastinal and hilar adenopathy. Slightly increased mesenteric adenopathy. Extensive peritoneal carcinomatosis with omental caking, not significantly changed. Large bilateral pleural effusions are redemonstrated with overlying compressive atelectasis. Admit to inpatient status      Patient was explained about the risk of admission including and not a complete list including risk of falls,fractures,blood clots,allergic reactions,infections. Patient/family also understands and agrees to the treatment plan including medications and side effect profiles and also understand the risk with radiation while undergoing imaging studies. The patient and the family/friends (after permission given by the patient to discuss) understand this and agree with the admission plan.         Assessment:     Active Problems:    Pleural effusion (9/22/2022)           Plan:     Shortness of breath possibly due to bilateral pleural effusion  Rule out malignant pleural effusion  Rule out infection atelectasis  --Admit patient to intermediate care  --Rec stat thoracentesis by ED ER physician  --We will send cell studies  --For the completeness of workout will get echocardiogram although bilateral mostly not from right heart failure  --Continue vancomycin and cefepime underlying at atelectasis will cover patient complains of sputum as well send sputum for culture    Sepsis of unclear etiology supported by tachycardia high fever high white count  Atelectasis versus perianal boil  --Started on Vanco cefepime blood culture  --CT scan does not show any collection around the boil  --Sepsis reassessment completed    Endometrial cancer with peritoneal carcinomatosis  --Jorge Dhlilon she got a Avastin on Thursday at outpatient infusion center  --She has [address] catheter for drainage of ascites  --Management per OB/GYN oncology Dr. Cheryle Castilla  --With current bilateral effusion and ascites possibly cancer is back although CT suggested not much change from the last scan    DVT prophylaxis patient is on Xarelto  Ambulates at baseline stays with family  Expected discharge 48 hours    CRITICAL CARE ATTESTATION:  I had a face to face encounter with the patient, reviewed and interpreted patient data including clinical events, labs, images, vital signs, I/O's, and examined patient. I have discussed the case and the plan and management of the patient's care with the consulting services, the bedside nurses and necessary ancillary providers. NOTE OF PERSONAL INVOLVEMENT IN CARE   This patient has a high probability of imminent, clinically significant deterioration, which requires the highest level of preparedness to intervene urgently. I participated in the decision-making and personally managed or directed the management of the following life and organ supporting interventions that required my frequent assessment to treat or prevent imminent deterioration. I personally spent 45 minutes of critical care time. This is time spent at this critically ill patient's bedside actively involved in patient care as well as the coordination of care and discussions with the patient's family. This does not include any procedural time which has been billed separately.           Signed By: Yessica Roa MD September 22, 2022

## 2022-09-23 ENCOUNTER — APPOINTMENT (OUTPATIENT)
Dept: GENERAL RADIOLOGY | Age: 67
DRG: 872 | End: 2022-09-23
Attending: NURSE PRACTITIONER
Payer: MEDICARE

## 2022-09-23 ENCOUNTER — APPOINTMENT (OUTPATIENT)
Dept: ULTRASOUND IMAGING | Age: 67
DRG: 872 | End: 2022-09-23
Attending: HOSPITALIST
Payer: MEDICARE

## 2022-09-23 ENCOUNTER — APPOINTMENT (OUTPATIENT)
Dept: GENERAL RADIOLOGY | Age: 67
DRG: 872 | End: 2022-09-23
Attending: PHYSICIAN ASSISTANT
Payer: MEDICARE

## 2022-09-23 PROBLEM — E43 SEVERE PROTEIN-CALORIE MALNUTRITION (HCC): Chronic | Status: ACTIVE | Noted: 2022-09-23

## 2022-09-23 PROBLEM — E43 SEVERE PROTEIN-CALORIE MALNUTRITION (HCC): Status: ACTIVE | Noted: 2022-09-23

## 2022-09-23 LAB
ANION GAP SERPL CALC-SCNC: 8 MMOL/L (ref 5–15)
ATRIAL RATE: 115 BPM
BUN SERPL-MCNC: 13 MG/DL (ref 6–20)
BUN/CREAT SERPL: 32 (ref 12–20)
CALCIUM SERPL-MCNC: 8.2 MG/DL (ref 8.5–10.1)
CALCULATED P AXIS, ECG09: 58 DEGREES
CALCULATED R AXIS, ECG10: 43 DEGREES
CALCULATED T AXIS, ECG11: 59 DEGREES
CHLORIDE SERPL-SCNC: 103 MMOL/L (ref 97–108)
CO2 SERPL-SCNC: 24 MMOL/L (ref 21–32)
COMMENT, HOLDF: NORMAL
CREAT SERPL-MCNC: 0.41 MG/DL (ref 0.55–1.02)
DIAGNOSIS, 93000: NORMAL
GLUCOSE SERPL-MCNC: 116 MG/DL (ref 65–100)
LACTATE SERPL-SCNC: 2.2 MMOL/L (ref 0.4–2)
P-R INTERVAL, ECG05: 148 MS
POTASSIUM SERPL-SCNC: 4 MMOL/L (ref 3.5–5.1)
Q-T INTERVAL, ECG07: 304 MS
QRS DURATION, ECG06: 78 MS
QTC CALCULATION (BEZET), ECG08: 420 MS
SAMPLES BEING HELD,HOLD: NORMAL
SODIUM SERPL-SCNC: 135 MMOL/L (ref 136–145)
VENTRICULAR RATE, ECG03: 115 BPM

## 2022-09-23 PROCEDURE — 36415 COLL VENOUS BLD VENIPUNCTURE: CPT

## 2022-09-23 PROCEDURE — 74011000258 HC RX REV CODE- 258: Performed by: HOSPITALIST

## 2022-09-23 PROCEDURE — 32555 ASPIRATE PLEURA W/ IMAGING: CPT

## 2022-09-23 PROCEDURE — 74011250636 HC RX REV CODE- 250/636: Performed by: PHYSICIAN ASSISTANT

## 2022-09-23 PROCEDURE — 74011000250 HC RX REV CODE- 250: Performed by: PHYSICIAN ASSISTANT

## 2022-09-23 PROCEDURE — 99222 1ST HOSP IP/OBS MODERATE 55: CPT | Performed by: OBSTETRICS & GYNECOLOGY

## 2022-09-23 PROCEDURE — 2709999900 HC NON-CHARGEABLE SUPPLY

## 2022-09-23 PROCEDURE — 71045 X-RAY EXAM CHEST 1 VIEW: CPT

## 2022-09-23 PROCEDURE — 77030032034 HC SYS EVAC ASEPT DISP BBMI -B

## 2022-09-23 PROCEDURE — 74011000250 HC RX REV CODE- 250: Performed by: HOSPITALIST

## 2022-09-23 PROCEDURE — 80048 BASIC METABOLIC PNL TOTAL CA: CPT

## 2022-09-23 PROCEDURE — 65270000046 HC RM TELEMETRY

## 2022-09-23 PROCEDURE — 74011000250 HC RX REV CODE- 250

## 2022-09-23 PROCEDURE — 74011250636 HC RX REV CODE- 250/636: Performed by: HOSPITALIST

## 2022-09-23 PROCEDURE — 74011250637 HC RX REV CODE- 250/637: Performed by: HOSPITALIST

## 2022-09-23 RX ORDER — LIDOCAINE HYDROCHLORIDE 10 MG/ML
INJECTION, SOLUTION EPIDURAL; INFILTRATION; INTRACAUDAL; PERINEURAL
Status: COMPLETED
Start: 2022-09-23 | End: 2022-09-23

## 2022-09-23 RX ORDER — ONDANSETRON 4 MG/1
4 TABLET, ORALLY DISINTEGRATING ORAL
Status: DISCONTINUED | OUTPATIENT
Start: 2022-09-23 | End: 2022-09-26 | Stop reason: HOSPADM

## 2022-09-23 RX ORDER — MORPHINE SULFATE 2 MG/ML
1 INJECTION, SOLUTION INTRAMUSCULAR; INTRAVENOUS ONCE
Status: ACTIVE | OUTPATIENT
Start: 2022-09-23 | End: 2022-09-24

## 2022-09-23 RX ORDER — ACETAMINOPHEN 325 MG/1
650 TABLET ORAL
Status: DISCONTINUED | OUTPATIENT
Start: 2022-09-23 | End: 2022-09-26 | Stop reason: HOSPADM

## 2022-09-23 RX ORDER — SODIUM CHLORIDE 0.9 % (FLUSH) 0.9 %
5-40 SYRINGE (ML) INJECTION AS NEEDED
Status: DISCONTINUED | OUTPATIENT
Start: 2022-09-23 | End: 2022-09-26 | Stop reason: HOSPADM

## 2022-09-23 RX ORDER — LIDOCAINE HYDROCHLORIDE 10 MG/ML
80 INJECTION INFILTRATION; PERINEURAL
Status: COMPLETED | OUTPATIENT
Start: 2022-09-23 | End: 2022-09-23

## 2022-09-23 RX ORDER — SODIUM BICARBONATE 42 MG/ML
2 INJECTION, SOLUTION INTRAVENOUS
Status: COMPLETED | OUTPATIENT
Start: 2022-09-23 | End: 2022-09-23

## 2022-09-23 RX ORDER — ONDANSETRON 2 MG/ML
4 INJECTION INTRAMUSCULAR; INTRAVENOUS
Status: DISCONTINUED | OUTPATIENT
Start: 2022-09-23 | End: 2022-09-26 | Stop reason: HOSPADM

## 2022-09-23 RX ORDER — SODIUM CHLORIDE 0.9 % (FLUSH) 0.9 %
5-40 SYRINGE (ML) INJECTION EVERY 8 HOURS
Status: DISCONTINUED | OUTPATIENT
Start: 2022-09-23 | End: 2022-09-26 | Stop reason: HOSPADM

## 2022-09-23 RX ORDER — POLYETHYLENE GLYCOL 3350 17 G/17G
17 POWDER, FOR SOLUTION ORAL DAILY PRN
Status: DISCONTINUED | OUTPATIENT
Start: 2022-09-23 | End: 2022-09-26 | Stop reason: HOSPADM

## 2022-09-23 RX ORDER — ACETAMINOPHEN 650 MG/1
650 SUPPOSITORY RECTAL
Status: DISCONTINUED | OUTPATIENT
Start: 2022-09-23 | End: 2022-09-26 | Stop reason: HOSPADM

## 2022-09-23 RX ADMIN — SODIUM CHLORIDE, PRESERVATIVE FREE 10 ML: 5 INJECTION INTRAVENOUS at 06:23

## 2022-09-23 RX ADMIN — CEFEPIME 2 G: 2 INJECTION, POWDER, FOR SOLUTION INTRAVENOUS at 23:19

## 2022-09-23 RX ADMIN — ACETAMINOPHEN 650 MG: 325 TABLET, FILM COATED ORAL at 16:34

## 2022-09-23 RX ADMIN — SODIUM BICARBONATE 2 ML: 42 INJECTION, SOLUTION INTRAVENOUS at 15:00

## 2022-09-23 RX ADMIN — CEFEPIME 2 G: 2 INJECTION, POWDER, FOR SOLUTION INTRAVENOUS at 06:21

## 2022-09-23 RX ADMIN — WATER 2 MG: 1 INJECTION INTRAMUSCULAR; INTRAVENOUS; SUBCUTANEOUS at 17:03

## 2022-09-23 RX ADMIN — VANCOMYCIN HYDROCHLORIDE 1250 MG: 1.25 INJECTION, POWDER, LYOPHILIZED, FOR SOLUTION INTRAVENOUS at 09:49

## 2022-09-23 RX ADMIN — SODIUM CHLORIDE, PRESERVATIVE FREE 10 ML: 5 INJECTION INTRAVENOUS at 14:29

## 2022-09-23 RX ADMIN — LIDOCAINE HYDROCHLORIDE 8 ML: 10 INJECTION, SOLUTION EPIDURAL; INFILTRATION; INTRACAUDAL; PERINEURAL at 15:00

## 2022-09-23 RX ADMIN — VANCOMYCIN HYDROCHLORIDE 1250 MG: 1.25 INJECTION, POWDER, LYOPHILIZED, FOR SOLUTION INTRAVENOUS at 21:19

## 2022-09-23 RX ADMIN — RIVAROXABAN 20 MG: 20 TABLET, FILM COATED ORAL at 08:00

## 2022-09-23 RX ADMIN — SODIUM CHLORIDE, PRESERVATIVE FREE 10 ML: 5 INJECTION INTRAVENOUS at 21:20

## 2022-09-23 RX ADMIN — LIDOCAINE HYDROCHLORIDE 8 ML: 10 INJECTION INFILTRATION; PERINEURAL at 15:00

## 2022-09-23 NOTE — CONSULTS
[address] [place] [address] Mathias Moritz [address], [address]  P [phone]  F [phone]    Consultation       Ellouise Prader     [id number] : 1955    Admitted: 2022 Date: 2022     Subjective:     Ellouise Prader is a 78 y.o.  postmenopausal female who is being seen for recurrent endometrial carcinoma at the request of the [facility] hospitalist service. She presented to the ER yesterday with increasing shortness of breath for the last few days. She also reported a fever of 101 the night before and a \"boil\" on her buttocks the size of a golf ball. Her WBC was mildly elevated. A CT demonstrated large bilateral effusions, as well as progressive disease in the abdomen/pelvis. She was admitted to the hospitalist service and underwent a left thoracentesis, draining 1700 cc of fluid. Oncology history  Rajendra Dominguez is an established patient with stage IA, grade 3 uterine carcinoma. She underwent laparoscopic hysterectomy with staging in 2013. She was recommended six cycles of adjuvant Taxol and Carboplatin, which she completed. We elected not to treat with pelvic radiation therapy due to her difficulty with chemotherapy. She had a CT of the abdomen/pelvis at [facility] that revealed retroperitoneal lymphadenopathy, peritoneal carcinomatosis, and trace ascites. I sent her for a PET/CT to better evaluate the extent of disease. She presented to review the results and discuss treatment. Her disease was not amenable to surgical resection. Systemic therapy was her only viable option. I thought immunotherapy would be the best choice, ahead of additional chemotherapy, specifically IV Keytruda and PO Lenvima. If that were not successful, we would consider retreatment with Taxol/Carboplatin or single agent Doxil. She completed 6 cycles of immunotherapy before demonstrating progression of disease.   We decided upon retreatment with Taxol/Carboplatin. She completed 8 cycles of that regimen. Her CA-125 responded and her CT after three cycles demonstrated a response. Her CT after completion of 6 cycles demonstrated further response. A subsequent PET/CT demonstrated resolution of most of the abnormal PET activity, but there was still some residual activity in the omentum, suggesting persistent disease. We stopped treatment in August 2021, as she was beginning not to tolerate treatment well. She presented subsequently complaining of abdominal pain and bloating. She stated it was a stabbing pain in her left abdomen. The bloating has also been causing a little shortness of breath. I sent her for a CT of the chest/abdomen/pelvis to evaluate. The scan demonstrated recurrent disease throughout the abdomen and pelvis. I recommended single agent Doxil. She completed 3 cycles of Doxil. Her interval CT scan after 3 cycles unfortunately demonstrated progression. I discussed other treatment options with her, including the possibility of a clinical trial at [facility]. Ultimately we decided upon single-agent Avastin. I explained that it won't make the tumor \"go away\", but it may slow down the growth and help manage the ascites. She has completed 10 cycles so far. Her CT in early July 2022 demonstrated overall stable disease, but she did have more fluid present. Her CA-125 remains elevated, but relatively stable. She had a paracentesis in mid July for symptomatic relief. She felt better for a while, but the fluid is starting to accumulate once again. On 8/11/22 she had an Aspira catheter placed to help manage her ascites. She has been feeling very weak and reports difficulty breathing.         Patient Active Problem List    Diagnosis Date Noted    Pleural effusion 09/22/2022    Dehydration 08/09/2022    Malignant neoplasm of corpus uteri, except isthmus ([facility] [address] 02/28/2013     Past Medical History:   Diagnosis Date    Abnormal Pap smear 1995    with f/u normal    Anemia     Arthritis     Asthma     Cancer ([facility] [address]     ENDOMETRIAL    Environmental allergies     GERD (gastroesophageal reflux disease)     Goiter     Other unknown and unspecified cause of morbidity or mortality     POSSIBLE ESOPHAGEAL SPASM, ? OBSTRUCTION DUE TO GOITER    PMB (postmenopausal bleeding)     S/P chemotherapy, time since greater than 12 weeks     LAST CHEMO 10/2014 PER PATIENT    Thyroid disease     goiter      Past Surgical History:   Procedure Laterality Date    HX APPENDECTOMY      HX BUNIONECTOMY      HX GYN  3/13/13    TLH/BSO    HX HEENT  4/22/13    TOOTH REMOVED    HX ORTHOPAEDIC  1980'S    BUNIONECTOMY    HX VASCULAR ACCESS      port    IR INSERT INTRAPERI TUNL CATH PERC  8/11/2022    MO BREAST SURGERY PROCEDURE UNLISTED  1/12/16    right breast bx      Social History     Tobacco Use    Smoking status: Never    Smokeless tobacco: Never   Substance Use Topics    Alcohol use:  Yes     Alcohol/week: 0.0 standard drinks     Comment: RARE OCC      Family History   Problem Relation Age of Onset    Cancer Maternal Aunt         breast    Heart Disease Mother     Diabetes Father     Cancer Sister         CERVICAL    Kidney Disease Brother     Diabetes Brother     Heart Attack Other     Arrhythmia Brother     Diabetes Brother     Anesth Problems Neg Hx       Current Facility-Administered Medications   Medication Dose Route Frequency    sodium chloride (NS) flush 5-40 mL  5-40 mL IntraVENous Q8H    sodium chloride (NS) flush 5-40 mL  5-40 mL IntraVENous PRN    acetaminophen (TYLENOL) tablet 650 mg  650 mg Oral Q6H PRN    Or    acetaminophen (TYLENOL) suppository 650 mg  650 mg Rectal Q6H PRN    polyethylene glycol (MIRALAX) packet 17 g  17 g Oral DAILY PRN    ondansetron (ZOFRAN ODT) tablet 4 mg  4 mg Oral Q8H PRN    Or    ondansetron (ZOFRAN) injection 4 mg  4 mg IntraVENous Q6H PRN    vancomycin (VANCOCIN) 1,250 mg in 0.9% sodium chloride 250 mL ([id number])  1,250 mg IntraVENous Q12H    rivaroxaban (XARELTO) tablet 20 mg  20 mg Oral DAILY WITH BREAKFAST    Vancomycin- pharmacy to dose   Other Rx Dosing/Monitoring    cefepime (MAXIPIME) 2 g in 0.9% sodium chloride (MBP/ADV) 100 mL MBP  2 g IntraVENous Q8H     Allergies   Allergen Reactions    Latex Other (comments)     Burns skin    Acetone Shortness of Breath    Amoxicillin Anaphylaxis    Ciprofloxacin Hives    Pcn [Penicillins] Anaphylaxis    Buspar [Buspirone] Unknown (comments)     Has N&V and makes her feel \"weird\"    Azithromycin Hives    Food [Egg] Nausea Only    Other Medication Rash     POPPY seeds        Review of Systems:  A comprehensive review of systems was negative except for that written in the History of Present Illness. , 10 point ROS    Objective:     Physical Exam:   Visit Vitals  /82 (BP 1 Location: Right upper arm)   Pulse 95   Temp 99 °F (37.2 °C)   Resp 20   Ht 5' 5\" (1.651 m)   Wt 140 lb (63.5 kg)   SpO2 95%   Breastfeeding No   BMI 23.30 kg/m²     General:  Frail, elderly WF   HEENT:  WNL. Neck: Supple, without masses. Back:   Symmetric, no curvature. ROM normal. No CVA tenderness. Lungs:   Decreased BS bilaterally, right worse than left   Heart:  Regular rate and rhythm. Abdomen:   Soft, mildly distended, mildly tender. Aspira catheter in place in RUQ. Pelvic:  Deferred. Rectal:  Erythematous nodule/collection to the right of the anus. No appreciable drainage. Extremities: Extremities normal, atraumatic, no cyanosis or edema. Skin: Skin color, texture, turgor normal. No rashes or lesions. Lymph nodes: Cervical, supraclavicular, and axillary nodes normal.   Neurologic: Grossly intact. Labs:    Recent Results (from the past 24 hour(s))   LACTIC ACID    Collection Time: 09/22/22  4:22 PM   Result Value Ref Range    Lactic acid 1.3 0.4 - 2.0 MMOL/L   CULTURE, BODY FLUID Laureen Morteza STAIN    Collection Time: 09/22/22  5:30 PM    Specimen: Pleural Fluid;  Body Fluid   Result Value Ref Range Special Requests: NO SPECIAL REQUESTS      GRAM STAIN NO ORGANISMS SEEN      Culture result: PENDING    CELL COUNT, BODY FLUID    Collection Time: 09/22/22  5:30 PM   Result Value Ref Range    BODY FLUID TYPE PLEURAL FLUID      FLUID COLOR RED      FLUID APPEARANCE HAZY      FLUID RBC CT. >100 (H) 0 /cu mm    FLUID NUCLEATED CELLS 925 /cu mm    FLD NEUTROPHILS 48 (A) NRRE %    FLD LYMPHS 42 (A) NRRE %    FLD MONO/MACROPHAGES 10 (A) NRRE %   SAMPLES BEING HELD    Collection Time: 09/22/22  5:30 PM   Result Value Ref Range    SAMPLES BEING HELD 1ANAC CULTURE  TUBE     COMMENT        Add-on orders for these samples will be processed based on acceptable specimen integrity and analyte stability, which may vary by analyte. METABOLIC PANEL, BASIC    Collection Time: 09/23/22 12:59 AM   Result Value Ref Range    Sodium 135 (L) 136 - 145 mmol/L    Potassium 4.0 3.5 - 5.1 mmol/L    Chloride 103 97 - 108 mmol/L    CO2 24 21 - 32 mmol/L    Anion gap 8 5 - 15 mmol/L    Glucose 116 (H) 65 - 100 mg/dL    BUN 13 6 - 20 MG/DL    Creatinine 0.41 (L) 0.55 - 1.02 MG/DL    BUN/Creatinine ratio 32 (H) 12 - 20      GFR est AA >60 >60 ml/min/1.73m2    GFR est non-AA >60 >60 ml/min/1.73m2    Calcium 8.2 (L) 8.5 - 10.1 MG/DL       Imaging:   CT of chest/abdomen/pelvis (9/22/22)  CHEST WALL: Left Port-A-Cath in place with catheter traversing expected course  to terminate in the SVC. No mass or enlarged adenopathy. THYROID: Stable right thyroid hypodensity. MEDIASTINUM: Mediastinal lymphadenopathy, increased,   3-54 19 x 23 mm precarinal previously 18 x 13 mm. Right hilar adenopathy 22 x 22 mm previously 20 x 17 mm. .  THORACIC AORTA: No dissection or aneurysm. MAIN PULMONARY ARTERY: Minimal segmental pulmonary emboli. No evidence of right  ventricular strain. TRACHEA/BRONCHI: Patent. ESOPHAGUS: No wall thickening or dilatation. HEART: Normal in size. PLEURA: Large bilateral pleural effusions. No pneumothorax.   LUNGS: Compressive atelectasis overlies large pleural effusions bilaterally. Aerated lungs are clear. LIVER: Portal vein is patent. No focal mass lesion. BILIARY TREE: Gallbladder is within normal limits. CBD is not dilated. SPLEEN: Unremarkable with no focal lesion. PANCREAS: No mass or ductal dilatation. ADRENALS: Unremarkable. KIDNEYS: No mass, calculus, or hydronephrosis. STOMACH: Unremarkable. SMALL BOWEL: No dilatation or wall thickening. COLON: There is fecal stasis. There is colonic diverticulosis. APPENDIX: Not visualized. PERITONEUM: Extensive peritoneal implants and dense omental caking is  redemonstrated without significant interval change. C6-46 anterior abdominal omental caking unchanged at 12.4 x 3.9 cm. Additional  large areas interposed between the greater curvature of the gastric body and the  spleen. Stable ascites status post right peritoneal drainage catheter placement  which traverses from the right abdominal wall to along the undersurface of the  liver. RETROPERITONEUM:      Portacaval node at C6-41 increased 2.3 x 2.3 cm previously 1.7 x 2.4 cm   right common iliac node measuring 2.8 x 1.8 cm 1.6 x 2.6 cm (  REPRODUCTIVE ORGANS: Hysterectomy  URINARY BLADDER: No mass or calculus. BONES: Degenerative spine change. No acute fracture or aggressive lesion. ABDOMINAL WALL: Probably edematous with no mass or hernia. ADDITIONAL COMMENTS: Minimal edematous change/inflammatory change abutting the  rectum/anus. There is no loculated collection in the subcutaneous tissue. IMPRESSION  No loculated collection in the perianal/perirectal tissue. Slight interval increase in extensive metastatic disease. Slightly increased mediastinal and hilar adenopathy. Slightly increased mesenteric adenopathy. Extensive peritoneal carcinomatosis with omental caking, not significantly  changed. Large bilateral pleural effusions are redemonstrated with overlying compressive  atelectasis. Assessment:/Plan:     77 yo WF with recurrent/progressive serous endometrial cancer. She is currently on single-agent Avastin, but has been treated with several prior regimens. Recent imaging suggests progression on this regimen. She has worsening abdominal disease and has large bilateral plural effusions. She is s/p left thoracentesis yesterday. She is scheduled for one on the right today. We may need to consider bilateral Aspira placement if the fluid returns quickly. We will also drain her abdomen today using her current abdominal Aspira. I briefly discussed with her the fact that her disease has progressed and we will need to decide on a new treatment regimen, that is if she wishes to continue. Colorectal surgery has been asked to see her regarding the perianal nodule. We will continue to follow.         Signed By: Ginna Reveles MD     September 23, 2022

## 2022-09-23 NOTE — PROGRESS NOTES
Abdominal Aspira catheter drained earlier this afternoon. About 800cc chyllous fluid drained. Patient tolerated the procedure. No complaints. Dressing over drain changed. Area cleansed with alcohol. Will reach out to [facility] to determine if there are any resources in discussing a cancer diagnosis with younger children. Will follow during this admission. Once discharged, patient to follow up outpatient to discuss next steps with treatment.      Laz Kilgore

## 2022-09-23 NOTE — PROGRESS NOTES
[address] [facility] Adult  Hospitalist Group                                                                                          Hospitalist Progress Note  Jason Rosado MD  Answering service: [phone] OR 36 from in house phone        Date of Service:  2022  NAME:  Bonnie Us  :  1955  MRN:  [id number]      Admission Summary:   Bonnie Us is a 78 y.o. female who presents with shortness of breath and fever. Patient said that she felt feverish on Tuesday night did not measure the temperature but yesterday it was 1 1 and gradually become more short of breath and he decided to come to the ED. in the ED code sepsis was called. Patient also complained of small boil at the perianal area which is now grown bigger in last 2 days. Patient has past medical history of stage Ia grade 3 uterine carcinoma he underwent laparoscopic hysterectomy in 2013 and finished 6 cycles of adjuvant chemo which she completed. She follows by Dr. Gamal Hunt. She also had a Spira catheter placed on the abdomen on 2022 to help manage her ascites.   Now came to the ED with shortness of breath with bilateral pleural effusion    Interval history / Subjective:   Patient seen and examined shortness of breath wise doing well  Awaiting colorectal surgery exam examination of the perianal abscess  Awaiting right-sided thoracentesis per IR     Assessment & Plan:     Shortness of breath possibly due to bilateral pleural effusion  Rule out malignant pleural effusion  Rule out infection atelectasis  -- Status post thoracentesis on the left small pneumothorax which is a stable shortness of breath improved  -- Requested echo and possible right-sided thoracentesis today  --Continue vancomycin and cefepime underlying at atelectasis will cover patient complains of sputum as well send sputum for culture     Sepsis of unclear etiology supported by tachycardia high fever high white count  Atelectasis versus perianal boil  --Started on Vanco cefepime blood culture  --CT scan does not show any collection around the boil  --Sepsis reassessment completed  --Colorectal surgery consulted     Endometrial cancer with peritoneal carcinomatosis  --Grady Vila she got a Avastin on Thursday at outpatient infusion center  --She has [address] catheter for drainage of ascites  --Management per OB/GYN oncology Dr. Lilian Chapman     Code status: Full code  DVT prophylaxis: On [facility] discussed with: Patient/Family and Nurse  Anticipated Disposition: Home w/Family  Anticipated Discharge: 24 hours to 48 hours     Hospital Problems  Date Reviewed: 9/8/2022            Codes Class Noted POA    Pleural effusion ICD-10-CM: J90  ICD-9-CM: 511.9  9/22/2022 Unknown             Review of Systems:   A comprehensive review of systems was negative. Vital Signs:    Last 24hrs VS reviewed since prior progress note.  Most recent are:  Visit Vitals  /70 (BP 1 Location: Right upper arm, BP Patient Position: At rest)   Pulse 97   Temp 98.9 °F (37.2 °C)   Resp 19   Ht 5' 5\" (1.651 m)   Wt 63.5 kg (140 lb)   SpO2 92%   Breastfeeding No   BMI 23.30 kg/m²         Intake/Output Summary (Last 24 hours) at 9/23/2022 1133  Last data filed at 9/23/2022 0800  Gross per 24 hour   Intake 1100 ml   Output --   Net 1100 ml        Physical Examination:     I had a face to face encounter with this patient and independently examined them on 9/23/2022 as outlined below:          General : alert x 3, awake, no acute distress, resting in bed, pleasant   HEENT: PEERL, EOMI, moist mucus membrane, TM clear  Neck: supple, no JVD, no meningeal signs  Chest: Clear to auscultation bilaterally   CVS: S1 S2 heard, Capillary refill less than 2 seconds  Abd: soft/ Non tender, non distended, BS physiological,   Ext: no clubbing, no cyanosis, no edema, brisk 2+ DP pulses  Neuro/Psych: pleasant mood and affect, CN 2-12 grossly intact  Skin: warm     Data Review:    I personally reviewed  Image and labs      Labs:     Recent Labs     09/22/22  1253   WBC 11.4*   HGB 12.8   HCT 41.2        Recent Labs     09/23/22  0059 09/22/22  1253   * 133*   K 4.0 4.2    99   CO2 24 26   BUN 13 14   CREA 0.41* 0.49*   * 123*   CA 8.2* 9.7     Recent Labs     09/22/22  1253   ALT 10*   AP 72   TBILI 0.4   TP 7.5   ALB 2.2*   GLOB 5.3*     No results for input(s): INR, PTP, APTT, INREXT in the last 72 hours. No results for input(s): FE, TIBC, PSAT, FERR in the last 72 hours. No results found for: FOL, RBCF   No results for input(s): PH, PCO2, PO2 in the last 72 hours. No results for input(s): CPK, CKNDX, TROIQ in the last 72 hours.     No lab exists for component: CPKMB  No results found for: CHOL, CHOLX, CHLST, CHOLV, HDL, HDLP, LDL, LDLC, DLDLP, TGLX, TRIGL, TRIGP, CHHD, CHHDX  Lab Results   Component Value Date/Time    Glucose (POC) 144 (H) 08/27/2021 11:58 AM    Glucose (POC) 136 (H) 09/18/2015 08:06 AM    Glucose (POC) 115 (H) 05/01/2013 09:53 AM     Lab Results   Component Value Date/Time    Color Yellow 09/13/2022 11:37 AM    Appearance Clear 09/13/2022 11:37 AM    Specific gravity 1.022 05/12/2022 01:03 PM    pH (UA) 6.5 09/13/2022 11:37 AM    Protein Negative 05/12/2022 01:03 PM    Glucose Negative 05/12/2022 01:03 PM    Ketone Negative 09/13/2022 11:37 AM    Bilirubin Negative 09/13/2022 11:37 AM    Urobilinogen 0.2 05/12/2022 01:03 PM    Nitrites Negative 09/13/2022 11:37 AM    Leukocyte Esterase Negative 09/13/2022 11:37 AM    Epithelial cells FEW 05/12/2022 01:03 PM    Bacteria None seen 09/13/2022 11:37 AM    WBC None seen 09/13/2022 11:37 AM    RBC None seen 09/13/2022 11:37 AM         Medications Reviewed:     Current Facility-Administered Medications   Medication Dose Route Frequency    sodium chloride (NS) flush 5-40 mL  5-40 mL IntraVENous Q8H    sodium chloride (NS) flush 5-40 mL  5-40 mL IntraVENous PRN    acetaminophen (TYLENOL) tablet 650 mg  650 mg Oral Q6H PRN    Or    acetaminophen (TYLENOL) suppository 650 mg  650 mg Rectal Q6H PRN    polyethylene glycol (MIRALAX) packet 17 g  17 g Oral DAILY PRN    ondansetron (ZOFRAN ODT) tablet 4 mg  4 mg Oral Q8H PRN    Or    ondansetron (ZOFRAN) injection 4 mg  4 mg IntraVENous Q6H PRN    vancomycin (VANCOCIN) 1,250 mg in 0.9% sodium chloride 250 mL ([id number])  1,250 mg IntraVENous Q12H    rivaroxaban (XARELTO) tablet 20 mg  20 mg Oral DAILY WITH BREAKFAST    Vancomycin- pharmacy to dose   Other Rx Dosing/Monitoring    cefepime (MAXIPIME) 2 g in 0.9% sodium chloride (MBP/ADV) 100 mL MBP  2 g IntraVENous Q8H     ______________________________________________________________________  EXPECTED LENGTH OF STAY: 4d 19h  ACTUAL LENGTH OF STAY:          1      Please note that this dictation was completed with [employer], the computer voice recognition software. Quite often unanticipated grammatical, syntax, homophones, and other interpretive errors are inadvertently transcribed by the computer software. Please disregard these errors. Please excuse any errors that have escaped final proofreading.                 Paulie Granado MD

## 2022-09-23 NOTE — PROGRESS NOTES
During Initial assessment noted patient still in undergarments. Asked pt if could assess her skin and reported boil. Pt would only pull down pants and underwear enough to for staff to examine sacrum and rectum. Noted hard boil, erythemic and painful to touch on right inner buttock close to rectum. Also during assessment noted a drain on pt's abdomen. Inquired as to what drain was for. Pt had permacath placed for chronic ascites in August at [facility]. Noted shadow on dressing and surrouding skin erythemic. Inquired with pt about dressing changes and per pt she changes the dressing every Sunday. Pt does not have home health involved in care of drain.

## 2022-09-23 NOTE — PROGRESS NOTES
Spiritual Care Assessment/Progress Note  [facility]      NAME: Ellouise Prader      MRN: [id number]  AGE: 78 y.o.  SEX: female  Congregation Affiliation: Holiness   Language: English     9/23/2022     Total Time (in minutes): 60     Spiritual Assessment begun in [facility] through conversation with:         [x]Patient        [] Family    [] Friend(s)        Reason for Consult: Request by staff     Spiritual beliefs: (Please include comment if needed)     [x] Identifies with a jaimee tradition: Contreras        [] Supported by a jaimee community:            [] Claims no spiritual orientation:           [] Seeking spiritual identity:                [] Adheres to an individual form of spirituality:           [] Not able to assess:                           Identified resources for coping:      [] Prayer                               [] Music                  [] Guided Imagery     [x] Family/friends                 [] Pet visits     [] Devotional reading                         [] Unknown     [] Other:                                               Interventions offered during this visit: (See comments for more details)    Patient Interventions: Affirmation of emotions/emotional suffering, Catharsis/review of pertinent events in supportive environment, Prayer (assurance of), Initial/Spiritual assessment, patient floor, Iconic (affirming the presence of God/Higher Power), Normalization of emotional/spiritual concerns           Plan of Care:     [] Support spiritual and/or cultural needs    [] Support AMD and/or advance care planning process      [] Support grieving process   [] Coordinate Rites and/or Rituals    [] Coordination with community clergy   [x] No spiritual needs identified at this time   [] Detailed Plan of Care below (See Comments)  [] Make referral to Music Therapy  [] Make referral to Pet Therapy     [] Make referral to Addiction services  [] Make referral to [facility]  [] Make referral to Spiritual Care Partner  [] No future visits requested        [] Contact Spiritual Care for further referrals     Comments: I visited with Ms. Jo Ann Dow per the request of nursing staff and provided a presence of comfort and support as she shared her concerns about her medical situation. She noted that she has strong support from friends. She also had questions about advance care planning, which I addressed. I printed out her current Advance Medical Directive (on file in [address] [facility]) for her to review. I also assured her of the ongoing availability of chaplains as needed. Spiritual Care remains available as needed. Rev. CHRISTIE DevriesDiv, [facility] '

## 2022-09-23 NOTE — PROGRESS NOTES
Comprehensive Nutrition Assessment    Type and Reason for Visit: Initial, Positive nutrition screen    Nutrition Recommendations/Plan:   Continue with 2gm Na+ diet  Will order Ensure Enlive TID       Malnutrition Assessment:  Malnutrition Status:  Severe malnutrition (09/23/22 1344)    Context:  Chronic illness     Findings of the 6 clinical characteristics of malnutrition:   Energy Intake:  75% or less est energy requirements for 1 month or longer  Weight Loss:  Greater than 7.5% over 3 months     Body Fat Loss:  Severe body fat loss, Orbital   Muscle Mass Loss:  Severe muscle mass loss, Clavicles (pectoralis &deltoids), Calf (gastrocnemius)  Fluid Accumulation:  Mild, Ascites   Strength:  Not performed        Nutrition Assessment:    79 yo female admitted for PE. PMHx: uterine CA s/p chemo and hysterectomy in 2013, GERD. Noted that pt has permacath drain for chronic ascites. S/P left thoracentesis with 1700 ml fluid removed. Colorectal surgeon consulted for perianal abscess. GYN/Onc consulted for recurrent endometrial carcinoma. MST screen received for weight loss. Spoke with pt at bedside. She reports poor appetite/PO intakes at home since [facility]. C/o early satiety as the main reason for not eating more. She eats a few bites and feels very full so she stops eating. States she mostly picks at food during the day. Cannot stand long enough to cook food. Drinks 1-2 Boost shakes/day, will order Ensure (likes chocolate). Provided education on high kcal food ideas that do not require cooking. Encouraged increased intake of Boost shakes. Weight hx in EMR indicates 22% loss over last 10 months and more recent loss of 11% over last 2 months. Explained to pt why too much weight loss too fast is concerning and results is muscle loss, pt admits to feeling weak and having lost all muscle. Meets criteria for severe malnutrition (see above). Labs reviewed.       Nutritionally Significant Medications:  reviewed      Estimated Daily Nutrient Needs:  Energy Requirements Based On: Kcal/kg  Weight Used for Energy Requirements: Current  Energy (kcal/day): 5980-1426 (30-35 kcals/kg)  Weight Used for Protein Requirements: Current  Protein (g/day): 95 (1.5 gm/kg (20%))  Method Used for Fluid Requirements: 1 ml/kcal  Fluid (ml/day): 1900    Nutrition Related Findings:   Edema: ascites  Last BM: 09/23/22, Loose    Wounds: None      Current Nutrition Therapies:  Diet: 2gm Na+   Supplements: none  Meal intake: No data found. Supplement intake: No data found. Nutrition Support: none      Anthropometric Measures:  Height: 5' 5\" (165.1 cm)  Ideal Body Weight (IBW): 125 lbs (57 kg)     Current Body Wt:  63.5 kg (139 lb 15.9 oz), 112 % IBW.  Not specified  Current BMI (kg/m2): 23.3        Weight Adjustment: No adjustment                 BMI Category: Normal weight (BMI 22.0-24.9) age over 72    Wt Readings from Last 10 Encounters:   09/22/22 63.5 kg (140 lb)   09/15/22 64.5 kg (142 lb 3.2 oz)   08/25/22 67.6 kg (149 lb)   08/16/22 69.9 kg (154 lb)   08/11/22 69.4 kg (153 lb)   08/04/22 69.7 kg (153 lb 9.6 oz)   07/18/22 71.7 kg (158 lb)   07/14/22 71.6 kg (157 lb 12.8 oz)   07/14/22 71.2 kg (157 lb)   06/24/22 70.8 kg (156 lb)           Nutrition Diagnosis:   Inadequate oral intake related to inadequate protein-energy intake as evidenced by intake 0-25%  Severe malnutrition related to inadequate protein-energy intake as evidenced by weight loss greater than or equal to 5% in 1 month, severe loss of subcutaneous fat, severe muscle loss    Nutrition Interventions:   Food and/or Nutrient Delivery: Continue current diet, Start oral nutrition supplement  Nutrition Education/Counseling: Education initiated  Coordination of Nutrition Care: Continue to monitor while inpatient       Goals:     Goals: other (specify)  Specify Other Goals: PO intakes > 75% meals + ONS to promote weight stability +/- 2 kg over next 5-7 days    Nutrition Monitoring and Evaluation:   Behavioral-Environmental Outcomes: Readiness for change  Food/Nutrient Intake Outcomes: Food and nutrient intake, Supplement intake  Physical Signs/Symptoms Outcomes: Biochemical data, GI status, Weight    Discharge Planning:    Continue current diet, Continue oral nutrition supplement    Jorge Kovacs RD  Available via [employer]

## 2022-09-23 NOTE — PROGRESS NOTES
Pharmacy consult note:  Day #2 of Vancomycin  Indication: sepsis, buttock cellulitis, malignant pleural effusion  -h/o serous adenocarcinoma of endometrium, currently undergoing chemotherapy  Current regimen:  1g IV q12h  Abx regimen:  cefepime, vanc  ID Following ?: NO  Concomitant nephrotoxic drugs (requires more frequent monitoring): None  Frequency of BMP?: every day through     Recent Labs     22  0059 22  1253   WBC  --  11.4*   CREA 0.41* 0.49*   BUN 13 14     Est CrCl: 71 ml/min; UO: n/a ml/kg/hr  Temp (24hrs), Av.2 °F (37.3 °C), Min:98 °F (36.7 °C), Max:100.1 °F (37.8 °C)    Cultures:    pleural fluid: pend   blood: pend    MRSA Swab ordered (if applicable)?  N/A    Goal target range AUC/TIFFANIE 400-600    Recent level history:  Date/Time Dose & Interval Measured Level (mcg/mL) Associated AUC/TIFFANIE Dose Adjustment                                                   Plan: Change to 1250 mg IV q12h starting today at  0900 for predicted AUC ~ 500

## 2022-09-23 NOTE — PROGRESS NOTES
Bedside and Verbal shift change report given to LISANDRA White (oncoming nurse) by Gomez Morales (offgoing nurse). Report included the following information SBAR, MAR, Cardiac Rhythm SR, and Alarm Parameters .

## 2022-09-23 NOTE — ACP (ADVANCE CARE PLANNING)
Advance Care Planning (ACP) Physician/NP/PA Conversation      Date of Conversation: 9/22/2022  Conducted with: Patient with Decision Making Capacity    Healthcare Decision Maker:   No healthcare decision makers have been documented. Click here to complete [address] including selection of the Healthcare Decision Maker Relationship (ie \"Primary\")  Today we discussed about disease progression and end-of-life care CODE STATUS etc. patient has a daughter who who is medical power of     Care Preferences:    Hospitalization: \"If your health worsens and it becomes clear that your chance of recovery is unlikely, what would be your preference regarding hospitalization? \"  The patient would prefer hospitalization. Ventilation: \"If you were unable to breathe on your own and your chance of recovery was unlikely, what would be your preference about the use of a ventilator (breathing machine) if it was available to you? \"   The patient is unsure. Resuscitation: \"In the event your heart stopped as a result of an underlying serious health condition, would you want attempts to be made to restart your heart, or would you prefer a natural death? \"   Yes, attempt to resuscitate.     Additional topics discussed: treatment goals, benefit/burden of treatment options, artificial nutrition, ventilation preferences, hospitalization preferences, resuscitation preferences, and end of life care preferences (vegetative state/imminent death)    Conversation Outcomes / Follow-Up Plan:   ACP complete - no further action today  Reviewed DNR/DNI and patient elects Full Code (Attempt Resuscitation)     Length of Voluntary ACP Conversation in minutes:  20 minutes    Bill Olivera MD

## 2022-09-23 NOTE — ROUTINE PROCESS
1600: thoracentesis on right. 1645: Dr Lynne Randall in to mayra rectal abcess    0790: chest xray done    2000: Bedside shift change report given to [facility] RN(oncoming nurse) by Yina Cornejo RN (offgoing nurse). Report included the following information SBAR, Procedure Summary, Intake/Output, MAR, and Recent Results.

## 2022-09-23 NOTE — CONSULTS
Colon and Rectal Surgery History and Physical    Subjective:      Christiano Jasso is a 78 y.o. female who has a perirectal abscess. SHe has been in pain for a few days. She was admitted with pleural effusions and ascites. She had a ct scan which did not show an obvious abscess. Patient Active Problem List    Diagnosis Date Noted    Severe protein-calorie malnutrition ([facility] [address] 09/23/2022    Pleural effusion 09/22/2022    Dehydration 08/09/2022    Malignant neoplasm of corpus uteri, except isthmus ([facility] [address] 02/28/2013     Past Medical History:   Diagnosis Date    Abnormal Pap smear 1995    with f/u normal    Anemia     Arthritis     Asthma     Cancer ([facility] [address]     ENDOMETRIAL    Environmental allergies     GERD (gastroesophageal reflux disease)     Goiter     Other unknown and unspecified cause of morbidity or mortality     POSSIBLE ESOPHAGEAL SPASM, ? OBSTRUCTION DUE TO GOITER    PMB (postmenopausal bleeding)     S/P chemotherapy, time since greater than 12 weeks     LAST CHEMO 10/2014 PER PATIENT    Thyroid disease     goiter      Past Surgical History:   Procedure Laterality Date    HX APPENDECTOMY      HX BUNIONECTOMY      HX GYN  3/13/13    TLH/BSO    HX HEENT  4/22/13    TOOTH REMOVED    HX ORTHOPAEDIC  1980'S    BUNIONECTOMY    HX VASCULAR ACCESS      port    IR INSERT INTRAPERI TUNL CATH PERC  8/11/2022    [place] BREAST SURGERY PROCEDURE UNLISTED  1/12/16    right breast bx      Social History     Tobacco Use    Smoking status: Never    Smokeless tobacco: Never   Substance Use Topics    Alcohol use:  Yes     Alcohol/week: 0.0 standard drinks     Comment: RARE OCC      Family History   Problem Relation Age of Onset    Cancer Maternal Aunt         breast    Heart Disease Mother     Diabetes Father     Cancer Sister         CERVICAL    Kidney Disease Brother     Diabetes Brother     Heart Attack Other     Arrhythmia Brother     Diabetes Brother     Anesth Problems Neg Hx       Prior to Admission medications Medication Sig Start Date End Date Taking? Authorizing Provider   Xarelto 20 mg tab tablet TAKE 1 TABLET BY MOUTH DAILY 8/8/22   Anna Spence MD   Cindy Snyder, by Does Not Apply route. With zinc ointment    Provider, Historical   acetaminophen (TYLENOL) 325 mg tablet Take  by mouth as needed. Provider, Historical   lisinopriL (PRINIVIL, ZESTRIL) 10 mg tablet Take 1 Tab by mouth daily. 1/6/21   Anna Spence MD   lisinopriL (PRINIVIL, ZESTRIL) 5 mg tablet Take 2 Tabs by mouth daily. 1/5/21   Hieu Cline PA-C   ondansetron (ZOFRAN ODT) 4 mg disintegrating tablet Take 1 Tab by mouth every eight (8) hours as needed for Nausea. Indications: nausea and vomiting caused by cancer drugs 10/22/20   Anna Spence MD   lidocaine-prilocaine (EMLA) topical cream Apply small amount over port area and cover with band aid one hour before chemo 10/22/20   Anna Spence MD   guaifenesin (MUCINEX PO) Take  by mouth. Provider, Historical   loratadine (CLARITIN) 10 mg tablet Take 10 mg by mouth. Provider, Historical   epinastine 0.05 % drop Apply  to eye. Provider, Historical   raNITIdine (ZANTAC) 150 mg tablet ZANTAC TABS 9/29/11   Provider, Historical   cyclobenzaprine (FLEXERIL) 5 mg tablet take 1 tablet by mouth three times a day if needed 12/5/16   Provider, Historical   valACYclovir (VALTREX) 1 gram tablet Take 1 Tab by mouth three (3) times daily. 12/15/16   Anna Spence MD   EPINEPHrine (EPIPEN) 0.3 mg/0.3 mL injection 0.3 mg by IntraMUSCular route once as needed. Provider, Historical   ketotifen (ZADITOR) 0.025 % (0.035 %) ophthalmic solution Administer 1 Drop to both eyes every morning. Provider, Historical   Cetirizine 10 mg cap Take 10 mg by mouth nightly. Patient not taking: No sig reported    Provider, Historical   SAL ACID/UREA/PETROLATUM,WHITE (KERASAL FOOT EX) by Apply Externally route daily as needed.     Provider, Historical   ACCU-CHEK HELDER PLUS TEST STRP strip  7/3/15   Provider, Historical   NITROSTAT 0.4 mg SL tablet  9/22/14   Provider, Historical   CALCIUM CARBONATE (MARCELLO-SELTZER ANTACID PO) Take  by mouth daily as needed. Provider, Historical     Allergies   Allergen Reactions    Latex Other (comments)     Burns skin    Acetone Shortness of Breath    Amoxicillin Anaphylaxis    Ciprofloxacin Hives    Pcn [Penicillins] Anaphylaxis    Buspar [Buspirone] Unknown (comments)     Has N&V and makes her feel \"weird\"    Azithromycin Hives    Food [Egg] Nausea Only    Other Medication Rash     POPPY seeds        Review of Systems:    A comprehensive review of systems was negative except for that written in the History of Present Illness. Objective:     Visit Vitals  /79   Pulse (!) 104   Temp 99.5 °F (37.5 °C)   Resp 20   Ht 5' 5\" (1.651 m)   Wt 63.5 kg (140 lb)   SpO2 96%   Breastfeeding No   BMI 23.30 kg/m²        Physical Exam:   General:  Alert, cooperative, no distress, appears stated age. Head:  Normocephalic, without obvious abnormality, atraumatic. Eyes:  Conjunctivae/corneas clear. PERRL, EOMs intact. Ears:  Normal external ear canals both ears. Nose: Nares normal. Septum midline. No drainage. Throat: Lips, mucosa, and tongue normal. Teeth and gums normal.   Neck: Supple, symmetrical, trachea midline, no adenopathy   Back:   Symmetric, no curvature. ROM normal.   Lungs:   Clear   Chest wall:  No tenderness or deformity. Heart:  Regular rate and rhythm       Abdomen:   Soft, non-tender. Bowel sounds normal. No masses,  No organomegaly. Genitalia:  Rectal exam demonstrates a small perianal abscess which is minimally draining. It is tender. Extremities: Extremities normal, atraumatic, no cyanosis or edema. Skin: Skin color, texture, turgor normal. No rashes or lesions. Neurologic: CNII-XII intact. Normal strength, sensation and reflexes throughout.          Imaging:  images and reports reviewed    Lab Review:    Recent Results (from the past 24 hour(s))   CULTURE, BODY FLUID Mickeyaine Caprice STAIN    Collection Time: 09/22/22  5:30 PM    Specimen: Pleural Fluid; Body Fluid   Result Value Ref Range    Special Requests: NO SPECIAL REQUESTS      GRAM STAIN NO ORGANISMS SEEN      Culture result: NO GROWTH THUS FAR     CELL COUNT, BODY FLUID    Collection Time: 09/22/22  5:30 PM   Result Value Ref Range    BODY FLUID TYPE PLEURAL FLUID      FLUID COLOR RED      FLUID APPEARANCE HAZY      FLUID RBC CT. >100 (H) 0 /cu mm    FLUID NUCLEATED CELLS 925 /cu mm    FLD NEUTROPHILS 48 (A) NRRE %    FLD LYMPHS 42 (A) NRRE %    FLD MONO/MACROPHAGES 10 (A) NRRE %   SAMPLES BEING HELD    Collection Time: 09/22/22  5:30 PM   Result Value Ref Range    SAMPLES BEING HELD 1ANAC CULTURE  TUBE     COMMENT        Add-on orders for these samples will be processed based on acceptable specimen integrity and analyte stability, which may vary by analyte. METABOLIC PANEL, BASIC    Collection Time: 09/23/22 12:59 AM   Result Value Ref Range    Sodium 135 (L) 136 - 145 mmol/L    Potassium 4.0 3.5 - 5.1 mmol/L    Chloride 103 97 - 108 mmol/L    CO2 24 21 - 32 mmol/L    Anion gap 8 5 - 15 mmol/L    Glucose 116 (H) 65 - 100 mg/dL    BUN 13 6 - 20 MG/DL    Creatinine 0.41 (L) 0.55 - 1.02 MG/DL    BUN/Creatinine ratio 32 (H) 12 - 20      GFR est AA >60 >60 ml/min/1.73m2    GFR est non-AA >60 >60 ml/min/1.73m2    Calcium 8.2 (L) 8.5 - 10.1 MG/DL       Labs and radiology: images and reports reviewed      Assessment:   Perianal abscess    Plan:     Risks and benefits were discussed with her regarding drainage of the abscess. She is agreeable to the procedure. Will do at the bedside. Signed By: Caitlin Mcgraw MD     September 23, 2022       Cleansed the skin with betadine. Injected 1%lidocaine. Cruciate incision made and about 5cc of purulent foul smelling effluent drained. Tolerated the procedure well. Recommned sitz baths and abx given that she is immunocompromised.  Partner will see tomorrow to make sure this continues to drain and is not getting worse. She can follow up with me as an outpatient.

## 2022-09-24 LAB
ANION GAP SERPL CALC-SCNC: 4 MMOL/L (ref 5–15)
BUN SERPL-MCNC: 10 MG/DL (ref 6–20)
BUN/CREAT SERPL: 33 (ref 12–20)
CALCIUM SERPL-MCNC: 7.8 MG/DL (ref 8.5–10.1)
CHLORIDE SERPL-SCNC: 106 MMOL/L (ref 97–108)
CO2 SERPL-SCNC: 26 MMOL/L (ref 21–32)
COMMENT, HOLDF: NORMAL
CREAT SERPL-MCNC: 0.3 MG/DL (ref 0.55–1.02)
GLUCOSE SERPL-MCNC: 91 MG/DL (ref 65–100)
POTASSIUM SERPL-SCNC: 4.1 MMOL/L (ref 3.5–5.1)
SAMPLES BEING HELD,HOLD: NORMAL
SODIUM SERPL-SCNC: 136 MMOL/L (ref 136–145)
VANCOMYCIN SERPL-MCNC: 14.4 UG/ML

## 2022-09-24 PROCEDURE — 65270000046 HC RM TELEMETRY

## 2022-09-24 PROCEDURE — 36415 COLL VENOUS BLD VENIPUNCTURE: CPT

## 2022-09-24 PROCEDURE — 74011000250 HC RX REV CODE- 250: Performed by: HOSPITALIST

## 2022-09-24 PROCEDURE — 74011250637 HC RX REV CODE- 250/637: Performed by: HOSPITALIST

## 2022-09-24 PROCEDURE — 74011000258 HC RX REV CODE- 258: Performed by: HOSPITALIST

## 2022-09-24 PROCEDURE — 74011250636 HC RX REV CODE- 250/636: Performed by: HOSPITALIST

## 2022-09-24 PROCEDURE — 99232 SBSQ HOSP IP/OBS MODERATE 35: CPT | Performed by: OBSTETRICS & GYNECOLOGY

## 2022-09-24 PROCEDURE — 80048 BASIC METABOLIC PNL TOTAL CA: CPT

## 2022-09-24 PROCEDURE — 80202 ASSAY OF VANCOMYCIN: CPT

## 2022-09-24 RX ORDER — ADHESIVE BANDAGE
15 BANDAGE TOPICAL
Status: COMPLETED | OUTPATIENT
Start: 2022-09-24 | End: 2022-09-24

## 2022-09-24 RX ADMIN — MAGNESIUM HYDROXIDE 15 ML: 400 SUSPENSION ORAL at 20:57

## 2022-09-24 RX ADMIN — CEFEPIME 2 G: 2 INJECTION, POWDER, FOR SOLUTION INTRAVENOUS at 23:24

## 2022-09-24 RX ADMIN — SODIUM CHLORIDE, PRESERVATIVE FREE 10 ML: 5 INJECTION INTRAVENOUS at 13:24

## 2022-09-24 RX ADMIN — SODIUM CHLORIDE, PRESERVATIVE FREE 10 ML: 5 INJECTION INTRAVENOUS at 23:25

## 2022-09-24 RX ADMIN — ACETAMINOPHEN 650 MG: 325 TABLET, FILM COATED ORAL at 04:15

## 2022-09-24 RX ADMIN — SODIUM CHLORIDE, PRESERVATIVE FREE 10 ML: 5 INJECTION INTRAVENOUS at 06:02

## 2022-09-24 RX ADMIN — CEFEPIME 2 G: 2 INJECTION, POWDER, FOR SOLUTION INTRAVENOUS at 15:09

## 2022-09-24 RX ADMIN — RIVAROXABAN 20 MG: 20 TABLET, FILM COATED ORAL at 08:39

## 2022-09-24 RX ADMIN — VANCOMYCIN HYDROCHLORIDE 1250 MG: 1.25 INJECTION, POWDER, LYOPHILIZED, FOR SOLUTION INTRAVENOUS at 08:39

## 2022-09-24 RX ADMIN — CEFEPIME 2 G: 2 INJECTION, POWDER, FOR SOLUTION INTRAVENOUS at 06:02

## 2022-09-24 RX ADMIN — VANCOMYCIN HYDROCHLORIDE 1250 MG: 1.25 INJECTION, POWDER, LYOPHILIZED, FOR SOLUTION INTRAVENOUS at 20:41

## 2022-09-24 NOTE — PROGRESS NOTES
CRS Note    Pt feeling much better today, minimal residual perianal discomfort.       PE  Perianal skin with minimal induration - draining small amount of pus    Recommendations  - abscess draining adequately - cont abx and dry gauze as needed

## 2022-09-24 NOTE — PROGRESS NOTES
Pharmacist Note - Vancomycin Dosing  Therapy day 3  Indication: sepsis, buttock cellulitis, malignant pleural effusion  Current regimen: 1250 mg IV q12 hours    Recent Labs     09/24/22  0628 09/23/22  0059 09/22/22  1253   WBC  --   --  11.4*   CREA 0.30* 0.41* 0.49*   BUN 10 13 14       A random vancomycin level of 14.4 mcg/mL was obtained ~ 9 hrs p dose and from this level, the patient's AUC24 is calculated to be 458 with the current regimen. Goal target range AUC/TIFFANIE 400-600      Plan: Continue current regimen. Pharmacy will continue to monitor this patient daily for changes in clinical status and renal function. Random vancomycin levels are used to calculate AUC/TIFFANIE, this level should not be interpreted as a trough. Vancomycin has been dosed using Bayesian kinetics software to target an AUC24:TIFFANIE of 400-600, which provides adequate exposure for as assumed infection due to MRSA with an TIFFANIE of 1 or less while reducing the risk of nephrotoxicity as seen with traditional trough based dosing goals.

## 2022-09-24 NOTE — PROGRESS NOTES
[facility] [address] [phone]  F [phone]       Patient Name: Juliane Hubbard   Admit Date: 9/22/2022   OR Date: * No surgery found *   Visit Date: 9/24/2022        SUBJECTIVE:    Feeling better today following both right thoracentesis and I&D of perianal abscess yesterday. OBJECTIVE:    Patient Vitals for the past 24 hrs:   Temp Pulse Resp BP SpO2   09/24/22 1000 -- 88 -- -- --   09/24/22 0816 97.6 °F (36.4 °C) 82 16 (!) 108/56 96 %   09/24/22 0551 -- 80 -- -- --   09/24/22 0403 97.6 °F (36.4 °C) 83 16 117/64 96 %   09/24/22 0300 -- 82 -- -- --   09/24/22 0145 -- 80 -- -- --   09/23/22 2320 97.5 °F (36.4 °C) 85 15 112/69 96 %   09/23/22 2159 -- 89 -- -- --   09/23/22 2006 98.8 °F (37.1 °C) 99 18 106/73 96 %   09/23/22 1949 -- 100 -- -- --   09/23/22 1806 -- 95 -- -- --   09/23/22 1600 -- -- -- -- 96 %   09/23/22 1530 99.5 °F (37.5 °C) (!) 104 20 114/79 96 %   09/23/22 1408 -- 99 -- -- --   09/23/22 1258 99 °F (37.2 °C) 95 20 120/82 95 %   09/23/22 1200 -- 97 -- -- --       Date 09/23/22 0700 - 09/24/22 0659 09/24/22 0700 - 09/25/22 0659   Shift 9531-1190 1298-3072 24 Hour Total 7344-8454 0494-2403 24 Hour Total   INTAKE   P.O. 240  240        P. O. 240  240      I. V.(mL/kg/hr) 450(0.6)  450(0.3)        Volume (cefepime (MAXIPIME) 2 g in 0.9% sodium chloride (MBP/ADV) 100 mL MBP) 200  200        Volume (vancomycin (VANCOCIN) 1,250 mg in 0.9% sodium chloride 250 mL ([id number])) 250  250      Shift Total(mL/kg) 690(10.9)  740(78.9)      OUTPUT   Urine(mL/kg/hr)           Urine Occurrence(s) 1 x 1 x 2 x      Stool           Stool Occurrence(s) 1 x  1 x      Shift Total(mL/kg)           690      Weight (kg) 63.5 61.8 61.8 61.8 61.8 61.8       Physical Exam     General:  alert, cooperative, no distress     Cardiac:  Regular rate and rhythm        Lungs:  decreased in bilateral bases  Abdomen:  soft, non-tender, without masses or organomegaly       Wound:  clean, dry, no drainage   Extremity: extremities normal, atraumatic, no cyanosis or edema    Data Review  Lab Results   Component Value Date/Time    WBC 11.4 (H) 09/22/2022 12:53 PM    ABS. NEUTROPHILS 10.1 (H) 09/22/2022 12:53 PM    HGB 12.8 09/22/2022 12:53 PM    HCT 41.2 09/22/2022 12:53 PM    MCV 86.6 09/22/2022 12:53 PM    MCH 26.9 09/22/2022 12:53 PM    PLATELET 191 90/77/1435 12:53 PM     Lab Results   Component Value Date/Time    Sodium 136 09/24/2022 06:28 AM    Potassium 4.1 09/24/2022 06:28 AM    Chloride 106 09/24/2022 06:28 AM    CO2 26 09/24/2022 06:28 AM    Glucose 91 09/24/2022 06:28 AM    BUN 10 09/24/2022 06:28 AM    Creatinine 0.30 (L) 09/24/2022 06:28 AM    Calcium 7.8 (L) 09/24/2022 06:28 AM    Albumin 2.2 (L) 09/22/2022 12:53 PM    Bilirubin, total 0.4 09/22/2022 12:53 PM    ALT (SGPT) 10 (L) 09/22/2022 12:53 PM    Alk. phosphatase 72 09/22/2022 12:53 PM       CT of chest/abdomen/pelvis (9/22/22)  CHEST WALL: Left Port-A-Cath in place with catheter traversing expected course  to terminate in the SVC. No mass or enlarged adenopathy. THYROID: Stable right thyroid hypodensity. MEDIASTINUM: Mediastinal lymphadenopathy, increased,   3-54 19 x 23 mm precarinal previously 18 x 13 mm. Right hilar adenopathy 22 x 22 mm previously 20 x 17 mm. .  THORACIC AORTA: No dissection or aneurysm. MAIN PULMONARY ARTERY: Minimal segmental pulmonary emboli. No evidence of right  ventricular strain. TRACHEA/BRONCHI: Patent. ESOPHAGUS: No wall thickening or dilatation. HEART: Normal in size. PLEURA: Large bilateral pleural effusions. No pneumothorax. LUNGS: Compressive atelectasis overlies large pleural effusions bilaterally. Aerated lungs are clear. LIVER: Portal vein is patent. No focal mass lesion. BILIARY TREE: Gallbladder is within normal limits. CBD is not dilated. SPLEEN: Unremarkable with no focal lesion. PANCREAS: No mass or ductal dilatation.   ADRENALS: Unremarkable. KIDNEYS: No mass, calculus, or hydronephrosis. STOMACH: Unremarkable. SMALL BOWEL: No dilatation or wall thickening. COLON: There is fecal stasis. There is colonic diverticulosis. APPENDIX: Not visualized. PERITONEUM: Extensive peritoneal implants and dense omental caking is  redemonstrated without significant interval change. C6-46 anterior abdominal omental caking unchanged at 12.4 x 3.9 cm. Additional  large areas interposed between the greater curvature of the gastric body and the  spleen. Stable ascites status post right peritoneal drainage catheter placement  which traverses from the right abdominal wall to along the undersurface of the  liver. RETROPERITONEUM:      Portacaval node at C6-41 increased 2.3 x 2.3 cm previously 1.7 x 2.4 cm   right common iliac node measuring 2.8 x 1.8 cm 1.6 x 2.6 cm (  REPRODUCTIVE ORGANS: Hysterectomy  URINARY BLADDER: No mass or calculus. BONES: Degenerative spine change. No acute fracture or aggressive lesion. ABDOMINAL WALL: Probably edematous with no mass or hernia. ADDITIONAL COMMENTS: Minimal edematous change/inflammatory change abutting the  rectum/anus. There is no loculated collection in the subcutaneous tissue. IMPRESSION  No loculated collection in the perianal/perirectal tissue. Slight interval increase in extensive metastatic disease. Slightly increased mediastinal and hilar adenopathy. Slightly increased mesenteric adenopathy. Extensive peritoneal carcinomatosis with omental caking, not significantly  changed. Large bilateral pleural effusions are redemonstrated with overlying compressive  atelectasis. IMPRESSION/PLAN:    Oncologic:  Progressive/recurrent papillary serous endometrial cancer. CT demonstrated large bilateral effusions, as well as progressive disease in the abdomen/pelvis.   She underwent a left thoracentesis on 9/22, draining 1700 cc of fluid, and a right thoracentesis on 9/23, draining 1600 cc of fluid.  Her peritoneal Aspira catheter was drained on 9/23, draining 800 cc of chylous ascites. She has been on single agent Avastin, completing 10 cycles. With progression of disease, we will now need to discuss other treatment options. She is aware that options are limited at this point. Heme/CV:  Hemodynamically stable. Renal:  Adequate UOP. FEN/GI:  Diet as tolerated. Severe protein/calorie malnutrition due to disease   ID/Wound:  Afebrile. Mildly elevated WBC upon admission. This is likely due to the perianal abscess. This was I&D'd yesterday by Dr. Hero Nava with colorectal surgery. Dispostion:  Feeling much better today. Met with  yesterday to discuss advance directives and POA. She wants to get things set while she still has clarity. Will plan to have a \"family meeting\" at some point in the near future to discuss treatment and prognosis.         Melina Cast MD  9/24/2022  10:50 AM

## 2022-09-24 NOTE — ROUTINE PROCESS
Bedside and Verbal shift change report given to Murphy Milton (oncoming nurse) by Holly Button (offgoing nurse).  Report included the following information SBAR, Kardex, Intake/Output, MAR, and Cardiac Rhythm SR .

## 2022-09-24 NOTE — PROGRESS NOTES
[address] [facility] Adult  Hospitalist Group                       Hospitalist Progress Note          Ulyess Goldberg, MD  Please call  and page for questions. Call physician on-call through the  7pm-7am        Daily Progress Note: 9/24/2022    Primary care provider:Bailee Pisano MD    Date of admission: 9/22/2022  1:15 PM    Admission summery and hospital course:  78 y.o. female who presents with shortness of breath and fever. Patient said that she felt feverish on Tuesday night did not measure the temperature but yesterday it was 1 1 and gradually become more short of breath and he decided to come to the ED. in the ED code sepsis was called. Patient also complained of small boil at the perianal area which is now grown bigger in last 2 days. Patient has past medical history of stage Ia grade 3 uterine carcinoma he underwent laparoscopic hysterectomy in March 2013 and finished 6 cycles of adjuvant chemo which she completed. She follows by Dr. Tommie Yoo. She also had a Spira catheter placed on the abdomen on 8/11/2022 to help manage her ascites. Now came to the ED with shortness of breath with bilateral pleural effusion. Subjective:   Patient said she is feeling a lot better since her abscess was drained yesterday. Patient had been tolerating diet and she denied any nausea, vomiting, abdominal pain for now. Patient had bowel movement before yesterday. Assessment/Plan:     Shortness of breath possibly due to bilateral pleural effusion  Rule out malignant pleural effusion  Rule out infection atelectasis  -S/p left thoracentesis, small pneumothorax , stable shortness of breath, improved. -S/p right thoracocentesis 09/23/2022.    -Continue vancomycin and cefepime for now and cultures. Perianal abscess   -Drained by colorectal surgeon on 09/23/2022 at bedside.    -Continue current management including antibiotic and follow cultures.       Sepsis due to above and/or atelectasis   -Patient is symptomatically better and vitals are improving.  -Continue antibiotic for now. -Sepsis reassessment completed. Endometrial cancer with peritoneal carcinomatosis  -Appreciated gynecology input.  -About 800 cc of chylous fluid drain abdominal Aspira on 2022.  -Progressive disease in spite of several prior regimens. -S/p Avastin on Thursday at outpatient infusion center.  -She has Mamie Dayhoff catheter for drainage of ascites. -Management per OB/GYN oncology Dr. Rodgers Session. DIET: ADULT DIET Regular; Safety Tray. ISOLATION PRECAUTIONS: There are currently no Active Isolations  CODE STATUS: Full code   DVT PROPHYLAXIS: SCDs, on Xarelto. FUNCTIONAL STATUS PRIOR TO HOSPITALIZATION:   Fully active and ambulatory; able to carry on all self-care without restriction. Ambulatory status/function: By self   EARLY MOBILITY ASSESSMENT:   Recommend routine ambulation while hospitalized with the assistance of nursing staff  ANTICIPATED DISCHARGE: More than 48 hours. ANTICIPATED DISPOSITION: Possible to home Home. Review of Systems:     Review of Systems:  Symptom  Y/N  Comments   Symptom  Y/N  Comments    Fever/Chills  n    Chest Pain  n    Poor Appetite  n    Edema   n    Cough  y   Abdominal Pain  n     Sputum  y   Joint Pain  n    SOB/KNIGHT  n   Pruritis/Rash      Nausea/vomit  n   Tolerating PT/OT      Diarrhea  n   Tolerating Diet      Constipation  n   Other      Could not obtain due to:         Objective:   Physical Exam:     Visit Vitals  BP (!) 108/56 (BP 1 Location: Right upper arm, BP Patient Position: At rest;Lying;Lying left side)   Pulse 82   Temp 97.6 °F (36.4 °C)   Resp 16   Ht 5' 5\" (1.651 m)   Wt 61.8 kg (136 lb 3.9 oz)   SpO2 96%   Breastfeeding No   BMI 22.67 kg/m²      O2 Device: None (Room air)    Temp (24hrs), Av.3 °F (36.8 °C), Min:97.5 °F (36.4 °C), Max:99.5 °F (37.5 °C)    No intake/output data recorded.     1901 -  0700  In: 690 [P.O.:240; I.V.:450]  Out: -       General:  Alert, cooperative, no distress, appears stated age. Lungs:   Clear to auscultation bilaterally. Chest wall:  No tenderness or deformity. Heart:  Regular rate and rhythm, S1, S2 normal, no murmur, click, rub or gallop. Abdomen:   Soft, non-tender. Bowel sounds normal. No masses,  No organomegaly. Extremities: Extremities normal, atraumatic, no cyanosis or edema. Pulses: 2+ and symmetric all extremities. Skin: Abscess drainage site of the perineal area is covered with soaked with serosanguineous fluid. No rashes or lesions   Neurologic: Patient has clear voice. She follows command. Data Review:       Recent Days:  Recent Labs     09/22/22  1253   WBC 11.4*   HGB 12.8   HCT 41.2        Recent Labs     09/24/22  0628 09/23/22  0059 09/22/22  1253    135* 133*   K 4.1 4.0 4.2    103 99   CO2 26 24 26   GLU 91 116* 123*   BUN 10 13 14   CREA 0.30* 0.41* 0.49*   CA 7.8* 8.2* 9.7   ALB  --   --  2.2*   ALT  --   --  10*     No results for input(s): PH, PCO2, PO2, HCO3, FIO2 in the last 72 hours. 24 Hour Results:  Recent Results (from the past 24 hour(s))   SAMPLES BEING HELD    Collection Time: 09/23/22  5:15 PM   Result Value Ref Range    SAMPLES BEING HELD 1LAV FLD TUBE, 1CUP OF FLD     COMMENT        Add-on orders for these samples will be processed based on acceptable specimen integrity and analyte stability, which may vary by analyte.    METABOLIC PANEL, BASIC    Collection Time: 09/24/22  6:28 AM   Result Value Ref Range    Sodium 136 136 - 145 mmol/L    Potassium 4.1 3.5 - 5.1 mmol/L    Chloride 106 97 - 108 mmol/L    CO2 26 21 - 32 mmol/L    Anion gap 4 (L) 5 - 15 mmol/L    Glucose 91 65 - 100 mg/dL    BUN 10 6 - 20 MG/DL    Creatinine 0.30 (L) 0.55 - 1.02 MG/DL    BUN/Creatinine ratio 33 (H) 12 - 20      GFR est AA >60 >60 ml/min/1.73m2    GFR est non-AA >60 >60 ml/min/1.73m2    Calcium 7.8 (L) 8.5 - 10.1 MG/DL   VANCOMYCIN, RANDOM Collection Time: 09/24/22  6:28 AM   Result Value Ref Range    Vancomycin, random 14.4 UG/ML   SAMPLES BEING HELD    Collection Time: 09/24/22  6:28 AM   Result Value Ref Range    SAMPLES BEING HELD 1BLU,1LAV     COMMENT        Add-on orders for these samples will be processed based on acceptable specimen integrity and analyte stability, which may vary by analyte. Problem List:  Problem List as of 9/24/2022 Date Reviewed: 9/8/2022            Codes Class Noted - Resolved    Severe protein-calorie malnutrition ([facility] [address] (Chronic) ICD-10-CM: E43  ICD-9-CM: 262  9/23/2022 - Present        Pleural effusion ICD-10-CM: J90  ICD-9-CM: 511.9  9/22/2022 - Present        Dehydration ICD-10-CM: E86.0  ICD-9-CM: 276.51  8/9/2022 - Present        Malignant neoplasm of corpus uteri, except isthmus ([facility] ICD-10-CM: C54.9  ICD-9-CM: 182.0  2/28/2013 - Present           Medications reviewed  Current Facility-Administered Medications   Medication Dose Route Frequency    sodium chloride (NS) flush 5-40 mL  5-40 mL IntraVENous Q8H    sodium chloride (NS) flush 5-40 mL  5-40 mL IntraVENous PRN    acetaminophen (TYLENOL) tablet 650 mg  650 mg Oral Q6H PRN    Or    acetaminophen (TYLENOL) suppository 650 mg  650 mg Rectal Q6H PRN    polyethylene glycol (MIRALAX) packet 17 g  17 g Oral DAILY PRN    ondansetron (ZOFRAN ODT) tablet 4 mg  4 mg Oral Q8H PRN    Or    ondansetron (ZOFRAN) injection 4 mg  4 mg IntraVENous Q6H PRN    vancomycin (VANCOCIN) 1,250 mg in 0.9% sodium chloride 250 mL ([id number])  1,250 mg IntraVENous Q12H    rivaroxaban (XARELTO) tablet 20 mg  20 mg Oral DAILY WITH BREAKFAST    Vancomycin- pharmacy to dose   Other Rx Dosing/Monitoring    cefepime (MAXIPIME) 2 g in 0.9% sodium chloride (MBP/ADV) 100 mL MBP  2 g IntraVENous Q8H       Care Plan discussed with: Patient/Family and Nurse    Total time spent with patient: 30 minutes.     Evelin Crowder MD

## 2022-09-25 LAB
ANION GAP SERPL CALC-SCNC: 3 MMOL/L (ref 5–15)
BUN SERPL-MCNC: 9 MG/DL (ref 6–20)
BUN/CREAT SERPL: 39 (ref 12–20)
CALCIUM SERPL-MCNC: 7.5 MG/DL (ref 8.5–10.1)
CHLORIDE SERPL-SCNC: 107 MMOL/L (ref 97–108)
CO2 SERPL-SCNC: 27 MMOL/L (ref 21–32)
CREAT SERPL-MCNC: 0.23 MG/DL (ref 0.55–1.02)
GLUCOSE SERPL-MCNC: 84 MG/DL (ref 65–100)
POTASSIUM SERPL-SCNC: 4.2 MMOL/L (ref 3.5–5.1)
SODIUM SERPL-SCNC: 137 MMOL/L (ref 136–145)

## 2022-09-25 PROCEDURE — 74011250636 HC RX REV CODE- 250/636: Performed by: HOSPITALIST

## 2022-09-25 PROCEDURE — 99232 SBSQ HOSP IP/OBS MODERATE 35: CPT | Performed by: OBSTETRICS & GYNECOLOGY

## 2022-09-25 PROCEDURE — 80048 BASIC METABOLIC PNL TOTAL CA: CPT

## 2022-09-25 PROCEDURE — 65270000046 HC RM TELEMETRY

## 2022-09-25 PROCEDURE — 74011000250 HC RX REV CODE- 250: Performed by: HOSPITALIST

## 2022-09-25 PROCEDURE — 74011250637 HC RX REV CODE- 250/637: Performed by: OBSTETRICS & GYNECOLOGY

## 2022-09-25 PROCEDURE — 74011250637 HC RX REV CODE- 250/637: Performed by: HOSPITALIST

## 2022-09-25 PROCEDURE — 36415 COLL VENOUS BLD VENIPUNCTURE: CPT

## 2022-09-25 PROCEDURE — 74011000258 HC RX REV CODE- 258: Performed by: HOSPITALIST

## 2022-09-25 RX ORDER — FLUCONAZOLE 100 MG/1
200 TABLET ORAL ONCE
Status: COMPLETED | OUTPATIENT
Start: 2022-09-25 | End: 2022-09-25

## 2022-09-25 RX ADMIN — ALTEPLASE 1 MG: 2.2 INJECTION, POWDER, LYOPHILIZED, FOR SOLUTION INTRAVENOUS at 05:31

## 2022-09-25 RX ADMIN — VANCOMYCIN HYDROCHLORIDE 1250 MG: 1.25 INJECTION, POWDER, LYOPHILIZED, FOR SOLUTION INTRAVENOUS at 20:32

## 2022-09-25 RX ADMIN — SODIUM CHLORIDE, PRESERVATIVE FREE 10 ML: 5 INJECTION INTRAVENOUS at 05:31

## 2022-09-25 RX ADMIN — CEFEPIME 2 G: 2 INJECTION, POWDER, FOR SOLUTION INTRAVENOUS at 15:32

## 2022-09-25 RX ADMIN — RIVAROXABAN 20 MG: 20 TABLET, FILM COATED ORAL at 08:24

## 2022-09-25 RX ADMIN — SODIUM CHLORIDE, PRESERVATIVE FREE 10 ML: 5 INJECTION INTRAVENOUS at 20:33

## 2022-09-25 RX ADMIN — CEFEPIME 2 G: 2 INJECTION, POWDER, FOR SOLUTION INTRAVENOUS at 07:10

## 2022-09-25 RX ADMIN — CEFEPIME 2 G: 2 INJECTION, POWDER, FOR SOLUTION INTRAVENOUS at 23:09

## 2022-09-25 RX ADMIN — VANCOMYCIN HYDROCHLORIDE 1250 MG: 1.25 INJECTION, POWDER, LYOPHILIZED, FOR SOLUTION INTRAVENOUS at 08:24

## 2022-09-25 RX ADMIN — SODIUM CHLORIDE, PRESERVATIVE FREE 10 ML: 5 INJECTION INTRAVENOUS at 15:32

## 2022-09-25 RX ADMIN — FLUCONAZOLE 200 MG: 100 TABLET ORAL at 09:33

## 2022-09-25 NOTE — PROGRESS NOTES
Bedside and Verbal shift change report given to Kevin Clifton (oncoming nurse) by Ginette Senior (offgoing nurse).  Report included the following information SBAR, Kardex, MAR, Accordion, and Cardiac Rhythm SR-ST .

## 2022-09-25 NOTE — PROGRESS NOTES
[address] [facility] [address]  P [phone]  F [phone]       Patient Name: Manda Alfred   Admit Date: 9/22/2022   OR Date: * No surgery found *   Visit Date: 9/25/2022        SUBJECTIVE:    Feeling better following both right thoracentesis and I&D of perianal abscess on Friday. OBJECTIVE:    Patient Vitals for the past 24 hrs:   Temp Pulse Resp BP SpO2   09/25/22 0812 97.4 °F (36.3 °C) 88 20 116/67 93 %   09/25/22 0545 -- 76 -- -- --   09/25/22 0421 97.9 °F (36.6 °C) 84 19 108/68 96 %   09/25/22 0353 -- 79 -- -- --   09/25/22 0145 -- 80 -- -- --   09/24/22 2346 -- 89 -- -- --   09/24/22 2328 98.3 °F (36.8 °C) 86 22 115/73 95 %   09/24/22 2146 -- (!) 104 -- -- --   09/24/22 2038 99 °F (37.2 °C) 92 20 118/82 96 %   09/24/22 1947 -- 94 -- -- --   09/24/22 1802 -- 86 -- -- --   09/24/22 1515 98 °F (36.7 °C) 89 20 102/61 98 %   09/24/22 1411 -- 92 -- -- --   09/24/22 1231 -- 89 -- -- --   09/24/22 1124 98 °F (36.7 °C) 89 18 109/70 96 %   09/24/22 1000 -- 88 -- -- --               Physical Exam     General:  alert, cooperative, no distress     Cardiac:  Regular rate and rhythm        Lungs:  decreased in bilateral bases  Abdomen:  soft, non-tender, without masses or organomegaly       Wound:  clean, dry, no drainage   Extremity: extremities normal, atraumatic, no cyanosis or edema    Data Review  Lab Results   Component Value Date/Time    WBC 11.4 (H) 09/22/2022 12:53 PM    ABS.  NEUTROPHILS 10.1 (H) 09/22/2022 12:53 PM    HGB 12.8 09/22/2022 12:53 PM    HCT 41.2 09/22/2022 12:53 PM    MCV 86.6 09/22/2022 12:53 PM    MCH 26.9 09/22/2022 12:53 PM    PLATELET 121 53/92/5871 12:53 PM     Lab Results   Component Value Date/Time    Sodium 137 09/25/2022 06:18 AM    Potassium 4.2 09/25/2022 06:18 AM    Chloride 107 09/25/2022 06:18 AM    CO2 27 09/25/2022 06:18 AM    Glucose 84 09/25/2022 06:18 AM    BUN 9 09/25/2022 06:18 AM    Creatinine 0.23 (L) 09/25/2022 06:18 AM    Calcium 7.5 (L) 09/25/2022 06:18 AM    Albumin 2.2 (L) 09/22/2022 12:53 PM    Bilirubin, total 0.4 09/22/2022 12:53 PM    ALT (SGPT) 10 (L) 09/22/2022 12:53 PM    Alk. phosphatase 72 09/22/2022 12:53 PM       CT of chest/abdomen/pelvis (9/22/22)  CHEST WALL: Left Port-A-Cath in place with catheter traversing expected course  to terminate in the SVC. No mass or enlarged adenopathy. THYROID: Stable right thyroid hypodensity. MEDIASTINUM: Mediastinal lymphadenopathy, increased,   3-54 19 x 23 mm precarinal previously 18 x 13 mm. Right hilar adenopathy 22 x 22 mm previously 20 x 17 mm. .  THORACIC AORTA: No dissection or aneurysm. MAIN PULMONARY ARTERY: Minimal segmental pulmonary emboli. No evidence of right  ventricular strain. TRACHEA/BRONCHI: Patent. ESOPHAGUS: No wall thickening or dilatation. HEART: Normal in size. PLEURA: Large bilateral pleural effusions. No pneumothorax. LUNGS: Compressive atelectasis overlies large pleural effusions bilaterally. Aerated lungs are clear. LIVER: Portal vein is patent. No focal mass lesion. BILIARY TREE: Gallbladder is within normal limits. CBD is not dilated. SPLEEN: Unremarkable with no focal lesion. PANCREAS: No mass or ductal dilatation. ADRENALS: Unremarkable. KIDNEYS: No mass, calculus, or hydronephrosis. STOMACH: Unremarkable. SMALL BOWEL: No dilatation or wall thickening. COLON: There is fecal stasis. There is colonic diverticulosis. APPENDIX: Not visualized. PERITONEUM: Extensive peritoneal implants and dense omental caking is  redemonstrated without significant interval change. C6-46 anterior abdominal omental caking unchanged at 12.4 x 3.9 cm. Additional  large areas interposed between the greater curvature of the gastric body and the  spleen. Stable ascites status post right peritoneal drainage catheter placement  which traverses from the right abdominal wall to along the undersurface of the  liver.   RETROPERITONEUM: Portacaval node at C6-41 increased 2.3 x 2.3 cm previously 1.7 x 2.4 cm   right common iliac node measuring 2.8 x 1.8 cm 1.6 x 2.6 cm (  REPRODUCTIVE ORGANS: Hysterectomy  URINARY BLADDER: No mass or calculus. BONES: Degenerative spine change. No acute fracture or aggressive lesion. ABDOMINAL WALL: Probably edematous with no mass or hernia. ADDITIONAL COMMENTS: Minimal edematous change/inflammatory change abutting the  rectum/anus. There is no loculated collection in the subcutaneous tissue. IMPRESSION  No loculated collection in the perianal/perirectal tissue. Slight interval increase in extensive metastatic disease. Slightly increased mediastinal and hilar adenopathy. Slightly increased mesenteric adenopathy. Extensive peritoneal carcinomatosis with omental caking, not significantly  changed. Large bilateral pleural effusions are redemonstrated with overlying compressive  atelectasis. IMPRESSION/PLAN:    Oncologic:  Progressive/recurrent papillary serous endometrial cancer. CT demonstrated large bilateral effusions, as well as progressive disease in the abdomen/pelvis. She underwent a left thoracentesis on 9/22, draining 1700 cc of fluid, and a right thoracentesis on 9/23, draining 1600 cc of fluid. Her peritoneal Aspira catheter was drained on 9/23, draining 800 cc of chylous ascites. She has been on single agent Avastin, completing 10 cycles. With progression of disease, we will now need to discuss other treatment options. She is aware that options are limited at this point. Heme/CV:  Hemodynamically stable. Renal:  Adequate UOP. FEN/GI:  Diet as tolerated. Severe protein/calorie malnutrition due to disease   ID/Wound:  Afebrile. Mildly elevated WBC upon admission. This is likely due to the perianal abscess. This was I&D'd yesterday by Dr. Roderick Dailey with colorectal surgery. She is now concerned that she might be getting a yeast infection.   Will give her a dose of PO Diflucan. Dispostion:  Feeling much better today. Met with  yesterday to discuss advance directives and POA. She wants to get things set while she still has clarity. Will plan to have a \"family meeting\" at some point in the near future to discuss treatment and prognosis.         Tea Aranda MD  9/25/2022  10:50 AM

## 2022-09-25 NOTE — PROGRESS NOTES
[facility]  [facility]ist [facility]                       Hospitalist Progress Note          Jorden Kaba MD  Please call  and page for questions. Call physician on-call through the  7pm-7am        Daily Progress Note: 9/25/2022    Primary care provider:Jez Pisano MD    Date of admission: 9/22/2022  1:15 PM    Admission summery and hospital course:  78 y.o. female who presents with shortness of breath and fever. Patient said that she felt feverish on Tuesday night did not measure the temperature but yesterday it was 1 1 and gradually become more short of breath and he decided to come to the ED. in the ED code sepsis was called. Patient also complained of small boil at the perianal area which is now grown bigger in last 2 days. Patient has past medical history of stage Ia grade 3 uterine carcinoma he underwent laparoscopic hysterectomy in March 2013 and finished 6 cycles of adjuvant chemo which she completed. She follows by Dr. Iman Heart. She also had a Spira catheter placed on the abdomen on 8/11/2022 to help manage her ascites. Now came to the ED with shortness of breath with bilateral pleural effusion. Subjective:   Patient said she is feeling better. Her pain at her perineal area is improving. Assessment/Plan:     Shortness of breath possibly due to bilateral pleural effusion  Rule out malignant pleural effusion  Rule out infection atelectasis  -S/p left thoracentesis, small pneumothorax , stable shortness of breath, improved. -S/p right thoracocentesis 09/23/2022.    -Continue vancomycin and cefepime for now. Follow  cultures, negative so far. Perianal abscess   -Drained by colorectal surgeon on 09/23/2022 at bedside.    -Continue current management including antibiotic and follow cultures, negative so far. Sepsis due to above and/or atelectasis   -Patient is symptomatically better and vitals are improving.  -Continue antibiotic for now. -Sepsis reassessment completed. Endometrial cancer with peritoneal carcinomatosis  -Appreciated gynecology input.  -About 800 cc of chylous fluid drain abdominal Aspira on 2022.  -Progressive disease in spite of several prior regimens. -S/p Avastin on Thursday at outpatient infusion center.  -She has Tawanna Dill catheter for drainage of ascites. -Management per OB/GYN oncology Dr. Keri Carreno. DIET: ADULT DIET Regular; Safety Tray. ISOLATION PRECAUTIONS: There are currently no Active Isolations  CODE STATUS: Full code   DVT PROPHYLAXIS: SCDs, on Xarelto. FUNCTIONAL STATUS PRIOR TO HOSPITALIZATION:   Fully active and ambulatory; able to carry on all self-care without restriction. Ambulatory status/function: By self   EARLY MOBILITY ASSESSMENT:   Recommend routine ambulation while hospitalized with the assistance of nursing staff  ANTICIPATED DISCHARGE: 24-48 hours. ANTICIPATED DISPOSITION: Possible to home Home       Review of Systems:     Review of Systems:  Symptom  Y/N  Comments   Symptom  Y/N  Comments    Fever/Chills   n   Chest Pain  n    Poor Appetite  y    Edema  n     Cough  n   Abdominal Pain   n    Sputum  n   Joint Pain  n    SOB/KNIGHT  n   Pruritis/Rash      Nausea/vomit  n   Tolerating PT/OT      Diarrhea  n   Tolerating Diet      Constipation     Other      Could not obtain due to:         Objective:   Physical Exam:     Visit Vitals  /67 (BP 1 Location: Right upper arm, BP Patient Position: At rest)   Pulse 87   Temp 97.4 °F (36.3 °C)   Resp 20   Ht 5' 5\" (1.651 m)   Wt 61.8 kg (136 lb 3.9 oz)   SpO2 93%   Breastfeeding No   BMI 22.67 kg/m²      O2 Device: None (Room air)    Temp (24hrs), Av.1 °F (36.7 °C), Min:97.4 °F (36.3 °C), Max:99 °F (37.2 °C)    No intake/output data recorded. No intake/output data recorded. General:  Alert, cooperative, no distress, appears stated age. Lungs:   Clear to auscultation bilaterally. Chest wall:  No tenderness or deformity.    Heart: Regular rate and rhythm, S1, S2 normal, no murmur. Abdomen:   Soft, non-tender. Bowel sounds normal.    Extremities: Extremities normal, atraumatic, no cyanosis or edema. Pulses: 2+ and symmetric all extremities. Skin: Abscess drainage site of the perineal area is covered with soaked with serosanguineous fluid. No rashes or lesions   Neurologic: Patient has clear voice. She follows command. Data Review:       Recent Days:  Recent Labs     09/22/22  1253   WBC 11.4*   HGB 12.8   HCT 41.2        Recent Labs     09/25/22  0618 09/24/22  0628 09/23/22  0059 09/22/22  1253    136 135* 133*   K 4.2 4.1 4.0 4.2    106 103 99   CO2 27 26 24 26   GLU 84 91 116* 123*   BUN 9 10 13 14   CREA 0.23* 0.30* 0.41* 0.49*   CA 7.5* 7.8* 8.2* 9.7   ALB  --   --   --  2.2*   ALT  --   --   --  10*     No results for input(s): PH, PCO2, PO2, HCO3, FIO2 in the last 72 hours.     24 Hour Results:  Recent Results (from the past 24 hour(s))   METABOLIC PANEL, BASIC    Collection Time: 09/25/22  6:18 AM   Result Value Ref Range    Sodium 137 136 - 145 mmol/L    Potassium 4.2 3.5 - 5.1 mmol/L    Chloride 107 97 - 108 mmol/L    CO2 27 21 - 32 mmol/L    Anion gap 3 (L) 5 - 15 mmol/L    Glucose 84 65 - 100 mg/dL    BUN 9 6 - 20 MG/DL    Creatinine 0.23 (L) 0.55 - 1.02 MG/DL    BUN/Creatinine ratio 39 (H) 12 - 20      GFR est AA >60 >60 ml/min/1.73m2    GFR est non-AA >60 >60 ml/min/1.73m2    Calcium 7.5 (L) 8.5 - 10.1 MG/DL       Problem List:  Problem List as of 9/25/2022 Date Reviewed: 9/8/2022            Codes Class Noted - Resolved    Severe protein-calorie malnutrition ([facility]) (Chronic) ICD-10-CM: E43  ICD-9-CM: 262  9/23/2022 - Present        Pleural effusion ICD-10-CM: J90  ICD-9-CM: 511.9  9/22/2022 - Present        Dehydration ICD-10-CM: E86.0  ICD-9-CM: 276.51  8/9/2022 - Present        Malignant neoplasm of corpus uteri, except isthmus ([facility]) ICD-10-CM: C54.9  ICD-9-CM: 182.0  2/28/2013 - Present Medications reviewed  Current Facility-Administered Medications   Medication Dose Route Frequency    sodium chloride (NS) flush 5-40 mL  5-40 mL IntraVENous Q8H    sodium chloride (NS) flush 5-40 mL  5-40 mL IntraVENous PRN    acetaminophen (TYLENOL) tablet 650 mg  650 mg Oral Q6H PRN    Or    acetaminophen (TYLENOL) suppository 650 mg  650 mg Rectal Q6H PRN    polyethylene glycol (MIRALAX) packet 17 g  17 g Oral DAILY PRN    ondansetron (ZOFRAN ODT) tablet 4 mg  4 mg Oral Q8H PRN    Or    ondansetron (ZOFRAN) injection 4 mg  4 mg IntraVENous Q6H PRN    vancomycin (VANCOCIN) 1,250 mg in 0.9% sodium chloride 250 mL ([id number])  1,250 mg IntraVENous Q12H    rivaroxaban (XARELTO) tablet 20 mg  20 mg Oral DAILY WITH BREAKFAST    Vancomycin- pharmacy to dose   Other Rx Dosing/Monitoring    cefepime (MAXIPIME) 2 g in 0.9% sodium chloride (MBP/ADV) 100 mL MBP  2 g IntraVENous Q8H       Care Plan discussed with: Patient/Family and Nurse    Total time spent with patient: 30 minutes.     Rudy Cruz MD

## 2022-09-26 VITALS
SYSTOLIC BLOOD PRESSURE: 139 MMHG | TEMPERATURE: 98 F | HEIGHT: 65 IN | DIASTOLIC BLOOD PRESSURE: 81 MMHG | BODY MASS INDEX: 23.91 KG/M2 | HEART RATE: 91 BPM | OXYGEN SATURATION: 100 % | RESPIRATION RATE: 20 BRPM | WEIGHT: 143.5 LBS

## 2022-09-26 LAB
ALBUMIN SERPL-MCNC: 1.4 G/DL (ref 3.5–5)
ALBUMIN/GLOB SERPL: 0.4 {RATIO} (ref 1.1–2.2)
ALP SERPL-CCNC: 59 U/L (ref 45–117)
ALT SERPL-CCNC: 9 U/L (ref 12–78)
ANION GAP SERPL CALC-SCNC: 4 MMOL/L (ref 5–15)
AST SERPL-CCNC: 19 U/L (ref 15–37)
BACTERIA SPEC CULT: NORMAL
BASOPHILS # BLD: 0.1 K/UL (ref 0–0.1)
BASOPHILS NFR BLD: 1 % (ref 0–1)
BILIRUB SERPL-MCNC: 0.2 MG/DL (ref 0.2–1)
BUN SERPL-MCNC: 8 MG/DL (ref 6–20)
BUN/CREAT SERPL: 24 (ref 12–20)
CALCIUM SERPL-MCNC: 8 MG/DL (ref 8.5–10.1)
CHLORIDE SERPL-SCNC: 105 MMOL/L (ref 97–108)
CO2 SERPL-SCNC: 26 MMOL/L (ref 21–32)
CREAT SERPL-MCNC: 0.34 MG/DL (ref 0.55–1.02)
DIFFERENTIAL METHOD BLD: ABNORMAL
EOSINOPHIL # BLD: 0.3 K/UL (ref 0–0.4)
EOSINOPHIL NFR BLD: 4 % (ref 0–7)
ERYTHROCYTE [DISTWIDTH] IN BLOOD BY AUTOMATED COUNT: 18.1 % (ref 11.5–14.5)
GLOBULIN SER CALC-MCNC: 4 G/DL (ref 2–4)
GLUCOSE SERPL-MCNC: 92 MG/DL (ref 65–100)
GRAM STN SPEC: NORMAL
HCT VFR BLD AUTO: 37.6 % (ref 35–47)
HGB BLD-MCNC: 11.6 G/DL (ref 11.5–16)
IMM GRANULOCYTES # BLD AUTO: 0.1 K/UL (ref 0–0.04)
IMM GRANULOCYTES NFR BLD AUTO: 1 % (ref 0–0.5)
LYMPHOCYTES # BLD: 0.7 K/UL (ref 0.8–3.5)
LYMPHOCYTES NFR BLD: 11 % (ref 12–49)
MAGNESIUM SERPL-MCNC: 2 MG/DL (ref 1.6–2.4)
MCH RBC QN AUTO: 26.7 PG (ref 26–34)
MCHC RBC AUTO-ENTMCNC: 30.9 G/DL (ref 30–36.5)
MCV RBC AUTO: 86.6 FL (ref 80–99)
MONOCYTES # BLD: 0.8 K/UL (ref 0–1)
MONOCYTES NFR BLD: 12 % (ref 5–13)
NEUTS SEG # BLD: 4.6 K/UL (ref 1.8–8)
NEUTS SEG NFR BLD: 71 % (ref 32–75)
NRBC # BLD: 0 K/UL (ref 0–0.01)
NRBC BLD-RTO: 0 PER 100 WBC
PLATELET # BLD AUTO: 317 K/UL (ref 150–400)
PMV BLD AUTO: 8.5 FL (ref 8.9–12.9)
POTASSIUM SERPL-SCNC: 4.2 MMOL/L (ref 3.5–5.1)
PROT SERPL-MCNC: 5.4 G/DL (ref 6.4–8.2)
RBC # BLD AUTO: 4.34 M/UL (ref 3.8–5.2)
RBC MORPH BLD: ABNORMAL
SERVICE CMNT-IMP: NORMAL
SODIUM SERPL-SCNC: 135 MMOL/L (ref 136–145)
WBC # BLD AUTO: 6.6 K/UL (ref 3.6–11)

## 2022-09-26 PROCEDURE — 97166 OT EVAL MOD COMPLEX 45 MIN: CPT

## 2022-09-26 PROCEDURE — 83735 ASSAY OF MAGNESIUM: CPT

## 2022-09-26 PROCEDURE — 74011000250 HC RX REV CODE- 250: Performed by: HOSPITALIST

## 2022-09-26 PROCEDURE — 99232 SBSQ HOSP IP/OBS MODERATE 35: CPT | Performed by: OBSTETRICS & GYNECOLOGY

## 2022-09-26 PROCEDURE — 97161 PT EVAL LOW COMPLEX 20 MIN: CPT

## 2022-09-26 PROCEDURE — 74011250636 HC RX REV CODE- 250/636: Performed by: HOSPITALIST

## 2022-09-26 PROCEDURE — 80053 COMPREHEN METABOLIC PANEL: CPT

## 2022-09-26 PROCEDURE — 97535 SELF CARE MNGMENT TRAINING: CPT

## 2022-09-26 PROCEDURE — 74011250636 HC RX REV CODE- 250/636

## 2022-09-26 PROCEDURE — 85025 COMPLETE CBC W/AUTO DIFF WBC: CPT

## 2022-09-26 PROCEDURE — 74011250637 HC RX REV CODE- 250/637: Performed by: HOSPITALIST

## 2022-09-26 PROCEDURE — 74011000258 HC RX REV CODE- 258: Performed by: HOSPITALIST

## 2022-09-26 PROCEDURE — 36415 COLL VENOUS BLD VENIPUNCTURE: CPT

## 2022-09-26 PROCEDURE — 97116 GAIT TRAINING THERAPY: CPT

## 2022-09-26 RX ORDER — CEFPODOXIME PROXETIL 100 MG/1
100 TABLET, FILM COATED ORAL 2 TIMES DAILY
Qty: 14 TABLET | Refills: 0 | Status: SHIPPED | OUTPATIENT
Start: 2022-09-26 | End: 2022-10-04

## 2022-09-26 RX ORDER — METRONIDAZOLE 250 MG/1
250 TABLET ORAL 3 TIMES DAILY
Qty: 21 TABLET | Refills: 0 | Status: SHIPPED | OUTPATIENT
Start: 2022-09-26 | End: 2022-10-04

## 2022-09-26 RX ORDER — HEPARIN 100 UNIT/ML
300 SYRINGE INTRAVENOUS AS NEEDED
Status: DISCONTINUED | OUTPATIENT
Start: 2022-09-26 | End: 2022-09-26 | Stop reason: HOSPADM

## 2022-09-26 RX ADMIN — SODIUM CHLORIDE, PRESERVATIVE FREE 10 ML: 5 INJECTION INTRAVENOUS at 06:27

## 2022-09-26 RX ADMIN — VANCOMYCIN HYDROCHLORIDE 1250 MG: 1.25 INJECTION, POWDER, LYOPHILIZED, FOR SOLUTION INTRAVENOUS at 08:39

## 2022-09-26 RX ADMIN — ACETAMINOPHEN 650 MG: 325 TABLET, FILM COATED ORAL at 03:53

## 2022-09-26 RX ADMIN — HEPARIN 300 UNITS: 100 SYRINGE at 14:52

## 2022-09-26 RX ADMIN — RIVAROXABAN 20 MG: 20 TABLET, FILM COATED ORAL at 08:39

## 2022-09-26 RX ADMIN — CEFEPIME 2 G: 2 INJECTION, POWDER, FOR SOLUTION INTRAVENOUS at 06:26

## 2022-09-26 RX ADMIN — SODIUM CHLORIDE, PRESERVATIVE FREE 10 ML: 5 INJECTION INTRAVENOUS at 13:25

## 2022-09-26 NOTE — PROGRESS NOTES
[facility] [address], [address] [phone]  F [phone]       Patient Name: Jonny Garnger   Admit Date: 9/22/2022   OR Date: * No surgery found *   Visit Date: 9/26/2022        SUBJECTIVE:    Feeling better following both right thoracentesis and I&D of perianal abscess on Friday. OBJECTIVE:    Patient Vitals for the past 24 hrs:   Temp Pulse Resp BP SpO2   09/26/22 1400 -- 91 -- -- --   09/26/22 1325 -- 91 20 139/81 100 %   09/26/22 1200 -- 94 -- -- --   09/26/22 1000 -- 92 -- -- --   09/26/22 0841 -- 88 25 123/76 95 %   09/26/22 0600 -- 79 -- -- --   09/26/22 0356 -- 87 -- -- --   09/26/22 0335 98 °F (36.7 °C) 89 21 111/70 95 %   09/26/22 0155 -- 78 -- -- --   09/25/22 2301 98.4 °F (36.9 °C) 89 19 122/75 96 %   09/25/22 2156 -- 83 -- -- --   09/25/22 2033 98.1 °F (36.7 °C) 94 18 123/73 96 %   09/25/22 1955 -- 95 -- -- --   09/25/22 1800 -- 96 -- -- --               Physical Exam     General:  alert, cooperative, no distress     Cardiac:  Regular rate and rhythm        Lungs:  decreased in bilateral bases  Abdomen:  soft, non-tender, without masses or organomegaly       Wound:  clean, dry, no drainage   Extremity: extremities normal, atraumatic, no cyanosis or edema    Data Review  Lab Results   Component Value Date/Time    WBC 6.6 09/26/2022 03:45 AM    ABS.  NEUTROPHILS 4.6 09/26/2022 03:45 AM    HGB 11.6 09/26/2022 03:45 AM    HCT 37.6 09/26/2022 03:45 AM    MCV 86.6 09/26/2022 03:45 AM    MCH 26.7 09/26/2022 03:45 AM    PLATELET 334 10/47/7298 03:45 AM     Lab Results   Component Value Date/Time    Sodium 135 (L) 09/26/2022 03:45 AM    Potassium 4.2 09/26/2022 03:45 AM    Chloride 105 09/26/2022 03:45 AM    CO2 26 09/26/2022 03:45 AM    Glucose 92 09/26/2022 03:45 AM    BUN 8 09/26/2022 03:45 AM    Creatinine 0.34 (L) 09/26/2022 03:45 AM    Calcium 8.0 (L) 09/26/2022 03:45 AM    Albumin 1.4 (L) 09/26/2022 03:45 AM    Bilirubin, total 0.2 09/26/2022 03:45 AM    ALT (SGPT) 9 (L) 09/26/2022 03:45 AM    Alk. phosphatase 59 09/26/2022 03:45 AM       CT of chest/abdomen/pelvis (9/22/22)  CHEST WALL: Left Port-A-Cath in place with catheter traversing expected course  to terminate in the SVC. No mass or enlarged adenopathy. THYROID: Stable right thyroid hypodensity. MEDIASTINUM: Mediastinal lymphadenopathy, increased,   3-54 19 x 23 mm precarinal previously 18 x 13 mm. Right hilar adenopathy 22 x 22 mm previously 20 x 17 mm. .  THORACIC AORTA: No dissection or aneurysm. MAIN PULMONARY ARTERY: Minimal segmental pulmonary emboli. No evidence of right  ventricular strain. TRACHEA/BRONCHI: Patent. ESOPHAGUS: No wall thickening or dilatation. HEART: Normal in size. PLEURA: Large bilateral pleural effusions. No pneumothorax. LUNGS: Compressive atelectasis overlies large pleural effusions bilaterally. Aerated lungs are clear. LIVER: Portal vein is patent. No focal mass lesion. BILIARY TREE: Gallbladder is within normal limits. CBD is not dilated. SPLEEN: Unremarkable with no focal lesion. PANCREAS: No mass or ductal dilatation. ADRENALS: Unremarkable. KIDNEYS: No mass, calculus, or hydronephrosis. STOMACH: Unremarkable. SMALL BOWEL: No dilatation or wall thickening. COLON: There is fecal stasis. There is colonic diverticulosis. APPENDIX: Not visualized. PERITONEUM: Extensive peritoneal implants and dense omental caking is  redemonstrated without significant interval change. C6-46 anterior abdominal omental caking unchanged at 12.4 x 3.9 cm. Additional  large areas interposed between the greater curvature of the gastric body and the  spleen. Stable ascites status post right peritoneal drainage catheter placement  which traverses from the right abdominal wall to along the undersurface of the  liver.   RETROPERITONEUM:      Portacaval node at C6-41 increased 2.3 x 2.3 cm previously 1.7 x 2.4 cm   right common iliac node measuring 2.8 x 1.8 cm 1.6 x 2.6 cm (  REPRODUCTIVE ORGANS: Hysterectomy  URINARY BLADDER: No mass or calculus. BONES: Degenerative spine change. No acute fracture or aggressive lesion. ABDOMINAL WALL: Probably edematous with no mass or hernia. ADDITIONAL COMMENTS: Minimal edematous change/inflammatory change abutting the  rectum/anus. There is no loculated collection in the subcutaneous tissue. IMPRESSION  No loculated collection in the perianal/perirectal tissue. Slight interval increase in extensive metastatic disease. Slightly increased mediastinal and hilar adenopathy. Slightly increased mesenteric adenopathy. Extensive peritoneal carcinomatosis with omental caking, not significantly  changed. Large bilateral pleural effusions are redemonstrated with overlying compressive  atelectasis. IMPRESSION/PLAN:    Oncologic:  Progressive/recurrent papillary serous endometrial cancer. CT demonstrated large bilateral effusions, as well as progressive disease in the abdomen/pelvis. She underwent a left thoracentesis on 9/22, draining 1700 cc of fluid, and a right thoracentesis on 9/23, draining 1600 cc of fluid. Her peritoneal Aspira catheter was drained on 9/23, draining 800 cc of chylous ascites. She has been on single agent Avastin, completing 10 cycles. With progression of disease, we will now need to discuss other treatment options. She is aware that options are limited at this point. Heme/CV:  Hemodynamically stable. Renal:  Adequate UOP. FEN/GI:  Diet as tolerated. Severe protein/calorie malnutrition due to disease   ID/Wound:  Afebrile. Mildly elevated WBC upon admission. This is likely due to the perianal abscess. This was I&D'd yesterday by Dr. Steffanie Martinez with colorectal surgery. She is now concerned that she might be getting a yeast infection. Will give her a dose of PO Diflucan. Dispostion:  Feeling much better today. Met with  yesterday to discuss advance directives and POA. She wants to get things set while she still has clarity. Will plan to have a \"family meeting\" at some point in the near future to discuss treatment and prognosis. She is to be discharged later today. She has a follow-up appointment with me next week.         Ronald Mccartney MD  9/26/2022  10:50 AM

## 2022-09-26 NOTE — DISCHARGE INSTRUCTIONS
Discharge Instructions       PATIENT ID: Beatriz Castillo  MRN: [id number]   YOB: 1955    DATE OF ADMISSION: [unfilled]    DATE OF DISCHARGE: 9/26/2022    PRIMARY CARE PROVIDER: @PCP@     ATTENDING PHYSICIAN: [unfilled]  DISCHARGING PROVIDER: Tera Oliver MD    To contact this individual call [phone] and ask the  to page. If unavailable ask to be transferred the Adult Hospitalist Department. DISCHARGE DIAGNOSES Sepsis from perianal abscess    CONSULTATIONS: [unfilled]    PROCEDURES/SURGERIES: * No surgery found *    PENDING TEST RESULTS:   At the time of discharge the following test results are still pending:     FOLLOW UP APPOINTMENTS:   [unfilled]     ADDITIONAL CARE RECOMMENDATIONS:     DIET: Cardiac Diet    ACTIVITY: Activity as tolerated    WOUND CARE: as instructed    EQUIPMENT needed:       Radiology      DISCHARGE MEDICATIONS:   See Medication Reconciliation Form    It is important that you take the medication exactly as they are prescribed. Keep your medication in the bottles provided by the pharmacist and keep a list of the medication names, dosages, and times to be taken in your wallet. Do not take other medications without consulting your doctor. NOTIFY YOUR PHYSICIAN FOR ANY OF THE FOLLOWING:   Fever over 101 degrees for 24 hours. Chest pain, shortness of breath, fever, chills, nausea, vomiting, diarrhea, change in mentation, falling, weakness, bleeding. Severe pain or pain not relieved by medications. Or, any other signs or symptoms that you may have questions about.       DISPOSITION:    Home With:   OT  PT  HH  RN       SNF/Inpatient Rehab/LTAC    Independent/assisted living    Hospice    Other:     CDMP Checked:   Yes x     PROBLEM LIST Updated:  Yes x       Signed:   Tera Oliver MD  9/26/2022  2:01 PM

## 2022-09-26 NOTE — PROGRESS NOTES
ECHO:  RUR: 16%    Disposition:  Home today    Care Management Interventions  PCP Verified by CM: Yes  Last Visit to PCP: 03/25/22  Transition of Care Consult (CM Consult): Home Health, Discharge Planning  Discharge Durable Medical Equipment: No  Physical Therapy Consult: Yes  Occupational Therapy Consult: Yes  Speech Therapy Consult: No  Support Systems: Child(balaji)  Confirm Follow Up Transport: Family  Discharge Location  Patient Expects to be Discharged to[de-identified] Home with home health     Medicare pt has received, reviewed, and signed 2nd IM letter informing them of their right to appeal the discharge. Signed copied has been placed on pt bedside chart. Chart reviewed. Patient admitted here from home [facility]) with complaints of Fever, SOB, Boil on buttocks, Bilateral Pleural Effusion. The patient has a past medical hx of serous adenocarcinoma of the endometrium. The patient is known to [facility]. 2013- Laproscopic hysterectomy,. Aspira catheter on abdomen 8/11/22 to help manage ascites. CM met with patient to introduce self and role. Demographics verified. Pharm:  Gr[facility] ([facility])  DME: shower chair  D/C transportation:  Family    Reason for Admission:   sob, fever bilateral pleural effusion                    RUR Score:  16%  Medium                PCP: First and Last name:   Analy Figueroa MD     Name of Practice: [address] [place] [address]   Are you a current patient: Yes/No:    Approximate date of last visit:    Can you participate in a virtual visit if needed:     Do you (patient/family) have any concerns for transition/discharge?                    Plan for utilizing home health:   to be determined    Current Advanced Directive/Advance Care Plan:  Full Code      Healthcare Decision Maker:   Click here to complete [address] including selection of the Healthcare Decision Maker Relationship (ie \"Primary\")            Primary Decision Maker: [facility] [phone]    Transition of Care Plan:   home possibly today. D/C confirmed for today. PT/OT recommending HH. [facility] CM faxing orders to agency at [phone]. Face To Face document received via fax. CM continuing to follow.     Vish Bates, [address], [address]

## 2022-09-26 NOTE — PROGRESS NOTES
PHYSICAL THERAPY EVALUATION/DISCHARGE  Patient: Mike Smith (89 y.o. female)  Date: 9/26/2022  Primary Diagnosis: Pleural effusion [J90]       Precautions:          ASSESSMENT  Based on the objective data described below, the patient presents with decreased activity tolerance, decreased functional mobility, and impaired balance. Pt generally mobilized at a Modified Independent to Independent level during today's session. Pt received up in chair, on RA, on tele, having just completed working with OT and agreeable to work with PT. Pt went sit>stand and ambulated in hallway without AD or use of hands for support. At [facility] point of trip, pt successfully completed stair training and then ambulated back to room under same conditions. Pt endorsed fatigue and SOB upon returning to room and went stand>sit in bedside chair while vitals assessed and stable (SpO2 >97%). Pt then stood and completed [employer] (see functional measure section below). Given pt's current level of function, pt is safe to d/c home with HHPT at this time from a PT perspective. Pt endorsed recent marked decline in endurance, strength, and functional mobility and pt advised they will likely require OPPT after completing HHPT. Pt verbalized understanding and took notes regarding OPPT. Pt declined going directly to OPPT 2/2 wanting to get home and stay home for a little while to \"get my house in order\". Pt does not require any DME at this time. Will complete order    Functional Outcome Measure: The patient scored 49/56 on the Ramos Balance Test outcome measure which is indicative of low fall risk. Other factors to consider for discharge: lives alone, recent decrease in function     Further skilled acute physical therapy is not indicated at this time.      PLAN :  Recommendation for discharge: (in order for the patient to meet his/her long term goals)  HHPT followed by OPPT    This discharge recommendation:  Has been made in collaboration with the attending provider and/or case management    IF patient discharges home will need the following DME: none       SUBJECTIVE:   Patient stated I get tired fast.    OBJECTIVE DATA SUMMARY:   HISTORY:    Past Medical History:   Diagnosis Date    Abnormal Pap smear 1995    with f/u normal    Anemia     Arthritis     Asthma     Cancer ([facility] [address]     ENDOMETRIAL    Environmental allergies     GERD (gastroesophageal reflux disease)     Goiter     Other unknown and unspecified cause of morbidity or mortality     POSSIBLE ESOPHAGEAL SPASM, ? OBSTRUCTION DUE TO GOITER    PMB (postmenopausal bleeding)     S/P chemotherapy, time since greater than 12 weeks     LAST CHEMO 10/2014 PER PATIENT    Thyroid disease     goiter     Past Surgical History:   Procedure Laterality Date    HX APPENDECTOMY      HX BUNIONECTOMY      HX GYN  3/13/13    TLH/BSO    HX HEENT  4/22/13    TOOTH REMOVED    HX ORTHOPAEDIC  1980'S    BUNIONECTOMY    HX VASCULAR ACCESS      port    IR INSERT INTRAPERI TUNL CATH PERC  8/11/2022    MA BREAST SURGERY PROCEDURE UNLISTED  1/12/16    right breast bx       Prior level of function: independent without DME, ambulating community distances, works, drives  Personal factors and/or comorbidities impacting plan of care: [address] [place] [address] Environment: Private residence  # Steps to Enter: 5  Rails to Enter: Yes  Hand Rails : Right  One/Two Story Residence: One story  Living Alone: Yes  Support Systems: Other Family Member(s)  Patient Expects to be Discharged to[de-identified] Home with home health  Current DME Used/Available at Home: Shower chair (walking stick)  Tub or Shower Type: Tub/Shower combination    EXAMINATION/PRESENTATION/DECISION MAKING:   Critical Behavior:  Neurologic State: Alert, Appropriate for age  Orientation Level: Oriented X4  Cognition: Appropriate decision making, Appropriate safety awareness, Follows commands     Hearing:   Auditory  Auditory Impairment: None    Range Of Motion:  AROM: Within functional limits                       Strength:    Strength: Generally decreased, functional                    Tone & Sensation:   Tone: Normal              Sensation: Impaired (occasional neuropathy in B toes)               Coordination:  Coordination: Within functional limits  Vision:   Acuity: Within Defined Limits  Corrective Lenses: Reading glasses  Functional Mobility:  Bed Mobility:     Supine to Sit: Other (comment) (NT, started and ended session up in chair)  Sit to Supine: Other (comment) (NT, started and ended session up in chair)     Transfers:  Sit to Stand: Modified independent  Stand to Sit: Modified independent                    Balance:   Sitting: Intact  Standing: Intact; Without support  Ambulation/Gait Training:  Distance (ft): 275 Feet (ft)  Assistive Device: Gait belt  Ambulation - Level of Assistance: Independent        Gait Abnormalities: Decreased step clearance; Altered arm swing;Trunk sway increased        Base of Support: Center of gravity altered     Speed/Ade: Slow           Interventions: Safety awareness training;Verbal cues           Stairs:  Number of Stairs Trained: 5  Stairs - Level of Assistance: Modified independent   Rail Use: Left         Functional Measure:  Ramos Balance Test:    Sitting to Standin  Standing Unsupported: 4  Sitting with Back Unsupported: 4  Standing to Sittin  Transfers: 4  Standing Unsupported with Eyes Closed: 3  Standing Unsupported with Feet Together: 3  Reach Forward with Outstretched Arm: 4   Object: 3  Turn to Look Over Shoulders: 3  Turn 360 Degrees: 4  Alternate Foot on Step/Stool: 3  Standing Unsupported One Foot in Front: 3  Stand on One Leg: 3  Total: 49/56         56=Maximum possible score;   0-20=High fall risk  21-40=Moderate fall risk   41-56=Low fall risk                Physical Therapy Evaluation Charge Determination   History Examination Presentation Decision-Making   HIGH Complexity :3+ comorbidities / personal factors will impact the outcome/ POC  HIGH Complexity : 4+ Standardized tests and measures addressing body structure, function, activity limitation and / or participation in recreation  LOW Complexity : Stable, uncomplicated  Other outcome measures manuel  LOW       Based on the above components, the patient evaluation is determined to be of the following complexity level: LOW     Pain Rating:  Pt complained of pain at port site but did not rate. RN made aware at end of session      Activity Tolerance:   Good, SpO2 stable on RA, and requires rest breaks      After treatment patient left in no apparent distress:   Sitting in chair and Call bell within reach    COMMUNICATION/EDUCATION:   The patients plan of care was discussed with: Occupational therapist, Registered nurse, and Case management. Fall prevention education was provided and the patient/caregiver indicated understanding., Patient/family have participated as able in goal setting and plan of care. , and Patient/family agree to work toward stated goals and plan of care.     Thank you for this referral.  Palmira Pizarro, PT   Time Calculation: 26 mins

## 2022-09-26 NOTE — PROGRESS NOTES
OCCUPATIONAL THERAPY EVALUATION/DISCHARGE  Patient: Tahir Arias (52 y.o. female)  Date: 9/26/2022  Primary Diagnosis: Pleural effusion [J90]       Precautions:        ASSESSMENT  Based on the objective data described below, the patient presents with generalized weakness and decreased activity tolerance s/p admission for SOB, buttock boil, B pleural effusions and R thoracentesis. Pt with hx of endometrial cancer and currently on treatment. She lives alone and reports functional decline in last 6 months. Pt was mod I for supine to sit, SBA for transfers without AD and completed toileting with supervision. She retrieved ADL items from high/low settings with no LOB. Pt reports using reachers at home to assist with lower/dropped items due to SOB with forward flexion. At this time, pt would best benefit from [address] [place] [address] and PT services. She would benefit from education on energy conservation, home safety, ADL and IADL setup/management. Pt in agreement. Current Level of Function (ADLs/self-care): SBA to supervision    Functional Outcome Measure: The patient scored 90 on the barthel outcome measure     Other factors to consider for discharge: lives alone, increased weakness at home     PLAN :  Recommend with staff: OOB to chair, bathroom ADLs    Recommendation for discharge: (in order for the patient to meet his/her long term goals)  Occupational therapy at least 2 days/week in the home     This discharge recommendation:  Has been made in collaboration with the attending provider and/or case management    IF patient discharges home will need the following DME: recommend grab bars, elevated toilet seat/ BSC for hand rails over toilet       SUBJECTIVE:   Patient stated it has been difficult at home, but I have been trying.     OBJECTIVE DATA SUMMARY:   HISTORY:   Past Medical History:   Diagnosis Date    Abnormal Pap smear 1995    with f/u normal    Anemia     Arthritis     Asthma     Cancer ([facility] [address] ENDOMETRIAL    Environmental allergies     GERD (gastroesophageal reflux disease)     Goiter     Other unknown and unspecified cause of morbidity or mortality     POSSIBLE ESOPHAGEAL SPASM, ? OBSTRUCTION DUE TO GOITER    PMB (postmenopausal bleeding)     S/P chemotherapy, time since greater than 12 weeks     LAST CHEMO 10/2014 PER PATIENT    Thyroid disease     goiter     Past Surgical History:   Procedure Laterality Date    HX APPENDECTOMY      HX BUNIONECTOMY      HX GYN  3/13/13    TLH/BSO    HX HEENT  4/22/13    TOOTH REMOVED    HX ORTHOPAEDIC  1980'S    BUNIONECTOMY    HX VASCULAR ACCESS      port    IR INSERT INTRAPERI TUNL CATH PERC  8/11/2022    SD BREAST SURGERY PROCEDURE UNLISTED  1/12/16    right breast bx       Prior Level of Function/Environment/Context: pt lives alone, stated last fall was about 2 years ago in her yard. She drives and works full time. She performs her own ADLs and IADLs but reports needing rest breaks afterwards, needs to sit to wash dishes or meal prep. Pt mainly does frozen meals  Expanded or extensive additional review of patient history:   Home Situation  Home Environment: Private residence  # Steps to Enter: 5  Rails to Enter: Yes  Hand Rails : Right  One/Two Story Residence: One story  Living Alone: Yes  Support Systems: Other Family Member(s)  Patient Expects to be Discharged to[de-identified] Home with home health  Current DME Used/Available at Home: Shower chair (walking stick)  Tub or Shower Type: Tub/Shower combination    Hand dominance: Right    EXAMINATION OF PERFORMANCE DEFICITS:  Cognitive/Behavioral Status:                      Skin: intact    Edema: none noted    Hearing:   Auditory  Auditory Impairment: None    Vision/Perceptual:                           Acuity: Within Defined Limits    Corrective Lenses: Reading glasses    Range of Motion:  \  AROM: Within functional limits- B UEs                         Strength:    Strength: Generally decreased, functional Coordination:  Coordination: Within functional limits  Fine Motor Skills-Upper: Left Intact; Right Intact    Gross Motor Skills-Upper: Left Intact; Right Intact    Tone & Sensation:  \  Tone: Normal  Sensation: Impaired (occasional neuropathy in B toes)                      Balance:  Sitting: Intact  Standing: Intact; Without support    Functional Mobility and Transfers for ADLs:  Bed Mobility:  Supine to Sit: Modified independent    Transfers:  Sit to Stand: Stand-by assistance  Stand to Sit: Stand-by assistance  Bed to Chair: Stand-by assistance  Toilet Transfer : Supervision    ADL Assessment:  Feeding: Independent    Oral Facial Hygiene/Grooming: Supervision    Bathing: Stand-by assistance    Type of Bath: Chlorhexidine (CHG)    Upper Body Dressing: Supervision    Lower Body Dressing: Supervision    Toileting: Supervision                ADL Intervention and task modifications:        Patient instructed and indicated understanding the benefits of maintaining activity tolerance, functional mobility, and independence with self care tasks during acute stay  to ensure safe return home and to baseline. Encouraged patient to increase frequency and duration OOB, be out of bed for all meals, perform daily ADLs (as approved by RN/MD regarding bathing etc), and performing functional mobility to/from bathroom. Type of Bath: Chlorhexidine (CHG)                         Functional Measure:    Barthel Index:  Bathin  Bladder: 10  Bowels: 10  Groomin  Dressing: 10  Feeding: 10  Mobility: 15  Stairs: 0  Toilet Use: 10  Transfer (Bed to Chair and Back): 15  Total: 90/100      The Barthel ADL Index: Guidelines  1. The index should be used as a record of what a patient does, not as a record of what a patient could do. 2. The main aim is to establish degree of independence from any help, physical or verbal, however minor and for whatever reason. 3. The need for supervision renders the patient not independent.   4. A patient's performance should be established using the best available evidence. Asking the patient, friends/relatives and nurses are the usual sources, but direct observation and common sense are also important. However direct testing is not needed. 5. Usually the patient's performance over the preceding 24-48 hours is important, but occasionally longer periods will be relevant. 6. Middle categories imply that the patient supplies over 50 per cent of the effort. 7. Use of aids to be independent is allowed. Score Interpretation (from [address])    Independent   60-79 Minimally independent   40-59 Partially dependent   20-39 Very dependent   <20 Totally dependent     -Isidro Shepherd., Barthel, D.W. (1965). Functional evaluation: the Barthel Index. [address] ([address] (1997). The Barthel activities of daily living index: self-reporting versus actual performance in the old (> or = 75 years). Journal of [address] [place] [address] 45(7), [facility], GENIE, Dale Salgado., Bozena Yang. (1999). Measuring the change in disability after inpatient rehabilitation; comparison of the responsiveness of the Barthel Index and Functional [place] Measure. Journal of Neurology, Neurosurgery, and Psychiatry, 66(4), 042-738. Moni Jeffersno, N.J.A, BILL Chicas, & Erin Knox M.A. (2004) Assessment of post-stroke quality of life in cost-effectiveness studies: The usefulness of the Barthel Index and the EuroQoL-5D.  Quality of Life Research, 15, 217-69         Occupational Therapy Evaluation Charge Determination   History Examination Decision-Making   MEDIUM Complexity : Expanded review of history including physical, cognitive and psychosocial  history  LOW Complexity : 1-3 performance deficits relating to physical, cognitive , or psychosocial skils that result in activity limitations and / or participation restrictions  MEDIUM Complexity : Patient may present with comorbidities that affect occupational performnce. Miniml to moderate modification of tasks or assistance (eg, physical or verbal ) with assesment(s) is necessary to enable patient to complete evaluation       Based on the above components, the patient evaluation is determined to be of the following complexity level: LOW   Pain Rating:  No complainst    Activity Tolerance:   Good    After treatment patient left in no apparent distress:    Sitting in chair and Call bell within reach    COMMUNICATION/EDUCATION:   The patients plan of care was discussed with: Physical therapist, Registered nurse, and Case management.      Thank you for this referral.  Arturo Wilcox, OT  Time Calculation: 38 mins

## 2022-09-26 NOTE — DISCHARGE SUMMARY
Discharge Summary       PATIENT ID: Jonny Granger  MRN: [id number]   YOB: 1955    DATE OF ADMISSION: 9/22/2022  1:15 PM    DATE OF DISCHARGE: 9/26/22   PRIMARY CARE PROVIDER: Raquel Reyes MD     ATTENDING PHYSICIAN: Es Cabrera  DISCHARGING PROVIDER: Yessica Roa MD    To contact this individual call [phone] and ask the  to page. If unavailable ask to be transferred the Adult Hospitalist Department. CONSULTATIONS: IP CONSULT TO GYNECOLOGICAL ONCOLOGY  IP CONSULT TO HOSPITALIST  IP CONSULT TO COLORECTAL SURGERY    PROCEDURES/SURGERIES: * No surgery found *    DISCHARGE DIAGNOSES: Sepsis from perianal abscess    78 y.o. female who presents with shortness of breath and fever. Patient said that she felt feverish on Tuesday night did not measure the temperature but yesterday it was 1 1 and gradually become more short of breath and he decided to come to the ED. in the ED code sepsis was called. Patient also complained of small boil at the perianal area which is now grown bigger in last 2 days. Patient has past medical history of stage Ia grade 3 uterine carcinoma he underwent laparoscopic hysterectomy in March 2013 and finished 6 cycles of adjuvant chemo which she completed. She follows by Dr. Cheryle Castilla. She also had a Spira catheter placed on the abdomen on 8/11/2022 to help manage her ascites. Now came to the ED with shortness of breath with bilateral pleural effusion. [address] [facility] COURSE:   Shortness of breath possibly due to bilateral pleural effusion  -Status post left and right thoracentesis cytology still pending follow-up as an outpatient with Dr. Cheryle Castilla       Perianal abscess   -Drained by colorectal surgeon on 09/23/2022 at bedside.    -Continue current management including antibiotic and follow cultures, negative so far.    ----Antibiotics at discharge cefepime and metronidazole will cover.       Sepsis due to above and/or atelectasis -Patient is symptomatically better and vitals are improving.  -Continue antibiotic for now. -Sepsis reassessment completed. Endometrial cancer with peritoneal carcinomatosis  -Appreciated gynecology input.  -About 800 cc of chylous fluid drain abdominal Aspira on 09/23/2022.  -Progressive disease in spite of several prior regimens. -S/p Avastin on Thursday at outpatient infusion center.  -She has Anna Ning catheter for drainage of ascites. -Management per OB/GYN oncology Dr. Caterina Daniel. DISCHARGE DIAGNOSES / PLAN:      Discharge home    BMI: Body mass index is 23.88 kg/m². . This patient: Has a BMI within normal limits. PENDING TEST RESULTS:   At the time of discharge the following test results are still pending:      ADDITIONAL CARE RECOMMENDATIONS:        NOTIFY YOUR PHYSICIAN FOR ANY OF THE FOLLOWING:   Fever over 101 degrees for 24 hours. Chest pain, shortness of breath, fever, chills, nausea, vomiting, diarrhea, change in mentation, falling, weakness, bleeding. Severe pain or pain not relieved by medications, as well as any other signs or symptoms that you may have questions about. FOLLOW UP APPOINTMENTS:    Follow-up Information       Follow up With Specialties Details Why Contact Info    Ta Steele MD Family Medicine Follow up in 1 week(s)  [address], Infusion Therapy, Hospice Follow up  [address] [place] [address]  [phone]               DIET: Regular Diet    ACTIVITY: Activity as tolerated    EQUIPMENT needed:     DISCHARGE MEDICATIONS:  Current Discharge Medication List        START taking these medications    Details   cefpodoxime (VANTIN) 100 mg tablet Take 1 Tablet by mouth two (2) times a day for 7 days.   Qty: 14 Tablet, Refills: 0  Start date: 9/26/2022, End date: 10/3/2022      metroNIDAZOLE (FlagyL) 250 mg tablet Take 1 Tablet by mouth three (3) times daily for 7 days. Qty: 21 Tablet, Refills: 0  Start date: 9/26/2022, End date: 10/3/2022           CONTINUE these medications which have NOT CHANGED    Details   Xarelto 20 mg tab tablet TAKE 1 TABLET BY MOUTH DAILY  Qty: 30 Tablet, Refills: 5      BACITRACIN, BULK, by Does Not Apply route. With zinc ointment      acetaminophen (TYLENOL) 325 mg tablet Take  by mouth as needed. !! lisinopriL (PRINIVIL, ZESTRIL) 10 mg tablet Take 1 Tab by mouth daily. Qty: 30 Tab, Refills: 2      !! lisinopriL (PRINIVIL, ZESTRIL) 5 mg tablet Take 2 Tabs by mouth daily. Qty: 30 Tab, Refills: 5      ondansetron (ZOFRAN ODT) 4 mg disintegrating tablet Take 1 Tab by mouth every eight (8) hours as needed for Nausea. Indications: nausea and vomiting caused by cancer drugs  Qty: 30 Tab, Refills: 3    Associated Diagnoses: Malignant neoplasm of corpus uteri, except isthmus (HCC)      lidocaine-prilocaine (EMLA) topical cream Apply small amount over port area and cover with band aid one hour before chemo  Qty: 30 g, Refills: 3    Associated Diagnoses: Malignant neoplasm of corpus uteri, except isthmus (HCC)      guaifenesin (MUCINEX PO) Take  by mouth.      loratadine (CLARITIN) 10 mg tablet Take 10 mg by mouth.      epinastine 0.05 % drop Apply  to eye. raNITIdine (ZANTAC) 150 mg tablet ZANTAC TABS      cyclobenzaprine (FLEXERIL) 5 mg tablet take 1 tablet by mouth three times a day if needed  Refills: 0      valACYclovir (VALTREX) 1 gram tablet Take 1 Tab by mouth three (3) times daily. Qty: 21 Tab, Refills: 3      EPINEPHrine (EPIPEN) 0.3 mg/0.3 mL injection 0.3 mg by IntraMUSCular route once as needed. ketotifen (ZADITOR) 0.025 % (0.035 %) ophthalmic solution Administer 1 Drop to both eyes every morning. SAL ACID/UREA/PETROLATUM,WHITE (KERASAL FOOT EX) by Apply Externally route daily as needed.       ACCU-CHEK HELDER PLUS TEST STRP strip Refills: 0      NITROSTAT 0.4 mg SL tablet       CALCIUM CARBONATE (MARCELLO-SELTZER ANTACID PO) Take  by mouth daily as needed. !! - Potential duplicate medications found. Please discuss with provider. STOP taking these medications       Cetirizine 10 mg cap Comments:   Reason for Stopping:               DISPOSITION:   x Home With:   OT  PT  HH  RN       Long term SNF/Inpatient Rehab    Independent/assisted living    Hospice    Other:       PATIENT CONDITION AT DISCHARGE:     Functional status    Poor     Deconditioned    x Independent      Cognition   x  Lucid     Forgetful     Dementia      Catheters/lines (plus indication)    Byrnes    x PICC -- catheter in the abdomen    PEG    x None      Code status    x Full code     DNR      PHYSICAL EXAMINATION AT DISCHARGE:  General:          Alert, cooperative, no distress, appears stated age. HEENT:           Atraumatic, anicteric sclerae, pink conjunctivae                          No oral ulcers, mucosa moist, throat clear, dentition fair  Neck:               Supple, symmetrical  Lungs:             Clear to auscultation bilaterally. No Wheezing or Rhonchi. No rales. Chest wall:      No tenderness  No Accessory muscle use. Heart:              Regular  rhythm,  No  murmur   No edema  Abdomen:        Soft, non-tender. Not distended. Bowel sounds normal  Extremities:     No cyanosis. No clubbing,                            Skin turgor normal, Capillary refill normal  Skin:                Not pale. Not Jaundiced  No rashes   Psych:             Not anxious or agitated.   Neurologic:      Alert, moves all extremities, answers questions appropriately and responds to commands       CHRONIC MEDICAL DIAGNOSES:  Problem List as of 9/26/2022 Date Reviewed: 9/8/2022            Codes Class Noted - Resolved    Severe protein-calorie malnutrition ([facility] [address] (Chronic) ICD-10-CM: E43  ICD-9-CM: 262  9/23/2022 - Present        Pleural effusion ICD-10-CM: J90  ICD-9-CM: 511.9  9/22/2022 - Present        Dehydration ICD-10-CM: E86.0  ICD-9-CM: 276.51  8/9/2022 - Present        Malignant neoplasm of corpus uteri, except isthmus ([facility] [address] ICD-10-CM: C54.9  ICD-9-CM: 182.0  2/28/2013 - Present           Greater than 30  minutes were spent with the patient on counseling and coordination of care    Signed:   Yessica Roa MD  9/26/2022  3:25 PM

## 2022-09-27 ENCOUNTER — PATIENT OUTREACH (OUTPATIENT)
Dept: CASE MANAGEMENT | Age: 67
End: 2022-09-27

## 2022-09-27 NOTE — ACP (ADVANCE CARE PLANNING)
Advance Care Planning     General Advance Care Planning (ACP) Conversation      Date of Conversation: 9/27/2022  Conducted with: Patient with Decision Making Capacity    Healthcare Decision Maker:     Primary Decision Maker: [address] [place] [address] [phone]  Click here to complete [address] including selection of the Healthcare Decision Maker Relationship (ie \"Primary\")        Content/Action Overview:    Has ACP document(s) on file - reflects the patient's care preferences  Reviewed DNR/DNI and patient elects Full Code (Attempt Resuscitation)         Length of Voluntary ACP Conversation in minutes:  <16 minutes (Non-Billable)    Ye George RN

## 2022-09-27 NOTE — PROGRESS NOTES
Care Transitions Initial Call    Call within 2 business days of discharge: Yes     Patient: Maeve Cespedes Patient : 1955 MRN: [id number]    Last Discharge  Street       Date Complaint Diagnosis Description Type Department Provider    22 Shortness of Breath; Skin Problem Malignant pleural effusion . .. ED to Hosp-Admission (Discharged) (ADMIT) Kelly Baez MD; Leticia Ellington. .. Was this an external facility discharge? No Discharge Facility: [facility]    Challenges to be reviewed by the provider   Additional needs identified to be addressed with provider: yes  home health care- [facility] to start caring for patient  medications- cefepime and flagyl for bilateral pleural effusions with thoracentesis and fever with sob. Hospitalized from  to          Method of communication with provider : chart routing    Discussed COVID-19 related testing which was not done at this time. Test results were not done. Patient informed of results, if available? Not done     Advance Care Planning:   Does patient have an Advance Directive: yes, reviewed and current. Inpatient Readmission Risk score: Unplanned Readmit Risk Score: 15.8    Was this a readmission? no   Patient stated reason for the admission: I had a fever and was sob    Patients top risk factors for readmission: functional physical ability, medical condition-bilateral pleural effusions, thoracentesis, spira drain, uterine ca, polypharmacy, and utilization of services   Interventions to address risk factors: Scheduled appointment with Specialist-10/7/22 with dr. Susan Garvin oncologist, Obtained and reviewed discharge summary and/or continuity of care documents, and Communication with home health agencies or other community services the patient is currently using-[facility]    Care Transition Nurse (CTN) contacted the patient by telephone to perform post hospital discharge assessment.  Verified name and  with patient as identifiers. Provided introduction to self, and explanation of the CTN role. CTN reviewed discharge instructions, medical action plan and red flags with patient who verbalized understanding. Were discharge instructions available to patient? yes. Reviewed appropriate site of care based on symptoms and resources available to patient including: Specialist and [address] [place] [address] Loco Peng. Patient given an opportunity to ask questions and does not have any further questions or concerns at this time. The patient agrees to contact the PCP office for questions related to their healthcare. Medication reconciliation was performed with patient, who verbalizes understanding of administration of home medications. Advised obtaining a 90-day supply of all daily and as-needed medications. Referral to Pharm D needed: no     Home Health/Outpatient orders at discharge: home health care, PT, OT, and [address]: [facility]  Date of initial visit: 9/28 or 9/29    Durable Medical Equipment ordered at discharge: None      Was patient discharged with a pulse oximeter? no    Discussed follow-up appointments. If no appointment was previously scheduled, appointment scheduling offered: yes. Is follow up appointment scheduled within 7 days of discharge? no.   [facility] follow up appointment(s):   Future Appointments   Date Time Provider [facility]   9/30/2022  2:00 PM [address] [facility] [address]   10/6/2022 10:15 AM Christelle Chan MD [facility] BS AMB   10/6/2022 11:00 AM E3 [facility]   10/25/2022  9:15 AM Christelle Chan MD [facility] AMB   10/27/2022  1:00 PM E3 [facility]    11/15/2022 11:45 AM MD GREER Corrigan BS AMB   11/17/2022  1:00 PM E3 [facility]     Non-[facility] follow up appointment(s): none at this time    Plan for follow-up call in 5-7 days based on severity of symptoms and risk factors.   Plan for next call: follow up appointment-pcp appt made?, antibiotic complete, wound to sacrum healing? CTN provided contact information for future needs. Goals Addressed                   This Visit's Progress     Prevent complications post hospitalization. 9/27/22  Patient to elevate legs due to swelling from 2 hour car ride home yesterday. Patient to drain spira cath tonight to help facilitate fluid status and decrease of fluid. Patient to take antibiotic as ordered. Patient to monitor for fever, increased drainage from boil lancing, foul odor. Patient to change dressing daily and wash with soap and water. Ctn to follow up in one week.    Deanne Casillas RN

## 2022-09-28 LAB
BACTERIA SPEC CULT: NORMAL
SERVICE CMNT-IMP: NORMAL

## 2022-09-30 ENCOUNTER — HOSPITAL ENCOUNTER (OUTPATIENT)
Dept: CT IMAGING | Age: 67
Discharge: HOME | End: 2022-09-30
Attending: OBSTETRICS & GYNECOLOGY
Payer: MEDICARE

## 2022-09-30 DIAGNOSIS — R18.0 MALIGNANT ASCITES: ICD-10-CM

## 2022-09-30 DIAGNOSIS — J90 PLEURAL EFFUSION, BILATERAL: ICD-10-CM

## 2022-09-30 DIAGNOSIS — C54.1 PRIMARY SEROUS ADENOCARCINOMA OF ENDOMETRIUM (HCC): ICD-10-CM

## 2022-09-30 PROCEDURE — 74011000636 HC RX REV CODE- 636: Performed by: OBSTETRICS & GYNECOLOGY

## 2022-09-30 PROCEDURE — 74177 CT ABD & PELVIS W/CONTRAST: CPT

## 2022-09-30 RX ORDER — BARIUM SULFATE 20 MG/ML
450 SUSPENSION ORAL
Status: DISPENSED | OUTPATIENT
Start: 2022-09-30 | End: 2022-09-30

## 2022-09-30 RX ADMIN — IOPAMIDOL 100 ML: 612 INJECTION, SOLUTION INTRAVENOUS at 13:13

## 2022-10-01 NOTE — PROGRESS NOTES
Physician Progress Note      PATIENT:               Leena Villalobos  CSN #:                  [id number]  :                       1955  ADMIT DATE:       2022 1:15 PM  [address] DATE:        2022 3:30 PM  RESPONDING  PROVIDER #:        Juan Pickering MD          QUERY TEXT:    Pt admitted with bilateral pleural effusion, had a right thoracentesis, and noted to have surgical cytology consistent with 'Metastatic adenocarcinoma'. Patient has been receiving treatment for endometrial cancer with peritoneal carcinomatosis. If possible, please document in progress notes and d/c summary a correlating diagnosis to explain pathology findings: The medical record reflects the following:  Risk Factors: 66yo with Endometrial cancer with peritoneal carcinomatosis    Clinical Indicators:   Cytology  Pleural Fluid, ThinPrep and cell block: Metastatic adenocarcinoma. DCS  Shortness of breath possibly due to bilateral pleural effusion  -Status post left and right thoracentesis cytology still pending follow-up as an outpatient with Dr. Wilmer Ramos    Treatment: Thoracentesis (left and right)    Thank you,  Vonnie Ordaz  [phone]  I am also available via [employer]. Options provided:  -- Malignant pleural effusion with secondary malignancy to the lung  -- Malignant pleural effusion only  -- Other - I will add my own diagnosis  -- Disagree - Not applicable / Not valid  -- Disagree - Clinically unable to determine / Unknown  -- Refer to Clinical Documentation Reviewer    PROVIDER RESPONSE TEXT:    This patient had a malignant pleural effusion.     Query created by: Fortunato Simpson on 2022 10:45 AM      Electronically signed by:  Juan Pickering MD 10/1/2022 10:36 AM

## 2022-10-03 ENCOUNTER — APPOINTMENT (OUTPATIENT)
Dept: GENERAL RADIOLOGY | Age: 67
DRG: 375 | End: 2022-10-03
Attending: EMERGENCY MEDICINE
Payer: MEDICARE

## 2022-10-03 LAB
ALBUMIN SERPL-MCNC: 1.8 G/DL (ref 3.5–5)
ALBUMIN/GLOB SERPL: 0.4 {RATIO} (ref 1.1–2.2)
ALP SERPL-CCNC: 66 U/L (ref 45–117)
ALT SERPL-CCNC: 18 U/L (ref 12–78)
ANION GAP SERPL CALC-SCNC: 3 MMOL/L (ref 5–15)
AST SERPL-CCNC: 41 U/L (ref 15–37)
BASOPHILS # BLD: 0.1 K/UL (ref 0–0.1)
BASOPHILS NFR BLD: 1 % (ref 0–1)
BILIRUB SERPL-MCNC: 0.3 MG/DL (ref 0.2–1)
BNP SERPL-MCNC: 61 PG/ML
BUN SERPL-MCNC: 12 MG/DL (ref 6–20)
BUN/CREAT SERPL: 24 (ref 12–20)
CALCIUM SERPL-MCNC: 9.4 MG/DL (ref 8.5–10.1)
CHLORIDE SERPL-SCNC: 102 MMOL/L (ref 97–108)
CO2 SERPL-SCNC: 29 MMOL/L (ref 21–32)
COMMENT, HOLDF: NORMAL
CREAT SERPL-MCNC: 0.49 MG/DL (ref 0.55–1.02)
DIFFERENTIAL METHOD BLD: ABNORMAL
EOSINOPHIL # BLD: 0.1 K/UL (ref 0–0.4)
EOSINOPHIL NFR BLD: 1 % (ref 0–7)
ERYTHROCYTE [DISTWIDTH] IN BLOOD BY AUTOMATED COUNT: 18.7 % (ref 11.5–14.5)
GLOBULIN SER CALC-MCNC: 5.1 G/DL (ref 2–4)
GLUCOSE SERPL-MCNC: 126 MG/DL (ref 65–100)
HCT VFR BLD AUTO: 39.7 % (ref 35–47)
HGB BLD-MCNC: 12.4 G/DL (ref 11.5–16)
IMM GRANULOCYTES # BLD AUTO: 0.1 K/UL (ref 0–0.04)
IMM GRANULOCYTES NFR BLD AUTO: 1 % (ref 0–0.5)
LYMPHOCYTES # BLD: 0.9 K/UL (ref 0.8–3.5)
LYMPHOCYTES NFR BLD: 9 % (ref 12–49)
MCH RBC QN AUTO: 26.9 PG (ref 26–34)
MCHC RBC AUTO-ENTMCNC: 31.2 G/DL (ref 30–36.5)
MCV RBC AUTO: 86.1 FL (ref 80–99)
MONOCYTES # BLD: 0.5 K/UL (ref 0–1)
MONOCYTES NFR BLD: 5 % (ref 5–13)
NEUTS SEG # BLD: 8.5 K/UL (ref 1.8–8)
NEUTS SEG NFR BLD: 83 % (ref 32–75)
NRBC # BLD: 0 K/UL (ref 0–0.01)
NRBC BLD-RTO: 0 PER 100 WBC
PLATELET # BLD AUTO: 408 K/UL (ref 150–400)
PMV BLD AUTO: 8.9 FL (ref 8.9–12.9)
POTASSIUM SERPL-SCNC: 5.2 MMOL/L (ref 3.5–5.1)
PROT SERPL-MCNC: 6.9 G/DL (ref 6.4–8.2)
RBC # BLD AUTO: 4.61 M/UL (ref 3.8–5.2)
SAMPLES BEING HELD,HOLD: NORMAL
SODIUM SERPL-SCNC: 134 MMOL/L (ref 136–145)
TROPONIN-HIGH SENSITIVITY: <4 NG/L (ref 0–51)
WBC # BLD AUTO: 10 K/UL (ref 3.6–11)

## 2022-10-03 PROCEDURE — 93005 ELECTROCARDIOGRAM TRACING: CPT

## 2022-10-03 PROCEDURE — 36415 COLL VENOUS BLD VENIPUNCTURE: CPT

## 2022-10-03 PROCEDURE — 83880 ASSAY OF NATRIURETIC PEPTIDE: CPT

## 2022-10-03 PROCEDURE — 94762 N-INVAS EAR/PLS OXIMTRY CONT: CPT

## 2022-10-03 PROCEDURE — 84484 ASSAY OF TROPONIN QUANT: CPT

## 2022-10-03 PROCEDURE — 99285 EMERGENCY DEPT VISIT HI MDM: CPT

## 2022-10-03 PROCEDURE — 71046 X-RAY EXAM CHEST 2 VIEWS: CPT

## 2022-10-03 PROCEDURE — 85025 COMPLETE CBC W/AUTO DIFF WBC: CPT

## 2022-10-03 PROCEDURE — 80053 COMPREHEN METABOLIC PANEL: CPT

## 2022-10-03 NOTE — ED TRIAGE NOTES
Triage: Pt arrives from home with CC of shortness of breath and elevated heart rate. Pt reports being admitted to the hospital a week ago for the same and being diagnosed with fluid overload. She reports her symptoms now are similar but less severe.

## 2022-10-03 NOTE — PROGRESS NOTES
Physician Progress Note      PATIENT:               Ross Brown  CSN #:                  [id number]  :                       1955  ADMIT DATE:       2022 1:15 PM  [address] [place] DATE:        2022 3:30 PM  RESPONDING  PROVIDER #:        Kimmie Quinones MD          QUERY TEXT:    Patient presented with c/o SOB witch increases with exertion and perianal abscess. Noted documentation of sepsis in multiple notes from admission to discharge. Per documentation pt met sepsis criteria based on tachycardia, tachypnea, high fever, and high white count. On review of the chart patient had a max temp of 100.1 and max WBC of 11.4. Discharge summary notes that patient's breathing effort is possibly due to bilateral pleural effusion. In order to support the diagnosis of sepsis, please include additional clinical indicators in your documentation. Or please document if the diagnosis of sepsis has been ruled out after further study. The medical record reflects the following:  Risk Factors: 66yo being treated for Endometrial cancer with peritoneal carcinomatosis; pt presented with pleural effusion and perianal abscess    Clinical Indicators:  WBC: 11.4 -- 6.6  Lactic Acid: 2.2 -- 1.3  C-Reactive Protein: n/a  Procalcitonin: 0.09  Blood and body fluid cultures: negative    VS first 24hr  Temp: .1  HR:   RR: 18-27    Triage  Patient arrives with c/o shortness of breath which increases with exertion. Patient reports boil on her buttocks 'the size of a golf ball'. Patient reports fever last night of 101. Sepsis flow sheet: Criteria met by RR and HR    ED  Other:  h/o serous adenocarcinoma of endometrium, Followed by Dr. Basil Ferris. Had increased dyspnea and fever 101 last night. Has what appeared to be a perirectal abscess on exam but on CT there was no fluid collection so I did not incise it. Have ordered cefepime and IVNS.   Consulted IR for thoracentesis and I spoke with Dr. Basil Ferris who will follow. H&P  Sepsis of unclear etiology supported by tachycardia high fever high white count    Treatment: 1000mL IV fluid bolus, Vancomycin, Maxipime, Thoracentesis, Drained of perianal abscess by colorectal surgeon, blood and body fluid cultures, Antibiotics at discharge cefepime and metronidazole    Thank you,  Vonnie Ordaz  [phone]  I am also available via [employer]. Options provided:  -- Sepsis present as evidenced by, Please document additional evidence.   -- Sepsis was ruled out after study  -- Other - I will add my own diagnosis  -- Disagree - Not applicable / Not valid  -- Disagree - Clinically unable to determine / Unknown  -- Refer to Clinical Documentation Reviewer    PROVIDER RESPONSE TEXT:    Sepsis is present as evidenced by SEE NOTE    Query created by: Fortunato Simpson on 10/3/2022 8:22 AM      Electronically signed by:  Juan Pickering MD 10/3/2022 4:06 PM

## 2022-10-04 ENCOUNTER — APPOINTMENT (OUTPATIENT)
Dept: ULTRASOUND IMAGING | Age: 67
DRG: 375 | End: 2022-10-04
Attending: FAMILY MEDICINE
Payer: MEDICARE

## 2022-10-04 ENCOUNTER — HOSPITAL ENCOUNTER (INPATIENT)
Age: 67
LOS: 8 days | Discharge: HOME HEALTH | DRG: 375 | End: 2022-10-12
Attending: STUDENT IN AN ORGANIZED HEALTH CARE EDUCATION/TRAINING PROGRAM | Admitting: FAMILY MEDICINE
Payer: MEDICARE

## 2022-10-04 ENCOUNTER — APPOINTMENT (OUTPATIENT)
Dept: GENERAL RADIOLOGY | Age: 67
DRG: 375 | End: 2022-10-04
Attending: FAMILY MEDICINE
Payer: MEDICARE

## 2022-10-04 DIAGNOSIS — R00.0 TACHYCARDIA: Primary | ICD-10-CM

## 2022-10-04 DIAGNOSIS — R06.00 DYSPNEA, UNSPECIFIED TYPE: ICD-10-CM

## 2022-10-04 DIAGNOSIS — C54.9 MALIGNANT NEOPLASM OF CORPUS UTERI, EXCEPT ISTHMUS (HCC): ICD-10-CM

## 2022-10-04 DIAGNOSIS — J91.0 MALIGNANT PLEURAL EFFUSION: ICD-10-CM

## 2022-10-04 DIAGNOSIS — R79.89 BLOOD CULTURE POSITIVE FOR MICROORGANISM: ICD-10-CM

## 2022-10-04 DIAGNOSIS — C54.1 ENDOMETRIAL CANCER (HCC): ICD-10-CM

## 2022-10-04 DIAGNOSIS — J90 PLEURAL EFFUSION: ICD-10-CM

## 2022-10-04 PROBLEM — E87.5 ACUTE HYPERKALEMIA: Status: ACTIVE | Noted: 2022-10-04

## 2022-10-04 PROBLEM — R06.02 SOBOE (SHORTNESS OF BREATH ON EXERTION): Status: ACTIVE | Noted: 2022-10-04

## 2022-10-04 LAB
ALBUMIN FLD-MCNC: 1.3 G/DL
ALBUMIN SERPL-MCNC: 2.6 G/DL (ref 3.8–4.8)
ALBUMIN/GLOB SERPL: 1 {RATIO} (ref 1.2–2.2)
ALP SERPL-CCNC: 71 IU/L (ref 44–121)
ALT SERPL-CCNC: 12 IU/L (ref 0–32)
APPEARANCE FLD: ABNORMAL
AST SERPL-CCNC: 30 IU/L (ref 0–40)
ATRIAL RATE: 125 BPM
BASOPHILS # BLD AUTO: 0 X10E3/UL (ref 0–0.2)
BASOPHILS NFR BLD AUTO: 1 %
BILIRUB SERPL-MCNC: <0.2 MG/DL (ref 0–1.2)
BODY FLD TYPE: NORMAL
BUN SERPL-MCNC: 10 MG/DL (ref 8–27)
BUN/CREAT SERPL: 30 (ref 12–28)
CALCIUM SERPL-MCNC: 8.5 MG/DL (ref 8.7–10.3)
CALCULATED P AXIS, ECG09: 40 DEGREES
CALCULATED R AXIS, ECG10: -13 DEGREES
CALCULATED T AXIS, ECG11: 44 DEGREES
CANCER AG125 SERPL-ACNC: 147 U/ML (ref 0–38.1)
CHLORIDE SERPL-SCNC: 98 MMOL/L (ref 96–106)
CO2 SERPL-SCNC: 23 MMOL/L (ref 20–29)
COLOR FLD: YELLOW
COMMENT, HOLDF: NORMAL
CREAT FLD-MCNC: 0.31 MG/DL
CREAT SERPL-MCNC: 0.33 MG/DL (ref 0.57–1)
DIAGNOSIS, 93000: NORMAL
EGFR: 114 ML/MIN/1.73
EOSINOPHIL # BLD AUTO: 0.1 X10E3/UL (ref 0–0.4)
EOSINOPHIL NFR BLD AUTO: 1 %
EOSINOPHIL NFR FLD MANUAL: 41 %
ERYTHROCYTE [DISTWIDTH] IN BLOOD BY AUTOMATED COUNT: 17.2 % (ref 11.7–15.4)
GLOBULIN SER CALC-MCNC: 2.7 G/DL (ref 1.5–4.5)
GLUCOSE FLD-MCNC: 90 MG/DL
GLUCOSE SERPL-MCNC: 176 MG/DL (ref 70–99)
HCT VFR BLD AUTO: 41.5 % (ref 34–46.6)
HGB BLD-MCNC: 12.9 G/DL (ref 11.1–15.9)
IMM GRANULOCYTES # BLD AUTO: 0 X10E3/UL (ref 0–0.1)
IMM GRANULOCYTES NFR BLD AUTO: 1 %
LACTATE SERPL-SCNC: 1.3 MMOL/L (ref 0.4–2)
LDH FLD L TO P-CCNC: 410 U/L
LYMPHOCYTES # BLD AUTO: 0.6 X10E3/UL (ref 0.7–3.1)
LYMPHOCYTES NFR BLD AUTO: 7 %
LYMPHOCYTES NFR FLD: 30 %
MAGNESIUM SERPL-MCNC: 1.8 MG/DL (ref 1.6–2.3)
MCH RBC QN AUTO: 26.4 PG (ref 26.6–33)
MCHC RBC AUTO-ENTMCNC: 31.1 G/DL (ref 31.5–35.7)
MCV RBC AUTO: 85 FL (ref 79–97)
MESOTHL CELL NFR FLD: 2 %
MONOCYTES # BLD AUTO: 0.4 X10E3/UL (ref 0.1–0.9)
MONOCYTES NFR BLD AUTO: 5 %
MONOS+MACROS NFR FLD: 22 %
NEUTROPHILS # BLD AUTO: 7 X10E3/UL (ref 1.4–7)
NEUTROPHILS NFR BLD AUTO: 85 %
NEUTROPHILS NFR FLD: 5 %
NUC CELL # FLD: 1895 /CU MM
P-R INTERVAL, ECG05: 154 MS
PH FLD: 6.8 [PH]
PLATELET # BLD AUTO: 403 X10E3/UL (ref 150–450)
POTASSIUM SERPL-SCNC: 4.5 MMOL/L (ref 3.5–5.2)
PROT FLD-MCNC: 2.8 G/DL
PROT SERPL-MCNC: 5.3 G/DL (ref 6–8.5)
Q-T INTERVAL, ECG07: 286 MS
QRS DURATION, ECG06: 74 MS
QTC CALCULATION (BEZET), ECG08: 412 MS
RBC # BLD AUTO: 4.89 X10E6/UL (ref 3.77–5.28)
RBC # FLD: >100 /CU MM
SAMPLES BEING HELD,HOLD: NORMAL
SODIUM SERPL-SCNC: 137 MMOL/L (ref 134–144)
SPECIMEN SOURCE FLD: ABNORMAL
SPECIMEN SOURCE FLD: NORMAL
VENTRICULAR RATE, ECG03: 125 BPM
WBC # BLD AUTO: 8.1 X10E3/UL (ref 3.4–10.8)

## 2022-10-04 PROCEDURE — 83605 ASSAY OF LACTIC ACID: CPT

## 2022-10-04 PROCEDURE — 74011000250 HC RX REV CODE- 250: Performed by: NURSE PRACTITIONER

## 2022-10-04 PROCEDURE — 74011250637 HC RX REV CODE- 250/637: Performed by: FAMILY MEDICINE

## 2022-10-04 PROCEDURE — 74011250636 HC RX REV CODE- 250/636: Performed by: FAMILY MEDICINE

## 2022-10-04 PROCEDURE — 77010033678 HC OXYGEN DAILY

## 2022-10-04 PROCEDURE — 87015 SPECIMEN INFECT AGNT CONCNTJ: CPT

## 2022-10-04 PROCEDURE — 82042 OTHER SOURCE ALBUMIN QUAN EA: CPT

## 2022-10-04 PROCEDURE — 0W993ZZ DRAINAGE OF RIGHT PLEURAL CAVITY, PERCUTANEOUS APPROACH: ICD-10-PCS | Performed by: RADIOLOGY

## 2022-10-04 PROCEDURE — 74011000250 HC RX REV CODE- 250: Performed by: STUDENT IN AN ORGANIZED HEALTH CARE EDUCATION/TRAINING PROGRAM

## 2022-10-04 PROCEDURE — 89050 BODY FLUID CELL COUNT: CPT

## 2022-10-04 PROCEDURE — 87102 FUNGUS ISOLATION CULTURE: CPT

## 2022-10-04 PROCEDURE — 74011000250 HC RX REV CODE- 250: Performed by: FAMILY MEDICINE

## 2022-10-04 PROCEDURE — 87205 SMEAR GRAM STAIN: CPT

## 2022-10-04 PROCEDURE — 87040 BLOOD CULTURE FOR BACTERIA: CPT

## 2022-10-04 PROCEDURE — 84157 ASSAY OF PROTEIN OTHER: CPT

## 2022-10-04 PROCEDURE — 36415 COLL VENOUS BLD VENIPUNCTURE: CPT

## 2022-10-04 PROCEDURE — 65270000046 HC RM TELEMETRY

## 2022-10-04 PROCEDURE — 99231 SBSQ HOSP IP/OBS SF/LOW 25: CPT | Performed by: PHYSICIAN ASSISTANT

## 2022-10-04 PROCEDURE — 32555 ASPIRATE PLEURA W/ IMAGING: CPT

## 2022-10-04 PROCEDURE — 74011250636 HC RX REV CODE- 250/636: Performed by: STUDENT IN AN ORGANIZED HEALTH CARE EDUCATION/TRAINING PROGRAM

## 2022-10-04 PROCEDURE — 83986 ASSAY PH BODY FLUID NOS: CPT

## 2022-10-04 PROCEDURE — 83615 LACTATE (LD) (LDH) ENZYME: CPT

## 2022-10-04 PROCEDURE — 82570 ASSAY OF URINE CREATININE: CPT

## 2022-10-04 PROCEDURE — 87116 MYCOBACTERIA CULTURE: CPT

## 2022-10-04 PROCEDURE — 87150 DNA/RNA AMPLIFIED PROBE: CPT

## 2022-10-04 PROCEDURE — 82945 GLUCOSE OTHER FLUID: CPT

## 2022-10-04 PROCEDURE — 74011000258 HC RX REV CODE- 258: Performed by: FAMILY MEDICINE

## 2022-10-04 PROCEDURE — 87077 CULTURE AEROBIC IDENTIFY: CPT

## 2022-10-04 PROCEDURE — 71045 X-RAY EXAM CHEST 1 VIEW: CPT

## 2022-10-04 RX ORDER — SODIUM CHLORIDE 0.9 % (FLUSH) 0.9 %
5-40 SYRINGE (ML) INJECTION AS NEEDED
Status: DISCONTINUED | OUTPATIENT
Start: 2022-10-04 | End: 2022-10-12 | Stop reason: HOSPADM

## 2022-10-04 RX ORDER — SODIUM CHLORIDE 0.9 % (FLUSH) 0.9 %
5-40 SYRINGE (ML) INJECTION EVERY 8 HOURS
Status: DISCONTINUED | OUTPATIENT
Start: 2022-10-04 | End: 2022-10-12 | Stop reason: HOSPADM

## 2022-10-04 RX ORDER — ACETAMINOPHEN 650 MG/1
650 SUPPOSITORY RECTAL
Status: DISCONTINUED | OUTPATIENT
Start: 2022-10-04 | End: 2022-10-12 | Stop reason: HOSPADM

## 2022-10-04 RX ORDER — POLYETHYLENE GLYCOL 3350 17 G/17G
17 POWDER, FOR SOLUTION ORAL DAILY PRN
Status: DISCONTINUED | OUTPATIENT
Start: 2022-10-04 | End: 2022-10-05

## 2022-10-04 RX ORDER — VALACYCLOVIR HYDROCHLORIDE 500 MG/1
1000 TABLET, FILM COATED ORAL 3 TIMES DAILY
Status: DISCONTINUED | OUTPATIENT
Start: 2022-10-04 | End: 2022-10-05

## 2022-10-04 RX ORDER — LIDOCAINE HYDROCHLORIDE 10 MG/ML
8 INJECTION INFILTRATION; PERINEURAL
Status: COMPLETED | OUTPATIENT
Start: 2022-10-04 | End: 2022-10-04

## 2022-10-04 RX ORDER — FAMOTIDINE 20 MG/1
20 TABLET, FILM COATED ORAL
COMMUNITY

## 2022-10-04 RX ORDER — KETOTIFEN FUMARATE 0.35 MG/ML
1 SOLUTION/ DROPS OPHTHALMIC
Status: DISCONTINUED | OUTPATIENT
Start: 2022-10-04 | End: 2022-10-04

## 2022-10-04 RX ORDER — LISINOPRIL 10 MG/1
10 TABLET ORAL DAILY
Status: DISCONTINUED | OUTPATIENT
Start: 2022-10-04 | End: 2022-10-04

## 2022-10-04 RX ORDER — ONDANSETRON 2 MG/ML
4 INJECTION INTRAMUSCULAR; INTRAVENOUS
Status: DISCONTINUED | OUTPATIENT
Start: 2022-10-04 | End: 2022-10-12 | Stop reason: HOSPADM

## 2022-10-04 RX ORDER — SODIUM BICARBONATE 42 MG/ML
2 INJECTION, SOLUTION INTRAVENOUS
Status: COMPLETED | OUTPATIENT
Start: 2022-10-04 | End: 2022-10-04

## 2022-10-04 RX ORDER — ONDANSETRON 4 MG/1
4 TABLET, ORALLY DISINTEGRATING ORAL
Status: DISCONTINUED | OUTPATIENT
Start: 2022-10-04 | End: 2022-10-12 | Stop reason: HOSPADM

## 2022-10-04 RX ORDER — ACETAMINOPHEN 325 MG/1
650 TABLET ORAL
Status: DISCONTINUED | OUTPATIENT
Start: 2022-10-04 | End: 2022-10-12 | Stop reason: HOSPADM

## 2022-10-04 RX ORDER — ACETAMINOPHEN 500 MG
500 TABLET ORAL
COMMUNITY

## 2022-10-04 RX ADMIN — CEFEPIME 2 G: 2 INJECTION, POWDER, FOR SOLUTION INTRAVENOUS at 12:27

## 2022-10-04 RX ADMIN — Medication 10 ML: at 21:08

## 2022-10-04 RX ADMIN — SODIUM CHLORIDE 2 G: 9 INJECTION INTRAMUSCULAR; INTRAVENOUS; SUBCUTANEOUS at 04:23

## 2022-10-04 RX ADMIN — LIDOCAINE HYDROCHLORIDE 8 ML: 10 INJECTION INFILTRATION; PERINEURAL at 15:14

## 2022-10-04 RX ADMIN — Medication 10 ML: at 04:24

## 2022-10-04 RX ADMIN — SODIUM BICARBONATE 2 ML: 42 INJECTION, SOLUTION INTRAVENOUS at 15:14

## 2022-10-04 RX ADMIN — CEFEPIME 2 G: 2 INJECTION, POWDER, FOR SOLUTION INTRAVENOUS at 20:04

## 2022-10-04 RX ADMIN — ACETAMINOPHEN 650 MG: 325 TABLET, FILM COATED ORAL at 15:26

## 2022-10-04 NOTE — CONSULTS
Pulmonary    Reason:  bilateral pleural effusions  Requesting:  Dr Sabrina Rosario reviewed and images viewed    79 yo female with advanced endometrial carcinoma with malignant ascites with drain and now malignant pleural effusions dx by thoracentesis 9/22/22. Being managed by Elijah Rangel / Loreta Washington Presented with increased sob and cxr with L > R pleural effusion  Bilateral ultrasound guided thoracentesis have been ordered by the primary team    Patient is afebrile and is on RA  WBC 10  Hgb 12.4  Plt 408  Cr 0.49  Trop < 4  Pro BNP 61    Pulmonary impression / recommendations:  --thoracentesis for sx improvement acutely  --if abd drain not able to control malignant pleural effusions then would consider placement of an indwelling pleural drain (unilaterally at first) to try to control sx.   From review of Dr Ulices Alcantar last not it appears that her treatment options for her underlying disease are limited    Will be available to see if needed    Emily Gonsalez MD

## 2022-10-04 NOTE — ED NOTES
Bedside and Verbal shift change report given to Flako Rivera (oncoming nurse) by Paty Rodríguez RN (offgoing nurse). Report included the following information SBAR, ED Summary, MAR and Recent Results.

## 2022-10-04 NOTE — CONSULTS
[address] [facility] [address] [facility]  P [phone]  F [phone]    Consultation       Nuria Almanzar     [id number] : 1955    Admitted: 10/4/2022 Date: 10/4/2022     Subjective:     Nuria Almanzar is a 78 y.o.  postmenopausal female who is being seen for recurrent endometrial carcinoma at the request of the [facility] hospitalist service. She presented to the ER yesterday with increasing shortness of breath for the last few days. She was recently discharge from [facility] on 2022 for the same complaints. During that admission, bilateral thoracentesis was performed. The patient states that she has felt better since the thoracentesis. She was doing fairly well at home until about 10/2/2022. She drained about 1000cc from her peritoneal Aspira drain. This improved her shortness of breath some. On 10/3/2022, the patient had an appointment with her PCP. She states that she had a very long walk from her vehicle to the office and became very short of breath. Her PCP was concerned about her trouble breathing and suggested she go to the ED. Her PCP spoke with Dr. Shama Copeland at the patient's request.  Patient decided to come to [facility]. Chest xrays in the ED show moderate bilateral pleural effusions which look about the same as at her prior admission. Oncology history  Ms. Dawna Quiros is an established patient with stage IA, grade 3 uterine carcinoma. She underwent laparoscopic hysterectomy with staging in 2013. She was recommended six cycles of adjuvant Taxol and Carboplatin, which she completed. We elected not to treat with pelvic radiation therapy due to her difficulty with chemotherapy. She had a CT of the abdomen/pelvis at [facility] that revealed retroperitoneal lymphadenopathy, peritoneal carcinomatosis, and trace ascites. I sent her for a PET/CT to better evaluate the extent of disease. She presented to review the results and discuss treatment. Her disease was not amenable to surgical resection. Systemic therapy was her only viable option. I thought immunotherapy would be the best choice, ahead of additional chemotherapy, specifically IV Keytruda and PO Lenvima. If that were not successful, we would consider retreatment with Taxol/Carboplatin or single agent Doxil. She completed 6 cycles of immunotherapy before demonstrating progression of disease. We decided upon retreatment with Taxol/Carboplatin. She completed 8 cycles of that regimen. Her CA-125 responded and her CT after three cycles demonstrated a response. Her CT after completion of 6 cycles demonstrated further response. A subsequent PET/CT demonstrated resolution of most of the abnormal PET activity, but there was still some residual activity in the omentum, suggesting persistent disease. We stopped treatment in August 2021, as she was beginning not to tolerate treatment well. She presented subsequently complaining of abdominal pain and bloating. She stated it was a stabbing pain in her left abdomen. The bloating has also been causing a little shortness of breath. I sent her for a CT of the chest/abdomen/pelvis to evaluate. The scan demonstrated recurrent disease throughout the abdomen and pelvis. I recommended single agent Doxil. She completed 3 cycles of Doxil. Her interval CT scan after 3 cycles unfortunately demonstrated progression. I discussed other treatment options with her, including the possibility of a clinical trial at [facility]. Ultimately we decided upon single-agent Avastin. I explained that it won't make the tumor \"go away\", but it may slow down the growth and help manage the ascites. She has completed 10 cycles so far. Her CT in early July 2022 demonstrated overall stable disease, but she did have more fluid present. Her CA-125 remains elevated, but relatively stable.   She had a paracentesis in mid July for symptomatic relief. She felt better for a while, but the fluid is starting to accumulate once again. On 8/11/22 she had an Aspira catheter placed to help manage her ascites. She has been feeling very weak and reports difficulty breathing. Patient Active Problem List    Diagnosis Date Noted    Acute hyperkalemia 10/04/2022    Tachycardia 10/04/2022    SOBOE (shortness of breath on exertion) 10/04/2022    Bilateral pleural effusion 10/04/2022    Severe protein-calorie malnutrition ([facility] [address] 09/23/2022    Pleural effusion 09/22/2022    Dehydration 08/09/2022    Malignant neoplasm of corpus uteri, except isthmus ([facility] [address] 02/28/2013     Past Medical History:   Diagnosis Date    Abnormal Pap smear 1995    with f/u normal    Anemia     Arthritis     Asthma     Cancer ([facility] [address]     ENDOMETRIAL    Environmental allergies     GERD (gastroesophageal reflux disease)     Goiter     Other unknown and unspecified cause of morbidity or mortality     POSSIBLE ESOPHAGEAL SPASM, ? OBSTRUCTION DUE TO GOITER    PMB (postmenopausal bleeding)     S/P chemotherapy, time since greater than 12 weeks     LAST CHEMO 10/2014 PER PATIENT    Thyroid disease     goiter      Past Surgical History:   Procedure Laterality Date    HX APPENDECTOMY      HX BUNIONECTOMY      HX GYN  3/13/13    TLH/BSO    HX HEENT  4/22/13    TOOTH REMOVED    HX ORTHOPAEDIC  1980'S    BUNIONECTOMY    HX VASCULAR ACCESS      port    IR INSERT INTRAPERI TUNL CATH PERC  8/11/2022    IN BREAST SURGERY PROCEDURE UNLISTED  1/12/16    right breast bx      Social History     Tobacco Use    Smoking status: Never    Smokeless tobacco: Never   Substance Use Topics    Alcohol use:  Yes     Alcohol/week: 0.0 standard drinks     Comment: RARE OCC      Family History   Problem Relation Age of Onset    Cancer Maternal Aunt         breast    Heart Disease Mother     Diabetes Father     Cancer Sister         CERVICAL    Kidney Disease Brother     Diabetes Brother     Heart Attack Other     Arrhythmia Brother     Diabetes Brother     Anesth Problems Neg Hx       Current Facility-Administered Medications   Medication Dose Route Frequency    valACYclovir (VALTREX) tablet 1,000 mg  1,000 mg Oral TID    sodium chloride (NS) flush 5-40 mL  5-40 mL IntraVENous Q8H    sodium chloride (NS) flush 5-40 mL  5-40 mL IntraVENous PRN    acetaminophen (TYLENOL) tablet 650 mg  650 mg Oral Q6H PRN    Or    acetaminophen (TYLENOL) suppository 650 mg  650 mg Rectal Q6H PRN    polyethylene glycol (MIRALAX) packet 17 g  17 g Oral DAILY PRN    ondansetron (ZOFRAN ODT) tablet 4 mg  4 mg Oral Q8H PRN    Or    ondansetron (ZOFRAN) injection 4 mg  4 mg IntraVENous Q6H PRN    cefepime (MAXIPIME) 2 g in 0.9% sodium chloride (MBP/ADV) 100 mL MBP  2 g IntraVENous Q8H     Current Outpatient Medications   Medication Sig    famotidine (PEPCID) 20 mg tablet Take 20 mg by mouth daily as needed for Heartburn or Indigestion. acetaminophen (TYLENOL) 500 mg tablet Take 500 mg by mouth every six (6) hours as needed for Pain. Xarelto 20 mg tab tablet TAKE 1 TABLET BY MOUTH DAILY     Allergies   Allergen Reactions    Latex Other (comments)     Burns skin    Acetone Shortness of Breath    Amoxicillin Anaphylaxis    Ciprofloxacin Hives    Pcn [Penicillins] Anaphylaxis    Buspar [Buspirone] Unknown (comments)     Has N&V and makes her feel \"weird\"    Azithromycin Hives    Food [Egg] Nausea Only    Other Medication Rash     POPPY seeds        Review of Systems:  A comprehensive review of systems was negative except for that written in the History of Present Illness. , 10 point ROS    Objective:     Physical Exam:   Visit Vitals  /80   Pulse (!) 55   Temp 97.4 °F (36.3 °C)   Resp 20   SpO2 90%     General:  Frail, elderly WF   HEENT:  WNL. Neck: Supple, without masses. Back:   Symmetric, no curvature. ROM normal. No CVA tenderness. Lungs:   Decreased BS bilaterally   Heart:  Regular rate and rhythm.    Abdomen: Soft, mildly distended, mildly tender. Aspira catheter in place in RUQ. Pelvic:  Deferred. Extremities: Extremities normal, atraumatic, no cyanosis or edema. Skin: Skin color, texture, turgor normal. No rashes or lesions. Lymph nodes: Cervical, supraclavicular, and axillary nodes normal.   Neurologic: Grossly intact. Labs:    Recent Results (from the past 24 hour(s))   EKG, 12 LEAD, INITIAL    Collection Time: 10/03/22  7:04 PM   Result Value Ref Range    Ventricular Rate 125 BPM    Atrial Rate 125 BPM    P-R Interval 154 ms    QRS Duration 74 ms    Q-T Interval 286 ms    QTC Calculation (Bezet) 412 ms    Calculated P Axis 40 degrees    Calculated R Axis -13 degrees    Calculated T Axis 44 degrees    Diagnosis       Sinus tachycardia  Nonspecific ST abnormality    When compared with ECG of 03-OCT-2022 19:04,  No significant change  Confirmed by Yeyo Daniel M.D., [facility] ([id number]) on 10/4/2022 8:30:58 AM     SAMPLES BEING HELD    Collection Time: 10/03/22  7:16 PM   Result Value Ref Range    SAMPLES BEING HELD 1 RED     COMMENT        Add-on orders for these samples will be processed based on acceptable specimen integrity and analyte stability, which may vary by analyte. METABOLIC PANEL, COMPREHENSIVE    Collection Time: 10/03/22  7:16 PM   Result Value Ref Range    Sodium 134 (L) 136 - 145 mmol/L    Potassium 5.2 (H) 3.5 - 5.1 mmol/L    Chloride 102 97 - 108 mmol/L    CO2 29 21 - 32 mmol/L    Anion gap 3 (L) 5 - 15 mmol/L    Glucose 126 (H) 65 - 100 mg/dL    BUN 12 6 - 20 MG/DL    Creatinine 0.49 (L) 0.55 - 1.02 MG/DL    BUN/Creatinine ratio 24 (H) 12 - 20      eGFR >60 >60 ml/min/1.73m2    Calcium 9.4 8.5 - 10.1 MG/DL    Bilirubin, total 0.3 0.2 - 1.0 MG/DL    ALT (SGPT) 18 12 - 78 U/L    AST (SGOT) 41 (H) 15 - 37 U/L    Alk.  phosphatase 66 45 - 117 U/L    Protein, total 6.9 6.4 - 8.2 g/dL    Albumin 1.8 (L) 3.5 - 5.0 g/dL    Globulin 5.1 (H) 2.0 - 4.0 g/dL    A-G Ratio 0.4 (L) 1.1 - 2.2     CBC WITH AUTOMATED DIFF    Collection Time: 10/03/22  7:16 PM   Result Value Ref Range    WBC 10.0 3.6 - 11.0 K/uL    RBC 4.61 3.80 - 5.20 M/uL    HGB 12.4 11.5 - 16.0 g/dL    HCT 39.7 35.0 - 47.0 %    MCV 86.1 80.0 - 99.0 FL    MCH 26.9 26.0 - 34.0 PG    MCHC 31.2 30.0 - 36.5 g/dL    RDW 18.7 (H) 11.5 - 14.5 %    PLATELET 594 (H) 973 - 400 K/uL    MPV 8.9 8.9 - 12.9 FL    NRBC 0.0 0  WBC    ABSOLUTE NRBC 0.00 0.00 - 0.01 K/uL    NEUTROPHILS 83 (H) 32 - 75 %    LYMPHOCYTES 9 (L) 12 - 49 %    MONOCYTES 5 5 - 13 %    EOSINOPHILS 1 0 - 7 %    BASOPHILS 1 0 - 1 %    IMMATURE GRANULOCYTES 1 (H) 0.0 - 0.5 %    ABS. NEUTROPHILS 8.5 (H) 1.8 - 8.0 K/UL    ABS. LYMPHOCYTES 0.9 0.8 - 3.5 K/UL    ABS. MONOCYTES 0.5 0.0 - 1.0 K/UL    ABS. EOSINOPHILS 0.1 0.0 - 0.4 K/UL    ABS. BASOPHILS 0.1 0.0 - 0.1 K/UL    ABS. IMM. GRANS. 0.1 (H) 0.00 - 0.04 K/UL    DF AUTOMATED     TROPONIN-HIGH SENSITIVITY    Collection Time: 10/03/22  7:16 PM   Result Value Ref Range    Troponin-High Sensitivity <4 0 - 51 ng/L   NT-PRO BNP    Collection Time: 10/03/22  7:16 PM   Result Value Ref Range    NT pro-BNP 61 <125 PG/ML   CULTURE, BLOOD, PAIRED    Collection Time: 10/04/22  3:17 AM    Specimen: Blood   Result Value Ref Range    Special Requests: NO SPECIAL REQUESTS      Culture result: NO GROWTH AFTER 4 HOURS     LACTIC ACID    Collection Time: 10/04/22  3:50 AM   Result Value Ref Range    Lactic acid 1.3 0.4 - 2.0 MMOL/L   SAMPLES BEING HELD    Collection Time: 10/04/22  3:51 AM   Result Value Ref Range    SAMPLES BEING HELD 1pst 1lav     COMMENT        Add-on orders for these samples will be processed based on acceptable specimen integrity and analyte stability, which may vary by analyte. Imaging:   CT of chest/abdomen/pelvis (9/22/22)  CHEST WALL: Left Port-A-Cath in place with catheter traversing expected course  to terminate in the SVC. No mass or enlarged adenopathy. THYROID: Stable right thyroid hypodensity.   MEDIASTINUM: Mediastinal lymphadenopathy, increased,   3-54 19 x 23 mm precarinal previously 18 x 13 mm. Right hilar adenopathy 22 x 22 mm previously 20 x 17 mm. .  THORACIC AORTA: No dissection or aneurysm. MAIN PULMONARY ARTERY: Minimal segmental pulmonary emboli. No evidence of right  ventricular strain. TRACHEA/BRONCHI: Patent. ESOPHAGUS: No wall thickening or dilatation. HEART: Normal in size. PLEURA: Large bilateral pleural effusions. No pneumothorax. LUNGS: Compressive atelectasis overlies large pleural effusions bilaterally. Aerated lungs are clear. LIVER: Portal vein is patent. No focal mass lesion. BILIARY TREE: Gallbladder is within normal limits. CBD is not dilated. SPLEEN: Unremarkable with no focal lesion. PANCREAS: No mass or ductal dilatation. ADRENALS: Unremarkable. KIDNEYS: No mass, calculus, or hydronephrosis. STOMACH: Unremarkable. SMALL BOWEL: No dilatation or wall thickening. COLON: There is fecal stasis. There is colonic diverticulosis. APPENDIX: Not visualized. PERITONEUM: Extensive peritoneal implants and dense omental caking is  redemonstrated without significant interval change. C6-46 anterior abdominal omental caking unchanged at 12.4 x 3.9 cm. Additional  large areas interposed between the greater curvature of the gastric body and the  spleen. Stable ascites status post right peritoneal drainage catheter placement  which traverses from the right abdominal wall to along the undersurface of the  liver. RETROPERITONEUM:      Portacaval node at C6-41 increased 2.3 x 2.3 cm previously 1.7 x 2.4 cm   right common iliac node measuring 2.8 x 1.8 cm 1.6 x 2.6 cm (  REPRODUCTIVE ORGANS: Hysterectomy  URINARY BLADDER: No mass or calculus. BONES: Degenerative spine change. No acute fracture or aggressive lesion. ABDOMINAL WALL: Probably edematous with no mass or hernia. ADDITIONAL COMMENTS: Minimal edematous change/inflammatory change abutting the  rectum/anus.  There is no loculated collection in the subcutaneous tissue. IMPRESSION  No loculated collection in the perianal/perirectal tissue. Slight interval increase in extensive metastatic disease. Slightly increased mediastinal and hilar adenopathy. Slightly increased mesenteric adenopathy. Extensive peritoneal carcinomatosis with omental caking, not significantly  changed. Large bilateral pleural effusions are redemonstrated with overlying compressive  atelectasis. EXAM: XR CHEST PA LAT dated 10/3/2022     INDICATION: SOB     COMPARISON: CT 9/30/2012 chest x-ray 9/23/2022. FINDINGS: PA and lateral radiographs of the chest demonstrate bilateral left  greater than right pleural effusions with overlying airspace opacity. Upper  lungs are clear and there is no pneumothorax. Left-sided Port-A-Cath in place  with catheter traversing expected course of terminate in the mid SVC region. The  cardiac and mediastinal contours and pulmonary vascularity are normal. The bones  and soft tissues are within normal limits. IMPRESSION  Bilateral pleural effusions, greater on the left with overlying  atelectasis. Assessment:/Plan:     79 yo WF with recurrent/progressive serous endometrial cancer. She is currently on single-agent Avastin, but has been treated with several prior regimens. Recent imaging suggests progression on this regimen. She has worsening abdominal disease and has large bilateral plural effusions. She was recently admitted due to shortness of breath related to her bilateral pleural effusions. Bilateral thoracentesis was performed during her last admission. She is now once again admitted with shortness of breath and bilateral pleural effusions, meaning that the pleural fluid reaccumulated very quickly. Presumably, the effusions are related to her malignancy. Discussed repeat thoracentesis with her today, with or without Aspira Pleural catheter placement.   Patient already has a peritoneal drain and has some difficulty maintaining this drain. She would like to hold off on placing permanent catheter for now, which is certainly reasonable. Will plan to proceed with thoracentesis during this admission. Patient has an appointment scheduled with Dr. Caterina Daniel later this week in the office to discuss further treatment options. Patient understands that she has tried several different regimens in the past and that she has few options left. Will try to meet with patient and her daughter at some point during her admission to discuss. Will continue to follow.        Signed By: Breana Cardenas PA-C     October 4, 2022

## 2022-10-04 NOTE — H&P
[address] [facility] Adult  Hospitalist Group  History and Physical    Date of Service:  10/4/2022  Primary Care Provider: Ana Cristina Jin MD  Source of information: The patient and Chart review    Chief Complaint: Shortness of Breath      History of Presenting Illness:   Brad Leavitt is a 78 y.o. female with past medical history of endometrial adenocarcinoma with peritoneal carcinomatosis, malignant pleural effusions status post prior thoracentesis, arthritis, GERD, goiter, anemia presented to the emergency department with chief complaint tachycardia and shortness of breath. Patient notes that initially she did not recognize she was even sick or short of breath. She notes that she was referred to the emergency department by her PCP. Symptoms notably started yesterday, rapid heart rate. She noted at that time that she had some mild shortness of breath and dyspnea on exertion which is been ongoing. Of note, she was last hospitalized 9/22/2022-9/26/2022 with shortness of breath bilateral pleural effusions and perianal abscess with sepsis. On arrival emergency department today, initial recorded vital signs were temperature 98.1 °F, /69, heart rate 122, respiratory 16, O2 saturation 97% room air. Abnormal labs included K5.2, blood glucose 126, serum sodium 134. Chest ray PA and lateral showed bilateral pleural effusions with left greater than right with opacities/atelectasis. ED started patient on cefepime 2 g IV x1 dose. Patient is now seen for admission to the hospital service for continued evaluation and treatments. Hayley Renee REVIEW OF SYSTEMS:  A comprehensive review of systems was negative except for that written in the History of Present Illness.      Past Medical History:   Diagnosis Date    Abnormal Pap smear 1995    with f/u normal    Anemia     Arthritis     Asthma     Cancer ([address]     ENDOMETRIAL    Environmental allergies     GERD (gastroesophageal reflux disease) Goiter     Other unknown and unspecified cause of morbidity or mortality     POSSIBLE ESOPHAGEAL SPASM, ? OBSTRUCTION DUE TO GOITER    PMB (postmenopausal bleeding)     S/P chemotherapy, time since greater than 12 weeks     LAST CHEMO 10/2014 PER PATIENT    Thyroid disease     goiter      Past Surgical History:   Procedure Laterality Date    HX APPENDECTOMY      HX BUNIONECTOMY      HX GYN  3/13/13    TLH/BSO    HX HEENT  4/22/13    TOOTH REMOVED    HX ORTHOPAEDIC  1980'S    BUNIONECTOMY    HX VASCULAR ACCESS      port    IR INSERT INTRAPERI TUNL CATH PERC  8/11/2022    MD BREAST SURGERY PROCEDURE UNLISTED  1/12/16    right breast bx     Prior to Admission medications    Medication Sig Start Date End Date Taking? Authorizing Provider   Xarelto 20 mg tab tablet TAKE 1 TABLET BY MOUTH DAILY 8/8/22   Jordis Schwab, MD     Allergies   Allergen Reactions    Latex Other (comments)     Burns skin    Acetone Shortness of Breath    Amoxicillin Anaphylaxis    Ciprofloxacin Hives    Pcn [Penicillins] Anaphylaxis    Buspar [Buspirone] Unknown (comments)     Has N&V and makes her feel \"weird\"    Azithromycin Hives    Food [Egg] Nausea Only    Other Medication Rash     POPPY seeds      Family History   Problem Relation Age of Onset    Cancer Maternal Aunt         breast    Heart Disease Mother     Diabetes Father     Cancer Sister         CERVICAL    Kidney Disease Brother     Diabetes Brother     Heart Attack Other     Arrhythmia Brother     Diabetes Brother     Anesth Problems Neg Hx       Social History:  reports that she has never smoked. She has never used smokeless tobacco. She reports current alcohol use. She reports that she does not use drugs. Family and social history were personally reviewed, all pertinent and relevant details are outlined as above.     Objective:   Visit Vitals  /72   Pulse (!) 55   Temp 97.4 °F (36.3 °C)   Resp 20   SpO2 90%      O2 Device: None (Room air)    PHYSICAL EXAM:   General: Patient in no acute respiratory distress. Head:  Normocephalic, without obvious abnormality, atraumatic   Eyes:  Conjunctivae/corneas clear. Pupils +2 mm reactive bilaterally. E/N/M/T: Nares normal. Septum midline. No nasal drainage or sinus tenderness  Tongue midline/ non-edematous  Clear oropharynx   Neck: Normal appearance and movements, symmetrical, trachea midline  No palpable adenopathy  No thyroid enlargement, tenderness or nodules  No carotid bruit   No JVD  Trachea midline   Lungs:   Decreased breath sounds to auscultation bilateral.     Chest wall:  No tenderness or deformity   Heart:  Regular rate and rhythm   Normal S1 and S2; no murmur, click, rub or gallop   Abdomen:   Soft, no tenderness  No rebound, guarding, or rigidity  Distended  Bowel sounds normal  No masses or hepatosplenomegaly  No hernias present   Back: No costovertebral angle tenderness  No step-off deformity   Extremities: Extremities normal, atraumatic  No cyanosis or edema     Vascular/  Pulses: 2+ radial/ 1+DP bilateral pulses   Integument/  Skin: No rashes or ulcers  Warm and dry   Musculo-      skeletal: Gait not tested  Normal symmetry, ROM, strength and tone  No calf tenderness   Neuro: GCS 15. Moves all extremities x 4. No slurred speech. No facial droop. Sensation grossly intact. Psych: Alert, oriented x 3              Data Review: All diagnostic labs and studies have been reviewed.     Abnormal Labs Reviewed   METABOLIC PANEL, COMPREHENSIVE - Abnormal; Notable for the following components:       Result Value    Sodium 134 (*)     Potassium 5.2 (*)     Anion gap 3 (*)     Glucose 126 (*)     Creatinine 0.49 (*)     BUN/Creatinine ratio 24 (*)     AST (SGOT) 41 (*)     Albumin 1.8 (*)     Globulin 5.1 (*)     A-G Ratio 0.4 (*)     All other components within normal limits   CBC WITH AUTOMATED DIFF - Abnormal; Notable for the following components:    RDW 18.7 (*)     PLATELET 276 (*)     NEUTROPHILS 83 (*) LYMPHOCYTES 9 (*)     IMMATURE GRANULOCYTES 1 (*)     ABS. NEUTROPHILS 8.5 (*)     ABS. IMM. GRANS. 0.1 (*)     All other components within normal limits       All Micro Results       Procedure Component Value Units Date/Time    CULTURE, BLOOD, PAIRED [[id number]] Collected: 10/04/22 0317    Order Status: Sent Specimen: Blood Updated: 10/04/22 0617    CULTURE, BODY FLUID [[id number]]     Order Status: Sent Specimen: Body Fluid from Pleural Fluid     AFB CULTURE + SMEAR W/RFLX ID FROM CULTURE [[id number]]     Order Status: Sent     CULTURE, FUNGUS [[id number]]     Order Status: Sent Specimen: Pleural Fluid             IMAGING:   XR CHEST PA LAT   Final Result   Bilateral pleural effusions, greater on the left with overlying   atelectasis. [address]    (Results Pending)   US THORACENTESIS RT NDL W IMAGE    (Results Pending)        ECG/ECHO:    Results for orders placed or performed during the hospital encounter of 10/04/22   EKG, 12 LEAD, INITIAL   Result Value Ref Range    Ventricular Rate 125 BPM    Atrial Rate 125 BPM    P-R Interval 154 ms    QRS Duration 74 ms    Q-T Interval 286 ms    QTC Calculation (Bezet) 412 ms    Calculated P Axis 40 degrees    Calculated R Axis -13 degrees    Calculated T Axis 44 degrees    Diagnosis       Sinus tachycardia  Nonspecific ST abnormality    When compared with ECG of 03-OCT-2022 19:04,  No significant change  Confirmed by Thierry العراقي M.D., Queta Joy ([id number]) on 10/4/2022 8:30:58 AM          Assessment/ Plan:   Given the patient's current clinical presentation, there is a high level of concern for decompensation if discharged from the emergency department. Complex decision making was performed, which includes reviewing the patient's available past medical records, laboratory results, and imaging studies.     Active Problems:    Bilateral pleural effusion   -Per chest x-ray left greater than right pleural effusion  -History of malignant pleural effusions  -Admit to telemetry  -Consult with pulmonary and/ or interventional radiology for thoracentesis  -In advance, place orders for fluid culture and labs    2. SOBOE (shortness of breath on exertion)   -Continuous pulse oximetry monitoring  -Provide submental oxygen as needed    3. Tachycardia  -Continuous telemetry monitoring    4. Acute hyperkalemia   -K 5.2. Repeat potassium level. 5.  Edema of lower extremities  -Keep legs elevated at rest  -Please and strict I's and O's and daily weights    6. Long-term anticoagulation therapy  -On Xarelto for unknown reason. Last recorded venous duplex on 9/30/2022 showed no evidence of DVT. I CTA of the chest on 2/22/2022 showed no evidence of PE. Patient has no evidence for history of atrial fibrillation.  -Place Xarelto on hold in preparation for possible thoracentesis. 7.  Endometrial carcinoma with peritoneal carcinomatosis  -Consult with GYN oncologist                   DIET: DIET NPO Sips of Water with Meds   ISOLATION PRECAUTIONS: There are currently no Active Isolations    DVT PROPHYLAXIS: SCDs  FUNCTIONAL STATUS PRIOR TO HOSPITALIZATION: Fully active and ambulatory; able to carry on all self-care without restriction. Ambulatory status/function: By self   EARLY MOBILITY ASSESSMENT: Recommend routine ambulation while hospitalized with the assistance of nursing staff  ANTICIPATED DISCHARGE: Greater than 48 hours. ANTICIPATED DISPOSITION: Home with Home Healthcare  EMERGENCY CONTACT/SURROGATE DECISION MAKER: Mary Grace Jimenez, her daughter [phone]    CRITICAL CARE WAS PERFORMED FOR THIS ENCOUNTER: NO.    ADVANCED DIRECTIVE/ CODE STATUS:  FULL CODE as per discussion with patient. Signed By: Eloy Coronado MD     October 4, 2022         Please note that this dictation may have been completed with Kamala Cisse, the computer voice recognition software.   Quite often unanticipated grammatical, syntax, homophones, and other interpretive errors are inadvertently transcribed by the computer software. Please disregard these errors. Please excuse any errors that have escaped final proofreading.

## 2022-10-04 NOTE — PROGRESS NOTES
[address] [facility] Adult  Hospitalist Group                                                                                          Hospitalist Progress Note  Pro Barrera MD  Answering service: [phone] from in house phone        Date of Service:  10/4/2022  NAME:  Yolis Valdez  :  1955  MRN:  [id number]      Admission Summary:     Patient presents with recurrent malignant pleural effusion. Interval history / Subjective:     No complaint of pain or shortness of breath.      Assessment & Plan:     Recurrent pleural effusion  -Patient presents with shortness of breath and chest x-ray showed bilateral pleural effusion left more than right  -This is malignant pleural effusion which is recurrent, patient was just discharged , was s/p left thoracentesis on  draining 1700 ml and s/p right thoracentesis  2600 mL  -Pulmonology has been consulted this admission and plan for bilateral thoracentesis, follow fluid cultures  -May need to consider indwelling pleural drain if this problem remains recurrent    Endometrial carcinoma  -Patient follows with Dr. Meka Enamorado, GYN oncology, per last record, patient's treatment options are limited, and to have family meeting in the near future  -We will consult Dr. Meka Enamorado to reevaluate the patient for further discussion of patient's prognosis    Perianal abscess  -Diagnosed on recent admission and was discharged to home on p.o. antibiotics and per discharge summary antibiotics to be completed 10/3/2022  -No recurrent signs or symptoms of sepsis    Hypertension  -Patient states she does not take lisinopril, will discontinue  -Blood pressure is stable, continue monitor    Mild hyponatremia  -Continue monitor     Code status: Full  Prophylaxis: SCDs  Care Plan discussed with: Patient  Anticipated Disposition: 24 to 48 hours     Hospital Problems  Date Reviewed: 2022            Codes Class Noted POA    Acute hyperkalemia ICD-10-CM: E87.5  ICD-9-CM: 276.7  10/4/2022 Unknown        Tachycardia ICD-10-CM: R00.0  ICD-9-CM: 785.0  10/4/2022 Unknown        SOBOE (shortness of breath on exertion) ICD-10-CM: R06.02  ICD-9-CM: 786.05  10/4/2022 Unknown        Bilateral pleural effusion ICD-10-CM: J90  ICD-9-CM: 511.9  10/4/2022 Unknown             Review of Systems:   A comprehensive review of systems was negative except for that written in the HPI. Vital Signs:    Last 24hrs VS reviewed since prior progress note. Most recent are:  Visit Vitals  /67   Pulse 86   Temp 98.1 °F (36.7 °C)   Resp 20   SpO2 97%       No intake or output data in the 24 hours ending 10/04/22 0804     Physical Examination:     I had a face to face encounter with this patient and independently examined them on 10/4/2022 as outlined below:          Constitutional:  No acute distress, cooperative, pleasant    ENT:  Oral mucosa moist, oropharynx benign. Resp:  CTA bilaterally. No wheezing/rhonchi/rales. No accessory muscle use. CV:  Regular rhythm, normal rate, no murmurs, gallops, rubs    GI:  Soft, non distended, non tender. normoactive bowel sounds, no hepatosplenomegaly     Musculoskeletal:  No edema, warm, 2+ pulses throughout    Neurologic:  Moves all extremities. AAOx3, CN II-XII reviewed            Data Review:    Review and/or order of clinical lab test      Labs:     Recent Labs     10/03/22  1916   WBC 10.0   HGB 12.4   HCT 39.7   *     Recent Labs     10/03/22  1916   *   K 5.2*      CO2 29   BUN 12   CREA 0.49*   *   CA 9.4     Recent Labs     10/03/22  1916   ALT 18   AP 66   TBILI 0.3   TP 6.9   ALB 1.8*   GLOB 5.1*     No results for input(s): INR, PTP, APTT, INREXT in the last 72 hours. No results for input(s): FE, TIBC, PSAT, FERR in the last 72 hours. No results found for: FOL, RBCF   No results for input(s): PH, PCO2, PO2 in the last 72 hours. No results for input(s): CPK, CKNDX, TROIQ in the last 72 hours.     No lab exists for component: CPKMB  No results found for: CHOL, CHOLX, CHLST, CHOLV, HDL, HDLP, LDL, LDLC, DLDLP, TGLX, TRIGL, TRIGP, CHHD, CHHDX  Lab Results   Component Value Date/Time    Glucose (POC) 144 (H) 08/27/2021 11:58 AM    Glucose (POC) 136 (H) 09/18/2015 08:06 AM    Glucose (POC) 115 (H) 05/01/2013 09:53 AM     Lab Results   Component Value Date/Time    Color Yellow 09/13/2022 11:37 AM    Appearance Clear 09/13/2022 11:37 AM    Specific gravity 1.022 05/12/2022 01:03 PM    pH (UA) 6.5 09/13/2022 11:37 AM    Protein Negative 05/12/2022 01:03 PM    Glucose Negative 05/12/2022 01:03 PM    Ketone Negative 09/13/2022 11:37 AM    Bilirubin Negative 09/13/2022 11:37 AM    Urobilinogen 0.2 05/12/2022 01:03 PM    Nitrites Negative 09/13/2022 11:37 AM    Leukocyte Esterase Negative 09/13/2022 11:37 AM    Epithelial cells FEW 05/12/2022 01:03 PM    Bacteria None seen 09/13/2022 11:37 AM    WBC None seen 09/13/2022 11:37 AM    RBC None seen 09/13/2022 11:37 AM         Medications Reviewed:     Current Facility-Administered Medications   Medication Dose Route Frequency    ketotifen (ZADITOR) 0.025 % (0.035 %) ophthalmic solution 1 Drop  1 Drop Both Eyes 7am    lisinopriL (PRINIVIL, ZESTRIL) tablet 10 mg  10 mg Oral DAILY    valACYclovir (VALTREX) tablet 1,000 mg  1,000 mg Oral TID    sodium chloride (NS) flush 5-40 mL  5-40 mL IntraVENous Q8H    sodium chloride (NS) flush 5-40 mL  5-40 mL IntraVENous PRN    acetaminophen (TYLENOL) tablet 650 mg  650 mg Oral Q6H PRN    Or    acetaminophen (TYLENOL) suppository 650 mg  650 mg Rectal Q6H PRN    polyethylene glycol (MIRALAX) packet 17 g  17 g Oral DAILY PRN    ondansetron (ZOFRAN ODT) tablet 4 mg  4 mg Oral Q8H PRN    Or    ondansetron (ZOFRAN) injection 4 mg  4 mg IntraVENous Q6H PRN    cefepime (MAXIPIME) 2 g in 0.9% sodium chloride (MBP/ADV) 100 mL MBP  2 g IntraVENous Q8H     Current Outpatient Medications   Medication Sig    Xarelto 20 mg tab tablet TAKE 1 TABLET BY MOUTH DAILY    BACITRACIN, BULK, by Does Not Apply route. With zinc ointment    acetaminophen (TYLENOL) 325 mg tablet Take  by mouth as needed. lisinopriL (PRINIVIL, ZESTRIL) 10 mg tablet Take 1 Tab by mouth daily. lisinopriL (PRINIVIL, ZESTRIL) 5 mg tablet Take 2 Tabs by mouth daily. ondansetron (ZOFRAN ODT) 4 mg disintegrating tablet Take 1 Tab by mouth every eight (8) hours as needed for Nausea. Indications: nausea and vomiting caused by cancer drugs    lidocaine-prilocaine (EMLA) topical cream Apply small amount over port area and cover with band aid one hour before chemo    guaifenesin (MUCINEX PO) Take  by mouth.    loratadine (CLARITIN) 10 mg tablet Take 10 mg by mouth.    epinastine 0.05 % drop Apply  to eye. raNITIdine (ZANTAC) 150 mg tablet ZANTAC TABS    cyclobenzaprine (FLEXERIL) 5 mg tablet take 1 tablet by mouth three times a day if needed    valACYclovir (VALTREX) 1 gram tablet Take 1 Tab by mouth three (3) times daily. EPINEPHrine (EPIPEN) 0.3 mg/0.3 mL injection 0.3 mg by IntraMUSCular route once as needed. ketotifen (ZADITOR) 0.025 % (0.035 %) ophthalmic solution Administer 1 Drop to both eyes every morning. SAL ACID/UREA/PETROLATUM,WHITE (KERASAL FOOT EX) by Apply Externally route daily as needed. ACCU-CHEK HELDER PLUS TEST STRP strip     NITROSTAT 0.4 mg SL tablet     CALCIUM CARBONATE (MARCELLO-SELTZER ANTACID PO) Take  by mouth daily as needed.      ______________________________________________________________________  EXPECTED LENGTH OF STAY: - - -  ACTUAL LENGTH OF STAY:          0                 Domonique Pike MD

## 2022-10-04 NOTE — PROGRESS NOTES
Admission Medication Reconciliation:    Information obtained from:  Patient interview at bedside, RxQuery  RxQuery data available¹:  YES    Comments/Recommendations: Updated PTA meds/reviewed patient's allergies. 1)  Patient is a good historian, able to name the strengths/names of medications she takes (notably not many). Reports she's on Xarelto for \"blood clots\" (RxQuery shows prescribed by Dr. Chetan Wang) - last dose PTA was 10/3 1100    2)  Medication changes (since last review): Added  - Pepcid PRN   - Tylenol     Adjusted  - n/a    Removed  - n/a     ¹RxQuery pharmacy benefit data reflects medications filled and processed through the patient's insurance, however   this data does NOT capture whether the medication was picked up or is currently being taken by the patient. Allergies:  Latex, Acetone, Amoxicillin, Ciprofloxacin, Pcn [penicillins], Buspar [buspirone], Azithromycin, Food [egg], and Other medication    Significant PMH/Disease States:   Past Medical History:   Diagnosis Date    Abnormal Pap smear 1995    with f/u normal    Anemia     Arthritis     Asthma     Cancer ([facility] [address]     ENDOMETRIAL    Environmental allergies     GERD (gastroesophageal reflux disease)     Goiter     Other unknown and unspecified cause of morbidity or mortality     POSSIBLE ESOPHAGEAL SPASM, ? OBSTRUCTION DUE TO GOITER    PMB (postmenopausal bleeding)     S/P chemotherapy, time since greater than 12 weeks     LAST CHEMO 10/2014 PER PATIENT    Thyroid disease     goiter     Chief Complaint for this Admission:    Chief Complaint   Patient presents with    Shortness of Breath     Prior to Admission Medications:   Prior to Admission Medications   Prescriptions Last Dose Informant Taking?    Xarelto 20 mg tab tablet 10/3/2022 at 1100  Yes   Sig: TAKE 1 TABLET BY MOUTH DAILY   acetaminophen (Tylenol Extra Strength) 500 mg tablet   Yes   Sig: Take 500 mg by mouth every six (6) hours as needed for Pain.   famotidine (Pepcid) 20 mg tablet   Yes   Sig: Take 20 mg by mouth daily as needed for Heartburn or Indigestion. Facility-Administered Medications: None     Please contact the main inpatient pharmacy with any questions or concerns at [phone] and we will direct you to the clinical pharmacist covering this patient's care while in-house.    Manjinder Biswas, PAZD

## 2022-10-04 NOTE — ED PROVIDER NOTES
71-year-old female with a history of serous adenocarcinoma of the endometrium, recent thoracentesis, malignant pleural effusions who presents the ED for evaluation of dyspnea for the last day. Patient reports that she has been short of breath and has had an elevated heart rate. Was admitted for volume overload recently. Patient reports that her symptoms are now worsening. Denies any fevers or chills. Denies chest pain, patient has dyspnea with exertion. Shortness of Breath  Pertinent negatives include no fever, no chest pain and no abdominal pain. Past Medical History:   Diagnosis Date    Abnormal Pap smear 1995    with f/u normal    Anemia     Arthritis     Asthma     Cancer ([facility] [address]     ENDOMETRIAL    Environmental allergies     GERD (gastroesophageal reflux disease)     Goiter     Other unknown and unspecified cause of morbidity or mortality     POSSIBLE ESOPHAGEAL SPASM, ?  OBSTRUCTION DUE TO GOITER    PMB (postmenopausal bleeding)     S/P chemotherapy, time since greater than 12 weeks     LAST CHEMO 10/2014 PER PATIENT    Thyroid disease     goiter       Past Surgical History:   Procedure Laterality Date    HX APPENDECTOMY      HX BUNIONECTOMY      HX GYN  3/13/13    TLH/BSO    HX HEENT  4/22/13    TOOTH REMOVED    HX ORTHOPAEDIC  1980'S    BUNIONECTOMY    HX VASCULAR ACCESS      port    IR INSERT INTRAPERI TUNL CATH PERC  8/11/2022    IL BREAST SURGERY PROCEDURE UNLISTED  1/12/16    right breast bx         Family History:   Problem Relation Age of Onset    Cancer Maternal Aunt         breast    Heart Disease Mother     Diabetes Father     Cancer Sister         CERVICAL    Kidney Disease Brother     Diabetes Brother     Heart Attack Other     Arrhythmia Brother     Diabetes Brother     Anesth Problems Neg Hx        Social History     Socioeconomic History    Marital status:      Spouse name: Not on file    Number of children: Not on file    Years of education: Not on file    Highest education The patient is at high risk for a choroidal neovascular membrane. NO CNV SEEN TODAY. Dry ARMD is responsible for some decrease in vision. level: Not on file   Occupational History    Not on file   Tobacco Use    Smoking status: Never    Smokeless tobacco: Never   Substance and Sexual Activity    Alcohol use: Yes     Alcohol/week: 0.0 standard drinks     Comment: RARE OCC    Drug use: No    Sexual activity: Not on file   Other Topics Concern    Not on file   Social History Narrative    Not on file     Social Determinants of Health     Financial Resource Strain: Not on file   Food Insecurity: Not on file   Transportation Needs: Not on file   Physical Activity: Not on file   Stress: Not on file   Social Connections: Not on file   Intimate Partner Violence: Not on file   Housing Stability: Not on file         ALLERGIES: Latex, Acetone, Amoxicillin, Ciprofloxacin, Pcn [penicillins], Buspar [buspirone], Azithromycin, Food [egg], and Other medication    Review of Systems   Constitutional:  Negative for chills and fever. Respiratory:  Positive for shortness of breath. Cardiovascular:  Negative for chest pain. Gastrointestinal:  Negative for abdominal pain. All other systems reviewed and are negative. Vitals:    10/03/22 1901   BP: 109/69   Pulse: (!) 122   Resp: 16   Temp: 98.1 °F (36.7 °C)   SpO2: 97%            Physical Exam  Vitals and nursing note reviewed. Constitutional:       General: She is not in acute distress. Appearance: Normal appearance. She is not ill-appearing. HENT:      Head: Normocephalic and atraumatic. Right Ear: External ear normal.      Left Ear: External ear normal.      Nose: Nose normal.      Mouth/Throat:      Mouth: Mucous membranes are moist.      Pharynx: Oropharynx is clear. Eyes:      Extraocular Movements: Extraocular movements intact. Conjunctiva/sclera: Conjunctivae normal.   Cardiovascular:      Rate and Rhythm: Normal rate and regular rhythm. Pulses: Normal pulses. Heart sounds: Normal heart sounds. Pulmonary:      Effort: Pulmonary effort is normal. No respiratory distress. Breath sounds: Examination of the right-lower field reveals decreased breath sounds. Examination of the left-lower field reveals decreased breath sounds. Decreased breath sounds present. No wheezing. Chest:      Chest wall: No tenderness. Abdominal:      General: Abdomen is flat. Bowel sounds are normal.      Palpations: Abdomen is soft. Tenderness: There is no abdominal tenderness. There is no guarding. Genitourinary:     Comments: Deferred  Musculoskeletal:         General: No swelling. Normal range of motion. Cervical back: Normal range of motion. Skin:     General: Skin is warm and dry. Capillary Refill: Capillary refill takes less than 2 seconds. Findings: No rash. Neurological:      General: No focal deficit present. Mental Status: She is alert and oriented to person, place, and time. Comments: Moving all extremities   Psychiatric:         Mood and Affect: Mood normal.         Behavior: Behavior normal.      Comments: Has decision making capacity        MDM  Number of Diagnoses or Management Options  Blood culture positive for microorganism [R79.89 (ICD-10-CM)]  Dyspnea, unspecified type  Endometrial cancer (HCC) [C54.1 (ICD-10-CM)]  Malignant neoplasm of corpus uteri, except isthmus (HCC) [C54.9 (ICD-10-CM)]  Malignant pleural effusion  Pleural effusion [J90 (ICD-10-CM)]  Tachycardia  Diagnosis management comments: Differential includes heart failure, pneumonia, ACS, pleural effusion           Procedures    Perfect Serve Consult for Admission  2:11 AM    ED Room Number: ER13/13  Patient Name and age:  Manda Alfred 78 y.o.  female  Working Diagnosis:   1. Tachycardia    2. Dyspnea, unspecified type    3.  Malignant pleural effusion        COVID-19 Suspicion:  no  Sepsis present:  no  Reassessment needed: no  Code Status:  Full Code  Readmission: no  Isolation Requirements:  no  Recommended Level of Care:  telemetry  Department:[facility] Adult ED - [phone]  Other:   ED evaluation included a CBC which shows no leukocytosis, proBNP of 61. Patient has bilateral pleural effusions right and left with overlying atelectasis and airspace opacity. I have given the patient cefepime based off of previous doses at her last admission.   Will likely need therapeutic thoracentesis

## 2022-10-05 ENCOUNTER — APPOINTMENT (OUTPATIENT)
Dept: ULTRASOUND IMAGING | Age: 67
DRG: 375 | End: 2022-10-05
Attending: FAMILY MEDICINE
Payer: MEDICARE

## 2022-10-05 PROCEDURE — 94760 N-INVAS EAR/PLS OXIMETRY 1: CPT

## 2022-10-05 PROCEDURE — 74011250636 HC RX REV CODE- 250/636: Performed by: FAMILY MEDICINE

## 2022-10-05 PROCEDURE — 65270000046 HC RM TELEMETRY

## 2022-10-05 PROCEDURE — 74011000258 HC RX REV CODE- 258: Performed by: FAMILY MEDICINE

## 2022-10-05 PROCEDURE — 77010033678 HC OXYGEN DAILY

## 2022-10-05 PROCEDURE — 74011000250 HC RX REV CODE- 250: Performed by: FAMILY MEDICINE

## 2022-10-05 RX ORDER — LIDOCAINE HYDROCHLORIDE 10 MG/ML
10 INJECTION, SOLUTION EPIDURAL; INFILTRATION; INTRACAUDAL; PERINEURAL
Status: ACTIVE | OUTPATIENT
Start: 2022-10-05 | End: 2022-10-06

## 2022-10-05 RX ORDER — SODIUM BICARBONATE 42 MG/ML
1 INJECTION, SOLUTION INTRAVENOUS
Status: ACTIVE | OUTPATIENT
Start: 2022-10-05 | End: 2022-10-06

## 2022-10-05 RX ORDER — FAMOTIDINE 20 MG/1
20 TABLET, FILM COATED ORAL DAILY PRN
Status: DISCONTINUED | OUTPATIENT
Start: 2022-10-05 | End: 2022-10-12 | Stop reason: HOSPADM

## 2022-10-05 RX ORDER — ADHESIVE BANDAGE
30 BANDAGE TOPICAL DAILY PRN
Status: DISCONTINUED | OUTPATIENT
Start: 2022-10-05 | End: 2022-10-12 | Stop reason: HOSPADM

## 2022-10-05 RX ADMIN — CEFEPIME 2 G: 2 INJECTION, POWDER, FOR SOLUTION INTRAVENOUS at 12:27

## 2022-10-05 RX ADMIN — Medication 10 ML: at 20:31

## 2022-10-05 RX ADMIN — CEFEPIME 2 G: 2 INJECTION, POWDER, FOR SOLUTION INTRAVENOUS at 20:29

## 2022-10-05 RX ADMIN — CEFEPIME 2 G: 2 INJECTION, POWDER, FOR SOLUTION INTRAVENOUS at 03:11

## 2022-10-05 RX ADMIN — Medication 10 ML: at 07:18

## 2022-10-05 NOTE — PROGRESS NOTES
[facility] Adult  Hospitalist Group                                                                                          Hospitalist Progress Note  Anahi Martinez MD  Answering service: [phone] from in house phone        Date of Service:  10/5/2022  NAME:  Sayra Andrew  :  1955  MRN:  [id number]      Admission Summary:     Patient presents with recurrent malignant pleural effusion. Interval history / Subjective:     No complaint of pain or shortness of breath.      Assessment & Plan:     Recurrent pleural effusion  -Patient presents with shortness of breath and chest x-ray showed bilateral pleural effusion left more than right  -This is malignant pleural effusion which is recurrent, patient was just discharged , after s/p left thoracentesis on  draining 1700 ml and s/p right thoracentesis  2600 mL  -Repeat right right thoracentesis 10/4, draining 1300 mL, plan for left thoracentesis today  -Pulmonology following, follow pleural fluid analysis  -May need to consider indwelling pleural drain if this problem remains recurrent    Endometrial carcinoma  -Patient follows with Dr. Ana Maria Perales, GYN oncology, per last record, patient's treatment options are limited, and to have family meeting in the near future  -GYN oncology following    Perianal abscess  -Diagnosed on recent admission and was discharged to home on p.o. antibiotics and per discharge summary antibiotics to be completed 10/3/2022  -No recurrent signs or symptoms of sepsis    Hypertension  -Patient states she does not take lisinopril, will discontinue  -Blood pressure is stable, continue monitor    Mild hyponatremia  -Continue monitor     Code status: Full  Prophylaxis: SCDs  Care Plan discussed with: Patient  Anticipated Disposition: 24 to 48 hours     Hospital Problems  Date Reviewed: 2022            Codes Class Noted POA    Acute hyperkalemia ICD-10-CM: E87.5  ICD-9-CM: 276.7  10/4/2022 Unknown Tachycardia ICD-10-CM: R00.0  ICD-9-CM: 785.0  10/4/2022 Unknown        SOBOE (shortness of breath on exertion) ICD-10-CM: R06.02  ICD-9-CM: 786.05  10/4/2022 Unknown        Bilateral pleural effusion ICD-10-CM: J90  ICD-9-CM: 511.9  10/4/2022 Unknown             Review of Systems:   A comprehensive review of systems was negative except for that written in the HPI. Vital Signs:    Last 24hrs VS reviewed since prior progress note. Most recent are:  Visit Vitals  /73 (BP 1 Location: Right upper arm, BP Patient Position: At rest)   Pulse 91   Temp 97.7 °F (36.5 °C)   Resp 25   Wt 62.7 kg (138 lb 3.7 oz)   SpO2 93%   BMI 23.00 kg/m²         Intake/Output Summary (Last 24 hours) at 10/5/2022 1345  Last data filed at 10/4/2022 2200  Gross per 24 hour   Intake --   Output 100 ml   Net -100 ml        Physical Examination:     I had a face to face encounter with this patient and independently examined them on 10/5/2022 as outlined below:          Constitutional:  No acute distress, cooperative, pleasant    ENT:  Oral mucosa moist, oropharynx benign. Resp:  CTA bilaterally. No wheezing/rhonchi/rales. No accessory muscle use. CV:  Regular rhythm, normal rate, no murmurs, gallops, rubs    GI:  Soft, non distended, non tender. normoactive bowel sounds, no hepatosplenomegaly     Musculoskeletal:  No edema, warm, 2+ pulses throughout    Neurologic:  Moves all extremities.   AAOx3, CN II-XII reviewed            Data Review:    Review and/or order of clinical lab test      Labs:     Recent Labs     10/03/22  1916 10/03/22  1224   WBC 10.0 8.1   HGB 12.4 12.9   HCT 39.7 41.5   * 403     Recent Labs     10/03/22  1916 10/03/22  1224   * 137   K 5.2* 4.5    98   CO2 29 23   BUN 12 10   CREA 0.49* 0.33*   * 176*   CA 9.4 8.5*   MG  --  1.8     Recent Labs     10/03/22  1916 10/03/22  1224   ALT 18 12   AP 66 71   TBILI 0.3 <0.2   TP 6.9 5.3*   ALB 1.8* 2.6*   GLOB 5.1*  --      No results for input(s): INR, PTP, APTT, INREXT, INREXT in the last 72 hours. No results for input(s): FE, TIBC, PSAT, FERR in the last 72 hours. No results found for: FOL, RBCF   No results for input(s): PH, PCO2, PO2 in the last 72 hours. No results for input(s): CPK, CKNDX, TROIQ in the last 72 hours.     No lab exists for component: CPKMB  No results found for: CHOL, CHOLX, CHLST, CHOLV, HDL, HDLP, LDL, LDLC, DLDLP, TGLX, TRIGL, TRIGP, CHHD, CHHDX  Lab Results   Component Value Date/Time    Glucose (POC) 144 (H) 08/27/2021 11:58 AM    Glucose (POC) 136 (H) 09/18/2015 08:06 AM    Glucose (POC) 115 (H) 05/01/2013 09:53 AM     Lab Results   Component Value Date/Time    Color Yellow 09/13/2022 11:37 AM    Appearance Clear 09/13/2022 11:37 AM    Specific gravity 1.022 05/12/2022 01:03 PM    pH (UA) 6.5 09/13/2022 11:37 AM    Protein Negative 05/12/2022 01:03 PM    Glucose Negative 05/12/2022 01:03 PM    Ketone Negative 09/13/2022 11:37 AM    Bilirubin Negative 09/13/2022 11:37 AM    Urobilinogen 0.2 05/12/2022 01:03 PM    Nitrites Negative 09/13/2022 11:37 AM    Leukocyte Esterase Negative 09/13/2022 11:37 AM    Epithelial cells FEW 05/12/2022 01:03 PM    Bacteria None seen 09/13/2022 11:37 AM    WBC None seen 09/13/2022 11:37 AM    RBC None seen 09/13/2022 11:37 AM         Medications Reviewed:     Current Facility-Administered Medications   Medication Dose Route Frequency    lidocaine (PF) (XYLOCAINE) 10 mg/mL (1 %) injection 10 mL  10 mL SubCUTAneous RAD ONCE    sodium bicarbonate (4.2%) injection 42 mg  1 mL SubCUTAneous RAD ONCE    sodium chloride (NS) flush 5-40 mL  5-40 mL IntraVENous Q8H    sodium chloride (NS) flush 5-40 mL  5-40 mL IntraVENous PRN    acetaminophen (TYLENOL) tablet 650 mg  650 mg Oral Q6H PRN    Or    acetaminophen (TYLENOL) suppository 650 mg  650 mg Rectal Q6H PRN    polyethylene glycol (MIRALAX) packet 17 g  17 g Oral DAILY PRN    ondansetron (ZOFRAN ODT) tablet 4 mg  4 mg Oral Q8H PRN    Or ondansetron (ZOFRAN) injection 4 mg  4 mg IntraVENous Q6H PRN    cefepime (MAXIPIME) 2 g in 0.9% sodium chloride (MBP/ADV) 100 mL MBP  2 g IntraVENous Q8H     ______________________________________________________________________  EXPECTED LENGTH OF STAY: 3d 7h  ACTUAL LENGTH OF STAY:          1                 Willis Marquez MD

## 2022-10-05 NOTE — PROGRESS NOTES
Reason for Readmission:   Shortness of breath    *Recent admission: 9/22/22-9/26/22 for Sepsis from perianal abscess*         RUR Score/Risk Level:     19% Moderate, Level one    PCP: First and Last name:  Adeola Dupree NP   Name of Practice: [address] [place] [address] [facility] [address]   Are you a current patient: Yes/No: Yes   Approximate date of last visit: 10/3/22   Can you participate in a virtual visit with your PCP: Yes    Is a Care Conference indicated: No    Did you attend your follow up appointment (s): If not, why not: Yes, 10/3/22         Resources/supports as identified by patient/family:   Daughter lives next door       Top Challenges facing patient (as identified by patient/family and CM): Finances/Medication cost?     [place] Medicare A&B, All-Scrap system or lack thereof? Daughter  Living arrangements? Lives alone in a one story with five steps to enter. Self-care/ADLs/Cognition? A&O to all spheres, patient reports receiving assistance with bathing and cooking. Current Advanced Directive/Advance Care Plan:  ACP on file, daughter Hermelindo Naidu, [phone]           Plan for utilizing home health:   [facility] SN, [facility]             Transition of Care Plan:    Based on readmission, the patient's previous Plan of Care   has been evaluated and/or modified. The current Transition of Care Plan is:           [address] home health with [address] Management Interventions  PCP Verified by CM: Yes Adeola Dupree)  Palliative Care Criteria Met (RRAT>21 & CHF Dx)?: No  Mode of Transport at Discharge:  Other (see comment) (Family)  Transition of Care Consult (CM Consult): Home Health ([address])  [facility] - INPATIENT: No  Reason Outside [place]: Patient already serviced by other home [address]: Yes  Discharge Durable Medical Equipment: No  Physical Therapy Consult: No  Speech Therapy Consult: No  Support Systems: Child(balaji)  Confirm Follow Up Transport: Family  The Plan for Transition of Care is Related to the Following Treatment Goals : [address]  The Patient and/or Patient Representative was Provided with a Choice of Provider and Agrees with the Discharge Plan?: Yes  Freedom of Choice List was Provided with Basic Dialogue that Supports the Patient's Individualized Plan of Care/Goals, Treatment Preferences and Shares the Quality Data Associated with the Providers?: Yes  Ward Resource Information Provided?: No  Discharge Location  Patient Expects to be Discharged to[de-identified] Home with home health  Readmission Assessment  Number of days since last admission?: 8-30 days  Previous disposition: Home with Home Health  Who is being interviewed?: Patient  What was the patient's/caregiver's perception as to why they think they needed to return back to the hospital?: Other (Comment)  Did you visit your Primary Care Physician after you left the hospital, before you returned this time?: Yes (10/3/22)  Did you see a specialist, such as Cardiac, Pulmonary, Orthopedic Physician, etc. after you left the hospital?: No  Who advised the patient to return to the hospital?: Physician  Does the patient report anything that got in the way of taking their medications?: No      Jonna Melchor MS

## 2022-10-05 NOTE — PROGRESS NOTES
Pt arrived to 641 around 1830. Pt is Ao x 4. Up ad el. Steady on feet. No pain. Was able to get dinner tray for pt. Pt VSS. No meds due at this time. Backside has right band aid from thoracentesis. Bedside and Verbal shift change report given to Nat Brittle (oncoming nurse) by Alvaro Craig RN (offgoing nurse). Report included the following information SBAR, Kardex, ED Summary, and Intake/Output.

## 2022-10-05 NOTE — PROGRESS NOTES
[facility]  [address], [address]  P [phone]  F [phone]       Patient Name: Christa Shepard Date: 10/4/2022   OR Date: * No surgery found *   Visit Date: 10/5/2022        SUBJECTIVE:  No new complaints. S/p thoracentesis on the right side. No shortness of breath currently. OBJECTIVE:    Patient Vitals for the past 24 hrs:   Temp Pulse Resp BP SpO2   10/05/22 1234 97.7 °F (36.5 °C) 91 25 112/73 93 %   10/05/22 1000 -- 92 -- -- --   10/05/22 0818 97.7 °F (36.5 °C) 86 16 111/73 94 %   10/05/22 0600 -- 74 -- -- --   10/05/22 0400 -- 82 -- -- --   10/05/22 0353 98.7 °F (37.1 °C) 85 25 111/79 97 %   10/05/22 0200 -- 88 -- -- --   10/05/22 0143 -- -- -- -- 99 %   10/04/22 2346 98.3 °F (36.8 °C) 79 17 103/71 98 %   10/04/22 2200 -- 85 -- -- --   10/04/22 2004 97.7 °F (36.5 °C) 97 16 110/65 95 %   10/04/22 2000 -- 98 -- -- --   10/04/22 1856 97.6 °F (36.4 °C) 98 12 109/70 95 %       Date 10/04/22 0700 - 10/05/22 0659 10/05/22 0700 - 10/06/22 0659   Shift [id number] 1900-0659 24 Hour Total 9654-5690 9349-1648 24 Hour Total   INTAKE   Shift Total(mL/kg)         OUTPUT   Urine  100(0.1) 100(0.1)        Urine Voided  100 100      Shift Total(mL/kg)  100(1.6) 100(1.6)      NET  -100 -100      Weight (kg)  62.7 62.7 62.7 62.7 62.7       Physical Exam     General:  alert, cooperative, no distress     Cardiac:  Regular rate and rhythm        Lungs:  clear to auscultation bilaterally at the apices. Diminished lung sounds at the lung bases. Abdomen:  soft, non-tender, without masses or organomegaly   Extremity: extremities normal, atraumatic, no cyanosis or edema    Data Review  Lab Results   Component Value Date/Time    WBC 10.0 10/03/2022 07:16 PM    ABS.  NEUTROPHILS 8.5 (H) 10/03/2022 07:16 PM    HGB 12.4 10/03/2022 07:16 PM    HCT 39.7 10/03/2022 07:16 PM    MCV 86.1 10/03/2022 07:16 PM    MCH 26.9 10/03/2022 07:16 PM    PLATELET 429 (H) 17/02/3604 07:16 PM     Lab Results   Component Value Date/Time    Sodium 134 (L) 10/03/2022 07:16 PM    Potassium 5.2 (H) 10/03/2022 07:16 PM    Chloride 102 10/03/2022 07:16 PM    CO2 29 10/03/2022 07:16 PM    Glucose 126 (H) 10/03/2022 07:16 PM    BUN 12 10/03/2022 07:16 PM    Creatinine 0.49 (L) 10/03/2022 07:16 PM    Calcium 9.4 10/03/2022 07:16 PM    Albumin 1.8 (L) 10/03/2022 07:16 PM    Bilirubin, total 0.3 10/03/2022 07:16 PM    ALT (SGPT) 18 10/03/2022 07:16 PM    Alk. phosphatase 66 10/03/2022 07:16 PM     IMPRESSION/PLAN:    * No surgery found *  for * No surgery found *    Oncologic:  77 yo WF with recurrent/progressive serous endometrial cancer. She is currently on single-agent Avastin. Last CT scan showed progression of disease. She has worsening of abdominal disease and has now developed recurrent bilateral pleural effusions. This is her second admission for the same problem in less than a month. Right US thoracentesis performed 10/4/2022. Discussed possible placement of Aspira indwelling catheter. When discussed with patient in the ED yesterday, she was not interested in getting the drains placed. Discussing with her today, she would like to proceed. Will plan on having indwelling pleural catheters placed bilaterally prior to discharged. Also discussed possible further treatment options. Patient has completed two previous courses of carboplatin/taxol. She was unable to tolerate immunotherapy and Doxil. Could consider Topotecan or possibly Gemzar though chances of efficacy are low and at the same time, chances of side effects can be higher as compared to other regimens. Briefly discussed the role of Hospice as well. Have asked that patient's daughter come to the hospital tomorrow around lunch time to have a family meeting with Dr. Ryley Vaughn. Heme/CV:  VSS. Hemodynamically stable   Renal:  Creatinine appropriate.   Voiding without a problem FEN/GI:  Regular diet. NPO after midnight for drain placement tomorrow   ID/Wound:  Afebrile. PPX:  Encourage OOB. Patient on Xarelto 20mg daily at home. Has not been getting it so far during this admission. Hold for now for procedures but restart once both pleural catheters placed. Dispostion:  Continue symptom management. Plans for bilateral pleural catheter placement later this week. To discuss further treatment plans with daughter and Dr. Ahmet Sheikh tomorrow.          Jason Kang PA-C  10/5/2022  2:01 PM

## 2022-10-05 NOTE — PROGRESS NOTES
Problem: Falls - Risk of  Goal: *Absence of Falls  Description: Document Reta Espitia Fall Risk and appropriate interventions in the flowsheet. Outcome: Progressing Towards Goal  Note: Fall Risk Interventions:                                Problem: Patient Education: Go to Patient Education Activity  Goal: Patient/Family Education  Outcome: Progressing Towards Goal     Problem: Pressure Injury - Risk of  Goal: *Prevention of pressure injury  Description: Document Henry Scale and appropriate interventions in the flowsheet.   Outcome: Progressing Towards Goal  Note: Pressure Injury Interventions:  Sensory Interventions: Assess changes in LOC    Moisture Interventions: Absorbent underpads    Activity Interventions: Assess need for specialty bed    Mobility Interventions: Assess need for specialty bed    Nutrition Interventions: Discuss nutritional consult with provider                     Problem: Patient Education: Go to Patient Education Activity  Goal: Patient/Family Education  Outcome: Progressing Towards Goal

## 2022-10-06 ENCOUNTER — HOSPITAL ENCOUNTER (OUTPATIENT)
Dept: INFUSION THERAPY | Age: 67
End: 2022-10-06

## 2022-10-06 LAB
ANION GAP SERPL CALC-SCNC: 5 MMOL/L (ref 5–15)
BASOPHILS # BLD: 0 K/UL (ref 0–0.1)
BASOPHILS NFR BLD: 0 % (ref 0–1)
BUN SERPL-MCNC: 11 MG/DL (ref 6–20)
BUN/CREAT SERPL: 46 (ref 12–20)
CALCIUM SERPL-MCNC: 7.5 MG/DL (ref 8.5–10.1)
CHLORIDE SERPL-SCNC: 104 MMOL/L (ref 97–108)
CO2 SERPL-SCNC: 27 MMOL/L (ref 21–32)
CREAT SERPL-MCNC: 0.24 MG/DL (ref 0.55–1.02)
DIFFERENTIAL METHOD BLD: ABNORMAL
EOSINOPHIL # BLD: 0.2 K/UL (ref 0–0.4)
EOSINOPHIL NFR BLD: 3 % (ref 0–7)
ERYTHROCYTE [DISTWIDTH] IN BLOOD BY AUTOMATED COUNT: 18.6 % (ref 11.5–14.5)
GLUCOSE SERPL-MCNC: 88 MG/DL (ref 65–100)
HCT VFR BLD AUTO: 34.1 % (ref 35–47)
HGB BLD-MCNC: 10.3 G/DL (ref 11.5–16)
IMM GRANULOCYTES # BLD AUTO: 0.1 K/UL (ref 0–0.04)
IMM GRANULOCYTES NFR BLD AUTO: 1 % (ref 0–0.5)
LYMPHOCYTES # BLD: 0.5 K/UL (ref 0.8–3.5)
LYMPHOCYTES NFR BLD: 10 % (ref 12–49)
MCH RBC QN AUTO: 26.9 PG (ref 26–34)
MCHC RBC AUTO-ENTMCNC: 30.2 G/DL (ref 30–36.5)
MCV RBC AUTO: 89 FL (ref 80–99)
MONOCYTES # BLD: 0.4 K/UL (ref 0–1)
MONOCYTES NFR BLD: 8 % (ref 5–13)
NEUTS SEG # BLD: 4.2 K/UL (ref 1.8–8)
NEUTS SEG NFR BLD: 78 % (ref 32–75)
NRBC # BLD: 0 K/UL (ref 0–0.01)
NRBC BLD-RTO: 0 PER 100 WBC
PLATELET # BLD AUTO: 290 K/UL (ref 150–400)
PMV BLD AUTO: 8.6 FL (ref 8.9–12.9)
POTASSIUM SERPL-SCNC: 4.2 MMOL/L (ref 3.5–5.1)
RBC # BLD AUTO: 3.83 M/UL (ref 3.8–5.2)
RBC MORPH BLD: ABNORMAL
SODIUM SERPL-SCNC: 136 MMOL/L (ref 136–145)
WBC # BLD AUTO: 5.4 K/UL (ref 3.6–11)

## 2022-10-06 PROCEDURE — 74011250636 HC RX REV CODE- 250/636: Performed by: FAMILY MEDICINE

## 2022-10-06 PROCEDURE — 85025 COMPLETE CBC W/AUTO DIFF WBC: CPT

## 2022-10-06 PROCEDURE — 65270000046 HC RM TELEMETRY

## 2022-10-06 PROCEDURE — 74011000250 HC RX REV CODE- 250: Performed by: FAMILY MEDICINE

## 2022-10-06 PROCEDURE — 87040 BLOOD CULTURE FOR BACTERIA: CPT

## 2022-10-06 PROCEDURE — 80048 BASIC METABOLIC PNL TOTAL CA: CPT

## 2022-10-06 PROCEDURE — 74011000258 HC RX REV CODE- 258: Performed by: FAMILY MEDICINE

## 2022-10-06 PROCEDURE — 74011250636 HC RX REV CODE- 250/636: Performed by: INTERNAL MEDICINE

## 2022-10-06 PROCEDURE — 36415 COLL VENOUS BLD VENIPUNCTURE: CPT

## 2022-10-06 PROCEDURE — 74011250637 HC RX REV CODE- 250/637: Performed by: FAMILY MEDICINE

## 2022-10-06 RX ORDER — VANCOMYCIN/0.9 % SOD CHLORIDE 1.5G/250ML
1500 PLASTIC BAG, INJECTION (ML) INTRAVENOUS ONCE
Status: COMPLETED | OUTPATIENT
Start: 2022-10-06 | End: 2022-10-06

## 2022-10-06 RX ADMIN — CEFEPIME 2 G: 2 INJECTION, POWDER, FOR SOLUTION INTRAVENOUS at 04:11

## 2022-10-06 RX ADMIN — Medication 10 ML: at 18:19

## 2022-10-06 RX ADMIN — ACETAMINOPHEN 650 MG: 325 TABLET, FILM COATED ORAL at 18:33

## 2022-10-06 RX ADMIN — VANCOMYCIN HYDROCHLORIDE 1500 MG: 10 INJECTION, POWDER, LYOPHILIZED, FOR SOLUTION INTRAVENOUS at 18:19

## 2022-10-06 NOTE — ACP (ADVANCE CARE PLANNING)
Advance Care Planning     Advance Care Planning (ACP) Physician/NP/PA Conversation      Date of Conversation: 10/3/2022  Conducted with: Patient with Decision Making Capacity    Healthcare Decision Maker:     Primary Decision Maker: [facility] [phone]  Click here to complete [place] including selection of the Healthcare Decision Maker Relationship (ie \"Primary\")    Today we documented Decision Maker(s) consistent with ACP documents on file. Care Preferences:    Hospitalization: \"If your health worsens and it becomes clear that your chance of recovery is unlikely, what would be your preference regarding hospitalization? \"  The patient would prefer hospitalization. Ventilation: \"If you were unable to breathe on your own and your chance of recovery was unlikely, what would be your preference about the use of a ventilator (breathing machine) if it was available to you? \"   The patient would NOT desire the use of a ventilator. Resuscitation: \"In the event your heart stopped as a result of an underlying serious health condition, would you want attempts to be made to restart your heart, or would you prefer a natural death? \"   No, do NOT attempt to resuscitate. Additional topics discussed: resuscitation preferences    Conversation Outcomes / Follow-Up Plan:   ACP in process - information provided, considering goals and options    Long discussion with patient regarding goals of care in case of cardiac arrest - CPR/ intubation. Poor prognosis and less chance of meaningful recovery. She understand and agrees with DNR but wants to wait until Dr. Ana Maria Perales and her daughter talk today.      Length of Voluntary ACP Conversation in minutes:  20 minutes    Luis Alberto Anders MD

## 2022-10-06 NOTE — PROGRESS NOTES
[facility]  Hospitalist Group                                                                                          Hospitalist Progress Note  Josesito Rascon MD  Answering service: [phone] from in house phone        Date of Service:  10/6/2022  NAME:  Radha Thomason  :  1955  MRN:  [id number]      Admission Summary:     Patient presents with recurrent malignant pleural effusion. Interval history / Subjective:     Patient is seen and examined at bedside this AM. Plan for aspira drain placement today. She feels otherwise ok. Appears depressed - Dr. Augustine Kelly informed her yesterday that there are minimal options for treatment for her cancer treatment. Long discussion regarding goals of care- check ACP notes    Discussed with nursing - IR postponed Aspira cath placement for tomorrow      Assessment & Plan:     Recurrent Malignant pleural effusion  -Patient presents with shortness of breath and chest x-ray showed bilateral pleural effusion left more than right  -patient was just discharged , after s/p left thoracentesis on  draining 1700 ml and s/p right thoracentesis  2600 mL  -Repeat right thoracentesis 10/4, draining 1300 mL  -fluid cx: NGTD. Dced abx   -appreciate Pulmonology input   -plan for bilateral Aspira cath - IR consulted     Endometrial carcinoma  -Patient follows with Dr. Newman Pleas oncology  - Dr. Augustine Kelly : Kanchan Martinez has progressive malignancy and her treatment options are limited. We discussed some treatment options today, but I explained that none of them have a very high response rate.  \"     Recent Perianal abscess  -Diagnosed on recent admission and was discharged to home on p.o. antibiotics and per discharge summary antibiotics to be completed 10/3/2022  -No recurrent signs or symptoms of sepsis    Positive blood cultures:  - Blood cx 10/4: 1 of 2 growing GPC  - will rpt blood cx   - started on IV vanco      Code status: Full  Prophylaxis: SCDs  Care Plan discussed with: Patient  Anticipated Disposition:  possible dc tomorrow after kristal Aspira cath placement. Hospital Problems  Date Reviewed: 9/8/2022            Codes Class Noted POA    Acute hyperkalemia ICD-10-CM: E87.5  ICD-9-CM: 276.7  10/4/2022 Unknown        Tachycardia ICD-10-CM: R00.0  ICD-9-CM: 785.0  10/4/2022 Unknown        SOBOE (shortness of breath on exertion) ICD-10-CM: R06.02  ICD-9-CM: 786.05  10/4/2022 Unknown        Bilateral pleural effusion ICD-10-CM: J90  ICD-9-CM: 511.9  10/4/2022 Unknown           Review of Systems:   A comprehensive review of systems was negative except for that written in the HPI. Vital Signs:    Last 24hrs VS reviewed since prior progress note. Most recent are:  Visit Vitals  /73 (BP 1 Location: Right upper arm, BP Patient Position: Supine)   Pulse 100   Temp 98.1 °F (36.7 °C)   Resp 21   Wt 62.2 kg (137 lb 2 oz)   SpO2 96%   BMI 22.82 kg/m²       No intake or output data in the 24 hours ending 10/06/22 1456       Physical Examination:     I had a face to face encounter with this patient and independently examined them on 10/6/2022 as outlined below:          Constitutional:  No acute distress, cooperative, pleasant    ENT:  Oral mucosa moist, oropharynx benign. Resp:  CTA bilaterally. No wheezing/rhonchi/rales. No accessory muscle use. CV:  Regular rhythm, normal rate, no murmurs, gallops, rubs    GI:  Soft, non distended, non tender. normoactive bowel sounds, no hepatosplenomegaly     Musculoskeletal:  warm, 2+ pulses throughout    Neurologic:  Moves all extremities.   AAOx3, CN II-XII reviewed            Data Review:    Review and/or order of clinical lab test      Labs:     Recent Labs     10/06/22  0404 10/03/22  1916   WBC 5.4 10.0   HGB 10.3* 12.4   HCT 34.1* 39.7    408*       Recent Labs     10/06/22  0404 10/03/22  1916    134*   K 4.2 5.2*    102   CO2 27 29   BUN 11 12   CREA 0.24* 0.49*   GLU 88 126*   CA 7. 5* 9.4       Recent Labs     10/03/22  1916   ALT 18   AP 66   TBILI 0.3   TP 6.9   ALB 1.8*   GLOB 5.1*       No results for input(s): INR, PTP, APTT, INREXT, INREXT in the last 72 hours. No results for input(s): FE, TIBC, PSAT, FERR in the last 72 hours. No results found for: FOL, RBCF   No results for input(s): PH, PCO2, PO2 in the last 72 hours. No results for input(s): CPK, CKNDX, TROIQ in the last 72 hours.     No lab exists for component: CPKMB  No results found for: CHOL, CHOLX, CHLST, CHOLV, HDL, HDLP, LDL, LDLC, DLDLP, TGLX, TRIGL, TRIGP, CHHD, CHHDX  Lab Results   Component Value Date/Time    Glucose (POC) 144 (H) 08/27/2021 11:58 AM    Glucose (POC) 136 (H) 09/18/2015 08:06 AM    Glucose (POC) 115 (H) 05/01/2013 09:53 AM     Lab Results   Component Value Date/Time    Color Yellow 09/13/2022 11:37 AM    Appearance Clear 09/13/2022 11:37 AM    Specific gravity 1.022 05/12/2022 01:03 PM    pH (UA) 6.5 09/13/2022 11:37 AM    Protein Negative 05/12/2022 01:03 PM    Glucose Negative 05/12/2022 01:03 PM    Ketone Negative 09/13/2022 11:37 AM    Bilirubin Negative 09/13/2022 11:37 AM    Urobilinogen 0.2 05/12/2022 01:03 PM    Nitrites Negative 09/13/2022 11:37 AM    Leukocyte Esterase Negative 09/13/2022 11:37 AM    Epithelial cells FEW 05/12/2022 01:03 PM    Bacteria None seen 09/13/2022 11:37 AM    WBC None seen 09/13/2022 11:37 AM    RBC None seen 09/13/2022 11:37 AM         Medications Reviewed:     Current Facility-Administered Medications   Medication Dose Route Frequency    magnesium hydroxide (MILK OF MAGNESIA) 400 mg/5 mL oral suspension 30 mL  30 mL Oral DAILY PRN    famotidine (PEPCID) tablet 20 mg  20 mg Oral DAILY PRN    sodium chloride (NS) flush 5-40 mL  5-40 mL IntraVENous Q8H    sodium chloride (NS) flush 5-40 mL  5-40 mL IntraVENous PRN    acetaminophen (TYLENOL) tablet 650 mg  650 mg Oral Q6H PRN    Or    acetaminophen (TYLENOL) suppository 650 mg  650 mg Rectal Q6H PRN    ondansetron (ZOFRAN ODT) tablet 4 mg  4 mg Oral Q8H PRN    Or    ondansetron (ZOFRAN) injection 4 mg  4 mg IntraVENous Q6H PRN     ______________________________________________________________________  EXPECTED LENGTH OF STAY: 3d 7h  ACTUAL LENGTH OF STAY:          2                 Ellis Duarte MD

## 2022-10-06 NOTE — PROGRESS NOTES
Pharmacist Note - Vancomycin Dosing    Consult provided for this 78 y.o. female for indication of GPC+ blood culture. -h/o serous adenocarcinoma of endometrium, currently undergoing chemotherapy  -perianal abscess drained 09/23/2022    Recent Labs     10/06/22  0404 10/03/22  1916   WBC 5.4 10.0   CREA 0.24* 0.49*   BUN 11 12     Frequency of BMP: daily x 3  Height: 165 cm  Weight: 62.2 kg  Est CrCl: 71 ml/min    The plan below is expected to result in a target range of AUC/TIFFANIE 400-600    Therapy will be initiated with a loading dose of 1500 mg IV x 1 to be followed by a maintenance dose of 1250 mg IV every 12 hours. Pharmacy to follow patient daily and order levels / make dose adjustments as appropriate. *Vancomycin has been dosed used Bayesian kinetics software to target an AUC/TIFFANIE of 400-600, which provides adequate exposure for an assumed infection due to MRSA with an TIFFANIE of 1 or less while reducing the risk of nephrotoxicity as seen with traditional trough based dosing goals.

## 2022-10-06 NOTE — PROGRESS NOTES
Spiritual Care Partner Volunteer visited patient at [address] in [address] [facility] on 10/6/2022   Documented by: For additional spiritual care, please contact the  on-call at [phone]). Rev. Sheridan Hernandez MDiv, MS, [facility]  Staff

## 2022-10-06 NOTE — PROGRESS NOTES
Bedside and Verbal shift change report given to Jake RN (oncoming nurse) by John Drew RN (offgoing nurse). Report included the following information SBAR, Kardex, Intake/Output, and MAR. NPO at midnight.   Plan for IR in morning  New set of blood cultures drawn and sent to lab  Pt started on vanco

## 2022-10-06 NOTE — PROGRESS NOTES
Bedside and Verbal shift change report given to Junior Ash RN (oncoming nurse) by Roberta Larkin RN (offgoing nurse). Report included the following information SBAR, Kardex, Intake/Output, MAR, Recent Results, Cardiac Rhythm of NSR, and Alarm Parameters .

## 2022-10-06 NOTE — PROGRESS NOTES
Problem: Falls - Risk of  Goal: *Absence of Falls  Description: Document [place] Fail Fall Risk and appropriate interventions in the flowsheet. Outcome: Progressing Towards Goal  Note: Fall Risk Interventions:            Medication Interventions: Bed/chair exit alarm, Patient to call before getting OOB, Teach patient to arise slowly                   Problem: Patient Education: Go to Patient Education Activity  Goal: Patient/Family Education  Outcome: Progressing Towards Goal     Problem: Pressure Injury - Risk of  Goal: *Prevention of pressure injury  Description: Document Henry Scale and appropriate interventions in the flowsheet.   Outcome: Progressing Towards Goal  Note: Pressure Injury Interventions:  Sensory Interventions: Assess changes in LOC    Moisture Interventions: Absorbent underpads    Activity Interventions: Assess need for specialty bed    Mobility Interventions: Assess need for specialty bed    Nutrition Interventions: Discuss nutritional consult with provider                     Problem: Patient Education: Go to Patient Education Activity  Goal: Patient/Family Education  Outcome: Progressing Towards Goal

## 2022-10-06 NOTE — PROGRESS NOTES
Family meeting today with patient and daughter. Discussed most recent imaging findings and the fact that she is progressing on the current therapy. We discussed other treatment options, as well as the possibility of no further therapy. She does not want to give up at this point and wants to continue with treatment. Based upon the NCCN guidelines, the most appropriate regimen for her would be topotecan, which I would administer on a weekly basis. Another option would be Herceptin, since she has a serous endometrial carcinoma, but the response rate is not very good. She is to have bilateral pleural drains placed during this admission. We will discuss starting treatment next week. An electronic signature was used to sign this note.     Chetan Wang MD  10/06/22   3:22 PM

## 2022-10-07 ENCOUNTER — HOSPITAL ENCOUNTER (INPATIENT)
Dept: INTERVENTIONAL RADIOLOGY/VASCULAR | Age: 67
Discharge: HOME | DRG: 375 | End: 2022-10-07
Attending: PHYSICIAN ASSISTANT
Payer: MEDICARE

## 2022-10-07 ENCOUNTER — APPOINTMENT (OUTPATIENT)
Dept: VASCULAR SURGERY | Age: 67
DRG: 375 | End: 2022-10-07
Attending: INTERNAL MEDICINE
Payer: MEDICARE

## 2022-10-07 VITALS
RESPIRATION RATE: 22 BRPM | DIASTOLIC BLOOD PRESSURE: 73 MMHG | HEART RATE: 87 BPM | OXYGEN SATURATION: 93 % | SYSTOLIC BLOOD PRESSURE: 100 MMHG

## 2022-10-07 LAB
ACC. NO. FROM MICRO ORDER, ACCP: NORMAL
ACID FAST STN SPEC: NEGATIVE
ACINETOBACTER CALCOACETICUS-BAUMANII COMPLEX, ACBCX: NOT DETECTED
ANION GAP SERPL CALC-SCNC: 6 MMOL/L (ref 5–15)
BACTEROIDES FRAGILIS, BFRA: NOT DETECTED
BIOFIRE COMMENT, BCIDPF: NORMAL
BUN SERPL-MCNC: 10 MG/DL (ref 6–20)
BUN/CREAT SERPL: 37 (ref 12–20)
C GLABRATA DNA VAG QL NAA+PROBE: NOT DETECTED
CALCIUM SERPL-MCNC: 7.7 MG/DL (ref 8.5–10.1)
CANDIDA ALBICANS: NOT DETECTED
CANDIDA AURIS, CAAU: NOT DETECTED
CANDIDA KRUSEI, CKRP: NOT DETECTED
CANDIDA PARAPSILOSIS, CPAUP: NOT DETECTED
CANDIDA TROPICALIS, CTROP: NOT DETECTED
CHLORIDE SERPL-SCNC: 106 MMOL/L (ref 97–108)
CO2 SERPL-SCNC: 24 MMOL/L (ref 21–32)
CREAT SERPL-MCNC: 0.27 MG/DL (ref 0.55–1.02)
CRYPTO NEOFORMANS/GATTII, CRYNEG: NOT DETECTED
ENTEROBACTER CLOACAE COMPLEX, ECCP: NOT DETECTED
ENTEROBACTERALES SP. , ENBLS: NOT DETECTED
ENTEROCOCCUS FAECALIS, ENFA: NOT DETECTED
ENTEROCOCCUS FAECIUM, ENFAM: NOT DETECTED
ESCHERICHIA COLI: NOT DETECTED
GLUCOSE SERPL-MCNC: 93 MG/DL (ref 65–100)
HAEMOPHILUS INFLUENZAE, HMI: NOT DETECTED
KLEBSIELLA AEROGENES, KLAE: NOT DETECTED
KLEBSIELLA OXYTOCA: NOT DETECTED
KLEBSIELLA PNEUMONIAE GROUP, KPPG: NOT DETECTED
LISTERIA MONOCYTOGENES, LMONP: NOT DETECTED
MYCOBACTERIUM SPEC QL CULT: NORMAL
NEISSERIA MENINGITIDIS, NMNI: NOT DETECTED
POTASSIUM SERPL-SCNC: 4.7 MMOL/L (ref 3.5–5.1)
PROTEUS, PRP: NOT DETECTED
PSEUDOMONAS AERUGINOSA: NOT DETECTED
RESISTANT GENE SPACE, REGENE: NORMAL
SALMONELLA, SALMO: NOT DETECTED
SERRATIA MARCESCENS: NOT DETECTED
SODIUM SERPL-SCNC: 136 MMOL/L (ref 136–145)
SPECIMEN PREPARATION: NORMAL
SPECIMEN SOURCE: NORMAL
STAPH EPIDERMIDIS, STEP: NOT DETECTED
STAPH LUGDUNENSIS, STALUG: NOT DETECTED
STAPHYLOCOCCUS AUREUS: NOT DETECTED
STAPHYLOCOCCUS, STAPP: NOT DETECTED
STENO MALTOPHILIA, STMA: NOT DETECTED
STREPTOCOCCUS , STPSP: NOT DETECTED
STREPTOCOCCUS AGALACTIAE (GROUP B): NOT DETECTED
STREPTOCOCCUS PNEUMONIAE , SPNP: NOT DETECTED
STREPTOCOCCUS PYOGENES (GROUP A), SPYOP: NOT DETECTED

## 2022-10-07 PROCEDURE — 93971 EXTREMITY STUDY: CPT

## 2022-10-07 PROCEDURE — 99223 1ST HOSP IP/OBS HIGH 75: CPT | Performed by: INTERNAL MEDICINE

## 2022-10-07 PROCEDURE — C1729 CATH, DRAINAGE: HCPCS

## 2022-10-07 PROCEDURE — 74011250636 HC RX REV CODE- 250/636: Performed by: INTERNAL MEDICINE

## 2022-10-07 PROCEDURE — 77030010507 HC ADH SKN DERMBND J&J -B

## 2022-10-07 PROCEDURE — 74011000250 HC RX REV CODE- 250

## 2022-10-07 PROCEDURE — 65270000046 HC RM TELEMETRY

## 2022-10-07 PROCEDURE — 0B9P30Z DRAINAGE OF LEFT PLEURA WITH DRAINAGE DEVICE, PERCUTANEOUS APPROACH: ICD-10-PCS | Performed by: STUDENT IN AN ORGANIZED HEALTH CARE EDUCATION/TRAINING PROGRAM

## 2022-10-07 PROCEDURE — 2709999900 HC NON-CHARGEABLE SUPPLY

## 2022-10-07 PROCEDURE — 74011000250 HC RX REV CODE- 250: Performed by: FAMILY MEDICINE

## 2022-10-07 PROCEDURE — 32550 INSERT PLEURAL CATH: CPT

## 2022-10-07 PROCEDURE — 36415 COLL VENOUS BLD VENIPUNCTURE: CPT

## 2022-10-07 PROCEDURE — 77030002986 HC SUT PROL J&J -A

## 2022-10-07 PROCEDURE — 80048 BASIC METABOLIC PNL TOTAL CA: CPT

## 2022-10-07 PROCEDURE — 74011250637 HC RX REV CODE- 250/637: Performed by: FAMILY MEDICINE

## 2022-10-07 PROCEDURE — 77030031139 HC SUT VCRL2 J&J -A

## 2022-10-07 PROCEDURE — 99232 SBSQ HOSP IP/OBS MODERATE 35: CPT | Performed by: PHYSICIAN ASSISTANT

## 2022-10-07 PROCEDURE — 74011250636 HC RX REV CODE- 250/636: Performed by: STUDENT IN AN ORGANIZED HEALTH CARE EDUCATION/TRAINING PROGRAM

## 2022-10-07 RX ORDER — LIDOCAINE HYDROCHLORIDE 20 MG/ML
20 INJECTION, SOLUTION INFILTRATION; PERINEURAL
Status: COMPLETED | OUTPATIENT
Start: 2022-10-07 | End: 2022-10-07

## 2022-10-07 RX ORDER — FENTANYL CITRATE 50 UG/ML
200 INJECTION, SOLUTION INTRAMUSCULAR; INTRAVENOUS
Status: DISCONTINUED | OUTPATIENT
Start: 2022-10-07 | End: 2022-10-11 | Stop reason: HOSPADM

## 2022-10-07 RX ORDER — LIDOCAINE HYDROCHLORIDE 20 MG/ML
INJECTION, SOLUTION INFILTRATION; PERINEURAL
Status: COMPLETED
Start: 2022-10-07 | End: 2022-10-07

## 2022-10-07 RX ORDER — MIDAZOLAM HYDROCHLORIDE 1 MG/ML
5 INJECTION, SOLUTION INTRAMUSCULAR; INTRAVENOUS
Status: DISCONTINUED | OUTPATIENT
Start: 2022-10-07 | End: 2022-10-11 | Stop reason: HOSPADM

## 2022-10-07 RX ORDER — TRAMADOL HYDROCHLORIDE 50 MG/1
50 TABLET ORAL
Status: DISCONTINUED | OUTPATIENT
Start: 2022-10-07 | End: 2022-10-12 | Stop reason: HOSPADM

## 2022-10-07 RX ADMIN — ACETAMINOPHEN 650 MG: 325 TABLET, FILM COATED ORAL at 12:29

## 2022-10-07 RX ADMIN — FENTANYL CITRATE 25 MCG: 50 INJECTION, SOLUTION INTRAMUSCULAR; INTRAVENOUS at 08:49

## 2022-10-07 RX ADMIN — MIDAZOLAM 1 MG: 1 INJECTION INTRAMUSCULAR; INTRAVENOUS at 08:52

## 2022-10-07 RX ADMIN — VANCOMYCIN HYDROCHLORIDE 1250 MG: 1.25 INJECTION, POWDER, LYOPHILIZED, FOR SOLUTION INTRAVENOUS at 06:01

## 2022-10-07 RX ADMIN — ACETAMINOPHEN 650 MG: 325 TABLET, FILM COATED ORAL at 18:50

## 2022-10-07 RX ADMIN — LIDOCAINE HYDROCHLORIDE 360 MG: 20 INJECTION, SOLUTION INFILTRATION; PERINEURAL at 09:09

## 2022-10-07 RX ADMIN — VANCOMYCIN HYDROCHLORIDE 1250 MG: 1.25 INJECTION, POWDER, LYOPHILIZED, FOR SOLUTION INTRAVENOUS at 17:35

## 2022-10-07 RX ADMIN — MIDAZOLAM 1 MG: 1 INJECTION INTRAMUSCULAR; INTRAVENOUS at 08:49

## 2022-10-07 RX ADMIN — Medication 10 ML: at 06:08

## 2022-10-07 RX ADMIN — Medication 10 ML: at 17:35

## 2022-10-07 NOTE — PROGRESS NOTES
SBAR out    Verbal report, including SBAR, Kardex, Procedure Summary, Intake/Output, MAR, given to Farzaneh and Pranav. Pt transferred to  for routine progression of care. Opportunity for questions and clarification provided. Pt in no acute distress and in stable condition at transfer of care.

## 2022-10-07 NOTE — PROGRESS NOTES
Left pleural Aspira drain placed to drainage with Aspira bulb drain system; Pleural fluid is pink tinged straw color.

## 2022-10-07 NOTE — PROGRESS NOTES
Bedside and Verbal shift change report given to Renaldo Ballesteros RN (oncoming nurse) by Pako Thompson RN (offgoing nurse). Report included the following information SBAR, Kardex, Intake/Output, MAR, Recent Results, and Cardiac Rhythm of NSR. Angio had call at [address] [place] [address] that someone is coming to take patient down, and in the middle of the report, transport pick patient up on stretcher to angio. IV vancomycin about 1/3 to complete and still running during transport process .

## 2022-10-07 NOTE — PROGRESS NOTES
[facility] [address], [address], [address] [employer] [place]  P [phone]  F [phone]       Patient Name: Adelfo Asher   Admit Date: 10/4/2022   OR Date: * No surgery found *   Visit Date: 10/7/2022        SUBJECTIVE:  No new complaints. Left thoracentesis and Aspira placement earlier today. Was told that there was not enough fluid present to place drain on right side. No shortness of breath. Somewhat sleepy following procedure this morning but otherwise fine. OBJECTIVE:    Patient Vitals for the past 24 hrs:   Temp Pulse Resp BP SpO2   10/07/22 1000 -- 79 -- -- --   10/07/22 0600 -- 76 -- -- --   10/07/22 0427 98.1 °F (36.7 °C) 92 21 110/74 94 %   10/07/22 0400 -- 78 -- -- --   10/07/22 0200 -- 73 -- -- --   10/07/22 0001 -- 77 -- -- --   10/06/22 2334 97.4 °F (36.3 °C) 87 17 120/76 95 %   10/06/22 2200 -- 88 -- -- --   10/06/22 2002 -- 89 -- -- --   10/06/22 1911 98.4 °F (36.9 °C) (!) 101 17 114/79 95 %   10/06/22 1800 -- 100 -- -- --   10/06/22 1614 98.7 °F (37.1 °C) 92 21 116/72 94 %         Date 10/06/22 0700 - 10/07/22 0659 10/07/22 0700 - 10/08/22 0659   Shift 8701-8722 3099-0575 24 Hour Total 6013-5746 8004-7428 24 Hour Total   INTAKE   P.O. 300  300 0  0     P. O. 300  300 0  0   Shift Total(mL/kg) 300(4.8)  300(4.9) 0(0)  0(0)   OUTPUT   Urine(mL/kg/hr)           Urine Occurrence(s) 3 x  3 x 0 x  0 x   Drains    1000  1000     Output (ml) (Pleural Catheter/Drain 10/07/22 Lateral;Left)    1000  1000   Stool           Stool Occurrence(s) 0 x  0 x 0 x  0 x   Shift Total(mL/kg)    1000(16.2)  1000(16.2)     300 -1000  -1000   Weight (kg) 62.2 61.6 61.6 61.6 61.6 61.6         Physical Exam     General:  alert, cooperative, no distress     Cardiac:  Regular rate and rhythm        Lungs:  clear to auscultation bilaterally at the apices. Diminished lung sounds at the lung bases.   Abdomen:  soft, non-tender, without masses or organomegaly   Extremity: extremities normal, atraumatic, no cyanosis or edema    Data Review  Lab Results   Component Value Date/Time    WBC 5.4 10/06/2022 04:04 AM    ABS. NEUTROPHILS 4.2 10/06/2022 04:04 AM    HGB 10.3 (L) 10/06/2022 04:04 AM    HCT 34.1 (L) 10/06/2022 04:04 AM    MCV 89.0 10/06/2022 04:04 AM    MCH 26.9 10/06/2022 04:04 AM    PLATELET 185 30/54/3406 04:04 AM     Lab Results   Component Value Date/Time    Sodium 136 10/07/2022 04:29 AM    Potassium 4.7 10/07/2022 04:29 AM    Chloride 106 10/07/2022 04:29 AM    CO2 24 10/07/2022 04:29 AM    Glucose 93 10/07/2022 04:29 AM    BUN 10 10/07/2022 04:29 AM    Creatinine 0.27 (L) 10/07/2022 04:29 AM    Calcium 7.7 (L) 10/07/2022 04:29 AM    Albumin 1.8 (L) 10/03/2022 07:16 PM    Bilirubin, total 0.3 10/03/2022 07:16 PM    ALT (SGPT) 18 10/03/2022 07:16 PM    Alk. phosphatase 66 10/03/2022 07:16 PM     IMPRESSION/PLAN:    * No surgery found *  for * No surgery found *    Oncologic:  77 yo WF with recurrent/progressive serous endometrial cancer. She is currently on single-agent Avastin. Last CT scan showed progression of disease. She has worsening of abdominal disease and has now developed recurrent bilateral pleural effusions. This is her second admission for the same problem in less than a month. Right US thoracentesis performed 10/4/2022. S/p left Aspira drain placement 10/7/2022. She can be scheduled to have right aspira placement as an outpatient. Also discussed possible further treatment options. Patient has completed two previous courses of carboplatin/taxol. She was unable to tolerate immunotherapy and Doxil. Could consider Topotecan or possibly Gemzar though chances of efficacy are low and at the same time, chances of side effects can be higher as compared to other regimens. Briefly discussed the role of Hospice as well. Family meeting with patient and daughter 10/6/2022.   Patient leaning towards proceeding with Topotecan. Heme/CV:  VSS. Hemodynamically stable   Renal:  Creatinine appropriate. Voiding without a problem       FEN/GI:  S/p drain placement. Can eat once again. ID/Wound:  Afebrile. WBC normal yesterday. Blood cultures from 10/4/2022 +. Sensitivity pending. Defer to primary team for management. PPX:  Encourage OOB. Patient on Xarelto 20mg daily at home. Ok to restart xarelto tomorrow    Dispostion:  Continue symptom management. S/p left Aspira placement. Can arrange for right sided aspira in outpatient setting. Patient will likely remain admitted over the weekend pending repeat blood cultures.          Juan Benito PA-C  10/7/2022  2:01 PM

## 2022-10-07 NOTE — CONSULTS
Infectious Disease Consult Note    Reason for Consult: Micrococcus from port site blood cx   Date of Consultation: October 7, 2022  Date of Admission: 10/4/2022  Referring Physician: Dr Tremaine Mujica       HPI:      Ms Nichole Schwarz is a 71-year-old lady with a history of COVID. Uterine carcinoma, status post laparoscopic hysterectomy in March 2013, multiple rounds of chemotherapy, indwelling port, recent admission to [facility] from 9/22 to 9/26/2022 treated for perianal abscess who presented to the hospital on 10/4/2022 with concerns of shortness of breath. She was found to have pleural effusions and is status post a left-sided thoracentesis this admission. She has had some therapeutic relief from that. I received a consult request today for positive blood culture with Micrococcus luteus in 1 out of 2 bottles drawn from the port site. Ms. Nichole Schwarz tells me that she has no fevers chills or night sweats. She apparently did have chills and fevers reported during her last admission but says that resolved. She received antibiotics during her last admission for the perianal abscess. Patient states that this was more of a boil that she had during her last admission and that resolved entirely per patient. She denies pain in her gluteal area. She denies any pain or erythema around the port area. Apart from being in the hospital or being accessed at the infusion center she denies her port ever being accessed. She denies any rash. She denies any nausea vomiting or diarrhea. She tells me that she heard news recently that there is not much options for her cancer treatment and this has \" hit me hard\". She says that she has to wrap her head around this. She denies any particular arthralgia or myalgia. She states that she is actually eating better now than she was before. She attributes this to something and ensure that makes her not want to eat when she takes Ensure at home.   She reports having had COVID earlier in the year and has had some weight loss since. She denies any vaginal discharge. She denies any particular back pain. She denies any headache or neurological symptoms such as syncope or seizures. From review of gynecology oncology notes she had a paracentesis done in August 2022 for therapeutic relief of ascites and a catheter placed at that time. From reviewing cultures available paracentesis done 8/11/2022 also had Micrococcus luteus seen on gram stain of the thio broth. She has no abdominal pain at present when discussed. She denies worsening distention or fullness of her abdomen. Per oncology notes she is currently on a single agent Avastin and her last CT showed progression of disease. Patient has completed courses of carboplatinum, Taxol in the past.  Leaning towards another agent for treatment in the future potentially starting next week per oncology. She is currently on IV vancomycin. Past Medical History:  Past Medical History:   Diagnosis Date    Abnormal Pap smear 1995    with f/u normal    Anemia     Arthritis     Asthma     Cancer ([address]     ENDOMETRIAL    Environmental allergies     GERD (gastroesophageal reflux disease)     Goiter     Other unknown and unspecified cause of morbidity or mortality     POSSIBLE ESOPHAGEAL SPASM, ?  OBSTRUCTION DUE TO GOITER    PMB (postmenopausal bleeding)     S/P chemotherapy, time since greater than 12 weeks     LAST CHEMO 10/2014 PER PATIENT    Thyroid disease     goiter         Surgical History:  Past Surgical History:   Procedure Laterality Date    HX APPENDECTOMY      HX BUNIONECTOMY      HX GYN  3/13/13    TLH/BSO    HX HEENT  4/22/13    TOOTH REMOVED    HX ORTHOPAEDIC  1980'S    BUNIONECTOMY    HX VASCULAR ACCESS      port    IR INSERT CATH PLEURAL INDWELL  10/7/2022    IR INSERT INTRAPERI TUNL CATH PERC  8/11/2022    MN BREAST SURGERY PROCEDURE UNLISTED  1/12/16    right breast bx         Family History:   Family History   Problem Relation Age of Onset    Cancer Maternal Aunt         breast    Heart Disease Mother     Diabetes Father     Cancer Sister         CERVICAL    Kidney Disease Brother     Diabetes Brother     Heart Attack Other     Arrhythmia Brother     Diabetes Brother     Anesth Problems Neg Hx          Social History:     Living Situation: from home, says she lives alone,   Tobacco: says no  Alcohol:says no  Illicit Drugs:says no   Travel History no recent travel history     Allergies: Allergies   Allergen Reactions    Latex Other (comments)     Burns skin    Acetone Shortness of Breath    Amoxicillin Anaphylaxis    Ciprofloxacin Hives    Pcn [Penicillins] Anaphylaxis    Buspar [Buspirone] Unknown (comments)     Has N&V and makes her feel \"weird\"    Azithromycin Hives    Food [Egg] Nausea Only    Other Medication Rash     POPPY seeds         Review of Systems:  10 point review of systems per HPI. All others negative. Medications:  No current facility-administered medications on file prior to encounter. Current Outpatient Medications on File Prior to Encounter   Medication Sig Dispense Refill    famotidine (PEPCID) 20 mg tablet Take 20 mg by mouth daily as needed for Heartburn or Indigestion. acetaminophen (TYLENOL) 500 mg tablet Take 500 mg by mouth every six (6) hours as needed for Pain.       Xarelto 20 mg tab tablet TAKE 1 TABLET BY MOUTH DAILY 30 Tablet 5           Physical Exam:    Vitals: Patient Vitals for the past 24 hrs:   Temp Pulse Resp BP SpO2   10/07/22 1400 -- 82 -- -- --   10/07/22 1216 98.1 °F (36.7 °C) 78 20 106/73 96 %   10/07/22 1200 -- 78 -- -- --   10/07/22 1000 -- 79 -- -- --   10/07/22 0600 -- 76 -- -- --   10/07/22 0427 98.1 °F (36.7 °C) 92 21 110/74 94 %   10/07/22 0400 -- 78 -- -- --   10/07/22 0200 -- 73 -- -- --   10/07/22 0001 -- 77 -- -- --   10/06/22 2334 97.4 °F (36.3 °C) 87 17 120/76 95 %   10/06/22 2200 -- 88 -- -- --   10/06/22 2002 -- 89 -- -- --   10/06/22 1911 98.4 °F (36.9 °C) (!) 101 17 114/79 95 %   10/06/22 1800 -- 100 -- -- --   10/06/22 1614 98.7 °F (37.1 °C) 92 21 116/72 94 %     GEN: NAD  HEENT: No scleral icterus, no thrush   CV: S1, S2 heard regularly, no chest wall erythema  Lungs: Crackles heard,  L side pleural drain   Abdomen: soft, not much distended, non tender,   Extremities: no edema, bruising noted bilateral upper extremities  Neuro: Alert, oriented to self, time, place and situation, moves all extremities to commands, verbal   Skin: no rash  Psych: flat affect  Lines: port L chest, no erythema around site  MSK nontender to palpation of cervical thoracic lumbar area      Labs:   Recent Results (from the past 24 hour(s))   CULTURE, BLOOD, PAIRED    Collection Time: 10/06/22  5:43 PM    Specimen: Blood   Result Value Ref Range    Special Requests: NO SPECIAL REQUESTS      Culture result: NO GROWTH AFTER 20 HOURS     METABOLIC PANEL, BASIC    Collection Time: 10/07/22  4:29 AM   Result Value Ref Range    Sodium 136 136 - 145 mmol/L    Potassium 4.7 3.5 - 5.1 mmol/L    Chloride 106 97 - 108 mmol/L    CO2 24 21 - 32 mmol/L    Anion gap 6 5 - 15 mmol/L    Glucose 93 65 - 100 mg/dL    BUN 10 6 - 20 MG/DL    Creatinine 0.27 (L) 0.55 - 1.02 MG/DL    BUN/Creatinine ratio 37 (H) 12 - 20      eGFR >60 >60 ml/min/1.73m2    Calcium 7.7 (L) 8.5 - 10.1 MG/DL       Microbiology Data:       Blood:     CULTURE, BLOOD, PAIRED [[id number]] (Order [id number])  Microbiology  Date: 10/4/2022 Department: [facility]  Surge Telemetry Released By/Authorizing: Lauri Cartagena DO (auto-released)      Contains abnormal data CULTURE, BLOOD, PAIRED  Order: [id number]  Collected 10/4/2022 03:17    Status: Preliminary result    Specimen Information: Blood   0 Result Notes  Component Ref Range & Units 10/4/22 0312    Special Requests:   NO SPECIAL REQUESTS P    Culture result:   MICROCOCCUS LUTEUS GROWING IN 1 OF 2 BOTTLES DRAWN , site = port Abnormal  P    Culture result:   REMAINING BOTTLE(S) HAS/HAVE NO GROWTH SO FAR P Culture result:  (NOTE) [facility] CALLED TO LISANDRA Roa Olgamary 10/6 @ 1603 P           CULTURE, BODY FLUID Tomma Keven [[id number]] (Order [id number])  Microbiology  Date: 10/4/2022 Department: [facility] Telemetry Released By/Authorizing: Lester Calero MD (auto-released)     CULTURE, BODY FLUID Tomma Keven  Order: [id number]  Collected 10/4/2022 15:15    Status: Preliminary result    Specimen Information: Pleural Fluid; Body Fluid   0 Result Notes  Component Ref Range & Units 10/4/22 1515    Special Requests:   NO SPECIAL REQUESTS P    GRAM STAIN   RARE WBCS SEEN P    GRAM STAIN   NO ORGANISMS SEEN P    Culture result:   NO GROWTH THUS FAR P           CULTURE, BLOOD, PAIRED [[id number]] (Order [id number])  Microbiology  Date: 9/22/2022 Department: [facility] Telemetry Released By: Aliya León RN (auto-released) Authorizing: Coco Harper MD     CULTURE, BLOOD, PAIRED  Order: [id number]  Collected 9/22/2022 12:53    Status: Final result    Specimen Information: Blood   0 Result Notes  Component Ref Range & Units 9/22/22 1253    Special Requests:   NO SPECIAL REQUESTS    Culture result:   NO GROWTH 6 DAYS               CULTURE, BODY FLUID Buzz Patch STAIN [[id number]] (Order [id number])  Microbiology  Date: 8/11/2022 Department: [facility] Angio Ir Released By: Audrey Huff RN (auto-released) Authorizing: Nikolia Clinton PA-C    Contains abnormal data CULTURE, BODY FLUID Tomma Keven  Order: [id number]  Collected 8/11/2022 10:40    Status: Final result    Specimen Information: Paracentesis Fluid;  Body Fluid   0 Result Notes  Component Ref Range & Units 8/11/22 1040  Resulting Agency   Special Requests:   NO SPECIAL REQUESTS  [facility]   GRAM STAIN   NO WBC'S SEEN  [address],[facility] Floor   Culture result:   Culture performed on Fluid swab specimen NO GROWTH SOLID MEDIA [facility]   Culture result:   MICROCOCCUS LUTEUS SEEN ON GRAM STAIN OF THE THIO BROTH Abnormal           Pathology Results:  9/22/22  Pleural Fluid, ThinPrep and cell block:     Metastatic adenocarcinoma. Positive for malignancy. Imaging:     CT CHEST ABD PELV W CONT: Patient Communication     Add Comments   Not seen     Study Result    Narrative & Impression   INDICATION: Malignant neoplasm of the endometrium. Primary serous adenocarcinoma  of endometrium. Bilateral pleural effusion, malignant ascites. CT of the chest, abdomen and pelvis is performed with 5 mm collimation. Study is  performed with PO and 100 cc of nonionic IV Isovue-370. Sagittal and coronal  reformatted images were also performed. CT dose reduction was achieved with the use of the standardized protocol  tailored for this examination and automatic exposure control for dose  modulation. Direct comparison is made to prior CT dated September 22, 2022. Chest:     Lungs/pleura: There are moderate-sized bilateral pleural effusions. There is  also trace biapical pleural gas. No pulmonary nodule or mass is seen. There is  bibasilar compressive atelectasis. Lymph nodes: There is an enlarged 2 cm x 2 cm precarinal lymph node. There is an  enlarged 2.3 cm x 2.5 cm node. There is an enlarged 2.3 cm x 1.9 cm subcarinal  lymph node. Heart: The heart is normal in size and there is no pericardial fluid. Bones: No lytic or sclerotic osseous lesion is visualized. Abdomen/pelvis:     Liver: The liver is normal.     Lymph nodes\omentum\peritoneum: There is a small to moderate amount free  intraperitoneal fluid greatest in the pelvis, not significantly changed in  quantity as compared to prior CT dated September 22, 2022. Percutaneous drainage  catheter extends to the upper central abdomen. There is extensive omental  caking, unchanged compared to prior CT from September 22, 2022. Ze hepatis,  portacaval, mesenteric and retroperitoneal lymphadenopathy is unchanged. Spleen:  The spleen is normal.     Pancreas: The pancreas is normal.     Adrenals: The adrenals are normal.     Gallbladder: Gallbladder is normal.     Kidneys: The kidneys are normal. There is no hydronephrosis. Bowel: No dilated or thickened loop of large or small bowel is seen. Appendix: The appendix is surgically absent. Urinary bladder: Urinary bladder is partially filled and grossly normal.     Bones: No lytic or sclerotic osseous lesion is visualized. Miscellaneous: There is no free intraperitoneal gas. There is no focal fluid  collection to suggest abscess. The uterus is surgically absent. IMPRESSION  1. Bilateral hydropneumothoraces. Moderate amount of pleural fluid bilaterally  and trace biapical pleural gas. 2. Mediastinal right hilar lymphadenopathy, as described above. 3. Small to moderate amount of free intraperitoneal fluid, predominantly in the  pelvis, unchanged in quantity. 4. Extensive omental caking, unchanged. 5. Ze hepatis, portacaval, mesenteric and retroperitoneal lymphadenopathy,  unchanged. Procedures:     10/7/22 Placement of left tunneled pleural drainage catheter. Approximately 1000 mL  of clear yellow fluid was removed. 10/4/22 Technically successful ultrasound guided right thoracentesis with evacuation of  1300 ml of fluid. 9/23/22 Technically successful ultrasound guided right thoracentesis yielding  approximately 1600 ml of fluid. 9/22/22 Technically successful ultrasound guided left thoracentesis with evacuation of  1700 ml of fluid. A post procedure chest x-ray is pending. 8/11/22 Successful placement of tunneled long-term peritoneal drainage (Aspira)  catheter. 7/18/22 Ultrasound-guided paracentesis. Assessment / Plan:   Ms Crista Epps is a 72-year-old lady with a history of COVID.   Uterine carcinoma, status post laparoscopic hysterectomy in March 2013, multiple rounds of chemotherapy, indwelling port, recent admission to [facility] from 9/22 to 9/26/2022 treated for perianal abscess who presented to the hospital on 10/4/2022 with micrococcus in blood 1/2 bottles from port site      1) Micrococcus in blood 1/2 bottles port site 10/4/22  Previous blood cx 9/22/22 negative  Patient had a recent admission from 9/22 to 9/26/2022 and given antibiotics during that admission. Reports of chills and fever during that admission with treatment for an anal boil/abscess  She says that the chills and fevers resolved entirely  No pain or redness around the port site    I discussed that micrococcus is an organism that can be found in nature including water, soil and is also a commensal of human skin  In some cases micrococcus can be a pathogen and cause deeper infections  Complicating the picture is her immune status and the port which increases affinity for such organisms to be more pathogenic    She would like to salvage her port is much as possible which is understandable as there are plans for repeat chemo in the near future    I discussed with her that if her repeat blood cultures are still positive for micrococcus/she has signs and symptoms concerning for infection we would recommend treating it aggressively as a pathogen and removing the port    At present suspect that this could be skin linda given her clinical condition but would recommend checking an echo and ultrasound around the port site to ensure no thrombus or vegetations found as micrococcus can cause invasive infections in rare cases    Currently on IV vancomycin given penicillin allergy      2)Pleural effusion :  Path with + adenocarcinoma in 9/2022  S/P L pleural drain       3) Endometrial cancer  Per oncology   S/P Aspira     4) port in place         Thank for the opportunity to participate in the care of this patient. Please contact with questions or concerns.        Peace Serna,   4:00 PM

## 2022-10-07 NOTE — ROUTINE PROCESS
Pt arrives IR holding to angio department accompanied by transport for Aspira pleural drain procedure. All assessments completed and consent was reviewed. Education given was regarding procedure, drain placement sedation, post-procedure care and  management/follow-up. Opportunity for questions was provided and all questions and concerns were addressed.

## 2022-10-07 NOTE — PROGRESS NOTES
Problem: Falls - Risk of  Goal: *Absence of Falls  Description: Document [place] Fall Risk and appropriate interventions in the flowsheet. Outcome: Progressing Towards Goal  Note: Fall Risk Interventions:            Medication Interventions: Teach patient to arise slowly, Patient to call before getting OOB         History of Falls Interventions: Evaluate medications/consider consulting pharmacy         Problem: Patient Education: Go to Patient Education Activity  Goal: Patient/Family Education  Outcome: Progressing Towards Goal     Problem: Pressure Injury - Risk of  Goal: *Prevention of pressure injury  Description: Document Henry Scale and appropriate interventions in the flowsheet.   Outcome: Progressing Towards Goal  Note: Pressure Injury Interventions:  Sensory Interventions: Assess changes in LOC, Float heels    Moisture Interventions: Minimize layers    Activity Interventions: Increase time out of bed, Pressure redistribution bed/mattress(bed type)    Mobility Interventions: Pressure redistribution bed/mattress (bed type)    Nutrition Interventions: Document food/fluid/supplement intake    Friction and Shear Interventions: Minimize layers                Problem: Patient Education: Go to Patient Education Activity  Goal: Patient/Family Education  Outcome: Progressing Towards Goal

## 2022-10-07 NOTE — PROGRESS NOTES
[facility]  Hospitalist Group                                                                                          Hospitalist Progress Note  Long Sevilla MD  Answering service: [phone] from in house phone        Date of Service:  10/7/2022  NAME:  Yolis Valdez  :  1955  MRN:  [id number]      Admission Summary:     Patient presents with recurrent malignant pleural effusion. Interval history / Subjective:     Patient is seen and examined at bedside this AM. She had Aspira cath placed on left side, tolerated well. Requesting Ensure. C/w pain at insertion site - pain meds adjusted. Explained positive blood cx - waiting for final report     Discussed with nursing      Assessment & Plan:     Recurrent Malignant pleural effusion  -Patient presents with shortness of breath and chest x-ray showed bilateral pleural effusion left more than right  -patient was just discharged , after s/p left thoracentesis on  draining 1700 ml and s/p right thoracentesis  2600 mL  -Repeat right thoracentesis 10/4, draining 1300 mL  -fluid cx: NGTD. -appreciate Pulmonology input   -plan for bilateral Aspira cath - IR consulted     Micrococcus bacteremia   - Blood cx 10/4: 1 of 2 growing Micrococcus luteus ( blood cx from port)  - rpt blood cx 10/6: NGTD  - c/w IV vanco   - ID consult placed  - Echo ordered     Endometrial carcinoma  -Patient follows with Dr. Brian Ambrose oncology  - Dr. Meka Enamorado : Yuni Manzanares has progressive malignancy and her treatment options are limited. We discussed some treatment options today, but I explained that none of them have a very high response rate.  \"     Recent Perianal abscess  -Diagnosed on recent admission and was discharged to home on p.o. antibiotics and per discharge summary antibiotics to be completed 10/3/2022     Code status: Full  Prophylaxis: SCDs  Care Plan discussed with: Patient  Anticipated Disposition:  home  likely when ready Hospital Problems  Date Reviewed: 10/7/2022            Codes Class Noted POA    Acute hyperkalemia ICD-10-CM: E87.5  ICD-9-CM: 276.7  10/4/2022 Unknown        Tachycardia ICD-10-CM: R00.0  ICD-9-CM: 785.0  10/4/2022 Unknown        SOBOE (shortness of breath on exertion) ICD-10-CM: R06.02  ICD-9-CM: 786.05  10/4/2022 Unknown        Bilateral pleural effusion ICD-10-CM: J90  ICD-9-CM: 511.9  10/4/2022 Unknown         Review of Systems:   A comprehensive review of systems was negative except for that written in the HPI. Vital Signs:    Last 24hrs VS reviewed since prior progress note. Most recent are:  Visit Vitals  /73   Pulse 78   Temp 98.1 °F (36.7 °C)   Resp 20   Wt 61.6 kg (135 lb 12.9 oz)   SpO2 96%   BMI 22.60 kg/m²         Intake/Output Summary (Last 24 hours) at 10/7/2022 1425  Last data filed at 10/7/2022 1030  Gross per 24 hour   Intake 300 ml   Output 1000 ml   Net -700 ml          Physical Examination:     I had a face to face encounter with this patient and independently examined them on 10/7/2022 as outlined below:          Constitutional:  No acute distress, cooperative, pleasant    ENT:  Oral mucosa moist, oropharynx benign. Resp:  CTA bilaterally. No wheezing/rhonchi/rales. No accessory muscle use. CV:  Regular rhythm, normal rate, no murmurs, gallops, rubs    GI:  Soft, non distended, non tender. normoactive bowel sounds, no hepatosplenomegaly     Musculoskeletal:  warm, 2+ pulses throughout    Neurologic:  Moves all extremities. AAOx3, CN II-XII reviewed            Data Review:    Review and/or order of clinical lab test      Labs:     Recent Labs     10/06/22  0404   WBC 5.4   HGB 10.3*   HCT 34.1*          Recent Labs     10/07/22  0429 10/06/22  0404    136   K 4.7 4.2    104   CO2 24 27   BUN 10 11   CREA 0.27* 0.24*   GLU 93 88   CA 7.7* 7.5*       No results for input(s): ALT, AP, TBIL, TBILI, TP, ALB, GLOB, GGT, AML, LPSE in the last 72 hours.     No lab exists for component: SGOT, GPT, AMYP, HLPSE    No results for input(s): INR, PTP, APTT, INREXT, INREXT in the last 72 hours. No results for input(s): FE, TIBC, PSAT, FERR in the last 72 hours. No results found for: FOL, RBCF   No results for input(s): PH, PCO2, PO2 in the last 72 hours. No results for input(s): CPK, CKNDX, TROIQ in the last 72 hours.     No lab exists for component: CPKMB  No results found for: CHOL, CHOLX, CHLST, CHOLV, HDL, HDLP, LDL, LDLC, DLDLP, TGLX, TRIGL, TRIGP, CHHD, CHHDX  Lab Results   Component Value Date/Time    Glucose (POC) 144 (H) 08/27/2021 11:58 AM    Glucose (POC) 136 (H) 09/18/2015 08:06 AM    Glucose (POC) 115 (H) 05/01/2013 09:53 AM     Lab Results   Component Value Date/Time    Color Yellow 09/13/2022 11:37 AM    Appearance Clear 09/13/2022 11:37 AM    Specific gravity 1.022 05/12/2022 01:03 PM    pH (UA) 6.5 09/13/2022 11:37 AM    Protein Negative 05/12/2022 01:03 PM    Glucose Negative 05/12/2022 01:03 PM    Ketone Negative 09/13/2022 11:37 AM    Bilirubin Negative 09/13/2022 11:37 AM    Urobilinogen 0.2 05/12/2022 01:03 PM    Nitrites Negative 09/13/2022 11:37 AM    Leukocyte Esterase Negative 09/13/2022 11:37 AM    Epithelial cells FEW 05/12/2022 01:03 PM    Bacteria None seen 09/13/2022 11:37 AM    WBC None seen 09/13/2022 11:37 AM    RBC None seen 09/13/2022 11:37 AM         Medications Reviewed:     Current Facility-Administered Medications   Medication Dose Route Frequency    traMADoL (ULTRAM) tablet 50 mg  50 mg Oral Q6H PRN    Vancomycin- pharmacy to dose   Other Rx Dosing/Monitoring    vancomycin (VANCOCIN) 1,250 mg in 0.9% sodium chloride 250 mL ([id number])  1,250 mg IntraVENous Q12H    magnesium hydroxide (MILK OF MAGNESIA) 400 mg/5 mL oral suspension 30 mL  30 mL Oral DAILY PRN    famotidine (PEPCID) tablet 20 mg  20 mg Oral DAILY PRN    sodium chloride (NS) flush 5-40 mL  5-40 mL IntraVENous Q8H    sodium chloride (NS) flush 5-40 mL  5-40 mL IntraVENous PRN    acetaminophen (TYLENOL) tablet 650 mg  650 mg Oral Q6H PRN    Or    acetaminophen (TYLENOL) suppository 650 mg  650 mg Rectal Q6H PRN    ondansetron (ZOFRAN ODT) tablet 4 mg  4 mg Oral Q8H PRN    Or    ondansetron (ZOFRAN) injection 4 mg  4 mg IntraVENous Q6H PRN     Facility-Administered Medications Ordered in Other Encounters   Medication Dose Route Frequency    midazolam (VERSED) injection 5 mg  5 mg IntraVENous Rad Multiple    fentaNYL citrate (PF) injection 200 mcg  200 mcg IntraVENous Rad Multiple     ______________________________________________________________________  EXPECTED LENGTH OF STAY: 3d 7h  ACTUAL LENGTH OF STAY:          3                 Luis Alberto Anders MD

## 2022-10-08 LAB
ANION GAP SERPL CALC-SCNC: 7 MMOL/L (ref 5–15)
BACTERIA SPEC CULT: NORMAL
BASOPHILS # BLD: 0.1 K/UL (ref 0–0.1)
BASOPHILS NFR BLD: 1 % (ref 0–1)
BUN SERPL-MCNC: 11 MG/DL (ref 6–20)
BUN/CREAT SERPL: 58 (ref 12–20)
CALCIUM SERPL-MCNC: 7.6 MG/DL (ref 8.5–10.1)
CHLORIDE SERPL-SCNC: 106 MMOL/L (ref 97–108)
CO2 SERPL-SCNC: 24 MMOL/L (ref 21–32)
CREAT SERPL-MCNC: 0.19 MG/DL (ref 0.55–1.02)
DIFFERENTIAL METHOD BLD: ABNORMAL
EOSINOPHIL # BLD: 0.1 K/UL (ref 0–0.4)
EOSINOPHIL NFR BLD: 2 % (ref 0–7)
ERYTHROCYTE [DISTWIDTH] IN BLOOD BY AUTOMATED COUNT: 18.6 % (ref 11.5–14.5)
GLUCOSE SERPL-MCNC: 91 MG/DL (ref 65–100)
GRAM STN SPEC: NORMAL
GRAM STN SPEC: NORMAL
HCT VFR BLD AUTO: 34.5 % (ref 35–47)
HGB BLD-MCNC: 10.8 G/DL (ref 11.5–16)
IMM GRANULOCYTES # BLD AUTO: 0.1 K/UL (ref 0–0.04)
IMM GRANULOCYTES NFR BLD AUTO: 1 % (ref 0–0.5)
LYMPHOCYTES # BLD: 0.5 K/UL (ref 0.8–3.5)
LYMPHOCYTES NFR BLD: 9 % (ref 12–49)
MAGNESIUM SERPL-MCNC: 1.9 MG/DL (ref 1.6–2.4)
MCH RBC QN AUTO: 27.7 PG (ref 26–34)
MCHC RBC AUTO-ENTMCNC: 31.3 G/DL (ref 30–36.5)
MCV RBC AUTO: 88.5 FL (ref 80–99)
MONOCYTES # BLD: 0.5 K/UL (ref 0–1)
MONOCYTES NFR BLD: 9 % (ref 5–13)
NEUTS SEG # BLD: 4.6 K/UL (ref 1.8–8)
NEUTS SEG NFR BLD: 78 % (ref 32–75)
NRBC # BLD: 0 K/UL (ref 0–0.01)
NRBC BLD-RTO: 0 PER 100 WBC
PHOSPHATE SERPL-MCNC: 3.1 MG/DL (ref 2.6–4.7)
PLATELET # BLD AUTO: 273 K/UL (ref 150–400)
PMV BLD AUTO: 8.2 FL (ref 8.9–12.9)
POTASSIUM SERPL-SCNC: 3.8 MMOL/L (ref 3.5–5.1)
RBC # BLD AUTO: 3.9 M/UL (ref 3.8–5.2)
RBC MORPH BLD: ABNORMAL
RBC MORPH BLD: ABNORMAL
SERVICE CMNT-IMP: NORMAL
SODIUM SERPL-SCNC: 137 MMOL/L (ref 136–145)
WBC # BLD AUTO: 5.9 K/UL (ref 3.6–11)

## 2022-10-08 PROCEDURE — 80048 BASIC METABOLIC PNL TOTAL CA: CPT

## 2022-10-08 PROCEDURE — 74011250636 HC RX REV CODE- 250/636: Performed by: INTERNAL MEDICINE

## 2022-10-08 PROCEDURE — 74011250637 HC RX REV CODE- 250/637: Performed by: PHYSICIAN ASSISTANT

## 2022-10-08 PROCEDURE — 84100 ASSAY OF PHOSPHORUS: CPT

## 2022-10-08 PROCEDURE — 83735 ASSAY OF MAGNESIUM: CPT

## 2022-10-08 PROCEDURE — 94760 N-INVAS EAR/PLS OXIMETRY 1: CPT

## 2022-10-08 PROCEDURE — 74011250637 HC RX REV CODE- 250/637: Performed by: FAMILY MEDICINE

## 2022-10-08 PROCEDURE — 85025 COMPLETE CBC W/AUTO DIFF WBC: CPT

## 2022-10-08 PROCEDURE — 36415 COLL VENOUS BLD VENIPUNCTURE: CPT

## 2022-10-08 PROCEDURE — 74011000250 HC RX REV CODE- 250: Performed by: FAMILY MEDICINE

## 2022-10-08 PROCEDURE — 65270000046 HC RM TELEMETRY

## 2022-10-08 RX ADMIN — Medication 10 ML: at 18:16

## 2022-10-08 RX ADMIN — ACETAMINOPHEN 650 MG: 325 TABLET, FILM COATED ORAL at 06:05

## 2022-10-08 RX ADMIN — Medication 10 ML: at 22:53

## 2022-10-08 RX ADMIN — VANCOMYCIN HYDROCHLORIDE 1250 MG: 1.25 INJECTION, POWDER, LYOPHILIZED, FOR SOLUTION INTRAVENOUS at 06:06

## 2022-10-08 RX ADMIN — Medication 10 ML: at 06:13

## 2022-10-08 RX ADMIN — RIVAROXABAN 20 MG: 20 TABLET, FILM COATED ORAL at 09:28

## 2022-10-08 RX ADMIN — ACETAMINOPHEN 650 MG: 325 TABLET, FILM COATED ORAL at 23:03

## 2022-10-08 RX ADMIN — VANCOMYCIN HYDROCHLORIDE 1250 MG: 1.25 INJECTION, POWDER, LYOPHILIZED, FOR SOLUTION INTRAVENOUS at 18:15

## 2022-10-08 RX ADMIN — ACETAMINOPHEN 650 MG: 325 TABLET, FILM COATED ORAL at 16:05

## 2022-10-08 NOTE — PROGRESS NOTES
Bedside and Verbal shift change report given to Jake RN (oncoming nurse) by Mitesh Cortes RN (offgoing nurse). Report included the following information SBAR, Kardex, Procedure Summary, Intake/Output, and MAR. Pt has left side aspira drain placed this morning. Dressing is CDI. Tender to touch. Pt requested for tylenol to help with discomfort. Relief reported with tylenol.     Pt AO x 4  Up ad el   Call light in reach

## 2022-10-08 NOTE — PROGRESS NOTES
Technician, Lorenzo Cesar, called night RN after duplex to advise not to check patient's BP on left arm because patient seems to have a superficial clot to her left cephalic. This was passed to day shift staff.

## 2022-10-08 NOTE — PROGRESS NOTES
Bedside and Verbal shift change report given to Gloria Izaguirre RN (oncoming nurse) by Viral Goldstein RN (offgoing nurse). Report included the following information SBAR, Kardex, Intake/Output, MAR, Recent Results, and Cardiac Rhythm of NSR and an episode of 8 beats of V-tach, also to avoid checking BP on left arm due to superficial thrombosis on left cephalic .

## 2022-10-08 NOTE — PROGRESS NOTES
Bedside and Verbal shift change report given to [facility] (oncoming nurse) by Marily Persaud (offgoing nurse). Report included the following information SBAR, Kardex, ED Summary, Procedure Summary, Intake/Output, MAR, Recent Results, and Cardiac Rhythm NSR .

## 2022-10-08 NOTE — PROGRESS NOTES
[address] [facility] Adult  Hospitalist Group                                                                                          Hospitalist Progress Note  Kun Oropeza MD  Answering service: [phone] from in house phone        Date of Service:  10/8/2022  NAME:  Rubens Gupta  :  1955  MRN:  [id number]      Admission Summary:     Patient presents with recurrent malignant pleural effusion. Interval history / Subjective:     Patient is seen and examined at bedside this AM. Pain controlled. She is worried , appears mildly depressed and anxious. Tried my best t reassure her. Explained bacteremia    Discussed with nursing      Assessment & Plan:     Recurrent Malignant pleural effusion s/p left Aspira cath 10/7  -Patient presents with shortness of breath and chest x-ray showed bilateral pleural effusion left more than right  -patient was just discharged , after s/p left thoracentesis on  draining 1700 ml and s/p right thoracentesis  2600 mL  -Repeat right thoracentesis 10/4, draining 1300 mL  -fluid cx: NGTD. -appreciate Pulmonology input     Micrococcus bacteremia   - Blood cx 10/4: 1 of 2 growing Micrococcus luteus ( blood cx from port)  - rpt blood cx 10/6: NGTD  - c/w IV vanco   - appreciate ID input   - Echo pending  - US: No evidence of fluid collection or thrombus around port      Endometrial carcinoma  -Patient follows with Dr. Reynolds [facility] oncology  - Dr. Matt Levy : Janene Finch has progressive malignancy and her treatment options are limited. We discussed some treatment options, but I explained that none of them have a very high response rate.  \"     Recent Perianal abscess  -Diagnosed on recent admission and was discharged to home on p.o. antibiotics and per discharge summary antibiotics to be completed 10/3/2022    On Xarelto at home, restarted 10/8     Code status: Full  Prophylaxis: [address] discussed with: Patient  Anticipated Disposition:  home likely on Monday       Hospital Problems  Date Reviewed: 10/7/2022            Codes Class Noted POA    Acute hyperkalemia ICD-10-CM: E87.5  ICD-9-CM: 276.7  10/4/2022 Unknown        Tachycardia ICD-10-CM: R00.0  ICD-9-CM: 785.0  10/4/2022 Unknown        SOBOE (shortness of breath on exertion) ICD-10-CM: R06.02  ICD-9-CM: 786.05  10/4/2022 Unknown        Bilateral pleural effusion ICD-10-CM: J90  ICD-9-CM: 511.9  10/4/2022 Unknown         Review of Systems:   A comprehensive review of systems was negative except for that written in the HPI. Vital Signs:    Last 24hrs VS reviewed since prior progress note. Most recent are:  Visit Vitals  /75 (BP 1 Location: Right upper arm, BP Patient Position: At rest)   Pulse 81   Temp 98.3 °F (36.8 °C)   Resp 21   Wt 63.6 kg (140 lb 3.4 oz)   SpO2 95%   BMI 23.33 kg/m²         Intake/Output Summary (Last 24 hours) at 10/8/2022 1419  Last data filed at 10/8/2022 3062  Gross per 24 hour   Intake 240 ml   Output 300 ml   Net -60 ml          Physical Examination:     I had a face to face encounter with this patient and independently examined them on 10/8/2022 as outlined below:          Constitutional:  No acute distress, cooperative, pleasant    ENT:  Oral mucosa moist, oropharynx benign. Resp:  CTA bilaterally. No wheezing/rhonchi/rales. No accessory muscle use. CV:  Regular rhythm, normal rate, no murmurs, gallops, rubs    GI:  Soft, non distended, non tender. normoactive bowel sounds, no hepatosplenomegaly     Musculoskeletal:  warm, 2+ pulses throughout    Neurologic:  Moves all extremities.   AAOx3, CN II-XII reviewed            Data Review:    Review and/or order of clinical lab test      Labs:     Recent Labs     10/08/22  0259 10/06/22  0404   WBC 5.9 5.4   HGB 10.8* 10.3*   HCT 34.5* 34.1*    290       Recent Labs     10/08/22  0259 10/07/22  0429 10/06/22  0404    136 136   K 3.8 4.7 4.2    106 104   CO2 24 24 27   BUN 11 10 11   CREA 0.19* 0.27* 0.24*   GLU 91 93 88   CA 7.6* 7.7* 7.5*   MG 1.9  --   --    PHOS 3.1  --   --        No results for input(s): ALT, AP, TBIL, TBILI, TP, ALB, GLOB, GGT, AML, LPSE in the last 72 hours. No lab exists for component: SGOT, GPT, AMYP, HLPSE    No results for input(s): INR, PTP, APTT, INREXT, INREXT in the last 72 hours. No results for input(s): FE, TIBC, PSAT, FERR in the last 72 hours. No results found for: FOL, RBCF   No results for input(s): PH, PCO2, PO2 in the last 72 hours. No results for input(s): CPK, CKNDX, TROIQ in the last 72 hours.     No lab exists for component: CPKMB  No results found for: CHOL, CHOLX, CHLST, CHOLV, HDL, HDLP, LDL, LDLC, DLDLP, TGLX, TRIGL, TRIGP, CHHD, CHHDX  Lab Results   Component Value Date/Time    Glucose (POC) 144 (H) 08/27/2021 11:58 AM    Glucose (POC) 136 (H) 09/18/2015 08:06 AM    Glucose (POC) 115 (H) 05/01/2013 09:53 AM     Lab Results   Component Value Date/Time    Color Yellow 09/13/2022 11:37 AM    Appearance Clear 09/13/2022 11:37 AM    Specific gravity 1.022 05/12/2022 01:03 PM    pH (UA) 6.5 09/13/2022 11:37 AM    Protein Negative 05/12/2022 01:03 PM    Glucose Negative 05/12/2022 01:03 PM    Ketone Negative 09/13/2022 11:37 AM    Bilirubin Negative 09/13/2022 11:37 AM    Urobilinogen 0.2 05/12/2022 01:03 PM    Nitrites Negative 09/13/2022 11:37 AM    Leukocyte Esterase Negative 09/13/2022 11:37 AM    Epithelial cells FEW 05/12/2022 01:03 PM    Bacteria None seen 09/13/2022 11:37 AM    WBC None seen 09/13/2022 11:37 AM    RBC None seen 09/13/2022 11:37 AM         Medications Reviewed:     Current Facility-Administered Medications   Medication Dose Route Frequency    rivaroxaban (XARELTO) tablet 20 mg  20 mg Oral DAILY WITH BREAKFAST    [START ON 10/9/2022] vancomycin random level 10/9 with AM labs   Other ONCE    traMADoL (ULTRAM) tablet 50 mg  50 mg Oral Q6H PRN    Vancomycin- pharmacy to dose   Other Rx Dosing/Monitoring    vancomycin (VANCOCIN) 1,250 mg in 0.9% sodium chloride 250 mL ([id number])  1,250 mg IntraVENous Q12H    magnesium hydroxide (MILK OF MAGNESIA) 400 mg/5 mL oral suspension 30 mL  30 mL Oral DAILY PRN    famotidine (PEPCID) tablet 20 mg  20 mg Oral DAILY PRN    sodium chloride (NS) flush 5-40 mL  5-40 mL IntraVENous Q8H    sodium chloride (NS) flush 5-40 mL  5-40 mL IntraVENous PRN    acetaminophen (TYLENOL) tablet 650 mg  650 mg Oral Q6H PRN    Or    acetaminophen (TYLENOL) suppository 650 mg  650 mg Rectal Q6H PRN    ondansetron (ZOFRAN ODT) tablet 4 mg  4 mg Oral Q8H PRN    Or    ondansetron (ZOFRAN) injection 4 mg  4 mg IntraVENous Q6H PRN     Facility-Administered Medications Ordered in Other Encounters   Medication Dose Route Frequency    midazolam (VERSED) injection 5 mg  5 mg IntraVENous Rad Multiple    fentaNYL citrate (PF) injection 200 mcg  200 mcg IntraVENous Rad Multiple     ______________________________________________________________________  EXPECTED LENGTH OF STAY: 3d 7h  ACTUAL LENGTH OF STAY:          [address], [place]

## 2022-10-09 ENCOUNTER — APPOINTMENT (OUTPATIENT)
Dept: NON INVASIVE DIAGNOSTICS | Age: 67
DRG: 375 | End: 2022-10-09
Attending: INTERNAL MEDICINE
Payer: MEDICARE

## 2022-10-09 LAB
ANION GAP SERPL CALC-SCNC: 3 MMOL/L (ref 5–15)
BACTERIA SPEC CULT: ABNORMAL
BUN SERPL-MCNC: 9 MG/DL (ref 6–20)
BUN/CREAT SERPL: 45 (ref 12–20)
CALCIUM SERPL-MCNC: 7.7 MG/DL (ref 8.5–10.1)
CHLORIDE SERPL-SCNC: 105 MMOL/L (ref 97–108)
CO2 SERPL-SCNC: 28 MMOL/L (ref 21–32)
CREAT SERPL-MCNC: 0.2 MG/DL (ref 0.55–1.02)
GLUCOSE SERPL-MCNC: 80 MG/DL (ref 65–100)
POTASSIUM SERPL-SCNC: 4.1 MMOL/L (ref 3.5–5.1)
SERVICE CMNT-IMP: ABNORMAL
SODIUM SERPL-SCNC: 136 MMOL/L (ref 136–145)
VANCOMYCIN SERPL-MCNC: 23.9 UG/ML

## 2022-10-09 PROCEDURE — 36415 COLL VENOUS BLD VENIPUNCTURE: CPT

## 2022-10-09 PROCEDURE — 80048 BASIC METABOLIC PNL TOTAL CA: CPT

## 2022-10-09 PROCEDURE — 74011250637 HC RX REV CODE- 250/637: Performed by: FAMILY MEDICINE

## 2022-10-09 PROCEDURE — 80202 ASSAY OF VANCOMYCIN: CPT

## 2022-10-09 PROCEDURE — 74011250636 HC RX REV CODE- 250/636: Performed by: INTERNAL MEDICINE

## 2022-10-09 PROCEDURE — 65270000046 HC RM TELEMETRY

## 2022-10-09 PROCEDURE — 74011000250 HC RX REV CODE- 250: Performed by: FAMILY MEDICINE

## 2022-10-09 PROCEDURE — 74011250637 HC RX REV CODE- 250/637: Performed by: PHYSICIAN ASSISTANT

## 2022-10-09 RX ADMIN — RIVAROXABAN 20 MG: 20 TABLET, FILM COATED ORAL at 08:56

## 2022-10-09 RX ADMIN — FAMOTIDINE 20 MG: 20 TABLET, FILM COATED ORAL at 06:47

## 2022-10-09 RX ADMIN — ACETAMINOPHEN 650 MG: 325 TABLET, FILM COATED ORAL at 06:48

## 2022-10-09 RX ADMIN — Medication 10 ML: at 06:39

## 2022-10-09 RX ADMIN — Medication 10 ML: at 21:54

## 2022-10-09 RX ADMIN — TRAMADOL HYDROCHLORIDE 50 MG: 50 TABLET, COATED ORAL at 21:54

## 2022-10-09 RX ADMIN — VANCOMYCIN HYDROCHLORIDE 1250 MG: 1.25 INJECTION, POWDER, LYOPHILIZED, FOR SOLUTION INTRAVENOUS at 06:38

## 2022-10-09 NOTE — PROGRESS NOTES
Bedside and Verbal shift change report given to Darryn Seth RN (oncoming nurse) by Grace Melissa LPN (offgoing nurse). Report included the following information SBAR, Kardex, MAR, Cardiac Rhythm  , and Alarm Parameters .

## 2022-10-09 NOTE — PROGRESS NOTES
Problem: Falls - Risk of  Goal: *Absence of Falls  Description: Document Vernia Colder Fall Risk and appropriate interventions in the flowsheet. Outcome: Progressing Towards Goal  Note: Fall Risk Interventions:            Medication Interventions: Patient to call before getting OOB         History of Falls Interventions: Evaluate medications/consider consulting pharmacy         Problem: Pressure Injury - Risk of  Goal: *Prevention of pressure injury  Description: Document Henry Scale and appropriate interventions in the flowsheet.   Outcome: Progressing Towards Goal  Note: Pressure Injury Interventions:  Sensory Interventions: Float heels    Moisture Interventions: Absorbent underpads    Activity Interventions: Increase time out of bed    Mobility Interventions: HOB 30 degrees or less    Nutrition Interventions: Document food/fluid/supplement intake    Friction and Shear Interventions: Minimize layers

## 2022-10-09 NOTE — PROGRESS NOTES
Day #4 of Vancomycin  Indication:  GPC+ blood culture. -h/o serous adenocarcinoma of endometrium, currently undergoing chemotherapy  -recurrent malignant pleural effusion  -perianal abscess drained 2022  Current regimen:  1250 mg q12h  Abx regimen: vanc monotherapy  ID Following ?: YES  Concomitant nephrotoxic drugs (requires more frequent monitoring): None  Frequency of BMP?: daily    Recent Labs     10/09/22  0355 10/08/22  0259 10/07/22  0429   WBC  --  5.9  --    CREA 0.20* 0.19* 0.27*   BUN 9 11 10     Est CrCl: 70-75 ml/min; UO: n/a ml/kg/hr   Temp (24hrs), Av.7 °F (36.5 °C), Min:97.3 °F (36.3 °C), Max:98.5 °F (36.9 °C)    Cultures:   10/4 blood: micrococcus luteus in 1/2 bottles; prelim  10/4 AFB pleural fluid: Neg  10/4 pleural fluid: NGTD - prelim  10/6 blood: NG x3 days; prelim    MRSA Swab ordered (if applicable)? N/A    Goal target range AUC/TIFFANIE 400-600    Recent level history:  Date/Time Dose & Interval Measured Level (mcg/mL) Associated AUC/TIFFANIE Dose Adjustment    10/9 @ 0355 Vanc 1.25g q12h 23.9 (~9h post-dose) 646 Vanc 1.25g q18h                                           Plan: Change to vanc 1.25g q18h  for predicted AUC/TIFFANIE within goal range given current regimen provides higher than desired levels. Follow ID plan.

## 2022-10-09 NOTE — PROGRESS NOTES
[address] [facility] Adult  Hospitalist Group                                                                                          Hospitalist Progress Note  Socorro Apley, MD  Answering service: [phone] from in house phone        Date of Service:  10/9/2022  NAME:  Brad Leavitt  :  1955  MRN:  [id number]      Admission Summary:     Patient presents with recurrent malignant pleural effusion. Interval history / Subjective:     Patient is seen and examined at bedside this AM. She feels ok. No overnight events. Will get final plan on abx tomorrow for ID  Briefly discussed goals of care again - she does not want to make any decisions now   Discussed with nursing      Assessment & Plan:     Recurrent Malignant pleural effusion s/p left Aspira cath 10/7  -Patient presents with shortness of breath and chest x-ray showed bilateral pleural effusion left more than right  -patient was just discharged , after s/p left thoracentesis on  draining 1700 ml and s/p right thoracentesis  2600 mL  -Repeat right thoracentesis 10/4, draining 1300 mL  -fluid cx: NGTD. -appreciate Pulmonology input     Micrococcus bacteremia   - Blood cx 10/4: 1 of 2 growing Micrococcus luteus ( blood cx from port)  - rpt blood cx 10/6: NGTD  - c/w IV vanco   - appreciate ID input   - Echo pending  - US: No evidence of fluid collection or thrombus around port      Endometrial carcinoma  -Patient follows with Dr. Miguel Angel Hart oncology  - Dr. Gm Morgan : Peg West has progressive malignancy and her treatment options are limited. We discussed some treatment options, but I explained that none of them have a very high response rate.  \"     Recent Perianal abscess  -Diagnosed on recent admission and was discharged to home on p.o. antibiotics and per discharge summary antibiotics to be completed 10/3/2022    On Xarelto at home, restarted 10/8     Code status: Full  Prophylaxis: [address] discussed with: Patient  Anticipated Disposition:  home  likely on Monday       Hospital Problems  Date Reviewed: 10/7/2022            Codes Class Noted POA    Acute hyperkalemia ICD-10-CM: E87.5  ICD-9-CM: 276.7  10/4/2022 Unknown        Tachycardia ICD-10-CM: R00.0  ICD-9-CM: 785.0  10/4/2022 Unknown        SOBOE (shortness of breath on exertion) ICD-10-CM: R06.02  ICD-9-CM: 786.05  10/4/2022 Unknown        Bilateral pleural effusion ICD-10-CM: J90  ICD-9-CM: 511.9  10/4/2022 Unknown       Review of Systems:   A comprehensive review of systems was negative except for that written in the HPI. Vital Signs:    Last 24hrs VS reviewed since prior progress note. Most recent are:  Visit Vitals  /79 (BP 1 Location: Right upper arm, BP Patient Position: At rest)   Pulse 79   Temp 97.6 °F (36.4 °C)   Resp 18   Wt 63.6 kg (140 lb 3.4 oz)   SpO2 96%   BMI 23.33 kg/m²         Intake/Output Summary (Last 24 hours) at 10/9/2022 1311  Last data filed at 10/8/2022 1637  Gross per 24 hour   Intake 240 ml   Output --   Net 240 ml          Physical Examination:     I had a face to face encounter with this patient and independently examined them on 10/9/2022 as outlined below:          Constitutional:  No acute distress, cooperative, pleasant    ENT:  Oral mucosa moist, oropharynx benign. Resp:  CTA bilaterally. No wheezing/rhonchi/rales. No accessory muscle use. CV:  Regular rhythm, normal rate, no murmurs, gallops, rubs    GI:  Soft, non distended, non tender. normoactive bowel sounds, no hepatosplenomegaly     Musculoskeletal:  warm, 2+ pulses throughout    Neurologic:  Moves all extremities.   AAOx3, CN II-XII reviewed            Data Review:    Review and/or order of clinical lab test      Labs:     Recent Labs     10/08/22  0259   WBC 5.9   HGB 10.8*   HCT 34.5*          Recent Labs     10/09/22  0355 10/08/22  0259 10/07/22  0429    137 136   K 4.1 3.8 4.7    106 106   CO2 28 24 24   BUN 9 11 10   CREA 0.20* 0.19* 0.27*   GLU 80 91 93   CA 7.7* 7.6* 7.7*   MG  --  1.9  --    PHOS  --  3.1  --        No results for input(s): ALT, AP, TBIL, TBILI, TP, ALB, GLOB, GGT, AML, LPSE in the last 72 hours. No lab exists for component: SGOT, GPT, AMYP, HLPSE    No results for input(s): INR, PTP, APTT, INREXT, INREXT in the last 72 hours. No results for input(s): FE, TIBC, PSAT, FERR in the last 72 hours. No results found for: FOL, RBCF   No results for input(s): PH, PCO2, PO2 in the last 72 hours. No results for input(s): CPK, CKNDX, TROIQ in the last 72 hours.     No lab exists for component: CPKMB  No results found for: CHOL, CHOLX, CHLST, CHOLV, HDL, HDLP, LDL, LDLC, DLDLP, TGLX, TRIGL, TRIGP, CHHD, CHHDX  Lab Results   Component Value Date/Time    Glucose (POC) 144 (H) 08/27/2021 11:58 AM    Glucose (POC) 136 (H) 09/18/2015 08:06 AM    Glucose (POC) 115 (H) 05/01/2013 09:53 AM     Lab Results   Component Value Date/Time    Color Yellow 09/13/2022 11:37 AM    Appearance Clear 09/13/2022 11:37 AM    Specific gravity 1.022 05/12/2022 01:03 PM    pH (UA) 6.5 09/13/2022 11:37 AM    Protein Negative 05/12/2022 01:03 PM    Glucose Negative 05/12/2022 01:03 PM    Ketone Negative 09/13/2022 11:37 AM    Bilirubin Negative 09/13/2022 11:37 AM    Urobilinogen 0.2 05/12/2022 01:03 PM    Nitrites Negative 09/13/2022 11:37 AM    Leukocyte Esterase Negative 09/13/2022 11:37 AM    Epithelial cells FEW 05/12/2022 01:03 PM    Bacteria None seen 09/13/2022 11:37 AM    WBC None seen 09/13/2022 11:37 AM    RBC None seen 09/13/2022 11:37 AM         Medications Reviewed:     Current Facility-Administered Medications   Medication Dose Route Frequency    [START ON 10/10/2022] vancomycin (VANCOCIN) 1,250 mg in 0.9% sodium chloride 250 mL ([id number])  1,250 mg IntraVENous Q18H    rivaroxaban (XARELTO) tablet 20 mg  20 mg Oral DAILY WITH BREAKFAST    traMADoL (ULTRAM) tablet 50 mg  50 mg Oral Q6H PRN    Vancomycin- pharmacy to dose   Other Rx Dosing/Monitoring    magnesium hydroxide (MILK OF MAGNESIA) 400 mg/5 mL oral suspension 30 mL  30 mL Oral DAILY PRN    famotidine (PEPCID) tablet 20 mg  20 mg Oral DAILY PRN    sodium chloride (NS) flush 5-40 mL  5-40 mL IntraVENous Q8H    sodium chloride (NS) flush 5-40 mL  5-40 mL IntraVENous PRN    acetaminophen (TYLENOL) tablet 650 mg  650 mg Oral Q6H PRN    Or    acetaminophen (TYLENOL) suppository 650 mg  650 mg Rectal Q6H PRN    ondansetron (ZOFRAN ODT) tablet 4 mg  4 mg Oral Q8H PRN    Or    ondansetron (ZOFRAN) injection 4 mg  4 mg IntraVENous Q6H PRN     Facility-Administered Medications Ordered in Other Encounters   Medication Dose Route Frequency    midazolam (VERSED) injection 5 mg  5 mg IntraVENous Rad Multiple    fentaNYL citrate (PF) injection 200 mcg  200 mcg IntraVENous Rad Multiple     ______________________________________________________________________  EXPECTED LENGTH OF STAY: 3d 7h  ACTUAL LENGTH OF STAY:          5                 Karen Venegas MD

## 2022-10-10 ENCOUNTER — APPOINTMENT (OUTPATIENT)
Dept: GENERAL RADIOLOGY | Age: 67
DRG: 375 | End: 2022-10-10
Attending: INTERNAL MEDICINE
Payer: MEDICARE

## 2022-10-10 ENCOUNTER — APPOINTMENT (OUTPATIENT)
Dept: NON INVASIVE DIAGNOSTICS | Age: 67
DRG: 375 | End: 2022-10-10
Attending: INTERNAL MEDICINE
Payer: MEDICARE

## 2022-10-10 LAB
ANION GAP SERPL CALC-SCNC: 4 MMOL/L (ref 5–15)
BUN SERPL-MCNC: 11 MG/DL (ref 6–20)
BUN/CREAT SERPL: 48 (ref 12–20)
CALCIUM SERPL-MCNC: 7.4 MG/DL (ref 8.5–10.1)
CHLORIDE SERPL-SCNC: 105 MMOL/L (ref 97–108)
CO2 SERPL-SCNC: 27 MMOL/L (ref 21–32)
CREAT SERPL-MCNC: 0.23 MG/DL (ref 0.55–1.02)
ECHO AO ROOT DIAM: 3.7 CM
ECHO AO ROOT INDEX: 2.14 CM/M2
ECHO AV PEAK GRADIENT: 2 MMHG
ECHO AV PEAK VELOCITY: 0.8 M/S
ECHO AV VELOCITY RATIO: 0.88
ECHO EST RA PRESSURE: 3 MMHG
ECHO LA DIAMETER INDEX: 1.27 CM/M2
ECHO LA DIAMETER: 2.2 CM
ECHO LA TO AORTIC ROOT RATIO: 0.59
ECHO LV E' LATERAL VELOCITY: 7 CM/S
ECHO LV E' SEPTAL VELOCITY: 5 CM/S
ECHO LV FRACTIONAL SHORTENING: 34 % (ref 28–44)
ECHO LV INTERNAL DIMENSION DIASTOLE INDEX: 1.85 CM/M2
ECHO LV INTERNAL DIMENSION DIASTOLIC: 3.2 CM (ref 3.9–5.3)
ECHO LV INTERNAL DIMENSION SYSTOLIC INDEX: 1.21 CM/M2
ECHO LV INTERNAL DIMENSION SYSTOLIC: 2.1 CM
ECHO LV IVSD: 1.2 CM (ref 0.6–0.9)
ECHO LV MASS 2D: 97.2 G (ref 67–162)
ECHO LV MASS INDEX 2D: 56.2 G/M2 (ref 43–95)
ECHO LV POSTERIOR WALL DIASTOLIC: 0.9 CM (ref 0.6–0.9)
ECHO LV RELATIVE WALL THICKNESS RATIO: 0.56
ECHO LVOT PEAK GRADIENT: 2 MMHG
ECHO LVOT PEAK VELOCITY: 0.7 M/S
ECHO MV A VELOCITY: 0.68 M/S
ECHO MV AREA PHT: 4.3 CM2
ECHO MV E DECELERATION TIME (DT): 178.2 MS
ECHO MV E VELOCITY: 0.49 M/S
ECHO MV E/A RATIO: 0.72
ECHO MV E/E' LATERAL: 7
ECHO MV E/E' RATIO (AVERAGED): 8.4
ECHO MV E/E' SEPTAL: 9.8
ECHO MV PRESSURE HALF TIME (PHT): 51.7 MS
ECHO PV MAX VELOCITY: 0.7 M/S
ECHO PV PEAK GRADIENT: 2 MMHG
ECHO RIGHT VENTRICULAR SYSTOLIC PRESSURE (RVSP): 83 MMHG
ECHO RV TAPSE: 1.5 CM (ref 1.7–?)
ECHO TV REGURGITANT MAX VELOCITY: 4.47 M/S
ECHO TV REGURGITANT PEAK GRADIENT: 80 MMHG
ERYTHROCYTE [DISTWIDTH] IN BLOOD BY AUTOMATED COUNT: 18.6 % (ref 11.5–14.5)
GLUCOSE SERPL-MCNC: 85 MG/DL (ref 65–100)
HCT VFR BLD AUTO: 34 % (ref 35–47)
HGB BLD-MCNC: 10.5 G/DL (ref 11.5–16)
MCH RBC QN AUTO: 27.3 PG (ref 26–34)
MCHC RBC AUTO-ENTMCNC: 30.9 G/DL (ref 30–36.5)
MCV RBC AUTO: 88.3 FL (ref 80–99)
NRBC # BLD: 0 K/UL (ref 0–0.01)
NRBC BLD-RTO: 0 PER 100 WBC
PLATELET # BLD AUTO: 279 K/UL (ref 150–400)
PMV BLD AUTO: 8.3 FL (ref 8.9–12.9)
POTASSIUM SERPL-SCNC: 4.1 MMOL/L (ref 3.5–5.1)
RBC # BLD AUTO: 3.85 M/UL (ref 3.8–5.2)
SODIUM SERPL-SCNC: 136 MMOL/L (ref 136–145)
WBC # BLD AUTO: 5.4 K/UL (ref 3.6–11)

## 2022-10-10 PROCEDURE — 93306 TTE W/DOPPLER COMPLETE: CPT

## 2022-10-10 PROCEDURE — 65270000046 HC RM TELEMETRY

## 2022-10-10 PROCEDURE — 99232 SBSQ HOSP IP/OBS MODERATE 35: CPT | Performed by: INTERNAL MEDICINE

## 2022-10-10 PROCEDURE — 99232 SBSQ HOSP IP/OBS MODERATE 35: CPT | Performed by: PHYSICIAN ASSISTANT

## 2022-10-10 PROCEDURE — 85027 COMPLETE CBC AUTOMATED: CPT

## 2022-10-10 PROCEDURE — 80048 BASIC METABOLIC PNL TOTAL CA: CPT

## 2022-10-10 PROCEDURE — 74011250637 HC RX REV CODE- 250/637: Performed by: PHYSICIAN ASSISTANT

## 2022-10-10 PROCEDURE — 71045 X-RAY EXAM CHEST 1 VIEW: CPT

## 2022-10-10 PROCEDURE — 74011250636 HC RX REV CODE- 250/636: Performed by: INTERNAL MEDICINE

## 2022-10-10 PROCEDURE — 74011000250 HC RX REV CODE- 250: Performed by: FAMILY MEDICINE

## 2022-10-10 PROCEDURE — 36415 COLL VENOUS BLD VENIPUNCTURE: CPT

## 2022-10-10 PROCEDURE — 93306 TTE W/DOPPLER COMPLETE: CPT | Performed by: INTERNAL MEDICINE

## 2022-10-10 RX ADMIN — TRAMADOL HYDROCHLORIDE 50 MG: 50 TABLET, COATED ORAL at 09:01

## 2022-10-10 RX ADMIN — Medication 10 ML: at 17:33

## 2022-10-10 RX ADMIN — VANCOMYCIN HYDROCHLORIDE 1250 MG: 1.25 INJECTION, POWDER, LYOPHILIZED, FOR SOLUTION INTRAVENOUS at 18:28

## 2022-10-10 RX ADMIN — TRAMADOL HYDROCHLORIDE 50 MG: 50 TABLET, COATED ORAL at 17:23

## 2022-10-10 RX ADMIN — VANCOMYCIN HYDROCHLORIDE 1250 MG: 1.25 INJECTION, POWDER, LYOPHILIZED, FOR SOLUTION INTRAVENOUS at 00:06

## 2022-10-10 RX ADMIN — Medication 10 ML: at 05:26

## 2022-10-10 RX ADMIN — RIVAROXABAN 20 MG: 20 TABLET, FILM COATED ORAL at 08:56

## 2022-10-10 RX ADMIN — FAMOTIDINE 20 MG: 20 TABLET, FILM COATED ORAL at 17:29

## 2022-10-10 NOTE — PROGRESS NOTES
Transition of Care  RUR 17% Moderate  Disposition [address]  DME None  Transportation Family   Follow Up PCP, Specialist    CM continuing to follow patient for transitional care planning needs. Patient is planned for DC home with [facility] of home health with [facility].      Damon Ignacio MS

## 2022-10-10 NOTE — PROGRESS NOTES
Faculty or Preceptor Review of Student Work    10/10/2022  - Shift times - 0700 to 1300    The student documentation of patient care for Rubens Gupta has been reviewed and approved. All medications have been administered under the direct supervision of the faculty or preceptor.     Duy Stoner RN

## 2022-10-10 NOTE — PROGRESS NOTES
Problem: Falls - Risk of  Goal: *Absence of Falls  Description: Document Cee Counts Fall Risk and appropriate interventions in the flowsheet. Outcome: Progressing Towards Goal  Note: Fall Risk Interventions:            Medication Interventions: Patient to call before getting OOB         History of Falls Interventions: Evaluate medications/consider consulting pharmacy         Problem: Patient Education: Go to Patient Education Activity  Goal: Patient/Family Education  Outcome: Progressing Towards Goal     Problem: Pressure Injury - Risk of  Goal: *Prevention of pressure injury  Description: Document Henry Scale and appropriate interventions in the flowsheet.   Outcome: Progressing Towards Goal  Note: Pressure Injury Interventions:  Sensory Interventions: Float heels    Moisture Interventions: Absorbent underpads    Activity Interventions: Increase time out of bed    Mobility Interventions: HOB 30 degrees or less    Nutrition Interventions: Discuss nutritional consult with provider    Friction and Shear Interventions: Minimize layers                Problem: Patient Education: Go to Patient Education Activity  Goal: Patient/Family Education  Outcome: Progressing Towards Goal     Problem: Discharge Planning  Goal: *Discharge to safe environment  Outcome: Progressing Towards Goal

## 2022-10-10 NOTE — PROGRESS NOTES
Bedside, Verbal, and Written shift change report given to Jose M Abel RN (oncoming nurse) by Prakash Nick RN (offgoing nurse). Report included the following information SBAR, Kardex, ED Summary, OR Summary, Procedure Summary, Intake/Output, MAR, Accordion, Recent Results, and Med Rec Status.

## 2022-10-10 NOTE — PROGRESS NOTES
Problem: Falls - Risk of  Goal: *Absence of Falls  Description: Document Maria C Jade Fall Risk and appropriate interventions in the flowsheet.   Outcome: Progressing Towards Goal  Note: Fall Risk Interventions:            Medication Interventions: Patient to call before getting OOB         History of Falls Interventions: Evaluate medications/consider consulting pharmacy         Problem: Patient Education: Go to Patient Education Activity  Goal: Patient/Family Education  Outcome: Progressing Towards Goal

## 2022-10-10 NOTE — PROGRESS NOTES
Infectious Disease progress note        HPI:      Ms Luis Carlos Owusu seen earlier today  She says she feels better today  No significant pain  Denies any pain around the port site  no fevers chills  Site denies any nausea vomiting diarrhea          Medications:  No current facility-administered medications on file prior to encounter. Current Outpatient Medications on File Prior to Encounter   Medication Sig Dispense Refill    famotidine (PEPCID) 20 mg tablet Take 20 mg by mouth daily as needed for Heartburn or Indigestion. acetaminophen (TYLENOL) 500 mg tablet Take 500 mg by mouth every six (6) hours as needed for Pain.       Xarelto 20 mg tab tablet TAKE 1 TABLET BY MOUTH DAILY 30 Tablet 5           Physical Exam:    Vitals: Patient Vitals for the past 24 hrs:   Temp Pulse Resp BP SpO2   10/10/22 1400 -- 81 -- -- --   10/10/22 1227 -- 94 -- -- --   10/10/22 1150 97.4 °F (36.3 °C) 90 17 106/75 96 %   10/10/22 1056 -- -- -- 109/77 --   10/10/22 1000 -- 95 -- -- --   10/10/22 0747 97.5 °F (36.4 °C) 88 16 109/77 94 %   10/10/22 0552 -- 72 -- -- --   10/10/22 0400 -- 74 -- -- --   10/10/22 0319 97.5 °F (36.4 °C) 81 18 113/74 --   10/10/22 0149 -- 80 -- -- --   10/09/22 2256 98 °F (36.7 °C) 95 17 118/82 94 %   10/09/22 2153 -- 84 -- -- --   10/09/22 1955 -- 88 -- -- --   10/09/22 1916 98.6 °F (37 °C) 84 20 119/71 (!) 84 %   10/09/22 1800 -- 89 -- -- --     GEN: NAD  HEENT: No scleral icterus, no thrush   CV: S1, S2 heard regularly, no chest wall erythema  Lungs: Crackles heard,  L side pleural drain   Abdomen: soft, not much distended, non tender, peritoneal drain RLQ  Extremities: no edema,   Neuro: Alert, oriented to self, time, place and situation, moves all extremities to commands, verbal   Skin: no rash  Lines: port L chest, no erythema around site        Labs:   Recent Results (from the past 24 hour(s))   METABOLIC PANEL, BASIC    Collection Time: 10/10/22 12:05 AM   Result Value Ref Range    Sodium 136 136 - 145 mmol/L    Potassium 4.1 3.5 - 5.1 mmol/L    Chloride 105 97 - 108 mmol/L    CO2 27 21 - 32 mmol/L    Anion gap 4 (L) 5 - 15 mmol/L    Glucose 85 65 - 100 mg/dL    BUN 11 6 - 20 MG/DL    Creatinine 0.23 (L) 0.55 - 1.02 MG/DL    BUN/Creatinine ratio 48 (H) 12 - 20      eGFR >60 >60 ml/min/1.73m2    Calcium 7.4 (L) 8.5 - 10.1 MG/DL   CBC W/O DIFF    Collection Time: 10/10/22 12:05 AM   Result Value Ref Range    WBC 5.4 3.6 - 11.0 K/uL    RBC 3.85 3.80 - 5.20 M/uL    HGB 10.5 (L) 11.5 - 16.0 g/dL    HCT 34.0 (L) 35.0 - 47.0 %    MCV 88.3 80.0 - 99.0 FL    MCH 27.3 26.0 - 34.0 PG    MCHC 30.9 30.0 - 36.5 g/dL    RDW 18.6 (H) 11.5 - 14.5 %    PLATELET 027 401 - 818 K/uL    MPV 8.3 (L) 8.9 - 12.9 FL    NRBC 0.0 0  WBC    ABSOLUTE NRBC 0.00 0.00 - 0.01 K/uL   ECHO ADULT COMPLETE    Collection Time: 10/10/22 10:57 AM   Result Value Ref Range    IVSd 1.2 (A) 0.6 - 0.9 cm    LVIDd 3.2 (A) 3.9 - 5.3 cm    LVIDs 2.1 cm    LVPWd 0.9 0.6 - 0.9 cm    LVOT Peak Gradient 2 mmHg    LVOT Peak Velocity 0.7 m/s    RVSP 83 mmHg    LA Diameter 2.2 cm    Est. RA Pressure 3 mmHg    AV Peak Gradient 2 mmHg    AV Peak Velocity 0.8 m/s    MV A Velocity 0.68 m/s    MV E Wave Deceleration Time 178.2 ms    MV E Velocity 0.49 m/s    LV E' Lateral Velocity 7 cm/s    LV E' Septal Velocity 5 cm/s    MV PHT 51.7 ms    MV Area by PHT 4.3 cm2    PV Peak Gradient 2 mmHg    PV Max Velocity 0.7 m/s    TAPSE 1.5 (A) 1.7 cm    TR Peak Gradient 80 mmHg    TR Max Velocity 4.47 m/s    Aortic Root 3.7 cm    Fractional Shortening 2D 34 28 - 44 %    LVIDd Index 1.85 cm/m2    LVIDs Index 1.21 cm/m2    LV RWT Ratio 0.56     LV Mass 2D 97.2 67 - 162 g    LV Mass 2D Index 56.2 43 - 95 g/m2    MV E/A 0.72     E/E' Ratio (Averaged) 8.40     E/E' Lateral 7.00     E/E' Septal 9.80     LA Size Index 1.27 cm/m2    LA/AO Root Ratio 0.59     Ao Root Index 2.14 cm/m2    AV Velocity Ratio 0.88        Microbiology Data:       Blood:     CULTURE, BLOOD, PAIRED [[id number]] (Order [id number])  Microbiology  Date: 10/4/2022 Department: [employer] [facility] Released By/Authorizing: Ana Luisa Hayes DO (auto-released)      Contains abnormal data CULTURE, BLOOD, PAIRED  Order: [id number]  Collected 10/4/2022 03:17    Status: Preliminary result    Specimen Information: Blood   0 Result Notes  Component Ref Range & Units 10/4/22 0317    Special Requests:   NO SPECIAL REQUESTS P    Culture result:   MICROCOCCUS LUTEUS GROWING IN 1 OF 2 BOTTLES DRAWN , site = port Abnormal  P    Culture result:   REMAINING BOTTLE(S) HAS/HAVE NO GROWTH SO FAR P    Culture result:  (NOTE) [facility] CALLED TO LISANDRA Pathak 10/6 @ 1603 P           CULTURE, BODY FLUID Pippa Rued [[id number]] (Order [id number])  Microbiology  Date: 10/4/2022 Department: [employer] [facility] Released By/Authorizing: Farida Jackson MD (auto-released)     CULTURE, BODY FLUID Pippa Rued  Order: [id number]  Collected 10/4/2022 15:15    Status: Preliminary result    Specimen Information: Pleural Fluid;  Body Fluid   0 Result Notes  Component Ref Range & Units 10/4/22 1515    Special Requests:   NO SPECIAL REQUESTS P    GRAM STAIN   RARE WBCS SEEN P    GRAM STAIN   NO ORGANISMS SEEN P    Culture result:   NO GROWTH THUS FAR P           CULTURE, BLOOD, PAIRED [[id number]] (Order [id number])  Microbiology  Date: 9/22/2022 Department: [facility] Released By: Angelica Esqueda RN (auto-released) Authorizing: Frank Matos MD     CULTURE, BLOOD, PAIRED  Order: [id number]  Collected 9/22/2022 12:53    Status: Final result    Specimen Information: Blood   0 Result Notes  Component Ref Range & Units 9/22/22 1253    Special Requests:   NO SPECIAL REQUESTS    Culture result:   NO GROWTH 6 DAYS               CULTURE, BODY FLUID Quenten Joiner STAIN [[id number]] (Order [id number])  Microbiology  Date: 8/11/2022 Department: [facility] Released By: Sp Green RN (auto-released) Authorizing: Jonah Kumar PA-C    Contains abnormal data CULTURE, BODY FLUID Kumar Cap  Order: [id number]  Collected 8/11/2022 10:40    Status: Final result    Specimen Information: Paracentesis Fluid; Body Fluid   0 Result Notes  Component Ref Range & Units 8/11/22 1040  Resulting Agency   Special Requests:   NO SPECIAL REQUESTS  [facility]   GRAM STAIN   NO WBC'S SEEN  [address] [facility]   Culture result:   Culture performed on Fluid swab specimen NO GROWTH SOLID MEDIA [facility]   Culture result:   MICROCOCCUS LUTEUS SEEN ON GRAM STAIN OF THE THIO BROTH Abnormal           Pathology Results:  9/22/22  Pleural Fluid, ThinPrep and cell block:     Metastatic adenocarcinoma. Positive for malignancy. Imaging:     CT CHEST ABD PELV W CONT: Patient Communication     Add Comments   Not seen     Study Result    Narrative & Impression   INDICATION: Malignant neoplasm of the endometrium. Primary serous adenocarcinoma  of endometrium. Bilateral pleural effusion, malignant ascites. CT of the chest, abdomen and pelvis is performed with 5 mm collimation. Study is  performed with PO and 100 cc of nonionic IV Isovue-370. Sagittal and coronal  reformatted images were also performed. CT dose reduction was achieved with the use of the standardized protocol  tailored for this examination and automatic exposure control for dose  modulation. Direct comparison is made to prior CT dated September 22, 2022. Chest:     Lungs/pleura: There are moderate-sized bilateral pleural effusions. There is  also trace biapical pleural gas. No pulmonary nodule or mass is seen. There is  bibasilar compressive atelectasis. Lymph nodes: There is an enlarged 2 cm x 2 cm precarinal lymph node. There is an  enlarged 2.3 cm x 2.5 cm node. There is an enlarged 2.3 cm x 1.9 cm subcarinal  lymph node. Heart: The heart is normal in size and there is no pericardial fluid.      Bones: No lytic or sclerotic osseous lesion is visualized. Abdomen/pelvis:     Liver: The liver is normal.     Lymph nodes\omentum\peritoneum: There is a small to moderate amount free  intraperitoneal fluid greatest in the pelvis, not significantly changed in  quantity as compared to prior CT dated September 22, 2022. Percutaneous drainage  catheter extends to the upper central abdomen. There is extensive omental  caking, unchanged compared to prior CT from September 22, 2022. Ze hepatis,  portacaval, mesenteric and retroperitoneal lymphadenopathy is unchanged. Spleen: The spleen is normal.     Pancreas: The pancreas is normal.     Adrenals: The adrenals are normal.     Gallbladder: Gallbladder is normal.     Kidneys: The kidneys are normal. There is no hydronephrosis. Bowel: No dilated or thickened loop of large or small bowel is seen. Appendix: The appendix is surgically absent. Urinary bladder: Urinary bladder is partially filled and grossly normal.     Bones: No lytic or sclerotic osseous lesion is visualized. Miscellaneous: There is no free intraperitoneal gas. There is no focal fluid  collection to suggest abscess. The uterus is surgically absent. IMPRESSION  1. Bilateral hydropneumothoraces. Moderate amount of pleural fluid bilaterally  and trace biapical pleural gas. 2. Mediastinal right hilar lymphadenopathy, as described above. 3. Small to moderate amount of free intraperitoneal fluid, predominantly in the  pelvis, unchanged in quantity. 4. Extensive omental caking, unchanged. 5. Ze hepatis, portacaval, mesenteric and retroperitoneal lymphadenopathy,  unchanged. Procedures:     10/7/22 Placement of left tunneled pleural drainage catheter. Approximately 1000 mL  of clear yellow fluid was removed. 10/4/22 Technically successful ultrasound guided right thoracentesis with evacuation of  1300 ml of fluid.     9/23/22 Technically successful ultrasound guided right thoracentesis yielding  approximately 1600 ml of fluid. 9/22/22 Technically successful ultrasound guided left thoracentesis with evacuation of  1700 ml of fluid. A post procedure chest x-ray is pending. 8/11/22 Successful placement of tunneled long-term peritoneal drainage (Aspira)  catheter. 7/18/22 Ultrasound-guided paracentesis. Assessment / Plan:   Ms Petey Schmidt is a 55-year-old lady with a history of COVID. Uterine carcinoma, status post laparoscopic hysterectomy in March 2013, multiple rounds of chemotherapy, indwelling port, recent admission to [facility] from 9/22 to 9/26/2022 treated for perianal abscess who presented to the hospital on 10/4/2022 with micrococcus in blood 1/2 bottles from port site      1) Micrococcus in blood 1/2 bottles port site 10/4/22  Previous blood cx 9/22/22 negative  Patient had a recent admission from 9/22 to 9/26/2022 and given antibiotics during that admission.   Reports of chills and fever during that admission with treatment for an anal boil/abscess  She says that the chills and fevers resolved entirely  No pain or redness around the port site    I discussed that micrococcus is an organism that can be found in nature including water, soil and is also a commensal of human skin  In some cases micrococcus can be a pathogen and cause deeper infections  Complicating the picture is her immune status and the port which increases affinity for such organisms to be more pathogenic    She would like to salvage her port is much as possible which is understandable as there are plans for repeat chemo in the near future      Plan  Will place final orders for vancomycin once level has been stabilized--pharmacy recently adjusted dosing  We will plan for 2-week course of IV vancomycin  I discussed with her that if her repeat blood cultures (surveillance post antibiotic completion)  are still positive for micrococcus/she has signs and symptoms concerning for infection we would recommend treating it aggressively as a pathogen and removing the port  We unfortunately do not have block therapy to give in addition to systemic therapy and discussed this with the patient  Currently on IV vancomycin given penicillin allergy  Monitor renal function closely    2)Pleural effusion :  Path with + adenocarcinoma in 9/2022  S/P L pleural drain       3) Endometrial cancer  Per oncology   S/P Aspira     4) port in place         Thank for the opportunity to participate in the care of this patient. Please contact with questions or concerns.        [address] [facility], DO  3:43 PM

## 2022-10-10 NOTE — PROGRESS NOTES
[address] [facility] [address]  P [phone]  F [phone]       Patient Name: Manda Alfred   Admit Date: 10/4/2022   OR Date: * No surgery found *   Visit Date: 10/10/2022        SUBJECTIVE:  Doing relatively well today. States that she may be able to go home tonight vs tomorrow depending on when her antibiotics are selected. States that she has had occasional discomfort on her left side where the Aspira pleural catheter is. No shortness of breath. OBJECTIVE:    Patient Vitals for the past 24 hrs:   Temp Pulse Resp BP SpO2   10/10/22 1227 -- 94 -- -- --   10/10/22 1150 97.4 °F (36.3 °C) 90 17 106/75 96 %   10/10/22 1056 -- -- -- 109/77 --   10/10/22 1000 -- 95 -- -- --   10/10/22 0747 97.5 °F (36.4 °C) 88 16 109/77 94 %   10/10/22 0552 -- 72 -- -- --   10/10/22 0400 -- 74 -- -- --   10/10/22 0319 97.5 °F (36.4 °C) 81 18 113/74 --   10/10/22 0149 -- 80 -- -- --   10/09/22 2256 98 °F (36.7 °C) 95 17 118/82 94 %   10/09/22 2153 -- 84 -- -- --   10/09/22 1955 -- 88 -- -- --   10/09/22 1916 98.6 °F (37 °C) 84 20 119/71 (!) 84 %   10/09/22 1800 -- 89 -- -- --   10/09/22 1535 98 °F (36.7 °C) 89 20 116/72 96 %         Date 10/09/22 0700 - 10/10/22 0659 10/10/22 0700 - 10/11/22 0659   Shift 5726-7394 1322-6982 24 Hour Total 8981-7978 1003-2985 24 Hour Total   INTAKE   P.O.    550  550     P. O.    550  550   Shift Total(mL/kg)    550(8.3)  550(8.3)   OUTPUT   Urine(mL/kg/hr)           Urine Occurrence(s)    1 x  1 x   Emesis/NG output           Emesis Occurrence(s)    0 x  0 x   Stool           Stool Occurrence(s)    0 x  0 x   Shift Total(mL/kg)         NET    550  550   Weight (kg) 63.6 66.7 66.7 66 66 66         Physical Exam     General:  alert, cooperative, no distress     Cardiac:  Regular rate and rhythm        Lungs:  clear to auscultation bilaterally at the apices. Diminished lung sounds at the lung bases.   Abdomen:  soft, non-tender, without masses or organomegaly   Extremity: extremities normal, atraumatic, 2+ edema in bilateral lower extremities. Data Review  Lab Results   Component Value Date/Time    WBC 5.4 10/10/2022 12:05 AM    ABS. NEUTROPHILS 4.6 10/08/2022 02:59 AM    HGB 10.5 (L) 10/10/2022 12:05 AM    HCT 34.0 (L) 10/10/2022 12:05 AM    MCV 88.3 10/10/2022 12:05 AM    MCH 27.3 10/10/2022 12:05 AM    PLATELET 521 38/13/5304 12:05 AM     Lab Results   Component Value Date/Time    Sodium 136 10/10/2022 12:05 AM    Potassium 4.1 10/10/2022 12:05 AM    Chloride 105 10/10/2022 12:05 AM    CO2 27 10/10/2022 12:05 AM    Glucose 85 10/10/2022 12:05 AM    BUN 11 10/10/2022 12:05 AM    Creatinine 0.23 (L) 10/10/2022 12:05 AM    Calcium 7.4 (L) 10/10/2022 12:05 AM    Albumin 1.8 (L) 10/03/2022 07:16 PM    Bilirubin, total 0.3 10/03/2022 07:16 PM    ALT (SGPT) 18 10/03/2022 07:16 PM    Alk. phosphatase 66 10/03/2022 07:16 PM     IMPRESSION/PLAN:    * No surgery found *  for * No surgery found *    Oncologic:  79 yo WF with recurrent/progressive serous endometrial cancer. She is currently on single-agent Avastin. Last CT scan showed progression of disease. She has worsening of abdominal disease and has now developed recurrent bilateral pleural effusions. This is her second admission for the same problem in less than a month. Right US thoracentesis performed 10/4/2022. S/p left Aspira drain placement 10/7/2022. She can be scheduled to have right aspira placement as an outpatient. Also discussed possible further treatment options. Patient has completed two previous courses of carboplatin/taxol. She was unable to tolerate immunotherapy and Doxil. Could consider Topotecan or possibly Gemzar though chances of efficacy are low and at the same time, chances of side effects can be higher as compared to other regimens. Briefly discussed the role of Hospice as well. Family meeting with patient and daughter 10/6/2022.   Patient leaning towards proceeding with Topotecan. Heme/CV:  VSS. Hemodynamically stable   Renal:  Creatinine appropriate. Voiding without a problem       FEN/GI:  S/p drain placement. Regular diet   ID/Wound:  Afebrile. WBC normal yesterday. Blood cultures from 10/4/2022 +. Sensitivity pending. Defer to primary team for management. Second blood culture negative. PPX:  Encourage OOB. Patient on Xarelto 20mg daily at home. Dispostion:  Continue symptom management. S/p left Aspira placement. Can arrange for right sided aspira in outpatient setting if it is needed. About 900cc serosanguinous fluid drained from left pleural catheter today. About 1200+cc chylous fluid drained from peritoneal drain. Follow up with Dr. Meka Enamorado outpatient once discharged.          Stephanie Miranda PA-C  10/10/2022  2:01 PM

## 2022-10-11 LAB
ANION GAP SERPL CALC-SCNC: 5 MMOL/L (ref 5–15)
BUN SERPL-MCNC: 10 MG/DL (ref 6–20)
BUN/CREAT SERPL: 36 (ref 12–20)
CALCIUM SERPL-MCNC: 8.1 MG/DL (ref 8.5–10.1)
CHLORIDE SERPL-SCNC: 104 MMOL/L (ref 97–108)
CK SERPL-CCNC: 16 U/L (ref 26–192)
CO2 SERPL-SCNC: 26 MMOL/L (ref 21–32)
CREAT SERPL-MCNC: 0.28 MG/DL (ref 0.55–1.02)
GLUCOSE SERPL-MCNC: 95 MG/DL (ref 65–100)
POTASSIUM SERPL-SCNC: 4.2 MMOL/L (ref 3.5–5.1)
SODIUM SERPL-SCNC: 135 MMOL/L (ref 136–145)
VANCOMYCIN SERPL-MCNC: 11.4 UG/ML

## 2022-10-11 PROCEDURE — 36415 COLL VENOUS BLD VENIPUNCTURE: CPT

## 2022-10-11 PROCEDURE — 65270000046 HC RM TELEMETRY

## 2022-10-11 PROCEDURE — 74011250637 HC RX REV CODE- 250/637: Performed by: PHYSICIAN ASSISTANT

## 2022-10-11 PROCEDURE — 99232 SBSQ HOSP IP/OBS MODERATE 35: CPT | Performed by: INTERNAL MEDICINE

## 2022-10-11 PROCEDURE — 74011000250 HC RX REV CODE- 250: Performed by: FAMILY MEDICINE

## 2022-10-11 PROCEDURE — 97165 OT EVAL LOW COMPLEX 30 MIN: CPT

## 2022-10-11 PROCEDURE — 97161 PT EVAL LOW COMPLEX 20 MIN: CPT

## 2022-10-11 PROCEDURE — 74011000258 HC RX REV CODE- 258: Performed by: INTERNAL MEDICINE

## 2022-10-11 PROCEDURE — 97535 SELF CARE MNGMENT TRAINING: CPT

## 2022-10-11 PROCEDURE — 80202 ASSAY OF VANCOMYCIN: CPT

## 2022-10-11 PROCEDURE — 82550 ASSAY OF CK (CPK): CPT

## 2022-10-11 PROCEDURE — 80048 BASIC METABOLIC PNL TOTAL CA: CPT

## 2022-10-11 PROCEDURE — 97116 GAIT TRAINING THERAPY: CPT

## 2022-10-11 PROCEDURE — 74011250636 HC RX REV CODE- 250/636: Performed by: INTERNAL MEDICINE

## 2022-10-11 RX ADMIN — RIVAROXABAN 20 MG: 20 TABLET, FILM COATED ORAL at 08:52

## 2022-10-11 RX ADMIN — Medication 10 ML: at 21:54

## 2022-10-11 RX ADMIN — Medication 10 ML: at 06:00

## 2022-10-11 RX ADMIN — Medication 10 ML: at 13:40

## 2022-10-11 RX ADMIN — DAPTOMYCIN 500 MG: 500 INJECTION, POWDER, LYOPHILIZED, FOR SOLUTION INTRAVENOUS at 13:40

## 2022-10-11 RX ADMIN — MAGNESIUM HYDROXIDE 30 ML: 400 SUSPENSION ORAL at 13:47

## 2022-10-11 NOTE — PROGRESS NOTES
ID follow up    Primary team planning to DC ms Petey Schmidt     I spoke with pharmacy and Vancomcyin dosing is needing to be changed for appropriate levels     Recently change again today at 1000 mg q 12 hours today     Vancomycin  dosing Q 18 would be difficult to do at home and not feasible       Patient wants to go to infusion center for iV therapy per primary team     Will change to daptomycin which can be given at infusion center and also due to Vancomycin dosing challenges     Check CK and test dose today       Peace Serna DO  10:53 AM

## 2022-10-11 NOTE — PROGRESS NOTES
Faculty or Preceptor Review of Student Work    10/11/2022  - Shift times - 0700 to 1300    The student documentation of patient care for Adelfo Asher has been reviewed and approved. All medications have been administered under the direct supervision of the faculty or preceptor.     Mikie Wilburn RN

## 2022-10-11 NOTE — PROGRESS NOTES
Infectious Disease progress note        HPI:      Ms Sandy Sorto seen earlier today  No significant pain  Denies any pain around the port site  no fevers chills  Site denies any nausea vomiting diarrhea          Medications:  No current facility-administered medications on file prior to encounter. Current Outpatient Medications on File Prior to Encounter   Medication Sig Dispense Refill    famotidine (PEPCID) 20 mg tablet Take 20 mg by mouth daily as needed for Heartburn or Indigestion. acetaminophen (TYLENOL) 500 mg tablet Take 500 mg by mouth every six (6) hours as needed for Pain.       Xarelto 20 mg tab tablet TAKE 1 TABLET BY MOUTH DAILY 30 Tablet 5           Physical Exam:    Vitals: Patient Vitals for the past 24 hrs:   Temp Pulse Resp BP SpO2   10/11/22 1217 -- 86 -- -- --   10/11/22 1130 97.9 °F (36.6 °C) 87 18 111/74 96 %   10/11/22 1036 -- 92 -- -- --   10/11/22 1035 -- 91 -- -- --   10/11/22 0957 98.7 °F (37.1 °C) -- -- -- --   10/11/22 0913 -- (!) 118 28 120/82 --   10/11/22 0906 -- (!) 118 -- 94/83 95 %   10/11/22 0901 -- (!) 104 -- 103/75 --   10/11/22 0854 -- 88 -- 118/72 --   10/11/22 0740 98.4 °F (36.9 °C) 85 18 115/71 95 %   10/11/22 0558 -- 88 -- -- --   10/11/22 0450 97.5 °F (36.4 °C) 81 16 118/70 95 %   10/11/22 0351 -- 78 -- -- --   10/11/22 0157 -- 79 -- -- --   10/11/22 0050 97.6 °F (36.4 °C) 85 19 109/75 96 %   10/10/22 2206 -- 81 -- -- --   10/10/22 2007 -- 95 -- -- --   10/10/22 2005 98.6 °F (37 °C) 91 17 111/72 94 %   10/10/22 1800 -- 85 -- -- --   10/10/22 1559 97.4 °F (36.3 °C) 86 24 108/71 96 %   10/10/22 1400 -- 81 -- -- --     GEN: NAD  HEENT: No scleral icterus, no thrush   CV: S1, S2 heard regularly, no chest wall erythema  Lungs: Crackles heard,  L side pleural drain   Abdomen: soft,  non tender, peritoneal drain RLQ  Extremities: no edema,   Neuro: Alert, oriented to self, time, place and situation, moves all extremities to commands, verbal   Skin: no rash  Lines: port L chest, no erythema around site        Labs:   Recent Results (from the past 24 hour(s))   VANCOMYCIN, RANDOM    Collection Time: 10/11/22  5:08 AM   Result Value Ref Range    Vancomycin, random 80.8 UG/ML   METABOLIC PANEL, BASIC    Collection Time: 10/11/22  5:09 AM   Result Value Ref Range    Sodium 135 (L) 136 - 145 mmol/L    Potassium 4.2 3.5 - 5.1 mmol/L    Chloride 104 97 - 108 mmol/L    CO2 26 21 - 32 mmol/L    Anion gap 5 5 - 15 mmol/L    Glucose 95 65 - 100 mg/dL    BUN 10 6 - 20 MG/DL    Creatinine 0.28 (L) 0.55 - 1.02 MG/DL    BUN/Creatinine ratio 36 (H) 12 - 20      eGFR >60 >60 ml/min/1.73m2    Calcium 8.1 (L) 8.5 - 10.1 MG/DL       Microbiology Data:       Blood:     CULTURE, BLOOD, PAIRED [[id number]] (Order [id number])  Microbiology  Date: 10/4/2022 Department: [facility]  [facility]ge Telemetry Released By/Authorizing: Sunshine Connell DO (auto-released)      Contains abnormal data CULTURE, BLOOD, PAIRED  Order: [id number]  Collected 10/4/2022 03:17    Status: Preliminary result    Specimen Information: Blood   0 Result Notes  Component Ref Range & Units 10/4/22 0317    Special Requests:   NO SPECIAL REQUESTS P    Culture result:   MICROCOCCUS LUTEUS GROWING IN 1 OF 2 BOTTLES DRAWN , site = port Abnormal  P    Culture result:   REMAINING BOTTLE(S) HAS/HAVE NO GROWTH SO FAR P    Culture result:  (NOTE) [facility] CALLED TO LISANDRA Grullon 10/6 @ 1603 P           CULTURE, BODY FLUID Rodrick Mejia [[id number]] (Order [id number])  Microbiology  Date: 10/4/2022 Department: [facility] 6 Surge Telemetry Released By/Authorizing: Barbara Marshall MD (auto-released)     CULTURE, BODY FLUID Rodrick Mejia  Order: [id number]  Collected 10/4/2022 15:15    Status: Preliminary result    Specimen Information: Pleural Fluid;  Body Fluid   0 Result Notes  Component Ref Range & Units 10/4/22 0505    Special Requests:   NO SPECIAL REQUESTS P    GRAM STAIN   RARE WBCS SEEN P    GRAM STAIN   NO ORGANISMS SEEN P    Culture result:   NO GROWTH THUS FAR P           CULTURE, BLOOD, PAIRED [[id number]] (Order [id number])  Microbiology  Date: 9/22/2022 Department: [facility] 4w Telemetry Released By: Estefany Hermosillo RN (auto-released) Authorizing: Jennifer Prajapati MD     CULTURE, BLOOD, PAIRED  Order: [id number]  Collected 9/22/2022 12:53    Status: Final result    Specimen Information: Blood   0 Result Notes  Component Ref Range & Units 9/22/22 1253    Special Requests:   NO SPECIAL REQUESTS    Culture result:   NO GROWTH 6 DAYS               CULTURE, BODY FLUID Dahlia Kumar STAIN [[id number]] (Order [id number])  Microbiology  Date: 8/11/2022 Department: [facility] Released By: Yoli Tapia RN (auto-released) Authorizing: Alexandra Osorio PA-C    Contains abnormal data CULTURE, BODY FLUID Anna El  Order: [id number]  Collected 8/11/2022 10:40    Status: Final result    Specimen Information: Paracentesis Fluid; Body Fluid   0 Result Notes  Component Ref Range & Units 8/11/22 1040  Resulting Agency   Special Requests:   NO SPECIAL REQUESTS  [facility]   GRAM STAIN   NO WBC'S SEEN  [address]   Culture result:   Culture performed on Fluid swab specimen NO GROWTH SOLID MEDIA [facility]   Culture result:   MICROCOCCUS LUTEUS SEEN ON GRAM STAIN OF THE THIO BROTH Abnormal           Pathology Results:  9/22/22  Pleural Fluid, ThinPrep and cell block:     Metastatic adenocarcinoma. Positive for malignancy. Imaging:     CT CHEST ABD PELV W CONT: Patient Communication     Add Comments   Not seen     Study Result    Narrative & Impression   INDICATION: Malignant neoplasm of the endometrium. Primary serous adenocarcinoma  of endometrium. Bilateral pleural effusion, malignant ascites. CT of the chest, abdomen and pelvis is performed with 5 mm collimation. Study is  performed with PO and 100 cc of nonionic IV Isovue-370. Sagittal and coronal  reformatted images were also performed.      CT dose reduction was achieved with the use of the standardized protocol  tailored for this examination and automatic exposure control for dose  modulation. Direct comparison is made to prior CT dated September 22, 2022. Chest:     Lungs/pleura: There are moderate-sized bilateral pleural effusions. There is  also trace biapical pleural gas. No pulmonary nodule or mass is seen. There is  bibasilar compressive atelectasis. Lymph nodes: There is an enlarged 2 cm x 2 cm precarinal lymph node. There is an  enlarged 2.3 cm x 2.5 cm node. There is an enlarged 2.3 cm x 1.9 cm subcarinal  lymph node. Heart: The heart is normal in size and there is no pericardial fluid. Bones: No lytic or sclerotic osseous lesion is visualized. Abdomen/pelvis:     Liver: The liver is normal.     Lymph nodes\omentum\peritoneum: There is a small to moderate amount free  intraperitoneal fluid greatest in the pelvis, not significantly changed in  quantity as compared to prior CT dated September 22, 2022. Percutaneous drainage  catheter extends to the upper central abdomen. There is extensive omental  caking, unchanged compared to prior CT from September 22, 2022. Ze hepatis,  portacaval, mesenteric and retroperitoneal lymphadenopathy is unchanged. Spleen: The spleen is normal.     Pancreas: The pancreas is normal.     Adrenals: The adrenals are normal.     Gallbladder: Gallbladder is normal.     Kidneys: The kidneys are normal. There is no hydronephrosis. Bowel: No dilated or thickened loop of large or small bowel is seen. Appendix: The appendix is surgically absent. Urinary bladder: Urinary bladder is partially filled and grossly normal.     Bones: No lytic or sclerotic osseous lesion is visualized. Miscellaneous: There is no free intraperitoneal gas. There is no focal fluid  collection to suggest abscess. The uterus is surgically absent. IMPRESSION  1. Bilateral hydropneumothoraces.  Moderate amount of pleural fluid bilaterally  and trace biapical pleural gas. 2. Mediastinal right hilar lymphadenopathy, as described above. 3. Small to moderate amount of free intraperitoneal fluid, predominantly in the  pelvis, unchanged in quantity. 4. Extensive omental caking, unchanged. 5. Ze hepatis, portacaval, mesenteric and retroperitoneal lymphadenopathy,  unchanged. Procedures:     10/7/22 Placement of left tunneled pleural drainage catheter. Approximately 1000 mL  of clear yellow fluid was removed. 10/4/22 Technically successful ultrasound guided right thoracentesis with evacuation of  1300 ml of fluid. 9/23/22 Technically successful ultrasound guided right thoracentesis yielding  approximately 1600 ml of fluid. 9/22/22 Technically successful ultrasound guided left thoracentesis with evacuation of  1700 ml of fluid. A post procedure chest x-ray is pending. 8/11/22 Successful placement of tunneled long-term peritoneal drainage (Aspira)  catheter. 7/18/22 Ultrasound-guided paracentesis. Assessment / Plan:   Ms Rupert Christine is a 79-year-old lady with a history of COVID. Uterine carcinoma, status post laparoscopic hysterectomy in March 2013, multiple rounds of chemotherapy, indwelling port, recent admission to [facility] from 9/22 to 9/26/2022 treated for perianal abscess who presented to the hospital on 10/4/2022 with micrococcus in blood 1/2 bottles from port site      1) Micrococcus in blood 1/2 bottles port site 10/4/22  Previous blood cx 9/22/22 negative  Patient had a recent admission from 9/22 to 9/26/2022 and given antibiotics during that admission.   Reports of chills and fever during that admission with treatment for an anal boil/abscess  She says that the chills and fevers resolved entirely  No pain or redness around the port site    I discussed that micrococcus is an organism that can be found in nature including water, soil and is also a commensal of human skin  In some cases micrococcus can be a pathogen and cause deeper infections  Complicating the picture is her immune status and the port which increases affinity for such organisms to be more pathogenic    She would like to salvage her port is much as possible which is understandable as there are plans for repeat chemo in the near future      Plan  Vancomycin dosing has been adjusted again and discussed with pharmacy   Concerned about dosing adjustments if discharged and needs close follow up for levels etc  Patient also wishes to go to the infusion center if she can but lives far from here   [address] assistance as patient states closer sites are either [facility] or [facility]   Swtiched to daptomycin IV 8mg/KG daily till  10/20/22. Adjust dosing based on renal function   Ck pending   Check surveillance blood cx from port and peripheral blood 1 week after antibiotics stopped  If recurrent Micrococcus + from blood cx, may need to  remove port and treatment based on goals of care and course   Follow up with ID in 4 weeks   Avoid statin while on IV daptomycin   Antibiotic side effects/adverse effects/toxicities discussed including gastrointestinal, renal, hematological , ability to lower siezure threshold, risk for C diff infection, myalgia, rhabdomyolysis   Probiotics, daily yogurt encouraged. 2)Pleural effusion :  Path with + adenocarcinoma in 9/2022  S/P L pleural drain       3) Endometrial cancer  Per oncology   S/P Aspira     4) port in place         Thank for the opportunity to participate in the care of this patient. Please contact with questions or concerns.        Peace Serna,   1:32 PM

## 2022-10-11 NOTE — PROGRESS NOTES
OCCUPATIONAL THERAPY EVALUATION/DISCHARGE  Patient: Caeasr Sanchez (11 y.o. female)  Date: 10/11/2022  Primary Diagnosis: Bilateral pleural effusion [J90]  Tachycardia [R00.0]  SOBOE (shortness of breath on exertion) [R06.02]  Acute hyperkalemia [E87.5]       Precautions: Fall    ASSESSMENT  Based on the objective data described below, the patient presents with decreased standing balance, activity tolerance, cognition (insight, safety, and judgment), and cardiopulmonary endurance s/p admission for recurrent ROLO pleural effusions s/p L Aspira drain placement (pending outpatient R Aspira drain placement). At baseline, patient lived alone with her daughter/granddaughters (10 & 12) next door to provide assistance with bathing and cooking; otherwise, patient reported being independent with ADLs, IADLs, and functional mobility without use of DME. She now presents slightly below her baseline, requiring Setup-SPV for ADLs and CGA for OOB mobility. Patient experienced OH+ episode (see below); however, was asymptomatic and it did not limit her performance. Of note, patient's HR elevated to 133 with extended physical activity (ambulation and stairs). As a result, she would require SPV for ADLs that involve functional transfers and CGA for longer distance ambulation and stairs for safety. Required Min multimodal cues for safety throughout session, fair carryover. Educated on the importance of carrying her cell phone on her person at home incase of a OH+ episode as she lives alone - acknowledging understanding; however, unsure if she will comply. Given PLOF, recommending [address] [place] [address] services and supervision/assist with ADLs for safety at d/c. Vitals:    10/11/22 0854 10/11/22 0901 10/11/22 0906 10/11/22 0913   BP: 118/72 103/75 94/83 120/82   BP 1 Location: Right Upper arm Right upper arm Right upper arm Right upper arm   BP Patient Position: Supine (taken by PT just prior to OT arrival) Sitting Standing Sitting; Other (Comment)  Comment: post gait training   Pulse: (!) 88 (!) 104 (!) 118 (!) 118     Current Level of Function (ADLs/self-care): Setup-SPV for ADLs and CGA for OOB mobility. Functional Outcome Measure: The patient scored 85/100 on the Barthel Index outcome measure which is indicative of independent for basic self-care. Other factors to consider for discharge: PLOF, PMH, OH+ and asymptomatic, and lives mostly alone. PLAN :  Recommend with staff: Recommend with nursing, ADLs with assist, OOB to chair 3x/day and toileting via functional mobility to and from bathroom with assist.Thank you for completing as able in order to maintain patient strength, endurance and independence. Recommendation for discharge: (in order for the patient to meet his/her long term goals)  Occupational therapy at least 2 days/week in the home AND ensure assist and/or supervision for safety. This discharge recommendation:  Has been made in collaboration with the attending provider and/or case management    IF patient discharges home will need the following DME: Patient owns DME required for discharge; however, recommending grab bar installation in bathroom in shower/by toilet - if possible. SUBJECTIVE:   Patient stated I can put my socks on without a problem.  Re: after being asked to adjust her socks in recliner. OBJECTIVE DATA SUMMARY:   HISTORY:   Past Medical History:   Diagnosis Date    Abnormal Pap smear 1995    with f/u normal    Anemia     Arthritis     Asthma     Cancer ([facility] [address]     ENDOMETRIAL    Environmental allergies     GERD (gastroesophageal reflux disease)     Goiter     Other unknown and unspecified cause of morbidity or mortality     POSSIBLE ESOPHAGEAL SPASM, ?  OBSTRUCTION DUE TO GOITER    PMB (postmenopausal bleeding)     S/P chemotherapy, time since greater than 12 weeks     LAST CHEMO 10/2014 PER PATIENT    Thyroid disease     goiter     Past Surgical History:   Procedure Laterality Date    HX APPENDECTOMY      HX BUNIONECTOMY      HX GYN  3/13/13    TLH/BSO    HX HEENT  4/22/13    TOOTH REMOVED    HX ORTHOPAEDIC  1980'S    BUNIONECTOMY    HX VASCULAR ACCESS      port    IR INSERT CATH PLEURAL INDWELL  10/7/2022    IR INSERT INTRAPERI TUNL CATH PERC  8/11/2022    ID BREAST SURGERY PROCEDURE UNLISTED  1/12/16    right breast bx     Expanded or extensive additional review of patient history:   Home Situation  Home Environment: Private residence  # Steps to Enter: 5  Rails to Enter: Yes  Hand Rails : Right  One/Two Story Residence: One story  Living Alone: Yes  Support Systems: Child(balaji)  Patient Expects to be Discharged to[de-identified] Home with home health  Current DME Used/Available at Home: Shower chair  Tub or Shower Type: Tub/Shower combination    Hand dominance: Right    EXAMINATION OF PERFORMANCE DEFICITS:  Cognitive/Behavioral Status:  Neurologic State: Alert  Orientation Level: Oriented X4  Cognition: Appropriate for age attention/concentration; Follows commands; Impaired decision making;Poor safety awareness  Perception: Appears intact  Perseveration: No perseveration noted  Safety/Judgement: Awareness of environment;Decreased awareness of need for assistance;Decreased awareness of need for safety;Decreased insight into deficits    Skin: Appears intact. Edema: Swelling noted in ROLO ankles. Hearing: Auditory  Auditory Impairment: None    Vision/Perceptual:    Tracking: Able to track stimulus in all quadrants w/o difficulty                 Diplopia: No              Range of Motion:  AROM: Within functional limits  PROM: Within functional limits                      Strength:  Strength: Within functional limits                Coordination:  Coordination: Within functional limits  Fine Motor Skills-Upper: Left Intact; Right Intact    Gross Motor Skills-Upper: Left Intact; Right Intact    Tone & Sensation:  Tone: Normal  Sensation: Intact                      Balance:  Sitting: Intact  Standing: Impaired  Standing - Static: Good  Standing - Dynamic : Fair    Functional Mobility and Transfers for ADLs:  Bed Mobility:  Rolling: Stand-by assistance  Supine to Sit: Stand-by assistance  Sit to Supine:  (Left in recliner)    Transfers:  Sit to Stand: Supervision  Stand to Sit: Supervision  Bed to Chair: Contact guard assistance  Bathroom Mobility: Contact guard assistance  Toilet Transfer : Contact guard assistance  Assistive Device : Gait Belt    ADL Assessment:  Feeding: Setup    Oral Facial Hygiene/Grooming: Supervision    Bathing: Contact guard assistance    Upper Body Dressing: Setup    Lower Body Dressing: Supervision    Toileting: Contact guard assistance    ADL Intervention and task modifications:    Lower Body Dressing Assistance  Dressing Assistance: Supervision  Pants With Elastic Waist: Stand-by assistance (Simulated)  Socks: Independent  Leg Crossed Method Used: Yes  Position Performed: Seated in chair;Standing  Cues: Verbal cues provided    Cognitive Retraining  Safety/Judgement: Awareness of environment;Decreased awareness of need for assistance;Decreased awareness of need for safety;Decreased insight into deficits    Functional Measure:    Barthel Index:  Bathin  Bladder: 10  Bowels: 10  Groomin  Dressing: 10  Feeding: 10  Mobility: 10  Stairs: 5  Toilet Use: 10  Transfer (Bed to Chair and Back): 10  Total: 80/100      The Barthel ADL Index: Guidelines  1. The index should be used as a record of what a patient does, not as a record of what a patient could do. 2. The main aim is to establish degree of independence from any help, physical or verbal, however minor and for whatever reason. 3. The need for supervision renders the patient not independent. 4. A patient's performance should be established using the best available evidence. Asking the patient, friends/relatives and nurses are the usual sources, but direct observation and common sense are also important.  However direct testing is not needed. 5. Usually the patient's performance over the preceding 24-48 hours is important, but occasionally longer periods will be relevant. 6. Middle categories imply that the patient supplies over 50 per cent of the effort. 7. Use of aids to be independent is allowed. Score Interpretation (from [address])    Independent   60-79 Minimally independent   40-59 Partially dependent   20-39 Very dependent   <20 Totally dependent     -Isidro Shepherd., Barthel, DBROOK. (1965). Functional evaluation: the Barthel Index. [address] ([address] (1997). The Barthel activities of daily living index: self-reporting versus actual performance in the old (> or = 75 years). Journal of [address] [place] [address] 45(7), [address] [facility], GENIE, Heather Mora., uRth Zepeda. (1999). Measuring the change in disability after inpatient rehabilitation; comparison of the responsiveness of the Barthel Index and Functional [place] Measure. Journal of Neurology, Neurosurgery, and Psychiatry, 66(4), 996-673. Matias Wyman, N.J.A, BILL Chicas, & Michael Van, M.A. (2004) Assessment of post-stroke quality of life in cost-effectiveness studies: The usefulness of the Barthel Index and the EuroQoL-5D. Quality of Life Research, 15, 583-30     Occupational Therapy Evaluation Charge Determination   History Examination Decision-Making   LOW Complexity : Brief history review  LOW Complexity : 1-3 performance deficits relating to physical, cognitive , or psychosocial skils that result in activity limitations and / or participation restrictions  LOW Complexity : No comorbidities that affect functional and no verbal or physical assistance needed to complete eval tasks       Based on the above components, the patient evaluation is determined to be of the following complexity level: LOW   Pain Rating:  Patient did not report any pain.     Activity Tolerance:   Good-Fair, HR up to 133 with activity, and brief OH+ episode; however asymptomatic and student RN present in room. After treatment patient left in no apparent distress:    Sitting in chair and Call bell within reach    COMMUNICATION/EDUCATION:   The patients plan of care was discussed with: Physical therapist and Registered nurse. Thank you for this referral.  HAI Pardo  Time Calculation: 16 mins     Regarding student involvement in patient care:  A student participated in this treatment session. Per CMS Medicare statements and AOTA guidelines I certify that the following was true:  1. I was present and directly observed the entire session. 2. I made all skilled judgments and clinical decisions regarding care. 3. I am the practitioner responsible for assessment, treatment, and documentation.

## 2022-10-11 NOTE — PROGRESS NOTES
Transition of Care  RUR 17% Moderate  Disposition [address]  DME None  Transportation Family   Follow Up PCP, Specialist     CM to follow up with patient in am regarding preference of where she wants to receive IV abx.- Home vs [facility]. Order written for home infusion. Dr. Maikol Mays said she would be available to write orders for [facility] if patient prefers it even though she lives 2 hours away.  Gaby Kaiser MSA,RN, CM

## 2022-10-11 NOTE — PROGRESS NOTES
2000 Received patient from [facility], [address] [place] [address].    9126 Bedside shift change report given to Grant Gaytan RN (oncoming nurse) by Mariposa Mercedes RN. Report included the following information SBAR, Kardex, MAR, Recent Results, and Cardiac Rhythm NSR. Problem: Falls - Risk of  Goal: *Absence of Falls  Description: Document David Dougherty Fall Risk and appropriate interventions in the flowsheet. Outcome: Progressing Towards Goal  Note: Fall Risk Interventions:            Medication Interventions: Evaluate medications/consider consulting pharmacy         History of Falls Interventions: Door open when patient unattended         Problem: Pressure Injury - Risk of  Goal: *Prevention of pressure injury  Description: Document Henry Scale and appropriate interventions in the flowsheet.   Outcome: Progressing Towards Goal  Note: Pressure Injury Interventions:  Sensory Interventions: Keep linens dry and wrinkle-free, Minimize linen layers, Check visual cues for pain    Moisture Interventions: Minimize layers, Assess need for specialty bed    Activity Interventions: Increase time out of bed    Mobility Interventions: Pressure redistribution bed/mattress (bed type)    Nutrition Interventions: Document food/fluid/supplement intake    Friction and Shear Interventions: Minimize layers

## 2022-10-11 NOTE — PROGRESS NOTES
[facility] Adult  Hospitalist Group                                                                                          Hospitalist Progress Note  Vanessa Castro MD  Answering service: [phone] from in house phone        Date of Service:  10/11/2022  NAME:  Christiano Jasso  :  1955  MRN:  [id number]      Admission Summary:     Patient presents with recurrent malignant pleural effusion. Interval history / Subjective:     Patient is seen and examined at bedside this AM. Feels ok. Pain better today. CXR showed re accumulation of fluid which was drained yesterday. Discussed ID plan for 2 weeks of IV abx. She prefers to go to infusion center rather than SNF. Discussed with nursing and ID Dr. Thuy Lance - vanco needing dose adjustments, changed to Dapto - can be done at infusion center     Assessment & Plan:     Recurrent Malignant pleural effusion s/p left Aspira cath 10/7  -Patient presents with shortness of breath and chest x-ray showed bilateral pleural effusion left more than right  -patient was just discharged , after s/p left thoracentesis on  draining 1700 ml and s/p right thoracentesis  2600 mL  -Repeat right thoracentesis 10/4, draining 1300 mL  -fluid cx: NGTD. -appreciate Pulmonology input     Micrococcus bacteremia   - Blood cx 10/4: 1 of 2 growing Micrococcus luteus ( blood cx from port)  - rpt blood cx 10/6: NGTD  - IV vanco changed to IV Daptomycin on 10/11   - appreciate ID input   - Echo :   - US: No evidence of fluid collection or thrombus around port    - need 2 weeks of IV abx on dc     Endometrial carcinoma  -Patient follows with Dr. Lv Vicente oncology  - Dr. Wilmer Ramos : Kiko Orellana has progressive malignancy and her treatment options are limited. We discussed some treatment options, but I explained that none of them have a very high response rate. \"     Recent Perianal abscess  -Diagnosed on recent admission and was discharged to home on p.o. antibiotics and per discharge summary antibiotics to be completed 10/3/2022    On Xarelto at home, restarted 10/8     Code status: Full  Prophylaxis: [address] discussed with: Patient  Anticipated Disposition: possible dc tomorrow when IV abx set up      Hospital Problems  Date Reviewed: 10/7/2022            Codes Class Noted POA    Acute hyperkalemia ICD-10-CM: E87.5  ICD-9-CM: 276.7  10/4/2022 Unknown        Tachycardia ICD-10-CM: R00.0  ICD-9-CM: 785.0  10/4/2022 Unknown        SOBOE (shortness of breath on exertion) ICD-10-CM: R06.02  ICD-9-CM: 786.05  10/4/2022 Unknown        Bilateral pleural effusion ICD-10-CM: J90  ICD-9-CM: 511.9  10/4/2022 Unknown       Review of Systems:   A comprehensive review of systems was negative except for that written in the HPI. Vital Signs:    Last 24hrs VS reviewed since prior progress note. Most recent are:  Visit Vitals  /74 (BP 1 Location: Right upper arm, BP Patient Position: At rest;Lying)   Pulse 86   Temp 97.9 °F (36.6 °C)   Resp 18   Ht 5' 5\" (1.651 m)   Wt 65.9 kg (145 lb 4.5 oz)   SpO2 96%   BMI 24.18 kg/m²         Intake/Output Summary (Last 24 hours) at 10/11/2022 1240  Last data filed at 10/11/2022 1207  Gross per 24 hour   Intake 1080 ml   Output --   Net 1080 ml          Physical Examination:     I had a face to face encounter with this patient and independently examined them on 10/11/2022 as outlined below:          Constitutional:  No acute distress, cooperative, pleasant    ENT:  Oral mucosa moist, oropharynx benign. Resp:  Decreased breath sounds    CV:  Regular rhythm, normal rate, no murmurs, gallops, rubs    GI:  Soft, non distended, non tender. normoactive bowel sounds, no hepatosplenomegaly     Musculoskeletal:  warm, 2+ pulses throughout    Neurologic:  Moves all extremities.   AAOx3, CN II-XII reviewed            Data Review:    Review and/or order of clinical lab test      Labs:     Recent Labs     10/10/22  0005   WBC 5.4 HGB 10.5*   HCT 34.0*          Recent Labs     10/11/22  0509 10/10/22  0005 10/09/22  0355   * 136 136   K 4.2 4.1 4.1    105 105   CO2 26 27 28   BUN 10 11 9   CREA 0.28* 0.23* 0.20*   GLU 95 85 80   CA 8.1* 7.4* 7.7*       No results for input(s): ALT, AP, TBIL, TBILI, TP, ALB, GLOB, GGT, AML, LPSE in the last 72 hours. No lab exists for component: SGOT, GPT, AMYP, HLPSE    No results for input(s): INR, PTP, APTT, INREXT, INREXT in the last 72 hours. No results for input(s): FE, TIBC, PSAT, FERR in the last 72 hours. No results found for: FOL, RBCF   No results for input(s): PH, PCO2, PO2 in the last 72 hours. No results for input(s): CPK, CKNDX, TROIQ in the last 72 hours.     No lab exists for component: CPKMB  No results found for: CHOL, CHOLX, CHLST, CHOLV, HDL, HDLP, LDL, LDLC, DLDLP, TGLX, TRIGL, TRIGP, CHHD, CHHDX  Lab Results   Component Value Date/Time    Glucose (POC) 144 (H) 08/27/2021 11:58 AM    Glucose (POC) 136 (H) 09/18/2015 08:06 AM    Glucose (POC) 115 (H) 05/01/2013 09:53 AM     Lab Results   Component Value Date/Time    Color Yellow 09/13/2022 11:37 AM    Appearance Clear 09/13/2022 11:37 AM    Specific gravity 1.022 05/12/2022 01:03 PM    pH (UA) 6.5 09/13/2022 11:37 AM    Protein Negative 05/12/2022 01:03 PM    Glucose Negative 05/12/2022 01:03 PM    Ketone Negative 09/13/2022 11:37 AM    Bilirubin Negative 09/13/2022 11:37 AM    Urobilinogen 0.2 05/12/2022 01:03 PM    Nitrites Negative 09/13/2022 11:37 AM    Leukocyte Esterase Negative 09/13/2022 11:37 AM    Epithelial cells FEW 05/12/2022 01:03 PM    Bacteria None seen 09/13/2022 11:37 AM    WBC None seen 09/13/2022 11:37 AM    RBC None seen 09/13/2022 11:37 AM         Medications Reviewed:     Current Facility-Administered Medications   Medication Dose Route Frequency    DAPTOmycin (CUBICIN) 500 mg in 0.9% sodium chloride 50 mL IVPB  500 mg IntraVENous Q24H    rivaroxaban (XARELTO) tablet 20 mg  20 mg Oral DAILY WITH BREAKFAST    traMADoL (ULTRAM) tablet 50 mg  50 mg Oral Q6H PRN    magnesium hydroxide (MILK OF MAGNESIA) 400 mg/5 mL oral suspension 30 mL  30 mL Oral DAILY PRN    famotidine (PEPCID) tablet 20 mg  20 mg Oral DAILY PRN    sodium chloride (NS) flush 5-40 mL  5-40 mL IntraVENous Q8H    sodium chloride (NS) flush 5-40 mL  5-40 mL IntraVENous PRN    acetaminophen (TYLENOL) tablet 650 mg  650 mg Oral Q6H PRN    Or    acetaminophen (TYLENOL) suppository 650 mg  650 mg Rectal Q6H PRN    ondansetron (ZOFRAN ODT) tablet 4 mg  4 mg Oral Q8H PRN    Or    ondansetron (ZOFRAN) injection 4 mg  4 mg IntraVENous Q6H PRN     ______________________________________________________________________  EXPECTED LENGTH OF STAY: 3d 14h  ACTUAL LENGTH OF STAY:          7                 Ellis Duarte MD

## 2022-10-11 NOTE — PROGRESS NOTES
Orders received, chart reviewed and patient evaluated by physical therapy. Pending progression with skilled acute physical therapy, recommend:  Physical therapy at least 2 days/week in the home AND ensure assist and/or supervision for safety with mobility. Recommend with nursing OOB to chair 3x/day and walking daily with one assist and  gait belt . Thank you for completing as able in order to maintain patient strength, endurance and independence. Full evaluation to follow.       Thank you for your consideration,    Juan Rowe, PT, DPT

## 2022-10-11 NOTE — PROGRESS NOTES
Vitals:    10/11/22 0854 10/11/22 0901 10/11/22 0906 10/11/22 0913   BP: 118/72 103/75 94/83 120/82   BP 1 Location: Right upper arm Right upper arm Right upper arm Right upper arm   BP Patient Position: At rest;Lying Sitting Standing Sitting; Other (Comment)  Comment: post gait training   Pulse: 88 (!) 104 (!) 118 (!) 118   Temp:       Resp:    28   Height:       Weight:       SpO2:   95%       Patient noted to be orthostatic during treatment however remained asymptomatic.     Thank you for your consideration,    Lord Waller PT, DPT

## 2022-10-11 NOTE — PROGRESS NOTES
Bedside and Verbal shift change report given to [address] (oncoming nurse) by Alvaro Craig RN (offgoing nurse). Report included the following information SBAR, Kardex, Intake/Output, and MAR. Pt was sitting in chair. IV vancomycin completed.   Pt returned back to bed  Call light in reach  AO x 4

## 2022-10-11 NOTE — PROGRESS NOTES
Problem: Falls - Risk of  Goal: *Absence of Falls  Description: Document Ernesto Kessler Fall Risk and appropriate interventions in the flowsheet. Outcome: Progressing Towards Goal  Note: Fall Risk Interventions:            Medication Interventions: Patient to call before getting OOB, Teach patient to arise slowly, Bed/chair exit alarm         History of Falls Interventions: Door open when patient unattended, Room close to nurse's station, Utilize gait belt for transfer/ambulation         Problem: Pressure Injury - Risk of  Goal: *Prevention of pressure injury  Description: Document Henry Scale and appropriate interventions in the flowsheet.   Outcome: Progressing Towards Goal  Note: Pressure Injury Interventions:  Sensory Interventions: Maintain/enhance activity level, Keep linens dry and wrinkle-free, Minimize linen layers    Moisture Interventions: Absorbent underpads, Apply protective barrier, creams and emollients, Maintain skin hydration (lotion/cream)    Activity Interventions: Increase time out of bed, PT/OT evaluation, Pressure redistribution bed/mattress(bed type)    Mobility Interventions: Pressure redistribution bed/mattress (bed type)    Nutrition Interventions: Document food/fluid/supplement intake    Friction and Shear Interventions: HOB 30 degrees or less, Lift sheet, Minimize layers                Problem: Discharge Planning  Goal: *Discharge to safe environment  Outcome: Progressing Towards Goal

## 2022-10-11 NOTE — PROGRESS NOTES
Occupational Therapy  10/11/22    Orders received, chart reviewed and patient evaluated by occupational therapy. Pending progression with skilled acute occupational therapy, recommend:  Occupational therapy at least 2 days/week in the home AND ensure assist and/or supervision for safety. Recommend with nursing ADLs with assist, OOB to chair 3x/day and toileting via functional mobility to and from bathroom with assist. Thank you for completing as able in order to maintain patient strength, endurance and independence. Full evaluation to follow.      Thank you,  Nick Cuba OTS

## 2022-10-11 NOTE — PROGRESS NOTES
PHYSICAL THERAPY EVALUATION/DISCHARGE  Patient: Caesar Sanchez (63 y.o. female)  Date: 10/11/2022  Primary Diagnosis: Bilateral pleural effusion [J90]  Tachycardia [R00.0]  SOBOE (shortness of breath on exertion) [R06.02]  Acute hyperkalemia [E87.5]       Precautions: Fall      ASSESSMENT  Based on the objective data described below, the patient presents with decreased activity tolerance, pain chest around tubes/drains, decreased strength, and increased need for assistance. This is a 78year old female who presented to the hospital with c/o SOB and found to have recurrent bilateral pleural effusions (L>R). Patient is s/p L/R Aspira drain placement. Patient received in bed agreeable to treatment however reports fatigue and discomfort in the bed. Patient received on RA and saturations remain >95%. Patient HR variable and elevated to low 100s with activity. Patient Noted to be orthostatic however is asymptomatic. Patient overall demonstrates supervision to [place] [id number] for mobility. Received gait/stair training without AD and CGA. Discussion of energy conservation at discharge and use of cell phone or life alert in home. Patient would benefit to have increased supervision in the home and [facility] upon discharge. No further PT needs at this time in acute setting, will sign off. Vitals:     10/11/22 0854 10/11/22 0901 10/11/22 0906 10/11/22 0913   BP: 118/72 103/75 94/83 120/82   BP 1 Location: Right upper arm Right upper arm Right upper arm Right upper arm   BP Patient Position: At rest;Lying Sitting Standing Sitting; Other (Comment)  Comment: post gait training   Pulse: 88 (!) 104 (!) 118 (!) 118   Temp:           Resp:       28   Height:           Weight:           SpO2:     95%         Functional Outcome Measure: The patient scored 85/100 on the Barthel outcome measure which is indicative of 15% disability.       Other factors to consider for discharge: lives alone, daughter lives nextdoor     Further skilled acute physical therapy is not indicated at this time. PLAN :  Recommendation for discharge: (in order for the patient to meet his/her long term goals)  Physical therapy at least 2 days/week in the home AND ensure assist and/or supervision for safety with stairs/gait    This discharge recommendation:  Has been made in collaboration with the attending provider and/or case management    IF patient discharges home will need the following DME: grab bars in bathroom       SUBJECTIVE:   Patient stated I never got to see the home PT last time because I came back to the hospital.    OBJECTIVE DATA SUMMARY:   HISTORY:    Past Medical History:   Diagnosis Date    Abnormal Pap smear 1995    with f/u normal    Anemia     Arthritis     Asthma     Cancer ([facility] [place] [address]     ENDOMETRIAL    Environmental allergies     GERD (gastroesophageal reflux disease)     Goiter     Other unknown and unspecified cause of morbidity or mortality     POSSIBLE ESOPHAGEAL SPASM, ?  OBSTRUCTION DUE TO GOITER    PMB (postmenopausal bleeding)     S/P chemotherapy, time since greater than 12 weeks     LAST CHEMO 10/2014 PER PATIENT    Thyroid disease     goiter     Past Surgical History:   Procedure Laterality Date    HX APPENDECTOMY      HX BUNIONECTOMY      HX GYN  3/13/13    TLH/BSO    HX HEENT  4/22/13    TOOTH REMOVED    HX ORTHOPAEDIC  1980'S    BUNIONECTOMY    HX VASCULAR ACCESS      port    IR INSERT CATH PLEURAL INDWELL  10/7/2022    IR INSERT INTRAPERI TUNL CATH PERC  8/11/2022    DC BREAST SURGERY PROCEDURE UNLISTED  1/12/16    right breast bx       Prior level of function: independent without AD for mobility, had her daughter/granddaughter with her for showers since last hospitalization   Personal factors and/or comorbidities impacting plan of care: motivated to return home, strong family support    Home Situation  Home Environment: Private residence  # Steps to Enter: 5  Rails to Enter: Yes  Hand Rails : Right  One/Two Story Residence: One story  Living Alone: Yes  Support Systems: Child(balaji)  Patient Expects to be Discharged to[de-identified] Home with home health  Current DME Used/Available at Home: Shower chair  Tub or Shower Type: Tub/Shower combination    EXAMINATION/PRESENTATION/DECISION MAKING:   Critical Behavior:  Neurologic State: Alert  Orientation Level: Oriented X4  Cognition: Appropriate for age attention/concentration, Follows commands, Impaired decision making, Poor safety awareness  Safety/Judgement: Awareness of environment, Decreased awareness of need for assistance, Decreased awareness of need for safety, Decreased insight into deficits  Hearing: Auditory  Auditory Impairment: None    Edema: non pitting edema to bilateral ankles  Range Of Motion:  AROM: Within functional limits     PROM: Within functional limits     Strength:    Strength:  Within functional limits        Tone & Sensation:   Tone: Normal     Sensation: Intact     Coordination:  Coordination: Within functional limits  Vision:   Tracking: Able to track stimulus in all quadrants w/o difficulty  Diplopia: No  Functional Mobility:  Bed Mobility:  Rolling: Stand-by assistance  Supine to Sit: Stand-by assistance  Sit to Supine:  (Left in recliner)     Transfers:  Sit to Stand: Supervision  Stand to Sit: Supervision        Bed to Chair: Contact guard assistance     Balance:   Sitting: Intact  Standing: Impaired  Standing - Static: Good  Standing - Dynamic : Fair  Ambulation/Gait Training:  Distance (ft): 100 Feet (ft)  Assistive Device: Gait belt  Ambulation - Level of Assistance: Contact guard assistance        Gait Abnormalities: Decreased step clearance     Stance: Weight shift  Speed/Ade: Slow  Step Length: Right shortened;Left shortened      Stairs:  Number of Stairs Trained: 5  Stairs - Level of Assistance: Contact guard assistance   Rail Use: Right     Functional Measure:  Barthel Index:    Bathin  Bladder: 10  Bowels: 10  Groomin  Dressing: 10  Feeding: 10  Mobility: 10  Stairs: 5  Toilet Use: 10  Transfer (Bed to Chair and Back): 10  Total: 85/100       The Barthel ADL Index: Guidelines  1. The index should be used as a record of what a patient does, not as a record of what a patient could do. 2. The main aim is to establish degree of independence from any help, physical or verbal, however minor and for whatever reason. 3. The need for supervision renders the patient not independent. 4. A patient's performance should be established using the best available evidence. Asking the patient, friends/relatives and nurses are the usual sources, but direct observation and common sense are also important. However direct testing is not needed. 5. Usually the patient's performance over the preceding 24-48 hours is important, but occasionally longer periods will be relevant. 6. Middle categories imply that the patient supplies over 50 per cent of the effort. 7. Use of aids to be independent is allowed. Score Interpretation (from [address] [facility] 83)    Independent   60-79 Minimally independent   40-59 Partially dependent   20-39 Very dependent   <20 Totally dependent     -Isidro Shepherd., Barthel, D.W. (1965). Functional evaluation: the Barthel Index. [address] [facility] ([address] (1997). The Barthel activities of daily living index: self-reporting versus actual performance in the old (> or = 75 years). Journal of [address] [place] [address] 45(7), [address] [facility], J.Sandie.F, Gerda Brown., Megna Ball. (1999). Measuring the change in disability after inpatient rehabilitation; comparison of the responsiveness of the Barthel Index and Functional [place] Measure. Journal of Neurology, Neurosurgery, and Psychiatry, 66(4), 141-970. MARCY Murray.A, BILL Chicas, & CHRISTIE GallardoA. (2004) Assessment of post-stroke quality of life in cost-effectiveness studies: The usefulness of the Barthel Index and the EuroQoL-5D. Quality of Life Research, 46, 432-60           Physical Therapy Evaluation Charge Determination   History Examination Presentation Decision-Making   MEDIUM  Complexity : 1-2 comorbidities / personal factors will impact the outcome/ POC  LOW Complexity : 1-2 Standardized tests and measures addressing body structure, function, activity limitation and / or participation in recreation  LOW Complexity : Stable, uncomplicated  LOW Complexity : FOTO score of       Based on the above components, the patient evaluation is determined to be of the following complexity level: LOW     Pain Rating:  Discomfort at chest tubes, otherwise unremarkable    Activity Tolerance:   Fair, SpO2 stable on RA, and elevated HR      After treatment patient left in no apparent distress:   Sitting in chair and Call bell within reach    COMMUNICATION/EDUCATION:   The patients plan of care was discussed with: Occupational therapist and Registered nurse. Fall prevention education was provided and the patient/caregiver indicated understanding.     Thank you for this referral.  Kamila Chiu, PT   Time Calculation: 31 mins

## 2022-10-12 VITALS
SYSTOLIC BLOOD PRESSURE: 126 MMHG | OXYGEN SATURATION: 94 % | HEIGHT: 65 IN | BODY MASS INDEX: 22.55 KG/M2 | WEIGHT: 135.36 LBS | RESPIRATION RATE: 16 BRPM | TEMPERATURE: 97.4 F | HEART RATE: 93 BPM | DIASTOLIC BLOOD PRESSURE: 81 MMHG

## 2022-10-12 PROBLEM — R78.81 BACTEREMIA: Status: ACTIVE | Noted: 2022-10-12

## 2022-10-12 LAB
ANION GAP SERPL CALC-SCNC: 7 MMOL/L (ref 5–15)
BACTERIA SPEC CULT: NORMAL
BUN SERPL-MCNC: 9 MG/DL (ref 6–20)
BUN/CREAT SERPL: 35 (ref 12–20)
CALCIUM SERPL-MCNC: 7.8 MG/DL (ref 8.5–10.1)
CHLORIDE SERPL-SCNC: 102 MMOL/L (ref 97–108)
CO2 SERPL-SCNC: 27 MMOL/L (ref 21–32)
CREAT SERPL-MCNC: 0.26 MG/DL (ref 0.55–1.02)
GLUCOSE SERPL-MCNC: 97 MG/DL (ref 65–100)
POTASSIUM SERPL-SCNC: 4.5 MMOL/L (ref 3.5–5.1)
SERVICE CMNT-IMP: NORMAL
SODIUM SERPL-SCNC: 136 MMOL/L (ref 136–145)

## 2022-10-12 PROCEDURE — 80048 BASIC METABOLIC PNL TOTAL CA: CPT

## 2022-10-12 PROCEDURE — 74011250636 HC RX REV CODE- 250/636: Performed by: INTERNAL MEDICINE

## 2022-10-12 PROCEDURE — 74011250637 HC RX REV CODE- 250/637: Performed by: PHYSICIAN ASSISTANT

## 2022-10-12 PROCEDURE — 74011000258 HC RX REV CODE- 258: Performed by: INTERNAL MEDICINE

## 2022-10-12 PROCEDURE — 36415 COLL VENOUS BLD VENIPUNCTURE: CPT

## 2022-10-12 PROCEDURE — 99232 SBSQ HOSP IP/OBS MODERATE 35: CPT | Performed by: INTERNAL MEDICINE

## 2022-10-12 PROCEDURE — 74011250637 HC RX REV CODE- 250/637: Performed by: FAMILY MEDICINE

## 2022-10-12 RX ORDER — DIPHENHYDRAMINE HYDROCHLORIDE 50 MG/ML
50 INJECTION, SOLUTION INTRAMUSCULAR; INTRAVENOUS AS NEEDED
Status: CANCELLED
Start: 2022-10-13

## 2022-10-12 RX ORDER — HEPARIN 100 UNIT/ML
500 SYRINGE INTRAVENOUS AS NEEDED
Status: CANCELLED
Start: 2022-10-13

## 2022-10-12 RX ORDER — SODIUM CHLORIDE 0.9 % (FLUSH) 0.9 %
5-40 SYRINGE (ML) INJECTION AS NEEDED
Status: CANCELLED | OUTPATIENT
Start: 2022-10-13

## 2022-10-12 RX ORDER — SODIUM CHLORIDE 9 MG/ML
5-250 INJECTION, SOLUTION INTRAVENOUS AS NEEDED
Status: CANCELLED | OUTPATIENT
Start: 2022-10-13

## 2022-10-12 RX ORDER — EPINEPHRINE 1 MG/ML
0.3 INJECTION, SOLUTION, CONCENTRATE INTRAVENOUS AS NEEDED
Status: CANCELLED | OUTPATIENT
Start: 2022-10-13

## 2022-10-12 RX ORDER — ACETAMINOPHEN 325 MG/1
650 TABLET ORAL AS NEEDED
Status: CANCELLED
Start: 2022-10-13

## 2022-10-12 RX ORDER — ONDANSETRON 2 MG/ML
8 INJECTION INTRAMUSCULAR; INTRAVENOUS AS NEEDED
Status: CANCELLED | OUTPATIENT
Start: 2022-10-13

## 2022-10-12 RX ORDER — HEPARIN 100 UNIT/ML
300 SYRINGE INTRAVENOUS
Status: COMPLETED | OUTPATIENT
Start: 2022-10-12 | End: 2022-10-12

## 2022-10-12 RX ORDER — ALBUTEROL SULFATE 0.83 MG/ML
2.5 SOLUTION RESPIRATORY (INHALATION) AS NEEDED
Status: CANCELLED
Start: 2022-10-13

## 2022-10-12 RX ORDER — DIPHENHYDRAMINE HYDROCHLORIDE 50 MG/ML
25 INJECTION, SOLUTION INTRAMUSCULAR; INTRAVENOUS AS NEEDED
Status: CANCELLED
Start: 2022-10-13

## 2022-10-12 RX ORDER — SODIUM CHLORIDE 9 MG/ML
5-40 INJECTION INTRAMUSCULAR; INTRAVENOUS; SUBCUTANEOUS AS NEEDED
Status: CANCELLED | OUTPATIENT
Start: 2022-10-13

## 2022-10-12 RX ORDER — HYDROCORTISONE SODIUM SUCCINATE 100 MG/2ML
100 INJECTION, POWDER, FOR SOLUTION INTRAMUSCULAR; INTRAVENOUS AS NEEDED
Status: CANCELLED | OUTPATIENT
Start: 2022-10-13

## 2022-10-12 RX ADMIN — DAPTOMYCIN 500 MG: 500 INJECTION, POWDER, LYOPHILIZED, FOR SOLUTION INTRAVENOUS at 12:55

## 2022-10-12 RX ADMIN — RIVAROXABAN 20 MG: 20 TABLET, FILM COATED ORAL at 09:44

## 2022-10-12 RX ADMIN — HEPARIN 300 UNITS: 100 SYRINGE at 15:00

## 2022-10-12 RX ADMIN — ACETAMINOPHEN 650 MG: 325 TABLET, FILM COATED ORAL at 12:42

## 2022-10-12 NOTE — PROGRESS NOTES
Infectious Disease progress note        HPI:      Ms Kar Horton seen earlier today  No significant pain anywhere  Tolerating daptomycin             Medications:  No current facility-administered medications on file prior to encounter. Current Outpatient Medications on File Prior to Encounter   Medication Sig Dispense Refill    famotidine (PEPCID) 20 mg tablet Take 20 mg by mouth daily as needed for Heartburn or Indigestion. acetaminophen (TYLENOL) 500 mg tablet Take 500 mg by mouth every six (6) hours as needed for Pain.       Xarelto 20 mg tab tablet TAKE 1 TABLET BY MOUTH DAILY 30 Tablet 5           Physical Exam:    Vitals: Patient Vitals for the past 24 hrs:   Temp Pulse Resp BP SpO2   10/12/22 1207 -- 86 -- -- --   10/12/22 1042 97.4 °F (36.3 °C) 88 16 126/81 94 %   10/12/22 1005 -- 93 -- -- --   10/12/22 0821 97.5 °F (36.4 °C) 86 18 122/77 93 %   10/12/22 0656 98.4 °F (36.9 °C) 94 16 120/75 94 %   10/12/22 0556 -- 82 -- -- --   10/12/22 0403 -- 86 -- -- --   10/12/22 0316 97.6 °F (36.4 °C) 93 20 116/78 95 %   10/12/22 0205 -- 83 -- -- --   10/12/22 0018 98.5 °F (36.9 °C) 89 18 108/72 --   10/11/22 2155 -- 95 -- -- --   10/11/22 2116 98.5 °F (36.9 °C) 94 23 112/73 96 %   10/11/22 2014 -- 91 -- -- --   10/11/22 1820 -- 93 -- -- --   10/11/22 1617 98.5 °F (36.9 °C) 100 20 107/71 95 %   10/11/22 1422 -- 86 -- -- --     GEN: NAD  HEENT: No scleral icterus, no thrush   CV: S1, S2 heard regularly, no chest wall erythema  Lungs: Crackles heard,  L side pleural drain   Abdomen: soft,  non tender, peritoneal drain RLQ  Extremities: no edema,   Neuro: Alert, oriented to self, time, place and situation, moves all extremities to commands, verbal   Skin: no rash  Lines: port L chest, no erythema around site        Labs:   Recent Results (from the past 24 hour(s))   METABOLIC PANEL, BASIC    Collection Time: 10/12/22  3:20 AM   Result Value Ref Range    Sodium 136 136 - 145 mmol/L    Potassium 4.5 3.5 - 5.1 mmol/L Chloride 102 97 - 108 mmol/L    CO2 27 21 - 32 mmol/L    Anion gap 7 5 - 15 mmol/L    Glucose 97 65 - 100 mg/dL    BUN 9 6 - 20 MG/DL    Creatinine 0.26 (L) 0.55 - 1.02 MG/DL    BUN/Creatinine ratio 35 (H) 12 - 20      eGFR >60 >60 ml/min/1.73m2    Calcium 7.8 (L) 8.5 - 10.1 MG/DL       Microbiology Data:       Blood:     CULTURE, BLOOD, PAIRED [[id number]] (Order [id number])  Microbiology  Date: 10/4/2022 Department: [address] Surge Telemetry Released By/Authorizing: Destini Tabares DO (auto-released)      Contains abnormal data CULTURE, BLOOD, PAIRED  Order: [id number]  Collected 10/4/2022 03:17    Status: Preliminary result    Specimen Information: Blood   0 Result Notes  Component Ref Range & Units 10/4/22 0317    Special Requests:   NO SPECIAL REQUESTS P    Culture result:   MICROCOCCUS LUTEUS GROWING IN 1 OF 2 BOTTLES DRAWN , site = port Abnormal  P    Culture result:   REMAINING BOTTLE(S) HAS/HAVE NO GROWTH SO FAR P    Culture result:  (NOTE) [facility] CALLED TO LISANDRA Pagan 10/6 @ 1603 P           CULTURE, BODY FLUID [facility] [[id number]] (Order [id number])  Microbiology  Date: 10/4/2022 Department: [address] Surge Telemetry Released By/Authorizing: Mary Moncada MD (auto-released)     CULTURE, BODY FLUID [facility]  Order: [id number]  Collected 10/4/2022 15:15    Status: Preliminary result    Specimen Information: Pleural Fluid;  Body Fluid   0 Result Notes  Component Ref Range & Units 10/4/22 1515    Special Requests:   NO SPECIAL REQUESTS P    GRAM STAIN   RARE WBCS SEEN P    GRAM STAIN   NO ORGANISMS SEEN P    Culture result:   NO GROWTH THUS FAR P           CULTURE, BLOOD, PAIRED [[id number]] (Order [id number])  Microbiology  Date: 9/22/2022 Department: [facility] Deep  Telemetry Released By: Matt Macario RN (auto-released) Authorizing: Donell Martinez MD     CULTURE, BLOOD, PAIRED  Order: [id number]  Collected 9/22/2022 12:53    Status: Final result    Specimen Information: Blood   0 Result Notes  Component Ref Range & Units 9/22/22 1253    Special Requests:   NO SPECIAL REQUESTS    Culture result:   NO GROWTH 6 DAYS               CULTURE, BODY FLUID [facility] STAIN [[id number]] (Order [id number])  Microbiology  Date: 8/11/2022 Department: [facility] Rad Angio Ir Released By: Elena Koo RN (auto-released) Authorizing: Eve Peguero PA-C    Contains abnormal data CULTURE, BODY FLUID Nohelia Wing  Order: [id number]  Collected 8/11/2022 10:40    Status: Final result    Specimen Information: Paracentesis Fluid; Body Fluid   0 Result Notes  Component Ref Range & Units 8/11/22 1040  Resulting Agency   Special Requests:   NO SPECIAL REQUESTS  [facility] LAB   GRAM STAIN   NO WBC'S SEEN  [address]   Culture result:   Culture performed on Fluid swab specimen NO GROWTH SOLID MEDIA [place]   Culture result:   MICROCOCCUS LUTEUS SEEN ON GRAM STAIN OF THE THIO BROTH Abnormal           Pathology Results:  9/22/22  Pleural Fluid, ThinPrep and cell block:     Metastatic adenocarcinoma. Positive for malignancy. Imaging:     CT CHEST ABD PELV W CONT: Patient Communication     Add Comments   Not seen     Study Result    Narrative & Impression   INDICATION: Malignant neoplasm of the endometrium. Primary serous adenocarcinoma  of endometrium. Bilateral pleural effusion, malignant ascites. CT of the chest, abdomen and pelvis is performed with 5 mm collimation. Study is  performed with PO and 100 cc of nonionic IV Isovue-370. Sagittal and coronal  reformatted images were also performed. CT dose reduction was achieved with the use of the standardized protocol  tailored for this examination and automatic exposure control for dose  modulation. Direct comparison is made to prior CT dated September 22, 2022. Chest:     Lungs/pleura: There are moderate-sized bilateral pleural effusions. There is  also trace biapical pleural gas.  No pulmonary nodule or mass is seen. There is  bibasilar compressive atelectasis. Lymph nodes: There is an enlarged 2 cm x 2 cm precarinal lymph node. There is an  enlarged 2.3 cm x 2.5 cm node. There is an enlarged 2.3 cm x 1.9 cm subcarinal  lymph node. Heart: The heart is normal in size and there is no pericardial fluid. Bones: No lytic or sclerotic osseous lesion is visualized. Abdomen/pelvis:     Liver: The liver is normal.     Lymph nodes\omentum\peritoneum: There is a small to moderate amount free  intraperitoneal fluid greatest in the pelvis, not significantly changed in  quantity as compared to prior CT dated September 22, 2022. Percutaneous drainage  catheter extends to the upper central abdomen. There is extensive omental  caking, unchanged compared to prior CT from September 22, 2022. Ze hepatis,  portacaval, mesenteric and retroperitoneal lymphadenopathy is unchanged. Spleen: The spleen is normal.     Pancreas: The pancreas is normal.     Adrenals: The adrenals are normal.     Gallbladder: Gallbladder is normal.     Kidneys: The kidneys are normal. There is no hydronephrosis. Bowel: No dilated or thickened loop of large or small bowel is seen. Appendix: The appendix is surgically absent. Urinary bladder: Urinary bladder is partially filled and grossly normal.     Bones: No lytic or sclerotic osseous lesion is visualized. Miscellaneous: There is no free intraperitoneal gas. There is no focal fluid  collection to suggest abscess. The uterus is surgically absent. IMPRESSION  1. Bilateral hydropneumothoraces. Moderate amount of pleural fluid bilaterally  and trace biapical pleural gas. 2. Mediastinal right hilar lymphadenopathy, as described above. 3. Small to moderate amount of free intraperitoneal fluid, predominantly in the  pelvis, unchanged in quantity. 4. Extensive omental caking, unchanged.   5. Ze hepatis, portacaval, mesenteric and retroperitoneal lymphadenopathy,  unchanged. Procedures:     10/7/22 Placement of left tunneled pleural drainage catheter. Approximately 1000 mL  of clear yellow fluid was removed. 10/4/22 Technically successful ultrasound guided right thoracentesis with evacuation of  1300 ml of fluid. 9/23/22 Technically successful ultrasound guided right thoracentesis yielding  approximately 1600 ml of fluid. 9/22/22 Technically successful ultrasound guided left thoracentesis with evacuation of  1700 ml of fluid. A post procedure chest x-ray is pending. 8/11/22 Successful placement of tunneled long-term peritoneal drainage (Aspira)  catheter. 7/18/22 Ultrasound-guided paracentesis. Assessment / Plan:   Ms Mariama Hernandez is a 80-year-old lady with a history of COVID. 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   Reports of chills and fever during that admission with treatment for an anal boil/abscess  She says that the chills and fevers resolved entirely  No pain or redness around the port site    I discussed that micrococcus is an organism that can be found in nature including water, soil and is also a commensal of human skin  In some cases micrococcus can be a pathogen and cause deeper infections  Complicating the picture is her immune status and the port which increases affinity for such organisms to be more pathogenic    She would like to salvage her port is much as possible which is understandable as there are plans for repeat chemo in the near future      Plan  Vancomycin dosing has been adjusted again and discussed with pharmacy   Concerned about dosing adjustments if discharged and needs close follow up for levels etc  Patient also wishes to go to the infusion center if she can but lives far from here   [address] assistance as patient states closer sites are either [facility] [address] [facility] or [facility]   IV daptomycin IV 8mg/KG daily till  10/20/22. Adjust dosing based on renal function   Check surveillance blood cx from port and peripheral blood 1 week after antibiotics stopped  If recurrent Micrococcus + from blood cx, may need to  remove port and treatment based on goals of care and course   Follow up with ID in 4 weeks   Infusion form filled out and left in chart for [facility] , weekly labs for cbc wt diff, cmp ck faxed to Dr Hawk Cos care and plans per oncology . May need removed if recurrent signs and symptoms of infection, /or repeat + blood cx   Avoid statin while on IV daptomycin   Antibiotic side effects/adverse effects/toxicities discussed including gastrointestinal, renal, hematological , ability to lower siezure threshold, risk for C diff infection, myalgia, rhabdomyolysis   Probiotics, daily yogurt encouraged. 2)Pleural effusion :  Path with + adenocarcinoma in 9/2022  S/P L pleural drain       3) Endometrial cancer  Per oncology   S/P Aspira     4) port in place         Thank for the opportunity to participate in the care of this patient. Please contact with questions or concerns.        Roseline Lepe,   1:47 PM

## 2022-10-12 NOTE — DISCHARGE SUMMARY
Discharge Summary     Patient:  Jonny Granger       MRN: [id number]       YOB: 1955       Age: 78 y.o. Date of admission:  10/4/2022    Date of discharge:  10/12/2022    Primary care provider: Dr. Raqeul Reyes MD    Admitting provider:  Claudia Unger MD    Discharging provider:  Serina Woodson Sandie [address].: [phone]. If unavailable, call [phone] and ask the  to page the triage hospitalist.    Consultations  IP CONSULT TO HOSPITALIST  IP CONSULT TO PULMONOLOGY  IP CONSULT TO GYNECOLOGICAL ONCOLOGY  IP CONSULT TO INFECTIOUS DISEASES    Procedures  * No surgery found *    Discharge destination: Home with [facility]. The patient is stable for discharge. Admission diagnosis  Bilateral pleural effusion [J90]  Tachycardia [R00.0]  SOBOE (shortness of breath on exertion) [R06.02]  Acute hyperkalemia [E87.5]    Current Discharge Medication List        START taking these medications    Details   DAPTOmycin (CUBICIN) IVPB 500 mg by IntraVENous route every twenty-four (24) hours for 10 days. Qty: 10 Dose, Refills: 0  Start date: 10/12/2022, End date: 10/22/2022           CONTINUE these medications which have NOT CHANGED    Details   famotidine (PEPCID) 20 mg tablet Take 20 mg by mouth daily as needed for Heartburn or Indigestion. acetaminophen (TYLENOL) 500 mg tablet Take 500 mg by mouth every six (6) hours as needed for Pain. Xarelto 20 mg tab tablet TAKE 1 TABLET BY MOUTH DAILY  Qty: 30 Tablet, Refills: 5             Follow-up Information       Follow up With Specialties Details Why Contact Racquel Cotton [facility]20[facility]. Please arrive by 1:00 pm. Ramona Ramsey at [phone] with any questions or concerns.  [address] [place] [address]  [phone] 203 [facility] Follow up [address] [facility]    Duane Pacheco MD Gynecologic Oncology Follow up in 1 week(s)  [facility]  [phone]              Final discharge diagnoses and brief hospital course    Patient presents with recurrent malignant pleural effusion    Recurrent Malignant pleural effusion s/p left Aspira cath 10/7  -Patient presents with shortness of breath and chest x-ray showed bilateral pleural effusion left more than right  -patient was just discharged 9/26, after s/p left thoracentesis on 9/22 draining 1700 ml and s/p right thoracentesis 9/23 2600 mL  -Repeat right thoracentesis 10/4, draining 1300 mL  -fluid cx: NGTD. -appreciate Pulmonology input      Micrococcus bacteremia   - Blood cx 10/4: 1 of 2 growing Micrococcus luteus ( blood cx from port)  - rpt blood cx 10/6: NGTD  - IV vanco changed to IV Daptomycin on 10/11   - appreciate ID input   - Echo :  EF of 60 - 65%. No vegetations   - US: No evidence of fluid collection or thrombus around port    - need 2 weeks of IV abx on dc      Endometrial carcinoma  -Patient follows with Dr. Bryon Verduzco oncology  - Dr. Caterina Daniel : Duyen Hamilton has progressive malignancy and her treatment options are limited. We discussed some treatment options, but I explained that none of them have a very high response rate. \"      Recent Perianal abscess  -Diagnosed on recent admission and was discharged to home on p.o. antibiotics and per discharge summary antibiotics to be completed 10/3/2022     On Xarelto at home, restarted 10/8    Discharge recommendations:  PCP in 1 week  Gyn - Onc in 1 week   Aspira cath care  IV Daptomycin at infusion center through [facility] per ID     Discharge plan explained in detail with patient.  She understood and agreed     High risk for readmission     Physical examination at discharge  Visit Vitals  BP 126/81 (BP 1 Location: Right upper arm, BP Patient Position: At rest)   Pulse 86   Temp 97.4 °F (36.3 °C)   Resp 16   Ht 5' 5\" (1.651 m)   Wt 61.4 kg (135 lb 5.8 oz)   SpO2 94%   BMI 22.53 kg/m²     Constitutional:  No acute distress, cooperative, pleasant    ENT:  Oral mucosa moist, oropharynx benign. Resp:  Decreased breath sounds    CV:  Regular rhythm, normal rate, no murmurs, gallops, rubs    GI:  Soft, non distended, non tender. normoactive bowel sounds, no hepatosplenomegaly     Musculoskeletal:  warm, 2+ pulses throughout    Neurologic:  Moves all extremities. AAOx3, CN II-XII reviewed       Pertinent imaging studies:    Per EMR     Recent Labs     10/10/22  0005   WBC 5.4   HGB 10.5*   HCT 34.0*        Recent Labs     10/12/22  0320 10/11/22  0509 10/10/22  0005    135* 136   K 4.5 4.2 4.1    104 105   CO2 27 26 27   BUN 9 10 11   CREA 0.26* 0.28* 0.23*   GLU 97 95 85   CA 7.8* 8.1* 7.4*     No results for input(s): AP, TBIL, TP, ALB, GLOB, GGT, AML, LPSE in the last 72 hours. No lab exists for component: SGOT, GPT, AMYP, HLPSE  No results for input(s): INR, PTP, APTT, INREXT in the last 72 hours. No results for input(s): FE, TIBC, PSAT, FERR in the last 72 hours. No results for input(s): PH, PCO2, PO2 in the last 72 hours.   Recent Labs     10/11/22  0509   CPK 16*     No components found for: [place]    Chronic Diagnoses:    Problem List as of 10/12/2022 Date Reviewed: 10/7/2022            Codes Class Noted - Resolved    Acute hyperkalemia ICD-10-CM: E87.5  ICD-9-CM: 276.7  10/4/2022 - Present        Tachycardia ICD-10-CM: R00.0  ICD-9-CM: 785.0  10/4/2022 - Present        SOBOE (shortness of breath on exertion) ICD-10-CM: R06.02  ICD-9-CM: 786.05  10/4/2022 - Present        Bilateral pleural effusion ICD-10-CM: J90  ICD-9-CM: 511.9  10/4/2022 - Present        Severe protein-calorie malnutrition ([facility] [address] (Chronic) ICD-10-CM: H94  ICD-9-CM: 262  9/23/2022 - Present        Pleural effusion ICD-10-CM: J90  ICD-9-CM: 511.9  9/22/2022 - Present        Dehydration ICD-10-CM: E86.0  ICD-9-CM: 276.51  8/9/2022 - Present        Malignant neoplasm of corpus uteri, except isthmus (HCC) ICD-10-CM: C54.9  ICD-9-CM: 182.0  2/28/2013 - Present           Time spent on discharge related activities today greater than 30 minutes.       Signed:  Sade Welch MD                 Hospitalist, Internal Medicine      Cc: Estrada Khan MD

## 2022-10-12 NOTE — DISCHARGE INSTRUCTIONS
Please bring this form with you to show your primary care provider at your follow-up appointment. Primary care provider:   [unfilled]    Discharging provider:  Leobardo Diego MD    You have been admitted to the hospital with the following diagnoses:  Bilateral pleural effusion [J90]  Tachycardia [R00.0]  SOBOE (shortness of breath on exertion) [R06.02]  Acute hyperkalemia [E87.5]    FOLLOW-UP CARE RECOMMENDATIONS:    APPOINTMENTS:  Follow-up with primary care provider,  [unfilled]  -  Please call [unfilled] shortly after discharge to set up an appointment to be seen in  1 week    Gyn- Onc in 1-2 weeks     FOLLOW-UP TESTS recommended: labs per ID while on IV antibiotics     PENDING TEST RESULTS:  At the time of your discharge the following test results are still pending: none   Please make sure you review these results with your outpatient follow-up provider(s). SYMPTOMS to watch for: chest pain, shortness of breath, fever, chills, nausea, vomiting, diarrhea, change in mentation, falling, weakness, bleeding. DIET/what to eat:  Regular Diet    ACTIVITY:  Activity as tolerated    What to do if new or unexpected symptoms occur? If you experience any of the above symptoms (or should other concerns or questions arise after discharge) please call your primary care physician. Return to the emergency room if you cannot get hold of your doctor. It is very important that you keep your follow-up appointment(s). Please bring discharge papers, medication list (and/or medication bottles) to your follow-up appointments for review by your outpatient provider(s). Please check the list of medications and be sure it includes every medication (even non-prescription medications) that your provider wants you to take. It is important that you take the medication exactly as they are prescribed.    Keep your medication in the bottles provided by the pharmacist and keep a list of the medication names, dosages, and times to be taken in your wallet. Do not take other medications without consulting your doctor. If you have any questions about your medications or other instructions, please talk to your nurse or care provider before you leave the hospital.    I understand that if any problems occur once I am at home I am to contact my physician. These instructions were explained to me and I had the opportunity to ask questions.

## 2022-10-12 NOTE — PROGRESS NOTES
Bedside and Verbal shift change report given to Jefferson Swift RN (oncoming nurse) by Faustina Rene RN (offgoing nurse). Report included the following information SBAR, Kardex, ED Summary, Procedure Summary, Intake/Output, MAR, Recent Results, and Cardiac Rhythm NSR .

## 2022-10-12 NOTE — PROGRESS NOTES
Problem: Falls - Risk of  Goal: *Absence of Falls  Description: Document Bettye Flores Fall Risk and appropriate interventions in the flowsheet. Outcome: Progressing Towards Goal  Note: Fall Risk Interventions:            Medication Interventions: Patient to call before getting OOB, Teach patient to arise slowly, Evaluate medications/consider consulting pharmacy         History of Falls Interventions: Bed/chair exit alarm, Room close to nurse's station         Problem: Pressure Injury - Risk of  Goal: *Prevention of pressure injury  Description: Document Henry Scale and appropriate interventions in the flowsheet.   Outcome: Progressing Towards Goal  Note: Pressure Injury Interventions:  Sensory Interventions: Keep linens dry and wrinkle-free, Assess changes in LOC, Monitor skin under medical devices    Moisture Interventions: Absorbent underpads    Activity Interventions: Increase time out of bed, Pressure redistribution bed/mattress(bed type)    Mobility Interventions: HOB 30 degrees or less, PT/OT evaluation, Assess need for specialty bed    Nutrition Interventions: Document food/fluid/supplement intake    Friction and Shear Interventions: Minimize layers, Lift sheet, HOB 30 degrees or less                Problem: Discharge Planning  Goal: *Discharge to safe environment  Outcome: Progressing Towards Goal

## 2022-10-13 ENCOUNTER — HOSPITAL ENCOUNTER (OUTPATIENT)
Dept: INFUSION THERAPY | Age: 67
Discharge: HOME | End: 2022-10-13
Payer: MEDICARE

## 2022-10-13 ENCOUNTER — PATIENT OUTREACH (OUTPATIENT)
Dept: CASE MANAGEMENT | Age: 67
End: 2022-10-13

## 2022-10-13 VITALS
BODY MASS INDEX: 22.47 KG/M2 | WEIGHT: 135 LBS | SYSTOLIC BLOOD PRESSURE: 104 MMHG | DIASTOLIC BLOOD PRESSURE: 66 MMHG | HEART RATE: 100 BPM | OXYGEN SATURATION: 97 % | TEMPERATURE: 97.3 F | RESPIRATION RATE: 17 BRPM

## 2022-10-13 DIAGNOSIS — R78.81 BACTEREMIA: Primary | ICD-10-CM

## 2022-10-13 LAB
ALBUMIN SERPL-MCNC: 1.3 G/DL (ref 3.5–5)
ALBUMIN/GLOB SERPL: 0.3 {RATIO} (ref 1.1–2.2)
ALP SERPL-CCNC: 63 U/L (ref 45–117)
ALT SERPL-CCNC: 12 U/L (ref 12–78)
ANION GAP SERPL CALC-SCNC: 7 MMOL/L (ref 5–15)
AST SERPL-CCNC: 23 U/L (ref 15–37)
BASOPHILS # BLD: 0 K/UL (ref 0–0.1)
BASOPHILS NFR BLD: 0 % (ref 0–1)
BILIRUB DIRECT SERPL-MCNC: 0.1 MG/DL (ref 0–0.2)
BILIRUB SERPL-MCNC: 0.2 MG/DL (ref 0.2–1)
BUN SERPL-MCNC: 16 MG/DL (ref 6–20)
BUN/CREAT SERPL: 27 (ref 12–20)
CALCIUM SERPL-MCNC: 8.4 MG/DL (ref 8.5–10.1)
CHLORIDE SERPL-SCNC: 98 MMOL/L (ref 97–108)
CK SERPL-CCNC: 16 U/L (ref 26–192)
CO2 SERPL-SCNC: 28 MMOL/L (ref 21–32)
COMMENT, HOLDF: NORMAL
CREAT SERPL-MCNC: 0.59 MG/DL (ref 0.55–1.02)
DIFFERENTIAL METHOD BLD: ABNORMAL
EOSINOPHIL # BLD: 0.1 K/UL (ref 0–0.4)
EOSINOPHIL NFR BLD: 1 % (ref 0–7)
ERYTHROCYTE [DISTWIDTH] IN BLOOD BY AUTOMATED COUNT: 18.5 % (ref 11.5–14.5)
GLOBULIN SER CALC-MCNC: 4.3 G/DL (ref 2–4)
GLUCOSE SERPL-MCNC: 183 MG/DL (ref 65–100)
HCT VFR BLD AUTO: 35.4 % (ref 35–47)
HGB BLD-MCNC: 10.9 G/DL (ref 11.5–16)
IMM GRANULOCYTES # BLD AUTO: 0.1 K/UL (ref 0–0.04)
IMM GRANULOCYTES NFR BLD AUTO: 1 % (ref 0–0.5)
LYMPHOCYTES # BLD: 0.3 K/UL (ref 0.8–3.5)
LYMPHOCYTES NFR BLD: 5 % (ref 12–49)
MCH RBC QN AUTO: 26.8 PG (ref 26–34)
MCHC RBC AUTO-ENTMCNC: 30.8 G/DL (ref 30–36.5)
MCV RBC AUTO: 87 FL (ref 80–99)
MONOCYTES # BLD: 0.5 K/UL (ref 0–1)
MONOCYTES NFR BLD: 8 % (ref 5–13)
NEUTS SEG # BLD: 5.4 K/UL (ref 1.8–8)
NEUTS SEG NFR BLD: 85 % (ref 32–75)
NRBC # BLD: 0 K/UL (ref 0–0.01)
NRBC BLD-RTO: 0 PER 100 WBC
PLATELET # BLD AUTO: 329 K/UL (ref 150–400)
PLATELET COMMENTS,PCOM: ABNORMAL
PMV BLD AUTO: 8.5 FL (ref 8.9–12.9)
POTASSIUM SERPL-SCNC: 4.5 MMOL/L (ref 3.5–5.1)
PROT SERPL-MCNC: 5.6 G/DL (ref 6.4–8.2)
RBC # BLD AUTO: 4.07 M/UL (ref 3.8–5.2)
RBC MORPH BLD: ABNORMAL
SAMPLES BEING HELD,HOLD: NORMAL
SODIUM SERPL-SCNC: 133 MMOL/L (ref 136–145)
WBC # BLD AUTO: 6.4 K/UL (ref 3.6–11)

## 2022-10-13 PROCEDURE — 96374 THER/PROPH/DIAG INJ IV PUSH: CPT

## 2022-10-13 PROCEDURE — 80048 BASIC METABOLIC PNL TOTAL CA: CPT

## 2022-10-13 PROCEDURE — 36415 COLL VENOUS BLD VENIPUNCTURE: CPT

## 2022-10-13 PROCEDURE — 74011000250 HC RX REV CODE- 250: Performed by: INTERNAL MEDICINE

## 2022-10-13 PROCEDURE — 82550 ASSAY OF CK (CPK): CPT

## 2022-10-13 PROCEDURE — 77030012965 HC NDL HUBR BBMI -A

## 2022-10-13 PROCEDURE — 80076 HEPATIC FUNCTION PANEL: CPT

## 2022-10-13 PROCEDURE — 74011250636 HC RX REV CODE- 250/636: Performed by: INTERNAL MEDICINE

## 2022-10-13 PROCEDURE — 85025 COMPLETE CBC W/AUTO DIFF WBC: CPT

## 2022-10-13 RX ORDER — ALBUTEROL SULFATE 0.83 MG/ML
2.5 SOLUTION RESPIRATORY (INHALATION) AS NEEDED
Status: CANCELLED
Start: 2022-10-14

## 2022-10-13 RX ORDER — ACETAMINOPHEN 325 MG/1
650 TABLET ORAL AS NEEDED
Status: ACTIVE | OUTPATIENT
Start: 2022-10-13 | End: 2022-10-13

## 2022-10-13 RX ORDER — EPINEPHRINE 1 MG/ML
0.3 INJECTION, SOLUTION, CONCENTRATE INTRAVENOUS AS NEEDED
Status: CANCELLED | OUTPATIENT
Start: 2022-10-14

## 2022-10-13 RX ORDER — SODIUM CHLORIDE 0.9 % (FLUSH) 0.9 %
5-40 SYRINGE (ML) INJECTION AS NEEDED
Status: DISPENSED | OUTPATIENT
Start: 2022-10-13 | End: 2022-10-13

## 2022-10-13 RX ORDER — HYDROCORTISONE SODIUM SUCCINATE 100 MG/2ML
100 INJECTION, POWDER, FOR SOLUTION INTRAMUSCULAR; INTRAVENOUS AS NEEDED
Status: CANCELLED | OUTPATIENT
Start: 2022-10-14

## 2022-10-13 RX ORDER — SODIUM CHLORIDE 0.9 % (FLUSH) 0.9 %
5-40 SYRINGE (ML) INJECTION AS NEEDED
Status: CANCELLED | OUTPATIENT
Start: 2022-10-14

## 2022-10-13 RX ORDER — ONDANSETRON 2 MG/ML
8 INJECTION INTRAMUSCULAR; INTRAVENOUS AS NEEDED
Status: ACTIVE | OUTPATIENT
Start: 2022-10-13 | End: 2022-10-13

## 2022-10-13 RX ORDER — DIPHENHYDRAMINE HYDROCHLORIDE 50 MG/ML
25 INJECTION, SOLUTION INTRAMUSCULAR; INTRAVENOUS AS NEEDED
Status: CANCELLED
Start: 2022-10-14

## 2022-10-13 RX ORDER — ONDANSETRON 2 MG/ML
8 INJECTION INTRAMUSCULAR; INTRAVENOUS AS NEEDED
Status: CANCELLED | OUTPATIENT
Start: 2022-10-14

## 2022-10-13 RX ORDER — SODIUM CHLORIDE 0.9 % (FLUSH) 0.9 %
5-10 SYRINGE (ML) INJECTION AS NEEDED
Status: DISCONTINUED | OUTPATIENT
Start: 2022-10-13 | End: 2022-10-14 | Stop reason: HOSPADM

## 2022-10-13 RX ORDER — DIPHENHYDRAMINE HYDROCHLORIDE 50 MG/ML
50 INJECTION, SOLUTION INTRAMUSCULAR; INTRAVENOUS AS NEEDED
Status: CANCELLED
Start: 2022-10-14

## 2022-10-13 RX ORDER — DIPHENHYDRAMINE HYDROCHLORIDE 50 MG/ML
25 INJECTION, SOLUTION INTRAMUSCULAR; INTRAVENOUS AS NEEDED
Status: ACTIVE | OUTPATIENT
Start: 2022-10-13 | End: 2022-10-13

## 2022-10-13 RX ORDER — HEPARIN 100 UNIT/ML
500 SYRINGE INTRAVENOUS AS NEEDED
Status: CANCELLED
Start: 2022-10-14

## 2022-10-13 RX ORDER — HEPARIN 100 UNIT/ML
500 SYRINGE INTRAVENOUS AS NEEDED
Status: DISCONTINUED | OUTPATIENT
Start: 2022-10-13 | End: 2022-10-14 | Stop reason: HOSPADM

## 2022-10-13 RX ORDER — ACETAMINOPHEN 325 MG/1
650 TABLET ORAL AS NEEDED
Status: CANCELLED
Start: 2022-10-14

## 2022-10-13 RX ORDER — SODIUM CHLORIDE 9 MG/ML
5-40 INJECTION INTRAMUSCULAR; INTRAVENOUS; SUBCUTANEOUS AS NEEDED
Status: CANCELLED | OUTPATIENT
Start: 2022-10-14

## 2022-10-13 RX ORDER — SODIUM CHLORIDE 9 MG/ML
5-250 INJECTION, SOLUTION INTRAVENOUS AS NEEDED
Status: CANCELLED | OUTPATIENT
Start: 2022-10-14

## 2022-10-13 RX ORDER — HEPARIN 100 UNIT/ML
500 SYRINGE INTRAVENOUS AS NEEDED
Status: ACTIVE | OUTPATIENT
Start: 2022-10-13 | End: 2022-10-13

## 2022-10-13 RX ADMIN — DAPTOMYCIN 500 MG: 500 INJECTION, POWDER, LYOPHILIZED, FOR SOLUTION INTRAVENOUS at 12:16

## 2022-10-13 RX ADMIN — SODIUM CHLORIDE, PRESERVATIVE FREE 10 ML: 5 INJECTION INTRAVENOUS at 12:20

## 2022-10-13 RX ADMIN — SODIUM CHLORIDE, PRESERVATIVE FREE 10 ML: 5 INJECTION INTRAVENOUS at 12:16

## 2022-10-13 RX ADMIN — Medication 500 UNITS: at 12:20

## 2022-10-13 NOTE — PROGRESS NOTES
[facility] Progress Note    Date: 2022    Name: Sayra Andrew    MRN: [id number]         : 1955    Ms. Harsh Noel was assessed and education was provided. Using sterile technique accessed port a cath without difficulty, labs drawn and in process. Ms. Orr Most vitals were reviewed. Visit Vitals  /66 (BP 1 Location: Right upper arm, BP Patient Position: Sitting)   Pulse 100   Temp 97.3 °F (36.3 °C)   Resp 17   Wt 61.2 kg (135 lb)   SpO2 97%   Breastfeeding No   BMI 22.47 kg/m²       Lab results were obtained and reviewed. Recent Results (from the past 12 hour(s))   CBC WITH AUTOMATED DIFF    Collection Time: 10/13/22 10:50 AM   Result Value Ref Range    WBC 6.4 3.6 - 11.0 K/uL    RBC 4.07 3.80 - 5.20 M/uL    HGB 10.9 (L) 11.5 - 16.0 g/dL    HCT 35.4 35.0 - 47.0 %    MCV 87.0 80.0 - 99.0 FL    MCH 26.8 26.0 - 34.0 PG    MCHC 30.8 30.0 - 36.5 g/dL    RDW 18.5 (H) 11.5 - 14.5 %    PLATELET 530 508 - 213 K/uL    MPV 8.5 (L) 8.9 - 12.9 FL    NRBC 0.0 0  WBC    ABSOLUTE NRBC 0.00 0.00 - 0.01 K/uL    NEUTROPHILS 85 (H) 32 - 75 %    LYMPHOCYTES 5 (L) 12 - 49 %    MONOCYTES 8 5 - 13 %    EOSINOPHILS 1 0 - 7 %    BASOPHILS 0 0 - 1 %    IMMATURE GRANULOCYTES 1 (H) 0.0 - 0.5 %    ABS. NEUTROPHILS 5.4 1.8 - 8.0 K/UL    ABS. LYMPHOCYTES 0.3 (L) 0.8 - 3.5 K/UL    ABS. MONOCYTES 0.5 0.0 - 1.0 K/UL    ABS. EOSINOPHILS 0.1 0.0 - 0.4 K/UL    ABS. BASOPHILS 0.0 0.0 - 0.1 K/UL    ABS. IMM.  GRANS. 0.1 (H) 0.00 - 0.04 K/UL    DF SMEAR SCANNED      PLATELET COMMENTS Increased Platelets      RBC COMMENTS NORMOCYTIC, NORMOCHROMIC     METABOLIC PANEL, BASIC    Collection Time: 10/13/22 10:50 AM   Result Value Ref Range    Sodium 133 (L) 136 - 145 mmol/L    Potassium 4.5 3.5 - 5.1 mmol/L    Chloride 98 97 - 108 mmol/L    CO2 28 21 - 32 mmol/L    Anion gap 7 5 - 15 mmol/L    Glucose 183 (H) 65 - 100 mg/dL    BUN 16 6 - 20 MG/DL    Creatinine 0.59 0.55 - 1.02 MG/DL    BUN/Creatinine ratio 27 (H) 12 - 20 eGFR >60 >60 ml/min/1.73m2    Calcium 8.4 (L) 8.5 - 10.1 MG/DL   HEPATIC FUNCTION PANEL    Collection Time: 10/13/22 10:50 AM   Result Value Ref Range    Protein, total 5.6 (L) 6.4 - 8.2 g/dL    Albumin 1.3 (L) 3.5 - 5.0 g/dL    Globulin 4.3 (H) 2.0 - 4.0 g/dL    A-G Ratio 0.3 (L) 1.1 - 2.2      Bilirubin, total 0.2 0.2 - 1.0 MG/DL    Bilirubin, direct 0.1 0.0 - 0.2 MG/DL    Alk. phosphatase 63 45 - 117 U/L    AST (SGOT) 23 15 - 37 U/L    ALT (SGPT) 12 12 - 78 U/L   CK    Collection Time: 10/13/22 10:50 AM   Result Value Ref Range    CK 16 (L) 26 - 192 U/L   SAMPLES BEING HELD    Collection Time: 10/13/22 10:50 AM   Result Value Ref Range    SAMPLES BEING HELD 1sst     COMMENT        Add-on orders for these samples will be processed based on acceptable specimen integrity and analyte stability, which may vary by analyte.           []  Vancomycin     []  Invanz     [x]  Cubicin 500 mg IVP     []  Rocephin    was infused over 2 minutes. Port flushed, line clamped and secured. Alcohol cap applied. Ms. Jennifer Ashton tolerated infusion, and had no complaints at this time. Patient armband removed and shredded. Ms. Jennifer Ashton was discharged from [place] [address] in stable condition at 1225. She is to return on October 14, 2022 at 1100 for her next appointment.     Mayra Good RN  October 13, 2022  1:27 PM

## 2022-10-13 NOTE — DISCHARGE INSTRUCTIONS
Daptomycin (By injection)   Daptomycin (dap-toe-MYE-sin)  Treats infections. This medicine is an antibiotic. Brand Name(s): Cubicin, Cubicin RF, PremierPro Rx Cubicin RF   There may be other brand names for this medicine. When This Medicine Should Not Be Used: This medicine is not right for everyone. You should not receive it if you had an allergic reaction to daptomycin. How to Use This Medicine:   Injectable  Your doctor will prescribe your dose and schedule. This medicine is given through a needle placed in a vein. This medicine is given slowly, so the needle will remain in place for about 30 minutes. A nurse or other health provider will give you this medicine. Missed dose: Call your doctor or pharmacist for instructions. Drugs and Foods to Avoid:   Ask your doctor or pharmacist before using any other medicine, including over-the-counter medicines, vitamins, and herbal products. Some medicines can affect how daptomycin works. Tell your doctor if you are using medicine to lower cholesterol. Warnings While Using This Medicine:   Tell your doctor if you are pregnant or breastfeeding, or if you have kidney disease, muscle problems, nerve problems, or if you are on dialysis. This medicine may cause the following problems:   Severe diarrhea  Eosinophilic pneumonia  Peripheral neuropathy  Muscle problems that can lead to kidney problems  This medicine can cause diarrhea. Call your doctor if the diarrhea becomes severe, does not stop, or is bloody. Do not take any medicine to stop diarrhea until you have talked to your doctor. Diarrhea can occur 2 months or more after you stop taking this medicine. Diarrhea may occur 2 months or more after your treatment ends. Tell any doctor or dentist who treats you that you are using this medicine. This medicine may affect certain medical test results. Your doctor will check your progress and the effects of this medicine at regular visits. Keep all appointments.   Call your doctor if your symptoms do not improve or if they get worse. Possible Side Effects While Using This Medicine:   Call your doctor right away if you notice any of these side effects: Allergic reaction: Itching or hives, swelling in your face or hands, swelling or tingling in your mouth or throat, chest tightness, trouble breathing  Blistering, peeling, red skin rash  Decrease in how often or how much you urinate  Chest pain, cough, fever, trouble breathing  Muscle pain, tenderness, or cramping  Numbness, tingling, or burning pain in your hands, arms, legs, or feet  Unusual tiredness or weakness  If you notice these less serious side effects, talk with your doctor:   Mild diarrhea  Pain, itching, burning, swelling, or a lump under your skin where the needle is placed  Sore throat  If you notice other side effects that you think are caused by this medicine, tell your doctor. Call your doctor for medical advice about side effects. You may report side effects to FDA at 6-486-FDA-9353  © 2017 [facility] Information is for End User's use only and may not be sold, redistributed or otherwise used for commercial purposes. The above information is an  only. It is not intended as medical advice for individual conditions or treatments. Talk to your doctor, nurse or pharmacist before following any medical regimen to see if it is safe and effective for you.

## 2022-10-14 ENCOUNTER — HOSPITAL ENCOUNTER (OUTPATIENT)
Dept: INFUSION THERAPY | Age: 67
Discharge: HOME | End: 2022-10-14
Payer: MEDICARE

## 2022-10-14 VITALS
SYSTOLIC BLOOD PRESSURE: 100 MMHG | DIASTOLIC BLOOD PRESSURE: 71 MMHG | RESPIRATION RATE: 18 BRPM | TEMPERATURE: 97.6 F | HEART RATE: 96 BPM

## 2022-10-14 DIAGNOSIS — R78.81 BACTEREMIA: Primary | ICD-10-CM

## 2022-10-14 PROCEDURE — 74011250636 HC RX REV CODE- 250/636: Performed by: INTERNAL MEDICINE

## 2022-10-14 PROCEDURE — 74011000250 HC RX REV CODE- 250: Performed by: INTERNAL MEDICINE

## 2022-10-14 PROCEDURE — 96374 THER/PROPH/DIAG INJ IV PUSH: CPT

## 2022-10-14 RX ORDER — HEPARIN 100 UNIT/ML
500 SYRINGE INTRAVENOUS AS NEEDED
Status: CANCELLED
Start: 2022-10-15

## 2022-10-14 RX ORDER — SODIUM CHLORIDE 9 MG/ML
5-40 INJECTION INTRAMUSCULAR; INTRAVENOUS; SUBCUTANEOUS AS NEEDED
Status: CANCELLED | OUTPATIENT
Start: 2022-10-15

## 2022-10-14 RX ORDER — EPINEPHRINE 1 MG/ML
0.3 INJECTION, SOLUTION, CONCENTRATE INTRAVENOUS AS NEEDED
Status: CANCELLED | OUTPATIENT
Start: 2022-10-15

## 2022-10-14 RX ORDER — HYDROCORTISONE SODIUM SUCCINATE 100 MG/2ML
100 INJECTION, POWDER, FOR SOLUTION INTRAMUSCULAR; INTRAVENOUS AS NEEDED
Status: CANCELLED | OUTPATIENT
Start: 2022-10-15

## 2022-10-14 RX ORDER — DIPHENHYDRAMINE HYDROCHLORIDE 50 MG/ML
25 INJECTION, SOLUTION INTRAMUSCULAR; INTRAVENOUS AS NEEDED
Status: CANCELLED
Start: 2022-10-15

## 2022-10-14 RX ORDER — HEPARIN 100 UNIT/ML
500 SYRINGE INTRAVENOUS AS NEEDED
Status: DISPENSED | OUTPATIENT
Start: 2022-10-14 | End: 2022-10-14

## 2022-10-14 RX ORDER — DIPHENHYDRAMINE HYDROCHLORIDE 50 MG/ML
50 INJECTION, SOLUTION INTRAMUSCULAR; INTRAVENOUS AS NEEDED
Status: CANCELLED
Start: 2022-10-15

## 2022-10-14 RX ORDER — SODIUM CHLORIDE 9 MG/ML
5-250 INJECTION, SOLUTION INTRAVENOUS AS NEEDED
Status: CANCELLED | OUTPATIENT
Start: 2022-10-15

## 2022-10-14 RX ORDER — ALBUTEROL SULFATE 0.83 MG/ML
2.5 SOLUTION RESPIRATORY (INHALATION) AS NEEDED
Status: CANCELLED
Start: 2022-10-15

## 2022-10-14 RX ORDER — SODIUM CHLORIDE 0.9 % (FLUSH) 0.9 %
5-40 SYRINGE (ML) INJECTION AS NEEDED
Status: CANCELLED | OUTPATIENT
Start: 2022-10-15

## 2022-10-14 RX ORDER — SODIUM CHLORIDE 0.9 % (FLUSH) 0.9 %
5-40 SYRINGE (ML) INJECTION AS NEEDED
Status: DISPENSED | OUTPATIENT
Start: 2022-10-14 | End: 2022-10-14

## 2022-10-14 RX ORDER — ONDANSETRON 2 MG/ML
8 INJECTION INTRAMUSCULAR; INTRAVENOUS AS NEEDED
Status: CANCELLED | OUTPATIENT
Start: 2022-10-15

## 2022-10-14 RX ORDER — ACETAMINOPHEN 325 MG/1
650 TABLET ORAL AS NEEDED
Status: CANCELLED
Start: 2022-10-15

## 2022-10-14 RX ADMIN — SODIUM CHLORIDE, PRESERVATIVE FREE 10 ML: 5 INJECTION INTRAVENOUS at 12:07

## 2022-10-14 RX ADMIN — SODIUM CHLORIDE, PRESERVATIVE FREE 10 ML: 5 INJECTION INTRAVENOUS at 11:23

## 2022-10-14 RX ADMIN — DAPTOMYCIN 500 MG: 500 INJECTION, POWDER, LYOPHILIZED, FOR SOLUTION INTRAVENOUS at 12:03

## 2022-10-14 RX ADMIN — HEPARIN 500 UNITS: 100 SYRINGE at 12:09

## 2022-10-14 NOTE — PROGRESS NOTES
Care Transitions Initial Call    Call within 2 business days of discharge: Yes     Patient: Mason Ward Patient : 1955 MRN: [id number]    Last Discharge  Street       Date Complaint Diagnosis Description Type Department Provider    10/4/22 Shortness of Breath Tachycardia . .. ED to Hosp-Admission (Discharged) (ADMIT) Jaycob Lennon MD; Neetu Lerma. Vearl Pippins Vearl Pippins Was this an external facility discharge? No Discharge Facility: [facility]    Challenges to be reviewed by the provider   Additional needs identified to be addressed with provider: yes  home health care- [facility] with [address]  medications- iv daptomycin daily for 10 days to be done at [facility]  Patient readmitted for bilateral malignant pleural effusions and spira drain placed in left chest as well as right chest for management of malignant effusions and draining. Method of communication with provider : chart routing    Discussed [id number] related testing which was not done at this time. Test results were not done. Patient informed of results, if available? Not done     . Inpatient Readmission Risk score: Unplanned Readmit Risk Score: 16    Was this a readmission? yes   Patient stated reason for the admission: I had fluid on my lungs again    Patients top risk factors for readmission: functional physical ability and medical condition-malignant bilateral pleural effusions with stage III uterine cancer    Interventions to address risk factors: Obtained and reviewed discharge summary and/or continuity of care documents    Care Transition Nurse (CTN) contacted the patient by telephone to perform post hospital discharge assessment. Verified name and  with patient as identifiers. Provided introduction to self, and explanation of the CTN role. CTN reviewed discharge instructions, medical action plan and red flags with patient who verbalized understanding.  Were discharge instructions available to patient? yes. Reviewed appropriate site of care based on symptoms and resources available to patient including: PCP, Specialist, and Home Health. Patient given an opportunity to ask questions and does not have any further questions or concerns at this time. The patient agrees to contact the PCP office for questions related to their healthcare. Medication reconciliation was performed with patient, who verbalizes understanding of administration of home medications. Advised obtaining a 90-day supply of all daily and as-needed medications. Referral to Pharm D needed: no     Home Health/Outpatient orders at discharge: PT, OT, and [facility]: [facility]  Date of initial visit: to be determined. Ctn left message about PERCY visit and that patient was d/c on 10/13/22    Was patient discharged with a pulse oximeter? no    Discussed follow-up appointments. If no appointment was previously scheduled, appointment scheduling offered: yes. Is follow up appointment scheduled within 7 days of discharge? yes. [facility] follow up appointment(s):   Future Appointments   Date Time Provider David Jara   10/14/2022 11:00 AM [facility]   10/15/2022 11:00 AM [facility]   10/16/2022  1:00 PM [facility]   10/17/2022 11:00 AM [facility] INF [facility] 2 [facility]   10/18/2022 11:00 AM [facility] INF [facility] 3 [facility]   10/19/2022 11:00 AM [facility] 2 [facility]   10/20/2022 11:00 AM [facility] INF S[facility] 3 [facility]   11/18/2022 11:00 AM Peace Serna DO MID [facility]     Non-[facility] follow up appointment(s): follow up with Dr. Augustine Kelly in one week. Plan for follow-up call in 5-7 days based on severity of symptoms and risk factors. Plan for next call: self management-drain status and amounts, percy of home health, iv antibiotics   CTN provided contact information for future needs.      Goals Addressed                   This Visit's Progress     Prevent complications post hospitalization. On track     9/27/22  Patient to elevate legs due to swelling from 2 hour car ride home yesterday. Patient to drain spira cath tonight to help facilitate fluid status and decrease of fluid. Patient to take antibiotic as ordered. Patient to monitor for fever, increased drainage from boil lancing, foul odor. Patient to change dressing daily and wash with soap and water. Ctn to follow up in one week. Princess Perea RN    10/14/22    Patient to drain spira cath every two days on both sides. Ctn contacted [facility] to make aware of new drain and percy orders were received. Patient to go to [facility] once a day to receive iv antibiotics for 10 days.    Patient to monitor for fever daily  Ctn to follow up in one week  Princess Perea RN

## 2022-10-14 NOTE — PROGRESS NOTES
[facility] Progress Note    Date: 2022    Name: Jonny Granger    MRN: [id number]         : 1955    Ms. Olga Lidia Dhillon was assessed and education was provided. She reports fatigue. Also states she isn't voiding a lot and it is dark yellow but acknowledges that she is not drinking a lot. Port access intact. Flushes easily but no blood return obtained. Ms. Miranda Ohs vitals were reviewed. Given water to drink while here. Encouraged to have fluid intake regularly. Visit Vitals  /71 (BP 1 Location: Right upper arm, BP Patient Position: Sitting)   Pulse 96   Temp 97.6 °F (36.4 °C)   Resp 18         []  Vancomycin     []  Invanz     [x]  Cubicin 500 mg IVP given over 2 minutes. Flushed with 10 ml NS pre and post administration. []  Rocephin     Ms. Olga Lidia Dhillon tolerated infusion, and had no new complaints at this time. Patient armband removed and shredded. Ms. Olga Lidia Dhillon was discharged from [place] [address] in stable condition at 1215. She is to return on  at 1100 for her next appointment.     Anu Pugh RN  2022  1:56 PM

## 2022-10-15 ENCOUNTER — HOSPITAL ENCOUNTER (OUTPATIENT)
Dept: INFUSION THERAPY | Age: 67
Discharge: HOME | End: 2022-10-15
Payer: MEDICARE

## 2022-10-15 VITALS
SYSTOLIC BLOOD PRESSURE: 115 MMHG | OXYGEN SATURATION: 97 % | HEART RATE: 106 BPM | BODY MASS INDEX: 22.33 KG/M2 | RESPIRATION RATE: 20 BRPM | WEIGHT: 134.2 LBS | TEMPERATURE: 97.6 F | DIASTOLIC BLOOD PRESSURE: 69 MMHG

## 2022-10-15 DIAGNOSIS — R78.81 BACTEREMIA: Primary | ICD-10-CM

## 2022-10-15 PROCEDURE — 74011000250 HC RX REV CODE- 250: Performed by: INTERNAL MEDICINE

## 2022-10-15 PROCEDURE — 96374 THER/PROPH/DIAG INJ IV PUSH: CPT

## 2022-10-15 PROCEDURE — 74011250636 HC RX REV CODE- 250/636: Performed by: INTERNAL MEDICINE

## 2022-10-15 RX ORDER — ALBUTEROL SULFATE 0.83 MG/ML
2.5 SOLUTION RESPIRATORY (INHALATION) AS NEEDED
Status: CANCELLED
Start: 2022-10-16

## 2022-10-15 RX ORDER — HEPARIN 100 UNIT/ML
500 SYRINGE INTRAVENOUS AS NEEDED
Status: CANCELLED
Start: 2022-10-16

## 2022-10-15 RX ORDER — SODIUM CHLORIDE 0.9 % (FLUSH) 0.9 %
5-40 SYRINGE (ML) INJECTION AS NEEDED
Status: DISPENSED | OUTPATIENT
Start: 2022-10-15 | End: 2022-10-15

## 2022-10-15 RX ORDER — HEPARIN 100 UNIT/ML
500 SYRINGE INTRAVENOUS AS NEEDED
Status: DISPENSED | OUTPATIENT
Start: 2022-10-15 | End: 2022-10-15

## 2022-10-15 RX ORDER — SODIUM CHLORIDE 9 MG/ML
5-250 INJECTION, SOLUTION INTRAVENOUS AS NEEDED
Status: CANCELLED | OUTPATIENT
Start: 2022-10-16

## 2022-10-15 RX ORDER — DIPHENHYDRAMINE HYDROCHLORIDE 50 MG/ML
50 INJECTION, SOLUTION INTRAMUSCULAR; INTRAVENOUS AS NEEDED
Status: CANCELLED
Start: 2022-10-16

## 2022-10-15 RX ORDER — ONDANSETRON 2 MG/ML
8 INJECTION INTRAMUSCULAR; INTRAVENOUS AS NEEDED
Status: CANCELLED | OUTPATIENT
Start: 2022-10-16

## 2022-10-15 RX ORDER — EPINEPHRINE 1 MG/ML
0.3 INJECTION, SOLUTION, CONCENTRATE INTRAVENOUS AS NEEDED
Status: CANCELLED | OUTPATIENT
Start: 2022-10-16

## 2022-10-15 RX ORDER — DIPHENHYDRAMINE HYDROCHLORIDE 50 MG/ML
25 INJECTION, SOLUTION INTRAMUSCULAR; INTRAVENOUS AS NEEDED
Status: CANCELLED
Start: 2022-10-16

## 2022-10-15 RX ORDER — HYDROCORTISONE SODIUM SUCCINATE 100 MG/2ML
100 INJECTION, POWDER, FOR SOLUTION INTRAMUSCULAR; INTRAVENOUS AS NEEDED
Status: CANCELLED | OUTPATIENT
Start: 2022-10-16

## 2022-10-15 RX ORDER — SODIUM CHLORIDE 9 MG/ML
5-40 INJECTION INTRAMUSCULAR; INTRAVENOUS; SUBCUTANEOUS AS NEEDED
Status: CANCELLED | OUTPATIENT
Start: 2022-10-16

## 2022-10-15 RX ORDER — ACETAMINOPHEN 325 MG/1
650 TABLET ORAL AS NEEDED
Status: CANCELLED
Start: 2022-10-16

## 2022-10-15 RX ORDER — SODIUM CHLORIDE 0.9 % (FLUSH) 0.9 %
5-40 SYRINGE (ML) INJECTION AS NEEDED
Status: CANCELLED | OUTPATIENT
Start: 2022-10-16

## 2022-10-15 RX ADMIN — SODIUM CHLORIDE, PRESERVATIVE FREE 10 ML: 5 INJECTION INTRAVENOUS at 11:13

## 2022-10-15 RX ADMIN — HEPARIN 500 UNITS: 100 SYRINGE at 11:21

## 2022-10-15 RX ADMIN — SODIUM CHLORIDE, PRESERVATIVE FREE 10 ML: 5 INJECTION INTRAVENOUS at 11:18

## 2022-10-15 RX ADMIN — DAPTOMYCIN 500 MG: 500 INJECTION, POWDER, LYOPHILIZED, FOR SOLUTION INTRAVENOUS at 11:14

## 2022-10-15 NOTE — PROGRESS NOTES
[facility] Progress Note    Date: October 15, 2022    Name: Nuria Almanzar    MRN: [id number]         : 1955    Ms. Dawna Quiros was assessed and education was provided. Denies new symptoms. States home health nurse removed fluid through her aspira drain yesterday and states her breathing is easier. Her only pain is buttocks and sacrum with sitting. Port access intact and flushes easily, no blood returned. Ms. Ana Shannon vitals were reviewed. Visit Vitals  /69 (BP 1 Location: Left upper arm, BP Patient Position: Sitting)   Pulse (!) 106   Temp 97.6 °F (36.4 °C)   Resp 20   Wt 60.9 kg (134 lb 3.2 oz)   SpO2 97%   BMI 22.33 kg/m²         []  Vancomycin     []  Invanz     [x]  Cubicin 500 mg given IV push over 2 minutes via port. Flushed with 10 ml NS pre and post administration. []  Rocephin     Ms. Dawna Quiros tolerated infusion, and had no complaints at this time. Port site intact. Patient armband removed and shredded. Ms. Dawna Quiros was discharged from [place] [address] in stable condition at 1125. She is to return on  at 1 pm for her next appointment.     Rona Gilman RN  October 15, 2022  11:39 AM

## 2022-10-16 ENCOUNTER — HOSPITAL ENCOUNTER (OUTPATIENT)
Dept: INFUSION THERAPY | Age: 67
Discharge: HOME | End: 2022-10-16
Payer: MEDICARE

## 2022-10-16 VITALS
TEMPERATURE: 97.6 F | DIASTOLIC BLOOD PRESSURE: 73 MMHG | HEART RATE: 86 BPM | SYSTOLIC BLOOD PRESSURE: 111 MMHG | RESPIRATION RATE: 18 BRPM

## 2022-10-16 DIAGNOSIS — R78.81 BACTEREMIA: Primary | ICD-10-CM

## 2022-10-16 PROCEDURE — 96374 THER/PROPH/DIAG INJ IV PUSH: CPT

## 2022-10-16 PROCEDURE — 74011250636 HC RX REV CODE- 250/636: Performed by: INTERNAL MEDICINE

## 2022-10-16 PROCEDURE — 74011000250 HC RX REV CODE- 250: Performed by: INTERNAL MEDICINE

## 2022-10-16 RX ORDER — DIPHENHYDRAMINE HYDROCHLORIDE 50 MG/ML
25 INJECTION, SOLUTION INTRAMUSCULAR; INTRAVENOUS AS NEEDED
Status: CANCELLED
Start: 2022-10-17

## 2022-10-16 RX ORDER — ONDANSETRON 2 MG/ML
8 INJECTION INTRAMUSCULAR; INTRAVENOUS AS NEEDED
Status: CANCELLED | OUTPATIENT
Start: 2022-10-17

## 2022-10-16 RX ORDER — HEPARIN 100 UNIT/ML
500 SYRINGE INTRAVENOUS AS NEEDED
Status: CANCELLED
Start: 2022-10-17

## 2022-10-16 RX ORDER — DIPHENHYDRAMINE HYDROCHLORIDE 50 MG/ML
50 INJECTION, SOLUTION INTRAMUSCULAR; INTRAVENOUS AS NEEDED
Status: CANCELLED
Start: 2022-10-17

## 2022-10-16 RX ORDER — SODIUM CHLORIDE 0.9 % (FLUSH) 0.9 %
5-40 SYRINGE (ML) INJECTION AS NEEDED
Status: DISCONTINUED | OUTPATIENT
Start: 2022-10-16 | End: 2022-10-17 | Stop reason: HOSPADM

## 2022-10-16 RX ORDER — SODIUM CHLORIDE 0.9 % (FLUSH) 0.9 %
5-40 SYRINGE (ML) INJECTION AS NEEDED
Status: CANCELLED | OUTPATIENT
Start: 2022-10-17

## 2022-10-16 RX ORDER — HYDROCORTISONE SODIUM SUCCINATE 100 MG/2ML
100 INJECTION, POWDER, FOR SOLUTION INTRAMUSCULAR; INTRAVENOUS AS NEEDED
Status: CANCELLED | OUTPATIENT
Start: 2022-10-17

## 2022-10-16 RX ORDER — HEPARIN 100 UNIT/ML
500 SYRINGE INTRAVENOUS AS NEEDED
Status: DISCONTINUED | OUTPATIENT
Start: 2022-10-16 | End: 2022-10-17 | Stop reason: HOSPADM

## 2022-10-16 RX ORDER — EPINEPHRINE 1 MG/ML
0.3 INJECTION, SOLUTION, CONCENTRATE INTRAVENOUS AS NEEDED
Status: CANCELLED | OUTPATIENT
Start: 2022-10-17

## 2022-10-16 RX ORDER — SODIUM CHLORIDE 9 MG/ML
5-250 INJECTION, SOLUTION INTRAVENOUS AS NEEDED
Status: CANCELLED | OUTPATIENT
Start: 2022-10-17

## 2022-10-16 RX ORDER — ACETAMINOPHEN 325 MG/1
650 TABLET ORAL AS NEEDED
Status: CANCELLED
Start: 2022-10-17

## 2022-10-16 RX ORDER — ALBUTEROL SULFATE 0.83 MG/ML
2.5 SOLUTION RESPIRATORY (INHALATION) AS NEEDED
Status: CANCELLED
Start: 2022-10-17

## 2022-10-16 RX ORDER — SODIUM CHLORIDE 9 MG/ML
5-40 INJECTION INTRAMUSCULAR; INTRAVENOUS; SUBCUTANEOUS AS NEEDED
Status: CANCELLED | OUTPATIENT
Start: 2022-10-17

## 2022-10-16 RX ADMIN — SODIUM CHLORIDE, PRESERVATIVE FREE 10 ML: 5 INJECTION INTRAVENOUS at 12:55

## 2022-10-16 RX ADMIN — HEPARIN 500 UNITS: 100 SYRINGE at 13:08

## 2022-10-16 RX ADMIN — SODIUM CHLORIDE, PRESERVATIVE FREE 10 ML: 5 INJECTION INTRAVENOUS at 13:03

## 2022-10-16 RX ADMIN — SODIUM CHLORIDE, PRESERVATIVE FREE 10 ML: 5 INJECTION INTRAVENOUS at 13:07

## 2022-10-16 RX ADMIN — DAPTOMYCIN 500 MG: 500 INJECTION, POWDER, LYOPHILIZED, FOR SOLUTION INTRAVENOUS at 13:04

## 2022-10-16 NOTE — PROGRESS NOTES
[facility] Progress Note    Date: 2022    Name: Rubens Gupta    MRN: [id number]         : 1955    Ms. Joselin Mckinnon was assessed and education was provided. She reports feeling a little better today. Has less swelling in feet and lower legs and reports voiding more. Has some burning to vaginal tissues, no discharge. Port site intact. Flushes easily, no blood return obtained. Ms. Azalia Jovel vitals were reviewed. Visit Vitals  /73 (BP 1 Location: Right upper arm, BP Patient Position: Sitting)   Pulse 86   Temp 97.6 °F (36.4 °C)   Resp 18         []  Vancomycin     []  Invanz     [x]  Cubicin 500 mg IVP given over 2 minutes via port. Flushed with 10 ml NS pre and post.     []  Rocephin     Ms. Joselin Mckinnon tolerated infusion, and had no complaints at this time. Port flushed and site intact. Patient armband removed and shredded. Ms. Joselin Mckinnon was discharged from [place] [address] in stable condition at 1315. She is to return on  at 1100 for her next appointment.     Mercedes Camargo RN  2022  1:29 PM

## 2022-10-17 ENCOUNTER — HOSPITAL ENCOUNTER (OUTPATIENT)
Dept: INFUSION THERAPY | Age: 67
Discharge: HOME | End: 2022-10-17
Payer: MEDICARE

## 2022-10-17 VITALS
RESPIRATION RATE: 18 BRPM | WEIGHT: 134.4 LBS | DIASTOLIC BLOOD PRESSURE: 75 MMHG | OXYGEN SATURATION: 96 % | HEART RATE: 108 BPM | BODY MASS INDEX: 22.37 KG/M2 | SYSTOLIC BLOOD PRESSURE: 102 MMHG | TEMPERATURE: 97.7 F

## 2022-10-17 DIAGNOSIS — R78.81 BACTEREMIA: Primary | ICD-10-CM

## 2022-10-17 PROCEDURE — 96374 THER/PROPH/DIAG INJ IV PUSH: CPT

## 2022-10-17 PROCEDURE — 74011250636 HC RX REV CODE- 250/636: Performed by: INTERNAL MEDICINE

## 2022-10-17 PROCEDURE — 74011000250 HC RX REV CODE- 250: Performed by: INTERNAL MEDICINE

## 2022-10-17 RX ORDER — SODIUM CHLORIDE 9 MG/ML
5-40 INJECTION INTRAMUSCULAR; INTRAVENOUS; SUBCUTANEOUS AS NEEDED
Status: CANCELLED | OUTPATIENT
Start: 2022-10-18

## 2022-10-17 RX ORDER — SODIUM CHLORIDE 9 MG/ML
5-250 INJECTION, SOLUTION INTRAVENOUS AS NEEDED
Status: DISPENSED | OUTPATIENT
Start: 2022-10-17 | End: 2022-10-17

## 2022-10-17 RX ORDER — DIPHENHYDRAMINE HYDROCHLORIDE 50 MG/ML
50 INJECTION, SOLUTION INTRAMUSCULAR; INTRAVENOUS AS NEEDED
Status: CANCELLED
Start: 2022-10-18

## 2022-10-17 RX ORDER — EPINEPHRINE 1 MG/ML
0.3 INJECTION, SOLUTION, CONCENTRATE INTRAVENOUS AS NEEDED
Status: CANCELLED | OUTPATIENT
Start: 2022-10-18

## 2022-10-17 RX ORDER — DIPHENHYDRAMINE HYDROCHLORIDE 50 MG/ML
25 INJECTION, SOLUTION INTRAMUSCULAR; INTRAVENOUS AS NEEDED
Status: CANCELLED
Start: 2022-10-18

## 2022-10-17 RX ORDER — ALBUTEROL SULFATE 0.83 MG/ML
2.5 SOLUTION RESPIRATORY (INHALATION) AS NEEDED
Status: CANCELLED
Start: 2022-10-18

## 2022-10-17 RX ORDER — ACETAMINOPHEN 325 MG/1
650 TABLET ORAL AS NEEDED
Status: CANCELLED
Start: 2022-10-18

## 2022-10-17 RX ORDER — HEPARIN 100 UNIT/ML
500 SYRINGE INTRAVENOUS AS NEEDED
Status: ACTIVE | OUTPATIENT
Start: 2022-10-17 | End: 2022-10-17

## 2022-10-17 RX ORDER — ONDANSETRON 2 MG/ML
8 INJECTION INTRAMUSCULAR; INTRAVENOUS AS NEEDED
Status: CANCELLED | OUTPATIENT
Start: 2022-10-18

## 2022-10-17 RX ORDER — HYDROCORTISONE SODIUM SUCCINATE 100 MG/2ML
100 INJECTION, POWDER, FOR SOLUTION INTRAMUSCULAR; INTRAVENOUS AS NEEDED
Status: CANCELLED | OUTPATIENT
Start: 2022-10-18

## 2022-10-17 RX ORDER — SODIUM CHLORIDE 9 MG/ML
5-250 INJECTION, SOLUTION INTRAVENOUS AS NEEDED
Status: CANCELLED | OUTPATIENT
Start: 2022-10-18

## 2022-10-17 RX ORDER — SODIUM CHLORIDE 0.9 % (FLUSH) 0.9 %
5-40 SYRINGE (ML) INJECTION AS NEEDED
Status: CANCELLED | OUTPATIENT
Start: 2022-10-18

## 2022-10-17 RX ORDER — HEPARIN 100 UNIT/ML
500 SYRINGE INTRAVENOUS AS NEEDED
Status: CANCELLED
Start: 2022-10-18

## 2022-10-17 RX ORDER — SODIUM CHLORIDE 0.9 % (FLUSH) 0.9 %
5-40 SYRINGE (ML) INJECTION AS NEEDED
Status: DISPENSED | OUTPATIENT
Start: 2022-10-17 | End: 2022-10-17

## 2022-10-17 RX ADMIN — SODIUM CHLORIDE 100 ML/HR: 9 INJECTION, SOLUTION INTRAVENOUS at 11:27

## 2022-10-17 RX ADMIN — DAPTOMYCIN 500 MG: 500 INJECTION, POWDER, LYOPHILIZED, FOR SOLUTION INTRAVENOUS at 11:30

## 2022-10-17 RX ADMIN — SODIUM CHLORIDE, PRESERVATIVE FREE 10 ML: 5 INJECTION INTRAVENOUS at 11:15

## 2022-10-17 RX ADMIN — HEPARIN 500 UNITS: 100 SYRINGE at 11:45

## 2022-10-17 NOTE — PROGRESS NOTES
[facility] Progress Note    Date: 2022    Name: Caesar Sanchez    MRN: [id number]         : 1955    Ms. Rupert Christine was assessed . Port needle in place. No blood return, however flushes easily and drips freely to gravity. N/S up a flush line    Ms. Angelica Alvarez vitals were reviewed. Visit Vitals  /75   Pulse (!) 108   Temp 97.7 °F (36.5 °C)   Resp 18   Wt 61 kg (134 lb 6.4 oz)   SpO2 96%   BMI 22.37 kg/m²         []  Vancomycin     []  Invanz     [x]  Cubicin     []  Rocephin    was injected over 2  mins    Ms. Rupert Christine tolerated infusion, and had no complaints at this time. Patient armband removed and shredded. Ms. Rupert Christine was discharged from [place] [address] in stable condition at 1150. She is to return on 10/18/22 at 1100 for her next appointment.     Margoth Abel RN  2022  12:20 PM

## 2022-10-18 ENCOUNTER — HOSPITAL ENCOUNTER (OUTPATIENT)
Dept: INFUSION THERAPY | Age: 67
Discharge: HOME | End: 2022-10-18
Payer: MEDICARE

## 2022-10-18 VITALS
TEMPERATURE: 97.7 F | HEART RATE: 89 BPM | DIASTOLIC BLOOD PRESSURE: 69 MMHG | RESPIRATION RATE: 18 BRPM | SYSTOLIC BLOOD PRESSURE: 102 MMHG

## 2022-10-18 DIAGNOSIS — R78.81 BACTEREMIA: Primary | ICD-10-CM

## 2022-10-18 PROCEDURE — 74011000250 HC RX REV CODE- 250: Performed by: INTERNAL MEDICINE

## 2022-10-18 PROCEDURE — 74011250636 HC RX REV CODE- 250/636: Performed by: INTERNAL MEDICINE

## 2022-10-18 PROCEDURE — 96374 THER/PROPH/DIAG INJ IV PUSH: CPT

## 2022-10-18 RX ORDER — HYDROCORTISONE SODIUM SUCCINATE 100 MG/2ML
100 INJECTION, POWDER, FOR SOLUTION INTRAMUSCULAR; INTRAVENOUS AS NEEDED
Status: CANCELLED | OUTPATIENT
Start: 2022-10-19

## 2022-10-18 RX ORDER — ALBUTEROL SULFATE 0.83 MG/ML
2.5 SOLUTION RESPIRATORY (INHALATION) AS NEEDED
Status: CANCELLED
Start: 2022-10-19

## 2022-10-18 RX ORDER — DIPHENHYDRAMINE HYDROCHLORIDE 50 MG/ML
50 INJECTION, SOLUTION INTRAMUSCULAR; INTRAVENOUS AS NEEDED
Status: CANCELLED
Start: 2022-10-19

## 2022-10-18 RX ORDER — ACETAMINOPHEN 325 MG/1
650 TABLET ORAL AS NEEDED
Status: CANCELLED
Start: 2022-10-19

## 2022-10-18 RX ORDER — SODIUM CHLORIDE 0.9 % (FLUSH) 0.9 %
5-40 SYRINGE (ML) INJECTION AS NEEDED
Status: CANCELLED | OUTPATIENT
Start: 2022-10-19

## 2022-10-18 RX ORDER — EPINEPHRINE 1 MG/ML
0.3 INJECTION, SOLUTION, CONCENTRATE INTRAVENOUS AS NEEDED
Status: CANCELLED | OUTPATIENT
Start: 2022-10-19

## 2022-10-18 RX ORDER — HEPARIN 100 UNIT/ML
500 SYRINGE INTRAVENOUS AS NEEDED
Status: CANCELLED
Start: 2022-10-19

## 2022-10-18 RX ORDER — DIPHENHYDRAMINE HYDROCHLORIDE 50 MG/ML
25 INJECTION, SOLUTION INTRAMUSCULAR; INTRAVENOUS AS NEEDED
Status: CANCELLED
Start: 2022-10-19

## 2022-10-18 RX ORDER — ONDANSETRON 2 MG/ML
8 INJECTION INTRAMUSCULAR; INTRAVENOUS AS NEEDED
Status: CANCELLED | OUTPATIENT
Start: 2022-10-19

## 2022-10-18 RX ORDER — HEPARIN 100 UNIT/ML
500 SYRINGE INTRAVENOUS AS NEEDED
Status: ACTIVE | OUTPATIENT
Start: 2022-10-18 | End: 2022-10-18

## 2022-10-18 RX ORDER — SODIUM CHLORIDE 0.9 % (FLUSH) 0.9 %
5-40 SYRINGE (ML) INJECTION AS NEEDED
Status: DISPENSED | OUTPATIENT
Start: 2022-10-18 | End: 2022-10-18

## 2022-10-18 RX ORDER — SODIUM CHLORIDE 9 MG/ML
5-40 INJECTION INTRAMUSCULAR; INTRAVENOUS; SUBCUTANEOUS AS NEEDED
Status: CANCELLED | OUTPATIENT
Start: 2022-10-19

## 2022-10-18 RX ORDER — SODIUM CHLORIDE 9 MG/ML
5-250 INJECTION, SOLUTION INTRAVENOUS AS NEEDED
Status: CANCELLED | OUTPATIENT
Start: 2022-10-19

## 2022-10-18 RX ADMIN — Medication 500 UNITS: at 11:30

## 2022-10-18 RX ADMIN — SODIUM CHLORIDE, PRESERVATIVE FREE 10 ML: 5 INJECTION INTRAVENOUS at 11:29

## 2022-10-18 RX ADMIN — SODIUM CHLORIDE, PRESERVATIVE FREE 10 ML: 5 INJECTION INTRAVENOUS at 11:22

## 2022-10-18 RX ADMIN — DAPTOMYCIN 500 MG: 500 INJECTION, POWDER, LYOPHILIZED, FOR SOLUTION INTRAVENOUS at 11:26

## 2022-10-18 NOTE — PROGRESS NOTES
[facility] Progress Note    Date: 2022    Name: Jessie Cotton    MRN: [id number]         : 1955    Ms. Levi Martinez was assessed and education was provided. She denies any new problems. States she walked more yesterday and tolerated that. Port site intact, flushed without difficulty. No blood return. Discussed instilling alteplase and she will plan her transportation to accommodate that tomorrow. Ms. Ronni Gold vitals were reviewed. Visit Vitals  /69 (BP 1 Location: Right upper arm)   Pulse 89   Temp 97.7 °F (36.5 °C)   Resp 18         []  Vancomycin     []  Invanz     [x]  Cubicin 500 mg IVP given via port over 2 minutes. Flushed with NS 10 ml pre and post.     []  Rocephin     Ms. Levi Martinez tolerated infusion, and had no complaints at this time. Patient armband removed and shredded. Ms. Levi Martinez was discharged from [place] [address] in stable condition at 1135. She is to return on  at 1100 for her next appointment.     Bere Fleming RN  2022  12:24 PM

## 2022-10-19 ENCOUNTER — HOSPITAL ENCOUNTER (OUTPATIENT)
Dept: INFUSION THERAPY | Age: 67
Discharge: HOME | End: 2022-10-19
Payer: MEDICARE

## 2022-10-19 VITALS
SYSTOLIC BLOOD PRESSURE: 104 MMHG | HEART RATE: 103 BPM | BODY MASS INDEX: 22.57 KG/M2 | RESPIRATION RATE: 16 BRPM | TEMPERATURE: 97.5 F | WEIGHT: 135.6 LBS | DIASTOLIC BLOOD PRESSURE: 70 MMHG

## 2022-10-19 DIAGNOSIS — R78.81 BACTEREMIA: Primary | ICD-10-CM

## 2022-10-19 PROCEDURE — 74011250636 HC RX REV CODE- 250/636: Performed by: INTERNAL MEDICINE

## 2022-10-19 PROCEDURE — 96374 THER/PROPH/DIAG INJ IV PUSH: CPT

## 2022-10-19 PROCEDURE — 74011000250 HC RX REV CODE- 250: Performed by: INTERNAL MEDICINE

## 2022-10-19 RX ORDER — ACETAMINOPHEN 325 MG/1
650 TABLET ORAL AS NEEDED
Status: ACTIVE | OUTPATIENT
Start: 2022-10-19 | End: 2022-10-19

## 2022-10-19 RX ORDER — HYDROCORTISONE SODIUM SUCCINATE 100 MG/2ML
100 INJECTION, POWDER, FOR SOLUTION INTRAMUSCULAR; INTRAVENOUS AS NEEDED
Status: CANCELLED | OUTPATIENT
Start: 2022-10-20

## 2022-10-19 RX ORDER — DIPHENHYDRAMINE HYDROCHLORIDE 50 MG/ML
25 INJECTION, SOLUTION INTRAMUSCULAR; INTRAVENOUS AS NEEDED
Status: CANCELLED
Start: 2022-10-20

## 2022-10-19 RX ORDER — ONDANSETRON 2 MG/ML
8 INJECTION INTRAMUSCULAR; INTRAVENOUS AS NEEDED
Status: CANCELLED | OUTPATIENT
Start: 2022-10-20

## 2022-10-19 RX ORDER — DIPHENHYDRAMINE HYDROCHLORIDE 50 MG/ML
25 INJECTION, SOLUTION INTRAMUSCULAR; INTRAVENOUS AS NEEDED
Status: ACTIVE | OUTPATIENT
Start: 2022-10-19 | End: 2022-10-19

## 2022-10-19 RX ORDER — DIPHENHYDRAMINE HYDROCHLORIDE 50 MG/ML
50 INJECTION, SOLUTION INTRAMUSCULAR; INTRAVENOUS AS NEEDED
Status: CANCELLED
Start: 2022-10-20

## 2022-10-19 RX ORDER — EPINEPHRINE 1 MG/ML
0.3 INJECTION, SOLUTION, CONCENTRATE INTRAVENOUS AS NEEDED
Status: CANCELLED | OUTPATIENT
Start: 2022-10-20

## 2022-10-19 RX ORDER — HEPARIN 100 UNIT/ML
500 SYRINGE INTRAVENOUS AS NEEDED
Status: CANCELLED
Start: 2022-10-20

## 2022-10-19 RX ORDER — SODIUM CHLORIDE 9 MG/ML
5-250 INJECTION, SOLUTION INTRAVENOUS AS NEEDED
Status: CANCELLED | OUTPATIENT
Start: 2022-10-20

## 2022-10-19 RX ORDER — SODIUM CHLORIDE 9 MG/ML
5-40 INJECTION INTRAMUSCULAR; INTRAVENOUS; SUBCUTANEOUS AS NEEDED
Status: CANCELLED | OUTPATIENT
Start: 2022-10-20

## 2022-10-19 RX ORDER — HEPARIN 100 UNIT/ML
500 SYRINGE INTRAVENOUS AS NEEDED
Status: DISPENSED | OUTPATIENT
Start: 2022-10-19 | End: 2022-10-19

## 2022-10-19 RX ORDER — ACETAMINOPHEN 325 MG/1
650 TABLET ORAL AS NEEDED
Status: CANCELLED
Start: 2022-10-20

## 2022-10-19 RX ORDER — ALBUTEROL SULFATE 0.83 MG/ML
2.5 SOLUTION RESPIRATORY (INHALATION) AS NEEDED
Status: CANCELLED
Start: 2022-10-20

## 2022-10-19 RX ORDER — SODIUM CHLORIDE 0.9 % (FLUSH) 0.9 %
5-40 SYRINGE (ML) INJECTION AS NEEDED
Status: CANCELLED | OUTPATIENT
Start: 2022-10-20

## 2022-10-19 RX ORDER — SODIUM CHLORIDE 0.9 % (FLUSH) 0.9 %
5-40 SYRINGE (ML) INJECTION AS NEEDED
Status: DISPENSED | OUTPATIENT
Start: 2022-10-19 | End: 2022-10-19

## 2022-10-19 RX ORDER — SODIUM CHLORIDE 9 MG/ML
5-250 INJECTION, SOLUTION INTRAVENOUS AS NEEDED
Status: DISPENSED | OUTPATIENT
Start: 2022-10-19 | End: 2022-10-19

## 2022-10-19 RX ORDER — ONDANSETRON 2 MG/ML
8 INJECTION INTRAMUSCULAR; INTRAVENOUS AS NEEDED
Status: ACTIVE | OUTPATIENT
Start: 2022-10-19 | End: 2022-10-19

## 2022-10-19 RX ADMIN — SODIUM CHLORIDE, PRESERVATIVE FREE 10 ML: 5 INJECTION INTRAVENOUS at 11:55

## 2022-10-19 RX ADMIN — SODIUM CHLORIDE 25 ML/HR: 9 INJECTION, SOLUTION INTRAVENOUS at 11:40

## 2022-10-19 RX ADMIN — HEPARIN 500 UNITS: 100 SYRINGE at 11:56

## 2022-10-19 RX ADMIN — DAPTOMYCIN 500 MG: 500 INJECTION, POWDER, LYOPHILIZED, FOR SOLUTION INTRAVENOUS at 11:42

## 2022-10-19 NOTE — PROGRESS NOTES
[facility] Progress Note    Date: 2022    Name: Bertha Atkins    MRN: [id number]         : 1955      Ms. Sage Candelaria was assessed and education was provided. Ms. Simone Nolasco vitals were reviewed and patient was observed for 5 minutes prior to treatment. Visit Vitals  /70 (BP 1 Location: Left upper arm, BP Patient Position: Sitting)   Pulse (!) 103   Temp 97.5 °F (36.4 °C)   Resp 16   Wt 61.5 kg (135 lb 9.6 oz)   BMI 22.57 kg/m²     Left port flushed with NS,  + blood return. NS free flowing  Daptomycin 500mg administered slow IVP via free flowing NS. Port flushed with 10ml NS and 500u heparin. Alcohol cap placed, port remains accessed as she is to return tomorrow. Ms. Sage Candelaria tolerated well, and had no complaints. Patient armband removed and shredded. Ms. Sage Candelaria was discharged from [place] [address] in stable condition at 1205. She is to return on 10-20-22 at 1100 for her next appointment.     Adeline Morel RN  2022  12:04 PM

## 2022-10-19 NOTE — PROGRESS NOTES
Problem: Infection - Risk of, Central Venous Catheter-Associated Bloodstream Infection  Goal: *Absence of infection signs and symptoms  Outcome: Resolved/Met     Problem: Knowledge Deficit  Goal: *Verbalizes understanding of procedures and medications  Outcome: Resolved/Met     Problem: Patient Education:  Go to Education Activity  Goal: Patient/Family Education  Outcome: Resolved/Met

## 2022-10-20 ENCOUNTER — HOSPITAL ENCOUNTER (OUTPATIENT)
Dept: INFUSION THERAPY | Age: 67
Discharge: HOME | End: 2022-10-20
Payer: MEDICARE

## 2022-10-20 VITALS
OXYGEN SATURATION: 97 % | DIASTOLIC BLOOD PRESSURE: 63 MMHG | BODY MASS INDEX: 22.23 KG/M2 | HEART RATE: 107 BPM | TEMPERATURE: 97.8 F | SYSTOLIC BLOOD PRESSURE: 103 MMHG | WEIGHT: 133.6 LBS | RESPIRATION RATE: 16 BRPM

## 2022-10-20 DIAGNOSIS — R78.81 BACTEREMIA: Primary | ICD-10-CM

## 2022-10-20 PROCEDURE — 96372 THER/PROPH/DIAG INJ SC/IM: CPT

## 2022-10-20 PROCEDURE — 74011250636 HC RX REV CODE- 250/636: Performed by: INTERNAL MEDICINE

## 2022-10-20 PROCEDURE — 96374 THER/PROPH/DIAG INJ IV PUSH: CPT

## 2022-10-20 PROCEDURE — 74011000250 HC RX REV CODE- 250: Performed by: INTERNAL MEDICINE

## 2022-10-20 RX ORDER — ACETAMINOPHEN 325 MG/1
650 TABLET ORAL AS NEEDED
Status: ACTIVE | OUTPATIENT
Start: 2022-10-20 | End: 2022-10-20

## 2022-10-20 RX ORDER — ACETAMINOPHEN 325 MG/1
650 TABLET ORAL AS NEEDED
Start: 2022-10-20

## 2022-10-20 RX ORDER — SODIUM CHLORIDE 0.9 % (FLUSH) 0.9 %
5-40 SYRINGE (ML) INJECTION AS NEEDED
OUTPATIENT
Start: 2022-10-20

## 2022-10-20 RX ORDER — EPINEPHRINE 1 MG/ML
0.3 INJECTION, SOLUTION, CONCENTRATE INTRAVENOUS AS NEEDED
OUTPATIENT
Start: 2022-10-20

## 2022-10-20 RX ORDER — DIPHENHYDRAMINE HYDROCHLORIDE 50 MG/ML
25 INJECTION, SOLUTION INTRAMUSCULAR; INTRAVENOUS AS NEEDED
Status: ACTIVE | OUTPATIENT
Start: 2022-10-20 | End: 2022-10-20

## 2022-10-20 RX ORDER — SODIUM CHLORIDE 0.9 % (FLUSH) 0.9 %
5-40 SYRINGE (ML) INJECTION AS NEEDED
Status: DISPENSED | OUTPATIENT
Start: 2022-10-20 | End: 2022-10-20

## 2022-10-20 RX ORDER — SODIUM CHLORIDE 9 MG/ML
5-40 INJECTION INTRAMUSCULAR; INTRAVENOUS; SUBCUTANEOUS AS NEEDED
OUTPATIENT
Start: 2022-10-20

## 2022-10-20 RX ORDER — ONDANSETRON 2 MG/ML
8 INJECTION INTRAMUSCULAR; INTRAVENOUS AS NEEDED
OUTPATIENT
Start: 2022-10-20

## 2022-10-20 RX ORDER — SODIUM CHLORIDE 9 MG/ML
5-250 INJECTION, SOLUTION INTRAVENOUS AS NEEDED
Status: DISPENSED | OUTPATIENT
Start: 2022-10-20 | End: 2022-10-20

## 2022-10-20 RX ORDER — ALBUTEROL SULFATE 0.83 MG/ML
2.5 SOLUTION RESPIRATORY (INHALATION) AS NEEDED
Start: 2022-10-20

## 2022-10-20 RX ORDER — SODIUM CHLORIDE 9 MG/ML
5-250 INJECTION, SOLUTION INTRAVENOUS AS NEEDED
OUTPATIENT
Start: 2022-10-20

## 2022-10-20 RX ORDER — HEPARIN 100 UNIT/ML
500 SYRINGE INTRAVENOUS AS NEEDED
Status: DISPENSED | OUTPATIENT
Start: 2022-10-20 | End: 2022-10-20

## 2022-10-20 RX ORDER — DIPHENHYDRAMINE HYDROCHLORIDE 50 MG/ML
25 INJECTION, SOLUTION INTRAMUSCULAR; INTRAVENOUS AS NEEDED
Start: 2022-10-20

## 2022-10-20 RX ORDER — DIPHENHYDRAMINE HYDROCHLORIDE 50 MG/ML
50 INJECTION, SOLUTION INTRAMUSCULAR; INTRAVENOUS AS NEEDED
Start: 2022-10-20

## 2022-10-20 RX ORDER — HEPARIN 100 UNIT/ML
500 SYRINGE INTRAVENOUS AS NEEDED
Start: 2022-10-20

## 2022-10-20 RX ORDER — HYDROCORTISONE SODIUM SUCCINATE 100 MG/2ML
100 INJECTION, POWDER, FOR SOLUTION INTRAMUSCULAR; INTRAVENOUS AS NEEDED
OUTPATIENT
Start: 2022-10-20

## 2022-10-20 RX ORDER — ONDANSETRON 2 MG/ML
8 INJECTION INTRAMUSCULAR; INTRAVENOUS AS NEEDED
Status: ACTIVE | OUTPATIENT
Start: 2022-10-20 | End: 2022-10-20

## 2022-10-20 RX ADMIN — SODIUM CHLORIDE, PRESERVATIVE FREE 10 ML: 5 INJECTION INTRAVENOUS at 11:42

## 2022-10-20 RX ADMIN — DAPTOMYCIN 500 MG: 500 INJECTION, POWDER, LYOPHILIZED, FOR SOLUTION INTRAVENOUS at 11:34

## 2022-10-20 RX ADMIN — HEPARIN 500 UNITS: 100 SYRINGE at 11:42

## 2022-10-20 RX ADMIN — SODIUM CHLORIDE 25 ML/HR: 9 INJECTION, SOLUTION INTRAVENOUS at 11:18

## 2022-10-20 NOTE — PROGRESS NOTES
[facility] Progress Note    Date: 2022    Name: Adelfo Asher    MRN: [id number]         : 1955      Ms. Padmini Merrill was assessed and education was provided. Ms. Delaney Guy vitals were reviewed and patient was observed for 5 minutes prior to treatment. Visit Vitals  /63 (BP 1 Location: Left upper arm, BP Patient Position: Sitting)   Pulse (!) 107   Temp 97.8 °F (36.6 °C)   Resp 16   Wt 60.6 kg (133 lb 9.6 oz)   SpO2 97%   BMI 22.23 kg/m²   Port flushed with 10ml NS and NS up at [facility]. Daptomycin 500mg slow IVP with free flowing NS. Upon completion port was flushed with 10ml NS and 500u heparin prior to removing the needle. 2x2 and bandaid to the site. Ms. Padmini Merrill tolerated well, and had no complaints. Patient armband removed and shredded. Ms. Padmini Merrill was discharged from [place] [address] in stable condition at 1155. This was the last day of the daptomycin - no further appointment is needed as she is treated at the infusion center at [facility].     Parvin Gee RN  2022  12:06 PM

## 2022-10-24 LAB
BACTERIA SPEC CULT: NORMAL
SERVICE CMNT-IMP: NORMAL

## 2022-10-27 ENCOUNTER — APPOINTMENT (OUTPATIENT)
Dept: INFUSION THERAPY | Age: 67
End: 2022-10-27

## 2022-10-28 ENCOUNTER — PATIENT OUTREACH (OUTPATIENT)
Dept: CASE MANAGEMENT | Age: 67
End: 2022-10-28

## 2022-10-28 ENCOUNTER — TELEPHONE (OUTPATIENT)
Dept: GYNECOLOGY | Age: 67
End: 2022-10-28

## 2022-10-28 DIAGNOSIS — J90 PLEURAL EFFUSION, BILATERAL: ICD-10-CM

## 2022-10-28 DIAGNOSIS — C54.1 PRIMARY SEROUS ADENOCARCINOMA OF ENDOMETRIUM (HCC): Primary | ICD-10-CM

## 2022-10-28 NOTE — PROGRESS NOTES
Care Transitions Follow Up Call    Challenges to be reviewed by the provider   Additional needs identified to be addressed with provider: no  none           Method of communication with provider : none    Care Transition Nurse (CTN) contacted the patient by telephone to follow up after admission on 10/4/22 readmission with pleural effusions. Verified name and  with patient as identifiers. Addressed changes since last contact:  need of possible throracentesis on right side due to increased sob and heaviness in chest  Follow up appointment completed? yes. Was follow up appointment scheduled within 7 days of discharge? yes. Advance Care Planning:   Does patient have an Advance Directive:  yes; reviewed and current     CTN reviewed discharge instructions, medical action plan and red flags with patient and discussed any barriers to care and/or understanding of plan of care after discharge. Discussed appropriate site of care based on symptoms and resources available to patient including: PCP, Specialist, and Urgent Care Clinics. The patient agrees to contact the PCP office for questions related to their healthcare. Patients top risk factors for readmission: functional physical ability, medical condition-bilateral pleural effusions, uterine cancer, and polypharmacy   Interventions to address risk factors: Scheduled appointment with Specialist-10/31/22 for thoracentesis and Obtained and reviewed discharge summary and/or continuity of care documents    [facility] follow up appointment(s):   Future Appointments   Date Time Provider David Jara   10/31/2022  1:30 PM [address] [place] [address]   2022 11:00 AM Peace Serna DO MID [facility]     Non[facility] follow up appointment(s): none at this time    CTN provided contact information for future needs. Plan for follow-up call in 5-7 days based on severity of symptoms and risk factors.   Plan for next call: follow up appointment-10/31/22       Goals Addressed                   This Visit's Progress     Prevent complications post hospitalization. On track     9/27/22  Patient to elevate legs due to swelling from 2 hour car ride home yesterday. Patient to drain spira cath tonight to help facilitate fluid status and decrease of fluid. Patient to take antibiotic as ordered. Patient to monitor for fever, increased drainage from boil lancing, foul odor. Patient to change dressing daily and wash with soap and water. Ctn to follow up in one week. Marge Pisano RN    10/14/22    Patient to drain spira cath every two days on left side. Ctn contacted [facility] to make aware of new drain and percy orders were received. Patient to go to [facility] once a day to receive iv antibiotics for 10 days. Patient to monitor for fever daily  Ctn to follow up in one week  Marge Pisano RN    10/28/22    Patient to drink non caffeine drinks to assist with hydration. Patient to go to [facility] for possible right thoracentesis on Monday. Patient to monitor oxygen levels over the weekend to ensure oxygen levels above 90  Ctn to follow up on Tuesday nov 1st to check on patient after thoracentesis.    Marge Pisano RN

## 2022-10-28 NOTE — TELEPHONE ENCOUNTER
Pt , she states she is having a hard time breathing, she thinks the fluid has returned on her right side, she states that sitting up makes it worse, laying down she feels better, it is also worse with exertion, she states her pulse is 103 and O2 is 97%, she states she has been draining her left side and her abdomen, 500 cc on Sunday, Tuesday and Thursday with no relief, she states she does not feel like she needs to go to ER, I spoke with Xochilt Wilcox and she is going to be an order into ultrasound to see if right side needs to be drained, pt does not want to go today, wants to go early next week and would like to go to [facility], advised if symptoms worse to go to ER

## 2022-10-31 ENCOUNTER — HOSPITAL ENCOUNTER (OUTPATIENT)
Dept: ULTRASOUND IMAGING | Age: 67
Discharge: HOME | End: 2022-10-31
Attending: PHYSICIAN ASSISTANT
Payer: MEDICARE

## 2022-10-31 ENCOUNTER — HOSPITAL ENCOUNTER (OUTPATIENT)
Dept: GENERAL RADIOLOGY | Age: 67
Discharge: HOME | End: 2022-10-31
Attending: PHYSICIAN ASSISTANT
Payer: MEDICARE

## 2022-10-31 ENCOUNTER — TELEPHONE (OUTPATIENT)
Dept: GYNECOLOGY | Age: 67
End: 2022-10-31

## 2022-10-31 VITALS
DIASTOLIC BLOOD PRESSURE: 75 MMHG | BODY MASS INDEX: 21.66 KG/M2 | HEIGHT: 65 IN | SYSTOLIC BLOOD PRESSURE: 104 MMHG | HEART RATE: 108 BPM | TEMPERATURE: 97.8 F | WEIGHT: 130 LBS | RESPIRATION RATE: 14 BRPM | OXYGEN SATURATION: 95 %

## 2022-10-31 DIAGNOSIS — J90 PLEURAL EFFUSION, BILATERAL: ICD-10-CM

## 2022-10-31 DIAGNOSIS — C54.1 PRIMARY SEROUS ADENOCARCINOMA OF ENDOMETRIUM (HCC): ICD-10-CM

## 2022-10-31 LAB
COMMENT, HOLDF: NORMAL
SAMPLES BEING HELD,HOLD: NORMAL

## 2022-10-31 PROCEDURE — 71045 X-RAY EXAM CHEST 1 VIEW: CPT

## 2022-10-31 PROCEDURE — 74011000250 HC RX REV CODE- 250: Performed by: PHYSICIAN ASSISTANT

## 2022-10-31 PROCEDURE — 32555 ASPIRATE PLEURA W/ IMAGING: CPT

## 2022-10-31 RX ORDER — LIDOCAINE HYDROCHLORIDE 10 MG/ML
10 INJECTION, SOLUTION EPIDURAL; INFILTRATION; INTRACAUDAL; PERINEURAL
Status: COMPLETED | OUTPATIENT
Start: 2022-10-31 | End: 2022-10-31

## 2022-10-31 RX ADMIN — LIDOCAINE HYDROCHLORIDE 10 ML: 10 INJECTION, SOLUTION EPIDURAL; INFILTRATION; INTRACAUDAL; PERINEURAL at 16:00

## 2022-10-31 NOTE — TELEPHONE ENCOUNTER
Pt called to office to report blood pressure of 110/63 and they drained 1,250 ml of fluid off her right lung, per Travis, have patient continue to hydrate and call us if any other issues

## 2022-10-31 NOTE — DISCHARGE INSTRUCTIONS
[facility]  Radiology Department  [phone]    Radiologist: Dr Elisa Brennan    Date:10/31/2022      Thoracentesis Discharge Instructions    You may have an aching pain in the puncture site tonight as the numbing medicine wears off. You may take Tylenol, as directed on the label, for pain or discomfort. Avoid ibuprofen (Advil, Motrin) and aspirin products for the next 48 hours as these drugs may increase your chance of bleeding. You have develop a mild cough as the lungs re expand with air, this should resolve with in the next 24 hours. Resume your previous diet and continue your prescribed medicaitons. Rest for the next 24 hours. If you experience shortness of breath (other than your normal breathing pattern) difficulty breathing, or have severe chest pain, call 911 and go to the nearest Emergency Room.     Be sure to follow up with your referring physician as soon as possible

## 2022-10-31 NOTE — TELEPHONE ENCOUNTER
Patient is going for draining of her left lung today. She reports blood pressure readings Sunday: 88/65, 88/66 Today: 81/67, 92/70 Pulse:115. She admits she has not been drinking fluids like she should. She is light headed upon standing. Advised to get up very slowly and try to drink more fluids. Advised that  would like to see how she is following the draining today. We may need to schedule her for IV fluids. Patient is also concerned about running out of suppplies for the catheters. Advised the order has been faxed.

## 2022-10-31 NOTE — ROUTINE PROCESS
Discharge instructions have been reviewed with patient and present family. Copy given to patient. Pt verbalized understand of instructions and was given  the opportunity to ask questions and receive answers prior to leaving. Pt discharged with family and assisted out by RN in wheelchair. In NAD at time of d/c.

## 2022-11-10 ENCOUNTER — PATIENT OUTREACH (OUTPATIENT)
Dept: CASE MANAGEMENT | Age: 67
End: 2022-11-10

## 2022-11-10 NOTE — PROGRESS NOTES
Patient has graduated from the Transitions of Care Coordination  program on 11/10/12. Patient/family has the ability to self-manage at this time Care management goals have been completed. Patient was not referred to the [facility] team for further management. Goals Addressed                   This Visit's Progress     COMPLETED: Prevent complications post hospitalization. On track     9/27/22  Patient to elevate legs due to swelling from 2 hour car ride home yesterday. Patient to drain spira cath tonight to help facilitate fluid status and decrease of fluid. Patient to take antibiotic as ordered. Patient to monitor for fever, increased drainage from boil lancing, foul odor. Patient to change dressing daily and wash with soap and water. Ctn to follow up in one week. Jose Elena RN    10/14/22    Patient to drain spira cath every two days on left side. Ctn contacted [facility] to make aware of new drain and percy orders were received. Patient to go to [facility] once a day to receive iv antibiotics for 10 days. Patient to monitor for fever daily  Ctn to follow up in one week  Jose Elena RN    10/28/22    Patient to drink non caffeine drinks to assist with hydration. Patient to go to [facility] for possible right thoracentesis on Monday. Patient to monitor oxygen levels over the weekend to ensure oxygen levels above 90  Ctn to follow to check on patient after thoracentesis. Jose Elena RN    11/10/22    Thoracentesis successful and drained 1250 from right side. Patient and family draining left side and getting 300 to 500. Patient still feels heaviness and SOB with exertion  Patient has had appt with  to finalize acp papers with her son and daughter and feels she needs to \"get my affairs in order\"  Patient not traveling to [place] for infusions or therapies weekly due to weakness  Patient using karissa to help increase water intake and ensure 3 to 4 voids per day. Patient has plenty of drain supplies and is in close contact with Dr. [facility] concerning condition. Freya Jang RN                  Patient has Care Transition Nurse's contact information for any further questions, concerns, or needs.   Patients upcoming visits:    Future Appointments   Date Time Provider David Jara   11/18/2022 11:00 AM Edvin Serna DO [facility]

## 2022-11-14 ENCOUNTER — TELEPHONE (OUTPATIENT)
Dept: GYNECOLOGY | Age: 67
End: 2022-11-14

## 2022-11-14 ENCOUNTER — HOSPICE ADMISSION (OUTPATIENT)
Dept: HOSPICE | Facility: HOSPICE | Age: 67
End: 2022-11-14

## 2022-11-14 DIAGNOSIS — C54.1 ENDOMETRIAL CANCER (HCC): Primary | ICD-10-CM

## 2022-11-14 NOTE — TELEPHONE ENCOUNTER
Pt calling office again to ask end of life questions about DNR signatures and interest in entering Hospice. All questions answered and Alice Loud LPN will [facility]

## 2022-11-15 NOTE — PROGRESS NOTES
Transition of Care  RUR 17% Moderate  Disposition [address]   DME None  Transportation Family   Follow Up PCP, Specialist    TUAN met with patient who states that she wants to receive IV ABX at [facility]. CM attempted coordination with Kimmie Grossman Dr at [facility] as this would be closest to patient but they do not open until [phone]. Karen Chapman MS    11:35 TUAN confirmed with Oneil at [facility] OF [phone] that patient can come tomorrow at 11:00 am for IV medication. CM to updated AVS. Will place [facility] form on chart for MD to complete. CM will then fax completed order form to [phone]. Kraen Chapman MS      12:37 CM contacted [facility] [phone]. TUAN confirmed patient on service for home health SN, PT/OT. Will fax orders to [phone].      Karen Chapman MS no

## 2022-11-16 ENCOUNTER — TELEPHONE (OUTPATIENT)
Dept: GYNECOLOGY | Age: 67
End: 2022-11-16

## 2022-11-16 DIAGNOSIS — J90 PLEURAL EFFUSION, BILATERAL: ICD-10-CM

## 2022-11-16 DIAGNOSIS — C54.1 PRIMARY SEROUS ADENOCARCINOMA OF ENDOMETRIUM (HCC): Primary | ICD-10-CM

## 2022-11-16 NOTE — TELEPHONE ENCOUNTER
Patient did not sign for Hospice until she can see about getting a catheter placed for the other lung. Please advise.

## 2022-11-17 ENCOUNTER — APPOINTMENT (OUTPATIENT)
Dept: INFUSION THERAPY | Age: 67
End: 2022-11-17

## 2022-11-21 ENCOUNTER — TELEPHONE (OUTPATIENT)
Dept: GYNECOLOGY | Age: 67
End: 2022-11-21

## 2022-11-21 NOTE — TELEPHONE ENCOUNTER
Talya snow/ [facility] called requesting an order, last office note & demographics be faxed to [phone].

## 2022-11-22 ENCOUNTER — APPOINTMENT (OUTPATIENT)
Dept: GENERAL RADIOLOGY | Age: 67
End: 2022-11-22
Attending: RADIOLOGY
Payer: MEDICARE

## 2022-11-22 ENCOUNTER — HOSPITAL ENCOUNTER (OUTPATIENT)
Dept: ULTRASOUND IMAGING | Age: 67
Discharge: HOME | End: 2022-11-22
Attending: STUDENT IN AN ORGANIZED HEALTH CARE EDUCATION/TRAINING PROGRAM | Admitting: STUDENT IN AN ORGANIZED HEALTH CARE EDUCATION/TRAINING PROGRAM
Payer: MEDICARE

## 2022-11-22 ENCOUNTER — APPOINTMENT (OUTPATIENT)
Dept: GENERAL RADIOLOGY | Age: 67
End: 2022-11-22
Attending: PHYSICIAN ASSISTANT
Payer: MEDICARE

## 2022-11-22 VITALS
OXYGEN SATURATION: 98 % | RESPIRATION RATE: 28 BRPM | DIASTOLIC BLOOD PRESSURE: 63 MMHG | TEMPERATURE: 95.1 F | SYSTOLIC BLOOD PRESSURE: 89 MMHG | HEIGHT: 65 IN | HEART RATE: 89 BPM | WEIGHT: 130 LBS | BODY MASS INDEX: 21.66 KG/M2

## 2022-11-22 DIAGNOSIS — C54.1 PRIMARY SEROUS ADENOCARCINOMA OF ENDOMETRIUM (HCC): ICD-10-CM

## 2022-11-22 DIAGNOSIS — J90 PLEURAL EFFUSION, BILATERAL: ICD-10-CM

## 2022-11-22 LAB
APPEARANCE FLD: CLEAR
COLOR FLD: YELLOW
COMMENT, HOLDF: NORMAL
LYMPHOCYTES NFR FLD: 29 %
MONOS+MACROS NFR FLD: 46 %
NEUTROPHILS NFR FLD: 25 %
NUC CELL # FLD: 45 /CU MM
RBC # FLD: 60 /CU MM
SAMPLES BEING HELD,HOLD: NORMAL
SPECIMEN SOURCE FLD: ABNORMAL

## 2022-11-22 PROCEDURE — 74011250636 HC RX REV CODE- 250/636: Performed by: PHYSICIAN ASSISTANT

## 2022-11-22 PROCEDURE — C1729 CATH, DRAINAGE: HCPCS

## 2022-11-22 PROCEDURE — 89050 BODY FLUID CELL COUNT: CPT

## 2022-11-22 PROCEDURE — 74011000250 HC RX REV CODE- 250: Performed by: RADIOLOGY

## 2022-11-22 PROCEDURE — 32555 ASPIRATE PLEURA W/ IMAGING: CPT

## 2022-11-22 PROCEDURE — 71045 X-RAY EXAM CHEST 1 VIEW: CPT

## 2022-11-22 PROCEDURE — 74011250636 HC RX REV CODE- 250/636: Performed by: RADIOLOGY

## 2022-11-22 PROCEDURE — 77030032034 HC SYS EVAC ASEPT DISP BBMI -B

## 2022-11-22 RX ORDER — FENTANYL CITRATE 50 UG/ML
200 INJECTION, SOLUTION INTRAMUSCULAR; INTRAVENOUS
Status: DISCONTINUED | OUTPATIENT
Start: 2022-11-22 | End: 2022-11-22 | Stop reason: HOSPADM

## 2022-11-22 RX ORDER — LIDOCAINE HYDROCHLORIDE 10 MG/ML
8 INJECTION INFILTRATION; PERINEURAL
Status: COMPLETED | OUTPATIENT
Start: 2022-11-22 | End: 2022-11-22

## 2022-11-22 RX ORDER — SODIUM BICARBONATE 42 MG/ML
1 INJECTION, SOLUTION INTRAVENOUS
Status: DISCONTINUED | OUTPATIENT
Start: 2022-11-22 | End: 2022-11-22 | Stop reason: HOSPADM

## 2022-11-22 RX ORDER — MIDAZOLAM HYDROCHLORIDE 1 MG/ML
5 INJECTION, SOLUTION INTRAMUSCULAR; INTRAVENOUS
Status: DISCONTINUED | OUTPATIENT
Start: 2022-11-22 | End: 2022-11-22 | Stop reason: HOSPADM

## 2022-11-22 RX ADMIN — SODIUM CHLORIDE 500 ML: 9 INJECTION, SOLUTION INTRAVENOUS at 14:04

## 2022-11-22 RX ADMIN — CEFAZOLIN 2 G: 1 INJECTION, POWDER, FOR SOLUTION INTRAMUSCULAR; INTRAVENOUS at 11:40

## 2022-11-22 RX ADMIN — LIDOCAINE HYDROCHLORIDE 8 ML: 10 INJECTION INFILTRATION; PERINEURAL at 13:47

## 2022-11-22 NOTE — ROUTINE PROCESS
Pt arrives via wheelchair to angio department accompanied by [facility] (sister) and Marisa Rivas (sister) for right tunneled pleural catheter insertion procedure. All assessments completed and consent was reviewed. Education given was regarding procedure, conscious sedation, post-procedure care and  management/follow-up. Opportunity for questions was provided and all questions and concerns were addressed.

## 2022-11-22 NOTE — ROUTINE PROCESS
Thoracentesis on the right performed. 1200cc drained. SBP dropped to 50s after procedure. Sherren Specter was made aware and 500cc NS bolus ordered and given with good result. PT recovered to baseline SBP of high 80's to low 90's. Denies discomfort. 2 portable chest xrays done and per MELONY Pollock, pt okay for discharge.

## 2023-11-18 NOTE — PROGRESS NOTES
1200cc pleural fluid drained by right thoracentesis.
Interventional Radiology Procedure Note        11/22/2022    Provider: Severino Castillo PA-C  Supervising Physician: Sue Crawford MD      Pre-Procedure Diagnosis: recurrent pleural effusions, endometrial cancer  Post-Procedure Diagnosis: recurrent pleural effusions, endometrial cancer    Informed consent obtained    Procedure(s): US-guided right thoracentesis performed in IR dept. 6F centesis catheter, ouput 1200ml of thin yellow pleural fluid. Sedation: none    Specimens removed: 100cc thin yellow fluid    Complications: Post-procedure CXR showed signs of incomplete reexpansion of the right lung concerning for pneumothorax vs trapped lung. Repeat CXR 1.5hrs post confirmed likely trapped lung. Images reviewed by Dr. Jeff Hsieh and myself. Discussed findings with . Erica Rothman who voiced her understanding. She was discharged home with precautions to return to the ED if she should experience any worsening symptoms such as shortness of breath or chest pain. Recomendations: Follow up with Oncology if she would like to reconsider Right Aspira.     Discharge Disposition: fair      Full PACS report to follow    Severino Castillo PA-C  Interventional Radiology
Pt came in for right tunneled pleural catheter insertion. Noted to have an SBP of 87 to 93 and a rectal temp of 95.1. Aleena Rai MD was made aware. MELONY Corona was called and she came to bedside. After discussing with Asia Pineda MD and the pt, MELONY Milligan decided we can do a right thoracentesis since sedation likely could not be tolerated by the pt. The pt will go home after where she will transition into hospice as previously planned.
Spoke with pharmacist. After reviewing allergies and past medications, stated ok to proceed with giving Ancef.
1

## 2023-11-27 NOTE — PROGRESS NOTES
Problem: Infection - Risk of, Central Venous Catheter-Associated Bloodstream Infection  Goal: *Absence of infection signs and symptoms  Outcome: Resolved/Met     Problem: Patient Education:  Go to Education Activity  Goal: Patient/Family Education  Outcome: Resolved/Met 37.2

## 2024-06-11 NOTE — PROGRESS NOTES
Problem: Infection - Risk of, Central Venous Catheter-Associated Bloodstream Infection  Goal: *Absence of infection signs and symptoms  Outcome: Resolved/Met     Problem: Knowledge Deficit  Goal: *Verbalizes understanding of procedures and medications  Outcome: Resolved/Met     Problem: Patient Education:  Go to Education Activity  Goal: Patient/Family Education  Outcome: Resolved/Met What Is The Reason For Today's Visit?: History of Melanoma Year Excised?: 2018 by Dr. Ramos

## 2025-01-06 NOTE — PROGRESS NOTES
Problem: Chemotherapy Treatment  Goal: *Chemotherapy regimen followed  Outcome: Progressing Towards Goal  Goal: *Hemodynamically stable  Outcome: Progressing Towards Goal  Goal: *Tolerating diet  Outcome: Progressing Towards Goal No

## (undated) DEVICE — GAUZE,SPONGE,2"X2",8PLY,STERILE,LF,2'S: Brand: MEDLINE

## (undated) DEVICE — SUTURE MCRYL SZ 4-0 L27IN ABSRB UD L19MM PS-2 1/2 CIR PRIM Y426H

## (undated) DEVICE — 3M™ TEGADERM™ TRANSPARENT FILM DRESSING FRAME STYLE, 1626W, 4 IN X 4-3/4 IN (10 CM X 12 CM), 50/CT 4CT/CASE: Brand: 3M™ TEGADERM™

## (undated) DEVICE — STRIP SKIN CLSR W0.25XL4IN WHT SPUNBOUND FBR NYL HI ADH

## (undated) DEVICE — TRAY PREP DRY W/ PREM GLV 2 APPL 6 SPNG 2 UNDPD 1 OVERWRAP

## (undated) DEVICE — DECANTER BAG 9": Brand: MEDLINE INDUSTRIES, INC.

## (undated) DEVICE — DRAPE XR C ARM 41X74IN LF --

## (undated) DEVICE — Device

## (undated) DEVICE — COVER LT HNDL PLAS RIG 1 PER PK

## (undated) DEVICE — SUTURE VCRL SZ 3-0 L27IN ABSRB UD L26MM SH 1/2 CIR J416H

## (undated) DEVICE — GLOVE SURG SZ 75 L1212IN FNGR THK138MIL BRN LTX FREE

## (undated) DEVICE — INFECTION CONTROL KIT SYS

## (undated) DEVICE — MASTISOL ADHESIVE LIQ 2/3ML

## (undated) DEVICE — DRAPE,LAPAROTOMY,PED,STERILE: Brand: MEDLINE

## (undated) DEVICE — REM POLYHESIVE ADULT PATIENT RETURN ELECTRODE: Brand: VALLEYLAB

## (undated) DEVICE — SOLUTION IV 500ML 0.9% SOD CHL FLX CONT

## (undated) DEVICE — STRAP,POSITIONING,KNEE/BODY,FOAM,4X60": Brand: MEDLINE

## (undated) DEVICE — SUTURE PROL SZ 2-0 L36IN NONABSORBABLE BLU SH L26MM 1/2 CIR 8523H